# Patient Record
Sex: MALE | Race: WHITE | NOT HISPANIC OR LATINO | Employment: FULL TIME | URBAN - NONMETROPOLITAN AREA
[De-identification: names, ages, dates, MRNs, and addresses within clinical notes are randomized per-mention and may not be internally consistent; named-entity substitution may affect disease eponyms.]

---

## 2020-06-12 ENCOUNTER — HOSPITAL ENCOUNTER (OUTPATIENT)
Dept: LAB | Facility: MEDICAL CENTER | Age: 70
End: 2020-06-12
Attending: FAMILY MEDICINE
Payer: MEDICARE

## 2020-06-12 LAB
25(OH)D3 SERPL-MCNC: 22 NG/ML (ref 30–100)
ALBUMIN SERPL BCP-MCNC: 4.2 G/DL (ref 3.2–4.9)
ALBUMIN/GLOB SERPL: 1.4 G/DL
ALP SERPL-CCNC: 62 U/L (ref 30–99)
ALT SERPL-CCNC: 18 U/L (ref 2–50)
ANION GAP SERPL CALC-SCNC: 12 MMOL/L (ref 7–16)
AST SERPL-CCNC: 24 U/L (ref 12–45)
BASOPHILS # BLD AUTO: 0.9 % (ref 0–1.8)
BASOPHILS # BLD: 0.05 K/UL (ref 0–0.12)
BILIRUB SERPL-MCNC: 0.4 MG/DL (ref 0.1–1.5)
BUN SERPL-MCNC: 16 MG/DL (ref 8–22)
CALCIUM SERPL-MCNC: 8.8 MG/DL (ref 8.5–10.5)
CHLORIDE SERPL-SCNC: 106 MMOL/L (ref 96–112)
CHOLEST SERPL-MCNC: 229 MG/DL (ref 100–199)
CO2 SERPL-SCNC: 22 MMOL/L (ref 20–33)
CREAT SERPL-MCNC: 0.85 MG/DL (ref 0.5–1.4)
EOSINOPHIL # BLD AUTO: 0.24 K/UL (ref 0–0.51)
EOSINOPHIL NFR BLD: 4.3 % (ref 0–6.9)
ERYTHROCYTE [DISTWIDTH] IN BLOOD BY AUTOMATED COUNT: 46.1 FL (ref 35.9–50)
FASTING STATUS PATIENT QL REPORTED: NORMAL
GLOBULIN SER CALC-MCNC: 2.9 G/DL (ref 1.9–3.5)
GLUCOSE SERPL-MCNC: 89 MG/DL (ref 65–99)
HCT VFR BLD AUTO: 42.7 % (ref 42–52)
HDLC SERPL-MCNC: 49 MG/DL
HGB BLD-MCNC: 13.4 G/DL (ref 14–18)
IMM GRANULOCYTES # BLD AUTO: 0.01 K/UL (ref 0–0.11)
IMM GRANULOCYTES NFR BLD AUTO: 0.2 % (ref 0–0.9)
LDLC SERPL CALC-MCNC: 157 MG/DL
LYMPHOCYTES # BLD AUTO: 1.51 K/UL (ref 1–4.8)
LYMPHOCYTES NFR BLD: 26.8 % (ref 22–41)
MCH RBC QN AUTO: 28.5 PG (ref 27–33)
MCHC RBC AUTO-ENTMCNC: 31.4 G/DL (ref 33.7–35.3)
MCV RBC AUTO: 90.9 FL (ref 81.4–97.8)
MONOCYTES # BLD AUTO: 0.7 K/UL (ref 0–0.85)
MONOCYTES NFR BLD AUTO: 12.4 % (ref 0–13.4)
NEUTROPHILS # BLD AUTO: 3.12 K/UL (ref 1.82–7.42)
NEUTROPHILS NFR BLD: 55.4 % (ref 44–72)
NRBC # BLD AUTO: 0 K/UL
NRBC BLD-RTO: 0 /100 WBC
PLATELET # BLD AUTO: 232 K/UL (ref 164–446)
PMV BLD AUTO: 10.6 FL (ref 9–12.9)
POTASSIUM SERPL-SCNC: 4.1 MMOL/L (ref 3.6–5.5)
PROT SERPL-MCNC: 7.1 G/DL (ref 6–8.2)
RBC # BLD AUTO: 4.7 M/UL (ref 4.7–6.1)
SODIUM SERPL-SCNC: 140 MMOL/L (ref 135–145)
T3FREE SERPL-MCNC: 2.84 PG/ML (ref 2–4.4)
T4 FREE SERPL-MCNC: 1.47 NG/DL (ref 0.93–1.7)
TRIGL SERPL-MCNC: 114 MG/DL (ref 0–149)
TSH SERPL DL<=0.005 MIU/L-ACNC: 0.52 UIU/ML (ref 0.38–5.33)
WBC # BLD AUTO: 5.6 K/UL (ref 4.8–10.8)

## 2020-06-12 PROCEDURE — 84403 ASSAY OF TOTAL TESTOSTERONE: CPT

## 2020-06-12 PROCEDURE — 86376 MICROSOMAL ANTIBODY EACH: CPT

## 2020-06-12 PROCEDURE — 84443 ASSAY THYROID STIM HORMONE: CPT

## 2020-06-12 PROCEDURE — 82306 VITAMIN D 25 HYDROXY: CPT

## 2020-06-12 PROCEDURE — 80053 COMPREHEN METABOLIC PANEL: CPT

## 2020-06-12 PROCEDURE — 84270 ASSAY OF SEX HORMONE GLOBUL: CPT

## 2020-06-12 PROCEDURE — 84439 ASSAY OF FREE THYROXINE: CPT

## 2020-06-12 PROCEDURE — 85025 COMPLETE CBC W/AUTO DIFF WBC: CPT

## 2020-06-12 PROCEDURE — 84402 ASSAY OF FREE TESTOSTERONE: CPT

## 2020-06-12 PROCEDURE — 86800 THYROGLOBULIN ANTIBODY: CPT

## 2020-06-12 PROCEDURE — 84481 FREE ASSAY (FT-3): CPT

## 2020-06-12 PROCEDURE — 80061 LIPID PANEL: CPT

## 2020-06-12 PROCEDURE — 36415 COLL VENOUS BLD VENIPUNCTURE: CPT

## 2020-06-14 LAB
SHBG SERPL-SCNC: 51 NMOL/L (ref 11–80)
TESTOST FREE MFR SERPL: 1.4 % (ref 1.6–2.9)
TESTOST FREE SERPL-MCNC: 62 PG/ML (ref 47–244)
TESTOST SERPL-MCNC: 435 NG/DL (ref 300–720)
THYROGLOB AB SERPL-ACNC: <0.9 IU/ML (ref 0–4)
THYROPEROXIDASE AB SERPL-ACNC: 0.7 IU/ML (ref 0–9)

## 2020-09-25 ENCOUNTER — HOSPITAL ENCOUNTER (OUTPATIENT)
Dept: LAB | Facility: MEDICAL CENTER | Age: 70
End: 2020-09-25
Attending: FAMILY MEDICINE
Payer: MEDICARE

## 2020-09-25 LAB
25(OH)D3 SERPL-MCNC: 42 NG/ML (ref 30–100)
ALBUMIN SERPL BCP-MCNC: 4 G/DL (ref 3.2–4.9)
ALBUMIN/GLOB SERPL: 1.4 G/DL
ALP SERPL-CCNC: 79 U/L (ref 30–99)
ALT SERPL-CCNC: 26 U/L (ref 2–50)
ANION GAP SERPL CALC-SCNC: 13 MMOL/L (ref 7–16)
AST SERPL-CCNC: 27 U/L (ref 12–45)
BASOPHILS # BLD AUTO: 1.2 % (ref 0–1.8)
BASOPHILS # BLD: 0.07 K/UL (ref 0–0.12)
BILIRUB SERPL-MCNC: 0.3 MG/DL (ref 0.1–1.5)
BUN SERPL-MCNC: 22 MG/DL (ref 8–22)
CALCIUM SERPL-MCNC: 8.6 MG/DL (ref 8.5–10.5)
CHLORIDE SERPL-SCNC: 103 MMOL/L (ref 96–112)
CHOLEST SERPL-MCNC: 197 MG/DL (ref 100–199)
CO2 SERPL-SCNC: 21 MMOL/L (ref 20–33)
CREAT SERPL-MCNC: 0.89 MG/DL (ref 0.5–1.4)
EOSINOPHIL # BLD AUTO: 0.37 K/UL (ref 0–0.51)
EOSINOPHIL NFR BLD: 6.2 % (ref 0–6.9)
ERYTHROCYTE [DISTWIDTH] IN BLOOD BY AUTOMATED COUNT: 46.5 FL (ref 35.9–50)
FASTING STATUS PATIENT QL REPORTED: NORMAL
GLOBULIN SER CALC-MCNC: 2.9 G/DL (ref 1.9–3.5)
GLUCOSE SERPL-MCNC: 97 MG/DL (ref 65–99)
HCT VFR BLD AUTO: 40.8 % (ref 42–52)
HDLC SERPL-MCNC: 48 MG/DL
HGB BLD-MCNC: 13.1 G/DL (ref 14–18)
IMM GRANULOCYTES # BLD AUTO: 0.01 K/UL (ref 0–0.11)
IMM GRANULOCYTES NFR BLD AUTO: 0.2 % (ref 0–0.9)
LDLC SERPL CALC-MCNC: 128 MG/DL
LYMPHOCYTES # BLD AUTO: 1.65 K/UL (ref 1–4.8)
LYMPHOCYTES NFR BLD: 27.6 % (ref 22–41)
MCH RBC QN AUTO: 27.6 PG (ref 27–33)
MCHC RBC AUTO-ENTMCNC: 32.1 G/DL (ref 33.7–35.3)
MCV RBC AUTO: 85.9 FL (ref 81.4–97.8)
MONOCYTES # BLD AUTO: 0.81 K/UL (ref 0–0.85)
MONOCYTES NFR BLD AUTO: 13.5 % (ref 0–13.4)
NEUTROPHILS # BLD AUTO: 3.07 K/UL (ref 1.82–7.42)
NEUTROPHILS NFR BLD: 51.3 % (ref 44–72)
NRBC # BLD AUTO: 0 K/UL
NRBC BLD-RTO: 0 /100 WBC
PLATELET # BLD AUTO: 231 K/UL (ref 164–446)
PMV BLD AUTO: 12.5 FL (ref 9–12.9)
POTASSIUM SERPL-SCNC: 4.2 MMOL/L (ref 3.6–5.5)
PROT SERPL-MCNC: 6.9 G/DL (ref 6–8.2)
RBC # BLD AUTO: 4.75 M/UL (ref 4.7–6.1)
SODIUM SERPL-SCNC: 137 MMOL/L (ref 135–145)
TRIGL SERPL-MCNC: 107 MG/DL (ref 0–149)
WBC # BLD AUTO: 6 K/UL (ref 4.8–10.8)

## 2020-09-25 PROCEDURE — 80053 COMPREHEN METABOLIC PANEL: CPT

## 2020-09-25 PROCEDURE — 84270 ASSAY OF SEX HORMONE GLOBUL: CPT

## 2020-09-25 PROCEDURE — 36415 COLL VENOUS BLD VENIPUNCTURE: CPT

## 2020-09-25 PROCEDURE — 82306 VITAMIN D 25 HYDROXY: CPT

## 2020-09-25 PROCEDURE — 84402 ASSAY OF FREE TESTOSTERONE: CPT

## 2020-09-25 PROCEDURE — 80061 LIPID PANEL: CPT

## 2020-09-25 PROCEDURE — 84403 ASSAY OF TOTAL TESTOSTERONE: CPT

## 2020-09-25 PROCEDURE — 85025 COMPLETE CBC W/AUTO DIFF WBC: CPT

## 2020-09-27 LAB
SHBG SERPL-SCNC: 44 NMOL/L (ref 11–80)
TESTOST FREE MFR SERPL: 1.6 % (ref 1.6–2.9)
TESTOST FREE SERPL-MCNC: 59 PG/ML (ref 47–244)
TESTOST SERPL-MCNC: 383 NG/DL (ref 300–720)

## 2020-11-25 ENCOUNTER — HOSPITAL ENCOUNTER (OUTPATIENT)
Dept: LAB | Facility: MEDICAL CENTER | Age: 70
End: 2020-11-25
Attending: FAMILY MEDICINE
Payer: MEDICARE

## 2020-11-25 LAB
25(OH)D3 SERPL-MCNC: 59 NG/ML (ref 30–100)
ALBUMIN SERPL BCP-MCNC: 4.1 G/DL (ref 3.2–4.9)
ALBUMIN/GLOB SERPL: 1.4 G/DL
ALP SERPL-CCNC: 71 U/L (ref 30–99)
ALT SERPL-CCNC: 22 U/L (ref 2–50)
ANION GAP SERPL CALC-SCNC: 8 MMOL/L (ref 7–16)
AST SERPL-CCNC: 22 U/L (ref 12–45)
BASOPHILS # BLD AUTO: 0.7 % (ref 0–1.8)
BASOPHILS # BLD: 0.04 K/UL (ref 0–0.12)
BILIRUB SERPL-MCNC: 0.4 MG/DL (ref 0.1–1.5)
BUN SERPL-MCNC: 17 MG/DL (ref 8–22)
CALCIUM SERPL-MCNC: 9 MG/DL (ref 8.5–10.5)
CHLORIDE SERPL-SCNC: 104 MMOL/L (ref 96–112)
CHOLEST SERPL-MCNC: 166 MG/DL (ref 100–199)
CO2 SERPL-SCNC: 24 MMOL/L (ref 20–33)
CREAT SERPL-MCNC: 0.86 MG/DL (ref 0.5–1.4)
EOSINOPHIL # BLD AUTO: 0.16 K/UL (ref 0–0.51)
EOSINOPHIL NFR BLD: 2.7 % (ref 0–6.9)
ERYTHROCYTE [DISTWIDTH] IN BLOOD BY AUTOMATED COUNT: 51.3 FL (ref 35.9–50)
FASTING STATUS PATIENT QL REPORTED: NORMAL
GLOBULIN SER CALC-MCNC: 2.9 G/DL (ref 1.9–3.5)
GLUCOSE SERPL-MCNC: 91 MG/DL (ref 65–99)
HCT VFR BLD AUTO: 41.8 % (ref 42–52)
HDLC SERPL-MCNC: 39 MG/DL
HGB BLD-MCNC: 13.3 G/DL (ref 14–18)
IMM GRANULOCYTES # BLD AUTO: 0.01 K/UL (ref 0–0.11)
IMM GRANULOCYTES NFR BLD AUTO: 0.2 % (ref 0–0.9)
LDLC SERPL CALC-MCNC: 107 MG/DL
LYMPHOCYTES # BLD AUTO: 2.01 K/UL (ref 1–4.8)
LYMPHOCYTES NFR BLD: 34.1 % (ref 22–41)
MCH RBC QN AUTO: 28.1 PG (ref 27–33)
MCHC RBC AUTO-ENTMCNC: 31.8 G/DL (ref 33.7–35.3)
MCV RBC AUTO: 88.2 FL (ref 81.4–97.8)
MONOCYTES # BLD AUTO: 0.72 K/UL (ref 0–0.85)
MONOCYTES NFR BLD AUTO: 12.2 % (ref 0–13.4)
NEUTROPHILS # BLD AUTO: 2.95 K/UL (ref 1.82–7.42)
NEUTROPHILS NFR BLD: 50.1 % (ref 44–72)
NRBC # BLD AUTO: 0 K/UL
NRBC BLD-RTO: 0 /100 WBC
PLATELET # BLD AUTO: 255 K/UL (ref 164–446)
PMV BLD AUTO: 12 FL (ref 9–12.9)
POTASSIUM SERPL-SCNC: 3.9 MMOL/L (ref 3.6–5.5)
PROT SERPL-MCNC: 7 G/DL (ref 6–8.2)
RBC # BLD AUTO: 4.74 M/UL (ref 4.7–6.1)
SODIUM SERPL-SCNC: 136 MMOL/L (ref 135–145)
T3FREE SERPL-MCNC: 2.63 PG/ML (ref 2–4.4)
T4 FREE SERPL-MCNC: 1.78 NG/DL (ref 0.93–1.7)
TRIGL SERPL-MCNC: 100 MG/DL (ref 0–149)
TSH SERPL DL<=0.005 MIU/L-ACNC: 0.91 UIU/ML (ref 0.38–5.33)
WBC # BLD AUTO: 5.9 K/UL (ref 4.8–10.8)

## 2020-11-25 PROCEDURE — 84270 ASSAY OF SEX HORMONE GLOBUL: CPT

## 2020-11-25 PROCEDURE — 80053 COMPREHEN METABOLIC PANEL: CPT

## 2020-11-25 PROCEDURE — 85025 COMPLETE CBC W/AUTO DIFF WBC: CPT

## 2020-11-25 PROCEDURE — 82306 VITAMIN D 25 HYDROXY: CPT

## 2020-11-25 PROCEDURE — 84402 ASSAY OF FREE TESTOSTERONE: CPT

## 2020-11-25 PROCEDURE — 84403 ASSAY OF TOTAL TESTOSTERONE: CPT

## 2020-11-25 PROCEDURE — 84443 ASSAY THYROID STIM HORMONE: CPT

## 2020-11-25 PROCEDURE — 86800 THYROGLOBULIN ANTIBODY: CPT

## 2020-11-25 PROCEDURE — 84481 FREE ASSAY (FT-3): CPT

## 2020-11-25 PROCEDURE — 80061 LIPID PANEL: CPT

## 2020-11-25 PROCEDURE — 84439 ASSAY OF FREE THYROXINE: CPT

## 2020-11-25 PROCEDURE — 36415 COLL VENOUS BLD VENIPUNCTURE: CPT

## 2020-11-28 LAB
SHBG SERPL-SCNC: 38 NMOL/L (ref 11–80)
TESTOST FREE MFR SERPL: 1.7 % (ref 1.6–2.9)
TESTOST FREE SERPL-MCNC: 60 PG/ML (ref 47–244)
TESTOST SERPL-MCNC: 358 NG/DL (ref 300–720)
THYROGLOB AB SERPL-ACNC: <0.9 IU/ML (ref 0–4)

## 2021-08-13 ENCOUNTER — TELEPHONE (OUTPATIENT)
Dept: CARDIOLOGY | Facility: MEDICAL CENTER | Age: 71
End: 2021-08-13

## 2021-08-13 NOTE — TELEPHONE ENCOUNTER
Medical records have been requested from [facility] regarding all cardiac related records such as ECG tracings, ambulatory reports, OV notes.     Fax: [phone], Phone: [phone].    Fax confirmation has been received and sent to scan through [employer].

## 2021-08-16 ENCOUNTER — TELEPHONE (OUTPATIENT)
Dept: SCHEDULING | Facility: IMAGING CENTER | Age: 71
End: 2021-08-16

## 2021-09-01 ENCOUNTER — OFFICE VISIT (OUTPATIENT)
Dept: CARDIOLOGY | Facility: MEDICAL CENTER | Age: 71
End: 2021-09-01
Payer: MEDICARE

## 2021-09-01 VITALS
HEIGHT: 66 IN | BODY MASS INDEX: 40.98 KG/M2 | DIASTOLIC BLOOD PRESSURE: 74 MMHG | SYSTOLIC BLOOD PRESSURE: 132 MMHG | OXYGEN SATURATION: 96 % | HEART RATE: 56 BPM | WEIGHT: 255 LBS

## 2021-09-01 DIAGNOSIS — I48.0 PAROXYSMAL ATRIAL FIBRILLATION (HCC): ICD-10-CM

## 2021-09-01 LAB — EKG IMPRESSION: NORMAL

## 2021-09-01 PROCEDURE — 93000 ELECTROCARDIOGRAM COMPLETE: CPT | Performed by: INTERNAL MEDICINE

## 2021-09-01 PROCEDURE — 99204 OFFICE O/P NEW MOD 45 MIN: CPT | Performed by: INTERNAL MEDICINE

## 2021-09-01 RX ORDER — HYDROXYZINE 50 MG/1
50 TABLET, FILM COATED ORAL
COMMUNITY
End: 2021-12-19

## 2021-09-01 RX ORDER — LOSARTAN POTASSIUM 50 MG/1
50 TABLET ORAL DAILY
Status: ON HOLD | COMMUNITY
Start: 2021-03-08 | End: 2021-12-24

## 2021-09-01 RX ORDER — PRAVASTATIN SODIUM 20 MG
20 TABLET ORAL DAILY
Status: ON HOLD | COMMUNITY
Start: 2021-03-08 | End: 2022-01-06

## 2021-09-01 RX ORDER — AMIODARONE HYDROCHLORIDE 200 MG/1
100 TABLET ORAL DAILY
COMMUNITY
Start: 2021-03-08 | End: 2021-09-01

## 2021-09-01 ASSESSMENT — FIBROSIS 4 INDEX: FIB4 SCORE: 1.25

## 2021-09-01 NOTE — PROGRESS NOTES
Arrhythmia Clinic Note (New patient)     DOS: 9/1/2021    Referring physician: Dr Alfonso    Chief complaint/Reason for consult: Afib    HPI: 69 y/o M with dx of pAF/AFL. S/p DCCV in 2019 on amiodarone since. On Xarelto. Major complaints is intermittent muscle spasms. Otherwise no recurrence of palpitations. Wants to be off meds if possible.     ROS (+ highlighted in bold):  Constitutional: Fevers/chills/fatigue/weightloss  HEENT: Blurry vision/eye pain/sore throat/hearing loss  Respiratory: Shortness of breath/cough  Cardiovascular: Chest pain/palpitations/edema/orthopnea/syncope  GI: Nausea/vomitting/diarrhea  MSK: Arthralgias/myagias/muscle weakness  Skin: Rash/sores  Neurological: Numbness/tremors/vertigo  Endocrine: Excessive thirst/polyuria/cold intolerance/heat intolerance  Psych: Depression/anxiety    History reviewed. No pertinent past medical history.   PAF/AFL  HTN  HLD    History reviewed. No pertinent surgical history.    Social History     Socioeconomic History   • Marital status:      Spouse name: Not on file   • Number of children: Not on file   • Years of education: Not on file   • Highest education level: Not on file   Occupational History   • Not on file   Tobacco Use   • Smoking status: Never Smoker   • Smokeless tobacco: Never Used   Substance and Sexual Activity   • Alcohol use: Never   • Drug use: Never   • Sexual activity: Not on file   Other Topics Concern   • Not on file   Social History Narrative   • Not on file     Social Determinants of Health     Financial Resource Strain:    • Difficulty of Paying Living Expenses:    Food Insecurity:    • Worried About Running Out of Food in the Last Year:    • Ran Out of Food in the Last Year:    Transportation Needs:    • Lack of Transportation (Medical):    • Lack of Transportation (Non-Medical):    Physical Activity:    • Days of Exercise per Week:    • Minutes of Exercise per Session:    Stress:    • Feeling of Stress :    Social  "Connections:    • Frequency of Communication with Friends and Family:    • Frequency of Social Gatherings with Friends and Family:    • Attends [employer] Services:    • Active Member of Clubs or Organizations:    • Attends Club or Organization Meetings:    • Marital Status:    Intimate Partner Violence:    • Fear of Current or Ex-Partner:    • Emotionally Abused:    • Physically Abused:    • Sexually Abused:        History reviewed. No pertinent family history.   Father  of heart disease    No Known Allergies    Current Outpatient Medications   Medication Sig Dispense Refill   • losartan (COZAAR) 50 MG Tab Take 50 mg by mouth every day.     • pravastatin (PRAVACHOL) 20 MG Tab Take 20 mg by mouth every day.     • rivaroxaban (XARELTO) 20 MG Tab tablet Take 20 mg by mouth every day.     • hydrOXYzine HCl (ATARAX) 50 MG Tab Take 50 mg by mouth at bedtime.       No current facility-administered medications for this visit.       Physical Exam:  Vitals:    21 0746   BP: 132/74   BP Location: Left arm   Patient Position: Sitting   BP Cuff Size: Adult   Pulse: (!) 56   SpO2: 96%   Weight: 116 kg (255 lb)   Height: 1.676 m (5' 6\")     General appearance: NAD, conversant   Eyes: anicteric sclerae, moist conjunctivae; no lid-lag; PERRLA  HENT: Atraumatic; oropharynx clear with moist mucous membranes and no mucosal ulcerations; normal hard and soft palate  Neck: Trachea midline; FROM, supple, no thyromegaly or lymphadenopathy  Lungs: CTA, with normal respiratory effort and no intercostal retractions  CV: RRR, no MRGs, no JVD   Abdomen: Soft, non-tender; no masses or HSM  Extremities: No peripheral edema or extremity lymphadenopathy  Skin: Normal temperature, turgor and texture; no rash, ulcers or subcutaneous nodules  Psych: Appropriate affect, alert and oriented to person, place and time    Data:  Lipids:   Lab Results   Component Value Date/Time    CHOLSTRLTOT 166 2020 07:23 AM    TRIGLYCERIDE 100 2020 " 07:23 AM    HDL 39 (A) 11/25/2020 07:23 AM     (H) 11/25/2020 07:23 AM        BMP:  Lab Results   Component Value Date/Time    SODIUM 136 11/25/2020 0723    POTASSIUM 3.9 11/25/2020 0723    CHLORIDE 104 11/25/2020 0723    CO2 24 11/25/2020 0723    GLUCOSE 91 11/25/2020 0723    BUN 17 11/25/2020 0723    CREATININE 0.86 11/25/2020 0723    CALCIUM 9.0 11/25/2020 0723    ANION 8.0 11/25/2020 0723        TSH:   Lab Results   Component Value Date/Time    TSHULTRASEN 0.914 11/25/2020 0723        THYROXINE (T4):   No results found for: FREEDIR     CBC:   Lab Results   Component Value Date/Time    WBC 5.9 11/25/2020 07:23 AM    RBC 4.74 11/25/2020 07:23 AM    HEMOGLOBIN 13.3 (L) 11/25/2020 07:23 AM    HEMATOCRIT 41.8 (L) 11/25/2020 07:23 AM    MCV 88.2 11/25/2020 07:23 AM    MCH 28.1 11/25/2020 07:23 AM    MCHC 31.8 (L) 11/25/2020 07:23 AM    RDW 51.3 (H) 11/25/2020 07:23 AM    PLATELETCT 255 11/25/2020 07:23 AM    MPV 12.0 11/25/2020 07:23 AM    NEUTSPOLYS 50.10 11/25/2020 07:23 AM    LYMPHOCYTES 34.10 11/25/2020 07:23 AM    MONOCYTES 12.20 11/25/2020 07:23 AM    EOSINOPHILS 2.70 11/25/2020 07:23 AM    BASOPHILS 0.70 11/25/2020 07:23 AM    IMMGRAN 0.20 11/25/2020 07:23 AM    NRBC 0.00 11/25/2020 07:23 AM    NEUTS 2.95 11/25/2020 07:23 AM    LYMPHS 2.01 11/25/2020 07:23 AM    MONOS 0.72 11/25/2020 07:23 AM    EOS 0.16 11/25/2020 07:23 AM    BASO 0.04 11/25/2020 07:23 AM    IMMGRANAB 0.01 11/25/2020 07:23 AM    NRBCAB 0.00 11/25/2020 07:23 AM        CBC w/o DIFF  Lab Results   Component Value Date/Time    WBC 5.9 11/25/2020 07:23 AM    RBC 4.74 11/25/2020 07:23 AM    HEMOGLOBIN 13.3 (L) 11/25/2020 07:23 AM    MCV 88.2 11/25/2020 07:23 AM    MCH 28.1 11/25/2020 07:23 AM    MCHC 31.8 (L) 11/25/2020 07:23 AM    RDW 51.3 (H) 11/25/2020 07:23 AM    MPV 12.0 11/25/2020 07:23 AM       Prior echo/stress results reviewed: [facility] echo reviewed, normal EF    EKG interpreted by me: BJ    Impression/Plan:  1. Paroxysmal atrial  fibrillation (HCC)  EKG     1. pAF  2. AFL  3. HTN  4. HLD    - SBP controlled on Losartan  - HLD controlled on Pravachol  - Wishes to come off amiodarone and metoprolol. Instructed ROB tx and weight loss to prevent AF recurrence. If recurrence we discussed flecainide vs ablation, he would prefer ablation  - Continue Xarelto [id number] is 2    FV in 6 months, sooner if needed    Prince Brown MD  Cardiac Electrophysiology

## 2021-12-19 ENCOUNTER — APPOINTMENT (OUTPATIENT)
Dept: RADIOLOGY | Facility: MEDICAL CENTER | Age: 71
DRG: 065 | End: 2021-12-19
Attending: EMERGENCY MEDICINE
Payer: MEDICARE

## 2021-12-19 ENCOUNTER — APPOINTMENT (OUTPATIENT)
Dept: RADIOLOGY | Facility: MEDICAL CENTER | Age: 71
DRG: 065 | End: 2021-12-19
Attending: PSYCHIATRY & NEUROLOGY
Payer: MEDICARE

## 2021-12-19 ENCOUNTER — APPOINTMENT (OUTPATIENT)
Dept: CARDIOLOGY | Facility: MEDICAL CENTER | Age: 71
DRG: 065 | End: 2021-12-19
Attending: INTERNAL MEDICINE
Payer: MEDICARE

## 2021-12-19 ENCOUNTER — HOSPITAL ENCOUNTER (INPATIENT)
Facility: MEDICAL CENTER | Age: 71
LOS: 5 days | DRG: 065 | End: 2021-12-24
Attending: EMERGENCY MEDICINE | Admitting: HOSPITALIST
Payer: MEDICARE

## 2021-12-19 DIAGNOSIS — I61.9 CEREBRAL HEMORRHAGE (HCC): Primary | ICD-10-CM

## 2021-12-19 DIAGNOSIS — F51.01 PRIMARY INSOMNIA: ICD-10-CM

## 2021-12-19 DIAGNOSIS — D68.9 COAGULOPATHY (HCC): ICD-10-CM

## 2021-12-19 DIAGNOSIS — R53.1 LEFT-SIDED WEAKNESS: ICD-10-CM

## 2021-12-19 DIAGNOSIS — I10 PRIMARY HYPERTENSION: ICD-10-CM

## 2021-12-19 PROBLEM — E78.5 HYPERLIPEMIA: Status: ACTIVE | Noted: 2021-12-19

## 2021-12-19 PROBLEM — I48.91 AF (ATRIAL FIBRILLATION) (HCC): Status: ACTIVE | Noted: 2021-12-19

## 2021-12-19 PROBLEM — I62.9 INTRACRANIAL HEMORRHAGE (HCC): Status: ACTIVE | Noted: 2021-12-19

## 2021-12-19 LAB
ABO + RH BLD: NORMAL
ABO GROUP BLD: NORMAL
ALBUMIN SERPL BCP-MCNC: 4.2 G/DL (ref 3.2–4.9)
ALBUMIN/GLOB SERPL: 1.4 G/DL
ALP SERPL-CCNC: 88 U/L (ref 30–99)
ALT SERPL-CCNC: 27 U/L (ref 2–50)
AMPHET UR QL SCN: NEGATIVE
ANION GAP SERPL CALC-SCNC: 10 MMOL/L (ref 7–16)
ANION GAP SERPL CALC-SCNC: 9 MMOL/L (ref 7–16)
APTT PPP: 35.2 SEC (ref 24.7–36)
AST SERPL-CCNC: 26 U/L (ref 12–45)
BARBITURATES UR QL SCN: NEGATIVE
BASOPHILS # BLD AUTO: 0.8 % (ref 0–1.8)
BASOPHILS # BLD: 0.05 K/UL (ref 0–0.12)
BENZODIAZ UR QL SCN: NEGATIVE
BILIRUB SERPL-MCNC: 0.2 MG/DL (ref 0.1–1.5)
BLD GP AB SCN SERPL QL: NORMAL
BUN SERPL-MCNC: 17 MG/DL (ref 8–22)
BUN SERPL-MCNC: 21 MG/DL (ref 8–22)
BZE UR QL SCN: NEGATIVE
CALCIUM SERPL-MCNC: 8.8 MG/DL (ref 8.5–10.5)
CALCIUM SERPL-MCNC: 8.8 MG/DL (ref 8.5–10.5)
CANNABINOIDS UR QL SCN: NEGATIVE
CFT BLD TEG: 5.6 MIN (ref 4.6–9.1)
CFT P HPASE BLD TEG: 5.9 MIN (ref 4.3–8.3)
CHLORIDE SERPL-SCNC: 104 MMOL/L (ref 96–112)
CHLORIDE SERPL-SCNC: 104 MMOL/L (ref 96–112)
CHOLEST SERPL-MCNC: 191 MG/DL (ref 100–199)
CLOT ANGLE BLD TEG: 78 DEGREES (ref 63–78)
CLOT LYSIS 30M P MA LENFR BLD TEG: 0.1 % (ref 0–2.6)
CO2 SERPL-SCNC: 24 MMOL/L (ref 20–33)
CO2 SERPL-SCNC: 27 MMOL/L (ref 20–33)
CREAT SERPL-MCNC: 0.67 MG/DL (ref 0.5–1.4)
CREAT SERPL-MCNC: 0.7 MG/DL (ref 0.5–1.4)
CT.EXTRINSIC BLD ROTEM: 0.8 MIN (ref 0.8–2.1)
EKG IMPRESSION: NORMAL
EOSINOPHIL # BLD AUTO: 0.22 K/UL (ref 0–0.51)
EOSINOPHIL NFR BLD: 3.5 % (ref 0–6.9)
ERYTHROCYTE [DISTWIDTH] IN BLOOD BY AUTOMATED COUNT: 45.8 FL (ref 35.9–50)
EST. AVERAGE GLUCOSE BLD GHB EST-MCNC: 111 MG/DL
GLOBULIN SER CALC-MCNC: 2.9 G/DL (ref 1.9–3.5)
GLUCOSE BLD-MCNC: 100 MG/DL (ref 65–99)
GLUCOSE BLD-MCNC: 124 MG/DL (ref 65–99)
GLUCOSE BLD-MCNC: 125 MG/DL (ref 65–99)
GLUCOSE SERPL-MCNC: 117 MG/DL (ref 65–99)
GLUCOSE SERPL-MCNC: 124 MG/DL (ref 65–99)
HBA1C MFR BLD: 5.5 % (ref 4–5.6)
HCT VFR BLD AUTO: 40.2 % (ref 42–52)
HDLC SERPL-MCNC: 46 MG/DL
HGB BLD-MCNC: 13.3 G/DL (ref 14–18)
IMM GRANULOCYTES # BLD AUTO: 0.02 K/UL (ref 0–0.11)
IMM GRANULOCYTES NFR BLD AUTO: 0.3 % (ref 0–0.9)
INR PPP: 1.23 (ref 0.87–1.13)
LDLC SERPL CALC-MCNC: 124 MG/DL
LV EJECT FRACT  99904: 70
LV EJECT FRACT MOD 2C 99903: 71.38
LV EJECT FRACT MOD 4C 99902: 74.48
LV EJECT FRACT MOD BP 99901: 72.62
LYMPHOCYTES # BLD AUTO: 1.69 K/UL (ref 1–4.8)
LYMPHOCYTES NFR BLD: 26.6 % (ref 22–41)
MCF BLD TEG: 63.6 MM (ref 52–69)
MCF.PLATELET INHIB BLD ROTEM: 28.7 MM (ref 15–32)
MCH RBC QN AUTO: 29.3 PG (ref 27–33)
MCHC RBC AUTO-ENTMCNC: 33.1 G/DL (ref 33.7–35.3)
MCV RBC AUTO: 88.5 FL (ref 81.4–97.8)
METHADONE UR QL SCN: NEGATIVE
MONOCYTES # BLD AUTO: 0.82 K/UL (ref 0–0.85)
MONOCYTES NFR BLD AUTO: 12.9 % (ref 0–13.4)
NEUTROPHILS # BLD AUTO: 3.55 K/UL (ref 1.82–7.42)
NEUTROPHILS NFR BLD: 55.9 % (ref 44–72)
NRBC # BLD AUTO: 0.02 K/UL
NRBC BLD-RTO: 0.3 /100 WBC
OPIATES UR QL SCN: NEGATIVE
OXYCODONE UR QL SCN: NEGATIVE
PA AA BLD-ACNC: 15.3 % (ref 0–11)
PA ADP BLD-ACNC: 0.9 % (ref 0–17)
PCP UR QL SCN: NEGATIVE
PLATELET # BLD AUTO: 229 K/UL (ref 164–446)
PMV BLD AUTO: 9.2 FL (ref 9–12.9)
POTASSIUM SERPL-SCNC: 4.2 MMOL/L (ref 3.6–5.5)
POTASSIUM SERPL-SCNC: 4.6 MMOL/L (ref 3.6–5.5)
PROPOXYPH UR QL SCN: NEGATIVE
PROT SERPL-MCNC: 7.1 G/DL (ref 6–8.2)
PROTHROMBIN TIME: 15.2 SEC (ref 12–14.6)
RBC # BLD AUTO: 4.54 M/UL (ref 4.7–6.1)
RH BLD: NORMAL
SODIUM SERPL-SCNC: 138 MMOL/L (ref 135–145)
SODIUM SERPL-SCNC: 140 MMOL/L (ref 135–145)
SODIUM SERPL-SCNC: 141 MMOL/L (ref 135–145)
SODIUM SERPL-SCNC: 143 MMOL/L (ref 135–145)
TEG ALGORITHM TGALG: ABNORMAL
TRIGL SERPL-MCNC: 104 MG/DL (ref 0–149)
TROPONIN T SERPL-MCNC: 8 NG/L (ref 6–19)
WBC # BLD AUTO: 6.4 K/UL (ref 4.8–10.8)

## 2021-12-19 PROCEDURE — 700111 HCHG RX REV CODE 636 W/ 250 OVERRIDE (IP): Mod: JG | Performed by: EMERGENCY MEDICINE

## 2021-12-19 PROCEDURE — 85576 BLOOD PLATELET AGGREGATION: CPT | Mod: 91

## 2021-12-19 PROCEDURE — 85025 COMPLETE CBC W/AUTO DIFF WBC: CPT

## 2021-12-19 PROCEDURE — 99221 1ST HOSP IP/OBS SF/LOW 40: CPT | Performed by: PSYCHIATRY & NEUROLOGY

## 2021-12-19 PROCEDURE — 700117 HCHG RX CONTRAST REV CODE 255: Performed by: INTERNAL MEDICINE

## 2021-12-19 PROCEDURE — 700111 HCHG RX REV CODE 636 W/ 250 OVERRIDE (IP): Performed by: PSYCHIATRY & NEUROLOGY

## 2021-12-19 PROCEDURE — 85384 FIBRINOGEN ACTIVITY: CPT

## 2021-12-19 PROCEDURE — 96375 TX/PRO/DX INJ NEW DRUG ADDON: CPT

## 2021-12-19 PROCEDURE — 70450 CT HEAD/BRAIN W/O DYE: CPT | Mod: MG

## 2021-12-19 PROCEDURE — 92610 EVALUATE SWALLOWING FUNCTION: CPT

## 2021-12-19 PROCEDURE — 86900 BLOOD TYPING SEROLOGIC ABO: CPT

## 2021-12-19 PROCEDURE — 82962 GLUCOSE BLOOD TEST: CPT | Mod: 91

## 2021-12-19 PROCEDURE — 99291 CRITICAL CARE FIRST HOUR: CPT

## 2021-12-19 PROCEDURE — 700111 HCHG RX REV CODE 636 W/ 250 OVERRIDE (IP): Performed by: INTERNAL MEDICINE

## 2021-12-19 PROCEDURE — 93005 ELECTROCARDIOGRAM TRACING: CPT | Performed by: EMERGENCY MEDICINE

## 2021-12-19 PROCEDURE — 99291 CRITICAL CARE FIRST HOUR: CPT | Performed by: INTERNAL MEDICINE

## 2021-12-19 PROCEDURE — 80307 DRUG TEST PRSMV CHEM ANLYZR: CPT

## 2021-12-19 PROCEDURE — 0042T CT-CEREBRAL PERFUSION ANALYSIS: CPT

## 2021-12-19 PROCEDURE — 80061 LIPID PANEL: CPT

## 2021-12-19 PROCEDURE — 700105 HCHG RX REV CODE 258: Performed by: PSYCHIATRY & NEUROLOGY

## 2021-12-19 PROCEDURE — 84295 ASSAY OF SERUM SODIUM: CPT

## 2021-12-19 PROCEDURE — 36415 COLL VENOUS BLD VENIPUNCTURE: CPT

## 2021-12-19 PROCEDURE — 85610 PROTHROMBIN TIME: CPT

## 2021-12-19 PROCEDURE — 86901 BLOOD TYPING SEROLOGIC RH(D): CPT

## 2021-12-19 PROCEDURE — 86850 RBC ANTIBODY SCREEN: CPT

## 2021-12-19 PROCEDURE — 83036 HEMOGLOBIN GLYCOSYLATED A1C: CPT

## 2021-12-19 PROCEDURE — 85347 COAGULATION TIME ACTIVATED: CPT

## 2021-12-19 PROCEDURE — 71045 X-RAY EXAM CHEST 1 VIEW: CPT

## 2021-12-19 PROCEDURE — 80048 BASIC METABOLIC PNL TOTAL CA: CPT

## 2021-12-19 PROCEDURE — 84484 ASSAY OF TROPONIN QUANT: CPT

## 2021-12-19 PROCEDURE — 700105 HCHG RX REV CODE 258: Performed by: INTERNAL MEDICINE

## 2021-12-19 PROCEDURE — 770022 HCHG ROOM/CARE - ICU (200)

## 2021-12-19 PROCEDURE — 700117 HCHG RX CONTRAST REV CODE 255: Performed by: EMERGENCY MEDICINE

## 2021-12-19 PROCEDURE — 85730 THROMBOPLASTIN TIME PARTIAL: CPT

## 2021-12-19 PROCEDURE — 70498 CT ANGIOGRAPHY NECK: CPT | Mod: MG

## 2021-12-19 PROCEDURE — 70496 CT ANGIOGRAPHY HEAD: CPT | Mod: MG

## 2021-12-19 PROCEDURE — 92612 ENDOSCOPY SWALLOW (FEES) VID: CPT

## 2021-12-19 PROCEDURE — 93306 TTE W/DOPPLER COMPLETE: CPT

## 2021-12-19 PROCEDURE — 94760 N-INVAS EAR/PLS OXIMETRY 1: CPT

## 2021-12-19 PROCEDURE — 700101 HCHG RX REV CODE 250: Performed by: INTERNAL MEDICINE

## 2021-12-19 PROCEDURE — 93306 TTE W/DOPPLER COMPLETE: CPT | Mod: 26 | Performed by: INTERNAL MEDICINE

## 2021-12-19 PROCEDURE — 96374 THER/PROPH/DIAG INJ IV PUSH: CPT

## 2021-12-19 PROCEDURE — 80053 COMPREHEN METABOLIC PANEL: CPT

## 2021-12-19 PROCEDURE — 700101 HCHG RX REV CODE 250: Performed by: PSYCHIATRY & NEUROLOGY

## 2021-12-19 RX ORDER — ENALAPRILAT 1.25 MG/ML
1.25 INJECTION INTRAVENOUS EVERY 6 HOURS PRN
Status: DISCONTINUED | OUTPATIENT
Start: 2021-12-19 | End: 2021-12-24 | Stop reason: HOSPADM

## 2021-12-19 RX ORDER — BISACODYL 10 MG
10 SUPPOSITORY, RECTAL RECTAL
Status: DISCONTINUED | OUTPATIENT
Start: 2021-12-19 | End: 2021-12-24 | Stop reason: HOSPADM

## 2021-12-19 RX ORDER — SODIUM CHLORIDE 9 MG/ML
INJECTION, SOLUTION INTRAVENOUS CONTINUOUS
Status: DISCONTINUED | OUTPATIENT
Start: 2021-12-19 | End: 2021-12-19

## 2021-12-19 RX ORDER — DEXTROSE MONOHYDRATE 25 G/50ML
50 INJECTION, SOLUTION INTRAVENOUS
Status: DISCONTINUED | OUTPATIENT
Start: 2021-12-19 | End: 2021-12-22

## 2021-12-19 RX ORDER — 3% SODIUM CHLORIDE 3 G/100ML
500 INJECTION, SOLUTION INTRAVENOUS CONTINUOUS
Status: DISCONTINUED | OUTPATIENT
Start: 2021-12-19 | End: 2021-12-22 | Stop reason: ALTCHOICE

## 2021-12-19 RX ORDER — URSODIOL 300 MG/1
300 CAPSULE ORAL 2 TIMES DAILY
Status: ON HOLD | COMMUNITY
Start: 2021-11-10 | End: 2022-01-06

## 2021-12-19 RX ORDER — LABETALOL HYDROCHLORIDE 5 MG/ML
10 INJECTION, SOLUTION INTRAVENOUS EVERY 4 HOURS PRN
Status: DISCONTINUED | OUTPATIENT
Start: 2021-12-19 | End: 2021-12-23

## 2021-12-19 RX ORDER — HYDRALAZINE HYDROCHLORIDE 20 MG/ML
10 INJECTION INTRAMUSCULAR; INTRAVENOUS EVERY 4 HOURS PRN
Status: DISCONTINUED | OUTPATIENT
Start: 2021-12-19 | End: 2021-12-23

## 2021-12-19 RX ORDER — ACETAMINOPHEN 325 MG/1
650 TABLET ORAL EVERY 4 HOURS PRN
Status: DISCONTINUED | OUTPATIENT
Start: 2021-12-19 | End: 2021-12-24 | Stop reason: HOSPADM

## 2021-12-19 RX ORDER — LORAZEPAM 2 MG/ML
2 INJECTION INTRAMUSCULAR
Status: DISCONTINUED | OUTPATIENT
Start: 2021-12-19 | End: 2021-12-24 | Stop reason: HOSPADM

## 2021-12-19 RX ORDER — POLYETHYLENE GLYCOL 3350 17 G/17G
1 POWDER, FOR SOLUTION ORAL
Status: DISCONTINUED | OUTPATIENT
Start: 2021-12-19 | End: 2021-12-24 | Stop reason: HOSPADM

## 2021-12-19 RX ORDER — ACETAMINOPHEN 650 MG/1
650 SUPPOSITORY RECTAL EVERY 4 HOURS PRN
Status: DISCONTINUED | OUTPATIENT
Start: 2021-12-19 | End: 2021-12-24 | Stop reason: HOSPADM

## 2021-12-19 RX ORDER — ONDANSETRON 2 MG/ML
4 INJECTION INTRAMUSCULAR; INTRAVENOUS EVERY 4 HOURS PRN
Status: DISCONTINUED | OUTPATIENT
Start: 2021-12-19 | End: 2021-12-24 | Stop reason: HOSPADM

## 2021-12-19 RX ORDER — ONDANSETRON 4 MG/1
4 TABLET, ORALLY DISINTEGRATING ORAL EVERY 4 HOURS PRN
Status: DISCONTINUED | OUTPATIENT
Start: 2021-12-19 | End: 2021-12-24 | Stop reason: HOSPADM

## 2021-12-19 RX ORDER — AMOXICILLIN 250 MG
2 CAPSULE ORAL 2 TIMES DAILY
Status: DISCONTINUED | OUTPATIENT
Start: 2021-12-19 | End: 2021-12-24 | Stop reason: HOSPADM

## 2021-12-19 RX ADMIN — PROTHROMBIN, COAGULATION FACTOR VII HUMAN, COAGULATION FACTOR IX HUMAN, COAGULATION FACTOR X HUMAN, PROTEIN C, PROTEIN S HUMAN, AND WATER 5000 UNITS: KIT at 04:27

## 2021-12-19 RX ADMIN — SODIUM CHLORIDE 30 ML: 3 INJECTION, SOLUTION INTRAVENOUS at 10:52

## 2021-12-19 RX ADMIN — IOHEXOL 40 ML: 350 INJECTION, SOLUTION INTRAVENOUS at 03:56

## 2021-12-19 RX ADMIN — IOHEXOL 70 ML: 350 INJECTION, SOLUTION INTRAVENOUS at 03:58

## 2021-12-19 RX ADMIN — HUMAN ALBUMIN MICROSPHERES AND PERFLUTREN 3 ML: 10; .22 INJECTION, SOLUTION INTRAVENOUS at 10:56

## 2021-12-19 RX ADMIN — SODIUM CHLORIDE 10 MG/HR: 9 INJECTION, SOLUTION INTRAVENOUS at 14:34

## 2021-12-19 RX ADMIN — FENTANYL CITRATE 50 MCG: 50 INJECTION, SOLUTION INTRAMUSCULAR; INTRAVENOUS at 05:46

## 2021-12-19 RX ADMIN — SODIUM CHLORIDE: 9 INJECTION, SOLUTION INTRAVENOUS at 06:42

## 2021-12-19 RX ADMIN — SODIUM CHLORIDE 10 MG/HR: 9 INJECTION, SOLUTION INTRAVENOUS at 09:25

## 2021-12-19 RX ADMIN — SODIUM BICARBONATE 100 MEQ: 84 INJECTION, SOLUTION INTRAVENOUS at 10:58

## 2021-12-19 RX ADMIN — SODIUM CHLORIDE 10 MG/HR: 9 INJECTION, SOLUTION INTRAVENOUS at 20:16

## 2021-12-19 RX ADMIN — SODIUM CHLORIDE 10 MG/HR: 9 INJECTION, SOLUTION INTRAVENOUS at 11:52

## 2021-12-19 RX ADMIN — SODIUM CHLORIDE 2 MG/HR: 9 INJECTION, SOLUTION INTRAVENOUS at 06:02

## 2021-12-19 ASSESSMENT — PAIN DESCRIPTION - PAIN TYPE
TYPE: ACUTE PAIN

## 2021-12-19 ASSESSMENT — ENCOUNTER SYMPTOMS
NECK PAIN: 0
COUGH: 0
MEMORY LOSS: 0
TINGLING: 1
BLURRED VISION: 0
WEAKNESS: 0
SEIZURES: 0
NAUSEA: 1
DIZZINESS: 0
SENSORY CHANGE: 1
HEADACHES: 0
SHORTNESS OF BREATH: 0
DIARRHEA: 0
CHILLS: 0
HEADACHES: 1
SPEECH CHANGE: 1
BACK PAIN: 0
FOCAL WEAKNESS: 1
ABDOMINAL PAIN: 0
PHOTOPHOBIA: 0
SPEECH CHANGE: 0
VOMITING: 0
FEVER: 0
FALLS: 0
MYALGIAS: 0
WEAKNESS: 1
DOUBLE VISION: 0
LOSS OF CONSCIOUSNESS: 0
NAUSEA: 0
TREMORS: 0

## 2021-12-19 ASSESSMENT — PATIENT HEALTH QUESTIONNAIRE - PHQ9
SUM OF ALL RESPONSES TO PHQ9 QUESTIONS 1 AND 2: 0
1. LITTLE INTEREST OR PLEASURE IN DOING THINGS: NOT AT ALL
2. FEELING DOWN, DEPRESSED, IRRITABLE, OR HOPELESS: NOT AT ALL

## 2021-12-19 ASSESSMENT — FIBROSIS 4 INDEX
FIB4 SCORE: 1.51
FIB4 SCORE: 1.27
FIB4 SCORE: 1.27

## 2021-12-19 NOTE — ASSESSMENT & PLAN NOTE
On nicardipine gtt    Plan  -continue nicardipinge gtt per neurosurgery target <150 systolics  -continue prn hydralazine  -continue prn vasotec  -continue prn labetalol

## 2021-12-19 NOTE — ASSESSMENT & PLAN NOTE
#hx of hypertension  #hx of atrial fibrilliation on xarelto  Pending official read of CTA head and neck.Likely from small vessel disease and HTN       Plan  -continue blood pressure control: target per NSG <150 systolics, prn labetalol, hydralazine, enalprit  -continue nicaripine gtt  -continue seizure precaution--lorazepam 5mg prn--call MD if seizures   -started on hypertonic saline with target of 150-155 with sodium checks  -continue fall precaution  -continue aspiration precaution  -NPO and clearance from speech therapy  -continue good glucose control  -risk modification: Pravasatin of 80 mg qdaily from 20, with LDL target of <70  -PT/OT when able  -continue SCDs.   -appreciate neurosurgery recs

## 2021-12-19 NOTE — CONSULTS
Critical Care Consultation    Date of consult: 12/19/2021    Referring Physician  MEME Barnett II*    Reason for Consultation  ICH    History of Presenting Illness  71 y.o. male with a PMHx AF on xeralto, HLD, HTN who presented 12/19/2021 with L sided weakness, found to have a R sided BG hemorrhage 3 x 3 cm.    Symptoms were noticed in the middle the night with left facial droop and left-sided weakness.  He was in his prior state of health.  Endorses a moderate R sided headache which is made worse by light and associated with nausea but denies chest pain, shortness of breath, fevers, chills.    Code Status  Full Code    Review of Systems  Review of Systems   Constitutional: Positive for malaise/fatigue. Negative for chills and fever.   Eyes: Negative for blurred vision and double vision.   Respiratory: Negative for cough and shortness of breath.    Cardiovascular: Negative for chest pain.   Gastrointestinal: Positive for nausea. Negative for abdominal pain and vomiting.   Neurological: Positive for speech change, focal weakness, weakness and headaches.       Past Medical History   has no past medical history on file.    Surgical History   has no past surgical history on file.    Family History  family history is not on file.    Social History   reports that he has never smoked. He has never used smokeless tobacco. He reports that he does not drink alcohol and does not use drugs.    Medications  Home Medications     Reviewed by Chaz Sellers (Pharmacy Tech) on 12/19/21 at 0512  Med List Status: Complete   Medication Last Dose Status   losartan (COZAAR) 50 MG Tab 12/18/2021 Active   pravastatin (PRAVACHOL) 20 MG Tab 12/18/2021 Active   rivaroxaban (XARELTO) 20 MG Tab tablet 12/18/2021 Active   ursodiol (ACTIGALL) 300 MG Cap 12/18/2021 Active              Current Facility-Administered Medications   Medication Dose Route Frequency Provider Last Rate Last Admin   • senna-docusate (PERICOLACE or SENOKOT  S) 8.6-50 MG per tablet 2 Tablet  2 Tablet Oral BID Ever Whitmore M.D.        And   • polyethylene glycol/lytes (MIRALAX) PACKET 1 Packet  1 Packet Oral QDAY PRN Ever Whitmore M.D.        And   • magnesium hydroxide (MILK OF MAGNESIA) suspension 30 mL  30 mL Oral QDAY PRN Ever Whitmore M.D.        And   • bisacodyl (DULCOLAX) suppository 10 mg  10 mg Rectal QDAY PRN Ever Whitmore M.D.       • Respiratory Therapy Consult   Nebulization Continuous RT Ever Whitmore M.D.       • NS infusion   Intravenous Continuous Ever Whitmore M.D.       • insulin regular (HumuLIN R,NovoLIN R) injection  1-6 Units Subcutaneous Q6HRS Ever Whitmore M.D.        And   • dextrose 50% (D50W) injection 50 mL  50 mL Intravenous Q15 MIN PRN Ever Whitmore M.D.       • Respiratory Therapy Consult   Nebulization Continuous RT Ever Whitmore M.D.       • LORazepam (ATIVAN) injection 2 mg  2 mg Intravenous Q5 MIN PRN Ever Whitmore M.D.       • acetaminophen (Tylenol) tablet 650 mg  650 mg Oral Q4HRS PRN Ever Whitmore M.D.        Or   • acetaminophen (TYLENOL) suppository 650 mg  650 mg Rectal Q4HRS PRN Ever Whitmore M.D.       • ondansetron (ZOFRAN ODT) dispertab 4 mg  4 mg Oral Q4HRS PRN Ever Whitmore M.D.        Or   • ondansetron (ZOFRAN) syringe/vial injection 4 mg  4 mg Intravenous Q4HRS PRN Ever Whitmore M.D.       • MD Alert...ICU Electrolyte Replacement per Pharmacy   Other PHARMACY TO DOSE Ever Whitmore M.D.       • labetalol (NORMODYNE/TRANDATE) injection 10 mg  10 mg Intravenous Q4HRS PRN Ever Whitmore M.D.       • hydrALAZINE (APRESOLINE) injection 10 mg  10 mg Intravenous Q4HRS PRN Ever Whitmore M.D.       • enalaprilat (Vasotec) injection 1.25 mg 1 mL  1.25 mg Intravenous Q6HRS PRN Ever Whitmore M.D.         Current Outpatient Medications   Medication Sig Dispense Refill   • ursodiol (ACTIGALL) 300 MG Cap Take 300 mg by mouth 2 times a day.     • losartan (COZAAR)  50 MG Tab Take 50 mg by mouth every day.     • pravastatin (PRAVACHOL) 20 MG Tab Take 20 mg by mouth every day.     • rivaroxaban (XARELTO) 20 MG Tab tablet Take 20 mg by mouth every day.         Allergies  No Known Allergies    Vital Signs last 24 hours  Temp:  [36.5 °C (97.7 °F)] 36.5 °C (97.7 °F)  Pulse:  [69-80] 76  Resp:  [18-35] 21  BP: (144-192)/(75-94) 185/75  SpO2:  [90 %-96 %] 90 %    Physical Exam  Physical Exam  Vitals and nursing note reviewed.   Constitutional:       Appearance: He is ill-appearing.   HENT:      Head: Normocephalic and atraumatic.      Right Ear: External ear normal.      Left Ear: External ear normal.      Mouth/Throat:      Mouth: Mucous membranes are moist.   Eyes:      Pupils: Pupils are equal, round, and reactive to light.   Cardiovascular:      Rate and Rhythm: Normal rate and regular rhythm.      Pulses: Normal pulses.   Pulmonary:      Effort: Pulmonary effort is normal. No respiratory distress.      Breath sounds: No wheezing or rales.   Chest:      Chest wall: No tenderness.   Abdominal:      General: Abdomen is flat. There is no distension.      Palpations: Abdomen is soft.      Tenderness: There is no abdominal tenderness. There is no guarding or rebound.   Musculoskeletal:      Right lower leg: No edema.      Left lower leg: No edema.   Skin:     General: Skin is warm and dry.      Capillary Refill: Capillary refill takes less than 2 seconds.   Neurological:      Mental Status: He is alert.      Comments: Left-sided facial droop with buccal weakness, dysarthric speech, left-sided weakness with antigravity left upper and lower extremity.  Normal strength on the right side         Fluids  No intake or output data in the 24 hours ending 12/19/21 0521    Laboratory  Recent Results (from the past 48 hour(s))   EKG (NOW)    Collection Time: 12/19/21  4:04 AM   Result Value Ref Range    Report       [facility] Emergency Dept.    Test Date:  2021-12-19  Pt  Name:    NASIM TELLEZ                Department:   MRN:        [id number]                      Room:       [facility]  Gender:     Male                         Technician: [id number]  :        1950                   Requested By:CONSTANZA ARTIS II  Order #:    [id number]                    Reading MD: Constanza Artis II, MD    Measurements  Intervals                                Axis  Rate:       81                           P:          67  NM:         199                          QRS:        14  QRSD:       93                           T:          18  QT:         381  QTc:        443    Interpretive Statements  Sinus rhythm  Rate 81  Normal intervals. No ST elevation or depression. NOrmal twaves.  Impression: Normal sinus rhythm EKG.  Compared to ECG 2021 08:01:50  Sinus bradycardia no longer present  Myocardial infarct finding no longer present  Electronically Signed On 2021 4:27:04 PST by A manny Artis II, MD     CBC WITH DIFFERENTIAL    Collection Time: 21  4:16 AM   Result Value Ref Range    WBC 6.4 4.8 - 10.8 K/uL    RBC 4.54 (L) 4.70 - 6.10 M/uL    Hemoglobin 13.3 (L) 14.0 - 18.0 g/dL    Hematocrit 40.2 (L) 42.0 - 52.0 %    MCV 88.5 81.4 - 97.8 fL    MCH 29.3 27.0 - 33.0 pg    MCHC 33.1 (L) 33.7 - 35.3 g/dL    RDW 45.8 35.9 - 50.0 fL    Platelet Count 229 164 - 446 K/uL    MPV 9.2 9.0 - 12.9 fL    Neutrophils-Polys 55.90 44.00 - 72.00 %    Lymphocytes 26.60 22.00 - 41.00 %    Monocytes 12.90 0.00 - 13.40 %    Eosinophils 3.50 0.00 - 6.90 %    Basophils 0.80 0.00 - 1.80 %    Immature Granulocytes 0.30 0.00 - 0.90 %    Nucleated RBC 0.30 /100 WBC    Neutrophils (Absolute) 3.55 1.82 - 7.42 K/uL    Lymphs (Absolute) 1.69 1.00 - 4.80 K/uL    Monos (Absolute) 0.82 0.00 - 0.85 K/uL    Eos (Absolute) 0.22 0.00 - 0.51 K/uL    Baso (Absolute) 0.05 0.00 - 0.12 K/uL    Immature Granulocytes (abs) 0.02 0.00 - 0.11 K/uL    NRBC (Absolute) 0.02 K/uL   COMP METABOLIC PANEL    Collection Time:  12/19/21  4:16 AM   Result Value Ref Range    Sodium 138 135 - 145 mmol/L    Potassium 4.6 3.6 - 5.5 mmol/L    Chloride 104 96 - 112 mmol/L    Co2 24 20 - 33 mmol/L    Anion Gap 10.0 7.0 - 16.0    Glucose 117 (H) 65 - 99 mg/dL    Bun 21 8 - 22 mg/dL    Creatinine 0.67 0.50 - 1.40 mg/dL    Calcium 8.8 8.5 - 10.5 mg/dL    AST(SGOT) 26 12 - 45 U/L    ALT(SGPT) 27 2 - 50 U/L    Alkaline Phosphatase 88 30 - 99 U/L    Total Bilirubin 0.2 0.1 - 1.5 mg/dL    Albumin 4.2 3.2 - 4.9 g/dL    Total Protein 7.1 6.0 - 8.2 g/dL    Globulin 2.9 1.9 - 3.5 g/dL    A-G Ratio 1.4 g/dL   TROPONIN    Collection Time: 12/19/21  4:16 AM   Result Value Ref Range    Troponin T 8 6 - 19 ng/L   ESTIMATED GFR    Collection Time: 12/19/21  4:16 AM   Result Value Ref Range    GFR If African American >60 >60 mL/min/1.73 m 2    GFR If Non African American >60 >60 mL/min/1.73 m 2       Imaging  DX-CHEST-PORTABLE (1 VIEW)   Final Result         1. No acute cardiopulmonary abnormalities are identified.      CT-CTA HEAD WITH & W/O-POST PROCESS   Final Result         1. No hemodynamically significant narrowing of the major intracranial vessels.   2. Redemonstration of parenchymal hemorrhage in the right basal ganglia.      CT-CTA NECK WITH & W/O-POST PROCESSING   Final Result      1. No evidence of flow-limiting stenosis in the cervical carotid or cervical vertebral arteries.      CT-CEREBRAL PERFUSION ANALYSIS   Final Result      1.  Cerebral blood flow less than 30% = 7 mL.      2.  T Max more than 6 seconds = 16 mL.      3.  These are likely related to the parenchymal hemorrhage.      4.  Please note that the cerebral perfusion was performed on the limited brain tissue around the basal ganglia region. Infarct/ischemia outside the CT perfusion sections can be missed in this study.      CT-HEAD W/O   Final Result         1. A 3.4 x 3.6 cm parenchymal hemorrhage in the right basal ganglia. Mild mass effect in the right lateral ventricle. No significant  midline shift.                  CRITICAL RESULT READ BACK: Preliminary findings discussed with and critical read back performed by Dr. CONSTANZA ARTIS II in the Emergency Department via telephone on 12/19/2021 3:50 AM      EC-ECHOCARDIOGRAM COMPLETE W/O CONT    (Results Pending)       Assessment/Plan  * Intracranial hemorrhage (HCC)  Assessment & Plan  R side BG 3 x 3 cm    SBP < 140  TEG in process  PCC for xeralto reversal  Maintain normal glucose and temperature  Neurology following  Repeat CT if change in neurologic exam  SLP  Q1 hour neuro checks    Hypertension  Assessment & Plan  Reintroduce oral antihypertensives when clinically appropriate     Hyperlipemia  Assessment & Plan  Continue statin once cleared by SLP    AF (atrial fibrillation) (HCC)  Assessment & Plan  Previously on Xeralto    AC contraindicated  Optimize electrolytes  Rate control as needed  Formal ECHO      Discussed patient condition and risk of morbidity and/or mortality with RN, RT, Therapies, Pharmacy, Charge nurse / hot rounds and Patient.    The patient remains critically ill.  Critical care time = 67 minutes in directly providing and coordinating critical care and extensive data review.  No time overlap and excludes procedures.

## 2021-12-19 NOTE — ASSESSMENT & PLAN NOTE
Previously on Xeralto  ECHO completed 12/19/2021  SR noted on 12/19 EKG  Plan  Holding xarelto; AC contraindicated  Optimize electrolytes  Rate control as needed

## 2021-12-19 NOTE — PROGRESS NOTES
Case d/w Dr Shelton.  CT reviewed:  Moderate sized Rt BG ICH.  GCS 14.  Will see later today.  No immediate NS intervention needed.  Rec:  ICU, BP control, reverse coagulopathy, Neurology consultation.

## 2021-12-19 NOTE — THERAPY
Speech Language Pathology   Clinical Swallow Evaluation     Patient Name: Gokul Baca  AGE:  71 y.o., SEX:  male  Medical Record #: [id number]  Today's Date: 12/19/2021     Assessment    Patient is a 71 y.o. male with a PMHx AF on xeralto, HLD, HTN who presented 12/19/2021 with L sided weakness, found to have a R sided BG hemorrhage 3 x 3 cm. Symptoms were noticed in the middle the night with left facial droop and left-sided weakness.  He was in his prior state of health.  Endorses a moderate R sided headache which is made worse by light and associated with nausea but denies chest pain, shortness of breath, fevers, chills.      The patient was seen for clinical swallow evaluation this date. The patient was awake, alert and oriented to self, location and reason with periods of confusion noted. The patient kept eyes closed for majority of session but when eyes were open he appeared to have difficulty locating/tracking items. The pt was able to follow oral motor directives with left sided facial weakness and numbness reported. The patient also exhibited difficult lateralizing tongue to left which may have more to do with numbness than true muscle weakness given use of contralateral side for that task. The patient was given PO trials of ice chips, thins, MTL and purees. Pt presented with intermittent throat clearing on PO trials with immediate cough response following thin liquid trials. Given location of neurologic symptoms and s/s noted during bedside evaluation, recommend patient remains NPO pending FEES to further assess oropharyngeal swallow function and r/o aspiration.     Plan    Recommendations: 1) Continue NPO pending FEES to further assess oropharyngeal swallow function and r/o aspiration.  2) SLP following for cognitive evaluation as pt able to maintain improved alertness.       Recommend Speech Therapy 5 times per week until therapy goals are met for the following treatments:  Dysphagia  Training.    Discharge Recommendations: Recommend post-acute placement for additional speech therapy services prior to discharge home    Objective       12/19/21 0912   Oral Motor Eval    Is Patient Able to Complete Oral Motor Eval Yes but Impaired   Labial Function   Labial Structure At Rest Moderate  (left weakness with decreased sensation)   Labial Vowel Production / I /, / U / Moderate   Labial Sequence / I /, / U / Moderate   Lingual Function   Lingual Structure At Rest Within Functional Limits   Lingual Protrude Within Functional Limits   Lingual Retract Within Functional Limits   Elevate In Mouth Within Functional Limits   Elevate Outside Mouth Within Functional Limits   Lateralization Moderate Left  (?d/t confusion of task vs weakness given opposite side)   / Pa / 5X's Within Functional Limits   / Ta / 5X's Within Functional Limits   / Ka / 5X's Within Functional Limits   / [place] / 5X's Minimal   Jaw   Jaw Structure At Rest Within Functional Limits   Bite ([place]) Within Functional Limits   Chew ([place]) Minimal   Jaw Open / Resist Within Functional Limits   Jaw Close / Resist Within Functional Limits   Velar Function   Velar Structure At Rest Not Tested   / A / Prolonged Not Tested   Laryngeal Function   Voice Quality Within Functional Limits   Volutional Cough Within Functional Limits   Excursion Upon Swallow Complete   Oral Food Presentation   Ice Chips Within Functional Limits   Single Swallow Mildly Thick (2) - (Nectar Thick)  Minimal   Serial Swallow Mildly Thick (2) - (Nectar Thick) Not Tested   Single Swallow Thin (0) Moderate   Serial Swallow Thin (0) Not Tested   Liquidised (3) Minimal   Pureed (4) Minimal   Minced & Moist (5) - (Dysphagia II) Not Tested   Soft & Bite-Sized (6) - (Dysphagia III) Not Tested   Regular (7) Not Tested   Regular-Easy to Chew (7) Not Tested   Tracheostomy   Tracheostomy  No   Dysphagia Strategies / Recommendations   Strategies / Interventions Recommended (Yes / No)  "Yes   Compensatory Strategies To Be Assessed   Diet / Liquid Recommendation NPO;Pre-Feeding Trials with SLP Only   Medication Administration    (NPO pending FEES)   Therapy Interventions Dysphagia Therapy By Speech Language Pathologist;Pharyngeal / Laryngeal Exercises;Oral Motor Exercises;FEES Evaluation   Dysphagia Rating   Nutritional Liquid Intake Rating Scale Nothing by mouth   Nutritional Food Intake Rating Scale Nothing by mouth   Patient / Family Goals   Patient / Family Goal #1 \" I'm thirsty\"   Short Term Goals   Short Term Goal # 1 The patient will consume prefeeding trials with no overt s/s of aspiration given min use of swallow strategies.    Short Term Goal # 2 The patient and his family will verbalize understanding of current swallow function, role of SLP and how current diagnosis will impact swallow and cognition.          "

## 2021-12-19 NOTE — ED TRIAGE NOTES
Chief Complaint   Patient presents with   • Possible Stroke     LKW 0130, slight slurred speech and Left side weakness with facial droop       Patient BIBA from home for stroke like symptoms. Patient lives at home with wife who noted patient to have facial droop, left side weakness, and slightly slurred speech.    Patient takes Xarelto for Afib.    Per EMS:   /70  FSBG 120    20g Right Hand PIV established PTA    Wife, Adriana: [phone]

## 2021-12-19 NOTE — CONSULTS
Referring Physician: Dr. Vlad Shelton    Referral Reason: Stroke code    HPI:  Mr. Gokul Baca is a 71 y.o. right-handed male with history of hypertension, hyperlipidemia and paroxysmal atrial fibrillation, currently on Xarelto who presented to emergency room for acute onset of left-sided weakness involving face arm and leg.  Apparently earlier this morning his wife noted some people are in their backyard.  She woke her  up who thought there intruder in their backyard.  They subsequently found out that there was a party in the neighborhood which was raided by police and the kids in the party were trying to escape through neighbors backyard.  Shortly after the patient woke up, developed left-sided weakness for which EMS were called and patient was brought to emergency room.  Stroke code was activated and he underwent a brain CT which revealed right-sided basal ganglia hemorrhage measuring 3 x 3 cm.  Patient reports moderate right-sided headache associated with mild nausea.  Patient was evaluated by neurosurgery and no neurosurgical intervention deemed necessary at this time.      ROS:   Review of Systems   Constitutional: Negative for chills, fever and malaise/fatigue.   HENT: Negative for hearing loss and tinnitus.    Eyes: Negative for blurred vision, double vision and photophobia.   Respiratory: Negative for shortness of breath.    Cardiovascular: Negative for chest pain.   Gastrointestinal: Negative for nausea and vomiting.   Genitourinary: Negative for hematuria.   Musculoskeletal: Negative for back pain, falls, myalgias and neck pain.   Skin: Negative for rash.   Neurological: Positive for tingling, sensory change, focal weakness and headaches. Negative for dizziness, tremors, speech change, seizures, loss of consciousness and weakness.   Psychiatric/Behavioral: Negative for memory loss.       Past Medical History: History reviewed. No pertinent past medical history.    Past Surgical History:  History reviewed. No pertinent surgical history.    Social History:   Social History     Socioeconomic History   • Marital status:      Spouse name: Not on file   • Number of children: Not on file   • Years of education: Not on file   • Highest education level: Not on file   Occupational History   • Not on file   Tobacco Use   • Smoking status: Never Smoker   • Smokeless tobacco: Never Used   Substance and Sexual Activity   • Alcohol use: Never   • Drug use: Never   • Sexual activity: Not on file   Other Topics Concern   • Not on file   Social History Narrative   • Not on file     Social Determinants of Health     Financial Resource Strain:    • Difficulty of Paying Living Expenses: Not on file   Food Insecurity:    • Worried About Running Out of Food in the Last Year: Not on file   • Ran Out of Food in the Last Year: Not on file   Transportation Needs:    • Lack of Transportation (Medical): Not on file   • Lack of Transportation (Non-Medical): Not on file   Physical Activity:    • Days of Exercise per Week: Not on file   • Minutes of Exercise per Session: Not on file   Stress:    • Feeling of Stress : Not on file   Social Connections:    • Frequency of Communication with Friends and Family: Not on file   • Frequency of Social Gatherings with Friends and Family: Not on file   • Attends [employer] Services: Not on file   • Active Member of Clubs or Organizations: Not on file   • Attends Club or Organization Meetings: Not on file   • Marital Status: Not on file   Intimate Partner Violence:    • Fear of Current or Ex-Partner: Not on file   • Emotionally Abused: Not on file   • Physically Abused: Not on file   • Sexually Abused: Not on file   Housing Stability:    • Unable to Pay for Housing in the Last Year: Not on file   • Number of Places Lived in the Last Year: Not on file   • Unstable Housing in the Last Year: Not on file       Family Hx: No family history on file.    Current Medications:   Current  Facility-Administered Medications   Medication Dose Route Frequency Provider Last Rate Last Admin   • senna-docusate (PERICOLACE or SENOKOT S) 8.6-50 MG per tablet 2 Tablet  2 Tablet Oral BID Ever Whitmore M.D.        And   • polyethylene glycol/lytes (MIRALAX) PACKET 1 Packet  1 Packet Oral QDAY PRN Ever Whitmore M.D.        And   • magnesium hydroxide (MILK OF MAGNESIA) suspension 30 mL  30 mL Oral QDAY PRN Ever Whitmore M.D.        And   • bisacodyl (DULCOLAX) suppository 10 mg  10 mg Rectal QDAY PRN Ever Whitmore M.D.       • Respiratory Therapy Consult   Nebulization Continuous RT Ever Whitmore M.D.       • insulin regular (HumuLIN R,NovoLIN R) injection  1-6 Units Subcutaneous Q6HRS Ever Whitmore M.D.        And   • dextrose 50% (D50W) injection 50 mL  50 mL Intravenous Q15 MIN PRN Ever Whitmore M.D.       • LORazepam (ATIVAN) injection 2 mg  2 mg Intravenous Q5 MIN PRN Ever Whitmore M.D.       • acetaminophen (Tylenol) tablet 650 mg  650 mg Oral Q4HRS PRN Ever Whitmore M.D.        Or   • acetaminophen (TYLENOL) suppository 650 mg  650 mg Rectal Q4HRS PRN Ever Whitmore M.D.       • ondansetron (ZOFRAN ODT) dispertab 4 mg  4 mg Oral Q4HRS PRN Ever Whitmore M.D.        Or   • ondansetron (ZOFRAN) syringe/vial injection 4 mg  4 mg Intravenous Q4HRS PRN Ever Whitmore M.D.       • MD Alert...ICU Electrolyte Replacement per Pharmacy   Other PHARMACY TO DOSE Ever Whitmore M.D.       • labetalol (NORMODYNE/TRANDATE) injection 10 mg  10 mg Intravenous Q4HRS PRN Ever Whitmore M.D.       • hydrALAZINE (APRESOLINE) injection 10 mg  10 mg Intravenous Q4HRS PRN Ever Whitmore M.D.       • enalaprilat (Vasotec) injection 1.25 mg 1 mL  1.25 mg Intravenous Q6HRS PRN Ever Whitmore M.D.       • niCARdipine (CARDENE) 25 mg in  mL Infusion  0-15 mg/hr Intravenous Continuous Ever Whitmore M.D. 100 mL/hr at 12/19/21 0925 10 mg/hr at 12/19/21 0925   • 3% sodium  chloride (HYPERTONIC SALINE) 500mL infusion 500 mL  500 mL Intravenous Continuous Kenn Davenport M.D. 30 mL/hr at 12/19/21 1052 30 mL at 12/19/21 1052   • sodium chloride 200 mEq in empty bag 50 mL ivpb  200 mEq Intravenous Q6HRS PRN Kenn Davenport M.D.           Allergies: No Known Allergies    Physical Exam:   Vitals:    12/19/21 0700 12/19/21 0800 12/19/21 0900 12/19/21 1000   BP: 137/64 121/67 139/73 120/58   Pulse: 68 69 81 79   Resp: 15 16 (!) 27 (!) 31   Temp:       TempSrc:       SpO2: 97% 96% 97% 96%   Weight:       Height:           Physical Exam   GENERAL:  Lying in the hospital bed in no apparent distress.  Head: Normocephalic and atraumatic.   Eyes: Pupils are equal, round, and reactive to light. EOM are normal.   Cardiovascular: Normal rate and regular rhythm.    Pulmonary/Chest: Breath sounds normal.   Abdominal: Soft. Bowel sounds are normal. He exhibits no distension. There is no tenderness.   Skin: Skin is warm and dry. No rash noted. No erythema.  Neuro Exam  MENTAL STATUS:  Awake, alert, oriented times 3.  Speech is fluent, although he has pauses, comprehension is intact.  CRANIAL NERVES:  PERRL, EOMI with no nystagmus, facial motor revealed left facial weakness in an upper motor neuron pattern, facial sensation is intact, tongue is in the midline, palate is symmetric. Hearing is intact to finger rub bilaterally. Shoulder shrugs are normal.  He appears to have mild neglect to the left side.  MOTOR:  Motor examination showed normal strength in direct testing of right upper and lower extremities, proximal and distal.  He has profound weakness of left upper extremity, he was able to move his hand minimally at the level of the wrist but is not able to wiggle his fingers.  He is able to move his left lower extremity with gravity eliminated.  SENSATION: Decreased to light touch and temperature in left upper and lower extremity.  REFLEXES:  1+ and symmetric, toes are downgoing  bilaterally  COORDINATION:  Normal finger to nose and heel to shin bilaterally  GAIT:  Deferred       NIH Stroke Scale:    1a. Level of Consciousness (Alert, drowsy, etc): 0= Alert    1b. LOC Questions (Month, age): 0= Answers both correctly    1c. LOC Commands (Open/close eyes make fist/let go): 0= Obeys both correctly    2.   Best Gaze (Eyes open - patient follows examiner's finger on face): 0= Normal    3.   Visual Fields (introduce visual stimulus/threat to patient's field quadrants): 0= No visual loss    4.   Facial Paresis (Show teeth, raise eyebrows and squeeze eyes shut): 1= Minor     5a. Motor Arm - Left (Elevate arm to 90 degrees if patient is sitting, 45 degrees if  supine): 4= No movement    5b. Motor Arm - Right (Elevate arm to 90 degrees if patient is sitting, 45 degrees if supine): 0= No drift    6a. Motor Leg - Left (Elevate leg 30 degrees with patient supine): 3= No effort against gravity    6b. Motor Leg - Right  (Elevate leg 30 degrees with patient supine): 0= No drift    7.   Limb Ataxia (Finger-nose, heel down shin): 0= No ataxia    8.   Sensory (Pin prick to face, arm, trunk and leg - compare side to side): 1= Partial loss    9.  Best Language (Name item, describe a picture and read sentences): 0= No aphasia    10. Dysarthria (Evaluate speech clarity by patient repeating listed words): 1= Mild to moderate slurring    11. Extinction and Inattention (Use information from prior testing to identify neglect or  double simultaneous stimuli testing): 1= Partial neglect    Total NIH Score: 10     Prehospital modified David Scale (MRS): 0 = No symptoms     ICH score is 1 due to ICH volume.      Labs:  Recent Labs     12/19/21 0416   WBC 6.4   RBC 4.54*   HEMOGLOBIN 13.3*   HEMATOCRIT 40.2*   MCV 88.5   MCH 29.3   MCHC 33.1*   RDW 45.8   PLATELETCT 229   MPV 9.2     Recent Labs     12/19/21 0416   SODIUM 138   POTASSIUM 4.6   CHLORIDE 104   CO2 24   GLUCOSE 117*   BUN 21   CREATININE 0.67   CALCIUM  8.8     Recent Labs     12/19/21  0435   APTT 35.2   INR 1.23*             Recent Labs     12/19/21  0416   TRIGLYCERIDE 104   HDL 46   *     Recent Labs     12/19/21  0416   SODIUM 138   POTASSIUM 4.6   CHLORIDE 104   CO2 24   GLUCOSE 117*   BUN 21     Recent Labs     12/19/21  0416   SODIUM 138   POTASSIUM 4.6   CHLORIDE 104   CO2 24   BUN 21   CREATININE 0.67   CALCIUM 8.8     Recent Labs     12/19/21  0435   APTT 35.2   INR 1.23*     No results found for this or any previous visit.      Imaging reviewed:    EC-ECHOCARDIOGRAM COMPLETE W/ CONT         CT-HEAD W/O   Final Result      1.  Increase in size of right temporal intra-axial hematoma now measuring 6.7 x 4.3 cm and previously 3.4 x 2.9 cm      2.  Mild mass effect with 2-3 mm right to left shift      3.  Underlying brain white matter change and volume loss      DX-CHEST-PORTABLE (1 VIEW)   Final Result         1. No acute cardiopulmonary abnormalities are identified.      CT-CTA HEAD WITH & W/O-POST PROCESS   Final Result         1. No hemodynamically significant narrowing of the major intracranial vessels.   2. Redemonstration of parenchymal hemorrhage in the right basal ganglia.      CT-CTA NECK WITH & W/O-POST PROCESSING   Final Result      1. No evidence of flow-limiting stenosis in the cervical carotid or cervical vertebral arteries.      CT-CEREBRAL PERFUSION ANALYSIS   Final Result      1.  Cerebral blood flow less than 30% = 7 mL.      2.  T Max more than 6 seconds = 16 mL.      3.  These are likely related to the parenchymal hemorrhage.      4.  Please note that the cerebral perfusion was performed on the limited brain tissue around the basal ganglia region. Infarct/ischemia outside the CT perfusion sections can be missed in this study.      CT-HEAD W/O   Final Result         1. A 3.4 x 3.6 cm parenchymal hemorrhage in the right basal ganglia. Mild mass effect in the right lateral ventricle. No significant midline shift.                   CRITICAL RESULT READ BACK: Preliminary findings discussed with and critical read back performed by Dr. CONSTANZA ARTIS II in the Emergency Department via telephone on 12/19/2021 3:50 AM             Assessment/Plan:   71 y.o. male with history of atrial fibrillation who was on Xarelto, hypertension who was brought to emergency room for acute onset of left-sided weakness.  He was found to have a 3 x 3 cm intraparenchymal hemorrhage in the right basal ganglia.  His ICH score is 1.  Patient's anticoagulation was reversed by [facility].  I will be admitted to intensive care unit for close neuro monitoring.  Continue with every hour neuro check.  Repeat brain CT at 6 hours.  Keep head of bed elevated at 45 degrees.  Maintain systolic blood pressure less than 150.  If any seizure activity noted recommend Keppra 500 mg twice a day.  Bowel regimen to avoid Valsalva maneuver.  Neurosurgery is on board for possible intervention in case of hemorrhage expansion or clinical deterioration.  Avoid antiplatelet or anticoagulant.  DVT prevention with SCDs.

## 2021-12-19 NOTE — PROGRESS NOTES
Daily Progress Note:     Date of Service: 12/19/2021  Primary Team: [facility] ICU Gold Team   Attending: Dr Davenport  Senior Resident: Dr. Robbins  Contact:  [phone]    Chief Complaint:   ICH  Atrial fibrilliation on Xarelto    Interval history:  VS: 144//91, ave 137/64, HR 68,   Ins/out: None  Non vented     Pending repeat CT scan head and CTA carotids official read.   Pending echocardiogram read  ECG showing NSR     Consultants/Specialty:  Neurology     Review of Systems:    Review of Systems   Constitutional: Negative for fever.   Respiratory: Negative for cough.    Cardiovascular: Negative for chest pain.   Gastrointestinal: Negative for diarrhea.   Musculoskeletal: Negative for myalgias.   Neurological: Positive for sensory change and focal weakness. Negative for tremors and headaches.       Objective Data:   Physical Exam:   Vitals:   Temp:  [36.5 °C (97.7 °F)] 36.5 °C (97.7 °F)  Pulse:  [62-81] 79  Resp:  [15-35] 31  BP: (120-192)/(58-94) 120/58  SpO2:  [90 %-97 %] 96 %   Physical Exam  Constitutional:       General: He is not in acute distress.     Appearance: He is obese. He is not ill-appearing.   Eyes:      General: No scleral icterus.  Cardiovascular:      Rate and Rhythm: Normal rate and regular rhythm.      Pulses: Normal pulses.   Pulmonary:      Effort: Pulmonary effort is normal.      Breath sounds: Normal breath sounds.   Abdominal:      General: Abdomen is flat.   Neurological:      Mental Status: He is alert and oriented to person, place, and time.      Comments: Mentation: Alert and oriented 4x,   CN: left side facial droop, non dysarthric  Motor: left upper 2-5, Left lower 3-5, Right side 5/5,            Labs:   WBC 6.4  Hgb 13.3  Na 138  K 4.6  Crea 0.67  T.bili 0.2  Glyc 5.5       Imaging:   CTA-head and neck pending   Echocardiogram pending read  EKG on 12/19/2021 showing NSR    Problem Representation  Mr. Baca is a 72 y/o w/ obesity, dyslipidemia, hx of atrial fibrilliation on  rivaroxaban, HTN on losartan admitted here for right basal ganglion ICH likely from small vessel disease, CXR showing aortic calcification. Pending CT head and neck read.       * Intracranial hemorrhage (HCC)  Assessment & Plan  #hx of hypertension  #hx of atrial fibrilliation on xarelto  Pending official read of CTA head and neck.Likely from small vessel disease and HTN       Plan  -continue blood pressure control: target per NSG <150 systolics, prn labetalol, hydralazine, enalprit  -continue nicaripine gtt  -continue seizure precaution--lorazepam 5mg prn--call MD if seizures   -started on hypertonic saline with target of 150-155 with sodium checks  -continue fall precaution  -continue aspiration precaution  -NPO and clearance from speech therapy  -continue good glucose control  -risk modification: Pravasatin of 80 mg qdaily from 20, with LDL target of <70  -PT/OT when able  -continue SCDs.   -appreciate neurosurgery recs       Hypertension  Assessment & Plan  On nicardipine gtt    Plan  -continue nicardipinge gtt per neurosurgery target <150 systolics  -continue prn hydralazine  -continue prn vasotec  -continue prn labetalol     Hyperlipemia  Assessment & Plan  #CAD chest xray showing aortic calcification.  #ASCVD  LDL >70 on 12/19/2021    Plan  -increase pravastatin to 80 mg qdaily with target of LDL <70      AF (atrial fibrillation) (HCC)  Assessment & Plan  CXR not showing cardiomegaly  Echocardiogram read pending  EKG showing NSR on 12/19    Plan  -holding off xarelto   -follow up echocardiogram    T/L/D  2x PIV On urinary condom cath  Analgesia: acetaminophen  Bowel regimen: PEG, MOM  Non sedated, no abx  GI prophylaix: none  DVT prophylaxis: SCDs, holding rivaroxaban  Code: Full  Dispo: ICU

## 2021-12-19 NOTE — ED NOTES
Pt drowsy, arouses on calling. Pt appears to be favoring R side, turning head to R and resting gaze is to R side. Able to track to L side with some effort.

## 2021-12-19 NOTE — ASSESSMENT & PLAN NOTE
CXR not showing cardiomegaly  Echocardiogram read pending  EKG showing NSR on 12/19    Plan  -holding off xarelto   -follow up echocardiogram

## 2021-12-19 NOTE — ASSESSMENT & PLAN NOTE
R side BG 3 x 3 cm on admission, increased to 6.7 x 4.3 cm on 12/19 1054 imaging  Plan  losartan, amlodipine goal: SBP < 140, see below problem hypertension   sodium tabs, given clinical improvement will be less aggressive with pursuing therapeutic hypernatremia  Glucose control  pravastatin  Neurology following  Neurosurgery signed off  SLP, PT/OT  PMR  Q4 hour neuro checks

## 2021-12-19 NOTE — ASSESSMENT & PLAN NOTE
#CAD chest xray showing aortic calcification.  #ASCVD  LDL >70 on 12/19/2021    Plan  -increase pravastatin to 80 mg qdaily with target of LDL <70

## 2021-12-19 NOTE — ASSESSMENT & PLAN NOTE
BP 190s systolic on admission  Started on amlodipine 5mg on 12/22  Plan  increased losartan dose to 100mg on 12/21  Amlodipine dose increased to 10mg on 12/23/2021      PRNs available; hydralazine, Vasotec, labetalol

## 2021-12-19 NOTE — CARE PLAN
Problem: Optimal Care of the Stroke Patient  Goal: Optimal emergency care for the stroke patient  Outcome: Progressing  Flowsheets (Taken 12/19/2021 1324)  Emergency Care:   NIHSS completed within 12 hours of arrival to hospital   Educated patient/family on diagnosis, medications and testing   Administered antihypertensive medications per order     Problem: Knowledge Deficit - Stroke Education  Goal: Patient's knowledge of stroke and risk factors will improve  Outcome: Progressing  Note: Family and patient education in regards to Hemorraghic stroke. Stroke booklet given to family    The patient is Watcher - Medium risk of patient condition declining or worsening    Shift Goals  Clinical Goals: SBP < 140  Patient Goals: Pass FEES  Family Goals: Updates    Progress made toward(s) clinical / shift goals:  on going    Patient is not progressing towards the following goals:

## 2021-12-19 NOTE — THERAPY
Speech Language Pathology   Fiberoptic Endoscopic Evaluation of Swallow     Patient Name: Gokul Baca  AGE:  71 y.o., SEX:  male  Medical Record #: [id number]  Today's Date: 12/19/2021    Assessment  Discussed with the patient the risks, benefits, and alternatives of the FEES procedure. Patient acknowledges and agreeable to proceed with the procedure.     Cursory view revealed asymmetrical closure pattern of laryngeal vestibule with left arytenoid appearing sluggish. Appearance of complete laryngeal adduction was noted.     PO trials: ice chips, thin liquid, mildly thick liquid, liquidized, puree, minced/moist solids, soft/bite sized solids & regular solids    Results:    The patient was seen for FEES evaluation this date. FEES procedure completed. Upon insertion of scope, patient was noted to have asymmetry of the arytenoids with what appeared to be decreased/sluggish movement of the left vocal fold. During breath hold and phonation, the patient was observed to have appearance of adequate ADDuction of his vocal folds.  Presentation of PO included ice chips, MTL purees, thins and soft solids. The patient presented with mild-moderate orophryngeal dysphagia as evidenced by delayed onset of swallow, weak laryngeal elevation, pharyngeal squeeze and BoT retraction as noted by premature spillage of bolus over anterior portion of epiglottis and to the level of the pyriforms before onset of swallow. Cognition did appear to impact timing of swallow at times during this evaluation. The patient reported feeling residue and was able to effectively clear with multiple swallows. Penetration occurred on thin liquids and soft solids to the vocal folds. Pt with immediate throat clear response to penetration episodes. Thin liquids also resulted in aspiration before the swallow which patient coughed and cleared. At this time, the patient appears at a level for modified PO diet of Liquidized mildly thick liquids with strict follow  "through with swallow strategies and direct supervision. Patient remains HIGH risk for decline in swallow function and need for NPO status. Results and recommendations discussed with patient and his wife. The patient verbalized clear understanding of aspiration risk, s/s to monitor and SLP recommendations. Please hold PO with any difficulty or change in status.     Penetration Aspiration Scale:    Consistency PAS Score Timing   Thin Liquid 7 Pre swallow   Soft and Bite Sized    4 Pre swallow     Clinical Impressions:    Mild-Moderate oropharyngeal dysphagia, marked by. Dysphagia is likely acute related to (chronic & acute risk factors). Swallow safety is impaired; swallow efficiency is impaired. Pt appears to be at high risk for aspiration PNA, and moderate risk for malnutrition/dehydration. Temporary Diet modification is indicated. Patient appears to be a excellent candidate for behavioral swallow rehabilitation.    Recommendations:  Initiation of diet liquidized with mildly thick liquids.   Supervision: Direct supervision, patient may be left alone for less than 5 minutes at a time  Positioning: Seat fully upright and midline when eating  Medication: Crushed in puree  Swallow Techniques: small bites and sips, no straws, refrain from talking while chewing or swallowing and check mouth for \"pocketing\" of food periodically during meal  Oral Care: Perform good oral cares at least 3-4 times a day        Plan    Recommend Speech Therapy 5 times per week until therapy goals are met for the following treatments:  Dysphagia Training.    Discharge Recommendations: Recommend post-acute placement for additional speech therapy services prior to discharge home    Objective       12/19/21 1515   FEES  Anatomy Assessment   Anatomy For A Functional Swallow: Edema;Asymmetry;Small Vallecular Space   FEES Laryngeal Adduction Assessment   Breath Holding Adequate   Clearing Throat Adequate   Cough Adequate   Phonation Adequate   Onset " Of Swallow Adequate   FEES Velopharyngeal Assessment    Velopharyngeal Closure: Adequate   FEES Sensation Assessment    Presence of Scope Adequate   Presence of Residue Adequate   Presence of Penetration Immediate Response   Presence of Aspiration Immediate Response   FEES Swallow Function Assessment   Swallow Trigger Level of the Valleculae   Base of Tongue Retraction Functional   Pharyngeal Constrictor Movement Functional   White Out Phase Incomplete White Out   Epiglottic Movement Impaired   Laryngeal Elevation Functional   Cricopharyngeal Function Functional   Swallow Observations / Symptoms   Swallow Observations / Symptoms Yes   Vallecular Residue Pureed (4);Soft & Bite-Sized (6) - (Dysphagia III)   Pyriform Sinus Residue Soft & Bite-Sized (6) - (Dysphagia III)   Penetration Before the Swallow Soft & Bite-Sized (6) - (Dysphagia III);Thin (0)   Thin (0) Teaspoon   Penetration Suspected During the Swallow Thin (0);Soft & Bite-Sized (6) - (Dysphagia III)   Thin (0) Teaspoon   Aspiration Suspected During the Swallow Thin (0)   Thin (0) Teaspoon   Compensatory Strategies Attempted   Compensatory Strategies Attempted Yes   Other (See Comments) Check left cheek for pocketed material   Patient Ability To Protect Airway   Patient Ability To Protect Airway  Adequate   Penetration Aspiration Scale   Penetration Aspiration Scale 7 - Material passes glottis but is not ejected from airway, visible subglottic stasis despite patient's response   Nehal Pharyngeal Residue Severity   Vallecular Residue Trace, trace coating   Pyriform Sinus Residue  Trace, trace coating   Dysphagia Rating   Nutritional Liquid Intake Rating Scale Thickened beverages (mildly thick unless otherwise specified)   Nutritional Food Intake Rating Scale Total oral diet of a single consistency   Problem List   Problem List Dysphagia   Evaluation Recommendations   Diet / Liquid Recommendation Liquidised (3) - (Nectar Thick Full Liquid);Moderately Thick (3) -  "(Honey Thick)   Medication Administration  Crush all Medications in Puree   Recommended Supervision Direct   Evaluation Recommended Techniques   Swallow Techniques Yes   Techniques Oral Stage Position Food Posterior In Right Oral Cavity;Check For Pocketing   Techniques Pharyngeal Phase Thicken Liquids For Better Control Of Bolus To Consistency Of Nectar;Repeat Swallow 2-3 Times On Each Bolus To Clear Residue;Effortful Swallow   Patient / Family Goals   Patient / Family Goal #1 \" I'm thirsty\"   Goal #1 Outcome Progressing as expected   Short Term Goals   Short Term Goal # 1 The patient will consume prefeeding trials with no overt s/s of aspiration given min use of swallow strategies.    Goal Outcome # 1 Goal met, new goal added   Short Term Goal # 1 B  The patient will consume LQ3/MT2 meal items with no overt s/s of aspiration given min cues for swallow strategies.    Short Term Goal # 2 The patient and his family will verbalize understanding of current swallow function, role of SLP and how current diagnosis will impact swallow and cognition.          "

## 2021-12-19 NOTE — CONSULTS
DATE OF SERVICE:  12/19/2021     NEUROSURGICAL CONSULTATION     REFERRING PHYSICIAN:  Ever Whitmore MD     HISTORY:  This 71-year-old male who developed acute onset of left-sided   weakness, brought to the emergency room.  CT scan shows a right basal   ganglionic hemorrhage involving the putamen and globus pallidus on the right,   extending right up to the posterior limb of the internal capsule.  The patient   reports some dull headache.  No previous history of stroke.  The scan shows a   roughly 3 cm clot with some local mass effect.  No significant midline shift.    No intraventricular extension.     REVIEW OF SYSTEMS:  There is no history of bleeding disorder or DVT.  The   patient was on Xarelto for atrial fibrillation that has been reversed.     PHYSICAL EXAMINATION:  VITAL SIGNS:  Recorded.  GENERAL:  The patient readily awakens.  He is conversant.  Speech is fluent.  HEENT:  Pupils equally round and react to light.  Extraocular movements are   full.  The patient is right-handed.  NEUROLOGIC:  He has a left facial weakness, lower left face.  He has got   minimal motor function in the left arm.  He can move his left leg better grade   3-4/5 power.  Follows commands readily.     IMPRESSION:  Right basal ganglionic intracerebral hemorrhage likely secondary   to small vessel disease.  The patient does have a history of hypertension,   does not smoke.     RECOMMENDATIONS:  ICU observation with hourly neurological checks, head of bed   elevated.  Blood pressure control to keep systolic less than 150-160.  I   would also recommend a neurology consultation for risk factor   identification/modification as long as the patient is awake, conversant and   following commands.  There is no need for immediate surgical intervention and   the neurosurgery service will follow the patient with internal medicine team.        ______________________________  MD LEAH VALADEZ/JAISON    DD:  12/19/2021 09:16  DT:   12/19/2021 09:52    Job#:  [id number]

## 2021-12-19 NOTE — ED PROVIDER NOTES
ED Provider Note     12/19/2021  3:37 AM    Means of Arrival: EMS  History obtained by: patient and EMS  Limitations: None  PCP: Eber Alfonso  CODE STATUS: Full    CHIEF COMPLAINT  Chief Complaint   Patient presents with   • Possible Stroke     LKW 0130, slight slurred speech and Left side weakness with facial droop       HPI  Gokul Baca is a 71 y.o. male who presents with concerns of possible stroke.  Last seen normal at 1:15AM with symptom onset 1:30 AM.  He was out of bed at 1:15 AM because he thought there was something outside of his house.  He went and looked outside, when he came back inside the house he started to feel weak on his left side.  Wife noticed that his face was drooped.  He was unable to ambulate.  No chest pain.  No trouble breathing.  He does have a headache that is on his left side.  He has a history of atrial fibrillation and takes Xarelto.      REVIEW OF SYSTEMS  Review of Systems   All other systems reviewed and are negative.    See HPI for further details.    PAST MEDICAL HISTORY   Atrial fibrillation anticoagulated with Xarelto    FAMILY HISTORY  No family history on file.    SOCIAL HISTORY  Social History     Tobacco Use   • Smoking status: Never Smoker   • Smokeless tobacco: Never Used   Substance and Sexual Activity   • Alcohol use: Never   • Drug use: Never   • Sexual activity: Not on file       SURGICAL HISTORY  patient denies any surgical history    CURRENT MEDICATIONS  Home Medications     Reviewed by Chaz Sellers (Pharmacy Tech) on 12/19/21 at 0512  Med List Status: Complete   Medication Last Dose Status   losartan (COZAAR) 50 MG Tab 12/18/2021 Active   pravastatin (PRAVACHOL) 20 MG Tab 12/18/2021 Active   rivaroxaban (XARELTO) 20 MG Tab tablet 12/18/2021 Active   ursodiol (ACTIGALL) 300 MG Cap 12/18/2021 Active                ALLERGIES  No Known Allergies    PHYSICAL EXAM  VITAL SIGNS: /64   Pulse 68   Temp 36.5 °C (97.7 °F) (Temporal)   Resp 15   Ht  "1.664 m (5' 5.51\")   Wt 112 kg (245 lb 13 oz)   SpO2 97%   BMI 40.27 kg/m²       Constitutional: Alert in no apparent distress.  HENT: No signs of trauma, Bilateral external ears normal, Nose normal.   Eyes: Pupils are equal, Conjunctiva normal, Non-icteric.   Neck: Normal range of motion, No tenderness, Supple, No stridor.   Cardiovascular: Regular rate and rhythm, no murmurs. Symmetric distal pulses. No cyanosis of extremities. No peripheral edema of extremities.  Thorax & Lungs: Normal breath sounds, No respiratory distress  Abdomen: Soft, No tenderness, No pulsatile masses. No peritoneal signs.  Skin: Warm, Dry, No erythema, No rash.   Back: No midline bony tenderness,   Musculoskeletal: Good range of motion in all major joints. No tenderness to palpation or major deformities noted.   Neurologic: NIHSS 11.  Alert and oriented to person place time situation.  Left-sided facial droop.  No aphasia.  Left upper and lower extremity drift. Neglecting left side. Visual field defect in left visual field.   Psychiatric: Affect normal, Judgment normal, Mood normal.   Physical Exam      DIAGNOSTIC STUDIES / PROCEDURES    EKG  Results for orders placed or performed during the hospital encounter of 21   EKG (NOW)   Result Value Ref Range    Report       [facility] Emergency Dept.    Test Date:  2021  Pt Name:    NASIM TELLEZ                Department: ER  MRN:        [id number]                      Room:       [facility]  Gender:     Male                         Technician: [id number]  :        1950                   Requested By:CONSTANZA ARTIS II  Order #:    [id number]                    Reading MD: Constanza Artis II, MD    Measurements  Intervals                                Axis  Rate:       81                           P:          67  WV:         199                          QRS:        14  QRSD:       93                           T:          18  QT:         381  QTc:        " "443    Interpretive Statements  Sinus rhythm  Rate 81  Normal intervals. No ST elevation or depression. NOrmal twaves.  Impression: Normal sinus rhythm EKG.  Compared to ECG 09/01/2021 08:01:50  Sinus bradycardia no longer present  Myocardial infarct finding no longer present  Electronically Signed On 12- 4:27:04 PST by JOHN Shelton II, MD           LABS  Pertinent Labs & Imaging studies reviewed. (See chart for details)    RADIOLOGY  Pertinent Labs & Imaging studies reviewed. (See chart for details)    COURSE & MEDICAL DECISION MAKING  Pertinent Labs & Imaging studies reviewed. (See chart for details)    3:37 AM This is an emergent evaluation of a  71 y.o. male who presents with symptoms concerning for stroke.  Upon arrival stroke alert was sent out and patient was immediately evaluated at nursing charge desk. Physical findings include left-sided facial droop, left-sided upper and lower extremity drift. They were immediately taken to imaging following a brief history and exam.  The differential diagnosis includes but is not limited to hemorrhagic stroke, ischemic stroke.  Stroke order set placed.    3:55 AM Discussed with Dr. Cervantes, Neurology.  Plan for No TPA.  Decision made because cerebral hemorrhage.  Imagings revealed \"3.4x3.6 cm parenchymal hemorrhage in the right basal ganglia with mild mass effect in the right lateral ventricle.\"  Plan to reverse anticoagulation with Kcentra.  He will need to be hospitalized in the ICU.  I will also consult neurosurgery.  Awaiting repeat vital signs when return from CT.     NIHSS score is 11.     4:23 AM  I have spoken with Dr. Arriaga, Neurosurgery. Agrees with plan for Kcentra.  He will evaluate  later today in the ICU.  No immediate indications for surgical intervention.  If there is acute worsening then we will contact Dr. Arriaga for further recommendations.    I have ordered continuous pulse ox and cardiac monitoring. Pulse ox shows a normal " waveform with saturations of 96%. Heart rate of 80, sinus rhythm.     CBC, CMP, Trop within normal limits. CXR unremarkable. Dr. Whitmore, Intensivist, has arranged for admission.        CRITICAL CARE  The very real possibilty of a deterioration of this patient's condition required the highest level of my preparedness for sudden, emergent intervention.  I provided critical care services, which included medication orders, frequent reevaluations of the patient's condition and response to treatment, ordering and reviewing test results, and discussing the case with various consultants.  The critical care time associated with the care of the patient was 35 minutes. Review chart for interventions. This time is exclusive of any other billable procedures.          FINAL IMPRESSION    ICD-10-CM   1. Cerebral hemorrhage (HCC)  I61.9   2. Coagulopathy (HCC)  D68.9   3. Left-sided weakness  R53.1            This dictation was created using voice recognition software. The accuracy of the dictation is limited to the abilities of the software. I expect there may be some errors of grammar and possibly content. The nursing notes were reviewed and certain aspects of this information were incorporated into this note.    Electronically signed by: Vlad Shelton II, M.D., 12/19/2021 3:37 AM

## 2021-12-20 LAB
ALBUMIN SERPL BCP-MCNC: 3.9 G/DL (ref 3.2–4.9)
ALBUMIN/GLOB SERPL: 1.6 G/DL
ALP SERPL-CCNC: 81 U/L (ref 30–99)
ALT SERPL-CCNC: 24 U/L (ref 2–50)
ANION GAP SERPL CALC-SCNC: 9 MMOL/L (ref 7–16)
AST SERPL-CCNC: 30 U/L (ref 12–45)
BASOPHILS # BLD AUTO: 0.4 % (ref 0–1.8)
BASOPHILS # BLD: 0.04 K/UL (ref 0–0.12)
BILIRUB SERPL-MCNC: 0.4 MG/DL (ref 0.1–1.5)
BUN SERPL-MCNC: 11 MG/DL (ref 8–22)
CALCIUM SERPL-MCNC: 8.2 MG/DL (ref 8.5–10.5)
CHLORIDE SERPL-SCNC: 110 MMOL/L (ref 96–112)
CO2 SERPL-SCNC: 22 MMOL/L (ref 20–33)
CREAT SERPL-MCNC: 0.65 MG/DL (ref 0.5–1.4)
EOSINOPHIL # BLD AUTO: 0.05 K/UL (ref 0–0.51)
EOSINOPHIL NFR BLD: 0.5 % (ref 0–6.9)
ERYTHROCYTE [DISTWIDTH] IN BLOOD BY AUTOMATED COUNT: 48.4 FL (ref 35.9–50)
GLOBULIN SER CALC-MCNC: 2.4 G/DL (ref 1.9–3.5)
GLUCOSE BLD-MCNC: 103 MG/DL (ref 65–99)
GLUCOSE BLD-MCNC: 105 MG/DL (ref 65–99)
GLUCOSE BLD-MCNC: 119 MG/DL (ref 65–99)
GLUCOSE BLD-MCNC: 123 MG/DL (ref 65–99)
GLUCOSE BLD-MCNC: 127 MG/DL (ref 65–99)
GLUCOSE BLD-MCNC: 130 MG/DL (ref 65–99)
GLUCOSE SERPL-MCNC: 134 MG/DL (ref 65–99)
HCT VFR BLD AUTO: 39.1 % (ref 42–52)
HGB BLD-MCNC: 12.8 G/DL (ref 14–18)
IMM GRANULOCYTES # BLD AUTO: 0.03 K/UL (ref 0–0.11)
IMM GRANULOCYTES NFR BLD AUTO: 0.3 % (ref 0–0.9)
LYMPHOCYTES # BLD AUTO: 1.6 K/UL (ref 1–4.8)
LYMPHOCYTES NFR BLD: 16.1 % (ref 22–41)
MAGNESIUM SERPL-MCNC: 1.9 MG/DL (ref 1.5–2.5)
MCH RBC QN AUTO: 29.4 PG (ref 27–33)
MCHC RBC AUTO-ENTMCNC: 32.7 G/DL (ref 33.7–35.3)
MCV RBC AUTO: 89.7 FL (ref 81.4–97.8)
MONOCYTES # BLD AUTO: 1.09 K/UL (ref 0–0.85)
MONOCYTES NFR BLD AUTO: 11 % (ref 0–13.4)
NEUTROPHILS # BLD AUTO: 7.13 K/UL (ref 1.82–7.42)
NEUTROPHILS NFR BLD: 71.7 % (ref 44–72)
NRBC # BLD AUTO: 0 K/UL
NRBC BLD-RTO: 0 /100 WBC
PLATELET # BLD AUTO: 234 K/UL (ref 164–446)
PMV BLD AUTO: 9 FL (ref 9–12.9)
POTASSIUM SERPL-SCNC: 4.3 MMOL/L (ref 3.6–5.5)
PROT SERPL-MCNC: 6.3 G/DL (ref 6–8.2)
RBC # BLD AUTO: 4.36 M/UL (ref 4.7–6.1)
SODIUM SERPL-SCNC: 140 MMOL/L (ref 135–145)
SODIUM SERPL-SCNC: 141 MMOL/L (ref 135–145)
SODIUM SERPL-SCNC: 141 MMOL/L (ref 135–145)
SODIUM SERPL-SCNC: 142 MMOL/L (ref 135–145)
WBC # BLD AUTO: 9.9 K/UL (ref 4.8–10.8)

## 2021-12-20 PROCEDURE — A9270 NON-COVERED ITEM OR SERVICE: HCPCS | Performed by: PHYSICAL MEDICINE & REHABILITATION

## 2021-12-20 PROCEDURE — 97167 OT EVAL HIGH COMPLEX 60 MIN: CPT

## 2021-12-20 PROCEDURE — 83735 ASSAY OF MAGNESIUM: CPT

## 2021-12-20 PROCEDURE — 700111 HCHG RX REV CODE 636 W/ 250 OVERRIDE (IP): Performed by: INTERNAL MEDICINE

## 2021-12-20 PROCEDURE — 700105 HCHG RX REV CODE 258: Performed by: PSYCHIATRY & NEUROLOGY

## 2021-12-20 PROCEDURE — A9270 NON-COVERED ITEM OR SERVICE: HCPCS | Performed by: STUDENT IN AN ORGANIZED HEALTH CARE EDUCATION/TRAINING PROGRAM

## 2021-12-20 PROCEDURE — 84295 ASSAY OF SERUM SODIUM: CPT

## 2021-12-20 PROCEDURE — 700101 HCHG RX REV CODE 250: Performed by: PSYCHIATRY & NEUROLOGY

## 2021-12-20 PROCEDURE — 770022 HCHG ROOM/CARE - ICU (200)

## 2021-12-20 PROCEDURE — 700102 HCHG RX REV CODE 250 W/ 637 OVERRIDE(OP): Performed by: PHYSICAL MEDICINE & REHABILITATION

## 2021-12-20 PROCEDURE — 99291 CRITICAL CARE FIRST HOUR: CPT | Performed by: INTERNAL MEDICINE

## 2021-12-20 PROCEDURE — 99223 1ST HOSP IP/OBS HIGH 75: CPT | Performed by: PHYSICAL MEDICINE & REHABILITATION

## 2021-12-20 PROCEDURE — 92526 ORAL FUNCTION THERAPY: CPT

## 2021-12-20 PROCEDURE — 700101 HCHG RX REV CODE 250: Performed by: INTERNAL MEDICINE

## 2021-12-20 PROCEDURE — 700102 HCHG RX REV CODE 250 W/ 637 OVERRIDE(OP): Performed by: STUDENT IN AN ORGANIZED HEALTH CARE EDUCATION/TRAINING PROGRAM

## 2021-12-20 PROCEDURE — A9270 NON-COVERED ITEM OR SERVICE: HCPCS | Performed by: INTERNAL MEDICINE

## 2021-12-20 PROCEDURE — 82962 GLUCOSE BLOOD TEST: CPT | Mod: 91

## 2021-12-20 PROCEDURE — 700102 HCHG RX REV CODE 250 W/ 637 OVERRIDE(OP): Performed by: INTERNAL MEDICINE

## 2021-12-20 PROCEDURE — 99233 SBSQ HOSP IP/OBS HIGH 50: CPT | Performed by: PSYCHIATRY & NEUROLOGY

## 2021-12-20 PROCEDURE — 80053 COMPREHEN METABOLIC PANEL: CPT

## 2021-12-20 PROCEDURE — 97162 PT EVAL MOD COMPLEX 30 MIN: CPT

## 2021-12-20 PROCEDURE — 85025 COMPLETE CBC W/AUTO DIFF WBC: CPT

## 2021-12-20 PROCEDURE — 97535 SELF CARE MNGMENT TRAINING: CPT

## 2021-12-20 RX ORDER — SODIUM CHLORIDE 1 G/1
1 TABLET ORAL
Status: DISCONTINUED | OUTPATIENT
Start: 2021-12-20 | End: 2021-12-20

## 2021-12-20 RX ORDER — URSODIOL 300 MG/1
300 CAPSULE ORAL 2 TIMES DAILY
Status: DISCONTINUED | OUTPATIENT
Start: 2021-12-20 | End: 2021-12-24 | Stop reason: HOSPADM

## 2021-12-20 RX ORDER — PRAVASTATIN SODIUM 20 MG
20 TABLET ORAL DAILY
Status: DISCONTINUED | OUTPATIENT
Start: 2021-12-20 | End: 2021-12-24 | Stop reason: HOSPADM

## 2021-12-20 RX ORDER — ACETAMINOPHEN 500 MG
500 TABLET ORAL 3 TIMES DAILY
Status: DISCONTINUED | OUTPATIENT
Start: 2021-12-20 | End: 2021-12-24 | Stop reason: HOSPADM

## 2021-12-20 RX ORDER — SODIUM CHLORIDE 1 G/1
2 TABLET ORAL
Status: DISCONTINUED | OUTPATIENT
Start: 2021-12-20 | End: 2021-12-24 | Stop reason: HOSPADM

## 2021-12-20 RX ORDER — CHOLECALCIFEROL (VITAMIN D3) 125 MCG
2.5 CAPSULE ORAL NIGHTLY
Status: DISCONTINUED | OUTPATIENT
Start: 2021-12-20 | End: 2021-12-24 | Stop reason: HOSPADM

## 2021-12-20 RX ORDER — LOSARTAN POTASSIUM 50 MG/1
50 TABLET ORAL DAILY
Status: DISCONTINUED | OUTPATIENT
Start: 2021-12-20 | End: 2021-12-21

## 2021-12-20 RX ADMIN — SODIUM CHLORIDE 9 MG/HR: 9 INJECTION, SOLUTION INTRAVENOUS at 08:05

## 2021-12-20 RX ADMIN — SODIUM CHLORIDE 10 MG/HR: 9 INJECTION, SOLUTION INTRAVENOUS at 15:45

## 2021-12-20 RX ADMIN — ACETAMINOPHEN 500 MG: 500 TABLET ORAL at 20:50

## 2021-12-20 RX ADMIN — Medication 2.5 MG: at 20:50

## 2021-12-20 RX ADMIN — ACETAMINOPHEN 650 MG: 325 TABLET, FILM COATED ORAL at 09:58

## 2021-12-20 RX ADMIN — Medication 1 G: at 17:23

## 2021-12-20 RX ADMIN — SODIUM CHLORIDE 9 MG/HR: 9 INJECTION, SOLUTION INTRAVENOUS at 02:12

## 2021-12-20 RX ADMIN — SODIUM CHLORIDE 500 ML: 3 INJECTION, SOLUTION INTRAVENOUS at 03:23

## 2021-12-20 RX ADMIN — SODIUM CHLORIDE 500 ML: 3 INJECTION, SOLUTION INTRAVENOUS at 14:30

## 2021-12-20 RX ADMIN — HYDRALAZINE HYDROCHLORIDE 10 MG: 20 INJECTION INTRAMUSCULAR; INTRAVENOUS at 08:55

## 2021-12-20 RX ADMIN — LABETALOL HYDROCHLORIDE 10 MG: 5 INJECTION INTRAVENOUS at 13:31

## 2021-12-20 RX ADMIN — LOSARTAN POTASSIUM 50 MG: 50 TABLET, FILM COATED ORAL at 09:57

## 2021-12-20 RX ADMIN — SODIUM CHLORIDE 13 MG/HR: 9 INJECTION, SOLUTION INTRAVENOUS at 20:45

## 2021-12-20 RX ADMIN — LABETALOL HYDROCHLORIDE 10 MG: 5 INJECTION INTRAVENOUS at 18:43

## 2021-12-20 RX ADMIN — Medication 1 G: at 14:33

## 2021-12-20 RX ADMIN — Medication 2 G: at 19:44

## 2021-12-20 RX ADMIN — DOCUSATE SODIUM 50 MG AND SENNOSIDES 8.6 MG 2 TABLET: 8.6; 5 TABLET, FILM COATED ORAL at 05:56

## 2021-12-20 RX ADMIN — HYDRALAZINE HYDROCHLORIDE 10 MG: 20 INJECTION INTRAMUSCULAR; INTRAVENOUS at 17:49

## 2021-12-20 RX ADMIN — URSODIOL 300 MG: 300 CAPSULE ORAL at 17:23

## 2021-12-20 RX ADMIN — DOCUSATE SODIUM 50 MG AND SENNOSIDES 8.6 MG 2 TABLET: 8.6; 5 TABLET, FILM COATED ORAL at 17:23

## 2021-12-20 RX ADMIN — URSODIOL 300 MG: 300 CAPSULE ORAL at 13:19

## 2021-12-20 RX ADMIN — PRAVASTATIN SODIUM 20 MG: 20 TABLET ORAL at 09:57

## 2021-12-20 ASSESSMENT — COGNITIVE AND FUNCTIONAL STATUS - GENERAL
SUGGESTED CMS G CODE MODIFIER MOBILITY: CM
WALKING IN HOSPITAL ROOM: TOTAL
HELP NEEDED FOR BATHING: A LOT
STANDING UP FROM CHAIR USING ARMS: A LOT
DAILY ACTIVITIY SCORE: 12
SUGGESTED CMS G CODE MODIFIER DAILY ACTIVITY: CL
CLIMB 3 TO 5 STEPS WITH RAILING: TOTAL
MOVING FROM LYING ON BACK TO SITTING ON SIDE OF FLAT BED: UNABLE
DRESSING REGULAR LOWER BODY CLOTHING: A LOT
EATING MEALS: A LOT
DRESSING REGULAR UPPER BODY CLOTHING: A LOT
PERSONAL GROOMING: A LOT
MOBILITY SCORE: 7
TOILETING: A LOT
MOVING TO AND FROM BED TO CHAIR: UNABLE
TURNING FROM BACK TO SIDE WHILE IN FLAT BAD: UNABLE

## 2021-12-20 ASSESSMENT — ENCOUNTER SYMPTOMS
NERVOUS/ANXIOUS: 0
ABDOMINAL PAIN: 0
BRUISES/BLEEDS EASILY: 0
SORE THROAT: 0
COUGH: 0
VOMITING: 0
FEVER: 0
MYALGIAS: 0
NAUSEA: 1
FOCAL WEAKNESS: 1
SHORTNESS OF BREATH: 0
BLURRED VISION: 0
DOUBLE VISION: 0
WEAKNESS: 1
SINUS PAIN: 0
HEADACHES: 1
TREMORS: 0
NECK PAIN: 0
SENSORY CHANGE: 1
BACK PAIN: 0
HEADACHES: 0
DEPRESSION: 0
DIARRHEA: 0
CHILLS: 0
WHEEZING: 0

## 2021-12-20 ASSESSMENT — ACTIVITIES OF DAILY LIVING (ADL): TOILETING: INDEPENDENT

## 2021-12-20 ASSESSMENT — PATIENT HEALTH QUESTIONNAIRE - PHQ9
2. FEELING DOWN, DEPRESSED, IRRITABLE, OR HOPELESS: NOT AT ALL
SUM OF ALL RESPONSES TO PHQ9 QUESTIONS 1 AND 2: 0
1. LITTLE INTEREST OR PLEASURE IN DOING THINGS: NOT AT ALL

## 2021-12-20 ASSESSMENT — GAIT ASSESSMENTS: GAIT LEVEL OF ASSIST: UNABLE TO PARTICIPATE

## 2021-12-20 ASSESSMENT — PAIN DESCRIPTION - PAIN TYPE: TYPE: ACUTE PAIN

## 2021-12-20 NOTE — CARE PLAN
The patient is Watcher - Medium risk of patient condition declining or worsening    Problem: Neuro Status  Goal: Neuro status will remain stable or improve  Note: Neuro exam q2h per neurology      Problem: Mobility - Stroke  Goal: Patient's capacity to carry out activities will improve  Note: Patient goal to mobilize to EOB today.    Shift Goals  Clinical Goals: stable neuro exams   Patient Goals: mobilize   Family Goals: updates    Progress made toward(s) clinical / shift goals: Patient mobilized to EOB with therapy.     Patient is not progressing towards the following goals:

## 2021-12-20 NOTE — PROGRESS NOTES
NEUROLOGY PROGRESS NOTE      BACKGROUND:    71 y.o. male was admitted on 12/19/2021  3:31 AM for Intracranial hemorrhage (HCC) [I62.9].      SUBJECTIVE:   He is more alert today and the movement in left upper and lower extremity has improved.  He has left visual field cut on the left.  Passed swallow eval.    VITALS:  Vitals:    12/20/21 1115 12/20/21 1130 12/20/21 1145 12/20/21 1200   BP: 137/64 135/59 148/63    Pulse: 84 87 89    Resp: 18 (!) 22 (!) 30    Temp:    36.9 °C (98.4 °F)   TempSrc:    Temporal   SpO2: 97% 97% 99%    Weight:       Height:           NEUROLOGICAL EXAM:   MENTAL STATUS:  Awake, alert, oriented times 3.  Speech is fluent, comprehension is intact.  CRANIAL NERVES:  PERRL, EOMI with no nystagmus, facial motor revealed left facial weakness in an upper motor neuron pattern, facial sensation is intact, tongue is in the midline, palate is symmetric. Hearing is intact to finger rub bilaterally. Shoulder shrugs are normal.  He appears to have mild neglect to the left side.  MOTOR:  Motor examination showed normal strength in direct testing of right upper and lower extremities, proximal and distal.  He has 3/5 weakness of left upper and lower extremity.  SENSATION: Decreased to light touch and temperature in left upper and lower extremity.  REFLEXES:  1+ and symmetric, toes are downgoing bilaterally  COORDINATION:  Normal finger to nose and heel to shin bilaterally  GAIT:  Deferred      OBJECTIVE:    NEUROIMAGING:    EC-ECHOCARDIOGRAM COMPLETE W/ CONT   Final Result      CT-HEAD W/O   Final Result      1.  Increase in size of right temporal intra-axial hematoma now measuring 6.7 x 4.3 cm and previously 3.4 x 2.9 cm      2.  Mild mass effect with 2-3 mm right to left shift      3.  Underlying brain white matter change and volume loss      DX-CHEST-PORTABLE (1 VIEW)   Final Result         1. No acute cardiopulmonary abnormalities are identified.      CT-CTA HEAD WITH & W/O-POST PROCESS   Final Result          1. No hemodynamically significant narrowing of the major intracranial vessels.   2. Redemonstration of parenchymal hemorrhage in the right basal ganglia.      CT-CTA NECK WITH & W/O-POST PROCESSING   Final Result      1. No evidence of flow-limiting stenosis in the cervical carotid or cervical vertebral arteries.      CT-CEREBRAL PERFUSION ANALYSIS   Final Result      1.  Cerebral blood flow less than 30% = 7 mL.      2.  T Max more than 6 seconds = 16 mL.      3.  These are likely related to the parenchymal hemorrhage.      4.  Please note that the cerebral perfusion was performed on the limited brain tissue around the basal ganglia region. Infarct/ischemia outside the CT perfusion sections can be missed in this study.      CT-HEAD W/O   Final Result         1. A 3.4 x 3.6 cm parenchymal hemorrhage in the right basal ganglia. Mild mass effect in the right lateral ventricle. No significant midline shift.                  CRITICAL RESULT READ BACK: Preliminary findings discussed with and critical read back performed by Dr. CONSTANZA ARTIS II in the Emergency Department via telephone on 12/19/2021 3:50 AM          MEDICATIONS:  Current Facility-Administered Medications   Medication Dose Route Frequency Provider Last Rate Last Admin   • losartan (COZAAR) tablet 50 mg  50 mg Oral DAILY Lissette Jaffe M.D.   50 mg at 12/20/21 0957   • pravastatin (PRAVACHOL) tablet 20 mg  20 mg Oral DAILY Lissette Jaffe M.D.   20 mg at 12/20/21 0957   • ursodiol (ACTIGALL) capsule 300 mg  300 mg Oral BID Lissette Jaffe M.D.   300 mg at 12/20/21 1319   • sodium chloride (SALT) tablet 1 g  1 g Oral TID WITH MEALS Andrew Samson M.D.       • senna-docusate (PERICOLACE or SENOKOT S) 8.6-50 MG per tablet 2 Tablet  2 Tablet Oral BID Ever Whitmore M.D.   2 Tablet at 12/20/21 0556    And   • polyethylene glycol/lytes (MIRALAX) PACKET 1 Packet  1 Packet Oral QDAY PRN Ever Whitmore M.D.        And   • magnesium hydroxide  (MILK OF MAGNESIA) suspension 30 mL  30 mL Oral QDAY PRN Ever Whitmore M.D.        And   • bisacodyl (DULCOLAX) suppository 10 mg  10 mg Rectal QDAY PRN Ever Whitmore M.D.       • Respiratory Therapy Consult   Nebulization Continuous RT Ever Whitmore M.D.       • insulin regular (HumuLIN R,NovoLIN R) injection  1-6 Units Subcutaneous Q6HRS Ever Whitmore M.D.        And   • dextrose 50% (D50W) injection 50 mL  50 mL Intravenous Q15 MIN PRN Ever Whitmore M.D.       • LORazepam (ATIVAN) injection 2 mg  2 mg Intravenous Q5 MIN PRN Ever Whitmore M.D.       • acetaminophen (Tylenol) tablet 650 mg  650 mg Oral Q4HRS PRN Ever Whitmore M.D.   650 mg at 12/20/21 0958    Or   • acetaminophen (TYLENOL) suppository 650 mg  650 mg Rectal Q4HRS PRN Ever Whitmore M.D.       • ondansetron (ZOFRAN ODT) dispertab 4 mg  4 mg Oral Q4HRS PRN Ever Whitmore M.D.        Or   • ondansetron (ZOFRAN) syringe/vial injection 4 mg  4 mg Intravenous Q4HRS PRN Ever Whitmore M.D.       • MD Alert...ICU Electrolyte Replacement per Pharmacy   Other PHARMACY TO DOSE Ever Whitmore M.D.       • labetalol (NORMODYNE/TRANDATE) injection 10 mg  10 mg Intravenous Q4HRS PRN Ever Whitmore M.D.   10 mg at 12/20/21 1331   • hydrALAZINE (APRESOLINE) injection 10 mg  10 mg Intravenous Q4HRS PRN Ever Whitmore M.D.   10 mg at 12/20/21 0855   • enalaprilat (Vasotec) injection 1.25 mg 1 mL  1.25 mg Intravenous Q6HRS PRN Ever Whitmore M.D.       • 3% sodium chloride (HYPERTONIC SALINE) 500mL infusion 500 mL  500 mL Intravenous Continuous Kenn Davenport M.D. 50 mL/hr at 12/20/21 1300 Rate Verify at 12/20/21 1300   • sodium chloride 200 mEq in empty bag 50 mL ivpb  200 mEq Intravenous Q6HRS PRN Kenn Davneport M.D.       • niCARdipine (CARDENE) 50 mg in  mL Infusion  0-15 mg/hr Intravenous Continuous Kenn Davenport M.D. 60 mL/hr at 12/20/21 1030 6 mg/hr at 12/20/21 1030       LABS:      Recent Labs      12/19/21  0416 12/20/21  1145   WBC 6.4 9.9   RBC 4.54* 4.36*   HEMOGLOBIN 13.3* 12.8*   HEMATOCRIT 40.2* 39.1*   MCV 88.5 89.7   MCH 29.3 29.4   MCHC 33.1* 32.7*   RDW 45.8 48.4   PLATELETCT 229 234   MPV 9.2 9.0     Recent Labs     12/19/21  0416 12/19/21  0416 12/19/21  1115 12/19/21  1702 12/19/21  2314 12/20/21  0611 12/20/21  1145   SODIUM 138   < > 140   < > 141 141 141   POTASSIUM 4.6  --  4.2  --   --  4.3  --    CHLORIDE 104  --  104  --   --  110  --    CO2 24  --  27  --   --  22  --    GLUCOSE 117*  --  124*  --   --  134*  --    BUN 21  --  17  --   --  11  --     < > = values in this interval not displayed.     INR   Date Value Ref Range Status   12/19/2021 1.23 (H) 0.87 - 1.13 Final     Comment:     INR - Non-therapeutic Reference Range: 0.87-1.13  INR - Therapeutic Reference Range: 2.0-4.0       No results found for: POCINR  Lab Results   Component Value Date/Time    CREATININE 0.65 12/20/2021 0611     Lab Results   Component Value Date/Time    IFAFRICA >60 12/20/2021 0611    IFNOTAFR >60 12/20/2021 0611       ASSESSMENT AND PLAN:  71 y.o. male with history of atrial fibrillation who was on Xarelto,  brought to emergency room for acute onset of left-sided weakness.  He was found to have a 3 x 3 cm intraparenchymal hemorrhage in the right basal ganglia.  His ICH score is 1.  Patient's anticoagulation was reversed with Kcentra.   Repeat brain CT revealed Increase in size of right temporal intra-axial hematoma now measuring 6.7 x 4.3 cm and previously 3.4 x 2.9 cm  also with mild mass effect with 2-3 mm right to left shift, started on hypertonic saline with current sodium of 141.   Keep head of bed elevated at 45 degrees.  Maintain systolic blood pressure less than 150.  If any seizure activity noted recommend Keppra 500 mg twice a day.  Bowel regimen to avoid Valsalva maneuver.   DVT prevention with SCDs.  Continue with physical therapy, occupational therapy and speech therapy.

## 2021-12-20 NOTE — DISCHARGE PLANNING
Anticipated Discharge Disposition: possible IRF     Action: RN CM attended IDT rounds and reviewed patient chart.  Patient lives in [place] with wife and works full-time.  Patient's PCP is Dr. Alfonso and preferred pharmacy on file is the [facility] pharmacy in [place] and the [facility] pharmacy mail service.  IRF following patient for potential rehab candidate, PT/OT evals pending at this time due to strict bedrest.     Email to PFA to come see patient and wife for insurance updates.  PFA Anastasia to come see patient and wife around 1230    Barriers to Discharge: medical clearance; ICU level of care.  PT/OT evals, IRF consult     Plan: HCM to continue to follow and assist with discharge planning needs and barriers       Care Transition Team Assessment  Emergency Contact  Adriana Baca (spouse) [phone]    Information Source: chart review   Orientation Level: Oriented X4  Information Given By: Other (Comments)  Informant's Name: EHR  Who is responsible for making decisions for patient? : Patient    Readmission Evaluation  Is this a readmission?: No    Elopement Risk  Legal Hold: No  Ambulatory or Self Mobile in Wheelchair: No-Not an Elopement Risk  Elopement Risk: Not at Risk for Elopement    Interdisciplinary Discharge Planning  Lives with - Patient's Self Care Capacity: Spouse  Housing / Facility: Unable To Determine At This Time  Prior Services: Home-Independent    Discharge Preparedness  What is your plan after discharge?: Uncertain - pending medical team collaboration  What are your discharge supports?: Spouse  Prior Functional Level: Ambulatory,Independent with Activities of Daily Living  Difficulity with ADLs: None  Difficulity with IADLs: None    Functional Assesment  Prior Functional Level: Ambulatory,Independent with Activities of Daily Living    Finances  Financial Barriers to Discharge: No  Prescription Coverage: Yes    Advance Directive  Advance Directive?: None    Domestic Abuse  Have you ever been the victim  of abuse or violence?: No    Psychological Assessment  History of Substance Abuse: None  History of Psychiatric Problems: No  Non-compliant with Treatment: No  Newly Diagnosed Illness: Yes    Discharge Risks or Barriers  Discharge risks or barriers?: No    Anticipated Discharge Information  Discharge Disposition: Disch to  rehab facility or distinct part unit (62)

## 2021-12-20 NOTE — PROGRESS NOTES
Neurosurgery Progress Note    Subjective:  States some HA, denies new changes  States some photophobia    Exam:  AOx4,Tongue ML, mild drift on the left. Some weakness to left arm 4/5, BLE grossly intact. EOMs, some deficit to LLQ, states some vision deficit since hemorrhage, unchanged. Perrl 3mm bilat    BP  Min: 115/56  Max: 150/68  Pulse  Av.2  Min: 69  Max: 93  Resp  Av.2  Min: 13  Max: 38  Temp  Av.8 °C (98.2 °F)  Min: 36.3 °C (97.4 °F)  Max: 37.1 °C (98.8 °F)  SpO2  Av.8 %  Min: 90 %  Max: 98 %    No data recorded    Recent Labs     21   WBC 6.4   RBC 4.54*   HEMOGLOBIN 13.3*   HEMATOCRIT 40.2*   MCV 88.5   MCH 29.3   MCHC 33.1*   RDW 45.8   PLATELETCT 229   MPV 9.2     Recent Labs     21  0416 21  0416 21  1115 21  1115 21  1702 21  2314 21  0611   SODIUM 138   < > 140   < > 143 141 141   POTASSIUM 4.6  --  4.2  --   --   --  4.3   CHLORIDE 104  --  104  --   --   --  110   CO2 24  --  27  --   --   --  22   GLUCOSE 117*  --  124*  --   --   --  134*   BUN 21  --  17  --   --   --  11   CREATININE 0.67  --  0.70  --   --   --  0.65   CALCIUM 8.8  --  8.8  --   --   --  8.2*    < > = values in this interval not displayed.     Recent Labs     21  0435   APTT 35.2   INR 1.23*     Recent Labs     21  0612   REACTMIN 5.6   CLOTKINET 0.8   CLOTANGL 78.0   MAXCLOTS 63.6   [id number] 0.1   PRCINADP 0.9   PRCINAA 15.3*       Intake/Output                       21 0700 - 21 0659 21 0700 - 21 659 Total  Total                 Intake    P.O.  --  700 700  120  -- 120    P.O. -- 700 700 120 -- 120    I.V.  1104.4  1603.8 2708.3  260.8  -- 260.8    Cardene Volume 785.4 1127.5 1912.9 180 -- 180    Volume (mL) (NS infusion) 165 0 165 -- -- --    Volume (mL) (3% sodium chloride (HYPERTONIC SALINE) 500mL infusion 500 mL) 154 476.3 630.3 80.8 -- 80.8    IV Piggyback  --  0 0  --   -- --    Volume (mL) (sodium chloride 200 mEq in empty bag 50 mL ivpb) -- 0 0 -- -- --    Total Intake 1104.4 2303.8 3408.3 380.8 -- 380.8       Output    Urine  1650  900 2550  160  -- 160    Output (mL) (External Urinary Catheter (Condom)) 3145 872 9980 160 -- 160    Total Output 7806 530 9868 160 -- 160       Net I/O     -545.6 1403.8 858.3 220.8 -- 220.8            Intake/Output Summary (Last 24 hours) at 12/20/2021 1037  Last data filed at 12/20/2021 0800  Gross per 24 hour   Intake 3789.08 ml   Output 2710 ml   Net 1079.08 ml            • losartan  50 mg DAILY   • pravastatin  20 mg DAILY   • ursodiol  300 mg BID   • senna-docusate  2 Tablet BID    And   • polyethylene glycol/lytes  1 Packet QDAY PRN    And   • magnesium hydroxide  30 mL QDAY PRN    And   • bisacodyl  10 mg QDAY PRN   • Respiratory Therapy Consult   Continuous RT   • insulin regular  1-6 Units Q6HRS    And   • dextrose 50%  50 mL Q15 MIN PRN   • LORazepam  2 mg Q5 MIN PRN   • acetaminophen  650 mg Q4HRS PRN    Or   • acetaminophen  650 mg Q4HRS PRN   • ondansetron  4 mg Q4HRS PRN    Or   • ondansetron  4 mg Q4HRS PRN   • MD Alert...Adult ICU Electrolyte Replacement per Pharmacy   PHARMACY TO DOSE   • labetalol  10 mg Q4HRS PRN   • hydrALAZINE  10 mg Q4HRS PRN   • enalaprilat  1.25 mg Q6HRS PRN   • 3% sodium chloride  500 mL Continuous   • sodium chloride 23.4% ivpb  200 mEq Q6HRS PRN   • niCARdipine infusion  0-15 mg/hr Continuous       Assessment and Plan:  Hospital day #2 R BG ICH  Prophylactic anticoagulation: no         Start date/time: tbd    Hs Afib- reversed  CT yest- some worsened  Keep SBP<150-on nicardipine  On 3%  Continue neuro checks

## 2021-12-20 NOTE — RESPIRATORY CARE
COPD EDUCATION by COPD CLINICAL EDUCATOR  12/20/2021 at 5:41 AM by Jojo Cuenca, RRT     Patient reviewed by COPD education team. Patient does not have a history or diagnosis of COPD and is a non-smoker.  Therefore, patient does not qualify for the COPD program.

## 2021-12-20 NOTE — DIETARY
NUTRITION SERVICES: BMI - Pt with BMI >40 (=Body mass index is 40.27 kg/m².), Class III obesity. Weight loss counseling not appropriate in acute care setting. RECOMMEND - If appropriate at DC please refer to outpatient nutrition services for weight management.

## 2021-12-20 NOTE — THERAPY
PT Contact Note    PT Consult received/acknowledged. Pt currently with strict bedrest orders. Will evaluate OOB mobility once bedrest orders lifted, discussed with nsg.    Tavia Rosa, PT, DPT  Ext. [phone]

## 2021-12-20 NOTE — THERAPY
Occupational Therapy   Initial Evaluation     Patient Name: Gokul Baca  Age:  71 y.o., Sex:  male  Medical Record #: [id number]  Today's Date: 12/20/2021     Precautions  Precautions: (P) Fall Risk,Swallow Precautions ( See Comments)    Assessment  Patient is 71 y.o. male admitted for right basal ganglia intracranial hemorrhage, HTN. Pt normally independent with all mobility and ADLs at baseline living in a [facility] with spouse who works during the day. Pt required modA for mobility, maxA for lower body ADLs and toileting. Pt presented with left sided weakness, left visual field deficits, and left inattention. Pt would benefit from continued skilled therapy while admitted as well as recommend post-acute placement when medically cleared. Pt and spouse educated on use of LUE and attempting to use as often as possible as well as exercises for LUE to increase functional use for long term outlook, spouse and patient agreeable to education and spouse plans to work with patient outside of therapy time.    Plan    Recommend Occupational Therapy 4 times per week until therapy goals are met for the following treatments:  Adaptive Equipment, Neuro Re-Education / Balance, Self Care/Activities of Daily Living, Therapeutic Activities and Therapeutic Exercises.    DC Equipment Recommendations: (P) Unable to determine at this time  Discharge Recommendations: (P) Recommend post-acute placement for additional occupational therapy services prior to discharge home     Objective       12/20/21 1138   Prior Living Situation   Prior Services Home-Independent   Housing / Facility 1 Story House   Steps Into Home 2   Bathroom Set up Walk In Shower;Shower Chair   Equipment Owned Tub / Shower Seat;4-Wheel Walker   Lives with - Patient's Self Care Capacity Spouse   Prior Level of ADL Function   Self Feeding Independent   Grooming / Hygiene Independent   Bathing Independent   Dressing Independent   Toileting Independent   Prior Level of IADL  Function   Medication Management Independent   Laundry Independent   Kitchen Mobility Independent   Finances Independent   Home Management Independent   Shopping Independent   Prior Level Of Mobility Independent Without Device in Community   Driving / Transportation Driving Independent   Precautions   Precautions Fall Risk;Swallow Precautions ( See Comments)   Pain 0 - 10 Group   Therapist Pain Assessment Post Activity Pain Same as Prior to Activity;Nurse Notified   Cognition    Cognition / Consciousness X   Level of Consciousness Alert   Ability To Follow Commands   (50-75% of time)   Safety Awareness Impulsive;Impaired   New Learning Impaired   Attention Impaired   Sequencing Impaired   Passive ROM Upper Body   Passive ROM Upper Body WDL   Active ROM Upper Body   Active ROM Upper Body  X   Dominant Hand Right   Comments LUE limited by weakness (3-/5) RUE WFL   Strength Upper Body   Upper Body Strength  X   Comments LUE 3-/5, RUE WFL   Sensation Upper Body   Upper Extremity Sensation  X   Lt Upper Extremity Light Touch Impaired   Lt Upper Extremity Proprioception Impaired   Upper Body Muscle Tone   Upper Body Muscle Tone  X   Lt Upper Extremity Muscle Tone Hypotonic   Neurological Concerns   Neurological Concerns Yes   Lt Upper Extremity Gross Motor Control Absent;Ataxic   Lt Upper Extremity Functional Use Impaired   Coordination Upper Body   Coordination X   Gross Motor Coordination LUE impaired   Balance Assessment   Sitting Balance (Static) Fair   Sitting Balance (Dynamic) Fair -   Standing Balance (Static) Poor   Standing Balance (Dynamic) Poor -   Weight Shift Sitting Fair   Weight Shift Standing Poor   Comments w/ HHA x2   Bed Mobility    Supine to Sit Moderate Assist   Sit to Supine Moderate Assist   Scooting Minimal Assist   Rolling Moderate Assist to Rt.   ADL Assessment   Grooming Moderate Assist;Seated   Upper Body Dressing Moderate Assist   Lower Body Dressing Maximal Assist   Toileting Maximal Assist    How much help from another person does the patient currently need...   Putting on and taking off regular lower body clothing? 2   Bathing (including washing, rinsing, and drying)? 2   Toileting, which includes using a toilet, bedpan, or urinal? 2   Putting on and taking off regular upper body clothing? 2   Taking care of personal grooming such as brushing teeth? 2   Eating meals? 2   6 Clicks Daily Activity Score 12   Modified [place] (mRS)   Modified [place] Score 4   Functional Mobility   Sit to Stand Moderate Assist  (of 2)   Bed, Chair, Wheelchair Transfer Unable to Participate   Toilet Transfers Unable to Participate   Mobility bed mobility, STS at EOB, side steps, BTB   Comments w/ HHA x2   ICU Target Mobility Level   ICU Mobility - Targeted Level Level 3A   Visual Perception   Visual Perception  X   Visual Fields Impaired;Left Field Cut   Right / Left Discrimination Impaired   Neglect Moderate Left   Visual Scanning Impaired  (to midline only when scanning towards left)   Activity Tolerance   Sitting Edge of Bed 10 min   Standing 5 min   Patient / Family Goals   Patient / Family Goal #1 To get better   Short Term Goals   Short Term Goal # 1 Pt will complete ADL transfers with supervision   Short Term Goal # 2 Pt will complete LB dressing with supervision   Short Term Goal # 3 Pt will complete toileting with supervision   Education Group   Education Provided Role of Occupational Therapist;Stroke   Role of Occupational Therapist Patient Response Patient;Family;Acceptance;Explanation   Stroke Patient Response Patient;Family;Acceptance;Explanation;Action Demonstration   Problem List   Problem List Decreased Active Daily Living Skills;Decreased Homemaking Skills;Decreased Upper Extremity Strength Left;Decreased Upper Extremity AROM Left;Decreased Functional Mobility;Decreased Activity Tolerance;Safety Awareness Deficits / Cognition;Impaired Coordination Left Upper Extremity;Impaired Sensation Left Upper  Extremity;Impaired Upper Extremity Tone Left;Impaired Postural Control / Balance;Impaired Vision   Interdisciplinary Plan of Care Collaboration   IDT Collaboration with  Nursing;Physical Therapist;Family / Caregiver   Patient Position at End of Therapy In Bed;Bed Alarm On;Call Light within Reach;Tray Table within Reach;Phone within Reach;Family / Friend in Room   Collaboration Comments RN updated

## 2021-12-20 NOTE — PROGRESS NOTES
Critical Care Progress Note    Date of admission  12/19/2021    Chief Complaint  71 y.o. male admitted 12/19/2021 with acute right basal ganglia hemorrhage    Hospital Course  Mr. Baca is a 71 year old male with the past medical history of atrial fibrillation on Xeralto, dyslipidemia, and hypertension who presented to the ER on 12/19 with complaints of sudden onset of left facial droop and left-sided weakness.  The patient was found to have a right basal ganglia hemorrhage at 3 x 3 cm.  He was admitted to the ICU for neuro checks and BP management.  12/20 - nicardipine drip ongoing, Na 141-->3% increased to 50cc/hr    Interval Problem Update  Reviewed last 24 hour events:   - no events overnight   - pt with left facial droop and left-sided weakness, a/ox4   - SR 60-80s   - -150   - Tmax 98.2   - Nicardipine at 10   - UOP of 900cc with condom cath   - Na at 141-->increased 3% to 50cc/hr   - O2 at 2 lpm NC   - last BM pta   - regular diet   - statin    Review of Systems  Review of Systems   Constitutional: Positive for malaise/fatigue. Negative for chills and fever.   HENT: Negative for sinus pain and sore throat.    Eyes: Negative for blurred vision and double vision.   Respiratory: Negative for cough and shortness of breath.    Cardiovascular: Negative for chest pain and leg swelling.   Gastrointestinal: Positive for nausea. Negative for abdominal pain, diarrhea and vomiting.   Genitourinary: Negative for frequency and hematuria.   Musculoskeletal: Negative for back pain and neck pain.   Skin: Negative for rash.   Neurological: Positive for sensory change, focal weakness, weakness and headaches.   Endo/Heme/Allergies: Does not bruise/bleed easily.   Psychiatric/Behavioral: Negative for depression. The patient is not nervous/anxious.         Vital Signs for last 24 hours   Temp:  [36.3 °C (97.3 °F)-37.1 °C (98.8 °F)] 36.7 °C (98.1 °F)  Pulse:  [69-93] 93  Resp:  [13-38] 18  BP: (115-150)/(55-68)  124/61  SpO2:  [90 %-98 %] 96 %    Hemodynamic parameters for last 24 hours       Respiratory Information for the last 24 hours       Physical Exam   Physical Exam  Vitals and nursing note reviewed.   Constitutional:       Appearance: He is obese. He is ill-appearing.   HENT:      Head: Normocephalic and atraumatic.      Right Ear: External ear normal.      Left Ear: External ear normal.      Nose: Nose normal. No rhinorrhea.      Mouth/Throat:      Mouth: Mucous membranes are moist.      Pharynx: Oropharynx is clear.   Eyes:      General: No scleral icterus.     Extraocular Movements: Extraocular movements intact.      Conjunctiva/sclera: Conjunctivae normal.      Pupils: Pupils are equal, round, and reactive to light.   Cardiovascular:      Rate and Rhythm: Normal rate and regular rhythm.      Pulses: Normal pulses.      Heart sounds: Normal heart sounds. No murmur heard.      Pulmonary:      Effort: No respiratory distress.      Breath sounds: No wheezing.   Chest:      Chest wall: No tenderness.   Abdominal:      General: There is no distension.      Palpations: Abdomen is soft.      Tenderness: There is no abdominal tenderness. There is no guarding or rebound.   Musculoskeletal:      Cervical back: Normal range of motion and neck supple.      Right lower leg: No edema.      Left lower leg: No edema.   Lymphadenopathy:      Cervical: No cervical adenopathy.   Skin:     General: Skin is warm and dry.      Capillary Refill: Capillary refill takes less than 2 seconds.      Findings: No rash.   Neurological:      Mental Status: He is alert and oriented to person, place, and time.      Cranial Nerves: Cranial nerve deficit present.      Motor: Weakness present.      Comments: Left facial droop, LUE/LLE weakness 3-4/5, normal strength on right, clearer speech   Psychiatric:         Mood and Affect: Mood normal.         Thought Content: Thought content normal.         Medications  Current Facility-Administered  Medications   Medication Dose Route Frequency Provider Last Rate Last Admin   • senna-docusate (PERICOLACE or SENOKOT S) 8.6-50 MG per tablet 2 Tablet  2 Tablet Oral BID Ever Whitmore M.D.   2 Tablet at 12/20/21 0556    And   • polyethylene glycol/lytes (MIRALAX) PACKET 1 Packet  1 Packet Oral QDAY PRN Ever Whitmore M.D.        And   • magnesium hydroxide (MILK OF MAGNESIA) suspension 30 mL  30 mL Oral QDAY PRN Ever Whitmore M.D.        And   • bisacodyl (DULCOLAX) suppository 10 mg  10 mg Rectal QDAY PRN Ever Whitmore M.D.       • Respiratory Therapy Consult   Nebulization Continuous RT Ever Whitmore M.D.       • insulin regular (HumuLIN R,NovoLIN R) injection  1-6 Units Subcutaneous Q6HRS Ever Whitmore M.D.        And   • dextrose 50% (D50W) injection 50 mL  50 mL Intravenous Q15 MIN PRN Ever Whitmore M.D.       • LORazepam (ATIVAN) injection 2 mg  2 mg Intravenous Q5 MIN PRN Ever Whitmore M.D.       • acetaminophen (Tylenol) tablet 650 mg  650 mg Oral Q4HRS PRN Ever Whitmore M.D.        Or   • acetaminophen (TYLENOL) suppository 650 mg  650 mg Rectal Q4HRS PRN Ever Whitmore M.D.       • ondansetron (ZOFRAN ODT) dispertab 4 mg  4 mg Oral Q4HRS PRN Ever Whitmore M.D.        Or   • ondansetron (ZOFRAN) syringe/vial injection 4 mg  4 mg Intravenous Q4HRS PRN Ever Whitmore M.D.       • MD Alert...ICU Electrolyte Replacement per Pharmacy   Other PHARMACY TO DOSE Ever Whitmore M.D.       • labetalol (NORMODYNE/TRANDATE) injection 10 mg  10 mg Intravenous Q4HRS PRN Ever Whitmore M.D.       • hydrALAZINE (APRESOLINE) injection 10 mg  10 mg Intravenous Q4HRS PRN Ever Whitmore M.D.   10 mg at 12/20/21 0855   • enalaprilat (Vasotec) injection 1.25 mg 1 mL  1.25 mg Intravenous Q6HRS PRN Ever Whitmore M.D.       • 3% sodium chloride (HYPERTONIC SALINE) 500mL infusion 500 mL  500 mL Intravenous Continuous Kenn Davenport M.D. 50 mL/hr at 12/20/21 0755 Rate Change  at 12/20/21 0755   • sodium chloride 200 mEq in empty bag 50 mL ivpb  200 mEq Intravenous Q6HRS PRN Kenn Davenport M.D.       • niCARdipine (CARDENE) 50 mg in  mL Infusion  0-15 mg/hr Intravenous Continuous Kenn Davenport M.D. 110 mL/hr at 12/20/21 0830 11 mg/hr at 12/20/21 0830       Fluids    Intake/Output Summary (Last 24 hours) at 12/20/2021 0917  Last data filed at 12/20/2021 0800  Gross per 24 hour   Intake 3789.08 ml   Output 2710 ml   Net 1079.08 ml       Laboratory          Recent Labs     12/19/21  0416 12/19/21  0416 12/19/21  1115 12/19/21  1115 12/19/21  1702 12/19/21  2314 12/20/21  0611   SODIUM 138   < > 140   < > 143 141 141   POTASSIUM 4.6  --  4.2  --   --   --  4.3   CHLORIDE 104  --  104  --   --   --  110   CO2 24  --  27  --   --   --  22   BUN 21  --  17  --   --   --  11   CREATININE 0.67  --  0.70  --   --   --  0.65   MAGNESIUM  --   --   --   --   --   --  1.9   CALCIUM 8.8  --  8.8  --   --   --  8.2*    < > = values in this interval not displayed.     Recent Labs     12/19/21 0416 12/19/21  1115 12/20/21  0611   ALTSGPT 27  --  24   ASTSGOT 26  --  30   ALKPHOSPHAT 88  --  81   TBILIRUBIN 0.2  --  0.4   GLUCOSE 117* 124* 134*     Recent Labs     12/19/21 0416 12/20/21  0611   WBC 6.4  --    NEUTSPOLYS 55.90  --    LYMPHOCYTES 26.60  --    MONOCYTES 12.90  --    EOSINOPHILS 3.50  --    BASOPHILS 0.80  --    ASTSGOT 26 30   ALTSGPT 27 24   ALKPHOSPHAT 88 81   TBILIRUBIN 0.2 0.4     Recent Labs     12/19/21 0416 12/19/21  0435   RBC 4.54*  --    HEMOGLOBIN 13.3*  --    HEMATOCRIT 40.2*  --    PLATELETCT 229  --    PROTHROMBTM  --  15.2*   APTT  --  35.2   INR  --  1.23*       Imaging  ECHO:  CONCLUSIONS  Normal transthoracic echocardiogram.     CT head 10pm:  IMPRESSION:     1.  Increase in size of right temporal intra-axial hematoma now measuring 6.7 x 4.3 cm and previously 3.4 x 2.9 cm     2.  Mild mass effect with 2-3 mm right to left shift     3.  Underlying brain white  matter change and volume loss    Assessment/Plan  Acute right basal ganglia hemorrhage   - NSG/neuro following   - neuro check every 1 hour   - SBP goals < 140   - s/p xeralto reversal   - SLP/PT/OT   - Na goals 150-155   - active titration of 3% drip   - CT head last night with some worsening, but clinically improving    Essential HTN   - nicardipine drip for SBP goals   - restart home losartan   - labetalol/hydralazine prn    Dyslipidemia   - cont home statin    Atrial fibrillation   - appears in SR   - holding Xeralto   - K goal > 4 and Mg goal > 2   - ECHO ok      VTE:  Contraindicated  Ulcer: Not Indicated  Lines: Peripherals    I have performed a physical exam and reviewed and updated ROS and Plan today (12/20/2021). In review of yesterday's note (12/19/2021), there are no changes except as documented above.     Discussed patient condition and risk of morbidity and/or mortality with Family, RN, RT, Therapies, Pharmacy, Charge nurse / hot rounds and Patient  The patient remains critically ill requiring active titration of nicardipine for SBP goals and titration of 3% for Na goals.  The patient remains at high risk of clinical deterioration, worsening vital organ dysfunction, and death without the above critical care interventions.  Critical care time = 37 minutes in directly providing and coordinating critical care and extensive data review.  No time overlap and excludes procedures.

## 2021-12-20 NOTE — DISCHARGE PLANNING
[facility] Transitional Care Coordination    Referral from:  Dr. Whitmore    Insurance Provider on Facesheet: [facility]    Potential Rehab Diagnosis: NTBI    Chart review indicates patient may have on going medical management and may have therapy needs to possibly meet inpatient rehab facility criteria with the goal of returning to community.    D/C support: TBD     Physiatry consultation forwarded per protocol.     NTBI.  Would appreciate LUNA hernandez once appropriate.      Thank you for the referral.

## 2021-12-20 NOTE — PROGRESS NOTES
"Daily Progress Note:     Date of Service: 12/20/2021  Primary Team: [facility] ICU Gold Team   Attending: Lissette Jaffe M.D.   Senior Resident: Dr. Andrew Samson    Chief Complaint:  Intracranial hemorrhage  History of atrial fibrillation on chronic anticoagulaion    Subjective:  -no acute events overnight    Interval Updates:  -VS: BP ranges 115/56 -150 /68, goal pressure systolic <140  -In/Outs IN: PO 820ml , IV 2968.8ml , OUT: 2710ml, Net: +1079.1  -Continuing with blood pressure control    Telemetry:  Per nursing documentation  \"SR 68-92 No ectopy\"    Consultants/Specialty:  Neurology  Neurosurgery  Review of Systems:    Review of Systems   Constitutional: Negative for fever.   Respiratory: Negative for cough, shortness of breath and wheezing.    Cardiovascular: Negative for chest pain.   Gastrointestinal: Negative for diarrhea and vomiting.   Musculoskeletal: Negative for myalgias.   Neurological: Positive for sensory change and focal weakness. Negative for tremors and headaches.       Objective Data:   Physical Exam:   Vitals:   Temp:  [36.7 °C (98 °F)-37.1 °C (98.8 °F)] 36.9 °C (98.4 °F)  Pulse:  [] 89  Resp:  [13-38] 30  BP: (115-150)/(55-68) 148/63  SpO2:  [90 %-99 %] 99 %     Physical Exam  Vitals and nursing note reviewed.   Constitutional:       General: He is not in acute distress.  HENT:      Head: Normocephalic and atraumatic.      Right Ear: External ear normal.      Left Ear: External ear normal.      Mouth/Throat:      Mouth: Mucous membranes are moist.   Eyes:      General: No scleral icterus.     Pupils: Pupils are equal, round, and reactive to light.   Cardiovascular:      Rate and Rhythm: Normal rate and regular rhythm.      Pulses: Normal pulses.   Pulmonary:      Effort: Pulmonary effort is normal. No respiratory distress.      Breath sounds: No wheezing.   Abdominal:      General: Abdomen is flat. There is no distension.      Palpations: Abdomen is soft.      Tenderness: There is no " abdominal tenderness. There is no guarding.   Musculoskeletal:      Right lower leg: No edema.      Left lower leg: No edema.   Skin:     General: Skin is warm and dry.      Capillary Refill: Capillary refill takes less than 2 seconds.   Neurological:      Mental Status: He is alert and oriented to person, place, and time.      Cranial Nerves: Cranial nerve deficit (L sided facial droop) present.      Motor: Weakness (Left UE and LE 2-3/5 strenght. 5/5 on RUE an RLE) present.   Psychiatric:         Mood and Affect: Mood normal.         Behavior: Behavior normal.         Labs:  Results for NASIM TELLEZ (MRN [id number]) as of 12/20/2021 09:52   Ref. Range 12/20/2021 00:48 12/20/2021 05:52 12/20/2021 06:11 12/20/2021 08:00   Sodium Latest Ref Range: 135 - 145 mmol/L   141    Potassium Latest Ref Range: 3.6 - 5.5 mmol/L   4.3    Chloride Latest Ref Range: 96 - 112 mmol/L   110    Co2 Latest Ref Range: 20 - 33 mmol/L   22    Anion Gap Latest Ref Range: 7.0 - 16.0    9.0    Glucose Latest Ref Range: 65 - 99 mg/dL   134 (H)    Bun Latest Ref Range: 8 - 22 mg/dL   11    Creatinine Latest Ref Range: 0.50 - 1.40 mg/dL   0.65    GFR If  Latest Ref Range: >60 mL/min/1.73 m 2   >60    GFR If Non  Latest Ref Range: >60 mL/min/1.73 m 2   >60    Calcium Latest Ref Range: 8.5 - 10.5 mg/dL   8.2 (L)    AST(SGOT) Latest Ref Range: 12 - 45 U/L   30    ALT(SGPT) Latest Ref Range: 2 - 50 U/L   24    Alkaline Phosphatase Latest Ref Range: 30 - 99 U/L   81    Total Bilirubin Latest Ref Range: 0.1 - 1.5 mg/dL   0.4    Albumin Latest Ref Range: 3.2 - 4.9 g/dL   3.9    Total Protein Latest Ref Range: 6.0 - 8.2 g/dL   6.3    Globulin Latest Ref Range: 1.9 - 3.5 g/dL   2.4    A-G Ratio Latest Units: g/dL   1.6    Magnesium Latest Ref Range: 1.5 - 2.5 mg/dL   1.9    Glucose - Accu-Ck Latest Ref Range: 65 - 99 mg/dL 119 (H) 103 (H)  130 (H)       Imaging:   No new imaging    Problem Representation: 71 year old  man with PMH that includes atrial fibrillation with chronic use of xarelto as well as history of hypertension who presented on 12/19 early AM with complaints of L sided weakness, found to have right sided intracranial hemorrhage with repeat CT imaging 6 hours after found to show increasing mass size. Neuro and Neurosurgery onboard. Currently on medical management.       * Intracranial hemorrhage (HCC)  Assessment & Plan  R side BG 3 x 3 cm on admission, increased to 6.7 x 4.3 cm on 12/19 1054 imaging  PCC for xeralto reversal  Plan  Nicardipine drip, SBP < 140  Hypertonic saline, Sodium goal of 150-155  Glucose control  pravastatin  Neurology following  Neurosurgery following  SLP, PT/OT  Q1 hour neuro checksR side BG 3 x 3 cm on admission, increased to 6.7 x 4.3 cm on 12/19 1054 imaging  PCC for xeralto reversal  Plan  Nicardipine drip, SBP < 140  Hypertonic saline, Sodium goal of 150-155  Glucose control  High dose pravastatin  Neurology following  Neurosurgery following  SLP, PT/OT  Q1 hour neuro checks    Hypertension  Assessment & Plan    BP 190s systolic on admission  Plan  Nicardipine drip, goal >140 systolic  PRNs available; hydralazine, Vasotec, labetalol   -Resumed home losartan PO on 12/20/21    Hyperlipemia  Assessment & Plan  -XR chest noted aortic calcification  Pravastatin as above, target LDL <70    AF (atrial fibrillation) (HCC)  Assessment & Plan  Previously on Xeralto  ECHO completed 12/19/2021  SR noted on 12/19 EKG  Plan  Holding xarelto; AC contraindicated  Optimize electrolytes  Rate control as needed            Quality Measures:  Lines:  Peripheral IV x 3. Condom cath  Code: FULL  VTE: SCD  Diet: Liquid Diet  GI prophylaxis: None  Disposition: ICU for continued monitoring  Antibiotics: None  Bowel regimen: Miralax, Milk of mag, dulcolax  Analgesia: Tylenol

## 2021-12-20 NOTE — THERAPY
Speech Language Pathology  Daily Treatment     Patient Name: Gokul Baca  Age:  71 y.o., Sex:  male  Medical Record #: [id number]  Today's Date: 12/20/2021     Assessment    The patient was seen for dysphagia therapy this date following initiation of PO alimentation last SLP session. The patient was awake, alert and able to keep his eyes open this session however, query visual changes. The patient was seen during his liquidized mildly thick meal tray. The patient required [place] A to self feed items from meal tray. The patient consumed meal items with no overt s/s of aspiration given min A for swallow strategies. Education provided to patient regarding importance of swallow strategies and reiterated results of FEES evaluation. Patient did not recall previous education. At this time, recommend continuation of modified PO diet of liquidized mildly thick with direct supervision.     Plan    Recommendations: 1) continuation of modified PO diet of liquidized mildly thick with direct supervision. 2) SLP following for dysphagia therapy and cognitive-linguistic evaluation.    Continue current treatment plan.    Discharge Recommendations: Recommend post-acute placement for additional speech therapy services prior to discharge home    Objective       12/20/21 0855   Verbal Expression   Comments Tangential and confabulatory at times   Cognitive-Linguistic   Level of Consciousness Alert   Dysphagia    Diet / Liquid Recommendation Mildly Thick (2) - (Nectar Thick);Liquidised (3) - (Nectar Thick Full Liquid)   Nutritional Liquid Intake Rating Scale Thickened beverages (mildly thick unless otherwise specified)   Nutritional Food Intake Rating Scale Total oral diet of a single consistency   Nursing Communication Swallow Precaution Sign Posted at Head of Bed   Skilled Intervention Compensatory Strategies;Gestural Cueing;Verbal Cueing   Recommended Route of Medication Administration   Medication Administration  Crush all Medications  "in Puree   Patient / Family Goals   Patient / Family Goal #1 \" I'm thirsty\"   Goal #1 Outcome Progressing as expected   Short Term Goals   Short Term Goal # 1 B  The patient will consume LQ3/MT2 meal items with no overt s/s of aspiration given min cues for swallow strategies.    Goal Outcome  # 1 B Progressing as expected   Short Term Goal # 2 The patient and his family will verbalize understanding of current swallow function, role of SLP and how current diagnosis will impact swallow and cognition.    Goal Outcome # 2  Progressing as expected         "

## 2021-12-20 NOTE — HOSPITAL COURSE
Mr. Baca is a 71 year old male with the past medical history of atrial fibrillation on Xeralto, dyslipidemia, and hypertension who presented to the ER on 12/19 with complaints of sudden onset of left facial droop and left-sided weakness.  The patient was found to have a right basal ganglia hemorrhage at 3 x 3 cm.  He was admitted to the ICU for neuro checks and BP management.

## 2021-12-20 NOTE — CARE PLAN
The patient is Watcher - Medium risk of patient condition declining or worsening    Shift Goals  Clinical Goals: SBP <140  Patient Goals: rest  Family Goals: KAELA    Progress made toward(s) clinical / shift goals:      Problem: Knowledge Deficit - Stroke Education  Goal: Patient's knowledge of stroke and risk factors will improve  Outcome: Progressing, patient questions answered.     Problem: Neuro Status  Goal: Neuro status will remain stable or improve  Outcome: Progressing, stable neuro exams     Problem: Hemodynamic Monitoring  Goal: Patient's hemodynamics, fluid balance and neurologic status will be stable or improve  Outcome: Progressing, SBP <140 with Nicardipine.      Problem: Dysphagia  Goal: Dysphagia will improve  Outcome: Progressing, FEES exam today, pt on mildly thick liquid diet, 1:1 feed.      Problem: Risk for Aspiration  Goal: Patient's risk for aspiration will be absent or decrease  Outcome: Progressing

## 2021-12-20 NOTE — THERAPY
Physical Therapy   Initial Evaluation     Patient Name: Gokul Baca  Age:  71 y.o., Sex:  male  Medical Record #: [id number]  Today's Date: 12/20/2021     Precautions: Fall Risk;Swallow Precautions (per SLP)  Comments: L sided deficits/L inattention, SBP <150 per neurosx and neuro    Assessment  Patient is 71 y.o. male presenting acutely with L-sided weakness, found 2/2 R basal ganglionic hemorrhage. Wife present throughout eval and very supportive. Pt initially lethargic requires cues to sustain alertness for activity, improved level of arousal with mob. He demonstrated L sided weakness, LLE grossly 4/5 however reports numbness throughout LLE. Decreased attention on L side requiring cues to bring L hip forward EOB. He was able to stand with mod A, max A to attempt side stepping EOB and quick fatigue noted. SBP increased from 135 pre activity to 159 during activity, nsg notified and further activity deferred. Anticipate need for post-acute placement prior to DC home.    Plan    Recommend Physical Therapy 4 times per week until therapy goals are met for the following treatments:  Bed Mobility, Equipment, Gait Training, Neuro Re-Education / Balance, Therapeutic Activities and Therapeutic Exercises    DC Equipment Recommendations: Unable to determine at this time  Discharge Recommendations: Recommend post-acute placement for additional physical therapy services prior to discharge home     Objective     12/20/21 1143   Vitals   Vitals Comments SBP increased from 135 pre activity to 159 with standing, deferred further activity and notified nsg   Prior Living Situation   Prior Services Home-Independent   Housing / Facility 1 Story House   Steps Into Home 2   Rail None   Equipment Owned 4-Wheel Walker   Lives with - Patient's Self Care Capacity Spouse   Comments Spouse has flexible schedule and able to be able to assist as needed   Prior Level of Functional Mobility   Bed Mobility Independent   Transfer Status  Independent   Ambulation Independent   Distance Ambulation (Feet) (Community distances)   Assistive Devices Used None   Stairs Independent   Cognition    Level of Consciousness Alert   Ability To Follow Commands 1 Step (50-75%)   Safety Awareness Impaired   New Learning Impaired   Attention Impaired   Sequencing Impaired   Comments L inattention   Active ROM Lower Body    Active ROM Lower Body  WDL   Strength Lower Body   Comments RLE WFL 5/5; LLE 4/5   Sensation Lower Body   Comments reports LLE grossly numb throughout, unable to identify tactile input to LLE   Strength Upper Body   Comments LUE weakness   Vision   Vision Comments Visual field cut on the L, minimal tracking L past midline   Balance Assessment   Sitting Balance (Static) Fair   Sitting Balance (Dynamic) Fair -   Standing Balance (Static) Trace +   Standing Balance (Dynamic) Trace   Comments w/ BUE support for standing   Gait Analysis   Gait Level Of Assist Unable to Participate   Bed Mobility    Supine to Sit Moderate Assist   Sit to Supine Moderate Assist   Functional Mobility   Sit to Stand Moderate Assist   Bed, Chair, Wheelchair Transfer Unable to Participate   Short Term Goals    Short Term Goal # 1 Pt will perform supine<>sit with SPV within 6 visits to improve ind with bed mob.   Short Term Goal # 2 Pt will perform bed<>chair transfers with FWW and min A within 6 visits to increase OOB activity.   Short Term Goal # 3 Pt will amb >15ft with mod A within 6 visits to begin gait trng.

## 2021-12-21 ENCOUNTER — APPOINTMENT (OUTPATIENT)
Dept: RADIOLOGY | Facility: MEDICAL CENTER | Age: 71
DRG: 065 | End: 2021-12-21
Attending: PHYSICIAN ASSISTANT
Payer: MEDICARE

## 2021-12-21 LAB
ALBUMIN SERPL BCP-MCNC: 3.8 G/DL (ref 3.2–4.9)
ALBUMIN/GLOB SERPL: 1.6 G/DL
ALP SERPL-CCNC: 75 U/L (ref 30–99)
ALT SERPL-CCNC: 19 U/L (ref 2–50)
ANION GAP SERPL CALC-SCNC: 10 MMOL/L (ref 7–16)
AST SERPL-CCNC: 19 U/L (ref 12–45)
BASOPHILS # BLD AUTO: 0.6 % (ref 0–1.8)
BASOPHILS # BLD: 0.06 K/UL (ref 0–0.12)
BILIRUB SERPL-MCNC: 0.3 MG/DL (ref 0.1–1.5)
BUN SERPL-MCNC: 8 MG/DL (ref 8–22)
CALCIUM SERPL-MCNC: 8.1 MG/DL (ref 8.5–10.5)
CHLORIDE SERPL-SCNC: 111 MMOL/L (ref 96–112)
CO2 SERPL-SCNC: 22 MMOL/L (ref 20–33)
CREAT SERPL-MCNC: 0.6 MG/DL (ref 0.5–1.4)
EOSINOPHIL # BLD AUTO: 0.07 K/UL (ref 0–0.51)
EOSINOPHIL NFR BLD: 0.7 % (ref 0–6.9)
ERYTHROCYTE [DISTWIDTH] IN BLOOD BY AUTOMATED COUNT: 49.4 FL (ref 35.9–50)
GLOBULIN SER CALC-MCNC: 2.4 G/DL (ref 1.9–3.5)
GLUCOSE BLD-MCNC: 113 MG/DL (ref 65–99)
GLUCOSE BLD-MCNC: 127 MG/DL (ref 65–99)
GLUCOSE BLD-MCNC: 136 MG/DL (ref 65–99)
GLUCOSE BLD-MCNC: 91 MG/DL (ref 65–99)
GLUCOSE SERPL-MCNC: 126 MG/DL (ref 65–99)
HCT VFR BLD AUTO: 37.7 % (ref 42–52)
HGB BLD-MCNC: 12 G/DL (ref 14–18)
IMM GRANULOCYTES # BLD AUTO: 0.03 K/UL (ref 0–0.11)
IMM GRANULOCYTES NFR BLD AUTO: 0.3 % (ref 0–0.9)
LYMPHOCYTES # BLD AUTO: 1.97 K/UL (ref 1–4.8)
LYMPHOCYTES NFR BLD: 20.9 % (ref 22–41)
MAGNESIUM SERPL-MCNC: 1.8 MG/DL (ref 1.5–2.5)
MCH RBC QN AUTO: 29.1 PG (ref 27–33)
MCHC RBC AUTO-ENTMCNC: 31.8 G/DL (ref 33.7–35.3)
MCV RBC AUTO: 91.3 FL (ref 81.4–97.8)
MONOCYTES # BLD AUTO: 1.01 K/UL (ref 0–0.85)
MONOCYTES NFR BLD AUTO: 10.7 % (ref 0–13.4)
NEUTROPHILS # BLD AUTO: 6.3 K/UL (ref 1.82–7.42)
NEUTROPHILS NFR BLD: 66.8 % (ref 44–72)
NRBC # BLD AUTO: 0 K/UL
NRBC BLD-RTO: 0 /100 WBC
PLATELET # BLD AUTO: 221 K/UL (ref 164–446)
PMV BLD AUTO: 9.5 FL (ref 9–12.9)
POTASSIUM SERPL-SCNC: 4 MMOL/L (ref 3.6–5.5)
PROT SERPL-MCNC: 6.2 G/DL (ref 6–8.2)
RBC # BLD AUTO: 4.13 M/UL (ref 4.7–6.1)
SODIUM SERPL-SCNC: 140 MMOL/L (ref 135–145)
SODIUM SERPL-SCNC: 140 MMOL/L (ref 135–145)
SODIUM SERPL-SCNC: 143 MMOL/L (ref 135–145)
WBC # BLD AUTO: 9.4 K/UL (ref 4.8–10.8)

## 2021-12-21 PROCEDURE — 700102 HCHG RX REV CODE 250 W/ 637 OVERRIDE(OP): Performed by: INTERNAL MEDICINE

## 2021-12-21 PROCEDURE — 82962 GLUCOSE BLOOD TEST: CPT | Mod: 91

## 2021-12-21 PROCEDURE — 99233 SBSQ HOSP IP/OBS HIGH 50: CPT | Performed by: PSYCHIATRY & NEUROLOGY

## 2021-12-21 PROCEDURE — 700102 HCHG RX REV CODE 250 W/ 637 OVERRIDE(OP): Performed by: PHYSICAL MEDICINE & REHABILITATION

## 2021-12-21 PROCEDURE — 70450 CT HEAD/BRAIN W/O DYE: CPT

## 2021-12-21 PROCEDURE — 700111 HCHG RX REV CODE 636 W/ 250 OVERRIDE (IP): Performed by: INTERNAL MEDICINE

## 2021-12-21 PROCEDURE — A9270 NON-COVERED ITEM OR SERVICE: HCPCS | Performed by: INTERNAL MEDICINE

## 2021-12-21 PROCEDURE — 700101 HCHG RX REV CODE 250: Performed by: PSYCHIATRY & NEUROLOGY

## 2021-12-21 PROCEDURE — A9270 NON-COVERED ITEM OR SERVICE: HCPCS | Performed by: PHYSICAL MEDICINE & REHABILITATION

## 2021-12-21 PROCEDURE — 84295 ASSAY OF SERUM SODIUM: CPT

## 2021-12-21 PROCEDURE — 83735 ASSAY OF MAGNESIUM: CPT

## 2021-12-21 PROCEDURE — A9270 NON-COVERED ITEM OR SERVICE: HCPCS | Performed by: STUDENT IN AN ORGANIZED HEALTH CARE EDUCATION/TRAINING PROGRAM

## 2021-12-21 PROCEDURE — 700102 HCHG RX REV CODE 250 W/ 637 OVERRIDE(OP): Performed by: STUDENT IN AN ORGANIZED HEALTH CARE EDUCATION/TRAINING PROGRAM

## 2021-12-21 PROCEDURE — 700105 HCHG RX REV CODE 258: Performed by: PSYCHIATRY & NEUROLOGY

## 2021-12-21 PROCEDURE — 85025 COMPLETE CBC W/AUTO DIFF WBC: CPT

## 2021-12-21 PROCEDURE — 80053 COMPREHEN METABOLIC PANEL: CPT

## 2021-12-21 PROCEDURE — 99291 CRITICAL CARE FIRST HOUR: CPT | Performed by: INTERNAL MEDICINE

## 2021-12-21 PROCEDURE — 770022 HCHG ROOM/CARE - ICU (200)

## 2021-12-21 RX ORDER — MAGNESIUM SULFATE HEPTAHYDRATE 40 MG/ML
2 INJECTION, SOLUTION INTRAVENOUS ONCE
Status: COMPLETED | OUTPATIENT
Start: 2021-12-21 | End: 2021-12-21

## 2021-12-21 RX ORDER — LOSARTAN POTASSIUM 50 MG/1
100 TABLET ORAL DAILY
Status: DISCONTINUED | OUTPATIENT
Start: 2021-12-22 | End: 2021-12-24 | Stop reason: HOSPADM

## 2021-12-21 RX ORDER — FUROSEMIDE 40 MG/1
40 TABLET ORAL
Status: DISCONTINUED | OUTPATIENT
Start: 2021-12-21 | End: 2021-12-22

## 2021-12-21 RX ORDER — LOSARTAN POTASSIUM 50 MG/1
50 TABLET ORAL ONCE
Status: COMPLETED | OUTPATIENT
Start: 2021-12-21 | End: 2021-12-21

## 2021-12-21 RX ADMIN — LABETALOL HYDROCHLORIDE 10 MG: 5 INJECTION INTRAVENOUS at 21:41

## 2021-12-21 RX ADMIN — Medication 2 G: at 07:44

## 2021-12-21 RX ADMIN — Medication 2 G: at 11:36

## 2021-12-21 RX ADMIN — PRAVASTATIN SODIUM 20 MG: 20 TABLET ORAL at 06:00

## 2021-12-21 RX ADMIN — SODIUM CHLORIDE 13 MG/HR: 9 INJECTION, SOLUTION INTRAVENOUS at 00:56

## 2021-12-21 RX ADMIN — LOSARTAN POTASSIUM 50 MG: 50 TABLET, FILM COATED ORAL at 06:00

## 2021-12-21 RX ADMIN — FUROSEMIDE 40 MG: 40 TABLET ORAL at 09:31

## 2021-12-21 RX ADMIN — SODIUM CHLORIDE 500 ML: 3 INJECTION, SOLUTION INTRAVENOUS at 21:54

## 2021-12-21 RX ADMIN — SODIUM CHLORIDE 500 ML: 3 INJECTION, SOLUTION INTRAVENOUS at 00:58

## 2021-12-21 RX ADMIN — DOCUSATE SODIUM 50 MG AND SENNOSIDES 8.6 MG 2 TABLET: 8.6; 5 TABLET, FILM COATED ORAL at 05:59

## 2021-12-21 RX ADMIN — LABETALOL HYDROCHLORIDE 10 MG: 5 INJECTION INTRAVENOUS at 16:01

## 2021-12-21 RX ADMIN — URSODIOL 300 MG: 300 CAPSULE ORAL at 05:59

## 2021-12-21 RX ADMIN — URSODIOL 300 MG: 300 CAPSULE ORAL at 17:23

## 2021-12-21 RX ADMIN — ACETAMINOPHEN 500 MG: 500 TABLET ORAL at 07:44

## 2021-12-21 RX ADMIN — SODIUM CHLORIDE 12 MG/HR: 9 INJECTION, SOLUTION INTRAVENOUS at 13:35

## 2021-12-21 RX ADMIN — Medication 2.5 MG: at 20:55

## 2021-12-21 RX ADMIN — MAGNESIUM SULFATE 2 G: 2 INJECTION INTRAVENOUS at 11:36

## 2021-12-21 RX ADMIN — Medication 2 G: at 17:23

## 2021-12-21 RX ADMIN — HYDRALAZINE HYDROCHLORIDE 10 MG: 20 INJECTION INTRAMUSCULAR; INTRAVENOUS at 09:02

## 2021-12-21 RX ADMIN — LOSARTAN POTASSIUM 50 MG: 50 TABLET, FILM COATED ORAL at 09:31

## 2021-12-21 RX ADMIN — SODIUM CHLORIDE 13 MG/HR: 9 INJECTION, SOLUTION INTRAVENOUS at 06:07

## 2021-12-21 RX ADMIN — SODIUM CHLORIDE 12 MG/HR: 9 INJECTION, SOLUTION INTRAVENOUS at 22:45

## 2021-12-21 RX ADMIN — HYDRALAZINE HYDROCHLORIDE 10 MG: 20 INJECTION INTRAMUSCULAR; INTRAVENOUS at 19:21

## 2021-12-21 RX ADMIN — ACETAMINOPHEN 500 MG: 500 TABLET ORAL at 20:55

## 2021-12-21 RX ADMIN — SODIUM CHLORIDE 10 MG/HR: 9 INJECTION, SOLUTION INTRAVENOUS at 19:23

## 2021-12-21 RX ADMIN — ACETAMINOPHEN 500 MG: 500 TABLET ORAL at 16:01

## 2021-12-21 RX ADMIN — SODIUM CHLORIDE 500 ML: 3 INJECTION, SOLUTION INTRAVENOUS at 11:40

## 2021-12-21 ASSESSMENT — ENCOUNTER SYMPTOMS
NAUSEA: 1
MYALGIAS: 0
SHORTNESS OF BREATH: 0
FOCAL WEAKNESS: 1
BRUISES/BLEEDS EASILY: 0
DEPRESSION: 0
FEVER: 0
BACK PAIN: 0
BLURRED VISION: 0
NECK PAIN: 0
SORE THROAT: 0
HEADACHES: 0
VOMITING: 0
WEAKNESS: 1
WHEEZING: 0
SENSORY CHANGE: 1
TREMORS: 0
CHILLS: 0
DOUBLE VISION: 0
DIARRHEA: 0
NERVOUS/ANXIOUS: 0
SINUS PAIN: 0
ABDOMINAL PAIN: 0
HEADACHES: 1
COUGH: 0

## 2021-12-21 ASSESSMENT — PATIENT HEALTH QUESTIONNAIRE - PHQ9
1. LITTLE INTEREST OR PLEASURE IN DOING THINGS: NOT AT ALL
2. FEELING DOWN, DEPRESSED, IRRITABLE, OR HOPELESS: NOT AT ALL
SUM OF ALL RESPONSES TO PHQ9 QUESTIONS 1 AND 2: 0

## 2021-12-21 ASSESSMENT — PAIN DESCRIPTION - PAIN TYPE: TYPE: ACUTE PAIN

## 2021-12-21 ASSESSMENT — FIBROSIS 4 INDEX: FIB4 SCORE: 1.81

## 2021-12-21 NOTE — PROGRESS NOTES
Right forearm piv infiltrated. Multiple attempts to place new piv including ultrasound. One piv currently in place, patient on sodium 3% and cardene gtts-not compatible. [facility] resident, [place] notified.

## 2021-12-21 NOTE — PROGRESS NOTES
"Daily Progress Note:     Date of Service: 12/21/2021  Primary Team: [facility] Gold Team   Attending: Lissette Jaffe M.D.   Senior Resident: Dr. Andrew Samson    Chief Complaint:  Intracranial hemorrhage  History of atrial fibrillation on chronic anticoagulation    Subjective:  -no acute events overnight  -reports symptoms have improved somewhat this AM  -was increased to 2g salt tabs overnight from 1g tabs  -this AM lost 1 of his peripheral IV lines, found to be a hard stick    Interval Updates:  -PICC line ordered  -losartan increased to 100mg  -started on oral lasix 40mg for volume    Telemetry:  Per nursing documentation  \"SR-ST   occasional PVC's\"    Consultants/Specialty:  Neurology  Neurosurgery    Review of Systems:    Review of Systems   Constitutional: Negative for fever.   HENT: Negative for hearing loss.    Eyes: Negative for blurred vision and double vision.   Respiratory: Negative for cough, shortness of breath and wheezing.    Cardiovascular: Negative for chest pain.   Gastrointestinal: Negative for diarrhea and vomiting.   Musculoskeletal: Negative for myalgias.   Neurological: Positive for sensory change and focal weakness. Negative for tremors and headaches.       Objective Data:   Physical Exam:   Vitals:   Temp:  [36.6 °C (97.8 °F)-36.9 °C (98.4 °F)] 36.8 °C (98.2 °F)  Pulse:  [] 97  Resp:  [17-85] 20  BP: (104-158)/(55-88) 128/60  SpO2:  [90 %-99 %] 96 %     Physical Exam  Vitals and nursing note reviewed.   Constitutional:       General: He is not in acute distress.     Comments: Laying in bed watching TV   HENT:      Head: Normocephalic and atraumatic.      Right Ear: External ear normal.      Left Ear: External ear normal.      Mouth/Throat:      Mouth: Mucous membranes are moist.   Eyes:      General: No scleral icterus.     Pupils: Pupils are equal, round, and reactive to light.   Cardiovascular:      Rate and Rhythm: Normal rate and regular rhythm.      Pulses: Normal pulses. "   Pulmonary:      Effort: Pulmonary effort is normal. No respiratory distress.      Breath sounds: No wheezing.   Abdominal:      General: Abdomen is flat. There is no distension.      Palpations: Abdomen is soft.      Tenderness: There is no abdominal tenderness. There is no guarding.   Musculoskeletal:      Right lower leg: No edema.      Left lower leg: No edema.   Skin:     General: Skin is warm and dry.      Capillary Refill: Capillary refill takes less than 2 seconds.   Neurological:      Mental Status: He is alert and oriented to person, place, and time.      Cranial Nerves: Cranial nerve deficit (L sided facial droop) present.      Motor: Weakness (Left UE and LE 2-3/5 strenght. 5/5 on RUE an RLE) present.   Psychiatric:         Mood and Affect: Mood normal.         Behavior: Behavior normal.         Labs:  Results for NASIM TELLEZ (MRN [id number]) as of 12/21/2021 08:33   Ref. Range 12/20/2021 18:00 12/20/2021 23:18 12/20/2021 23:20 12/21/2021 04:42 12/21/2021 05:57   WBC Latest Ref Range: 4.8 - 10.8 K/uL    9.4    RBC Latest Ref Range: 4.70 - 6.10 M/uL    4.13 (L)    Hemoglobin Latest Ref Range: 14.0 - 18.0 g/dL    12.0 (L)    Hematocrit Latest Ref Range: 42.0 - 52.0 %    37.7 (L)    MCV Latest Ref Range: 81.4 - 97.8 fL    91.3    MCH Latest Ref Range: 27.0 - 33.0 pg    29.1    MCHC Latest Ref Range: 33.7 - 35.3 g/dL    31.8 (L)    RDW Latest Ref Range: 35.9 - 50.0 fL    49.4    Platelet Count Latest Ref Range: 164 - 446 K/uL    221    MPV Latest Ref Range: 9.0 - 12.9 fL    9.5    Neutrophils-Polys Latest Ref Range: 44.00 - 72.00 %    66.80    Neutrophils (Absolute) Latest Ref Range: 1.82 - 7.42 K/uL    6.30    Lymphocytes Latest Ref Range: 22.00 - 41.00 %    20.90 (L)    Lymphs (Absolute) Latest Ref Range: 1.00 - 4.80 K/uL    1.97    Monocytes Latest Ref Range: 0.00 - 13.40 %    10.70    Monos (Absolute) Latest Ref Range: 0.00 - 0.85 K/uL    1.01 (H)    Eosinophils Latest Ref Range: 0.00 - 6.90 %     0.70    Eos (Absolute) Latest Ref Range: 0.00 - 0.51 K/uL    0.07    Basophils Latest Ref Range: 0.00 - 1.80 %    0.60    Baso (Absolute) Latest Ref Range: 0.00 - 0.12 K/uL    0.06    Immature Granulocytes Latest Ref Range: 0.00 - 0.90 %    0.30    Immature Granulocytes (abs) Latest Ref Range: 0.00 - 0.11 K/uL    0.03    Nucleated RBC Latest Units: /100 WBC    0.00    NRBC (Absolute) Latest Units: K/uL    0.00    Sodium Latest Ref Range: 135 - 145 mmol/L 140  142 143    Potassium Latest Ref Range: 3.6 - 5.5 mmol/L    4.0    Chloride Latest Ref Range: 96 - 112 mmol/L    111    Co2 Latest Ref Range: 20 - 33 mmol/L    22    Anion Gap Latest Ref Range: 7.0 - 16.0     10.0    Glucose Latest Ref Range: 65 - 99 mg/dL    126 (H)    Bun Latest Ref Range: 8 - 22 mg/dL    8    Creatinine Latest Ref Range: 0.50 - 1.40 mg/dL    0.60    GFR If  Latest Ref Range: >60 mL/min/1.73 m 2    >60    GFR If Non  Latest Ref Range: >60 mL/min/1.73 m 2    >60    Calcium Latest Ref Range: 8.5 - 10.5 mg/dL    8.1 (L)    AST(SGOT) Latest Ref Range: 12 - 45 U/L    19    ALT(SGPT) Latest Ref Range: 2 - 50 U/L    19    Alkaline Phosphatase Latest Ref Range: 30 - 99 U/L    75    Total Bilirubin Latest Ref Range: 0.1 - 1.5 mg/dL    0.3    Albumin Latest Ref Range: 3.2 - 4.9 g/dL    3.8    Total Protein Latest Ref Range: 6.0 - 8.2 g/dL    6.2    Globulin Latest Ref Range: 1.9 - 3.5 g/dL    2.4    A-G Ratio Latest Units: g/dL    1.6    Magnesium Latest Ref Range: 1.5 - 2.5 mg/dL    1.8    Glucose - Accu-Ck Latest Ref Range: 65 - 99 mg/dL  123 (H)   113 (H)       Imaging:   No new imaging    Problem Representation: 71 year old man with PMH that includes atrial fibrillation with chronic use of xarelto as well as history of hypertension who presented on 12/19 early AM with complaints of L sided weakness, found to have right sided intracranial hemorrhage with repeat CT imaging 6 hours after found to show increasing mass  size. Neuro and Neurosurgery onboard. Currently on medical management.     * Intracranial hemorrhage (HCC)  Assessment & Plan  R side BG 3 x 3 cm on admission, increased to 6.7 x 4.3 cm on 12/19 1054 imaging  Plan  Nicardipine drip, losartan, goal: SBP < 140  Hypertonic saline, sodium tabs: Sodium goal of 150-155, given clinical improvement will be less aggressive with pursuing this goal sodium  Oral lasix started on 12/21 for volume  Glucose control  pravastatin  Neurology following  Neurosurgery following  SLP, PT/OT  Q1 hour neuro checks      Hypertension  Assessment & Plan    BP 190s systolic on admission  Plan  Nicardipine drip, goal >140 systolic  PRNs available; hydralazine, Vasotec, labetalol   -increased losartan dose to 100mg on 12/21    Hyperlipemia  Assessment & Plan  -XR chest noted aortic calcification  Pravastatin as above, target LDL <70    AF (atrial fibrillation) (HCC)  Assessment & Plan  Previously on Xeralto  ECHO completed 12/19/2021  SR noted on 12/19 EKG  Plan  Holding xarelto; AC contraindicated  Optimize electrolytes  Rate control as needed        Quality Measures:  Lines: Peripheral IV x1, PICC pending, Condom cath  Code: FULL  VTE: SCDs  Diet: Liquid diet  GI prophylaxis: None  Disposition: ICU for continued monitoring  Antibiotics: None  Bowel regimen: Doc-Senna  Analgesia: Tylenol

## 2021-12-21 NOTE — PROGRESS NOTES
Doctor Seeni updated regarding sodium result of 140. Current 3% gtt at 50mL/hr-only peripheral line in place at this time. Per doctor Seeni, salt tablet dose will be increased.

## 2021-12-21 NOTE — CARE PLAN
The patient is Watcher - Medium risk of patient condition declining or worsening    Shift Goals  Clinical Goals: SBP <140  Patient Goals: rest  Family Goals: KAELA    Progress made toward(s) clinical / shift goals:      Problem: Neuro Status  Goal: Neuro status will remain stable or improve  Outcome: Progressing       Patient is not progressing towards the following goals:      Problem: Hemodynamic Monitoring  Goal: Patient's hemodynamics, fluid balance and neurologic status will be stable or improve  Outcome: Not Progressing, increasing cardene drip, and use of PRN's.      Statement Selected

## 2021-12-21 NOTE — PROGRESS NOTES
Critical Care Progress Note    Date of admission  12/19/2021    Chief Complaint  71 y.o. male admitted 12/19/2021 with acute right basal ganglia hemorrhage    Hospital Course  Mr. Baca is a 71 year old male with the past medical history of atrial fibrillation on Xeralto, dyslipidemia, and hypertension who presented to the ER on 12/19 with complaints of sudden onset of left facial droop and left-sided weakness.  The patient was found to have a right basal ganglia hemorrhage at 3 x 3 cm.  He was admitted to the ICU for neuro checks and BP management.  12/20 - nicardipine drip ongoing, Na 141-->3% increased to 50cc/hr    Interval Problem Update  Reviewed last 24 hour events:   - no events overnight   - neuro at q 2 hour   - improving left side strength   - SR 60-90s   - SBP < 140 (120-140   - nicardipine at 6mg/hr   - tolerating diet   - BM yesterday   - UOP of 1.6 liters overnight   - mobilized to edge of bed with PT   - Na at 143   - SCDs in place   - 3% at 50cc/hr   - Mg at 1.8   - losartan to increase to 100mg PO   - lost IV access-->PICC line ordered    Yesterday's Events:   - no events overnight   - pt with left facial droop and left-sided weakness, a/ox4   - SR 60-80s   - -150   - Tmax 98.2   - Nicardipine at 10   - UOP of 900cc with condom cath   - Na at 141-->increased 3% to 50cc/hr   - O2 at 2 lpm NC   - last BM pta   - regular diet   - statin    Review of Systems  Review of Systems   Constitutional: Positive for malaise/fatigue. Negative for chills and fever.   HENT: Negative for sinus pain and sore throat.    Eyes: Negative for blurred vision and double vision.   Respiratory: Negative for cough and shortness of breath.    Cardiovascular: Negative for chest pain and leg swelling.   Gastrointestinal: Positive for nausea. Negative for abdominal pain, diarrhea and vomiting.   Genitourinary: Negative for frequency and hematuria.   Musculoskeletal: Negative for back pain and neck pain.   Skin: Negative  for rash.   Neurological: Positive for sensory change, focal weakness, weakness and headaches.   Endo/Heme/Allergies: Does not bruise/bleed easily.   Psychiatric/Behavioral: Negative for depression. The patient is not nervous/anxious.     no change to ROS    Vital Signs for last 24 hours   Temp:  [36.6 °C (97.8 °F)-36.9 °C (98.4 °F)] 36.7 °C (98 °F)  Pulse:  [] 89  Resp:  [17-85] 21  BP: (104-158)/(55-88) 109/57  SpO2:  [90 %-99 %] 93 %    Hemodynamic parameters for last 24 hours       Respiratory Information for the last 24 hours       Physical Exam   Physical Exam  Vitals and nursing note reviewed.   Constitutional:       Appearance: He is obese. He is ill-appearing.      Comments: A little sleepy this morning   HENT:      Head: Normocephalic and atraumatic.      Right Ear: External ear normal.      Left Ear: External ear normal.      Nose: Nose normal. No rhinorrhea.      Mouth/Throat:      Mouth: Mucous membranes are moist.      Pharynx: Oropharynx is clear.   Eyes:      General: No scleral icterus.     Extraocular Movements: Extraocular movements intact.      Conjunctiva/sclera: Conjunctivae normal.      Pupils: Pupils are equal, round, and reactive to light.   Cardiovascular:      Rate and Rhythm: Normal rate and regular rhythm.      Pulses: Normal pulses.      Heart sounds: Normal heart sounds. No murmur heard.      Pulmonary:      Effort: No respiratory distress.      Breath sounds: No wheezing.   Chest:      Chest wall: No tenderness.   Abdominal:      General: There is no distension.      Palpations: Abdomen is soft.      Tenderness: There is no abdominal tenderness. There is no guarding or rebound.   Musculoskeletal:         General: Normal range of motion.      Cervical back: Normal range of motion and neck supple.      Right lower leg: No edema.      Left lower leg: No edema.   Lymphadenopathy:      Cervical: No cervical adenopathy.   Skin:     General: Skin is warm and dry.      Capillary Refill:  Capillary refill takes less than 2 seconds.      Findings: No rash.   Neurological:      Mental Status: He is alert and oriented to person, place, and time.      Cranial Nerves: Cranial nerve deficit present.      Motor: Weakness present.      Comments: Left facial droop, LUE/LLE weakness 4/5, normal strength on right, clear speech   Psychiatric:         Mood and Affect: Mood normal.         Thought Content: Thought content normal.      Comments: sleepy         Medications  Current Facility-Administered Medications   Medication Dose Route Frequency Provider Last Rate Last Admin   • losartan (COZAAR) tablet 50 mg  50 mg Oral DAILY Lissette Jaffe M.D.   50 mg at 12/21/21 0600   • pravastatin (PRAVACHOL) tablet 20 mg  20 mg Oral DAILY Lissette Jaffe M.D.   20 mg at 12/21/21 0600   • ursodiol (ACTIGALL) capsule 300 mg  300 mg Oral BID Lissette Jaffe M.D.   300 mg at 12/21/21 0559   • acetaminophen (TYLENOL) tablet 500 mg  500 mg Oral TID Arden Pfeiffer D.O.   500 mg at 12/20/21 2050   • melatonin tablet 2.5 mg  2.5 mg Oral Nightly Arden Pfeiffer, D.O.   2.5 mg at 12/20/21 2050   • sodium chloride (SALT) tablet 2 g  2 g Oral TID WITH MEALS Bel Leach M.D.   2 g at 12/20/21 1944   • senna-docusate (PERICOLACE or SENOKOT S) 8.6-50 MG per tablet 2 Tablet  2 Tablet Oral BID Ever Whitmore M.D.   2 Tablet at 12/21/21 0559    And   • polyethylene glycol/lytes (MIRALAX) PACKET 1 Packet  1 Packet Oral QDAY PRN Ever Whitmore M.D.        And   • magnesium hydroxide (MILK OF MAGNESIA) suspension 30 mL  30 mL Oral QDAY PRN Ever Whitmore M.D.        And   • bisacodyl (DULCOLAX) suppository 10 mg  10 mg Rectal QDAY PRN Ever Whitmore M.D.       • Respiratory Therapy Consult   Nebulization Continuous RT Ever Whitmore M.D.       • insulin regular (HumuLIN R,NovoLIN R) injection  1-6 Units Subcutaneous Q6HRS Ever D Whitmore, M.D.        And   • dextrose 50% (D50W) injection 50 mL  50 mL  Intravenous Q15 MIN PRN Ever Whitmore M.D.       • LORazepam (ATIVAN) injection 2 mg  2 mg Intravenous Q5 MIN PRN Ever Whitmore M.D.       • acetaminophen (Tylenol) tablet 650 mg  650 mg Oral Q4HRS PRN Ever Whitmore M.D.   650 mg at 12/20/21 0958    Or   • acetaminophen (TYLENOL) suppository 650 mg  650 mg Rectal Q4HRS PRN Ever Whitmore M.D.       • ondansetron (ZOFRAN ODT) dispertab 4 mg  4 mg Oral Q4HRS PRN Ever Whitmore M.D.        Or   • ondansetron (ZOFRAN) syringe/vial injection 4 mg  4 mg Intravenous Q4HRS PRN Ever Whitmore M.D.       • MD Alert...ICU Electrolyte Replacement per Pharmacy   Other PHARMACY TO DOSE Ever Whitmore M.D.       • labetalol (NORMODYNE/TRANDATE) injection 10 mg  10 mg Intravenous Q4HRS PRN Ever Whitmore M.D.   10 mg at 12/20/21 1843   • hydrALAZINE (APRESOLINE) injection 10 mg  10 mg Intravenous Q4HRS PRN Ever Whitmore M.D.   10 mg at 12/20/21 1749   • enalaprilat (Vasotec) injection 1.25 mg 1 mL  1.25 mg Intravenous Q6HRS PRN Ever Whitmore M.D.       • 3% sodium chloride (HYPERTONIC SALINE) 500mL infusion 500 mL  500 mL Intravenous Continuous Kenn Davenport M.D. 50 mL/hr at 12/21/21 0058 500 mL at 12/21/21 0058   • sodium chloride 200 mEq in empty bag 50 mL ivpb  200 mEq Intravenous Q6HRS PRN Kenn Davenport M.D.       • niCARdipine (CARDENE) 50 mg in  mL Infusion  0-15 mg/hr Intravenous Continuous Kenn Davenport M.D. 130 mL/hr at 12/21/21 0607 13 mg/hr at 12/21/21 0607       Fluids    Intake/Output Summary (Last 24 hours) at 12/21/2021 0722  Last data filed at 12/21/2021 0600  Gross per 24 hour   Intake 6565.33 ml   Output 2450 ml   Net 4115.33 ml       Laboratory          Recent Labs     12/19/21  1115 12/19/21  1702 12/20/21  0611 12/20/21  1145 12/20/21  1800 12/20/21  2320 12/21/21  0442   SODIUM 140   < > 141   < > 140 142 143   POTASSIUM 4.2  --  4.3  --   --   --  4.0   CHLORIDE 104  --  110  --   --   --  111   CO2 27  --  22   --   --   --  22   BUN 17  --  11  --   --   --  8   CREATININE 0.70  --  0.65  --   --   --  0.60   MAGNESIUM  --   --  1.9  --   --   --  1.8   CALCIUM 8.8  --  8.2*  --   --   --  8.1*    < > = values in this interval not displayed.     Recent Labs     12/19/21  0416 12/19/21  0416 12/19/21  1115 12/20/21  0611 12/21/21  0442   ALTSGPT 27  --   --  24 19   ASTSGOT 26  --   --  30 19   ALKPHOSPHAT 88  --   --  81 75   TBILIRUBIN 0.2  --   --  0.4 0.3   GLUCOSE 117*   < > 124* 134* 126*    < > = values in this interval not displayed.     Recent Labs     12/19/21 0416 12/20/21  0611 12/20/21  1145 12/21/21  0442   WBC 6.4  --  9.9 9.4   NEUTSPOLYS 55.90  --  71.70 66.80   LYMPHOCYTES 26.60  --  16.10* 20.90*   MONOCYTES 12.90  --  11.00 10.70   EOSINOPHILS 3.50  --  0.50 0.70   BASOPHILS 0.80  --  0.40 0.60   ASTSGOT 26 30  --  19   ALTSGPT 27 24  --  19   ALKPHOSPHAT 88 81  --  75   TBILIRUBIN 0.2 0.4  --  0.3     Recent Labs     12/19/21 0416 12/19/21  0435 12/20/21  1145 12/21/21  0442   RBC 4.54*  --  4.36* 4.13*   HEMOGLOBIN 13.3*  --  12.8* 12.0*   HEMATOCRIT 40.2*  --  39.1* 37.7*   PLATELETCT 229  --  234 221   PROTHROMBTM  --  15.2*  --   --    APTT  --  35.2  --   --    INR  --  1.23*  --   --        Imaging  ECHO:  CONCLUSIONS  Normal transthoracic echocardiogram.     CT head 10pm:  IMPRESSION:     1.  Increase in size of right temporal intra-axial hematoma now measuring 6.7 x 4.3 cm and previously 3.4 x 2.9 cm     2.  Mild mass effect with 2-3 mm right to left shift     3.  Underlying brain white matter change and volume loss    Assessment/Plan  Acute right basal ganglia hemorrhage   - NSG/neuro following   - neuro check every 1 hour   - SBP goals < 140   - s/p xeralto reversal   - SLP/PT/OT   - Na goals 150-155   - active titration of 3% drip, obtain PICC   - CT head last night with some worsening, but clinically improving   - increase salt tabs to 2g TID    Essential HTN   - active titration of  nicardipine drip for SBP goals   - cont home losartan, increase to 100mg daily from 50mg   - labetalol/hydralazine prn    Dyslipidemia   - cont home statin    Atrial fibrillation   - appears in SR   - reversed Xeralto   - K goal > 4 and Mg goal > 2   - ECHO ok      VTE:  Contraindicated  Ulcer: Not Indicated  Lines: Peripherals    I have performed a physical exam and reviewed and updated ROS and Plan today (12/21/2021). In review of yesterday's note (12/20/2021), there are no changes except as documented above.     Discussed patient condition and risk of morbidity and/or mortality with Family, RN, RT, Therapies, Pharmacy, Charge nurse / hot rounds and Patient     Patient remains critically ill requiring active titration of nicardipine drip for SBP goals as well as titration of 3% for sodium goals.  The patient remains at high risk of clinical deterioration, worsening vital organ dysfunction, and death without the above critical care interventions.    Critical care time = 39 minutes in directly providing and coordinating critical care and extensive data review.  No time overlap and excludes procedures.

## 2021-12-21 NOTE — PROGRESS NOTES
Neurosurgery Progress Note    Subjective:  States some HA, denies new changes  States some photophobia  Pain controlled on pain meds    Exam:  AOx4,Tongue ML, mild drift on the left. Some weakness to left arm 4/5, BLE grossly intact. EOMs, some deficit to LLQ, states some vision deficit since hemorrhage, unchanged. Perrl 3mm bilat, tongue midline    BP  Min: 104/81  Max: 158/78  Pulse  Av.8  Min: 65  Max: 100  Resp  Av.7  Min: 17  Max: 85  Temp  Av.7 °C (98.1 °F)  Min: 36.6 °C (97.8 °F)  Max: 36.9 °C (98.4 °F)  SpO2  Av %  Min: 90 %  Max: 99 %    No data recorded    Recent Labs     21  0416 21  1145 21  0442   WBC 6.4 9.9 9.4   RBC 4.54* 4.36* 4.13*   HEMOGLOBIN 13.3* 12.8* 12.0*   HEMATOCRIT 40.2* 39.1* 37.7*   MCV 88.5 89.7 91.3   MCH 29.3 29.4 29.1   MCHC 33.1* 32.7* 31.8*   RDW 45.8 48.4 49.4   PLATELETCT 229 234 221   MPV 9.2 9.0 9.5     Recent Labs     21  1115 21  1702 21  0611 21  1145 21  1800 21  2320 21  0442   SODIUM 140   < > 141   < > 140 142 143   POTASSIUM 4.2  --  4.3  --   --   --  4.0   CHLORIDE 104  --  110  --   --   --  111   CO2 27  --  22  --   --   --  22   GLUCOSE 124*  --  134*  --   --   --  126*   BUN 17  --  11  --   --   --  8   CREATININE 0.70  --  0.65  --   --   --  0.60   CALCIUM 8.8  --  8.2*  --   --   --  8.1*    < > = values in this interval not displayed.     Recent Labs     21  0435   APTT 35.2   INR 1.23*     Recent Labs     21  0612   REACTMIN 5.6   CLOTKINET 0.8   CLOTANGL 78.0   MAXCLOTS 63.6   [id number] 0.1   PRCINADP 0.9   PRCINAA 15.3*       Intake/Output                       21 0700 - 21 0659 21 0700 - 21 0659     8784-2621 5460-5543 Total 5197-4667 6732-9758 Total                 Intake    P.O.  1080  1700 2780  120  -- 120    P.O. 1080 1700 2780 120 -- 120    I.V.  1653.3  2132 3785.3  335  -- 335    Cardene Volume 1072.5 1532 2604.5 235 -- 235     Volume (mL) (3% sodium chloride (HYPERTONIC SALINE) 500mL infusion 500 mL) 580.8 600 1180.8 100 -- 100    Total Intake 2733.3 3832 6565.3 455 -- 455       Output    Urine  1250  1200 2450  240  -- 240    Output (mL) (External Urinary Catheter (Condom)) 1250 1200 2450 240 -- 240    Stool  --  -- --  --  -- --    Number of Times Stooled 1 x -- 1 x -- -- --    Total Output 1250 1200 2450 240 -- 240       Net I/O     1483.3 2632 4115.3 215 -- 215            Intake/Output Summary (Last 24 hours) at 12/21/2021 0942  Last data filed at 12/21/2021 0800  Gross per 24 hour   Intake 6399.5 ml   Output 2530 ml   Net 3869.5 ml            • magnesium sulfate  2 g Once   • [START ON 12/22/2021] losartan  100 mg DAILY   • furosemide  40 mg Q DAY   • pravastatin  20 mg DAILY   • ursodiol  300 mg BID   • acetaminophen  500 mg TID   • melatonin  2.5 mg Nightly   • sodium chloride  2 g TID WITH MEALS   • senna-docusate  2 Tablet BID    And   • polyethylene glycol/lytes  1 Packet QDAY PRN    And   • magnesium hydroxide  30 mL QDAY PRN    And   • bisacodyl  10 mg QDAY PRN   • Respiratory Therapy Consult   Continuous RT   • insulin regular  1-6 Units Q6HRS    And   • dextrose 50%  50 mL Q15 MIN PRN   • LORazepam  2 mg Q5 MIN PRN   • acetaminophen  650 mg Q4HRS PRN    Or   • acetaminophen  650 mg Q4HRS PRN   • ondansetron  4 mg Q4HRS PRN    Or   • ondansetron  4 mg Q4HRS PRN   • MD Alert...Adult ICU Electrolyte Replacement per Pharmacy   PHARMACY TO DOSE   • labetalol  10 mg Q4HRS PRN   • hydrALAZINE  10 mg Q4HRS PRN   • enalaprilat  1.25 mg Q6HRS PRN   • 3% sodium chloride  500 mL Continuous   • sodium chloride 23.4% ivpb  200 mEq Q6HRS PRN   • niCARdipine infusion  0-15 mg/hr Continuous       Assessment and Plan:  Hospital day #3 R BG ICH  Prophylactic anticoagulation: no         Start date/time: tbd    Hs Afib- reversed  CT 12/19- some worsened.  Repeat head CT today  Keep SBP<150-on nicardipine  On 3%, Na 143  Continue neuro  checks  PT/OT recommending placement when medically cleared

## 2021-12-21 NOTE — CONSULTS
Physical Medicine and Rehabilitation Consultation              Date of initial consultation: 12/20/2021  Requested by: Ever Whitmore MD  Consulting physician: Arden Pfeiffer D.O.  Reason for consultation: assessment of rehabilitation needs  LOS: 1 Day(s)    Chief complaint: left sided weakness and headache    This history was prepared after interviewing the patient and reviewing the patient's chart at [facility].    HPI: The patient is a 71 y.o. male with a past medical history of obstructive sleep apnea, hypertension, hyperlipidemia and paroxysmal atrial fibrillation on Xarelto;  who presented on 12/19/2021  3:31 AM with left sided droop and left sided weakness  and found to have a basal ganglionic hemorrhage involving the putamen and globus pallidus on the right, extending right up to the posterior limb of the internal capsule. Per documentation, wife woke up early AM due to noise and woke up  to check for intruders. It was neighbor's party in backyard. Shortly after the patient woke up, developed left-sided weakness for which EMS were called and patient was brought to emergency room.     Physiatry was consulted to assess the patient's rehabilitation needs.    Today, patient reports: throbbing headache on right side that will not go away. Unable to sleep due to noises. Having dry mouth. He uses a CPAP at night for obstructive sleep apnea    Goals for rehabilitation include: improving independence and going home safely    Social and functional history:  Prior to this hospitalization, patient was independent with ADLS and mobility without an assistive device. Patient lives with  spouse in a one story home with 2 stairs to enter. Spouse is realtor with flexible hours and willing to be home 24hours a day if needed    Employment: , works for [employer]    Current function during therapy include:  Restrictions: SBP<150mmHg  PT: Functional mobility   Cognition    Level  of Consciousness Alert   Ability To Follow Commands 1 Step (50-75%)   Safety Awareness Impaired   New Learning Impaired   Attention Impaired   Sequencing Impaired   Comments L inattention   Active ROM Lower Body    Active ROM Lower Body  WDL   Strength Lower Body   Comments RLE WFL 5/5; LLE 4/5   Sensation Lower Body   Comments reports LLE grossly numb throughout, unable to identify tactile input to LLE   Strength Upper Body   Comments LUE weakness   Vision   Vision Comments Visual field cut on the L, minimal tracking L past midline   Balance Assessment   Sitting Balance (Static) Fair   Sitting Balance (Dynamic) Fair -   Standing Balance (Static) Trace +   Standing Balance (Dynamic) Trace   Comments w/ BUE support for standing   Gait Analysis   Gait Level Of Assist Unable to Participate   Bed Mobility    Supine to Sit Moderate Assist   Sit to Supine Moderate Assist   Functional Mobility   Sit to Stand Moderate Assist   Bed, Chair, Wheelchair Transfer Unable to Participate         OT: Activities of daily living  Cognition    Cognition / Consciousness X   Level of Consciousness Alert   Ability To Follow Commands    (50-75% of time)   Safety Awareness Impulsive;Impaired   New Learning Impaired   Attention Impaired   Sequencing Impaired   Passive ROM Upper Body   Passive ROM Upper Body WDL   Active ROM Upper Body   Active ROM Upper Body  X   Dominant Hand Right   Comments LUE limited by weakness (3-/5) RUE WFL   Strength Upper Body   Upper Body Strength  X   Comments LUE 3-/5, RUE WFL   Sensation Upper Body   Upper Extremity Sensation  X   Lt Upper Extremity Light Touch Impaired   Lt Upper Extremity Proprioception Impaired   Upper Body Muscle Tone   Upper Body Muscle Tone  X   Lt Upper Extremity Muscle Tone Hypotonic   Neurological Concerns   Neurological Concerns Yes   Lt Upper Extremity Gross Motor Control Absent;Ataxic   Lt Upper Extremity Functional Use Impaired   Coordination Upper Body   Coordination X   Gross  Motor Coordination LUE impaired   Balance Assessment   Sitting Balance (Static) Fair   Sitting Balance (Dynamic) Fair -   Standing Balance (Static) Poor   Standing Balance (Dynamic) Poor -   Weight Shift Sitting Fair   Weight Shift Standing Poor   Comments w/ HHA x2   Bed Mobility    Supine to Sit Moderate Assist   Sit to Supine Moderate Assist   Scooting Minimal Assist   Rolling Moderate Assist to Rt.   ADL Assessment   Grooming Moderate Assist;Seated   Upper Body Dressing Moderate Assist   Lower Body Dressing Maximal Assist   Toileting Maximal Assist   How much help from another person does the patient currently need...   Putting on and taking off regular lower body clothing? 2   Bathing (including washing, rinsing, and drying)? 2   Toileting, which includes using a toilet, bedpan, or urinal? 2   Putting on and taking off regular upper body clothing? 2   Taking care of personal grooming such as brushing teeth? 2   Eating meals? 2   6 Clicks Daily Activity Score 12   Modified [place] (mRS)   Modified David Score 4   Functional Mobility   Sit to Stand Moderate Assist  (of 2)   Bed, Chair, Wheelchair Transfer Unable to Participate   Toilet Transfers Unable to Participate   Mobility bed mobility, STS at EOB, side steps, BTB   Comments w/ HHA x2   ICU Target Mobility Level   ICU Mobility - Targeted Level Level 3A   Visual Perception   Visual Perception  X   Visual Fields Impaired;Left Field Cut   Right / Left Discrimination Impaired   Neglect Moderate Left   Visual Scanning Impaired  (to midline only when scanning towards left)   Activity Tolerance   Sitting Edge of Bed 10 min   Standing 5 min         SLP: Cognition/swallow/speech  Penetration Aspiration Scale:     Consistency PAS Score Timing   Thin Liquid 7 Pre swallow   Soft and Bite Sized     4 Pre swallow      Clinical Impressions:     Mild-Moderate oropharyngeal dysphagia, marked by. Dysphagia is likely acute related to (chronic & acute risk factors). Swallow  safety is impaired; swallow efficiency is impaired. Pt appears to be at high risk for aspiration PNA, and moderate risk for malnutrition/dehydration. Temporary Diet modification is indicated. Patient appears to be a excellent candidate for behavioral swallow rehabilitation.      PMH:  History reviewed. No pertinent past medical history.    PSH:  History reviewed. No pertinent surgical history.    FHX:  No family history on file.    Medications:  Current Facility-Administered Medications   Medication Dose   • losartan (COZAAR) tablet 50 mg  50 mg   • pravastatin (PRAVACHOL) tablet 20 mg  20 mg   • ursodiol (ACTIGALL) capsule 300 mg  300 mg   • sodium chloride (SALT) tablet 1 g  1 g   • senna-docusate (PERICOLACE or SENOKOT S) 8.6-50 MG per tablet 2 Tablet  2 Tablet    And   • polyethylene glycol/lytes (MIRALAX) PACKET 1 Packet  1 Packet    And   • magnesium hydroxide (MILK OF MAGNESIA) suspension 30 mL  30 mL    And   • bisacodyl (DULCOLAX) suppository 10 mg  10 mg   • Respiratory Therapy Consult     • insulin regular (HumuLIN R,NovoLIN R) injection  1-6 Units    And   • dextrose 50% (D50W) injection 50 mL  50 mL   • LORazepam (ATIVAN) injection 2 mg  2 mg   • acetaminophen (Tylenol) tablet 650 mg  650 mg    Or   • acetaminophen (TYLENOL) suppository 650 mg  650 mg   • ondansetron (ZOFRAN ODT) dispertab 4 mg  4 mg    Or   • ondansetron (ZOFRAN) syringe/vial injection 4 mg  4 mg   • MD Alert...ICU Electrolyte Replacement per Pharmacy     • labetalol (NORMODYNE/TRANDATE) injection 10 mg  10 mg   • hydrALAZINE (APRESOLINE) injection 10 mg  10 mg   • enalaprilat (Vasotec) injection 1.25 mg 1 mL  1.25 mg   • 3% sodium chloride (HYPERTONIC SALINE) 500mL infusion 500 mL  500 mL   • sodium chloride 200 mEq in empty bag 50 mL ivpb  200 mEq   • niCARdipine (CARDENE) 50 mg in  mL Infusion  0-15 mg/hr       Allergies:  Allergies   Allergen Reactions   • Lactose          Review of systems:  Constitutional: denies fevers and  "chills  Eyes: denies change in vision  Ears, nose, mouth, throat: denies sore throat  Cardiovascular: denies palpitations  Respiratory: denies respiratory discomfort  Gastrointestinal: last BM today, continent  Genitourinary: has been using condom catheter  Musculoskeletal: admits to pain in right frontal headache  Integumentary: denies pressure ulcer  Neurological: denies spasms, denies burning or shooting pain, admits to right frontal headache, admits to weakness in left arm / leg  Psychiatric: denies change in mood  Endocrine: denies diabetes mellitus  Hematologic/lymphatic: denies history of blood disorders  Allergic/immunologic: has allergy to lactose    Physical Exam:  Vitals: /63   Pulse 89   Temp 36.8 °C (98.3 °F) (Temporal)   Resp (!) 21   Ht 1.664 m (5' 5.51\")   Wt 112 kg (245 lb 13 oz)   SpO2 97%   General: well-groomed sitting up in no acute distress, no visitors present  Eyes: no scleral icterus or conjunctival injection  Ears, nose, mouth and throat: moist oral mucosa  Cardiovascular: regular rate, good peripheral perfusion  Respiratory: breathing comfortably without use of accessory muscles  Gastrointestinal: nondistended  Genitourinary: +condom catheter  Musculoskeletal: good symmetry in bilateral shoulders  Skin: no wounds seen on exposed skin    Neurologic:  Mental status:  A&Ox4 (person, place, date, situation) answers questions appropriately follows commands but showing some confusion and left inattention  Speech: fluent, no aphasia or dysarthria  Motor:    Upper Extremity  Myotome R L   Shoulder flexion C5 5/5 4/5   Elbow flexion C5 5/5 4/5   Wrist extension C6 5/5 3/5   Elbow extension C7 5/5 3/5   Finger flexion C8 5/5 3/5   Finger abduction T1 5/5 3/5     Lower Extremity Myotome R L   Hip flexion L2 5/5 1/5   Knee extension L3 5/5 1/5   Ankle dorsiflexion L4 5/5 4/5   Toe extension L5 5/5 4/5   Ankle plantarflexion S1 5/5 4/5     Sensory:   Impaired sensation to light touch on " left leg  Tone: no spasticity noted  Psychiatric: appropriate affect  Hematologic/lymphatic/immunologic: ++IV access, no bruises seen on exposed skin    Labs: Reviewed and significant for   Recent Labs     12/19/21 0416 12/19/21  0435 12/20/21  1145   RBC 4.54*  --  4.36*   HEMOGLOBIN 13.3*  --  12.8*   HEMATOCRIT 40.2*  --  39.1*   PLATELETCT 229  --  234   PROTHROMBTM  --  15.2*  --    APTT  --  35.2  --    INR  --  1.23*  --      Recent Labs     12/19/21  0416 12/19/21  0416 12/19/21  1115 12/19/21  1702 12/19/21  2314 12/20/21  0611 12/20/21  1145   SODIUM 138   < > 140   < > 141 141 141   POTASSIUM 4.6  --  4.2  --   --  4.3  --    CHLORIDE 104  --  104  --   --  110  --    CO2 24  --  27  --   --  22  --    GLUCOSE 117*  --  124*  --   --  134*  --    BUN 21  --  17  --   --  11  --    CREATININE 0.67  --  0.70  --   --  0.65  --    CALCIUM 8.8  --  8.8  --   --  8.2*  --     < > = values in this interval not displayed.     Recent Results (from the past 24 hour(s))   Sodium Serum (NA)    Collection Time: 12/19/21  5:02 PM   Result Value Ref Range    Sodium 143 135 - 145 mmol/L   POCT glucose device results    Collection Time: 12/19/21  5:05 PM   Result Value Ref Range    Glucose - Accu-Ck 100 (H) 65 - 99 mg/dL   Sodium Serum (NA)    Collection Time: 12/19/21 11:14 PM   Result Value Ref Range    Sodium 141 135 - 145 mmol/L   POCT glucose device results    Collection Time: 12/20/21 12:48 AM   Result Value Ref Range    Glucose - Accu-Ck 119 (H) 65 - 99 mg/dL   POCT glucose device results    Collection Time: 12/20/21  5:52 AM   Result Value Ref Range    Glucose - Accu-Ck 103 (H) 65 - 99 mg/dL   Comp Metabolic Panel    Collection Time: 12/20/21  6:11 AM   Result Value Ref Range    Sodium 141 135 - 145 mmol/L    Potassium 4.3 3.6 - 5.5 mmol/L    Chloride 110 96 - 112 mmol/L    Co2 22 20 - 33 mmol/L    Anion Gap 9.0 7.0 - 16.0    Glucose 134 (H) 65 - 99 mg/dL    Bun 11 8 - 22 mg/dL    Creatinine 0.65 0.50 - 1.40  mg/dL    Calcium 8.2 (L) 8.5 - 10.5 mg/dL    AST(SGOT) 30 12 - 45 U/L    ALT(SGPT) 24 2 - 50 U/L    Alkaline Phosphatase 81 30 - 99 U/L    Total Bilirubin 0.4 0.1 - 1.5 mg/dL    Albumin 3.9 3.2 - 4.9 g/dL    Total Protein 6.3 6.0 - 8.2 g/dL    Globulin 2.4 1.9 - 3.5 g/dL    A-G Ratio 1.6 g/dL   MAGNESIUM    Collection Time: 12/20/21  6:11 AM   Result Value Ref Range    Magnesium 1.9 1.5 - 2.5 mg/dL   ESTIMATED GFR    Collection Time: 12/20/21  6:11 AM   Result Value Ref Range    GFR If African American >60 >60 mL/min/1.73 m 2    GFR If Non African American >60 >60 mL/min/1.73 m 2   POCT glucose device results    Collection Time: 12/20/21  8:00 AM   Result Value Ref Range    Glucose - Accu-Ck 130 (H) 65 - 99 mg/dL   Sodium Serum (NA)    Collection Time: 12/20/21 11:45 AM   Result Value Ref Range    Sodium 141 135 - 145 mmol/L   CBC WITH DIFFERENTIAL    Collection Time: 12/20/21 11:45 AM   Result Value Ref Range    WBC 9.9 4.8 - 10.8 K/uL    RBC 4.36 (L) 4.70 - 6.10 M/uL    Hemoglobin 12.8 (L) 14.0 - 18.0 g/dL    Hematocrit 39.1 (L) 42.0 - 52.0 %    MCV 89.7 81.4 - 97.8 fL    MCH 29.4 27.0 - 33.0 pg    MCHC 32.7 (L) 33.7 - 35.3 g/dL    RDW 48.4 35.9 - 50.0 fL    Platelet Count 234 164 - 446 K/uL    MPV 9.0 9.0 - 12.9 fL    Neutrophils-Polys 71.70 44.00 - 72.00 %    Lymphocytes 16.10 (L) 22.00 - 41.00 %    Monocytes 11.00 0.00 - 13.40 %    Eosinophils 0.50 0.00 - 6.90 %    Basophils 0.40 0.00 - 1.80 %    Immature Granulocytes 0.30 0.00 - 0.90 %    Nucleated RBC 0.00 /100 WBC    Neutrophils (Absolute) 7.13 1.82 - 7.42 K/uL    Lymphs (Absolute) 1.60 1.00 - 4.80 K/uL    Monos (Absolute) 1.09 (H) 0.00 - 0.85 K/uL    Eos (Absolute) 0.05 0.00 - 0.51 K/uL    Baso (Absolute) 0.04 0.00 - 0.12 K/uL    Immature Granulocytes (abs) 0.03 0.00 - 0.11 K/uL    NRBC (Absolute) 0.00 K/uL   POCT glucose device results    Collection Time: 12/20/21 11:50 AM   Result Value Ref Range    Glucose - Accu-Ck 127 (H) 65 - 99 mg/dL          ASSESSMENT:  IMPRESSION: The patient is a 71 y.o. male with a past medical history of obstructive sleep apnea, hypertension, hyperlipidemia and paroxysmal atrial fibrillation on Xarelto;  who presented on 12/19/2021  3:31 AM with left sided droop and left sided weakness  and found to have a basal ganglionic hemorrhage involving the putamen and globus pallidus on the right, extending right up to the posterior limb of the internal capsule.     [facility] Code: 0001.1 - Stroke: Left Body Involvement (Right Brain)  -With acute secondary complications of: impaired ADLs and mobility, Mild-Moderate oropharyngeal dysphagia, impaired cognition, left inattention, left sided weakness  -with chronic conditions of: obstructive sleep apnea, hypertension, hyperlipidemia and paroxysmal atrial fibrillation on Xarelto  -and:     Medical Complexity:  Anemia  Hyperglycemia    RECOMMENDATIONS:    ##MSK  #Impaired ADLs and mobility: Agree with continuing OT/PT/*SLP while admitted here.    Patient is early in his course. However, due to his impaired cognition, left sided neglect, likely aphasia, I suspect he will require     Patient is a good candidate for acute inpatient rehabilitation provided that: patient can tolerate 3 hours of therapy a day, has aggressive therapy needs, patient is medically stable, bed becomes available, insurance authorization is obtained.    Estimated length of stay: 12-16 days  Anticipated discharge destination: home with family  Prognosis: good    ##NEURO  #Acute basal ganglionic hemorrhage involving the putamen and globus pallidus on the right, extending right up to the posterior limb of the internal capsule  Currently on nicardipine drip in ICU and is therefore high risk    #Likely aphasia  #left sided neglect  #Left sided weakness    #Acute headache  Ordered tylenol 500mg TID    ##PSYCH  #Impaired sleep  Ordered melatonin 2.5mg QHS  Recommend good sleep hygiene with lights on/window shade up during the day,  out of bed and in chair for meals, and in bed with lights off and minimal interruptions at night.    ##GI  #At risk for constipation  Goal of one continent BM daily    ##  #Condom catheter in place    ##SKIN  Recommend turning Q2hr and monitor for skin changes at least daily  DVT chemoprophlaxis: brain bleed. Recommend starting once medically cleared to do so  Code: full resuscitation    Thank you for allowing us to participate in the care of this patient. Physiatry will continue to follow and provide recommendations, as needed.    Patient was seen for 114 minutes on unit/floor of which > 50% of time was spent on counseling and coordination of care regarding the above, including prognosis, risk reduction, benefits of treatment, and options for next stage of care.    Arden Pfeiffer D.O.   Physical Medicine and Rehabilitation     I have performed a physical exam and reviewed and updated history and plan today (12/20/2021).     Please note that this dictation was created using voice recognition software. I have made every reasonable attempt to correct obvious errors, but there may be errors of grammar and possibly content that I did not discover before finalizing the note.

## 2021-12-21 NOTE — PROGRESS NOTES
NEUROLOGY PROGRESS NOTE      BACKGROUND:    71 y.o. male was admitted on 12/19/2021  3:31 AM for Intracranial hemorrhage (HCC) [I62.9].      SUBJECTIVE:   No significant changes, continue with left-sided weakness and left visual field cut.  Wife is at bedside.    VITALS:  Vitals:    12/21/21 0930 12/21/21 0945 12/21/21 1000 12/21/21 1015   BP: 123/59 123/60  131/58   Pulse: (!) 103 (!) 105 (!) 104 91   Resp: (!) 42 (!) 30 (!) 23 20   Temp:   36.7 °C (98 °F)    TempSrc:   Temporal    SpO2: 96% 97% 96% 97%   Weight:       Height:           NEUROLOGICAL EXAM:   MENTAL STATUS:  Awake, alert, oriented times 3.  Speech is fluent, comprehension is intact.  CRANIAL NERVES:  PERRL, EOMI with no nystagmus, facial motor revealed left facial weakness in an upper motor neuron pattern, facial sensation is intact, tongue is in the midline, palate is symmetric. Hearing is intact to finger rub bilaterally. Shoulder shrugs are normal.  He appears to have mild neglect to the left side.  He has left homonymous hemianopsia.  MOTOR:  Motor examination showed normal strength in direct testing of right upper and lower extremities, proximal and distal.  He has 3/5 weakness of left upper and lower extremity.  SENSATION: Decreased to light touch and temperature in left upper and lower extremity.  REFLEXES:  1+ and symmetric, toes are downgoing bilaterally  COORDINATION:  Normal finger to nose and heel to shin bilaterally  GAIT:  Deferred      OBJECTIVE:    NEUROIMAGING:    EC-ECHOCARDIOGRAM COMPLETE W/ CONT   Final Result      CT-HEAD W/O   Final Result      1.  Increase in size of right temporal intra-axial hematoma now measuring 6.7 x 4.3 cm and previously 3.4 x 2.9 cm      2.  Mild mass effect with 2-3 mm right to left shift      3.  Underlying brain white matter change and volume loss      DX-CHEST-PORTABLE (1 VIEW)   Final Result         1. No acute cardiopulmonary abnormalities are identified.      CT-CTA HEAD WITH & W/O-POST PROCESS    Final Result         1. No hemodynamically significant narrowing of the major intracranial vessels.   2. Redemonstration of parenchymal hemorrhage in the right basal ganglia.      CT-CTA NECK WITH & W/O-POST PROCESSING   Final Result      1. No evidence of flow-limiting stenosis in the cervical carotid or cervical vertebral arteries.      CT-CEREBRAL PERFUSION ANALYSIS   Final Result      1.  Cerebral blood flow less than 30% = 7 mL.      2.  T Max more than 6 seconds = 16 mL.      3.  These are likely related to the parenchymal hemorrhage.      4.  Please note that the cerebral perfusion was performed on the limited brain tissue around the basal ganglia region. Infarct/ischemia outside the CT perfusion sections can be missed in this study.      CT-HEAD W/O   Final Result         1. A 3.4 x 3.6 cm parenchymal hemorrhage in the right basal ganglia. Mild mass effect in the right lateral ventricle. No significant midline shift.                  CRITICAL RESULT READ BACK: Preliminary findings discussed with and critical read back performed by Dr. CONSTANZA ARTIS II in the Emergency Department via telephone on 12/19/2021 3:50 AM      CT-HEAD W/O    (Results Pending)       MEDICATIONS:  Current Facility-Administered Medications   Medication Dose Route Frequency Provider Last Rate Last Admin   • magnesium sulfate IVPB premix 2 g  2 g Intravenous Once Lissette Jaffe M.D. 25 mL/hr at 12/21/21 1136 2 g at 12/21/21 1136   • [START ON 12/22/2021] losartan (COZAAR) tablet 100 mg  100 mg Oral DAILY Lissette Jaffe M.D.       • furosemide (LASIX) tablet 40 mg  40 mg Oral Q DAY Lissette Jaffe M.D.   40 mg at 12/21/21 0931   • pravastatin (PRAVACHOL) tablet 20 mg  20 mg Oral DAILY Lissette Jaffe M.D.   20 mg at 12/21/21 0600   • ursodiol (ACTIGALL) capsule 300 mg  300 mg Oral BID Lissette Jaffe M.D.   300 mg at 12/21/21 0559   • acetaminophen (TYLENOL) tablet 500 mg  500 mg Oral TID LAURA Islas  mg at 12/21/21 0744   • melatonin tablet 2.5 mg  2.5 mg Oral Nightly Arden Pfeiffer, D.O.   2.5 mg at 12/20/21 2050   • sodium chloride (SALT) tablet 2 g  2 g Oral TID WITH MEALS Bel Leach M.D.   2 g at 12/21/21 1136   • senna-docusate (PERICOLACE or SENOKOT S) 8.6-50 MG per tablet 2 Tablet  2 Tablet Oral BID Ever Whitmore M.D.   2 Tablet at 12/21/21 0559    And   • polyethylene glycol/lytes (MIRALAX) PACKET 1 Packet  1 Packet Oral QDAY PRN Ever Whitmore M.D.        And   • magnesium hydroxide (MILK OF MAGNESIA) suspension 30 mL  30 mL Oral QDAY PRN Ever Whitmore M.D.        And   • bisacodyl (DULCOLAX) suppository 10 mg  10 mg Rectal QDAY PRN Ever Whitmore M.D.       • Respiratory Therapy Consult   Nebulization Continuous RT Ever Whitmore M.D.       • insulin regular (HumuLIN R,NovoLIN R) injection  1-6 Units Subcutaneous Q6HRS Ever Whitmore M.D.        And   • dextrose 50% (D50W) injection 50 mL  50 mL Intravenous Q15 MIN PRN Ever Whitmore M.D.       • LORazepam (ATIVAN) injection 2 mg  2 mg Intravenous Q5 MIN PRN Ever Whitmore M.D.       • acetaminophen (Tylenol) tablet 650 mg  650 mg Oral Q4HRS PRN Ever Whitmore M.D.   650 mg at 12/20/21 0958    Or   • acetaminophen (TYLENOL) suppository 650 mg  650 mg Rectal Q4HRS PRN Ever Whitmore M.D.       • ondansetron (ZOFRAN ODT) dispertab 4 mg  4 mg Oral Q4HRS PRN Ever Whitmore M.D.        Or   • ondansetron (ZOFRAN) syringe/vial injection 4 mg  4 mg Intravenous Q4HRS PRN Ever Whitmore M.D.       • MD Alert...ICU Electrolyte Replacement per Pharmacy   Other PHARMACY TO DOSE Ever Whitmore M.D.       • labetalol (NORMODYNE/TRANDATE) injection 10 mg  10 mg Intravenous Q4HRS PRN Ever Whitmore M.D.   10 mg at 12/20/21 1843   • hydrALAZINE (APRESOLINE) injection 10 mg  10 mg Intravenous Q4HRS PRN Ever Whitmore M.D.   10 mg at 12/21/21 0902   • enalaprilat (Vasotec) injection 1.25 mg 1 mL  1.25 mg  Intravenous Q6HRS PRN Ever Whitmore M.D.       • 3% sodium chloride (HYPERTONIC SALINE) 500mL infusion 500 mL  500 mL Intravenous Continuous Kenn Davenport M.D. 50 mL/hr at 12/21/21 1140 500 mL at 12/21/21 1140   • sodium chloride 200 mEq in empty bag 50 mL ivpb  200 mEq Intravenous Q6HRS PRN Kenn Davenport M.D.       • niCARdipine (CARDENE) 50 mg in  mL Infusion  0-15 mg/hr Intravenous Continuous Kenn Davenport M.D. 60 mL/hr at 12/21/21 0845 6 mg/hr at 12/21/21 0845       LABS:      Recent Labs     12/19/21  0416 12/20/21  1145 12/21/21  0442   WBC 6.4 9.9 9.4   RBC 4.54* 4.36* 4.13*   HEMOGLOBIN 13.3* 12.8* 12.0*   HEMATOCRIT 40.2* 39.1* 37.7*   MCV 88.5 89.7 91.3   MCH 29.3 29.4 29.1   MCHC 33.1* 32.7* 31.8*   RDW 45.8 48.4 49.4   PLATELETCT 229 234 221   MPV 9.2 9.0 9.5     Recent Labs     12/19/21  1115 12/19/21  1702 12/20/21  0611 12/20/21  1145 12/20/21  2320 12/21/21  0442 12/21/21  1110   SODIUM 140   < > 141   < > 142 143 140   POTASSIUM 4.2  --  4.3  --   --  4.0  --    CHLORIDE 104  --  110  --   --  111  --    CO2 27  --  22  --   --  22  --    GLUCOSE 124*  --  134*  --   --  126*  --    BUN 17  --  11  --   --  8  --     < > = values in this interval not displayed.     INR   Date Value Ref Range Status   12/19/2021 1.23 (H) 0.87 - 1.13 Final     Comment:     INR - Non-therapeutic Reference Range: 0.87-1.13  INR - Therapeutic Reference Range: 2.0-4.0       No results found for: POCINR  Lab Results   Component Value Date/Time    CREATININE 0.65 12/20/2021 0611     Lab Results   Component Value Date/Time    IFAFRICA >60 12/20/2021 0611    IFNOTAFR >60 12/20/2021 0611       ASSESSMENT AND PLAN:  71 y.o. male with history of atrial fibrillation who was on Xarelto,  brought to emergency room for acute onset of left-sided weakness.  He was found to have a 3 x 3 cm intraparenchymal hemorrhage in the right basal ganglia.  His ICH score is 1.  Patient's anticoagulation was reversed with Kcentra.    Repeat brain CT revealed Increase in size of right temporal intra-axial hematoma now measuring 6.7 x 4.3 cm and previously 3.4 x 2.9 cm  also with mild mass effect with 2-3 mm right to left shift, started on hypertonic saline with current sodium of 141.   Keep head of bed elevated at 45 degrees.  Maintain systolic blood pressure less than 150.  If any seizure activity noted recommend Keppra 500 mg twice a day.  Bowel regimen to avoid Valsalva maneuver.   DVT prevention with SCDs.  Continue with physical therapy, occupational therapy and speech therapy.  Neurology will follow as needed, please call us if there is any question.

## 2021-12-22 ENCOUNTER — APPOINTMENT (OUTPATIENT)
Dept: RADIOLOGY | Facility: MEDICAL CENTER | Age: 71
DRG: 065 | End: 2021-12-22
Attending: STUDENT IN AN ORGANIZED HEALTH CARE EDUCATION/TRAINING PROGRAM
Payer: MEDICARE

## 2021-12-22 PROBLEM — E87.70 VOLUME EXCESS: Status: ACTIVE | Noted: 2021-12-22

## 2021-12-22 LAB
ALBUMIN SERPL BCP-MCNC: 3.3 G/DL (ref 3.2–4.9)
ALBUMIN/GLOB SERPL: 1.4 G/DL
ALP SERPL-CCNC: 68 U/L (ref 30–99)
ALT SERPL-CCNC: 16 U/L (ref 2–50)
ANION GAP SERPL CALC-SCNC: 10 MMOL/L (ref 7–16)
ANION GAP SERPL CALC-SCNC: 9 MMOL/L (ref 7–16)
AST SERPL-CCNC: 14 U/L (ref 12–45)
BASOPHILS # BLD AUTO: 0.7 % (ref 0–1.8)
BASOPHILS # BLD: 0.06 K/UL (ref 0–0.12)
BILIRUB SERPL-MCNC: 0.3 MG/DL (ref 0.1–1.5)
BUN SERPL-MCNC: 9 MG/DL (ref 8–22)
BUN SERPL-MCNC: 9 MG/DL (ref 8–22)
CALCIUM SERPL-MCNC: 7.7 MG/DL (ref 8.5–10.5)
CALCIUM SERPL-MCNC: 7.8 MG/DL (ref 8.5–10.5)
CHLORIDE SERPL-SCNC: 108 MMOL/L (ref 96–112)
CHLORIDE SERPL-SCNC: 111 MMOL/L (ref 96–112)
CO2 SERPL-SCNC: 22 MMOL/L (ref 20–33)
CO2 SERPL-SCNC: 23 MMOL/L (ref 20–33)
CREAT SERPL-MCNC: 0.62 MG/DL (ref 0.5–1.4)
CREAT SERPL-MCNC: 0.63 MG/DL (ref 0.5–1.4)
EOSINOPHIL # BLD AUTO: 0.21 K/UL (ref 0–0.51)
EOSINOPHIL NFR BLD: 2.5 % (ref 0–6.9)
ERYTHROCYTE [DISTWIDTH] IN BLOOD BY AUTOMATED COUNT: 50.2 FL (ref 35.9–50)
GLOBULIN SER CALC-MCNC: 2.4 G/DL (ref 1.9–3.5)
GLUCOSE BLD-MCNC: 90 MG/DL (ref 65–99)
GLUCOSE SERPL-MCNC: 104 MG/DL (ref 65–99)
GLUCOSE SERPL-MCNC: 180 MG/DL (ref 65–99)
HCT VFR BLD AUTO: 35.4 % (ref 42–52)
HGB BLD-MCNC: 11.4 G/DL (ref 14–18)
IMM GRANULOCYTES # BLD AUTO: 0.03 K/UL (ref 0–0.11)
IMM GRANULOCYTES NFR BLD AUTO: 0.4 % (ref 0–0.9)
LYMPHOCYTES # BLD AUTO: 2.3 K/UL (ref 1–4.8)
LYMPHOCYTES NFR BLD: 27 % (ref 22–41)
MAGNESIUM SERPL-MCNC: 1.9 MG/DL (ref 1.5–2.5)
MCH RBC QN AUTO: 29.3 PG (ref 27–33)
MCHC RBC AUTO-ENTMCNC: 32.2 G/DL (ref 33.7–35.3)
MCV RBC AUTO: 91 FL (ref 81.4–97.8)
MONOCYTES # BLD AUTO: 1.04 K/UL (ref 0–0.85)
MONOCYTES NFR BLD AUTO: 12.2 % (ref 0–13.4)
NEUTROPHILS # BLD AUTO: 4.88 K/UL (ref 1.82–7.42)
NEUTROPHILS NFR BLD: 57.2 % (ref 44–72)
NRBC # BLD AUTO: 0 K/UL
NRBC BLD-RTO: 0 /100 WBC
PLATELET # BLD AUTO: 192 K/UL (ref 164–446)
PMV BLD AUTO: 9.2 FL (ref 9–12.9)
POTASSIUM SERPL-SCNC: 3.6 MMOL/L (ref 3.6–5.5)
POTASSIUM SERPL-SCNC: 3.7 MMOL/L (ref 3.6–5.5)
PROT SERPL-MCNC: 5.7 G/DL (ref 6–8.2)
RBC # BLD AUTO: 3.89 M/UL (ref 4.7–6.1)
SODIUM SERPL-SCNC: 141 MMOL/L (ref 135–145)
SODIUM SERPL-SCNC: 141 MMOL/L (ref 135–145)
SODIUM SERPL-SCNC: 142 MMOL/L (ref 135–145)
SODIUM SERPL-SCNC: 142 MMOL/L (ref 135–145)
WBC # BLD AUTO: 8.5 K/UL (ref 4.8–10.8)

## 2021-12-22 PROCEDURE — A9270 NON-COVERED ITEM OR SERVICE: HCPCS | Performed by: STUDENT IN AN ORGANIZED HEALTH CARE EDUCATION/TRAINING PROGRAM

## 2021-12-22 PROCEDURE — 770022 HCHG ROOM/CARE - ICU (200)

## 2021-12-22 PROCEDURE — 99291 CRITICAL CARE FIRST HOUR: CPT | Performed by: INTERNAL MEDICINE

## 2021-12-22 PROCEDURE — A9270 NON-COVERED ITEM OR SERVICE: HCPCS | Performed by: PHYSICAL MEDICINE & REHABILITATION

## 2021-12-22 PROCEDURE — 85025 COMPLETE CBC W/AUTO DIFF WBC: CPT

## 2021-12-22 PROCEDURE — 84295 ASSAY OF SERUM SODIUM: CPT

## 2021-12-22 PROCEDURE — 700111 HCHG RX REV CODE 636 W/ 250 OVERRIDE (IP): Performed by: INTERNAL MEDICINE

## 2021-12-22 PROCEDURE — 700102 HCHG RX REV CODE 250 W/ 637 OVERRIDE(OP): Performed by: INTERNAL MEDICINE

## 2021-12-22 PROCEDURE — 700102 HCHG RX REV CODE 250 W/ 637 OVERRIDE(OP): Performed by: STUDENT IN AN ORGANIZED HEALTH CARE EDUCATION/TRAINING PROGRAM

## 2021-12-22 PROCEDURE — 02HV33Z INSERTION OF INFUSION DEVICE INTO SUPERIOR VENA CAVA, PERCUTANEOUS APPROACH: ICD-10-PCS | Performed by: INTERNAL MEDICINE

## 2021-12-22 PROCEDURE — A9270 NON-COVERED ITEM OR SERVICE: HCPCS | Performed by: INTERNAL MEDICINE

## 2021-12-22 PROCEDURE — 83735 ASSAY OF MAGNESIUM: CPT

## 2021-12-22 PROCEDURE — 80053 COMPREHEN METABOLIC PANEL: CPT

## 2021-12-22 PROCEDURE — 97112 NEUROMUSCULAR REEDUCATION: CPT

## 2021-12-22 PROCEDURE — 700105 HCHG RX REV CODE 258: Performed by: STUDENT IN AN ORGANIZED HEALTH CARE EDUCATION/TRAINING PROGRAM

## 2021-12-22 PROCEDURE — 700101 HCHG RX REV CODE 250: Performed by: PSYCHIATRY & NEUROLOGY

## 2021-12-22 PROCEDURE — 700105 HCHG RX REV CODE 258: Performed by: PSYCHIATRY & NEUROLOGY

## 2021-12-22 PROCEDURE — 80048 BASIC METABOLIC PNL TOTAL CA: CPT

## 2021-12-22 PROCEDURE — 700102 HCHG RX REV CODE 250 W/ 637 OVERRIDE(OP): Performed by: PHYSICAL MEDICINE & REHABILITATION

## 2021-12-22 PROCEDURE — C1751 CATH, INF, PER/CENT/MIDLINE: HCPCS

## 2021-12-22 PROCEDURE — 82962 GLUCOSE BLOOD TEST: CPT

## 2021-12-22 PROCEDURE — 97530 THERAPEUTIC ACTIVITIES: CPT

## 2021-12-22 RX ORDER — 3% SODIUM CHLORIDE 3 G/100ML
INJECTION, SOLUTION INTRAVENOUS CONTINUOUS
Status: DISCONTINUED | OUTPATIENT
Start: 2021-12-22 | End: 2021-12-24

## 2021-12-22 RX ORDER — MAGNESIUM SULFATE HEPTAHYDRATE 40 MG/ML
2 INJECTION, SOLUTION INTRAVENOUS ONCE
Status: COMPLETED | OUTPATIENT
Start: 2021-12-22 | End: 2021-12-22

## 2021-12-22 RX ORDER — FUROSEMIDE 10 MG/ML
40 INJECTION INTRAMUSCULAR; INTRAVENOUS
Status: DISCONTINUED | OUTPATIENT
Start: 2021-12-22 | End: 2021-12-24 | Stop reason: HOSPADM

## 2021-12-22 RX ORDER — POTASSIUM CHLORIDE 20 MEQ/1
40 TABLET, EXTENDED RELEASE ORAL DAILY
Status: DISCONTINUED | OUTPATIENT
Start: 2021-12-22 | End: 2021-12-24 | Stop reason: HOSPADM

## 2021-12-22 RX ORDER — OMEPRAZOLE 20 MG/1
20 CAPSULE, DELAYED RELEASE ORAL DAILY
Status: DISCONTINUED | OUTPATIENT
Start: 2021-12-22 | End: 2021-12-24 | Stop reason: HOSPADM

## 2021-12-22 RX ORDER — AMLODIPINE BESYLATE 5 MG/1
5 TABLET ORAL
Status: DISCONTINUED | OUTPATIENT
Start: 2021-12-22 | End: 2021-12-23

## 2021-12-22 RX ADMIN — AMLODIPINE BESYLATE 5 MG: 5 TABLET ORAL at 08:41

## 2021-12-22 RX ADMIN — OMEPRAZOLE 20 MG: 20 CAPSULE, DELAYED RELEASE ORAL at 14:15

## 2021-12-22 RX ADMIN — SODIUM CHLORIDE 12 MG/HR: 9 INJECTION, SOLUTION INTRAVENOUS at 05:37

## 2021-12-22 RX ADMIN — LABETALOL HYDROCHLORIDE 10 MG: 5 INJECTION INTRAVENOUS at 14:07

## 2021-12-22 RX ADMIN — SODIUM CHLORIDE 500 ML: 3 INJECTION, SOLUTION INTRAVENOUS at 09:21

## 2021-12-22 RX ADMIN — FUROSEMIDE 40 MG: 10 INJECTION, SOLUTION INTRAMUSCULAR; INTRAVENOUS at 09:02

## 2021-12-22 RX ADMIN — SODIUM CHLORIDE: 3 INJECTION, SOLUTION INTRAVENOUS at 20:38

## 2021-12-22 RX ADMIN — LOSARTAN POTASSIUM 100 MG: 50 TABLET, FILM COATED ORAL at 05:16

## 2021-12-22 RX ADMIN — ENALAPRILAT 1.25 MG: 1.25 INJECTION INTRAVENOUS at 15:01

## 2021-12-22 RX ADMIN — Medication 2 G: at 18:01

## 2021-12-22 RX ADMIN — SODIUM CHLORIDE 8 MG/HR: 9 INJECTION, SOLUTION INTRAVENOUS at 20:19

## 2021-12-22 RX ADMIN — POTASSIUM CHLORIDE 40 MEQ: 1500 TABLET, EXTENDED RELEASE ORAL at 08:39

## 2021-12-22 RX ADMIN — FUROSEMIDE 40 MG: 40 TABLET ORAL at 05:16

## 2021-12-22 RX ADMIN — ACETAMINOPHEN 500 MG: 500 TABLET ORAL at 14:15

## 2021-12-22 RX ADMIN — PRAVASTATIN SODIUM 20 MG: 20 TABLET ORAL at 05:16

## 2021-12-22 RX ADMIN — URSODIOL 300 MG: 300 CAPSULE ORAL at 05:16

## 2021-12-22 RX ADMIN — ENALAPRILAT 1.25 MG: 1.25 INJECTION INTRAVENOUS at 09:02

## 2021-12-22 RX ADMIN — URSODIOL 300 MG: 300 CAPSULE ORAL at 18:01

## 2021-12-22 RX ADMIN — Medication 2 G: at 08:40

## 2021-12-22 RX ADMIN — ACETAMINOPHEN 500 MG: 500 TABLET ORAL at 20:22

## 2021-12-22 RX ADMIN — ACETAMINOPHEN 500 MG: 500 TABLET ORAL at 08:42

## 2021-12-22 RX ADMIN — Medication 2.5 MG: at 20:22

## 2021-12-22 RX ADMIN — MAGNESIUM SULFATE HEPTAHYDRATE 2 G: 2 INJECTION, SOLUTION INTRAVENOUS at 08:59

## 2021-12-22 RX ADMIN — HYDRALAZINE HYDROCHLORIDE 10 MG: 20 INJECTION INTRAMUSCULAR; INTRAVENOUS at 15:19

## 2021-12-22 RX ADMIN — Medication 2 G: at 14:15

## 2021-12-22 RX ADMIN — ONDANSETRON 4 MG: 2 INJECTION INTRAMUSCULAR; INTRAVENOUS at 14:43

## 2021-12-22 RX ADMIN — LABETALOL HYDROCHLORIDE 10 MG: 5 INJECTION INTRAVENOUS at 02:10

## 2021-12-22 ASSESSMENT — ENCOUNTER SYMPTOMS
SENSORY CHANGE: 1
WHEEZING: 0
BLURRED VISION: 0
SORE THROAT: 0
HEADACHES: 0
NAUSEA: 1
DIARRHEA: 0
DOUBLE VISION: 0
BACK PAIN: 0
NERVOUS/ANXIOUS: 0
SINUS PAIN: 0
WEAKNESS: 1
NECK PAIN: 0
BRUISES/BLEEDS EASILY: 0
DEPRESSION: 0
TREMORS: 0
MYALGIAS: 0
FEVER: 0
HEADACHES: 1
ABDOMINAL PAIN: 0
COUGH: 0
CHILLS: 0
FOCAL WEAKNESS: 1
VOMITING: 0
SHORTNESS OF BREATH: 0

## 2021-12-22 ASSESSMENT — COGNITIVE AND FUNCTIONAL STATUS - GENERAL
MOVING TO AND FROM BED TO CHAIR: UNABLE
STANDING UP FROM CHAIR USING ARMS: A LITTLE
SUGGESTED CMS G CODE MODIFIER MOBILITY: CL
WALKING IN HOSPITAL ROOM: A LOT
CLIMB 3 TO 5 STEPS WITH RAILING: TOTAL
MOVING FROM LYING ON BACK TO SITTING ON SIDE OF FLAT BED: UNABLE
MOBILITY SCORE: 10
TURNING FROM BACK TO SIDE WHILE IN FLAT BAD: A LOT

## 2021-12-22 ASSESSMENT — PATIENT HEALTH QUESTIONNAIRE - PHQ9
2. FEELING DOWN, DEPRESSED, IRRITABLE, OR HOPELESS: NOT AT ALL
1. LITTLE INTEREST OR PLEASURE IN DOING THINGS: NOT AT ALL
SUM OF ALL RESPONSES TO PHQ9 QUESTIONS 1 AND 2: 0

## 2021-12-22 ASSESSMENT — GAIT ASSESSMENTS: GAIT LEVEL OF ASSIST: UNABLE TO PARTICIPATE

## 2021-12-22 NOTE — THERAPY
Physical Therapy   Daily Treatment     Patient Name: Gokul Baca  Age:  71 y.o., Sex:  male  Medical Record #: [id number]  Today's Date: 12/22/2021     Precautions: Fall Risk;Swallow Precautions (per SLP)  Comments:  L sided deficits/inattention, SBP <150    Assessment    Pt with ongoing L inattention, L sided numbness, and weakness UE>LE. He required max cues to sequence bed mob today however was able to complete with min physical assist. He progressed with STS/transfers from mod A to min A with repetition. Unable to see transfer surface on L and requires cues to track to visualize. Cont to recommend post-acute placement prior to DC home.    Plan    Continue current treatment plan.    DC Equipment Recommendations: Unable to determine at this time  Discharge Recommendations: Recommend post-acute placement for additional physical therapy services prior to discharge home     Objective     12/22/21 1143   Vitals   Vitals Comments SBP remained <150 throughout tx   Cognition    Level of Consciousness Alert   Ability To Follow Commands 1 Step   Safety Awareness Impaired   Attention Impaired   Sequencing Impaired   Comments ongoing L inattention, required cues to sequence all mob   Strength Lower Body   Comments LLE 4/5   Sensation Lower Body   Comments ongoing LLE gross numbness   Vision   Vision Comments visual field cut on L, able to rotate neck to find objects on L today   Balance   Sitting Balance (Static) Fair   Sitting Balance (Dynamic) Fair -   Standing Balance (Static) Poor -   Standing Balance (Dynamic) Trace +   Gait Analysis   Gait Level Of Assist Unable to Participate   Bed Mobility    Supine to Sit Minimal Assist (max cueing to sequence, toward L side)   Sit to Supine (Up in chair post session)   Functional Mobility   Sit to Stand Minimal Assist   Bed, Chair, Wheelchair Transfer Minimal Assist   Transfer Method Stand Step   Short Term Goals    Short Term Goal # 1 Pt will perform supine<>sit with SPV within  6 visits to improve ind with bed mob.   Goal Outcome # 1 goal not met   Short Term Goal # 2 Pt will perform bed<>chair transfers with FWW and min A within 6 visits to increase OOB activity.   Goal Outcome # 2 Goal met, new goal added   Short Term Goal # 2 B  Pt will perform bed<>chair transfers with FWW and SPV within 6 visits to incrase ind with OOB activity.   Short Term Goal # 3 Pt will amb >15ft with mod A within 6 visits to begin gait trng.   Goal Outcome # 3 Goal not met

## 2021-12-22 NOTE — CARE PLAN
The patient is Watcher - Medium risk of patient condition declining or worsening    Shift Goals  Clinical Goals: SBP <140, self ADL's  Patient Goals: rest  Family Goals: KAELA    Progress made toward(s) clinical / shift goals:      Problem: Neuro Status  Goal: Neuro status will remain stable or improve  Outcome: Progressing, remains stable     Problem: Dysphagia  Goal: Dysphagia will improve  Outcome: Progressing       Patient is not progressing towards the following goals:      Problem: Hemodynamic Monitoring  Goal: Patient's hemodynamics, fluid balance and neurologic status will be stable or improve  Outcome: Not Progressing, continued use of nicardipine and PRN's to maintain SBP <140      Problem: Self Care  Goal: Patient will have the ability to perform ADLs independently or with assistance (bathe, groom, dress, toilet and feed)  Outcome: Not Progressing

## 2021-12-22 NOTE — PROGRESS NOTES
Neurosurgery Progress Note    Subjective:  States some HA, denies new changes, slightly improved since yesterday  States some photophobia  Pain controlled on pain meds    Exam:  AOx4,Tongue ML, mild drift on the left. Some weakness to left arm 4/5, BLE grossly intact. EOMs, some deficit to LLQ 4/5, slightly improved   states some vision deficit since hemorrhage, unchanged. Perrl 3mm bilat, tongue midline    BP  Min: 93/65  Max: 153/72  Pulse  Av.3  Min: 72  Max: 105  Resp  Av.1  Min: 12  Max: 48  Temp  Av.4 °C (97.6 °F)  Min: 36.1 °C (97 °F)  Max: 37 °C (98.6 °F)  SpO2  Av.3 %  Min: 90 %  Max: 97 %    No data recorded    Recent Labs     21  1145 21  0442 21  0511   WBC 9.9 9.4 8.5   RBC 4.36* 4.13* 3.89*   HEMOGLOBIN 12.8* 12.0* 11.4*   HEMATOCRIT 39.1* 37.7* 35.4*   MCV 89.7 91.3 91.0   MCH 29.4 29.1 29.3   MCHC 32.7* 31.8* 32.2*   RDW 48.4 49.4 50.2*   PLATELETCT 234 221 192   MPV 9.0 9.5 9.2     Recent Labs     21  0611 21  1145 21  0442 21  1110 21  1745 21  2330 21  0511   SODIUM 141   < > 143   < > 140 141 142   POTASSIUM 4.3  --  4.0  --   --   --  3.7   CHLORIDE 110  --  111  --   --   --  111   CO2 22  --  22  --   --   --  22   GLUCOSE 134*  --  126*  --   --   --  104*   BUN 11  --  8  --   --   --  9   CREATININE 0.65  --  0.60  --   --   --  0.62   CALCIUM 8.2*  --  8.1*  --   --   --  7.7*    < > = values in this interval not displayed.               Intake/Output                       21 0700 - 12/22/21 0659 21 0700 - 21 0659     [id number] 9821-5126 Total  Total                 Intake    P.O.  1080  1750 2830  --  -- --    P.O. 1080 1750 2830 -- -- --    I.V.  1705  2097.7 3802.7  209.8  -- 209.8    Magnesium Sulfate Volume 60 -- 60 -- -- --    Cardene Volume 1145 1397.7 2542.7 209.8 -- 209.8    Volume (mL) (3% sodium chloride (HYPERTONIC SALINE) 500mL infusion 500 mL)  --  -- --    Total Intake 2785 3847.7 6632.7 209.8 -- 209.8       Output    Urine  3080  1200 4280  --  -- --    Output (mL) (External Urinary Catheter (Condom)) 3080 1200 4280 -- -- --    Stool  --  -- --  --  -- --    Number of Times Stooled 2 x -- 2 x -- -- --    Total Output 3080 1200 4280 -- -- --       Net I/O     -295 2647.7 2352.7 209.8 -- 209.8            Intake/Output Summary (Last 24 hours) at 12/22/2021 0811  Last data filed at 12/22/2021 0800  Gross per 24 hour   Intake 6387.5 ml   Output 4040 ml   Net 2347.5 ml            • furosemide  40 mg Q DAY   • potassium chloride SA  40 mEq DAILY   • magnesium sulfate  2 g Once   • amLODIPine  5 mg Q DAY   • losartan  100 mg DAILY   • pravastatin  20 mg DAILY   • ursodiol  300 mg BID   • acetaminophen  500 mg TID   • melatonin  2.5 mg Nightly   • sodium chloride  2 g TID WITH MEALS   • senna-docusate  2 Tablet BID    And   • polyethylene glycol/lytes  1 Packet QDAY PRN    And   • magnesium hydroxide  30 mL QDAY PRN    And   • bisacodyl  10 mg QDAY PRN   • Respiratory Therapy Consult   Continuous RT   • insulin regular  1-6 Units Q6HRS    And   • dextrose 50%  50 mL Q15 MIN PRN   • LORazepam  2 mg Q5 MIN PRN   • acetaminophen  650 mg Q4HRS PRN    Or   • acetaminophen  650 mg Q4HRS PRN   • ondansetron  4 mg Q4HRS PRN    Or   • ondansetron  4 mg Q4HRS PRN   • MD Alert...Adult ICU Electrolyte Replacement per Pharmacy   PHARMACY TO DOSE   • labetalol  10 mg Q4HRS PRN   • hydrALAZINE  10 mg Q4HRS PRN   • enalaprilat  1.25 mg Q6HRS PRN   • 3% sodium chloride  500 mL Continuous   • sodium chloride 23.4% ivpb  200 mEq Q6HRS PRN   • niCARdipine infusion  0-15 mg/hr Continuous       Assessment and Plan:  Hospital day #4 R BG ICH  Prophylactic anticoagulation: no         Start date/time: tbd    Hs Afib- reversed  CT 12/19- some worsened.  Repeat head CT 12/21 stable  Keep SBP<150-on nicardipine  On 3%, Na 142  Continue neuro checks  PT/OT recommending placement when medically  cleared

## 2021-12-22 NOTE — PROGRESS NOTES
Critical Care Progress Note    Date of admission  12/19/2021    Chief Complaint  71 y.o. male admitted 12/19/2021 with acute right basal ganglia hemorrhage    Hospital Course  Mr. Baca is a 71 year old male with the past medical history of atrial fibrillation on Xeralto, dyslipidemia, and hypertension who presented to the ER on 12/19 with complaints of sudden onset of left facial droop and left-sided weakness.  The patient was found to have a right basal ganglia hemorrhage at 3 x 3 cm.  He was admitted to the ICU for neuro checks and BP management.  12/20 - nicardipine drip ongoing, Na 141-->3% increased to 50cc/hr  12/21 - nicardipine drip ongoing, Na 142-->3% at 50cc/hr, increased losartan dose    Interval Problem Update  Reviewed last 24 hour events:   - no events overnight   - a/ox4   - clear speech, improved strength to left arm/leg   - SR/ST 80-100s   - -130s   - nicardipine at 5   - started IV lasix   - tolerating modified diet   - BM this am   - UOP of 1 liter this am with condom cath   - no RT issues   - no abx   - Hb at 11.4   - 3% at 50cc/hr   - K 3.7-->repleted   - Mg 1.9-->repleted   - pt is 7.2 liters (+)      Yesterday's Events:   - no events overnight   - neuro at q 2 hour   - improving left side strength   - SR 60-90s   - SBP < 140 (120-140   - nicardipine at 6mg/hr   - tolerating diet   - BM yesterday   - UOP of 1.6 liters overnight   - mobilized to edge of bed with PT   - Na at 143   - SCDs in place   - 3% at 50cc/hr   - Mg at 1.8   - losartan to increase to 100mg PO   - lost IV access-->PICC line ordered    Review of Systems  Review of Systems   Constitutional: Positive for malaise/fatigue. Negative for chills and fever.   HENT: Negative for sinus pain and sore throat.    Eyes: Negative for blurred vision and double vision.   Respiratory: Negative for cough and shortness of breath.    Cardiovascular: Negative for chest pain and leg swelling.   Gastrointestinal: Positive for nausea.  Negative for abdominal pain, diarrhea and vomiting.   Genitourinary: Negative for frequency and hematuria.   Musculoskeletal: Negative for back pain and neck pain.   Skin: Negative for rash.   Neurological: Positive for sensory change, focal weakness, weakness and headaches.   Endo/Heme/Allergies: Does not bruise/bleed easily.   Psychiatric/Behavioral: Negative for depression. The patient is not nervous/anxious.    no change to ROS    Vital Signs for last 24 hours   Temp:  [36.1 °C (97 °F)-36.8 °C (98.2 °F)] 36.1 °C (97 °F)  Pulse:  [] 80  Resp:  [12-48] 34  BP: ()/(51-82) 129/67  SpO2:  [90 %-97 %] 95 %    Hemodynamic parameters for last 24 hours       Respiratory Information for the last 24 hours       Physical Exam   Physical Exam  Vitals and nursing note reviewed.   Constitutional:       Appearance: He is obese. He is ill-appearing.      Comments: Sleeping but wakes easily and following all commands   HENT:      Head: Normocephalic and atraumatic.      Right Ear: External ear normal.      Left Ear: External ear normal.      Nose: Nose normal. No rhinorrhea.      Mouth/Throat:      Mouth: Mucous membranes are moist.      Pharynx: Oropharynx is clear. No oropharyngeal exudate.   Eyes:      General: No scleral icterus.     Extraocular Movements: Extraocular movements intact.      Conjunctiva/sclera: Conjunctivae normal.      Pupils: Pupils are equal, round, and reactive to light.   Cardiovascular:      Rate and Rhythm: Normal rate and regular rhythm.      Pulses: Normal pulses.      Heart sounds: Normal heart sounds. No murmur heard.      Pulmonary:      Effort: No respiratory distress.      Breath sounds: No wheezing.   Chest:      Chest wall: No tenderness.   Abdominal:      General: There is no distension.      Palpations: Abdomen is soft.      Tenderness: There is no abdominal tenderness. There is no guarding or rebound.   Musculoskeletal:         General: Normal range of motion.      Cervical back:  Normal range of motion and neck supple.      Right lower leg: No edema.      Left lower leg: No edema.   Lymphadenopathy:      Cervical: No cervical adenopathy.   Skin:     General: Skin is warm and dry.      Capillary Refill: Capillary refill takes less than 2 seconds.      Findings: No rash.   Neurological:      Mental Status: He is alert and oriented to person, place, and time.      Cranial Nerves: Cranial nerve deficit present.      Motor: Weakness present.      Comments: Slight left facial droop, LUE/LLE weakness 4+/5, normal strength on right, clear speech   Psychiatric:         Mood and Affect: Mood normal.         Thought Content: Thought content normal.      Comments: sleepy         Medications  Current Facility-Administered Medications   Medication Dose Route Frequency Provider Last Rate Last Admin   • furosemide (LASIX) injection 40 mg  40 mg Intravenous Q DAY Lissette Jaffe M.D.       • losartan (COZAAR) tablet 100 mg  100 mg Oral DAILY Lissette Jaffe M.D.   100 mg at 12/22/21 0516   • pravastatin (PRAVACHOL) tablet 20 mg  20 mg Oral DAILY Lissette Jaffe M.D.   20 mg at 12/22/21 0516   • ursodiol (ACTIGALL) capsule 300 mg  300 mg Oral BID Lissette Jaffe M.D.   300 mg at 12/22/21 0516   • acetaminophen (TYLENOL) tablet 500 mg  500 mg Oral TID Arden Pfeiffer, D.O.   500 mg at 12/21/21 2055   • melatonin tablet 2.5 mg  2.5 mg Oral Nightly Arden Pfeiffer, D.O.   2.5 mg at 12/21/21 2055   • sodium chloride (SALT) tablet 2 g  2 g Oral TID WITH MEALS Bel Leach M.D.   2 g at 12/21/21 1723   • senna-docusate (PERICOLACE or SENOKOT S) 8.6-50 MG per tablet 2 Tablet  2 Tablet Oral BID Ever Whitmore M.D.   2 Tablet at 12/21/21 0559    And   • polyethylene glycol/lytes (MIRALAX) PACKET 1 Packet  1 Packet Oral QDAY PRN Ever Whitmore M.D.        And   • magnesium hydroxide (MILK OF MAGNESIA) suspension 30 mL  30 mL Oral QDAY PRN Ever Whitmore M.D.        And   • bisacodyl  (DULCOLAX) suppository 10 mg  10 mg Rectal QDAY PRN Ever Whitmore M.D.       • Respiratory Therapy Consult   Nebulization Continuous RT Ever Whitmore M.D.       • insulin regular (HumuLIN R,NovoLIN R) injection  1-6 Units Subcutaneous Q6HRS Ever Whitmore M.D.        And   • dextrose 50% (D50W) injection 50 mL  50 mL Intravenous Q15 MIN PRN Ever Whitmore M.D.       • LORazepam (ATIVAN) injection 2 mg  2 mg Intravenous Q5 MIN PRN Ever Whitmore M.D.       • acetaminophen (Tylenol) tablet 650 mg  650 mg Oral Q4HRS PRN Ever Whitmore M.D.   650 mg at 12/20/21 0958    Or   • acetaminophen (TYLENOL) suppository 650 mg  650 mg Rectal Q4HRS PRN Ever Whitmore M.D.       • ondansetron (ZOFRAN ODT) dispertab 4 mg  4 mg Oral Q4HRS PRN Ever Whitmore M.D.        Or   • ondansetron (ZOFRAN) syringe/vial injection 4 mg  4 mg Intravenous Q4HRS PRN Ever Whitmore M.D.       • MD Alert...ICU Electrolyte Replacement per Pharmacy   Other PHARMACY TO DOSE Ever Whitmore M.D.       • labetalol (NORMODYNE/TRANDATE) injection 10 mg  10 mg Intravenous Q4HRS PRN Ever Whitmore M.D.   10 mg at 12/22/21 0210   • hydrALAZINE (APRESOLINE) injection 10 mg  10 mg Intravenous Q4HRS PRN Ever Whitmore M.D.   10 mg at 12/21/21 1921   • enalaprilat (Vasotec) injection 1.25 mg 1 mL  1.25 mg Intravenous Q6HRS PRN Ever Whitmore M.D.       • 3% sodium chloride (HYPERTONIC SALINE) 500mL infusion 500 mL  500 mL Intravenous Continuous Kenn Davenport M.D. 50 mL/hr at 12/21/21 2154 500 mL at 12/21/21 2154   • sodium chloride 200 mEq in empty bag 50 mL ivpb  200 mEq Intravenous Q6HRS PRN Kenn Davenport M.D.       • niCARdipine (CARDENE) 50 mg in  mL Infusion  0-15 mg/hr Intravenous Continuous Kenn Davenport M.D. 100 mL/hr at 12/22/21 0659 10 mg/hr at 12/22/21 0659       Fluids    Intake/Output Summary (Last 24 hours) at 12/22/2021 0708  Last data filed at 12/22/2021 0600  Gross per 24 hour   Intake 6632.67  ml   Output 4280 ml   Net 2352.67 ml       Laboratory          Recent Labs     12/20/21  0611 12/20/21  1145 12/21/21  0442 12/21/21  1110 12/21/21  1745 12/21/21  2330 12/22/21  0511   SODIUM 141   < > 143   < > 140 141 142   POTASSIUM 4.3  --  4.0  --   --   --  3.7   CHLORIDE 110  --  111  --   --   --  111   CO2 22  --  22  --   --   --  22   BUN 11  --  8  --   --   --  9   CREATININE 0.65  --  0.60  --   --   --  0.62   MAGNESIUM 1.9  --  1.8  --   --   --  1.9   CALCIUM 8.2*  --  8.1*  --   --   --  7.7*    < > = values in this interval not displayed.     Recent Labs     12/20/21  0611 12/21/21  0442 12/22/21  0511   ALTSGPT 24 19 16   ASTSGOT 30 19 14   ALKPHOSPHAT 81 75 68   TBILIRUBIN 0.4 0.3 0.3   GLUCOSE 134* 126* 104*     Recent Labs     12/20/21  0611 12/20/21  1145 12/21/21  0442 12/22/21  0511   WBC  --  9.9 9.4 8.5   NEUTSPOLYS  --  71.70 66.80 57.20   LYMPHOCYTES  --  16.10* 20.90* 27.00   MONOCYTES  --  11.00 10.70 12.20   EOSINOPHILS  --  0.50 0.70 2.50   BASOPHILS  --  0.40 0.60 0.70   ASTSGOT 30  --  19 14   ALTSGPT 24  --  19 16   ALKPHOSPHAT 81  --  75 68   TBILIRUBIN 0.4  --  0.3 0.3     Recent Labs     12/20/21  1145 12/21/21  0442 12/22/21  0511   RBC 4.36* 4.13* 3.89*   HEMOGLOBIN 12.8* 12.0* 11.4*   HEMATOCRIT 39.1* 37.7* 35.4*   PLATELETCT 234 221 192       Imaging  ECHO:  CONCLUSIONS  Normal transthoracic echocardiogram.     CT head 10pm:  IMPRESSION:     1.  Increase in size of right temporal intra-axial hematoma now measuring 6.7 x 4.3 cm and previously 3.4 x 2.9 cm     2.  Mild mass effect with 2-3 mm right to left shift     3.  Underlying brain white matter change and volume loss    Assessment/Plan  Acute right basal ganglia hemorrhage   - NSG/neuro following   - neuro check every 2 hours   - SBP goals < 140   - s/p xeralto reversal   - SLP/PT/OT   - Na goals 150-155   - active titration of 3% drip, obtain PICC   - CT head last night with some worsening, but clinically  improving   - cont salt tabs at 2g TID    Essential HTN   - active titration of nicardipine drip for SBP goals   - cont home losartan at 100mg daily    - add oral amlodipine 5mg PO daily   - start IV lasix 40mg daily   - labetalol/hydralazine prn    Dyslipidemia   - cont home statin    Atrial fibrillation   - appears in SR   - reversed Xeralto   - K goal > 4 and Mg goal > 2   - ECHO ok      VTE:  Contraindicated  Ulcer: Not Indicated  Lines: Peripherals    I have performed a physical exam and reviewed and updated ROS and Plan today (12/22/2021). In review of yesterday's note (12/21/2021), there are no changes except as documented above.     Discussed patient condition and risk of morbidity and/or mortality with Family, RN, RT, Therapies, Pharmacy, Charge nurse / hot rounds and Patient     Patient jessica critically ill requiring active titration of nicardipine drip for SBP goals of less than 140 as well as titration of 3% for sodium goals.  The patient remains at high risk of clinical deterioration, worsening vital organ dysfunction, and death without the above critical care interventions.    Critical care time = 36 minutes in directly providing and coordinating critical care and extensive data review.  No time overlap and excludes procedures.

## 2021-12-22 NOTE — PROCEDURES
Vascular Access Team     Date of Insertion: 12/22/2021  Arm Circumference: 40.5  Internal length: 42  External Length: 0  Vein Occupancy %: 27   Reason for PICC: critical access  Labs: WBC 8.5, , BUN 9, Cr 0.62, GFR >60, INR 1.23     Consents confirmed, vessel patency confirmed with ultrasound. Risks and benefits of procedure explained to patient and family member and education regarding central line associated bloodstream infections provided. Questions answered.      PICC placed in RUE per licensed provider order with ultrasound guidance.  5 Fr, 2 lumen PICC placed in Brachial vein after 1 attempt(s). 2 mL of 1% lidocaine injected intradermally at the insertion site. A 21 gauge microintroducer needle and modified Seldinger technique was used to obtain access to the vein. 42 cm catheter inserted and brisk blood return was observed from each lumen upon aspiration. Line secured at the 0 cm marker. TCS stylet removed and observed to be fully intact. Each lumen flushed using pulsatile method without resistance with 10 mL 0.9% normal saline. PICC line secured with Biopatch and Tegaderm.     PICC tip placement location is confirmed by nurse to be in the Superior Vena Cava (SVC) utilizing 3CG technology. PICC line is appropriate for use at this time. Patient tolerated procedure well, without complications.  Patient condition relayed to primary RN or ordering physician via this post procedure note in the EMR.      Ultrasound images uploaded to PACS and viewable in the EMR - yes  Ultrasound imaged printed and placed in paper chart - no     BARD Power PICC ref # [id number], Lot # [id number], Expiration Date 08/31/2022

## 2021-12-22 NOTE — ASSESSMENT & PLAN NOTE
-Likely secondary to increased fluid intake from various IV inputs  -Started on PO lasix on 12/21  -Switched to IV lasix on 12/22  Plan  Continue with IV lasix

## 2021-12-22 NOTE — PROGRESS NOTES
"Daily Progress Note:     Date of Service: 12/22/2021  Primary Team: [facility] ICU Gold Team   Attending: Lissette Jaffe M.D.   Senior Resident: Dr. Andrew Samson    Chief Complaint:  Intracranial hemorrhage  History of atrial fibrillation on chronic anticoagulation    Subjective:  -no acute events overnight  -reports continued improvement of his symptoms, notes his left sided strength feels better    Interval Updates:  Started on Amlodipine 5mg  Lasix switched from PO 40mg to IV 40mg    Telemetry:  Per nursing documentation  \"SR-ST   Occasional PVC's\"    Consultants/Specialty:  Neurology  Neurosurgery    Review of Systems:    Review of Systems   Constitutional: Negative for fever.   HENT: Negative for hearing loss.    Eyes: Negative for blurred vision and double vision.   Respiratory: Negative for cough, shortness of breath and wheezing.    Cardiovascular: Negative for chest pain.   Gastrointestinal: Negative for diarrhea and vomiting.   Musculoskeletal: Negative for myalgias.   Neurological: Positive for sensory change and focal weakness (reports improving). Negative for tremors and headaches.   Psychiatric/Behavioral: Negative for depression.       Objective Data:   Physical Exam:   Vitals:   Temp:  [36.1 °C (97 °F)-37 °C (98.6 °F)] 37 °C (98.6 °F)  Pulse:  [] 79  Resp:  [12-48] 17  BP: ()/(51-84) 128/60  SpO2:  [90 %-97 %] 95 %     Physical Exam  Vitals and nursing note reviewed.   Constitutional:       General: He is not in acute distress.     Comments: Laying in bed watching TV   HENT:      Head: Normocephalic and atraumatic.      Right Ear: External ear normal.      Left Ear: External ear normal.      Mouth/Throat:      Mouth: Mucous membranes are moist.   Eyes:      General: No scleral icterus.     Pupils: Pupils are equal, round, and reactive to light.   Cardiovascular:      Rate and Rhythm: Normal rate and regular rhythm.      Pulses: Normal pulses.   Pulmonary:      Effort: Pulmonary effort is " normal. No respiratory distress.      Breath sounds: No wheezing.   Abdominal:      General: Abdomen is flat. There is no distension.      Palpations: Abdomen is soft.      Tenderness: There is no abdominal tenderness. There is no guarding.   Musculoskeletal:      Right lower leg: No edema.      Left lower leg: No edema.   Skin:     General: Skin is warm and dry.      Capillary Refill: Capillary refill takes less than 2 seconds.   Neurological:      Mental Status: He is alert and oriented to person, place, and time.      Cranial Nerves: Cranial nerve deficit (L sided facial droop, less pronounced from prior exam) present.      Motor: Weakness (Left UE and LE 3/5 strenght, improved from prior exam. 5/5 on RUE an RLE) present.   Psychiatric:         Mood and Affect: Mood normal.         Behavior: Behavior normal.         Labs:  Results for NASIM TELLEZ (MRN [id number]) as of 12/22/2021 08:08   Ref. Range 12/22/2021 05:10 12/22/2021 05:11   WBC Latest Ref Range: 4.8 - 10.8 K/uL  8.5   RBC Latest Ref Range: 4.70 - 6.10 M/uL  3.89 (L)   Hemoglobin Latest Ref Range: 14.0 - 18.0 g/dL  11.4 (L)   Hematocrit Latest Ref Range: 42.0 - 52.0 %  35.4 (L)   MCV Latest Ref Range: 81.4 - 97.8 fL  91.0   MCH Latest Ref Range: 27.0 - 33.0 pg  29.3   MCHC Latest Ref Range: 33.7 - 35.3 g/dL  32.2 (L)   RDW Latest Ref Range: 35.9 - 50.0 fL  50.2 (H)   Platelet Count Latest Ref Range: 164 - 446 K/uL  192   MPV Latest Ref Range: 9.0 - 12.9 fL  9.2   Neutrophils-Polys Latest Ref Range: 44.00 - 72.00 %  57.20   Neutrophils (Absolute) Latest Ref Range: 1.82 - 7.42 K/uL  4.88   Lymphocytes Latest Ref Range: 22.00 - 41.00 %  27.00   Lymphs (Absolute) Latest Ref Range: 1.00 - 4.80 K/uL  2.30   Monocytes Latest Ref Range: 0.00 - 13.40 %  12.20   Monos (Absolute) Latest Ref Range: 0.00 - 0.85 K/uL  1.04 (H)   Eosinophils Latest Ref Range: 0.00 - 6.90 %  2.50   Eos (Absolute) Latest Ref Range: 0.00 - 0.51 K/uL  0.21   Basophils Latest Ref Range:  0.00 - 1.80 %  0.70   Baso (Absolute) Latest Ref Range: 0.00 - 0.12 K/uL  0.06   Immature Granulocytes Latest Ref Range: 0.00 - 0.90 %  0.40   Immature Granulocytes (abs) Latest Ref Range: 0.00 - 0.11 K/uL  0.03   Nucleated RBC Latest Units: /100 WBC  0.00   NRBC (Absolute) Latest Units: K/uL  0.00   Sodium Latest Ref Range: 135 - 145 mmol/L  142   Potassium Latest Ref Range: 3.6 - 5.5 mmol/L  3.7   Chloride Latest Ref Range: 96 - 112 mmol/L  111   Co2 Latest Ref Range: 20 - 33 mmol/L  22   Anion Gap Latest Ref Range: 7.0 - 16.0   9.0   Glucose Latest Ref Range: 65 - 99 mg/dL  104 (H)   Bun Latest Ref Range: 8 - 22 mg/dL  9   Creatinine Latest Ref Range: 0.50 - 1.40 mg/dL  0.62   GFR If  Latest Ref Range: >60 mL/min/1.73 m 2  >60   GFR If Non  Latest Ref Range: >60 mL/min/1.73 m 2  >60   Calcium Latest Ref Range: 8.5 - 10.5 mg/dL  7.7 (L)   AST(SGOT) Latest Ref Range: 12 - 45 U/L  14   ALT(SGPT) Latest Ref Range: 2 - 50 U/L  16   Alkaline Phosphatase Latest Ref Range: 30 - 99 U/L  68   Total Bilirubin Latest Ref Range: 0.1 - 1.5 mg/dL  0.3   Albumin Latest Ref Range: 3.2 - 4.9 g/dL  3.3   Total Protein Latest Ref Range: 6.0 - 8.2 g/dL  5.7 (L)   Globulin Latest Ref Range: 1.9 - 3.5 g/dL  2.4   A-G Ratio Latest Units: g/dL  1.4   Magnesium Latest Ref Range: 1.5 - 2.5 mg/dL  1.9   Glucose - Accu-Ck Latest Ref Range: 65 - 99 mg/dL 90        Imaging:   No new imaging    Problem Representation:  71 year old man with PMH that includes atrial fibrillation with chronic use of xarelto as well as history of hypertension who presented on 12/19 early AM with complaints of L sided weakness, found to have right sided intracranial hemorrhage with repeat CT imaging 6 hours after found to show increasing mass size, repeat scan following shows mass is stable. Neuro and Neurosurgery onboard. Currently on medical management.    * Intracranial hemorrhage (HCC)  Assessment & Plan  R side BG 3 x 3 cm on  admission, increased to 6.7 x 4.3 cm on 12/19 1054 imaging  Plan  Nicardipine drip, losartan, amlodipine goal: SBP < 140, see below problem hypertension  Hypertonic saline, sodium tabs: Sodium goal of 150-155, given clinical improvement will be less aggressive with pursuing this goal sodium  Glucose control  pravastatin  Neurology following  Neurosurgery following  SLP, PT/OT  Q1 hour neuro checks      Volume excess  Assessment & Plan  -Likely secondary to increased fluid intake from various IV inputs  -Started on PO lasix on 12/21  Plan  -Switched to IV lasix on 12/22    Hypertension  Assessment & Plan    BP 190s systolic on admission  Plan  Nicardipine drip, goal >140 systolic  increased losartan dose to 100mg on 12/21  Started on amlodipine 5mg on 12/22      PRNs available; hydralazine, Vasotec, labetalol     Hyperlipemia  Assessment & Plan  -XR chest noted aortic calcification  Pravastatin as above, target LDL <70    AF (atrial fibrillation) (HCC)  Assessment & Plan  Previously on Xeralto  ECHO completed 12/19/2021  SR noted on 12/19 EKG  Plan  Holding xarelto; AC contraindicated  Optimize electrolytes  Rate control as needed        Quality Measures:  Lines: PIV x 1, PICC pending, Condom cath  Code: FULL  VTE: SCDs  Diet: Liquid diet  GI prophylaxis: none needed   Disposition: ICU for nicardipine drip/ tight BP control  Antibiotics: None  Bowel regimen: Doc-Senna  Analgesia: Tylenol

## 2021-12-23 PROBLEM — K21.9 GERD (GASTROESOPHAGEAL REFLUX DISEASE): Status: ACTIVE | Noted: 2021-12-23

## 2021-12-23 LAB
ALBUMIN SERPL BCP-MCNC: 3.3 G/DL (ref 3.2–4.9)
ALBUMIN/GLOB SERPL: 1.3 G/DL
ALP SERPL-CCNC: 69 U/L (ref 30–99)
ALT SERPL-CCNC: 13 U/L (ref 2–50)
ANION GAP SERPL CALC-SCNC: 9 MMOL/L (ref 7–16)
AST SERPL-CCNC: 12 U/L (ref 12–45)
BASOPHILS # BLD AUTO: 0.5 % (ref 0–1.8)
BASOPHILS # BLD: 0.04 K/UL (ref 0–0.12)
BILIRUB SERPL-MCNC: 0.4 MG/DL (ref 0.1–1.5)
BUN SERPL-MCNC: 8 MG/DL (ref 8–22)
CALCIUM SERPL-MCNC: 8 MG/DL (ref 8.5–10.5)
CHLORIDE SERPL-SCNC: 107 MMOL/L (ref 96–112)
CO2 SERPL-SCNC: 24 MMOL/L (ref 20–33)
CREAT SERPL-MCNC: 0.64 MG/DL (ref 0.5–1.4)
EOSINOPHIL # BLD AUTO: 0.35 K/UL (ref 0–0.51)
EOSINOPHIL NFR BLD: 4.4 % (ref 0–6.9)
ERYTHROCYTE [DISTWIDTH] IN BLOOD BY AUTOMATED COUNT: 47.8 FL (ref 35.9–50)
GLOBULIN SER CALC-MCNC: 2.5 G/DL (ref 1.9–3.5)
GLUCOSE SERPL-MCNC: 130 MG/DL (ref 65–99)
HCT VFR BLD AUTO: 35.8 % (ref 42–52)
HGB BLD-MCNC: 11.8 G/DL (ref 14–18)
IMM GRANULOCYTES # BLD AUTO: 0.02 K/UL (ref 0–0.11)
IMM GRANULOCYTES NFR BLD AUTO: 0.3 % (ref 0–0.9)
LYMPHOCYTES # BLD AUTO: 1.66 K/UL (ref 1–4.8)
LYMPHOCYTES NFR BLD: 20.8 % (ref 22–41)
MAGNESIUM SERPL-MCNC: 1.8 MG/DL (ref 1.5–2.5)
MCH RBC QN AUTO: 29.6 PG (ref 27–33)
MCHC RBC AUTO-ENTMCNC: 33 G/DL (ref 33.7–35.3)
MCV RBC AUTO: 89.9 FL (ref 81.4–97.8)
MONOCYTES # BLD AUTO: 0.99 K/UL (ref 0–0.85)
MONOCYTES NFR BLD AUTO: 12.4 % (ref 0–13.4)
NEUTROPHILS # BLD AUTO: 4.92 K/UL (ref 1.82–7.42)
NEUTROPHILS NFR BLD: 61.6 % (ref 44–72)
NRBC # BLD AUTO: 0 K/UL
NRBC BLD-RTO: 0 /100 WBC
PLATELET # BLD AUTO: 177 K/UL (ref 164–446)
PMV BLD AUTO: 9.1 FL (ref 9–12.9)
POTASSIUM SERPL-SCNC: 3.5 MMOL/L (ref 3.6–5.5)
PROT SERPL-MCNC: 5.8 G/DL (ref 6–8.2)
RBC # BLD AUTO: 3.98 M/UL (ref 4.7–6.1)
SODIUM SERPL-SCNC: 139 MMOL/L (ref 135–145)
SODIUM SERPL-SCNC: 140 MMOL/L (ref 135–145)
SODIUM SERPL-SCNC: 140 MMOL/L (ref 135–145)
SODIUM SERPL-SCNC: 142 MMOL/L (ref 135–145)
WBC # BLD AUTO: 8 K/UL (ref 4.8–10.8)

## 2021-12-23 PROCEDURE — 700105 HCHG RX REV CODE 258: Performed by: INTERNAL MEDICINE

## 2021-12-23 PROCEDURE — A9270 NON-COVERED ITEM OR SERVICE: HCPCS | Performed by: STUDENT IN AN ORGANIZED HEALTH CARE EDUCATION/TRAINING PROGRAM

## 2021-12-23 PROCEDURE — 84295 ASSAY OF SERUM SODIUM: CPT | Mod: 91

## 2021-12-23 PROCEDURE — 97116 GAIT TRAINING THERAPY: CPT

## 2021-12-23 PROCEDURE — 92526 ORAL FUNCTION THERAPY: CPT

## 2021-12-23 PROCEDURE — A9270 NON-COVERED ITEM OR SERVICE: HCPCS | Performed by: PHYSICAL MEDICINE & REHABILITATION

## 2021-12-23 PROCEDURE — 97530 THERAPEUTIC ACTIVITIES: CPT

## 2021-12-23 PROCEDURE — 700102 HCHG RX REV CODE 250 W/ 637 OVERRIDE(OP): Performed by: INTERNAL MEDICINE

## 2021-12-23 PROCEDURE — 99291 CRITICAL CARE FIRST HOUR: CPT | Performed by: INTERNAL MEDICINE

## 2021-12-23 PROCEDURE — 85025 COMPLETE CBC W/AUTO DIFF WBC: CPT

## 2021-12-23 PROCEDURE — 770022 HCHG ROOM/CARE - ICU (200)

## 2021-12-23 PROCEDURE — 700101 HCHG RX REV CODE 250: Performed by: INTERNAL MEDICINE

## 2021-12-23 PROCEDURE — 700111 HCHG RX REV CODE 636 W/ 250 OVERRIDE (IP): Performed by: INTERNAL MEDICINE

## 2021-12-23 PROCEDURE — 83735 ASSAY OF MAGNESIUM: CPT

## 2021-12-23 PROCEDURE — 700105 HCHG RX REV CODE 258: Performed by: STUDENT IN AN ORGANIZED HEALTH CARE EDUCATION/TRAINING PROGRAM

## 2021-12-23 PROCEDURE — A9270 NON-COVERED ITEM OR SERVICE: HCPCS | Performed by: INTERNAL MEDICINE

## 2021-12-23 PROCEDURE — 700102 HCHG RX REV CODE 250 W/ 637 OVERRIDE(OP): Performed by: STUDENT IN AN ORGANIZED HEALTH CARE EDUCATION/TRAINING PROGRAM

## 2021-12-23 PROCEDURE — 97535 SELF CARE MNGMENT TRAINING: CPT

## 2021-12-23 PROCEDURE — 700102 HCHG RX REV CODE 250 W/ 637 OVERRIDE(OP): Performed by: PHYSICAL MEDICINE & REHABILITATION

## 2021-12-23 PROCEDURE — 99233 SBSQ HOSP IP/OBS HIGH 50: CPT | Performed by: PHYSICAL MEDICINE & REHABILITATION

## 2021-12-23 PROCEDURE — 700101 HCHG RX REV CODE 250: Performed by: PSYCHIATRY & NEUROLOGY

## 2021-12-23 PROCEDURE — 700105 HCHG RX REV CODE 258: Performed by: PSYCHIATRY & NEUROLOGY

## 2021-12-23 PROCEDURE — 80053 COMPREHEN METABOLIC PANEL: CPT

## 2021-12-23 RX ORDER — HYDRALAZINE HYDROCHLORIDE 20 MG/ML
20 INJECTION INTRAMUSCULAR; INTRAVENOUS EVERY 4 HOURS PRN
Status: DISCONTINUED | OUTPATIENT
Start: 2021-12-23 | End: 2021-12-24 | Stop reason: HOSPADM

## 2021-12-23 RX ORDER — HYDRALAZINE HYDROCHLORIDE 25 MG/1
25 TABLET, FILM COATED ORAL EVERY 8 HOURS
Status: DISCONTINUED | OUTPATIENT
Start: 2021-12-23 | End: 2021-12-24 | Stop reason: HOSPADM

## 2021-12-23 RX ORDER — AMLODIPINE BESYLATE 10 MG/1
10 TABLET ORAL
Status: DISCONTINUED | OUTPATIENT
Start: 2021-12-24 | End: 2021-12-24 | Stop reason: HOSPADM

## 2021-12-23 RX ORDER — MAGNESIUM SULFATE HEPTAHYDRATE 40 MG/ML
2 INJECTION, SOLUTION INTRAVENOUS ONCE
Status: COMPLETED | OUTPATIENT
Start: 2021-12-23 | End: 2021-12-23

## 2021-12-23 RX ORDER — OMEPRAZOLE 20 MG/1
20 CAPSULE, DELAYED RELEASE ORAL DAILY
Status: ON HOLD | COMMUNITY
End: 2022-01-06

## 2021-12-23 RX ORDER — POTASSIUM CHLORIDE 29.8 MG/ML
40 INJECTION INTRAVENOUS ONCE
Status: COMPLETED | OUTPATIENT
Start: 2021-12-23 | End: 2021-12-23

## 2021-12-23 RX ORDER — LABETALOL HYDROCHLORIDE 5 MG/ML
10-20 INJECTION, SOLUTION INTRAVENOUS EVERY 4 HOURS PRN
Status: DISCONTINUED | OUTPATIENT
Start: 2021-12-23 | End: 2021-12-24 | Stop reason: HOSPADM

## 2021-12-23 RX ORDER — AMLODIPINE BESYLATE 5 MG/1
5 TABLET ORAL ONCE
Status: COMPLETED | OUTPATIENT
Start: 2021-12-23 | End: 2021-12-23

## 2021-12-23 RX ADMIN — HYDRALAZINE HYDROCHLORIDE 25 MG: 25 TABLET, FILM COATED ORAL at 14:15

## 2021-12-23 RX ADMIN — URSODIOL 300 MG: 300 CAPSULE ORAL at 17:51

## 2021-12-23 RX ADMIN — SODIUM CHLORIDE: 3 INJECTION, SOLUTION INTRAVENOUS at 07:58

## 2021-12-23 RX ADMIN — DOCUSATE SODIUM 50 MG AND SENNOSIDES 8.6 MG 2 TABLET: 8.6; 5 TABLET, FILM COATED ORAL at 05:08

## 2021-12-23 RX ADMIN — SODIUM CHLORIDE 7 MG/HR: 9 INJECTION, SOLUTION INTRAVENOUS at 09:49

## 2021-12-23 RX ADMIN — Medication 2 G: at 13:50

## 2021-12-23 RX ADMIN — Medication 2 G: at 17:51

## 2021-12-23 RX ADMIN — ACETAMINOPHEN 500 MG: 500 TABLET ORAL at 09:30

## 2021-12-23 RX ADMIN — ACETAMINOPHEN 500 MG: 500 TABLET ORAL at 14:15

## 2021-12-23 RX ADMIN — DOCUSATE SODIUM 50 MG AND SENNOSIDES 8.6 MG 2 TABLET: 8.6; 5 TABLET, FILM COATED ORAL at 17:51

## 2021-12-23 RX ADMIN — ACETAMINOPHEN 650 MG: 325 TABLET, FILM COATED ORAL at 03:30

## 2021-12-23 RX ADMIN — POTASSIUM CHLORIDE 40 MEQ: 29.8 INJECTION, SOLUTION INTRAVENOUS at 08:46

## 2021-12-23 RX ADMIN — FUROSEMIDE 40 MG: 10 INJECTION, SOLUTION INTRAMUSCULAR; INTRAVENOUS at 05:09

## 2021-12-23 RX ADMIN — HYDRALAZINE HYDROCHLORIDE 25 MG: 25 TABLET, FILM COATED ORAL at 08:40

## 2021-12-23 RX ADMIN — Medication 2.5 MG: at 21:06

## 2021-12-23 RX ADMIN — AMLODIPINE BESYLATE 5 MG: 5 TABLET ORAL at 05:08

## 2021-12-23 RX ADMIN — HYDRALAZINE HYDROCHLORIDE 25 MG: 25 TABLET, FILM COATED ORAL at 21:06

## 2021-12-23 RX ADMIN — POTASSIUM CHLORIDE 40 MEQ: 1500 TABLET, EXTENDED RELEASE ORAL at 05:10

## 2021-12-23 RX ADMIN — Medication 2 G: at 09:30

## 2021-12-23 RX ADMIN — MAGNESIUM SULFATE HEPTAHYDRATE 2 G: 40 INJECTION, SOLUTION INTRAVENOUS at 08:54

## 2021-12-23 RX ADMIN — AMLODIPINE BESYLATE 5 MG: 5 TABLET ORAL at 08:40

## 2021-12-23 RX ADMIN — OMEPRAZOLE 20 MG: 20 CAPSULE, DELAYED RELEASE ORAL at 05:11

## 2021-12-23 RX ADMIN — SODIUM CHLORIDE 8 MG/HR: 9 INJECTION, SOLUTION INTRAVENOUS at 03:30

## 2021-12-23 RX ADMIN — PRAVASTATIN SODIUM 20 MG: 20 TABLET ORAL at 05:10

## 2021-12-23 RX ADMIN — URSODIOL 300 MG: 300 CAPSULE ORAL at 05:12

## 2021-12-23 RX ADMIN — ACETAMINOPHEN 500 MG: 500 TABLET ORAL at 21:07

## 2021-12-23 RX ADMIN — SODIUM CHLORIDE: 3 INJECTION, SOLUTION INTRAVENOUS at 19:00

## 2021-12-23 RX ADMIN — LOSARTAN POTASSIUM 100 MG: 50 TABLET, FILM COATED ORAL at 05:10

## 2021-12-23 ASSESSMENT — COGNITIVE AND FUNCTIONAL STATUS - GENERAL
DRESSING REGULAR UPPER BODY CLOTHING: A LOT
STANDING UP FROM CHAIR USING ARMS: A LITTLE
EATING MEALS: A LOT
HELP NEEDED FOR BATHING: A LOT
DRESSING REGULAR LOWER BODY CLOTHING: A LOT
TURNING FROM BACK TO SIDE WHILE IN FLAT BAD: UNABLE
MOBILITY SCORE: 14
WALKING IN HOSPITAL ROOM: A LITTLE
PERSONAL GROOMING: A LOT
CLIMB 3 TO 5 STEPS WITH RAILING: A LOT
SUGGESTED CMS G CODE MODIFIER DAILY ACTIVITY: CL
SUGGESTED CMS G CODE MODIFIER MOBILITY: CL
TOILETING: A LOT
MOVING TO AND FROM BED TO CHAIR: UNABLE
DAILY ACTIVITIY SCORE: 12

## 2021-12-23 ASSESSMENT — ENCOUNTER SYMPTOMS
WHEEZING: 0
MYALGIAS: 0
CHILLS: 0
WEAKNESS: 1
HEADACHES: 1
TREMORS: 0
VOMITING: 0
COUGH: 0
DEPRESSION: 0
BACK PAIN: 0
DIARRHEA: 0
SENSORY CHANGE: 1
BLURRED VISION: 0
SORE THROAT: 0
NECK PAIN: 0
DOUBLE VISION: 0
SINUS PAIN: 0
FOCAL WEAKNESS: 1
BRUISES/BLEEDS EASILY: 0
ABDOMINAL PAIN: 0
NAUSEA: 1
SHORTNESS OF BREATH: 0
FEVER: 0
HEADACHES: 0
NERVOUS/ANXIOUS: 0

## 2021-12-23 ASSESSMENT — GAIT ASSESSMENTS
GAIT LEVEL OF ASSIST: MINIMAL ASSIST
DISTANCE (FEET): 4
ASSISTIVE DEVICE: FRONT WHEEL WALKER
DEVIATION: SHUFFLED GAIT;BRADYKINETIC

## 2021-12-23 ASSESSMENT — PAIN DESCRIPTION - PAIN TYPE
TYPE: ACUTE PAIN

## 2021-12-23 NOTE — THERAPY
Occupational Therapy  Daily Treatment     Patient Name: Gokul Baca  Age:  71 y.o., Sex:  male  Medical Record #: [id number]  Today's Date: 12/23/2021     Precautions  Precautions: (P) Fall Risk,Swallow Precautions ( See Comments)  Comments: (P) L sided deficits    Assessment    Pt seen for follow up OT tx session, pt making steady improvement in ADLs and functional mobility as well as cognition, pt more appropriate throughout and able to participate more meaningfully in tasks. Will continue to see for skilled therapy while admitted as well as recommend post-acute placement.    Plan    Continue current treatment plan.    DC Equipment Recommendations: (P) Unable to determine at this time  Discharge Recommendations: (P) Recommend post-acute placement for additional occupational therapy services prior to discharge home    Objective       12/23/21 1015   Precautions   Precautions Fall Risk;Swallow Precautions ( See Comments)   Comments L sided deficits   Vitals   Pulse 77   Blood Pressure  126/62   Respiration (!) 43   Pain 0 - 10 Group   Therapist Pain Assessment Post Activity Pain Same as Prior to Activity;Nurse Notified   Cognition    Cognition / Consciousness X   Level of Consciousness Alert   Ability To Follow Commands 1 Step   Safety Awareness Impaired   New Learning Impaired   Attention Impaired   Sequencing Impaired   Comments improving cognition   Balance   Sitting Balance (Static) Fair   Sitting Balance (Dynamic) Fair   Standing Balance (Static) Fair -   Standing Balance (Dynamic) Fair -   Weight Shift Sitting Poor   Weight Shift Standing Fair   Skilled Intervention Verbal Cuing;Facilitation   Comments w/ FWW   Bed Mobility    Supine to Sit Moderate Assist   Sit to Supine Minimal Assist   Scooting Maximal Assist   Rolling Moderate Assist to Rt.   Skilled Intervention Verbal Cuing;Tactile Cuing   Activities of Daily Living   Eating Minimal Assist   Grooming Moderate Assist   Upper Body Dressing Moderate  Assist   Lower Body Dressing Maximal Assist   How much help from another person does the patient currently need...   Putting on and taking off regular lower body clothing? 2   Bathing (including washing, rinsing, and drying)? 2   Toileting, which includes using a toilet, bedpan, or urinal? 2   Putting on and taking off regular upper body clothing? 2   Taking care of personal grooming such as brushing teeth? 2   Eating meals? 2   6 Clicks Daily Activity Score 12   Modified [place] (mRS)   Modified [place] Score 4   Functional Mobility   Sit to Stand Minimal Assist   Bed, Chair, Wheelchair Transfer Refused   Transfer Method Stand Step   Mobility bed mobility, steps at EOB, BTB   Activity Tolerance   Sitting Edge of Bed 5 min   Standing 5 min   Patient / Family Goals   Patient / Family Goal #1 To get better   Goal #1 Outcome Progressing slower than expected   Short Term Goals   Short Term Goal # 1 Pt will complete ADL transfers with supervision   Goal Outcome # 1 Progressing as expected   Short Term Goal # 2 Pt will complete LB dressing with supervision   Goal Outcome # 2 Progressing slower than expected   Short Term Goal # 3 Pt will complete toileting with supervision   Goal Outcome # 3 Progressing slower than expected   Education Group   Education Provided Role of Occupational Therapist   Role of Occupational Therapist Patient Response Patient;Acceptance;Explanation   Interdisciplinary Plan of Care Collaboration   IDT Collaboration with  Nursing   Patient Position at End of Therapy In Bed;Bed Alarm On;Call Light within Reach;Tray Table within Reach;Phone within Reach   Collaboration Comments RN updated

## 2021-12-23 NOTE — DISCHARGE PLANNING
Anticipated Discharge Disposition: possible IRF when medically cleared     Action: RN CANDIDO attended IDT rounds and reviewed patient chart.  Patient seen by therapies with recommendation for post-acute placement.  IRF following, barriers at this time include nicardipine and Na gtts.       Barriers to Discharge: medical clearance; ICU level of care     Plan: HCM to continue to follow for discharge planning needs and barriers

## 2021-12-23 NOTE — THERAPY
"Physical Therapy   Daily Treatment     Patient Name: Gokul Baca  Age:  71 y.o., Sex:  male  Medical Record #: [id number]  Today's Date: 12/23/2021     Precautions  Precautions: Fall Risk;Swallow Precautions ( See Comments)  Comments: L sided deficits/inattention, SBP <150    Assessment    Rec'd pt alert, in bed, pleasant.  Mod assist to sit eob, w/ log rolling to his left.  Able to maintain eob balance w/o assist.  Continues to present w/ LUE weakness w/ noted edema.  Educated pt to elevate LUE as well as to attempt movement as much as possible to mitigate edema.  He is able to stand w/ spv only today.  He does require verbal encouragement to attempt ambulation, and only wanted to take two steps, but after encouragement he was able to ambulate 4 ft forward and back.  He refused to sit in the bedside chair, requesting to return to bed, stating that he is \"lazy\".      Plan    Continue current treatment plan.    DC Equipment Recommendations: Unable to determine at this time  Discharge Recommendations: Recommend post-acute placement for additional physical therapy services prior to discharge home         12/23/21 0911   Balance   Sitting Balance (Static) Fair   Sitting Balance (Dynamic) Fair   Standing Balance (Static) Fair -   Standing Balance (Dynamic) Fair -   Weight Shift Sitting Poor   Weight Shift Standing Fair   Skilled Intervention Verbal Cuing;Tactile Cuing;Facilitation   Comments w/ fww   Gait Analysis   Gait Level Of Assist Minimal Assist   Assistive Device Front Wheel Walker   Distance (Feet) 4  (4 forward, 4 back)   Deviation Shuffled Gait;Bradykinetic  (verbal encouragment needed to participate)   Skilled Intervention Verbal Cuing;Tactile Cuing;Facilitation   Bed Mobility    Supine to Sit Moderate Assist   Sit to Supine Minimal Assist   Scooting Maximal Assist   Skilled Intervention Verbal Cuing;Tactile Cuing;Sequencing;Facilitation   Comments toward left side   Functional Mobility   Sit to Stand " Supervised   Bed, Chair, Wheelchair Transfer Refused   Skilled Intervention Verbal Cuing   Short Term Goals    Short Term Goal # 1 Pt will perform supine<>sit with SPV within 6 visits to improve ind with bed mob.   Goal Outcome # 1 goal not met   Short Term Goal # 2 B  Pt will perform bed<>chair transfers with FWW and SPV within 6 visits to incrase ind with OOB activity.   Goal Outcome # 2 B Goal not met   Short Term Goal # 3 Pt will amb >15ft with mod A within 6 visits to begin gait trng.   Goal Outcome # 3 Goal not met   Anticipated Discharge Equipment and Recommendations   DC Equipment Recommendations Unable to determine at this time   Discharge Recommendations Recommend post-acute placement for additional physical therapy services prior to discharge home     Objective

## 2021-12-23 NOTE — PROGRESS NOTES
Critical Care Progress Note    Date of admission  12/19/2021    Chief Complaint  71 y.o. male admitted 12/19/2021 with acute right basal ganglia hemorrhage    Hospital Course  Mr. Baca is a 71 year old male with the past medical history of atrial fibrillation on Xeralto, dyslipidemia, and hypertension who presented to the ER on 12/19 with complaints of sudden onset of left facial droop and left-sided weakness.  The patient was found to have a right basal ganglia hemorrhage at 3 x 3 cm.  He was admitted to the ICU for neuro checks and BP management.  12/20 - nicardipine drip ongoing, Na 141-->3% increased to 50cc/hr  12/21 - nicardipine drip ongoing, Na 142-->3% at 50cc/hr, increased losartan dose  12/22 - nicardipine drip ongoing, Na 142, added amlodipine    Interval Problem Update  Reviewed last 24 hour events:   - no events overnight   - a/ox4   - still left facial droop, LUE/LLE weakness  3/5 with drift   - SR 70-90s   - SBPO 110-140s   - 3% at 50cc   - nicadipine at 7   - BM on 12/22   - nectar thick diet   - UOP of 2200 cc with condom cath   - no RT issues   - PPI   - no DVT proph   - no abx   - Na at 142   - lasix 40mg IV       Yesterday's Events:   - no events overnight   - a/ox4   - clear speech, improved strength to left arm/leg   - SR/ST 80-100s   - -130s   - nicardipine at 5   - started IV lasix   - tolerating modified diet   - BM this am   - UOP of 1 liter this am with condom cath   - no RT issues   - no abx   - Hb at 11.4   - 3% at 50cc/hr   - K 3.7-->repleted   - Mg 1.9-->repleted   - pt is 7.2 liters (+)    Review of Systems  Review of Systems   Constitutional: Positive for malaise/fatigue. Negative for chills and fever.   HENT: Negative for sinus pain and sore throat.    Eyes: Negative for blurred vision and double vision.   Respiratory: Negative for cough and shortness of breath.    Cardiovascular: Negative for chest pain and leg swelling.   Gastrointestinal: Positive for nausea. Negative  for abdominal pain, diarrhea and vomiting.   Genitourinary: Negative for frequency and hematuria.   Musculoskeletal: Negative for back pain and neck pain.   Skin: Negative for rash.   Neurological: Positive for sensory change, focal weakness, weakness and headaches.   Endo/Heme/Allergies: Does not bruise/bleed easily.   Psychiatric/Behavioral: Negative for depression. The patient is not nervous/anxious.    Unchanged ROS    Vital Signs for last 24 hours   Temp:  [36.7 °C (98 °F)-37 °C (98.6 °F)] 36.7 °C (98 °F)  Pulse:  [72-96] 80  Resp:  [15-98] 44  BP: (112-164)/(53-84) 125/56  SpO2:  [90 %-97 %] 94 %    Hemodynamic parameters for last 24 hours       Respiratory Information for the last 24 hours       Physical Exam   Physical Exam  Vitals and nursing note reviewed.   Constitutional:       General: He is not in acute distress.     Appearance: He is obese. He is ill-appearing. He is not toxic-appearing.      Comments: Bright-eyed and bushy tailed this morning, sitting up in bed   HENT:      Head: Normocephalic and atraumatic.      Right Ear: External ear normal.      Left Ear: External ear normal.      Nose: Nose normal. No rhinorrhea.      Mouth/Throat:      Mouth: Mucous membranes are moist.      Pharynx: Oropharynx is clear. No oropharyngeal exudate.   Eyes:      General: No scleral icterus.     Extraocular Movements: Extraocular movements intact.      Conjunctiva/sclera: Conjunctivae normal.      Pupils: Pupils are equal, round, and reactive to light.   Cardiovascular:      Rate and Rhythm: Normal rate and regular rhythm.      Pulses: Normal pulses.      Heart sounds: Normal heart sounds. No murmur heard.      Pulmonary:      Effort: No respiratory distress.      Breath sounds: No wheezing.   Chest:      Chest wall: No tenderness.   Abdominal:      General: There is no distension.      Palpations: Abdomen is soft.      Tenderness: There is no abdominal tenderness. There is no guarding or rebound.    Musculoskeletal:         General: Normal range of motion.      Cervical back: Normal range of motion and neck supple.      Right lower leg: No edema.      Left lower leg: No edema.   Lymphadenopathy:      Cervical: No cervical adenopathy.   Skin:     General: Skin is warm and dry.      Capillary Refill: Capillary refill takes less than 2 seconds.      Findings: No rash.   Neurological:      Mental Status: He is alert and oriented to person, place, and time.      Cranial Nerves: Cranial nerve deficit present.      Motor: Weakness present.      Comments: Slight left facial droop, LUE/LLE weakness 3+/5, normal strength on right, clear speech   Psychiatric:         Mood and Affect: Mood normal.         Behavior: Behavior normal.         Thought Content: Thought content normal.         Medications  Current Facility-Administered Medications   Medication Dose Route Frequency Provider Last Rate Last Admin   • labetalol (NORMODYNE/TRANDATE) injection 10-20 mg  10-20 mg Intravenous Q4HRS PRN Lissette Jaffe M.D.       • hydrALAZINE (APRESOLINE) injection 20 mg  20 mg Intravenous Q4HRS PRN Lissette Jaffe M.D.       • hydrALAZINE (APRESOLINE) tablet 25 mg  25 mg Oral Q8HRS Lissette Jaffe M.D.       • [START ON 12/24/2021] amLODIPine (NORVASC) tablet 10 mg  10 mg Oral Q DAY Lissette Jaffe M.D.       • furosemide (LASIX) injection 40 mg  40 mg Intravenous Q DAY Lissette Jaffe M.D.   40 mg at 12/23/21 0509   • potassium chloride SA (Kdur) tablet 40 mEq  40 mEq Oral DAILY Lissette Jaffe M.D.   40 mEq at 12/23/21 0510   • omeprazole (PRILOSEC) capsule 20 mg  20 mg Oral DAILY Andrew Samson M.D.   20 mg at 12/23/21 0511   • 3% sodium chloride (HYPERTONIC SALINE) 500mL infusion   Intravenous Continuous Andrew Samson M.D. 50 mL/hr at 12/22/21 2038 New Bag at 12/22/21 2038   • losartan (COZAAR) tablet 100 mg  100 mg Oral DAILY Lissette Jaffe M.D.   100 mg at 12/23/21 0510   • pravastatin (PRAVACHOL) tablet 20 mg  20  mg Oral DAILY Lissette Jaffe M.D.   20 mg at 12/23/21 0510   • ursodiol (ACTIGALL) capsule 300 mg  300 mg Oral BID Lissette Jaffe M.D.   300 mg at 12/23/21 0512   • acetaminophen (TYLENOL) tablet 500 mg  500 mg Oral TID Arden Pfeiffer, D.O.   500 mg at 12/22/21 2022   • melatonin tablet 2.5 mg  2.5 mg Oral Nightly Arden Pfeiffer, D.O.   2.5 mg at 12/22/21 2022   • sodium chloride (SALT) tablet 2 g  2 g Oral TID WITH MEALS Bel Leach M.D.   2 g at 12/22/21 1801   • senna-docusate (PERICOLACE or SENOKOT S) 8.6-50 MG per tablet 2 Tablet  2 Tablet Oral BID Ever Whitmore M.D.   2 Tablet at 12/23/21 0508    And   • polyethylene glycol/lytes (MIRALAX) PACKET 1 Packet  1 Packet Oral QDAY PRN Ever Whitmore M.D.        And   • magnesium hydroxide (MILK OF MAGNESIA) suspension 30 mL  30 mL Oral QDAY PRN Ever Whitmore M.D.        And   • bisacodyl (DULCOLAX) suppository 10 mg  10 mg Rectal QDAY PRN Ever Whitmore M.D.       • Respiratory Therapy Consult   Nebulization Continuous RT Ever Whitmore M.D.       • LORazepam (ATIVAN) injection 2 mg  2 mg Intravenous Q5 MIN PRN Ever Whitmore M.D.       • acetaminophen (Tylenol) tablet 650 mg  650 mg Oral Q4HRS PRN Ever Whitmore M.D.   650 mg at 12/23/21 0330    Or   • acetaminophen (TYLENOL) suppository 650 mg  650 mg Rectal Q4HRS PRN Ever Whitmore M.D.       • ondansetron (ZOFRAN ODT) dispertab 4 mg  4 mg Oral Q4HRS PRN Ever Whitmore M.D.        Or   • ondansetron (ZOFRAN) syringe/vial injection 4 mg  4 mg Intravenous Q4HRS PRN Ever Whitmore M.D.   4 mg at 12/22/21 1443   • MD Alert...ICU Electrolyte Replacement per Pharmacy   Other PHARMACY TO DOSE Ever Whitmore M.D.       • enalaprilat (Vasotec) injection 1.25 mg 1 mL  1.25 mg Intravenous Q6HRS PRN Ever Whitmore M.D.   1.25 mg at 12/22/21 1501   • niCARdipine (CARDENE) 50 mg in  mL Infusion  0-15 mg/hr Intravenous Continuous Lissette Jaffe M.D. 80 mL/hr  at 12/23/21 0330 8 mg/hr at 12/23/21 0330       Fluids    Intake/Output Summary (Last 24 hours) at 12/23/2021 0654  Last data filed at 12/23/2021 0600  Gross per 24 hour   Intake 5498.32 ml   Output 6675 ml   Net -1176.68 ml       Laboratory          Recent Labs     12/21/21  0442 12/21/21  1110 12/22/21  0511 12/22/21  1145 12/22/21  1908 12/23/21  0030 12/23/21  0310   SODIUM 143   < > 142   < > 141 139 140   POTASSIUM 4.0   < > 3.7  --  3.6  --  3.5*   CHLORIDE 111   < > 111  --  108  --  107   CO2 22   < > 22  --  23  --  24   BUN 8   < > 9  --  9  --  8   CREATININE 0.60   < > 0.62  --  0.63  --  0.64   MAGNESIUM 1.8  --  1.9  --   --   --  1.8   CALCIUM 8.1*   < > 7.7*  --  7.8*  --  8.0*    < > = values in this interval not displayed.     Recent Labs     12/21/21 0442 12/21/21 0442 12/22/21  0511 12/22/21  1908 12/23/21  0310   ALTSGPT 19  --  16  --  13   ASTSGOT 19  --  14  --  12   ALKPHOSPHAT 75  --  68  --  69   TBILIRUBIN 0.3  --  0.3  --  0.4   GLUCOSE 126*   < > 104* 180* 130*    < > = values in this interval not displayed.     Recent Labs     12/21/21 0442 12/22/21  0511 12/23/21  0310   WBC 9.4 8.5 8.0   NEUTSPOLYS 66.80 57.20 61.60   LYMPHOCYTES 20.90* 27.00 20.80*   MONOCYTES 10.70 12.20 12.40   EOSINOPHILS 0.70 2.50 4.40   BASOPHILS 0.60 0.70 0.50   ASTSGOT 19 14 12   ALTSGPT 19 16 13   ALKPHOSPHAT 75 68 69   TBILIRUBIN 0.3 0.3 0.4     Recent Labs     12/21/21 0442 12/22/21  0511 12/23/21  0310   RBC 4.13* 3.89* 3.98*   HEMOGLOBIN 12.0* 11.4* 11.8*   HEMATOCRIT 37.7* 35.4* 35.8*   PLATELETCT 221 192 177       Imaging  ECHO:  CONCLUSIONS  Normal transthoracic echocardiogram.     CT head 10pm:  IMPRESSION:     1.  Increase in size of right temporal intra-axial hematoma now measuring 6.7 x 4.3 cm and previously 3.4 x 2.9 cm     2.  Mild mass effect with 2-3 mm right to left shift     3.  Underlying brain white matter change and volume loss    Assessment/Plan  Acute right basal ganglia  hemorrhage   - NSG/neuro following   - neuro check every 2 hours   - SBP goals < 140   - s/p xeralto reversal   - SLP/PT/OT   - cont 3% at 50cc/hr   - CT head on 12/21 with some worsening, but clinically improving   - cont salt tabs at 2g TID   - PMR consult placed    Essential HTN   - active titration of nicardipine drip for SBP goals   - cont home losartan 100mg daily    - cont oral amlodipine 10mg PO daily   - add oral hydralazine 25mg every 8 hours   - cont IV lasix 40mg daily   - labetalol/hydralazine prn    Dyslipidemia   - cont home statin    Atrial fibrillation   - appears in SR   - reversed Xeralto   - K goal > 4 and Mg goal > 2   - ECHO ok      VTE:  Contraindicated  Ulcer: Not Indicated  Lines: Peripherals    I have performed a physical exam and reviewed and updated ROS and Plan today (12/23/2021). In review of yesterday's note (12/22/2021), there are no changes except as documented above.     Discussed patient condition and risk of morbidity and/or mortality with Family, RN, RT, Therapies, Pharmacy, Charge nurse / hot rounds and Patient     Patient remains critically ill requiring active titration of nicardipine drip for SBP goals less than 140.  The patient is on 3% saline and is maintaining sodium levels.  Patient remains at high risk of clinical deterioration, worsening vital organ dysfunction, and death without the above critical care interventions.    Critical care time = 38 minutes in directly providing and coordinating critical care and extensive data review.  No time overlap and excludes procedures.

## 2021-12-23 NOTE — PROGRESS NOTES
Physical Medicine and Rehabilitation Consultation              Date of initial consultation: 12/20/2021  Requested by: Ever Whitmore MD  Consulting physician: Arden Pfeiffer D.O.  Reason for consultation: assessment of rehabilitation needs  LOS: 4 Day(s)    SUBJECTIVE: Family came in during the visit. Patient reporting minor headache that is improving. Still on drips with BP well controlled with them. Patient denies pain. Using condom catheter for convenience. BM continent. Patient would like to go to rehab once cleared.    Medications:  Current Facility-Administered Medications   Medication Dose   • labetalol (NORMODYNE/TRANDATE) injection 10-20 mg  10-20 mg   • hydrALAZINE (APRESOLINE) injection 20 mg  20 mg   • hydrALAZINE (APRESOLINE) tablet 25 mg  25 mg   • [START ON 12/24/2021] amLODIPine (NORVASC) tablet 10 mg  10 mg   • furosemide (LASIX) injection 40 mg  40 mg   • potassium chloride SA (Kdur) tablet 40 mEq  40 mEq   • omeprazole (PRILOSEC) capsule 20 mg  20 mg   • 3% sodium chloride (HYPERTONIC SALINE) 500mL infusion     • losartan (COZAAR) tablet 100 mg  100 mg   • pravastatin (PRAVACHOL) tablet 20 mg  20 mg   • ursodiol (ACTIGALL) capsule 300 mg  300 mg   • acetaminophen (TYLENOL) tablet 500 mg  500 mg   • melatonin tablet 2.5 mg  2.5 mg   • sodium chloride (SALT) tablet 2 g  2 g   • senna-docusate (PERICOLACE or SENOKOT S) 8.6-50 MG per tablet 2 Tablet  2 Tablet    And   • polyethylene glycol/lytes (MIRALAX) PACKET 1 Packet  1 Packet    And   • magnesium hydroxide (MILK OF MAGNESIA) suspension 30 mL  30 mL    And   • bisacodyl (DULCOLAX) suppository 10 mg  10 mg   • Respiratory Therapy Consult     • LORazepam (ATIVAN) injection 2 mg  2 mg   • acetaminophen (Tylenol) tablet 650 mg  650 mg    Or   • acetaminophen (TYLENOL) suppository 650 mg  650 mg   • ondansetron (ZOFRAN ODT) dispertab 4 mg  4 mg    Or   • ondansetron (ZOFRAN) syringe/vial injection 4 mg  4 mg   • MD Alert...ICU Electrolyte  "Replacement per Pharmacy     • enalaprilat (Vasotec) injection 1.25 mg 1 mL  1.25 mg   • niCARdipine (CARDENE) 50 mg in  mL Infusion  0-15 mg/hr       Allergies:  Allergies   Allergen Reactions   • Lactose      Physical Exam:  Vitals: /57   Pulse 71   Temp 36.7 °C (98 °F) (Temporal)   Resp 18   Ht 1.664 m (5' 5.51\")   Wt 117 kg (257 lb 4.4 oz)   SpO2 93%   General: well-groomed sitting up in no acute distress, multiple visitors present  Eyes: no scleral icterus or conjunctival injection  Ears, nose, mouth and throat: moist oral mucosa  Cardiovascular: regular rate, good peripheral perfusion  Respiratory: breathing comfortably without use of accessory muscles  Gastrointestinal: nondistended  Genitourinary: +condom catheter  Musculoskeletal: good symmetry in bilateral shoulders  Skin: no wounds seen on exposed skin    Neurologic:  Mental status:  A&Ox4 (person, place, date, situation) answers questions appropriately follows commands but showing some confusion and left inattention  Speech: fluent, no aphasia or dysarthria  Motor - not tested today:    Upper Extremity  Myotome R L   Shoulder flexion C5 5/5 4/5   Elbow flexion C5 5/5 4/5   Wrist extension C6 5/5 3/5   Elbow extension C7 5/5 3/5   Finger flexion C8 5/5 3/5   Finger abduction T1 5/5 3/5     Lower Extremity Myotome R L   Hip flexion L2 5/5 1/5   Knee extension L3 5/5 1/5   Ankle dorsiflexion L4 5/5 4/5   Toe extension L5 5/5 4/5   Ankle plantarflexion S1 5/5 4/5     Sensory - not tested today : Impaired sensation to light touch on left leg  Tone: no spasticity noted  Psychiatric: appropriate affect  Hematologic/lymphatic/immunologic: ++IV access    Labs: Reviewed and significant for   Recent Labs     12/21/21  0442 12/22/21  0511 12/23/21  0310   RBC 4.13* 3.89* 3.98*   HEMOGLOBIN 12.0* 11.4* 11.8*   HEMATOCRIT 37.7* 35.4* 35.8*   PLATELETCT 221 192 177     Recent Labs     12/22/21  0511 12/22/21  1145 12/22/21  1908 12/23/21  0030 " 12/23/21  0310   SODIUM 142   < > 141 139 140   POTASSIUM 3.7  --  3.6  --  3.5*   CHLORIDE 111  --  108  --  107   CO2 22  --  23  --  24   GLUCOSE 104*  --  180*  --  130*   BUN 9  --  9  --  8   CREATININE 0.62  --  0.63  --  0.64   CALCIUM 7.7*  --  7.8*  --  8.0*    < > = values in this interval not displayed.     Recent Results (from the past 24 hour(s))   Sodium Serum (NA)    Collection Time: 12/22/21 11:45 AM   Result Value Ref Range    Sodium 142 135 - 145 mmol/L   Basic Metabolic Panel    Collection Time: 12/22/21  7:08 PM   Result Value Ref Range    Sodium 141 135 - 145 mmol/L    Potassium 3.6 3.6 - 5.5 mmol/L    Chloride 108 96 - 112 mmol/L    Co2 23 20 - 33 mmol/L    Glucose 180 (H) 65 - 99 mg/dL    Bun 9 8 - 22 mg/dL    Creatinine 0.63 0.50 - 1.40 mg/dL    Calcium 7.8 (L) 8.5 - 10.5 mg/dL    Anion Gap 10.0 7.0 - 16.0   ESTIMATED GFR    Collection Time: 12/22/21  7:08 PM   Result Value Ref Range    GFR If African American >60 >60 mL/min/1.73 m 2    GFR If Non African American >60 >60 mL/min/1.73 m 2   Sodium Serum (NA)    Collection Time: 12/23/21 12:30 AM   Result Value Ref Range    Sodium 139 135 - 145 mmol/L   Magnesium    Collection Time: 12/23/21  3:10 AM   Result Value Ref Range    Magnesium 1.8 1.5 - 2.5 mg/dL   Complete Metabolic Panel    Collection Time: 12/23/21  3:10 AM   Result Value Ref Range    Sodium 140 135 - 145 mmol/L    Potassium 3.5 (L) 3.6 - 5.5 mmol/L    Chloride 107 96 - 112 mmol/L    Co2 24 20 - 33 mmol/L    Anion Gap 9.0 7.0 - 16.0    Glucose 130 (H) 65 - 99 mg/dL    Bun 8 8 - 22 mg/dL    Creatinine 0.64 0.50 - 1.40 mg/dL    Calcium 8.0 (L) 8.5 - 10.5 mg/dL    AST(SGOT) 12 12 - 45 U/L    ALT(SGPT) 13 2 - 50 U/L    Alkaline Phosphatase 69 30 - 99 U/L    Total Bilirubin 0.4 0.1 - 1.5 mg/dL    Albumin 3.3 3.2 - 4.9 g/dL    Total Protein 5.8 (L) 6.0 - 8.2 g/dL    Globulin 2.5 1.9 - 3.5 g/dL    A-G Ratio 1.3 g/dL   CBC with Differential    Collection Time: 12/23/21  3:10 AM   Result  Value Ref Range    WBC 8.0 4.8 - 10.8 K/uL    RBC 3.98 (L) 4.70 - 6.10 M/uL    Hemoglobin 11.8 (L) 14.0 - 18.0 g/dL    Hematocrit 35.8 (L) 42.0 - 52.0 %    MCV 89.9 81.4 - 97.8 fL    MCH 29.6 27.0 - 33.0 pg    MCHC 33.0 (L) 33.7 - 35.3 g/dL    RDW 47.8 35.9 - 50.0 fL    Platelet Count 177 164 - 446 K/uL    MPV 9.1 9.0 - 12.9 fL    Neutrophils-Polys 61.60 44.00 - 72.00 %    Lymphocytes 20.80 (L) 22.00 - 41.00 %    Monocytes 12.40 0.00 - 13.40 %    Eosinophils 4.40 0.00 - 6.90 %    Basophils 0.50 0.00 - 1.80 %    Immature Granulocytes 0.30 0.00 - 0.90 %    Nucleated RBC 0.00 /100 WBC    Neutrophils (Absolute) 4.92 1.82 - 7.42 K/uL    Lymphs (Absolute) 1.66 1.00 - 4.80 K/uL    Monos (Absolute) 0.99 (H) 0.00 - 0.85 K/uL    Eos (Absolute) 0.35 0.00 - 0.51 K/uL    Baso (Absolute) 0.04 0.00 - 0.12 K/uL    Immature Granulocytes (abs) 0.02 0.00 - 0.11 K/uL    NRBC (Absolute) 0.00 K/uL   ESTIMATED GFR    Collection Time: 12/23/21  3:10 AM   Result Value Ref Range    GFR If African American >60 >60 mL/min/1.73 m 2    GFR If Non African American >60 >60 mL/min/1.73 m 2     ASSESSMENT:  IMPRESSION: The patient is a 71 y.o. male with a past medical history of obstructive sleep apnea, hypertension, hyperlipidemia and paroxysmal atrial fibrillation on Xarelto;  who presented on 12/19/2021  3:31 AM with left sided droop and left sided weakness  and found to have a basal ganglionic hemorrhage involving the putamen and globus pallidus on the right, extending right up to the posterior limb of the internal capsule.     [facility] Code: 0001.1 - Stroke: Left Body Involvement (Right Brain)  -With acute secondary complications of: impaired ADLs and mobility, Mild-Moderate oropharyngeal dysphagia, impaired cognition, left inattention, left sided weakness  -with chronic conditions of: obstructive sleep apnea, hypertension, hyperlipidemia and paroxysmal atrial fibrillation on Xarelto  -and:     Medical  Complexity:  Anemia  Hyperglycemia    RECOMMENDATIONS:    ##MSK  #Impaired ADLs and mobility: Agree with continuing OT/PT/SLP while admitted here.    Patient is early in his course. However, due to his impaired cognition, left sided neglect, likely aphasia, I suspect he will require     Patient is a good candidate for acute inpatient rehabilitation provided that: patient can tolerate 3 hours of therapy a day, has aggressive therapy needs, patient is medically stable, bed becomes available, insurance authorization is obtained.    Estimated length of stay: 12-16 days  Anticipated discharge destination: home with family  Prognosis: good    ##NEURO  #Acute basal ganglionic hemorrhage involving the putamen and globus pallidus on the right, extending right up to the posterior limb of the internal capsule  Currently on nicardipine drip in ICU and is therefore high risk    #Likely aphasia  #left sided neglect  #Left sided weakness  Aphasia improving  SLP, PT, OT    #Acute headache, improving  Continue tylenol 500mg TID    ##PSYCH  #Impaired sleep  Continue melatonin 2.5mg QHS  Recommend good sleep hygiene with lights on/window shade up during the day, out of bed and in chair for meals, and in bed with lights off and minimal interruptions at night.    ##GI  #At risk for constipation  Goal of one continent BM daily    ##  #Condom catheter in place  Monitor    ##SKIN  Recommend turning Q2hr and monitor for skin changes at least daily  DVT chemoprophlaxis: brain bleed. Recommend starting once medically cleared to do so  Code: full resuscitation    Thank you for allowing us to participate in the care of this patient. Physiatry will continue to follow and provide recommendations, as needed.    Patient was seen for 26 minutes on unit/floor of which > 50% of time was spent on counseling and coordination of care regarding the above, including prognosis, risk reduction, benefits of treatment, and options for next stage of  care.    Arden Pfeiffer D.O.   Physical Medicine and Rehabilitation     I have performed a physical exam and reviewed and updated history and plan today (12/23/2021).     Please note that this dictation was created using voice recognition software. I have made every reasonable attempt to correct obvious errors, but there may be errors of grammar and possibly content that I did not discover before finalizing the note.

## 2021-12-23 NOTE — PROGRESS NOTES
Neurosurgery Progress Note    Subjective:  States some HA, denies new changes, slightly improved since yesterday  Pain controlled on pain meds    Exam:  AOx4,Tongue ML, mild drift on the left. Some weakness to left arm 4/5, BLE grossly intact. EOMs, some deficit to LLQ 4/5, slightly improved   states some vision deficit since hemorrhage, unchanged. Perrl 3mm bilat, tongue midline    BP  Min: 119/57  Max: 164/70  Pulse  Av  Min: 71  Max: 96  Resp  Av.9  Min: 15  Max: 98  Temp  Av.8 °C (98.2 °F)  Min: 36.7 °C (98 °F)  Max: 36.9 °C (98.4 °F)  SpO2  Av.7 %  Min: 90 %  Max: 97 %    No data recorded    Recent Labs     21  0442 21  0511 21  0310   WBC 9.4 8.5 8.0   RBC 4.13* 3.89* 3.98*   HEMOGLOBIN 12.0* 11.4* 11.8*   HEMATOCRIT 37.7* 35.4* 35.8*   MCV 91.3 91.0 89.9   MCH 29.1 29.3 29.6   MCHC 31.8* 32.2* 33.0*   RDW 49.4 50.2* 47.8   PLATELETCT 221 192 177   MPV 9.5 9.2 9.1     Recent Labs     21  0511 21  1145 21  1908 21  0030 21  0310   SODIUM 142   < > 141 139 140   POTASSIUM 3.7  --  3.6  --  3.5*   CHLORIDE 111  --  108  --  107   CO2   --    --  24   GLUCOSE 104*  --  180*  --  130*   BUN 9  --  9  --  8   CREATININE 0.62  --  0.63  --  0.64   CALCIUM 7.7*  --  7.8*  --  8.0*    < > = values in this interval not displayed.               Intake/Output                       21 0700 - 21 0659 21 0700 - 21 0659      Total 1686-4631 3248-1547 Total                 Intake    P.O.  1920  1000 2920  --  -- --    P.O. 1920 1000 2920 -- -- --    I.V.  1041  1537.3 2578.3  130  -- 130    Magnesium Sulfate Volume 50 -- 50 -- -- --    Cardene Volume 391 937.3 1328.3 80 -- 80    Volume (mL) (3% sodium chloride (HYPERTONIC SALINE) 500mL infusion 500 mL) 400 -- 400 -- -- --    Volume (mL) (3% sodium chloride (HYPERTONIC SALINE) 500mL infusion) 200 600 800 50 -- 50    Total Intake 2961 2537.3 5498.3 130 -- 130        Output    Urine  4475  2200 6675  --  -- --    Output (mL) (External Urinary Catheter) 1475 2200 3675 -- -- --    Output (mL) ([REMOVED] External Urinary Catheter (Condom)) 3000 -- 3000 -- -- --    Stool  --  -- --  --  -- --    Number of Times Stooled 1 x -- 1 x -- -- --    Total Output 4475 2200 6675 -- -- --       Net I/O     -1514 337.3 -1176.7 130 -- 130            Intake/Output Summary (Last 24 hours) at 12/23/2021 1054  Last data filed at 12/23/2021 0700  Gross per 24 hour   Intake 4305.16 ml   Output 5675 ml   Net -1369.84 ml            • labetalol  10-20 mg Q4HRS PRN   • hydrALAZINE  20 mg Q4HRS PRN   • hydrALAZINE  25 mg Q8HRS   • [START ON 12/24/2021] amLODIPine  10 mg Q DAY   • furosemide  40 mg Q DAY   • potassium chloride SA  40 mEq DAILY   • omeprazole  20 mg DAILY   • 3% sodium chloride   Continuous   • losartan  100 mg DAILY   • pravastatin  20 mg DAILY   • ursodiol  300 mg BID   • acetaminophen  500 mg TID   • melatonin  2.5 mg Nightly   • sodium chloride  2 g TID WITH MEALS   • senna-docusate  2 Tablet BID    And   • polyethylene glycol/lytes  1 Packet QDAY PRN    And   • magnesium hydroxide  30 mL QDAY PRN    And   • bisacodyl  10 mg QDAY PRN   • Respiratory Therapy Consult   Continuous RT   • LORazepam  2 mg Q5 MIN PRN   • acetaminophen  650 mg Q4HRS PRN    Or   • acetaminophen  650 mg Q4HRS PRN   • ondansetron  4 mg Q4HRS PRN    Or   • ondansetron  4 mg Q4HRS PRN   • MD Alert...Adult ICU Electrolyte Replacement per Pharmacy   PHARMACY TO DOSE   • enalaprilat  1.25 mg Q6HRS PRN   • niCARdipine infusion  0-15 mg/hr Continuous       Assessment and Plan:  Hospital day #5 R BG ICH  Prophylactic anticoagulation: no         Start date/time: tbd    Hs Afib- reversed  CT 12/19- some worsened.  Repeat head CT 12/21 stable  Keep SBP<150-on nicardipine  On 3%, Na 140    PT/OT recommending placement when medically cleared  No active neurosurgical needs, ok to transfer to floor or to rehab when  appropriate  nsgy team will sign off, please call with questions or concerns

## 2021-12-23 NOTE — CARE PLAN
The patient is Stable - Low risk of patient condition declining or worsening    Shift Goals  Clinical Goals: SBP <140, titrate nicard. drip down, comfot/rest  Patient Goals: comfort/rest  Family Goals: n/a    Progress made toward(s) clinical / shift goals:  SBP remained below goal of 140 with nicardipine drip, unable to titrate down or stop. Pt displayed good knowledge and acceptance about sitauation and importance of support. Pt able to sleep on/off throughout night, easily woken but otherwise comfortable.    Patient is not progressing towards the following goals:

## 2021-12-23 NOTE — PROGRESS NOTES
Daily Progress Note:     Date of Service: 12/23/2021  Primary Team: [facility] ICU Gold Team   Attending: Lissette Jaffe M.D.   Senior Resident: Dr. Andrew Samson    Chief Complaint:  Intracranial hemorrhage  History of atrial fibrillation on chronic anticoagulation    Subjective:  -no acute events overnight  -reports feeling good this AM  -tolerating IV lasix well    Interval Updates:  -Amlodipine dose increased to 10mg from 5mg    Consultants/Specialty:  Neurology  Neurosurgery    Review of Systems:    Review of Systems   Constitutional: Negative for fever.   HENT: Negative for hearing loss.    Eyes: Negative for blurred vision and double vision.        Worsened left eye vision (stable)   Respiratory: Negative for cough, shortness of breath and wheezing.    Cardiovascular: Negative for chest pain.   Gastrointestinal: Negative for diarrhea and vomiting.   Musculoskeletal: Negative for myalgias.   Neurological: Positive for sensory change and focal weakness (reports improving). Negative for tremors and headaches.   Psychiatric/Behavioral: Negative for depression.       Objective Data:   Physical Exam:   Vitals:   Temp:  [36.7 °C (98 °F)-36.9 °C (98.4 °F)] 36.7 °C (98 °F)  Pulse:  [71-96] 71  Resp:  [15-98] 18  BP: (115-164)/(56-70) 121/57  SpO2:  [90 %-97 %] 93 %     Physical Exam  Vitals and nursing note reviewed.   Constitutional:       General: He is not in acute distress.     Comments: Laying in bed watching TV   HENT:      Head: Normocephalic and atraumatic.      Right Ear: External ear normal.      Left Ear: External ear normal.      Mouth/Throat:      Mouth: Mucous membranes are moist.   Eyes:      General: No scleral icterus.     Pupils: Pupils are equal, round, and reactive to light.   Cardiovascular:      Rate and Rhythm: Normal rate and regular rhythm.      Pulses: Normal pulses.   Pulmonary:      Effort: Pulmonary effort is normal. No respiratory distress.      Breath sounds: No wheezing.   Abdominal:       General: Abdomen is flat. There is no distension.      Palpations: Abdomen is soft.      Tenderness: There is no abdominal tenderness. There is no guarding.   Musculoskeletal:      Right lower leg: No edema.      Left lower leg: No edema.   Skin:     General: Skin is warm and dry.      Capillary Refill: Capillary refill takes less than 2 seconds.   Neurological:      Mental Status: He is alert and oriented to person, place, and time.      Cranial Nerves: Cranial nerve deficit (mild L sided facial droop) present.      Motor: Weakness (Left UE and LE 3/5 strength, 5/5 on RUE an RLE) present.   Psychiatric:         Mood and Affect: Mood normal.         Behavior: Behavior normal.         Labs:  Results for NASIM TELLEZ (MRN [id number]) as of 12/23/2021 10:04   Ref. Range 12/22/2021 19:08 12/23/2021 00:30 12/23/2021 03:10   WBC Latest Ref Range: 4.8 - 10.8 K/uL   8.0   RBC Latest Ref Range: 4.70 - 6.10 M/uL   3.98 (L)   Hemoglobin Latest Ref Range: 14.0 - 18.0 g/dL   11.8 (L)   Hematocrit Latest Ref Range: 42.0 - 52.0 %   35.8 (L)   MCV Latest Ref Range: 81.4 - 97.8 fL   89.9   MCH Latest Ref Range: 27.0 - 33.0 pg   29.6   MCHC Latest Ref Range: 33.7 - 35.3 g/dL   33.0 (L)   RDW Latest Ref Range: 35.9 - 50.0 fL   47.8   Platelet Count Latest Ref Range: 164 - 446 K/uL   177   MPV Latest Ref Range: 9.0 - 12.9 fL   9.1   Neutrophils-Polys Latest Ref Range: 44.00 - 72.00 %   61.60   Neutrophils (Absolute) Latest Ref Range: 1.82 - 7.42 K/uL   4.92   Lymphocytes Latest Ref Range: 22.00 - 41.00 %   20.80 (L)   Lymphs (Absolute) Latest Ref Range: 1.00 - 4.80 K/uL   1.66   Monocytes Latest Ref Range: 0.00 - 13.40 %   12.40   Monos (Absolute) Latest Ref Range: 0.00 - 0.85 K/uL   0.99 (H)   Eosinophils Latest Ref Range: 0.00 - 6.90 %   4.40   Eos (Absolute) Latest Ref Range: 0.00 - 0.51 K/uL   0.35   Basophils Latest Ref Range: 0.00 - 1.80 %   0.50   Baso (Absolute) Latest Ref Range: 0.00 - 0.12 K/uL   0.04   Immature  Granulocytes Latest Ref Range: 0.00 - 0.90 %   0.30   Immature Granulocytes (abs) Latest Ref Range: 0.00 - 0.11 K/uL   0.02   Nucleated RBC Latest Units: /100 WBC   0.00   NRBC (Absolute) Latest Units: K/uL   0.00   Sodium Latest Ref Range: 135 - 145 mmol/L 141 139 140   Potassium Latest Ref Range: 3.6 - 5.5 mmol/L 3.6  3.5 (L)   Chloride Latest Ref Range: 96 - 112 mmol/L 108  107   Co2 Latest Ref Range: 20 - 33 mmol/L 23  24   Anion Gap Latest Ref Range: 7.0 - 16.0  10.0  9.0   Glucose Latest Ref Range: 65 - 99 mg/dL 180 (H)  130 (H)   Bun Latest Ref Range: 8 - 22 mg/dL 9  8   Creatinine Latest Ref Range: 0.50 - 1.40 mg/dL 0.63  0.64   GFR If  Latest Ref Range: >60 mL/min/1.73 m 2 >60  >60   GFR If Non  Latest Ref Range: >60 mL/min/1.73 m 2 >60  >60   Calcium Latest Ref Range: 8.5 - 10.5 mg/dL 7.8 (L)  8.0 (L)   AST(SGOT) Latest Ref Range: 12 - 45 U/L   12   ALT(SGPT) Latest Ref Range: 2 - 50 U/L   13   Alkaline Phosphatase Latest Ref Range: 30 - 99 U/L   69   Total Bilirubin Latest Ref Range: 0.1 - 1.5 mg/dL   0.4   Albumin Latest Ref Range: 3.2 - 4.9 g/dL   3.3   Total Protein Latest Ref Range: 6.0 - 8.2 g/dL   5.8 (L)   Globulin Latest Ref Range: 1.9 - 3.5 g/dL   2.5   A-G Ratio Latest Units: g/dL   1.3   Magnesium Latest Ref Range: 1.5 - 2.5 mg/dL   1.8       Imaging:   No new imaging    Problem Representation:  71 year old man with PMH that includes atrial fibrillation with chronic use of xarelto as well as history of hypertension who presented on 12/19 early AM with complaints of L sided weakness, found to have right sided intracranial hemorrhage with repeat CT imaging 6 hours after found to show increasing mass size, repeat scan following shows mass is stable. Neuro and Neurosurgery onboard. Currently on medical management.    * Intracranial hemorrhage (HCC)  Assessment & Plan  R side BG 3 x 3 cm on admission, increased to 6.7 x 4.3 cm on 12/19 1054 imaging  Plan  Nicardipine  drip, losartan, amlodipine goal: SBP < 140, see below problem hypertension  Hypertonic saline, sodium tabs: Sodium goal of 150-155, given clinical improvement will be less aggressive with pursuing this goal sodium  Glucose control  pravastatin  Neurology following  Neurosurgery following  SLP, PT/OT  Q4 hour neuro checks      Hypertension  Assessment & Plan    BP 190s systolic on admission  Started on amlodipine 5mg on 12/22  Plan  Nicardipine drip, goal >140 systolic  increased losartan dose to 100mg on 12/21  Amlodipine dose increased to 10mg on 12/23/2021      PRNs available; hydralazine, Vasotec, labetalol     Volume excess  Assessment & Plan  -Likely secondary to increased fluid intake from various IV inputs  -Started on PO lasix on 12/21  -Switched to IV lasix on 12/22  Plan  Continue with IV lasix    Hyperlipemia  Assessment & Plan  -XR chest noted aortic calcification  Pravastatin as above, target LDL <70    AF (atrial fibrillation) (HCC)  Assessment & Plan  Previously on Xeralto  ECHO completed 12/19/2021  SR noted on 12/19 EKG  Plan  Holding xarelto; AC contraindicated  Optimize electrolytes  Rate control as needed    GERD (gastroesophageal reflux disease)  Assessment & Plan  -On omeprazole OTC at home  Plan  Resumed home omeprazole        Quality Measures:  Lines: PIV x 2, PICC line in place, Condom cath  Code: FULL  VTE: SCDs  Diet: Liquid diet  GI prophylaxis: none needed, on home omeprazole for GERD  Disposition: ICU for nicardipine drip/ tight BP control  Antibiotics: None  Bowel regimen: Doc-Senna  Analgesia: Tylenol

## 2021-12-24 ENCOUNTER — HOSPITAL ENCOUNTER (INPATIENT)
Facility: REHABILITATION | Age: 71
LOS: 13 days | DRG: 056 | End: 2022-01-06
Attending: PHYSICAL MEDICINE & REHABILITATION | Admitting: PHYSICAL MEDICINE & REHABILITATION
Payer: MEDICARE

## 2021-12-24 ENCOUNTER — ANCILLARY PROCEDURE (OUTPATIENT)
Dept: CARDIOLOGY | Facility: REHABILITATION | Age: 71
DRG: 056 | End: 2021-12-24
Attending: PHYSICAL MEDICINE & REHABILITATION
Payer: MEDICARE

## 2021-12-24 VITALS
BODY MASS INDEX: 40.75 KG/M2 | RESPIRATION RATE: 17 BRPM | TEMPERATURE: 98.7 F | DIASTOLIC BLOOD PRESSURE: 55 MMHG | HEART RATE: 73 BPM | HEIGHT: 66 IN | OXYGEN SATURATION: 96 % | SYSTOLIC BLOOD PRESSURE: 112 MMHG | WEIGHT: 253.53 LBS

## 2021-12-24 DIAGNOSIS — M25.552 HIP PAIN, LEFT: ICD-10-CM

## 2021-12-24 DIAGNOSIS — I10 PRIMARY HYPERTENSION: ICD-10-CM

## 2021-12-24 DIAGNOSIS — T14.90XA TRAUMA: ICD-10-CM

## 2021-12-24 DIAGNOSIS — R25.2 SPASTICITY: ICD-10-CM

## 2021-12-24 DIAGNOSIS — K21.9 GASTROESOPHAGEAL REFLUX DISEASE WITHOUT ESOPHAGITIS: ICD-10-CM

## 2021-12-24 DIAGNOSIS — N30.90 CYSTITIS: ICD-10-CM

## 2021-12-24 DIAGNOSIS — D50.9 IRON DEFICIENCY ANEMIA, UNSPECIFIED IRON DEFICIENCY ANEMIA TYPE: ICD-10-CM

## 2021-12-24 DIAGNOSIS — I63.9 CEREBROVASCULAR ACCIDENT (CVA), UNSPECIFIED MECHANISM (HCC): ICD-10-CM

## 2021-12-24 DIAGNOSIS — G47.00 INSOMNIA, UNSPECIFIED TYPE: ICD-10-CM

## 2021-12-24 PROBLEM — E87.70 VOLUME EXCESS: Status: RESOLVED | Noted: 2021-12-22 | Resolved: 2021-12-24

## 2021-12-24 PROBLEM — Z74.09 IMPAIRED MOBILITY AND ADLS: Status: ACTIVE | Noted: 2021-12-24

## 2021-12-24 PROBLEM — I61.9 HEMORRHAGIC STROKE (HCC): Status: ACTIVE | Noted: 2021-12-24

## 2021-12-24 PROBLEM — Z78.9 IMPAIRED MOBILITY AND ADLS: Status: ACTIVE | Noted: 2021-12-24

## 2021-12-24 PROBLEM — E66.01 MORBID OBESITY WITH BMI OF 40.0-44.9, ADULT (HCC): Status: ACTIVE | Noted: 2021-12-24

## 2021-12-24 PROBLEM — G47.30 SLEEP APNEA: Status: ACTIVE | Noted: 2021-12-24

## 2021-12-24 PROBLEM — H92.01 RIGHT EAR PAIN: Status: ACTIVE | Noted: 2021-12-24

## 2021-12-24 LAB
ALBUMIN SERPL BCP-MCNC: 3.4 G/DL (ref 3.2–4.9)
ALBUMIN/GLOB SERPL: 1.3 G/DL
ALP SERPL-CCNC: 75 U/L (ref 30–99)
ALT SERPL-CCNC: 14 U/L (ref 2–50)
ANION GAP SERPL CALC-SCNC: 8 MMOL/L (ref 7–16)
AST SERPL-CCNC: 16 U/L (ref 12–45)
BASOPHILS # BLD AUTO: 0.6 % (ref 0–1.8)
BASOPHILS # BLD: 0.05 K/UL (ref 0–0.12)
BILIRUB SERPL-MCNC: 0.4 MG/DL (ref 0.1–1.5)
BUN SERPL-MCNC: 9 MG/DL (ref 8–22)
CALCIUM SERPL-MCNC: 8.1 MG/DL (ref 8.5–10.5)
CHLORIDE SERPL-SCNC: 107 MMOL/L (ref 96–112)
CO2 SERPL-SCNC: 25 MMOL/L (ref 20–33)
CREAT SERPL-MCNC: 0.61 MG/DL (ref 0.5–1.4)
EOSINOPHIL # BLD AUTO: 0.3 K/UL (ref 0–0.51)
EOSINOPHIL NFR BLD: 3.4 % (ref 0–6.9)
ERYTHROCYTE [DISTWIDTH] IN BLOOD BY AUTOMATED COUNT: 46 FL (ref 35.9–50)
GLOBULIN SER CALC-MCNC: 2.7 G/DL (ref 1.9–3.5)
GLUCOSE SERPL-MCNC: 93 MG/DL (ref 65–99)
HCT VFR BLD AUTO: 37 % (ref 42–52)
HGB BLD-MCNC: 12.4 G/DL (ref 14–18)
IMM GRANULOCYTES # BLD AUTO: 0.03 K/UL (ref 0–0.11)
IMM GRANULOCYTES NFR BLD AUTO: 0.3 % (ref 0–0.9)
LYMPHOCYTES # BLD AUTO: 1.5 K/UL (ref 1–4.8)
LYMPHOCYTES NFR BLD: 16.8 % (ref 22–41)
MAGNESIUM SERPL-MCNC: 1.9 MG/DL (ref 1.5–2.5)
MCH RBC QN AUTO: 30 PG (ref 27–33)
MCHC RBC AUTO-ENTMCNC: 33.5 G/DL (ref 33.7–35.3)
MCV RBC AUTO: 89.4 FL (ref 81.4–97.8)
MONOCYTES # BLD AUTO: 1.1 K/UL (ref 0–0.85)
MONOCYTES NFR BLD AUTO: 12.3 % (ref 0–13.4)
NEUTROPHILS # BLD AUTO: 5.97 K/UL (ref 1.82–7.42)
NEUTROPHILS NFR BLD: 66.6 % (ref 44–72)
NRBC # BLD AUTO: 0 K/UL
NRBC BLD-RTO: 0 /100 WBC
PLATELET # BLD AUTO: 199 K/UL (ref 164–446)
PMV BLD AUTO: 9 FL (ref 9–12.9)
POTASSIUM SERPL-SCNC: 4 MMOL/L (ref 3.6–5.5)
PROT SERPL-MCNC: 6.1 G/DL (ref 6–8.2)
RBC # BLD AUTO: 4.14 M/UL (ref 4.7–6.1)
SODIUM SERPL-SCNC: 139 MMOL/L (ref 135–145)
SODIUM SERPL-SCNC: 140 MMOL/L (ref 135–145)
SODIUM SERPL-SCNC: 142 MMOL/L (ref 135–145)
WBC # BLD AUTO: 9 K/UL (ref 4.8–10.8)

## 2021-12-24 PROCEDURE — 85025 COMPLETE CBC W/AUTO DIFF WBC: CPT

## 2021-12-24 PROCEDURE — U0005 INFEC AGEN DETEC AMPLI PROBE: HCPCS

## 2021-12-24 PROCEDURE — A9270 NON-COVERED ITEM OR SERVICE: HCPCS | Performed by: PHYSICAL MEDICINE & REHABILITATION

## 2021-12-24 PROCEDURE — 94760 N-INVAS EAR/PLS OXIMETRY 1: CPT

## 2021-12-24 PROCEDURE — A9270 NON-COVERED ITEM OR SERVICE: HCPCS | Performed by: INTERNAL MEDICINE

## 2021-12-24 PROCEDURE — A9270 NON-COVERED ITEM OR SERVICE: HCPCS

## 2021-12-24 PROCEDURE — 700102 HCHG RX REV CODE 250 W/ 637 OVERRIDE(OP): Performed by: INTERNAL MEDICINE

## 2021-12-24 PROCEDURE — U0003 INFECTIOUS AGENT DETECTION BY NUCLEIC ACID (DNA OR RNA); SEVERE ACUTE RESPIRATORY SYNDROME CORONAVIRUS 2 (SARS-COV-2) (CORONAVIRUS DISEASE [COVID-19]), AMPLIFIED PROBE TECHNIQUE, MAKING USE OF HIGH THROUGHPUT TECHNOLOGIES AS DESCRIBED BY CMS-2020-01-R: HCPCS

## 2021-12-24 PROCEDURE — 700111 HCHG RX REV CODE 636 W/ 250 OVERRIDE (IP): Performed by: INTERNAL MEDICINE

## 2021-12-24 PROCEDURE — A9270 NON-COVERED ITEM OR SERVICE: HCPCS | Performed by: STUDENT IN AN ORGANIZED HEALTH CARE EDUCATION/TRAINING PROGRAM

## 2021-12-24 PROCEDURE — 99233 SBSQ HOSP IP/OBS HIGH 50: CPT | Performed by: INTERNAL MEDICINE

## 2021-12-24 PROCEDURE — 80053 COMPREHEN METABOLIC PANEL: CPT

## 2021-12-24 PROCEDURE — 770010 HCHG ROOM/CARE - REHAB SEMI PRIVAT*

## 2021-12-24 PROCEDURE — 700105 HCHG RX REV CODE 258: Performed by: STUDENT IN AN ORGANIZED HEALTH CARE EDUCATION/TRAINING PROGRAM

## 2021-12-24 PROCEDURE — 93971 EXTREMITY STUDY: CPT | Mod: LT

## 2021-12-24 PROCEDURE — 700102 HCHG RX REV CODE 250 W/ 637 OVERRIDE(OP): Performed by: PHYSICAL MEDICINE & REHABILITATION

## 2021-12-24 PROCEDURE — 700102 HCHG RX REV CODE 250 W/ 637 OVERRIDE(OP): Performed by: STUDENT IN AN ORGANIZED HEALTH CARE EDUCATION/TRAINING PROGRAM

## 2021-12-24 PROCEDURE — 83735 ASSAY OF MAGNESIUM: CPT

## 2021-12-24 PROCEDURE — 93971 EXTREMITY STUDY: CPT | Mod: 26,LT | Performed by: INTERNAL MEDICINE

## 2021-12-24 PROCEDURE — 82088 ASSAY OF ALDOSTERONE: CPT

## 2021-12-24 PROCEDURE — 92526 ORAL FUNCTION THERAPY: CPT

## 2021-12-24 PROCEDURE — 84244 ASSAY OF RENIN: CPT

## 2021-12-24 PROCEDURE — 99223 1ST HOSP IP/OBS HIGH 75: CPT | Mod: AI | Performed by: PHYSICAL MEDICINE & REHABILITATION

## 2021-12-24 PROCEDURE — 99222 1ST HOSP IP/OBS MODERATE 55: CPT | Performed by: HOSPITALIST

## 2021-12-24 PROCEDURE — 700102 HCHG RX REV CODE 250 W/ 637 OVERRIDE(OP)

## 2021-12-24 PROCEDURE — 84295 ASSAY OF SERUM SODIUM: CPT | Mod: 91

## 2021-12-24 RX ORDER — ACETAMINOPHEN 500 MG
500 TABLET ORAL 3 TIMES DAILY
Status: DISCONTINUED | OUTPATIENT
Start: 2021-12-24 | End: 2021-12-24

## 2021-12-24 RX ORDER — ECHINACEA PURPUREA EXTRACT 125 MG
2 TABLET ORAL PRN
Status: DISCONTINUED | OUTPATIENT
Start: 2021-12-24 | End: 2022-01-06 | Stop reason: HOSPADM

## 2021-12-24 RX ORDER — HYDRALAZINE HYDROCHLORIDE 10 MG/1
10 TABLET, FILM COATED ORAL 3 TIMES DAILY PRN
Status: DISCONTINUED | OUTPATIENT
Start: 2021-12-24 | End: 2022-01-06 | Stop reason: HOSPADM

## 2021-12-24 RX ORDER — MIDAZOLAM HYDROCHLORIDE 5 MG/ML
5 INJECTION INTRAMUSCULAR; INTRAVENOUS PRN
Status: DISCONTINUED | OUTPATIENT
Start: 2021-12-24 | End: 2022-01-06 | Stop reason: HOSPADM

## 2021-12-24 RX ORDER — HYDRALAZINE HYDROCHLORIDE 25 MG/1
TABLET, FILM COATED ORAL
Status: COMPLETED
Start: 2021-12-24 | End: 2021-12-24

## 2021-12-24 RX ORDER — AMOXICILLIN 250 MG
2 CAPSULE ORAL 2 TIMES DAILY
Status: DISCONTINUED | OUTPATIENT
Start: 2021-12-24 | End: 2022-01-06 | Stop reason: HOSPADM

## 2021-12-24 RX ORDER — HYDRALAZINE HYDROCHLORIDE 25 MG/1
25 TABLET, FILM COATED ORAL EVERY 8 HOURS
Status: DISCONTINUED | OUTPATIENT
Start: 2021-12-24 | End: 2021-12-24

## 2021-12-24 RX ORDER — SODIUM CHLORIDE 1 G/1
2 TABLET ORAL
Status: DISCONTINUED | OUTPATIENT
Start: 2021-12-24 | End: 2021-12-27

## 2021-12-24 RX ORDER — LOSARTAN POTASSIUM 100 MG/1
100 TABLET ORAL DAILY
Qty: 30 TABLET | Status: ON HOLD
Start: 2021-12-25 | End: 2022-01-06

## 2021-12-24 RX ORDER — POTASSIUM CHLORIDE 20 MEQ/1
40 TABLET, EXTENDED RELEASE ORAL DAILY
Status: DISCONTINUED | OUTPATIENT
Start: 2021-12-25 | End: 2021-12-24

## 2021-12-24 RX ORDER — OMEPRAZOLE 20 MG/1
20 CAPSULE, DELAYED RELEASE ORAL
Status: DISCONTINUED | OUTPATIENT
Start: 2021-12-25 | End: 2021-12-27

## 2021-12-24 RX ORDER — ENEMA 19; 7 G/133ML; G/133ML
1 ENEMA RECTAL
Status: DISCONTINUED | OUTPATIENT
Start: 2021-12-24 | End: 2022-01-06 | Stop reason: HOSPADM

## 2021-12-24 RX ORDER — ONDANSETRON 4 MG/1
4 TABLET, ORALLY DISINTEGRATING ORAL 4 TIMES DAILY PRN
Status: DISCONTINUED | OUTPATIENT
Start: 2021-12-24 | End: 2022-01-06 | Stop reason: HOSPADM

## 2021-12-24 RX ORDER — LANOLIN ALCOHOL/MO/W.PET/CERES
3 CREAM (GRAM) TOPICAL
Status: DISCONTINUED | OUTPATIENT
Start: 2021-12-24 | End: 2021-12-26

## 2021-12-24 RX ORDER — HYDROXYZINE HYDROCHLORIDE 25 MG/1
50 TABLET, FILM COATED ORAL EVERY 6 HOURS PRN
Status: DISCONTINUED | OUTPATIENT
Start: 2021-12-24 | End: 2021-12-27

## 2021-12-24 RX ORDER — HYDRALAZINE HYDROCHLORIDE 50 MG/1
25 TABLET, FILM COATED ORAL EVERY 8 HOURS
Status: CANCELLED | OUTPATIENT
Start: 2021-12-24

## 2021-12-24 RX ORDER — POLYETHYLENE GLYCOL 3350 17 G/17G
1 POWDER, FOR SOLUTION ORAL
Status: DISCONTINUED | OUTPATIENT
Start: 2021-12-24 | End: 2022-01-06 | Stop reason: HOSPADM

## 2021-12-24 RX ORDER — AMOXICILLIN 250 MG
2 CAPSULE ORAL 2 TIMES DAILY
Status: CANCELLED | OUTPATIENT
Start: 2021-12-24

## 2021-12-24 RX ORDER — ACETAMINOPHEN 325 MG/1
650 TABLET ORAL EVERY 4 HOURS PRN
Status: DISCONTINUED | OUTPATIENT
Start: 2021-12-24 | End: 2022-01-06 | Stop reason: HOSPADM

## 2021-12-24 RX ORDER — POLYVINYL ALCOHOL 14 MG/ML
1 SOLUTION/ DROPS OPHTHALMIC PRN
Status: DISCONTINUED | OUTPATIENT
Start: 2021-12-24 | End: 2022-01-06 | Stop reason: HOSPADM

## 2021-12-24 RX ORDER — ONDANSETRON 2 MG/ML
4 INJECTION INTRAMUSCULAR; INTRAVENOUS 4 TIMES DAILY PRN
Status: DISCONTINUED | OUTPATIENT
Start: 2021-12-24 | End: 2022-01-06 | Stop reason: HOSPADM

## 2021-12-24 RX ORDER — AMLODIPINE BESYLATE 5 MG/1
10 TABLET ORAL
Status: DISCONTINUED | OUTPATIENT
Start: 2021-12-25 | End: 2022-01-06 | Stop reason: HOSPADM

## 2021-12-24 RX ORDER — HYDRALAZINE HYDROCHLORIDE 10 MG/1
TABLET, FILM COATED ORAL
Status: COMPLETED
Start: 2021-12-24 | End: 2021-12-24

## 2021-12-24 RX ORDER — ACETAMINOPHEN 500 MG
1000 TABLET ORAL 3 TIMES DAILY
Status: DISCONTINUED | OUTPATIENT
Start: 2021-12-24 | End: 2022-01-02

## 2021-12-24 RX ORDER — SODIUM CHLORIDE 1 G/1
2 TABLET ORAL
Status: CANCELLED | OUTPATIENT
Start: 2021-12-24

## 2021-12-24 RX ORDER — HYDRALAZINE HYDROCHLORIDE 10 MG/1
10 TABLET, FILM COATED ORAL ONCE
Status: COMPLETED | OUTPATIENT
Start: 2021-12-24 | End: 2021-12-24

## 2021-12-24 RX ORDER — AMLODIPINE BESYLATE 10 MG/1
10 TABLET ORAL
Status: CANCELLED | OUTPATIENT
Start: 2021-12-25

## 2021-12-24 RX ORDER — HYDRALAZINE HYDROCHLORIDE 25 MG/1
25 TABLET, FILM COATED ORAL EVERY 8 HOURS
Qty: 90 TABLET | Status: ON HOLD
Start: 2021-12-24 | End: 2022-01-06

## 2021-12-24 RX ORDER — AMLODIPINE BESYLATE 10 MG/1
10 TABLET ORAL DAILY
Qty: 30 TABLET | Status: ON HOLD
Start: 2021-12-25 | End: 2022-01-06

## 2021-12-24 RX ORDER — ATORVASTATIN CALCIUM 40 MG/1
40 TABLET, FILM COATED ORAL EVERY EVENING
Status: DISCONTINUED | OUTPATIENT
Start: 2021-12-24 | End: 2022-01-06 | Stop reason: HOSPADM

## 2021-12-24 RX ORDER — CLONIDINE HYDROCHLORIDE 0.1 MG/1
0.1 TABLET ORAL 3 TIMES DAILY PRN
Status: DISCONTINUED | OUTPATIENT
Start: 2021-12-24 | End: 2022-01-06 | Stop reason: HOSPADM

## 2021-12-24 RX ORDER — URSODIOL 300 MG/1
300 CAPSULE ORAL 2 TIMES DAILY
Status: DISCONTINUED | OUTPATIENT
Start: 2021-12-24 | End: 2021-12-26

## 2021-12-24 RX ORDER — BISACODYL 10 MG
10 SUPPOSITORY, RECTAL RECTAL
Status: CANCELLED | OUTPATIENT
Start: 2021-12-24

## 2021-12-24 RX ORDER — POTASSIUM CHLORIDE 20 MEQ/1
40 TABLET, EXTENDED RELEASE ORAL DAILY
Status: CANCELLED | OUTPATIENT
Start: 2021-12-25

## 2021-12-24 RX ORDER — LOSARTAN POTASSIUM 25 MG/1
100 TABLET ORAL DAILY
Status: DISCONTINUED | OUTPATIENT
Start: 2021-12-25 | End: 2022-01-02

## 2021-12-24 RX ORDER — ACETAMINOPHEN 500 MG
500 TABLET ORAL 3 TIMES DAILY
Status: CANCELLED | OUTPATIENT
Start: 2021-12-24

## 2021-12-24 RX ORDER — LOSARTAN POTASSIUM 100 MG/1
100 TABLET ORAL DAILY
Qty: 30 TABLET | Status: SHIPPED
Start: 2021-12-25 | End: 2021-12-24 | Stop reason: SDUPTHER

## 2021-12-24 RX ORDER — ALUMINA, MAGNESIA, AND SIMETHICONE 2400; 2400; 240 MG/30ML; MG/30ML; MG/30ML
20 SUSPENSION ORAL
Status: DISCONTINUED | OUTPATIENT
Start: 2021-12-24 | End: 2022-01-06 | Stop reason: HOSPADM

## 2021-12-24 RX ORDER — CHOLECALCIFEROL (VITAMIN D3) 125 MCG
2.5 CAPSULE ORAL
Qty: 30 TABLET | Status: ON HOLD
Start: 2021-12-24 | End: 2022-01-06

## 2021-12-24 RX ORDER — HYDRALAZINE HYDROCHLORIDE 25 MG/1
50 TABLET, FILM COATED ORAL EVERY 8 HOURS
Status: DISCONTINUED | OUTPATIENT
Start: 2021-12-24 | End: 2021-12-25

## 2021-12-24 RX ORDER — CEPHALEXIN 250 MG/1
500 CAPSULE ORAL EVERY 6 HOURS
Status: DISCONTINUED | OUTPATIENT
Start: 2021-12-24 | End: 2021-12-25

## 2021-12-24 RX ORDER — BISACODYL 10 MG
10 SUPPOSITORY, RECTAL RECTAL
Status: DISCONTINUED | OUTPATIENT
Start: 2021-12-24 | End: 2022-01-06 | Stop reason: HOSPADM

## 2021-12-24 RX ORDER — POLYETHYLENE GLYCOL 3350 17 G/17G
1 POWDER, FOR SOLUTION ORAL
Status: CANCELLED | OUTPATIENT
Start: 2021-12-24

## 2021-12-24 RX ORDER — BUTALBITAL, ACETAMINOPHEN AND CAFFEINE 50; 325; 40 MG/1; MG/1; MG/1
1 TABLET ORAL EVERY 6 HOURS PRN
Status: DISCONTINUED | OUTPATIENT
Start: 2021-12-24 | End: 2022-01-06 | Stop reason: HOSPADM

## 2021-12-24 RX ORDER — LOSARTAN POTASSIUM 50 MG/1
100 TABLET ORAL DAILY
Status: CANCELLED | OUTPATIENT
Start: 2021-12-25

## 2021-12-24 RX ORDER — TRAZODONE HYDROCHLORIDE 50 MG/1
50 TABLET ORAL
Status: DISCONTINUED | OUTPATIENT
Start: 2021-12-24 | End: 2022-01-06 | Stop reason: HOSPADM

## 2021-12-24 RX ORDER — URSODIOL 300 MG/1
300 CAPSULE ORAL 2 TIMES DAILY
Status: CANCELLED | OUTPATIENT
Start: 2021-12-24

## 2021-12-24 RX ORDER — SODIUM CHLORIDE 1 G/1
2 TABLET ORAL
Qty: 90 TABLET | Refills: 3 | Status: ON HOLD
Start: 2021-12-24 | End: 2022-01-06

## 2021-12-24 RX ADMIN — HYDRALAZINE HYDROCHLORIDE 50 MG: 25 TABLET, FILM COATED ORAL at 20:25

## 2021-12-24 RX ADMIN — URSODIOL 300 MG: 300 CAPSULE ORAL at 20:28

## 2021-12-24 RX ADMIN — CLONIDINE HYDROCHLORIDE 0.1 MG: 0.1 TABLET ORAL at 17:17

## 2021-12-24 RX ADMIN — SENNOSIDES AND DOCUSATE SODIUM 2 TABLET: 50; 8.6 TABLET ORAL at 13:42

## 2021-12-24 RX ADMIN — Medication 2 G: at 11:07

## 2021-12-24 RX ADMIN — HYDRALAZINE HYDROCHLORIDE 25 MG: 25 TABLET, FILM COATED ORAL at 13:50

## 2021-12-24 RX ADMIN — FUROSEMIDE 40 MG: 10 INJECTION, SOLUTION INTRAMUSCULAR; INTRAVENOUS at 06:15

## 2021-12-24 RX ADMIN — SENNOSIDES AND DOCUSATE SODIUM 2 TABLET: 50; 8.6 TABLET ORAL at 20:23

## 2021-12-24 RX ADMIN — HYDRALAZINE HYDROCHLORIDE 10 MG: 10 TABLET, FILM COATED ORAL at 15:02

## 2021-12-24 RX ADMIN — PRAVASTATIN SODIUM 20 MG: 20 TABLET ORAL at 06:14

## 2021-12-24 RX ADMIN — URSODIOL 300 MG: 300 CAPSULE ORAL at 06:14

## 2021-12-24 RX ADMIN — SODIUM CHLORIDE: 3 INJECTION, SOLUTION INTRAVENOUS at 05:25

## 2021-12-24 RX ADMIN — AMLODIPINE BESYLATE 10 MG: 10 TABLET ORAL at 06:14

## 2021-12-24 RX ADMIN — OMEPRAZOLE 20 MG: 20 CAPSULE, DELAYED RELEASE ORAL at 06:14

## 2021-12-24 RX ADMIN — SODIUM CHLORIDE 2 G: 1 TABLET ORAL at 17:06

## 2021-12-24 RX ADMIN — HYDRALAZINE HYDROCHLORIDE 25 MG: 25 TABLET, FILM COATED ORAL at 06:14

## 2021-12-24 RX ADMIN — ACETAMINOPHEN 500 MG: 500 TABLET, FILM COATED ORAL at 14:02

## 2021-12-24 RX ADMIN — Medication 2 G: at 08:13

## 2021-12-24 RX ADMIN — MELATONIN TAB 3 MG 3 MG: 3 TAB at 20:23

## 2021-12-24 RX ADMIN — ATORVASTATIN CALCIUM 40 MG: 40 TABLET, FILM COATED ORAL at 20:23

## 2021-12-24 RX ADMIN — LOSARTAN POTASSIUM 100 MG: 50 TABLET, FILM COATED ORAL at 06:14

## 2021-12-24 RX ADMIN — DOCUSATE SODIUM 50 MG AND SENNOSIDES 8.6 MG 2 TABLET: 8.6; 5 TABLET, FILM COATED ORAL at 06:14

## 2021-12-24 RX ADMIN — SODIUM CHLORIDE 2 G: 1 TABLET ORAL at 13:43

## 2021-12-24 RX ADMIN — POTASSIUM CHLORIDE 40 MEQ: 1500 TABLET, EXTENDED RELEASE ORAL at 06:14

## 2021-12-24 RX ADMIN — CEPHALEXIN 500 MG: 250 CAPSULE ORAL at 18:12

## 2021-12-24 RX ADMIN — ACETAMINOPHEN 1000 MG: 500 TABLET, FILM COATED ORAL at 20:21

## 2021-12-24 RX ADMIN — ACETAMINOPHEN 500 MG: 500 TABLET ORAL at 08:13

## 2021-12-24 ASSESSMENT — ENCOUNTER SYMPTOMS
STRIDOR: 0
WEAKNESS: 1
TREMORS: 0
NAUSEA: 1
BACK PAIN: 0
HEADACHES: 0
COUGH: 0
HEADACHES: 1
SINUS PAIN: 0
BRUISES/BLEEDS EASILY: 0
SENSORY CHANGE: 1
SHORTNESS OF BREATH: 0
CHILLS: 0
EYE DISCHARGE: 0
ABDOMINAL PAIN: 0
VOMITING: 0
SORE THROAT: 0
FEVER: 0
NERVOUS/ANXIOUS: 0
DIARRHEA: 0
BLURRED VISION: 0
DOUBLE VISION: 0
FOCAL WEAKNESS: 1
HALLUCINATIONS: 0
DEPRESSION: 0
NECK PAIN: 0
WHEEZING: 0
MYALGIAS: 0
EYE REDNESS: 0

## 2021-12-24 ASSESSMENT — LIFESTYLE VARIABLES
EVER FELT BAD OR GUILTY ABOUT YOUR DRINKING: NO
CONSUMPTION TOTAL: NEGATIVE
HAVE YOU EVER FELT YOU SHOULD CUT DOWN ON YOUR DRINKING: NO
TOTAL SCORE: 0
ON A TYPICAL DAY WHEN YOU DRINK ALCOHOL HOW MANY DRINKS DO YOU HAVE: 0
ALCOHOL_USE: NO
HAVE PEOPLE ANNOYED YOU BY CRITICIZING YOUR DRINKING: NO
EVER HAD A DRINK FIRST THING IN THE MORNING TO STEADY YOUR NERVES TO GET RID OF A HANGOVER: NO
EVER_SMOKED: NEVER
AVERAGE NUMBER OF DAYS PER WEEK YOU HAVE A DRINK CONTAINING ALCOHOL: 0
TOTAL SCORE: 0
HOW MANY TIMES IN THE PAST YEAR HAVE YOU HAD 5 OR MORE DRINKS IN A DAY: 0
TOTAL SCORE: 0

## 2021-12-24 ASSESSMENT — FIBROSIS 4 INDEX
FIB4 SCORE: 1.48
FIB4 SCORE: 1.3

## 2021-12-24 ASSESSMENT — PAIN DESCRIPTION - PAIN TYPE
TYPE: ACUTE PAIN
TYPE: ACUTE PAIN

## 2021-12-24 NOTE — PROGRESS NOTES
Received report from SLICK Jeffrey at 1100. Pt arrived at [facility] from [facility] via [facility] transport. Dr. Rmairez to follow for diagnosis of Hemorrhagic stroke. Initial assessment initiated. Pt oriented to room and facility routine and safety measures; pt education binder provided and discussed. Pt A/O x 4, incontinent of bowel and condom cath worn for urination. 2 person max assist for transfers. All wounds photographed and documented; photos uploaded to . Pt complains of right ear pain or discomfort at this time. Pt positioned for comfort in bed. Call light within reach, safety measures in place. Will continue to monitor.

## 2021-12-24 NOTE — THERAPY
Speech Language Pathology  Daily Treatment     Patient Name: Gokul Baca  Age:  71 y.o., Sex:  male  Medical Record #: [id number]  Today's Date: 12/23/2021     Assessment  The patient was seen for dysphagia therapy this date. The patient was awake, alert and once again able to keep his eyes open this session however, visual deficits noted. The patient was seen during his liquidized mildly thick meal tray. The patient was able to self feed from meal tray with min cues for attention to task and location of items (left inattention/neglect).  The patient consumed meal items with no overt s/s of aspiration given min A for swallow strategies. The patient was given PO trials of soft and bite size meal items which he consumed without difficulty with once again cues for attention to task and to only take 1 bite at a time. Education provided to patient regarding importance of swallow strategies and reiterated results of FEES evaluation.  At this time, recommend upgrade to soft and bite size meal items with continuation of mildly thick liquids.    Plan     Recommendations: 1)  upgrade to soft and bite size meal items with continuation of mildly thick liquids.  2) SLP following for dysphagia therapy and cognitive-linguistic evaluation.     Continue current treatment plan.     Discharge Recommendations: Recommend post-acute placement for additional speech therapy services prior to discharge home    Objective       12/23/21 3777   Dysphagia    Diet / Liquid Recommendation Soft & Bite-Sized (6) - (Dysphagia III);Mildly Thick (2) - (Nectar Thick)   Nutritional Liquid Intake Rating Scale Thickened beverages (mildly thick unless otherwise specified)   Nutritional Food Intake Rating Scale Total oral diet with multiple consistencies without special preparation but with specific food limitations   Nursing Communication Swallow Precaution Sign Posted at Head of Bed   Skilled Intervention Compensatory Strategies;Verbal Cueing;External  "Aids   Recommended Route of Medication Administration   Medication Administration  Crush all Medications in Puree   Patient / Family Goals   Patient / Family Goal #1 \" I'm thirsty\"   Goal #1 Outcome Progressing as expected   Short Term Goals   Short Term Goal # 1 The patient will utilize swallow strategies during SB6/MT2 meal items with no overt s/s of aspiration given min-mod A.    Short Term Goal # 1 B  The patient will consume LQ3/MT2 meal items with no overt s/s of aspiration given min cues for swallow strategies.    Goal Outcome  # 1 B Goal met   Short Term Goal # 2 The patient and his family will verbalize understanding of current swallow function, role of SLP and how current diagnosis will impact swallow and cognition.    Goal Outcome # 2  Progressing as expected         "

## 2021-12-24 NOTE — CARE PLAN
The patient is Stable - Low risk of patient condition declining or worsening    Shift Goals  Clinical Goals: Acclimate to new room/surroundings, monitor ear pain  Patient Goals: Pain control    Progress made toward(s) clinical / shift goals:  Patient understands the call light system and TV remote, ear pain to be addressed, Venous US ordered for left arm swelling    Patient is not progressing towards the following goals: Pain continues to right ear. Dr. Ramirez is aware.      Problem: Pain - Standard  Goal: Alleviation of pain or a reduction in pain to the patient’s comfort goal  Outcome: Not Progressing

## 2021-12-24 NOTE — ASSESSMENT & PLAN NOTE
Neuro & NSG consulted   SBP goals < 140  Was on Xeralto reversed   SLP/PT/OT > good candidate for acute rehab. Accepted to [facility].  Will transfer 12/24/2021   S/p 3%, now on salt tabs    Will need to follow with neurology stroke clinic, if not able to restart on anticoagulation may be a good candidate for a Watchman device for embolic stoke prophylaxis.

## 2021-12-24 NOTE — DISCHARGE INSTRUCTIONS
Discharge Instructions        Follow up with stroke bridge clinical once discharged from [facility] rehab, Referral has already been placed.      Discharged to other by medical transportation with relative. Discharged via wheelchair, hospital escort: Yes.  Special equipment needed: Not Applicable    Be sure to schedule a follow-up appointment with your primary care doctor or any specialists as instructed.     Discharge Plan:   Diet Plan: Discussed  Activity Level: Discussed  Confirmed Follow up Appointment:  (Pt discharging to Rehab)  Confirmed Symptoms Management: Discussed  Medication Reconciliation Updated: Yes  Influenza Vaccine Indication: Not indicated: Previously immunized this influenza season and > 8 years of age    I understand that a diet low in cholesterol, fat, and sodium is recommended for good health. Unless I have been given specific instructions below for another diet, I accept this instruction as my diet prescription.   Other diet: per SLP recommendations for texture and liquid thickness    Special Instructions: None    · Is patient discharged on Warfarin / Coumadin?   No     Depression / Suicide Risk    As you are discharged from this [facility] facility, it is important to learn how to keep safe from harming yourself.    Recognize the warning signs:  · Abrupt changes in personality, positive or negative- including increase in energy   · Giving away possessions  · Change in eating patterns- significant weight changes-  positive or negative  · Change in sleeping patterns- unable to sleep or sleeping all the time   · Unwillingness or inability to communicate  · Depression  · Unusual sadness, discouragement and loneliness  · Talk of wanting to die  · Neglect of personal appearance   · Rebelliousness- reckless behavior  · Withdrawal from people/activities they love  · Confusion- inability to concentrate     If you or a loved one observes any of these behaviors or has concerns about self-harm, here's  what you can do:  · Talk about it- your feelings and reasons for harming yourself  · Remove any means that you might use to hurt yourself (examples: pills, rope, extension cords, firearm)  · Get professional help from the community (Mental Health, Substance Abuse, psychological counseling)  · Do not be alone:Call your Safe Contact- someone whom you trust who will be there for you.  · Call your local CRISIS HOTLINE 153-1716 or 678-826-9096  · Call your local Children's Mobile Crisis Response Team [place] (809) 326-1326 or www.[employer]  · Call the toll free National Suicide Prevention Hotlines   · National Suicide Prevention Lifeline 744-785-XIYC (1478)  · National Hope Line Network 800-SUICIDE (110-7160)

## 2021-12-24 NOTE — THERAPY
Speech Language Pathology  Daily Treatment     Patient Name: Gokul Baca  Age:  71 y.o., Sex:  male  Medical Record #: [id number]  Today's Date: 12/24/2021     Precautions: Fall Risk,Swallow Precautions ( See Comments)  Comments: L sided deficits    Assessment    Patient was seen on this date for dysphagia treatment. Patient awake and AAOx4. Voice was clear. Spouse at bedside.     Patient seen with items from SB6/MT2 diet. Patient required min cues to reduce bite and take single versus consecutive sips from the cup. Pt independently ate at a slow rate. NO overt s/sx of aspiration appreciated across entire meal. Pt consumed <50% before declining further PO. Education provided to pt regarding current status and SLP recs.     Recommendations  1. Continue soft and bite size and mildly thick liquids    -sit up 90*   -small, single sips and bites   -close monitoring  2. SLP following for dysphagia therapy and cognitive-linguistic evaluation.    Plan    Continue current treatment plan.    Discharge Recommendations: Recommend post-acute placement for additional speech therapy services prior to discharge home     Objective       12/24/21 0940   Vitals   O2 (LPM) 2   O2 Delivery Device Silicone Nasal Cannula   Dysphagia    Positioning / Behavior Modification Modulate Rate or Bite Size   Other Treatments SB6/MT2 meal tray   Diet / Liquid Recommendation Soft & Bite-Sized (6) - (Dysphagia III);Mildly Thick (2) - (Nectar Thick)   Nursing Communication Swallow Precaution Sign Posted at Head of Bed   Skilled Intervention Compensatory Strategies;Verbal Cueing   Recommended Route of Medication Administration   Medication Administration  Crush all Medications in Puree   Short Term Goals   Short Term Goal # 1 The patient will utilize swallow strategies during SB6/MT2 meal items with no overt s/s of aspiration given min-mod A.    Goal Outcome # 1 Progressing as expected

## 2021-12-24 NOTE — DISCHARGE SUMMARY
Discharge Summary    CHIEF COMPLAINT ON ADMISSION  Chief Complaint   Patient presents with   • Possible Stroke     LKW 0130, slight slurred speech and Left side weakness with facial droop       Reason for Admission  Stroke Alert     CODE STATUS  Full Code    HPI & HOSPITAL COURSE  This is a 71 y.o. male with past medical history that includes atrial fibrillation on chronic xarelto, hypertension, hyperlipidemia who presented on 12/19/2021 for intracranial hemorrhage; right sided bleed with neurological changes.     Intracranial hemorrhage; CT imaging on 12/19 found a 3.4x 3.6cm parenchymal hemorrhage in the right basal ganglia with mild mass effect in the right lateral ventricle. Neurology and neurosurgery were consulted. Neurosurgery recommended no surgical intervention. Patient received prothrombin complex concentrates for Xarelto reversal and admitted to the ICU for close monitoring. His blood pressure on admission was 190 systolic. He was started on nicardipine drip for pressure goal of less than 140 systolic. Was started on hypertonic saline drip and oral sodium tabs initially for a goal of sodium of 150, however as patient began to clinically improve without meeting this goal it was decided to not aggressively pursue this sodium goal. Anticoagulation was held.  Repeat CT head 6 hours from admission did find increase in size of hematoma to 6.7x 4.3 cm. Follow-up CT on 12/21/21 found the hematoma to be stable.    Neurological deficits; on admission was noted to have left sided facial droop, slurred speech, and left sided weakness in both the upper and lower extremity with partial sensory loss over the same area. Also endorsed left eye vision loss. Over the course of his stay these deficits gradually improved. On day of discharge was noted to have 3/5 strength in LUE, 4/5 strength in LLE with reduced sensation on both upper and lower extremity. Mild facial droop improved from admission. Speech  improved.    Hypertension; as above, blood pressures were managed with a goal of systolic pressure less than 140. Ultimately his pressures were appropriately controlled on an oral regimen of amlodipine 10mg and losartan 100mg. He was also receiving lasix 40mg IV as below for volume excess. Will need follow up for continued adjustment of his regimen for good pressure control.     Volume excess; noted that the patient was having mild symptoms of volume excess with swelling of his abdomen and upper extremities. This was thought to be secondary to high intake of IV fluids. ECHO performed on 12/19/2021 showed EF of 70% with generally normal cardiac structures. He was started on Lasix on 12/21 for purposes of diuresis of this excess intake.    Intravenous access: it was noted that it was difficult to maintain consistent peripheral IV access in the patient, and as such a PICC line was placed on 12/22/2021.     Atrial fibrillation; on admission his anticoagulation with xarelto was reversed as above. Xarelto was discontinued in the context of his bleed. Patient remained in sinus rhythm over the course of his stay. Of note, patient does share that a-fib was originally diagnosed in 2019 after a bout a pneumonia. He is not on rate control medication at home and did not require rate control medication while here.     Therefore, he is discharged in fair and stable condition to [facility].    The patient met 2-midnight criteria for an inpatient stay at the time of discharge.      FOLLOW UP ITEMS POST DISCHARGE  Blood pressure regimen  Volume status/ continued need for lasix  Holding of anticoagulation in the context of intracranial hemorrhage     DISCHARGE DIAGNOSES  Principal Problem:    Intracranial hemorrhage (HCC) POA: Yes  Active Problems:    Hypertension POA: Yes    AF (atrial fibrillation) (HCC) POA: Yes    Hyperlipemia POA: Yes    GERD (gastroesophageal reflux disease) POA: Yes  Resolved Problems:    Volume excess POA:  Yes      FOLLOW UP  No future appointments.  No follow-up provider specified.    MEDICATIONS ON DISCHARGE     Medication List      START taking these medications      Instructions   amLODIPine 10 MG Tabs  Start taking on: December 25, 2021  Commonly known as: NORVASC   Take 1 Tablet by mouth every day.  Dose: 10 mg     melatonin 5 mg Tabs   Take 0.5 Tablets by mouth 1 time a day as needed.  Dose: 2.5 mg     sodium chloride 1 GM Tabs  Commonly known as: SALT   Take 2 Tablets by mouth 3 times a day with meals.  Dose: 2 g     Hydralazine 25mg tabs                                               Take 1 tablet every 8 hours.     CHANGE how you take these medications      Instructions   losartan 100 MG Tabs  Start taking on: December 25, 2021  What changed:   · medication strength  · how much to take  Commonly known as: COZAAR   Take 1 Tablet by mouth every day.  Dose: 100 mg        CONTINUE taking these medications      Instructions   omeprazole 20 MG delayed-release capsule  Commonly known as: PRILOSEC   Take 20 mg by mouth every day.  Dose: 20 mg     pravastatin 20 MG Tabs  Commonly known as: PRAVACHOL   Take 20 mg by mouth every day.  Dose: 20 mg     ursodiol 300 MG Caps  Commonly known as: ACTIGALL   Take 300 mg by mouth 2 times a day.  Dose: 300 mg        STOP taking these medications    rivaroxaban 20 MG Tabs tablet  Commonly known as: XARELTO            Allergies  Allergies   Allergen Reactions   • Lactose        DIET  Orders Placed This Encounter   Procedures   • Diet Order Diet: Level 6 - Soft and Bite Sized; Liquid level: Level 2 - Mildly Thick; Tray Modifications (optional): SLP - 1:1 Supervision by Nursing, SLP - Deliver to Nursing Station     Standing Status:   Standing     Number of Occurrences:   1     Order Specific Question:   Diet:     Answer:   Level 6 - Soft and Bite Sized [23]     Order Specific Question:   Liquid level     Answer:   Level 2 - Mildly Thick     Order Specific Question:   Tray  Modifications (optional)     Answer:   SLP - 1:1 Supervision by Nursing     Order Specific Question:   Tray Modifications (optional)     Answer:   SLP - Deliver to Nursing Station   • Discontinue Diet Tray     Standing Status:   Standing     Number of Occurrences:   1       ACTIVITY  As tolerated and directed by rehab.  Weight bearing as tolerated    LINES, DRAINS, AND WOUNDS  This is an automated list. Peripheral IVs will be removed prior to discharge.  Peripheral IV 12/20/21 20 G Anterior;Left;Proximal Forearm (Active)   Site Assessment Clean;Dry;Intact 12/23/21 2000   Dressing Type Transparent;Occlusive 12/23/21 2000   Line Status Scrubbed the hub prior to access;Flushed;Saline locked 12/23/21 2000   Dressing Status Clean;Dry;Intact 12/23/21 2000   Dressing Intervention N/A 12/23/21 2000   Dressing Change Due 12/25/21 12/23/21 2000   Date Primary Tubing Changed 12/21/21 12/22/21 0800   NEXT Primary Tubing Change  12/25/21 12/23/21 0800   Infiltration Grading ([facility], [facility]) 0 12/23/21 2000   Phlebitis Scale ([facility] Only) 0 12/23/21 2000       PICC Double Lumen 12/22/21 Lumen 1: Red Lumen 2: Purple Right Brachial (Active)   Site Assessment Clean;Dry;Intact 12/23/21 2000   Lumen 1 Status Scrubbed the hub prior to access;Flushed;Infusing 12/23/21 2000   Lumen 2 Status Scrubbed the hub prior to access;Normal saline locked;Flushed 12/23/21 2000   Line Secured at (cm) 42 cm 12/22/21 2000   Extremity Circumference (cm) 40.5 cm 12/22/21 2000   Dressing Type Biopatch;Securing device;Skin barrier;Transparent;Occlusive 12/23/21 2000   Dressing Status Clean;Dry;Intact 12/23/21 2000   Dressing Intervention N/A 12/23/21 2000   Dressing Change Due 12/25/21 12/23/21 2000   Date Primary Tubing Changed 12/23/21 12/23/21 2000   Date Secondary Tubing Changed 12/23/21 12/23/21 2000   Date IV Connector(s) Changed 12/22/21 12/23/21 2000   NEXT Primary Tubing Change  12/25/21 12/23/21 2000   NEXT Secondary Tubing Change  12/25/21  12/23/21 2000   NEXT IV Connector(s) Change 12/25/21 12/23/21 2000   Line Necessity Assessed Medication with pH <4.1 and >9.0 12/23/21 2000   $ Double Lumen PICC Charge Single kit used 12/22/21 1033     External Urinary Catheter (Active)   Collection Container Standard drainage bag 12/24/21 0600   Output (mL) 1100 mL 12/24/21 0600        PICC Double Lumen 12/22/21 Lumen 1: Red Lumen 2: Purple Right Brachial (Active)   Site Assessment Clean;Dry;Intact 12/23/21 2000   Lumen 1 Status Scrubbed the hub prior to access;Flushed;Infusing 12/23/21 2000   Lumen 2 Status Scrubbed the hub prior to access;Normal saline locked;Flushed 12/23/21 2000   Line Secured at (cm) 42 cm 12/22/21 2000   Extremity Circumference (cm) 40.5 cm 12/22/21 2000   Dressing Type Biopatch;Securing device;Skin barrier;Transparent;Occlusive 12/23/21 2000   Dressing Status Clean;Dry;Intact 12/23/21 2000   Dressing Intervention N/A 12/23/21 2000   Dressing Change Due 12/25/21 12/23/21 2000   Date Primary Tubing Changed 12/23/21 12/23/21 2000   Date Secondary Tubing Changed 12/23/21 12/23/21 2000   Date IV Connector(s) Changed 12/22/21 12/23/21 2000   NEXT Primary Tubing Change  12/25/21 12/23/21 2000   NEXT Secondary Tubing Change  12/25/21 12/23/21 2000   NEXT IV Connector(s) Change 12/25/21 12/23/21 2000   Line Necessity Assessed Medication with pH <4.1 and >9.0 12/23/21 2000   $ Double Lumen PICC Charge Single kit used 12/22/21 1033     Peripheral IV 12/20/21 20 G Anterior;Left;Proximal Forearm (Active)   Site Assessment Clean;Dry;Intact 12/23/21 2000   Dressing Type Transparent;Occlusive 12/23/21 2000   Line Status Scrubbed the hub prior to access;Flushed;Saline locked 12/23/21 2000   Dressing Status Clean;Dry;Intact 12/23/21 2000   Dressing Intervention N/A 12/23/21 2000   Dressing Change Due 12/25/21 12/23/21 2000   Date Primary Tubing Changed 12/21/21 12/22/21 0800   NEXT Primary Tubing Change  12/25/21 12/23/21 0800   Infiltration Grading ([facility],  [facility]) 0 12/23/21 2000   Phlebitis Scale ([facility] Only) 0 12/23/21 2000               MENTAL STATUS ON TRANSFER      AOx 4       CONSULTATIONS  Neurosurgery: Eyal Arriaga  Neurology: Teetee Cervantes    PROCEDURES  12/19/2021: prothrombin complex concentrates (PCC) administered for anticoagulation reversal    LABORATORY  Lab Results   Component Value Date    SODIUM 139 12/24/2021    POTASSIUM 4.0 12/24/2021    CHLORIDE 107 12/24/2021    CO2 25 12/24/2021    GLUCOSE 93 12/24/2021    BUN 9 12/24/2021    CREATININE 0.61 12/24/2021        Lab Results   Component Value Date    WBC 9.0 12/24/2021    HEMOGLOBIN 12.4 (L) 12/24/2021    HEMATOCRIT 37.0 (L) 12/24/2021    PLATELETCT 199 12/24/2021        Total time of the discharge process exceeds 47 minutes.

## 2021-12-24 NOTE — PROGRESS NOTES
Critical Care Progress Note    Date of admission  12/19/2021    Chief Complaint  71 y.o. male admitted 12/19/2021 with acute right basal ganglia hemorrhage    Hospital Course  Mr. Baca is a 71 year old male with the past medical history of atrial fibrillation on Xeralto, dyslipidemia, and hypertension who presented to the ER on 12/19 with complaints of sudden onset of left facial droop and left-sided weakness.  The patient was found to have a right basal ganglia hemorrhage at 3 x 3 cm.  He was admitted to the ICU for neuro checks and BP management.  12/20 - nicardipine drip ongoing, Na 141-->3% increased to 50cc/hr  12/21 - nicardipine drip ongoing, Na 142-->3% at 50cc/hr, increased losartan dose  12/22 - nicardipine drip ongoing, Na 142, added amlodipine  12/23 - nicardipine drip ongoing, Na 142, increased amlodipine and added hydralazine oral    Interval Problem Update  Reviewed last 24 hour events:   - no events overnight   - nicardipine drip off now   - still with slight facial droop and LUE/LLE weakness at 3/5   - SR 70-80s   - -140s   - afebrile   - 3% stopped this morning   - nectar thick diet   - UOP of 2.5 liters overnight   - BM on 12/22   - no RT issues   - PPI   - no DVT proph, SCDs   - Na at 140    Yesterday's Events:   - no events overnight   - a/ox4   - still left facial droop, LUE/LLE weakness  3/5 with drift   - SR 70-90s   - SBPO 110-140s   - 3% at 50cc   - nicadipine at 7   - BM on 12/22   - nectar thick diet   - UOP of 2200 cc with condom cath   - no RT issues   - PPI   - no DVT proph   - no abx   - Na at 142   - lasix 40mg IV    Review of Systems  Review of Systems   Constitutional: Positive for malaise/fatigue. Negative for chills and fever.   HENT: Negative for sinus pain and sore throat.    Eyes: Negative for blurred vision and double vision.   Respiratory: Negative for cough and shortness of breath.    Cardiovascular: Negative for chest pain and leg swelling.   Gastrointestinal:  Positive for nausea. Negative for abdominal pain, diarrhea and vomiting.   Genitourinary: Negative for frequency and hematuria.   Musculoskeletal: Negative for back pain and neck pain.   Skin: Negative for rash.   Neurological: Positive for sensory change, focal weakness, weakness and headaches.   Endo/Heme/Allergies: Does not bruise/bleed easily.   Psychiatric/Behavioral: Negative for depression. The patient is not nervous/anxious.    no change to ROS    Vital Signs for last 24 hours   Temp:  [36.7 °C (98 °F)-37.2 °C (98.9 °F)] 37.1 °C (98.8 °F)  Pulse:  [69-91] 70  Resp:  [15-52] 21  BP: (115-148)/(56-74) 133/62  SpO2:  [93 %-98 %] 97 %    Hemodynamic parameters for last 24 hours       Respiratory Information for the last 24 hours       Physical Exam   Physical Exam  Vitals and nursing note reviewed.   Constitutional:       General: He is not in acute distress.     Appearance: He is obese. He is ill-appearing. He is not toxic-appearing.      Comments: Bright-eyed and bushy tailed this morning, sitting up in bed   HENT:      Head: Normocephalic and atraumatic.      Right Ear: External ear normal.      Left Ear: External ear normal.      Nose: Nose normal. No rhinorrhea.      Mouth/Throat:      Mouth: Mucous membranes are moist.      Pharynx: Oropharynx is clear. No oropharyngeal exudate.   Eyes:      General: No scleral icterus.     Extraocular Movements: Extraocular movements intact.      Conjunctiva/sclera: Conjunctivae normal.      Pupils: Pupils are equal, round, and reactive to light.   Cardiovascular:      Rate and Rhythm: Normal rate and regular rhythm.      Pulses: Normal pulses.      Heart sounds: Normal heart sounds. No murmur heard.      Pulmonary:      Effort: No respiratory distress.      Breath sounds: No wheezing.   Chest:      Chest wall: No tenderness.   Abdominal:      General: There is no distension.      Palpations: Abdomen is soft.      Tenderness: There is no abdominal tenderness. There is no  guarding or rebound.   Musculoskeletal:         General: Normal range of motion.      Cervical back: Normal range of motion and neck supple.      Right lower leg: No edema.      Left lower leg: No edema.   Lymphadenopathy:      Cervical: No cervical adenopathy.   Skin:     General: Skin is warm and dry.      Capillary Refill: Capillary refill takes less than 2 seconds.      Findings: No rash.   Neurological:      Mental Status: He is alert and oriented to person, place, and time.      Cranial Nerves: Cranial nerve deficit present.      Motor: Weakness present.      Comments: Slight left facial droop, LUE/LLE weakness 3+/5, normal strength on right, clear speech   Psychiatric:         Mood and Affect: Mood normal.         Behavior: Behavior normal.         Thought Content: Thought content normal.     no change to exam     Medications  Current Facility-Administered Medications   Medication Dose Route Frequency Provider Last Rate Last Admin   • labetalol (NORMODYNE/TRANDATE) injection 10-20 mg  10-20 mg Intravenous Q4HRS PRN Lissette Jaffe M.D.       • hydrALAZINE (APRESOLINE) injection 20 mg  20 mg Intravenous Q4HRS PRN Lissette Jaffe M.D.       • hydrALAZINE (APRESOLINE) tablet 25 mg  25 mg Oral Q8HRS Lissette Jaffe M.D.   25 mg at 12/24/21 0614   • amLODIPine (NORVASC) tablet 10 mg  10 mg Oral Q DAY Lissette Jaffe M.D.   10 mg at 12/24/21 0614   • furosemide (LASIX) injection 40 mg  40 mg Intravenous Q DAY Lissette Jaffe M.D.   40 mg at 12/24/21 0615   • potassium chloride SA (Kdur) tablet 40 mEq  40 mEq Oral DAILY Lissette Jaffe M.D.   40 mEq at 12/24/21 0614   • omeprazole (PRILOSEC) capsule 20 mg  20 mg Oral DAILY Andrew Samson M.D.   20 mg at 12/24/21 0614   • losartan (COZAAR) tablet 100 mg  100 mg Oral DAILY Lissette Jaffe M.D.   100 mg at 12/24/21 0614   • pravastatin (PRAVACHOL) tablet 20 mg  20 mg Oral DAILY Lissette Jaffe M.D.   20 mg at 12/24/21 0614   • ursodiol (ACTIGALL) capsule  300 mg  300 mg Oral BID Lissette Jaffe M.D.   300 mg at 12/24/21 0614   • acetaminophen (TYLENOL) tablet 500 mg  500 mg Oral TID Arden Pfeiffer D.O.   500 mg at 12/23/21 2107   • melatonin tablet 2.5 mg  2.5 mg Oral Nightly Arden Pfeiffer D.O.   2.5 mg at 12/23/21 2106   • sodium chloride (SALT) tablet 2 g  2 g Oral TID WITH MEALS Bel Leach M.D.   2 g at 12/23/21 1751   • senna-docusate (PERICOLACE or SENOKOT S) 8.6-50 MG per tablet 2 Tablet  2 Tablet Oral BID Ever Whitmore M.D.   2 Tablet at 12/24/21 0614    And   • polyethylene glycol/lytes (MIRALAX) PACKET 1 Packet  1 Packet Oral QDAY PRN Ever Whitmore M.D.        And   • magnesium hydroxide (MILK OF MAGNESIA) suspension 30 mL  30 mL Oral QDAY PRN Ever Whitmore M.D.        And   • bisacodyl (DULCOLAX) suppository 10 mg  10 mg Rectal QDAY PRN Ever Whitmore M.D.       • Respiratory Therapy Consult   Nebulization Continuous RT Ever Whitmore M.D.       • LORazepam (ATIVAN) injection 2 mg  2 mg Intravenous Q5 MIN PRN Ever Whitmore M.D.       • acetaminophen (Tylenol) tablet 650 mg  650 mg Oral Q4HRS PRN Ever Whitmore M.D.   650 mg at 12/23/21 0330    Or   • acetaminophen (TYLENOL) suppository 650 mg  650 mg Rectal Q4HRS PRN Ever Whitmore M.D.       • ondansetron (ZOFRAN ODT) dispertab 4 mg  4 mg Oral Q4HRS PRN Ever Whitmore M.D.        Or   • ondansetron (ZOFRAN) syringe/vial injection 4 mg  4 mg Intravenous Q4HRS PRN Ever Whitmore M.D.   4 mg at 12/22/21 1443   • MD Alert...ICU Electrolyte Replacement per Pharmacy   Other PHARMACY TO DOSE Ever Whitmore M.D.       • enalaprilat (Vasotec) injection 1.25 mg 1 mL  1.25 mg Intravenous Q6HRS PRN Ever Whitmore M.D.   1.25 mg at 12/22/21 1501       Fluids    Intake/Output Summary (Last 24 hours) at 12/24/2021 0655  Last data filed at 12/24/2021 0600  Gross per 24 hour   Intake 5397.08 ml   Output 7650 ml   Net -2252.92 ml       Laboratory           Recent Labs     12/22/21  0511 12/22/21  1145 12/22/21  1908 12/23/21  0030 12/23/21  0310 12/23/21  0310 12/23/21  1220 12/23/21  1835 12/24/21  0000   SODIUM 142   < > 141   < > 140   < > 140 142 142   POTASSIUM 3.7  --  3.6  --  3.5*  --   --   --   --    CHLORIDE 111  --  108  --  107  --   --   --   --    CO2 22  --  23  --  24  --   --   --   --    BUN 9  --  9  --  8  --   --   --   --    CREATININE 0.62  --  0.63  --  0.64  --   --   --   --    MAGNESIUM 1.9  --   --   --  1.8  --   --   --   --    CALCIUM 7.7*  --  7.8*  --  8.0*  --   --   --   --     < > = values in this interval not displayed.     Recent Labs     12/22/21  0511 12/22/21  1908 12/23/21 0310   ALTSGPT 16  --  13   ASTSGOT 14  --  12   ALKPHOSPHAT 68  --  69   TBILIRUBIN 0.3  --  0.4   GLUCOSE 104* 180* 130*     Recent Labs     12/22/21  0511 12/23/21 0310   WBC 8.5 8.0   NEUTSPOLYS 57.20 61.60   LYMPHOCYTES 27.00 20.80*   MONOCYTES 12.20 12.40   EOSINOPHILS 2.50 4.40   BASOPHILS 0.70 0.50   ASTSGOT 14 12   ALTSGPT 16 13   ALKPHOSPHAT 68 69   TBILIRUBIN 0.3 0.4     Recent Labs     12/22/21  0511 12/23/21 0310   RBC 3.89* 3.98*   HEMOGLOBIN 11.4* 11.8*   HEMATOCRIT 35.4* 35.8*   PLATELETCT 192 177       Imaging  ECHO:  CONCLUSIONS  Normal transthoracic echocardiogram.     CT head 10pm:  IMPRESSION:     1.  Increase in size of right temporal intra-axial hematoma now measuring 6.7 x 4.3 cm and previously 3.4 x 2.9 cm     2.  Mild mass effect with 2-3 mm right to left shift     3.  Underlying brain white matter change and volume loss    Assessment/Plan  Acute right basal ganglia hemorrhage   - NSG/neuro following   - neuro check every 4 hours   - SBP goals < 140   - s/p xeralto reversal   - SLP/PT/OT   - stop 3%    - CT head on 12/21 with some worsening, but clinically improving   - cont salt tabs at 2g TID   - medically cleared and accepted to [facility].  Will transfer today    Acute Cerebral Edema   - due to basal ganglia  hemorrhage   - s/p 3%   - exam improving slowly    Essential HTN   - s/p nicardipine   - cont home losartan 100mg daily    - cont oral amlodipine 10mg PO daily   - cont oral hydralazine 25mg every 8 hours   - cont IV lasix 40mg daily   - labetalol/hydralazine prn    Dyslipidemia   - cont home statin    Atrial fibrillation   - appears in SR   - reversed Xeralto   - K goal > 4 and Mg goal > 2   - ECHO ok   - discuss with NSG when to resume Xarelto    Hyponatremia   - Na stable at 140-142   - cont salt tabs at 2g TID   - stopped 3% today   - would repeat BMP tomorrow to monitor Na levels      VTE:  Contraindicated  Ulcer: Not Indicated  Lines: Peripherals    I have performed a physical exam and reviewed and updated ROS and Plan today (12/24/2021). In review of yesterday's note (12/23/2021), there are no changes except as documented above.     Discussed patient condition and risk of morbidity and/or mortality with Family, RN, RT, Therapies, Pharmacy, Charge nurse / hot rounds and Patient     Patient is stable from an ICU perspective and is medically cleared for rehab.  He will be transferred today.  He has required several additional medications to control SBP with an increased dose of losartan from 50mg to 100mg as well as amlodipine and oral hydralazine.  We have been diuresing with Lasix 40mg IV daily.  His total fluid is about 3.8 liters so I would continue diuresing.  3% was just stopped today.  I would repeat a BMP in the morning.  His neuro exam has remained stable and improving somewhat.

## 2021-12-24 NOTE — CONSULTS
Hospital Medicine Consultation    Date of Service  12/24/2021    Referring Physician  Lissette Jaffe M.D.    Consulting Physician  Abram Hyatt M.D.    Reason for Consultation  Help manage medical problem     History of Presenting Illness  71 y.o. male with past medical history of atrial fibrillation dyslipidemia and hypertension who presented 12/19/2021 with left-sided weakness secondary to right basal ganglia hemorrhage.  S/p nicardipine drips & hypertonic saline infusion. transitioned to salt tablets. His blood pressure medications adjusted. Will need rehab, accepted at [facility]. Will need to follow with neurology stroke clinic, if not able to restart on anticoagulation may be a good candidate for a Watchman device for embolic stoke prophylaxis.     Review of Systems  Review of Systems   Constitutional: Positive for malaise/fatigue. Negative for fever.   HENT: Negative for sore throat.    Eyes: Negative for discharge and redness.   Respiratory: Negative for cough, shortness of breath and stridor.    Cardiovascular: Negative for chest pain and leg swelling.   Gastrointestinal: Negative for abdominal pain.   Genitourinary: Negative for frequency and hematuria.   Musculoskeletal: Negative for back pain and neck pain.   Skin: Negative for rash.   Neurological: Positive for sensory change and focal weakness (left side, improving slowly ).   Endo/Heme/Allergies: Does not bruise/bleed easily.   Psychiatric/Behavioral: Negative for depression. The patient is not nervous/anxious.      Past Medical History  HTN, AFIB DLD     Surgical History  Dental extraction     Family History  HTN in father     Consults  [facility] [place]     Social History   reports that he has never smoked. He has never used smokeless tobacco. He reports that he does not drink alcohol and does not use drugs.    Medications  Prior to Admission Medications   Prescriptions Last Dose Informant Patient Reported? Taking?   losartan  (COZAAR) 50 MG Tab 12/18/2021 at am  Yes No   Sig: Take 50 mg by mouth every day.   omeprazole (PRILOSEC) 20 MG delayed-release capsule   Yes Yes   Sig: Take 20 mg by mouth every day.   pravastatin (PRAVACHOL) 20 MG Tab 12/18/2021 at am  Yes No   Sig: Take 20 mg by mouth every day.   rivaroxaban (XARELTO) 20 MG Tab tablet 12/18/2021 at am  Yes No   Sig: Take 20 mg by mouth every day.   ursodiol (ACTIGALL) 300 MG Cap 12/18/2021 at am  Yes No   Sig: Take 300 mg by mouth 2 times a day.      Facility-Administered Medications: None     Allergies  Allergies   Allergen Reactions   • Lactose      Physical Exam  Temp:  [36.7 °C (98 °F)-37.2 °C (98.9 °F)] 37.1 °C (98.7 °F)  Pulse:  [69-91] 73  Resp:  [15-52] 17  BP: (112-148)/(55-74) 112/55  SpO2:  [93 %-98 %] 96 %    Physical Exam  Vitals and nursing note reviewed.   Constitutional:       General: He is not in acute distress.     Comments: Laying in bed watching TV   HENT:      Head: Normocephalic and atraumatic.      Right Ear: External ear normal.      Left Ear: External ear normal.      Mouth/Throat:      Mouth: Mucous membranes are moist.   Eyes:      General: No scleral icterus.     Pupils: Pupils are equal, round, and reactive to light.   Cardiovascular:      Rate and Rhythm: Normal rate and regular rhythm.      Pulses: Normal pulses.   Pulmonary:      Effort: Pulmonary effort is normal. No respiratory distress.      Breath sounds: No wheezing.   Abdominal:      General: Abdomen is flat. There is no distension.      Palpations: Abdomen is soft.      Tenderness: There is no abdominal tenderness. There is no guarding.   Musculoskeletal:      Right lower leg: No edema.      Left lower leg: No edema.   Skin:     General: Skin is warm and dry.      Capillary Refill: Capillary refill takes less than 2 seconds.   Neurological:      Mental Status: He is alert and oriented to person, place, and time.      Motor: Weakness (Power 3/5 Left UE and LE 3/5 strength) present.    Psychiatric:         Mood and Affect: Mood normal.         Behavior: Behavior normal.       Laboratory  Recent Labs     12/22/21  0511 12/23/21  0310   WBC 8.5 8.0   RBC 3.89* 3.98*   HEMOGLOBIN 11.4* 11.8*   HEMATOCRIT 35.4* 35.8*   MCV 91.0 89.9   MCH 29.3 29.6   MCHC 32.2* 33.0*   RDW 50.2* 47.8   PLATELETCT 192 177   MPV 9.2 9.1     Recent Labs     12/22/21  1908 12/23/21  0030 12/23/21  0310 12/23/21  1220 12/23/21  1835 12/24/21  0000 12/24/21  0625   SODIUM 141   < > 140   < > 142 142 140   POTASSIUM 3.6  --  3.5*  --   --   --  4.0   CHLORIDE 108  --  107  --   --   --  107   CO2 23  --  24  --   --   --  25   GLUCOSE 180*  --  130*  --   --   --  93   BUN 9  --  8  --   --   --  9   CREATININE 0.63  --  0.64  --   --   --  0.61   CALCIUM 7.8*  --  8.0*  --   --   --  8.1*    < > = values in this interval not displayed.                     Imaging  IR-PICC LINE PLACEMENT W/ GUIDANCE > AGE 5   Final Result                  Ultrasound-guided PICC placement performed by qualified nursing staff as    above.          CT-HEAD W/O   Final Result      1. Stable acute right temporal lobe and basal ganglia intraparenchymal hematoma measuring approximately 6.3 x 4.1 cm.   2. Chronic microvascular ischemic type changes.         EC-ECHOCARDIOGRAM COMPLETE W/ CONT   Final Result      CT-HEAD W/O   Final Result      1.  Increase in size of right temporal intra-axial hematoma now measuring 6.7 x 4.3 cm and previously 3.4 x 2.9 cm      2.  Mild mass effect with 2-3 mm right to left shift      3.  Underlying brain white matter change and volume loss      DX-CHEST-PORTABLE (1 VIEW)   Final Result         1. No acute cardiopulmonary abnormalities are identified.      CT-CTA HEAD WITH & W/O-POST PROCESS   Final Result         1. No hemodynamically significant narrowing of the major intracranial vessels.   2. Redemonstration of parenchymal hemorrhage in the right basal ganglia.      CT-CTA NECK WITH & W/O-POST PROCESSING    Final Result      1. No evidence of flow-limiting stenosis in the cervical carotid or cervical vertebral arteries.      CT-CEREBRAL PERFUSION ANALYSIS   Final Result      1.  Cerebral blood flow less than 30% = 7 mL.      2.  T Max more than 6 seconds = 16 mL.      3.  These are likely related to the parenchymal hemorrhage.      4.  Please note that the cerebral perfusion was performed on the limited brain tissue around the basal ganglia region. Infarct/ischemia outside the CT perfusion sections can be missed in this study.      CT-HEAD W/O   Final Result         1. A 3.4 x 3.6 cm parenchymal hemorrhage in the right basal ganglia. Mild mass effect in the right lateral ventricle. No significant midline shift.                  CRITICAL RESULT READ BACK: Preliminary findings discussed with and critical read back performed by Dr. CONSTANZA ARTIS II in the Emergency Department via telephone on 12/19/2021 3:50 AM        Assessment/Plan  * Intracranial hemorrhage (HCC)- (present on admission)  Assessment & Plan  Neuro & NSG consulted   SBP goals < 140  Was on Xeralto reversed   SLP/PT/OT > good candidate for acute rehab. Accepted to [facility].  Will transfer 12/24/2021   S/p 3%, now on salt tabs    Will need to follow with neurology stroke clinic, if not able to restart on anticoagulation may be a good candidate for a Watchman device for embolic stoke prophylaxis.      Hypertension- (present on admission)  Assessment & Plan  Amlodipine, losartan   Titrate according to tred      AF (atrial fibrillation) (HCC)- (present on admission)  Assessment & Plan  Xarallto stopped with his ICH   Will need to follow with neurology stroke clinic, if not able to restart on anticoagulation may be a good candidate for a Watchman device for embolic stoke prophylaxis.   Has been without RVR     Hyperlipemia- (present on admission)  Assessment & Plan  Pravastatin     GERD (gastroesophageal reflux disease)- (present on  admission)  Assessment & Plan  Omeprazole

## 2021-12-24 NOTE — DISCHARGE PLANNING
Anticipated Discharge Disposition: [facility]     Action: RN CM attended IDT rounds and reviewed patient chart.  Voalte [facility] from [facility] at 1012 stating patient was accepted and transport arranged for  with [facility].  SLICK REY spoke with Dr. Jaffe to confirm medical clearance as patient had orders to transfer to neuro floor.  Dr. Jaffe states patient is medically cleared and she and resident will work on d/c orders and summary.    SLICK REY met with patient and wife at bedside.  They are aware of transport and anxious to get patient to rehab.  Cobra and IMM signed.  Cobra given to charge RN.  IMM scanned to DPA.      Barriers to Discharge: none identified     Plan: HCM to remain available for any further discharge planning needs.

## 2021-12-24 NOTE — H&P
"REHABILITATION HISTORY AND PHYSICAL/POST ADMISSION EVALUATION    12/24/2021  2:23 PM  Gokul Baca  [id number]  Admission  12/24/2021 12:44 PM  [facility] Code/Reason for admission: 0001.1 - Stroke: Left Body Involvement (Right Brain)   Etiologic diagnosis/problem: Hemorrhagic stroke (HCC)  Chief Complaint: Left-sided weakness    HPI:  Per Dr. Pfeiffer's consult \"The patient is a 71 y.o. male with a past medical history of obstructive sleep apnea, hypertension, hyperlipidemia and paroxysmal atrial fibrillation on Xarelto;  who presented on 12/19/2021  3:31 AM with left sided droop and left sided weakness and found to have a basal ganglionic hemorrhage involving the putamen and globus pallidus on the right, extending right up to the posterior limb of the internal capsule. Per documentation, wife woke up early AM due to noise and woke up  to check for intruders. It was neighbor's party in backyard. Shortly after the patient woke up, developed left-sided weakness for which EMS were called and patient was brought to emergency room.\"    Patient was monitored in the ICU and on IV antihypertensives.  He received prothrombin complex conference for Xarelto reversal.  He was started on hyper tonic saline and oral sodium tabs for initial goal of a sodium of 150, now with a goal of 140.  Repeat head CT 6 hours for admission found increase in the size of his hemorrhage though repeat imaging the following day was stable.  He did not require any surgery. HgbA1c 5.5, .    Patient required IV diuresis with noted anasarca, thought secondary to IV fluids.  He had an echocardiogram that showed ejection fraction of 70%.  Patient had a PICC line placed on 12/22 due to difficulties maintaining peripheral IV access.  His Xarelto was discontinued due to his hemorrhage.  Per neurology okay to restart Xarelto in 2 weeks.    Patient current reports left-sided weakness, a sense that his left arm does not belong to him, swelling in " his extremities, and a mild headache.  He denies any issues with urination other than polyuria due to the diuretics and is currently has a condom catheter in place.  He moved his bowels this morning.  He also reports a left visual field cut new since his stroke.  In addition he is complaining of right ear pain.  Patient reports he is not sure he wants to restart Xarelto.    Patient was evaluated by Rehab Medicine physician and Physical Therapy, Occupational Therapy and Speech Therapy and determined to be appropriate for acute inpatient rehab and was transferred to [facility] on 12/24/2021 12:44 PM.    With this acute therapeutic intervention, this patient hopes to improve his functional status, and return to independent living with the supportive care of spouse.    REVIEW OF SYSTEMS:     A complete review of systems was performed and was negative in detail with the exception of items mentioned elsewhere in this document.    PMH:  Past Medical History:   Diagnosis Date   • Atrial fibrillation (HCC)    • GERD (gastroesophageal reflux disease)    • Hyperlipidemia    • Hypertension        PSH:  Past Surgical History:   Procedure Laterality Date   • KNEE ARTHROPLASTY TOTAL Left        Family History   Problem Relation Age of Onset   • Heart Disease Mother    • Heart Disease Brother    • Alcohol abuse Son         MEDICATIONS:  Current Facility-Administered Medications   Medication Dose   • hydrOXYzine HCl (ATARAX) tablet 50 mg  50 mg   • melatonin tablet 3 mg  3 mg   • Respiratory Therapy Consult     • Pharmacy Consult Request ...Pain Management Review 1 Each  1 Each   • acetaminophen (Tylenol) tablet 650 mg  650 mg   • docusate sodium (ENEMEEZ) enema 283 mg  283 mg   • sodium phosphate (Fleet) enema 133 mL  1 Each   • [START ON 12/25/2021] omeprazole (PRILOSEC) capsule 20 mg  20 mg   • artificial tears ophthalmic solution 1 Drop  1 Drop   • benzocaine-menthol (CEPACOL) lozenge 1 Lozenge  1 Lozenge   •  mag hydrox-al hydrox-simeth (MAALOX PLUS ES or MYLANTA DS) suspension 20 mL  20 mL   • ondansetron (ZOFRAN ODT) dispertab 4 mg  4 mg    Or   • ondansetron (ZOFRAN) syringe/vial injection 4 mg  4 mg   • traZODone (DESYREL) tablet 50 mg  50 mg   • sodium chloride (OCEAN) 0.65 % nasal spray 2 Spray  2 Spray   • midazolam (VERSED) 5 mg/mL (1 mL vial)  5 mg   • senna-docusate (PERICOLACE or SENOKOT S) 8.6-50 MG per tablet 2 Tablet  2 Tablet    And   • polyethylene glycol/lytes (MIRALAX) PACKET 1 Packet  1 Packet    And   • magnesium hydroxide (MILK OF MAGNESIA) suspension 30 mL  30 mL    And   • bisacodyl (DULCOLAX) suppository 10 mg  10 mg   • acetaminophen (TYLENOL) tablet 500 mg  500 mg   • [START ON 12/25/2021] amLODIPine (NORVASC) tablet 10 mg  10 mg   • hydrALAZINE (APRESOLINE) tablet 25 mg  25 mg   • [START ON 12/25/2021] losartan (COZAAR) tablet 100 mg  100 mg   • [START ON 12/25/2021] potassium chloride SA (Kdur) tablet 40 mEq  40 mEq   • sodium chloride (SALT) tablet 2 g  2 g   • ursodiol (ACTIGALL) capsule 300 mg  300 mg       ALLERGIES:  Lactose    PSYCHOSOCIAL HISTORY:  Pre-mobidly, the patient lived in a single level home with 2 steps to enter, in [place] with spouse.  They have been  for 40 years.  They have 2 adopted children.  Patient still works as a salesman for life insurance.  Denies tobacco alcohol or drugs.  His wife is a realtor and can assist him at discharge.    LEVEL OF FUNCTION PRIOR TO DISABILTY:  Independent    LEVEL OF FUNCTION PRIOR TO ADMISSION to [facility]:  PT:    Gait Level Of Assist: Minimal Assist  Assistive Device: Front Wheel Walker  Distance (Feet): 4 (4 forward, 4 back)  Deviation: Shuffled Gait,Bradykinetic (verbal encouragment needed to participate)  Skilled Intervention: Verbal Cuing,Tactile Cuing,Facilitation  Supine to Sit: Moderate Assist  Sit to Supine: Minimal Assist  Scooting: Maximal Assist  Rolling: Moderate Assist to Rt.  Skilled  "Intervention: Verbal Cuing,Tactile Cuing  Comments: toward left side  Sit to Stand: Minimal Assist  Bed, Chair, Wheelchair Transfer: Refused  Toilet Transfers: Unable to Participate  Transfer Method: Stand Step  Skilled Intervention: Verbal Cuing  Sitting in Chair: post session  Sitting Edge of Bed: 5 min  Standin min    OT:    Eating: Minimal Assist  Upper Body Dressing: Moderate Assist  Lower Body Dressing: Maximal Assist  Toileting: Maximal Assist    SLP:    Problem List: Dysphagia  Diet / Liquid Recommendation: Soft & Bite-Sized (6) - (Dysphagia III),Mildly Thick (2) - (Nectar Thick)    CURRENT LEVEL OF FUNCTION:   Same as level of function prior to admission to [facility]    PHYSICAL EXAM:     VITAL SIGNS:   height is 1.664 m (5' 5.51\") and weight is 116 kg (255 lb 11.7 oz). His blood pressure is 165/68 (abnormal) and his pulse is 85. His respiration is 17 and oxygen saturation is 96%.     GENERAL: No apparent distress  HEENT: Normocephalic/atraumatic, EOMI, PERRL and No nystagmus, external auditory canal without evidence of erythema  CARDIAC: Regular rate and rhythm, normal S1, S2, no murmurs, left upper extremity and left lower extremity peripheral edema   LUNGS: Clear to auscultation, normal respiratory effort, on room air   ABDOMINAL: bowel sounds present, soft, nontender, nondistended and protuberant    EXTREMITIES: 2+ bilateral DP/PT pulses  MSK: Scar left knee    NEURO:    Mental status: Alert, impaired attention  Speech: fluent, no aphasia or dysarthria    CRANIAL NERVES:  2,3: Left visual field cut, PERRL  3,4,6: EOMI bilaterally, no nystagmus or diplopia  5: intact in all branches  7: Left facial droop  8: hearing grossly intact  9,10: symmetric palate elevation  11: SCM/Trapezius strength 5/5 on the right, 4/5 on the left  12: tongue protrudes midline    Motor:  Shoulder flexors:  Right -  5/5, Left -  2-3/5  Elbow flexors:  Right -  5/5, Left -  4/5  Elbow extensors:  Right - "  5/5, Left -  4/5  Weaker  on the left  Hip flexors:  Right -  5/5, Left -  2-3/5  Dorsiflexors:  Right -  5/5, Left -  4/5  Plantar flexors:  Right -  5/5, Left -  4/5     Sensory:   Decreased to light touch in patchy areas on the left leg and arm    DTRs:   2+ in right upper and right lower extremity  3+ in left upper and left lower extremity  Several beats of clonus at the left ankle  Negative babinski b/l  Negative Larson b/l     Tone: Increased at the left ankle and the left wrist      RADIOLOGY:                                                           Results for orders placed during the hospital encounter of 12/19/21    CT-CTA NECK WITH & W/O-POST PROCESSING    Impression  1. No evidence of flow-limiting stenosis in the cervical carotid or cervical vertebral arteries.                     12/19/2021 3:36 AM     HISTORY/REASON FOR EXAM:  Emergency Medical Condition ? Stroke  Left-sided weakness.     TECHNIQUE/EXAM DESCRIPTION AND NUMBER OF VIEWS:  CT of the head without contrast.     The study was performed on a helical multidetector CT scanner. Contiguous 2.5 mm axial sections were obtained from the skull base through the vertex.     Up to date radiation dose reduction adjustments have been utilized to meet ALARA standards for radiation dose reduction.     COMPARISON:  None available     FINDINGS:  A 3.4 x 3.6 cm parenchymal hemorrhage in the right basal ganglia     Gray-white matter differentiation is grossly preserved.     Ventricle size and brain parenchymal volume are appropriate for this patient's stated age.     The basal cisterns are patent.     There are no abnormal extra-axial fluid collections.     No depressed or widely  calvarial fracture is seen.     The visualized paranasal sinuses and temporal bone structures are aerated.     ___________________________________     IMPRESSION:        1. A 3.4 x 3.6 cm parenchymal hemorrhage in the right basal ganglia. Mild mass effect in the right  lateral ventricle. No significant midline shift.         12/19/2021 9:44 AM     HISTORY/REASON FOR EXAM:  Stroke, follow up.  Follow-up intracranial hemorrhage     TECHNIQUE/EXAM DESCRIPTION AND NUMBER OF VIEWS:  CT of the head without contrast.     The study was performed on a helical multidetector CT scanner. Contiguous 5 mm axial sections were obtained from the skull base through the vertex.     Up to date radiation dose reduction adjustments have been utilized to meet ALARA standards for radiation dose reduction.     COMPARISON:  CT head 12/19/2021 0338 hours     FINDINGS:  CALVARIA:  The calvaria are normal in appearance.     BRAIN:  There is a right temporal intra-axial hematoma. It now measures 6.7 x 4.3 cm transversely. There is surrounding white matter edema. There is mild mass effect with 2-3 mm right to left shift. Previously the hematoma measured 2.9 x 3.4 cm.     There are underlying brain white matter changes and cerebral volume loss..                LABS:  Recent Labs     12/22/21  1908 12/23/21  0030 12/23/21  0310 12/23/21  1220 12/24/21  0000 12/24/21  0625 12/24/21  1040   SODIUM 141   < > 140   < > 142 140 139   POTASSIUM 3.6  --  3.5*  --   --  4.0  --    CHLORIDE 108  --  107  --   --  107  --    CO2 23  --  24  --   --  25  --    GLUCOSE 180*  --  130*  --   --  93  --    BUN 9  --  8  --   --  9  --    CREATININE 0.63  --  0.64  --   --  0.61  --    CALCIUM 7.8*  --  8.0*  --   --  8.1*  --     < > = values in this interval not displayed.     Recent Labs     12/22/21  0511 12/23/21  0310 12/24/21  1040   WBC 8.5 8.0 9.0   RBC 3.89* 3.98* 4.14*   HEMOGLOBIN 11.4* 11.8* 12.4*   HEMATOCRIT 35.4* 35.8* 37.0*   MCV 91.0 89.9 89.4   MCH 29.3 29.6 30.0   MCHC 32.2* 33.0* 33.5*   RDW 50.2* 47.8 46.0   PLATELETCT 192 177 199   MPV 9.2 9.1 9.0         PRIMARY REHAB DIAGNOSIS:    This patient is a 71 y.o. male admitted for acute inpatient rehabilitation with Hemorrhagic stroke (HCC).    IMPAIRMENTS:    Cognitive  ADLs/IADLs  Mobility  Speech  Swallow    SECONDARY DIAGNOSIS/MEDICAL CO-MORBIDITIES AFFECTING FUNCTION:    Headache  Hypertension  Paroxysmal atrial fibrillation  Edema  Anemia  GERD  Insomnia  Morbid obesity  Right ear pain      RELEVANT CHANGES SINCE PREADMISSION EVALUATION:    Status unchanged    The patient's rehabilitation potential is Good  The patient's medical prognosis is good    PLAN:   Discussion and Recommendations, discussed with the patient and/or family:   1. The patient requires an acute inpatient rehabilitation program with a coordinated program of care at an intensity and frequency not available at a lower level of care. This recommendation is substantiated by the patient's medical physicians who recommend that the patient's intervention and assessment of medical issues needs to be done at an acute level of care for patient's safety and maximum outcome.     2. A coordinated program of care will be supplied by an interdisciplinary team of physical therapy, occupational therapy, rehab physician, rehab nursing, and, if needed, speech therapy and rehab psychology. Rehab team presents a patient-specific rehabilitation and education program concentrating on prevention of future problems related to accessibility, mobility, skin, bowel, bladder, sexuality, and psychosocial and medical/surgical problems.     3. Need for Rehabilitation Physician: The rehab physician will be evaluating the patient on a multi-weekly basis to help coordinate the program of care. The rehab physician communicates between medical physicians, therapists, and nurses to maximize the patient's potential outcome. Specific areas in which the rehab physician will be providing daily assessment include the following:   A. Assessing the patient's heart rate and blood pressure response (vitals monitoring) to activity and making adjustments in medications or conservative measures as needed.   B. The rehab physician will be  assessing the frequency at which the program can be increased to allow the patient to reach optimal functional outcome.   C. The rehab physician will also provide assessments in daily skin care, especially in light of patient's impairments in mobility.   D. The rehab physician will provide special expertise in understanding how to work with functional impairment and recommend appropriate interventions, compensatory techniques, and education that will facilitate the patient's outcome.     4. Rehab R.N.   The rehab RN will be working with patient to carry over in room mobility and activities of daily living when the patient is not in 3 hours of skilled therapy. Rehab nursing will be working in conjunction with rehab physician to address all the medical issues above and continue to assess laboratory work and discuss abnormalities with the treating physicians, assess vitals, and response to activity, and discuss and report abnormalities with the rehab physician. Rehab RN will also continue daily skin care, supervise bladder/bowel program, instruct in medication administration, and ensure patient safety.     5. Therapies to treat at intensity and frequency of (may change after completion of evaluation by all therapeutic disciplines):       PT:  Physical therapy to address mobility, transfer, gait training and evaluation for adaptive equipment needs 1hour/day at least 5 days/week for the duration of the ELOS (see below)       OT:  Occupational therapy to address ADLs, self-care, home management training, functional mobility/transfers and assistive device evaluation, and community re-integration 1hour/day at least 5 days/week for the duration of the ELOS (see below).        ST/Dysphagia:  Speech therapy to address speech, language, and cognitive deficits as well as swallowing difficulties with retraining/dysphagia management and community re-integration with comprehension, expression, cognitive training 1hour/day at least  5 days/week for the duration of the ELOS (see below).     6. Medical management / Rehabilitation Issues/Adverse Potential affecting function as part of rehabilitation plan.    Headache  Likely from brain swelling  Continue scheduled Tylenol, dose increased  As needed Fioricet  We will slowly titrate off salt tabs with goal to keep sodium 140 or above    Hypertension  Amlodipine 10 mg  Losartan 100 mg  Increased dose of hydralazine 25 mg every 8 to 50 mg every 8  Consult hospitalist    Paroxysmal atrial fibrillation  Xarelto currently on hold for 2 weeks per neurology  May benefit from beta-blocker, per review of outpatient notes has been on metoprolol and amiodarone in the past  Consult hospitalist    Edema  Previously on IV Lasix  Check Doppler ultrasound of the left upper extremity and left lower extremity    Anemia  Check morning labs    GERD  Omeprazole    Insomnia  Melatonin    Morbid obesity  Due to excess calories  BMI 41.89  Outpatient nutritional counseling    Right ear pain  Evaluate with otoscope    I performed a complete drug regimen review and did not identify any potential clinically significant medication issues.    The patient's CODE STATUS was confirmed as FULL CODE on admission, with the patient and/or family at bedside.    REHABILITATION ISSUES/ADVERSE POTENTIAL:  1.  CVA (Cerebrovascular Accident): Continue lipid and blood pressure management. Patient demonstrates functional deficits in strength, balance, coordination, and ADL's. Patient is admitted to [facility] for comprehensive rehabilitation therapy as described below.   Rehabilitation nursing monitors bowel and bladder control, educates on medication administration, co-morbidities and monitors patient safety.    2.  DVT prophylaxis:  Patient is on nothing for anticoagulation upon transfer due to his intraparenchymal hemorrhage. Encourage OOB. Monitor daily for signs and symptoms of DVT including but not limited to  swelling and pain to prevent the development of DVT that may interfere with therapies.  Check Doppler ultrasound of the left lower extremity.    3.  Pain: No issues with pain currently / Controlled with as needed oral analgesics.  See headache as noted above.    4.  Nutrition/Dysphagia: Dietician monitors nutrient intake, recommend supplements prn and provide nutrition education to pt/family to promote optimal nutrition for wound healing/recovery.     5.  Bladder/bowel:  Start bowel and bladder program, to prevent constipation, urinary retention (which may lead to UTI), and urinary incontinence (which will impact upon pt's functional independence).   - TV Q3h while awake with post void bladder scans, I&O cath for PVRs >400  - up to commode after meal   -Okay to use condom catheter due to diuresis    6.  Skin/dermal ulcer prophylaxis: Monitor for new skin conditions with q.2 h. turns as required to prevent the development of skin breakdown.     7.  GI prophylaxis: Omeprazole to prevent gastritis or GI bleed that would interfere with therapies.    8. Respiratory therapy: RT performs O2 management prn, breathing retraining, pulmonary hygiene and bronchospasm management prn to optimize participation in therapies.    Pt was seen today for 74 min, and entire time spent in face-to-face contact was >50% in counseling and coordination of care as detailed in A/P above.        GOALS/EXPECTED LEVEL OF FUNCTION BASED ON CURRENT MEDICAL AND FUNCTIONAL STATUS (may change based on patient's medical status and rate of impairment recovery):  Transfers:   Supervision  Mobility/Gait:   Supervision  ADL's:   Minimal Assistance  Cognition: Least verbal cues  Swallowing: Least restrictive diet    DISPOSITION: Discharge to pre-morbid independent living setting with the supportive care of patient's spouse.      ELOS: 21 days    Valencia Ramirez M.D.  Physical Medicine and Rehabilitation

## 2021-12-24 NOTE — PROGRESS NOTES
Daily Progress Note:     Date of Service: 12/24/2021  Primary Team: [facility] Gold Team   Attending: Lissette Jaffe M.D.   Senior Resident: Dr. Andrew Samson    Chief Complaint:  Intracranial hemorrhage  History of atrial fibrillation on chronic anticoagulation    Subjective:  -Reports doing well this AM  -Around 6AM had a couple second run of irregular heart rate, possible afib, however spontaneously terminated within a few seconds. No recurrence of abnormality thus far.  -mentioned this AM that his wife had to go to the ED after having high blood pressure    Interval Updates:  Transferred to Neurosciences  -Nicardipine drip discontinued  -discontinued continuous hypertonic saline    Consultants/Specialty:  Neurosurgery: Signed off  PMR  Neuro    Review of Systems:    Review of Systems   Constitutional: Negative for fever.   HENT: Negative for hearing loss.    Eyes: Negative for blurred vision and double vision.        Worsened left eye vision (stable)   Respiratory: Negative for cough, shortness of breath and wheezing.    Cardiovascular: Negative for chest pain.   Gastrointestinal: Negative for diarrhea and vomiting.   Musculoskeletal: Negative for myalgias.   Neurological: Positive for sensory change and focal weakness (reports improving). Negative for tremors and headaches.   Psychiatric/Behavioral: Negative for depression and hallucinations.       Objective Data:   Physical Exam:   Vitals:   Temp:  [36.7 °C (98 °F)-37.2 °C (98.9 °F)] 37.1 °C (98.8 °F)  Pulse:  [69-91] 70  Resp:  [15-52] 21  BP: (115-148)/(56-74) 133/62  SpO2:  [93 %-98 %] 97 %     Physical Exam  Vitals and nursing note reviewed.   Constitutional:       General: He is not in acute distress.     Comments: Laying in bed watching TV   HENT:      Head: Normocephalic and atraumatic.      Right Ear: External ear normal.      Left Ear: External ear normal.      Mouth/Throat:      Mouth: Mucous membranes are moist.   Eyes:      General: No scleral  icterus.     Pupils: Pupils are equal, round, and reactive to light.   Cardiovascular:      Rate and Rhythm: Normal rate and regular rhythm.      Pulses: Normal pulses.   Pulmonary:      Effort: Pulmonary effort is normal. No respiratory distress.      Breath sounds: No wheezing.   Abdominal:      General: Abdomen is flat. There is no distension.      Palpations: Abdomen is soft.      Tenderness: There is no abdominal tenderness. There is no guarding.   Musculoskeletal:      Right lower leg: No edema.      Left lower leg: No edema.   Skin:     General: Skin is warm and dry.      Capillary Refill: Capillary refill takes less than 2 seconds.   Neurological:      Mental Status: He is alert and oriented to person, place, and time.      Cranial Nerves: Cranial nerve deficit (mild L sided facial droop) present.      Motor: Weakness (Left UE and LE 3/5 strength, 5/5 on RUE an RLE) present.   Psychiatric:         Mood and Affect: Mood normal.         Behavior: Behavior normal.         Labs:  Results for NASIM TELLEZ (MRN [id number]) as of 12/24/2021 09:43   Ref. Range 12/24/2021 06:25   Sodium Latest Ref Range: 135 - 145 mmol/L 140   Potassium Latest Ref Range: 3.6 - 5.5 mmol/L 4.0   Chloride Latest Ref Range: 96 - 112 mmol/L 107   Co2 Latest Ref Range: 20 - 33 mmol/L 25   Anion Gap Latest Ref Range: 7.0 - 16.0  8.0   Glucose Latest Ref Range: 65 - 99 mg/dL 93   Bun Latest Ref Range: 8 - 22 mg/dL 9   Creatinine Latest Ref Range: 0.50 - 1.40 mg/dL 0.61   GFR If  Latest Ref Range: >60 mL/min/1.73 m 2 >60   GFR If Non  Latest Ref Range: >60 mL/min/1.73 m 2 >60   Calcium Latest Ref Range: 8.5 - 10.5 mg/dL 8.1 (L)   AST(SGOT) Latest Ref Range: 12 - 45 U/L 16   ALT(SGPT) Latest Ref Range: 2 - 50 U/L 14   Alkaline Phosphatase Latest Ref Range: 30 - 99 U/L 75   Total Bilirubin Latest Ref Range: 0.1 - 1.5 mg/dL 0.4   Albumin Latest Ref Range: 3.2 - 4.9 g/dL 3.4   Total Protein Latest Ref Range: 6.0  - 8.2 g/dL 6.1   Globulin Latest Ref Range: 1.9 - 3.5 g/dL 2.7   A-G Ratio Latest Units: g/dL 1.3   Magnesium Latest Ref Range: 1.5 - 2.5 mg/dL 1.9       Problem Representation:  71 year old man with PMH that includes atrial fibrillation with chronic use of xarelto as well as history of hypertension who presented on 12/19 early AM with complaints of L sided weakness, found to have right sided intracranial hemorrhage with repeat CT imaging 6 hours after found to show increasing mass size, repeat scan following shows mass is stable. Now off of nicardipine drip, stable for transfer out of ICU to Neuroscience floor.     * Intracranial hemorrhage (HCC)  Assessment & Plan  R side BG 3 x 3 cm on admission, increased to 6.7 x 4.3 cm on 12/19 1054 imaging  Plan  losartan, amlodipine goal: SBP < 140, see below problem hypertension   sodium tabs, given clinical improvement will be less aggressive with pursuing therapeutic hypernatremia  Glucose control  pravastatin  Neurology following  Neurosurgery signed off  SLP, PT/OT  PMR  Q4 hour neuro checks      Hypertension  Assessment & Plan    BP 190s systolic on admission  Started on amlodipine 5mg on 12/22  Plan  increased losartan dose to 100mg on 12/21  Amlodipine dose increased to 10mg on 12/23/2021      PRNs available; hydralazine, Vasotec, labetalol     Volume excess  Assessment & Plan  -Likely secondary to increased fluid intake from various IV inputs  -Started on PO lasix on 12/21  -Switched to IV lasix on 12/22  Plan  Continue with IV lasix    Hyperlipemia  Assessment & Plan  -XR chest noted aortic calcification  Pravastatin as above, target LDL <70    AF (atrial fibrillation) (HCC)  Assessment & Plan  Previously on Xeralto  ECHO completed 12/19/2021  SR noted on 12/19 EKG  Plan  Holding xarelto; AC contraindicated  Optimize electrolytes  Rate control as needed    GERD (gastroesophageal reflux disease)  Assessment & Plan  -On omeprazole OTC at home  Plan  Resumed home  omeprazole        Quality Measures:  Lines: PIV x 1, PICC line in place, Condom cath  Code: FULL  VTE: SCDs  Diet: Liquid diet  GI prophylaxis: none needed, on home omeprazole for GERD  Disposition: transfer to neurosciences  Antibiotics: None  Bowel regimen: Doc-Senna  Analgesia: Tylenol

## 2021-12-24 NOTE — CARE PLAN
The patient is Watcher - Medium risk of patient condition declining or worsening    Shift Goals  Clinical Goals: Stable neuro checks  Patient Goals: Sleep  Family Goals: Comfort    Progress made toward(s) clinical / shift goals:  Patient extremely thirsty, required minimal but frequent reorientation for safety measures and swallow precautions.      Problem: Optimal Care of the Stroke Patient  Goal: Optimal acute care for the stroke patient  Outcome: Progressing     Problem: Knowledge Deficit - Stroke Education  Goal: Patient's knowledge of stroke and risk factors will improve  Outcome: Progressing     Problem: Neuro Status  Goal: Neuro status will remain stable or improve  Outcome: Progressing     Problem: Hemodynamic Monitoring  Goal: Patient's hemodynamics, fluid balance and neurologic status will be stable or improve  Outcome: Progressing     Problem: Risk for Aspiration  Goal: Patient's risk for aspiration will be absent or decrease  Outcome: Progressing     Problem: Self Care  Goal: Patient will have the ability to perform ADLs independently or with assistance (bathe, groom, dress, toilet and feed)  Outcome: Progressing     Problem: Skin Integrity  Goal: Skin integrity is maintained or improved  Outcome: Progressing     Problem: Fall Risk  Goal: Patient will remain free from falls  Outcome: Progressing

## 2021-12-24 NOTE — PREADMISSION SCREENING NOTE
Pre-Admission Screening Form    Patient Information:   Name: Gokul Baca     MRN: [id number]       : 1950      Age: 71 y.o.   Gender: male      Race: White [7]       Marital Status:  [2]  Family Contact: Adriana Baca        Relationship: Spouse [17]  Home Phone:            Cell Phone: [phone]  Advanced Directives: None  Code Status:  FULL  Current Attending Provider: Abram Hyatt M.D.  Referring Physician: Dr. Whitmore  Physiatrist Consult: Dr. Pfeiffer   Referral Date: 21  Primary Payor Source:  MEDICARE  Secondary Payor Source:  [facility], [facility]    Medical Information:   Date of Admission to Acute Care [place]2021  Room Number: R104/00  Rehabilitation Diagnosis: 0001.1 - Stroke: Left Body Involvement (Right Brain)  Immunization History   Administered Date(s) Administered   • Influenza Vaccine Adult HD 2020, 10/14/2020   • Moderna SARS-CoV-2 Vaccine 2021, 2021, 2021   • Pneumococcal Conjugate Vaccine (Prevnar/PCV-13) 2020     Allergies   Allergen Reactions   • Lactose      History reviewed. No pertinent past medical history.  History reviewed. No pertinent surgical history.    History Leading to Admission, Conditions that Caused the Need for Rehab (CMS):     Dr. Whitmore H&P:  Reason for Consultation  ICH     History of Presenting Illness  71 y.o. male with a PMHx AF on xeralto, HLD, HTN who presented 2021 with L sided weakness, found to have a R sided BG hemorrhage 3 x 3 cm.     Symptoms were noticed in the middle the night with left facial droop and left-sided weakness.  He was in his prior state of health.  Endorses a moderate R sided headache which is made worse by light and associated with nausea but denies chest pain, shortness of breath, fevers, chills.   Assessment/Plan  * Intracranial hemorrhage (HCC)  Assessment & Plan  R side BG 3 x 3 cm     SBP < 140  TEG in process  PCC for xeralto reversal  Maintain normal glucose and  temperature  Neurology following  Repeat CT if change in neurologic exam  SLP  Q1 hour neuro checks     Hypertension  Assessment & Plan  Reintroduce oral antihypertensives when clinically appropriate      Hyperlipemia  Assessment & Plan  Continue statin once cleared by SLP     AF (atrial fibrillation) ([facility])  Assessment & Plan  Previously on Xeralto     AC contraindicated  Optimize electrolytes  Rate control as needed  Formal ECHO    Dr. Cervantes (Neurology) recommendations:  Assessment/Plan:   71 y.o. male with history of atrial fibrillation who was on Xarelto, hypertension who was brought to emergency room for acute onset of left-sided weakness.  He was found to have a 3 x 3 cm intraparenchymal hemorrhage in the right basal ganglia.  His ICH score is 1.  Patient's anticoagulation was reversed by [employer].  I will be admitted to intensive care unit for close neuro monitoring.  Continue with every hour neuro check.  Repeat brain CT at 6 hours.  Keep head of bed elevated at 45 degrees.  Maintain systolic blood pressure less than 150.  If any seizure activity noted recommend Keppra 500 mg twice a day.  Bowel regimen to avoid Valsalva maneuver.  Neurosurgery is on board for possible intervention in case of hemorrhage expansion or clinical deterioration.  Avoid antiplatelet or anticoagulant.  DVT prevention with SCDs.    Dr. Arriaga (Surgery Neurosurgery) recommendations:  IMPRESSION:  Right basal ganglionic intracerebral hemorrhage likely secondary   to small vessel disease.  The patient does have a history of hypertension,   does not smoke.     RECOMMENDATIONS:  ICU observation with hourly neurological checks, head of bed   elevated.  Blood pressure control to keep systolic less than 150-160.  I   would also recommend a neurology consultation for risk factor   identification/modification as long as the patient is awake, conversant and   following commands.  There is no need for immediate surgical intervention and   the  neurosurgery service will follow the patient with internal medicine team.     Dr. Pfeiffer (Physiatry) recommendations:  ASSESSMENT:  IMPRESSION: The patient is a 71 y.o. male with a past medical history of obstructive sleep apnea, hypertension, hyperlipidemia and paroxysmal atrial fibrillation on Xarelto;  who presented on 12/19/2021  3:31 AM with left sided droop and left sided weakness  and found to have a basal ganglionic hemorrhage involving the putamen and globus pallidus on the right, extending right up to the posterior limb of the internal capsule.      [facility] Code: 0001.1 - Stroke: Left Body Involvement (Right Brain)  -With acute secondary complications of: impaired ADLs and mobility, Mild-Moderate oropharyngeal dysphagia, impaired cognition, left inattention, left sided weakness  -with chronic conditions of: obstructive sleep apnea, hypertension, hyperlipidemia and paroxysmal atrial fibrillation on Xarelto  -and:      Medical Complexity:  Anemia  Hyperglycemia     RECOMMENDATIONS:     ##MSK  #Impaired ADLs and mobility: Agree with continuing OT/PT/SLP while admitted here.     Patient is early in his course. However, due to his impaired cognition, left sided neglect, likely aphasia, I suspect he will require      Patient is a good candidate for acute inpatient rehabilitation provided that: patient can tolerate 3 hours of therapy a day, has aggressive therapy needs, patient is medically stable, bed becomes available, insurance authorization is obtained.     Estimated length of stay: 12-16 days  Anticipated discharge destination: home with family  Prognosis: good     ##NEURO  #Acute basal ganglionic hemorrhage involving the putamen and globus pallidus on the right, extending right up to the posterior limb of the internal capsule  Currently on nicardipine drip in ICU and is therefore high risk     #Likely aphasia  #left sided neglect  #Left sided weakness  Aphasia improving  SLP, PT, OT     #Acute headache,  improving  Continue tylenol 500mg TID     ##PSYCH  #Impaired sleep  Continue melatonin 2.5mg QHS  Recommend good sleep hygiene with lights on/window shade up during the day, out of bed and in chair for meals, and in bed with lights off and minimal interruptions at night.     ##GI  #At risk for constipation  Goal of one continent BM daily     ##  #Condom catheter in place  Monitor     ##SKIN  Recommend turning Q2hr and monitor for skin changes at least daily  DVT chemoprophlaxis: brain bleed. Recommend starting once medically cleared to do so  Code: full resuscitation    Syeda Ramires R.N.   Registered Nurse      Procedures      Signed   Date of Service:  12/22/2021 10:34 AM                       []Hide copied text    []Adele for details    Vascular Access Team      Date of Insertion: 12/22/2021  Arm Circumference: 40.5  Internal length: 42  External Length: 0  Vein Occupancy %: 27              Reason for PICC: critical access  Labs: WBC 8.5, PLT 192, BUN 9, Cr 0.62, GFR >60, INR 1.23     Consents confirmed, vessel patency confirmed with ultrasound. Risks and benefits of procedure explained to patient and family member and education regarding central line associated bloodstream infections provided. Questions answered.      PICC placed in RUE per licensed provider order with ultrasound guidance.  5 Fr, 2 lumen PICC placed in Brachial vein after 1 attempt(s). 2 mL of 1% lidocaine injected intradermally at the insertion site. A 21 gauge microintroducer needle and modified Seldinger technique was used to obtain access to the vein. 42 cm catheter inserted and brisk blood return was observed from each lumen upon aspiration. Line secured at the 0 cm marker. TCS stylet removed and observed to be fully intact. Each lumen flushed using pulsatile method without resistance with 10 mL 0.9% normal saline. PICC line secured with Biopatch and Tegaderm.     PICC tip placement location is confirmed by nurse to be in the  "Superior Vena Cava (SVC) utilizing 3CG technology. PICC line is appropriate for use at this time. Patient tolerated procedure well, without complications.  Patient condition relayed to primary RN or ordering physician via this post procedure note in the EMR.      Ultrasound images uploaded to PACS and viewable in the EMR - yes  Ultrasound imaged printed and placed in paper chart - no     [facility] PICC ref # [id number]        Head CT 12-19-21:  1. A 3.4 x 3.6 cm parenchymal hemorrhage in the right basal ganglia. Mild mass effect in the right lateral ventricle. No significant midline shift.    Co-morbidities: See PMH  Potential Risk - Complications: Aphasia, Cognitive Impairment, Contractures, Deep Vein Thrombosis, Dysphagia, Incontinence, Malnutrition, Pain, Perceptual Impairment, Pneumonia, Pressure Ulcer, Seizures and Urinary Tract Infection  Level of Risk: High    Ongoing Medical Management Needed (Medical/Nursing Needs):   Patient Active Problem List    Diagnosis Date Noted   • GERD (gastroesophageal reflux disease) 12/23/2021   • Volume excess 12/22/2021   • Intracranial hemorrhage (HCC) 12/19/2021   • AF (atrial fibrillation) (HCC) 12/19/2021   • Hyperlipemia 12/19/2021   • Hypertension 12/19/2021     Alert with impaired cognition.    Current Vital Signs:   Temperature: 37.1 °C (98.8 °F) Pulse: 70 Respiration: (!) 21 Blood Pressure : 133/62  Weight: 115 kg (253 lb 8.5 oz) Height: 166.4 cm (5' 5.51\")  Pulse Oximetry: 97 % O2 (LPM): 2      Completed Laboratory Reports:  Recent Labs     12/21/21  1110 12/21/21  1143 12/21/21  1727 12/21/21  1745 12/21/21  2330 12/21/21  2333 12/22/21  0510 12/22/21  0511 12/22/21  1145 12/22/21  1908 12/23/21  0030 12/23/21  0310 12/23/21  1220 12/23/21  1835 12/24/21  0000 12/24/21  0625   WBC  --   --   --   --   --   --   --  8.5  --   --   --  8.0  --   --   --   --    HEMOGLOBIN  --   --   --   --   --   --   --  11.4*  --   --   --  11.8*  --   --   --   --    HEMATOCRIT  " --   --   --   --   --   --   --  35.4*  --   --   --  35.8*  --   --   --   --    PLATELETCT  --   --   --   --   --   --   --  192  --   --   --  177  --   --   --   --    SODIUM 140  --   --  140 141  --   --  142 142 141 139 140 140 142 142 140   POTASSIUM  --   --   --   --   --   --   --  3.7  --  3.6  --  3.5*  --   --   --  4.0   BUN  --   --   --   --   --   --   --  9  --  9  --  8  --   --   --  9   CREATININE  --   --   --   --   --   --   --  0.62  --  0.63  --  0.64  --   --   --  0.61   ALBUMIN  --   --   --   --   --   --   --  3.3  --   --   --  3.3  --   --   --  3.4   GLUCOSE  --   --   --   --   --   --   --  104*  --  180*  --  130*  --   --   --  93   POCGLUCOSE  --  127* 136*  --   --  91 90  --   --   --   --   --   --   --   --   --      Additional Labs: Not Applicable    Prior Living Situation:   Housing / Facility: 1 Story House  Steps Into Home: 2  Lives with - Patient's Self Care Capacity: Spouse  Equipment Owned: 4-Wheel Walker    Prior Level of Function / Living Situation:   Physical Therapy: Prior Services: Home-Independent  Housing / Facility: 1 Story House  Steps Into Home: 2  Rail: None  Bathroom Set up: Walk In Shower,Shower Chair  Equipment Owned: 4-Wheel Walker  Lives with - Patient's Self Care Capacity: Spouse  Bed Mobility: Independent  Transfer Status: Independent  Ambulation: Independent  Distance Ambulation (Feet):  (Community distances)  Assistive Devices Used: None  Stairs: Independent  Current Level of Function:   Gait Level Of Assist: Minimal Assist  Assistive Device: Front Wheel Walker  Distance (Feet): 4 (4 forward, 4 back)  Deviation: Shuffled Gait,Bradykinetic (verbal encouragment needed to participate)  Skilled Intervention: Verbal Cuing,Tactile Cuing,Facilitation  Supine to Sit: Moderate Assist  Sit to Supine: Minimal Assist  Scooting: Maximal Assist  Rolling: Moderate Assist to Rt.  Skilled Intervention: Verbal Cuing,Tactile Cuing  Comments: toward left side  Sit  to Stand: Minimal Assist  Bed, Chair, Wheelchair Transfer: Refused  Toilet Transfers: Unable to Participate  Transfer Method: Stand Step  Skilled Intervention: Verbal Cuing  Sitting in Chair: post session  Sitting Edge of Bed: 5 min  Standin min  Occupational Therapy:   Self Feeding: Independent  Grooming / Hygiene: Independent  Bathing: Independent  Dressing: Independent  Toileting: Independent  Medication Management: Independent  Laundry: Independent  Kitchen Mobility: Independent  Finances: Independent  Home Management: Independent  Shopping: Independent  Prior Level Of Mobility: Independent Without Device in Community  Driving / Transportation: Driving Independent  Prior Services: Home-Independent  Housing / Facility: [address]  Occupation (Pre-Hospital Vocational): Unable To Determine At This Time  Current Level of Function:   Eating: Minimal Assist  Upper Body Dressing: Moderate Assist  Lower Body Dressing: Maximal Assist  Toileting: Maximal Assist  Speech Language Pathology:   Problem List: Dysphagia  Diet / Liquid Recommendation: Soft & Bite-Sized (6) - (Dysphagia III),Mildly Thick (2) - (Nectar Thick)  Rehabilitation Prognosis/Potential: Good  Estimated Length of Stay: 14-21 days    Nursing:      Incontinent-external cath in place.    Scope/Intensity of Services Recommended:  Physical Therapy: 1 hr / day  5 days / week. Therapeutic Interventions Required: Maximize Endurance, Mobility, Strength and Safety  Occupational Therapy: 1 hr / day 5 days / week. Therapeutic Interventions Required: Maximize Self Care, ADLs, IADLs and Energy Conservation  Speech & Language Pathology: 1 hr / day 5 days / week. Therapeutic Interventions Required: Maximize Cognition, Swallowing and Safety  Rehabilitation Nursin/7. Therapeutic Interventions Required: Monitor Pain, Skin, Vital Signs, Intake and Output, Labs, Safety, Aspiration Risk and Family Training  Rehabilitation Physician: 3 - 5 days / week. Therapeutic  Interventions Required: Medical Management  Respiratory Care: Pulmonary Toileting. Therapeutic Interventions Required: Pulmonary Toileting, O2 Weaning and Aspiration Risk  Dietician: Nutritional Assessment/Education. Therapeutic Interventions Required: .    He requires 24-hour rehabilitation nursing to manage bowel and bladder function, skin care, nutrition and fluid intake, pulmonary hygiene, pain control, safety, medication management and patient/family goals. In addition, rehabilitation nursing will reiterate and reinforce therapy skills and equipment use, including ADLs, as well as provide education to the patient and family. Gokul Baca is willing to participate in and is able to tolerate the proposed plan of care.    Rehabilitation Goals and Plan (Expected frequency & duration of treatment in the IRF):   Return to the Community, Minimal Assist Level of Care and Family Able to Provide 24/7 Assistance  Anticipated Date of Rehabilitation Admission: 12-24-21  Patient/Family oriented IRF level of care/facility/plan: Yes  Patient/Family willing to participate in IRF care/facility/plan: Yes  Patient able to tolerate IRF level of care proposed: Yes  Patient has potential to benefit IRF level of care proposed: Yes  Comments: Not Applicable    Special Needs or Precautions - Medical Necessity:  Safety Concerns/Precautions:  Fall Risk / High Risk for Falls, Balance, Cognition and Bed / Chair Alarm  Pain Management  IV Site: PICC  Requires Oxygen  Current Medications:    Current Facility-Administered Medications Ordered in Epic   Medication Dose Route Frequency Provider Last Rate Last Admin   • labetalol (NORMODYNE/TRANDATE) injection 10-20 mg  10-20 mg Intravenous Q4HRS PRN Lissette Jaffe M.D.       • hydrALAZINE (APRESOLINE) injection 20 mg  20 mg Intravenous Q4HRS PRN Lissette Jaffe M.D.       • hydrALAZINE (APRESOLINE) tablet 25 mg  25 mg Oral Q8HRS Lissette Jaffe M.D.   25 mg at 12/24/21 0614   •  amLODIPine (NORVASC) tablet 10 mg  10 mg Oral Q DAY Lissette Jaffe M.D.   10 mg at 12/24/21 0614   • furosemide (LASIX) injection 40 mg  40 mg Intravenous Q DAY Lissette Jaffe M.D.   40 mg at 12/24/21 0615   • potassium chloride SA (Kdur) tablet 40 mEq  40 mEq Oral DAILY Lissette Jaffe M.D.   40 mEq at 12/24/21 0614   • omeprazole (PRILOSEC) capsule 20 mg  20 mg Oral DAILY Andrew Samson M.D.   20 mg at 12/24/21 0614   • losartan (COZAAR) tablet 100 mg  100 mg Oral DAILY Lissette Jaffe M.D.   100 mg at 12/24/21 0614   • pravastatin (PRAVACHOL) tablet 20 mg  20 mg Oral DAILY Lissette Jaffe M.D.   20 mg at 12/24/21 0614   • ursodiol (ACTIGALL) capsule 300 mg  300 mg Oral BID Lissette Jaffe M.D.   300 mg at 12/24/21 0614   • acetaminophen (TYLENOL) tablet 500 mg  500 mg Oral TID Arden Pfeiffer D.O.   500 mg at 12/24/21 0813   • melatonin tablet 2.5 mg  2.5 mg Oral Nightly Arden Pfeiffer, D.O.   2.5 mg at 12/23/21 2106   • sodium chloride (SALT) tablet 2 g  2 g Oral TID WITH MEALS Bel Leach M.D.   2 g at 12/24/21 0813   • senna-docusate (PERICOLACE or SENOKOT S) 8.6-50 MG per tablet 2 Tablet  2 Tablet Oral BID Ever Whitmore M.D.   2 Tablet at 12/24/21 0614    And   • polyethylene glycol/lytes (MIRALAX) PACKET 1 Packet  1 Packet Oral QDAY PRN Ever Whitmore M.D.        And   • magnesium hydroxide (MILK OF MAGNESIA) suspension 30 mL  30 mL Oral QDAY PRN Ever Whitmore M.D.        And   • bisacodyl (DULCOLAX) suppository 10 mg  10 mg Rectal QDAY PRN Ever Whitmore M.D.       • Respiratory Therapy Consult   Nebulization Continuous RT Ever Whitmore M.D.       • LORazepam (ATIVAN) injection 2 mg  2 mg Intravenous Q5 MIN PRN Ever Whitmore M.D.       • acetaminophen (Tylenol) tablet 650 mg  650 mg Oral Q4HRS PRN Ever Whitmore M.D.   650 mg at 12/23/21 0330    Or   • acetaminophen (TYLENOL) suppository 650 mg  650 mg Rectal Q4HRS PRN Ever Whitmore M.D.       •  ondansetron (ZOFRAN ODT) dispertab 4 mg  4 mg Oral Q4HRS PRN Ever Whitmore M.D.        Or   • ondansetron (ZOFRAN) syringe/vial injection 4 mg  4 mg Intravenous Q4HRS PRN Ever Whitmore M.D.   4 mg at 12/22/21 1443   • MD Alert...ICU Electrolyte Replacement per Pharmacy   Other PHARMACY TO DOSE Ever Whitmore M.D.       • enalaprilat (Vasotec) injection 1.25 mg 1 mL  1.25 mg Intravenous Q6HRS PRN Ever Whitmore M.D.   1.25 mg at 12/22/21 1501     No current Epic-ordered outpatient medications on file.     Diet:   DIET ORDERS (From admission to next 24h)     Start     Ordered    12/23/21 1100  Diet Order Diet: Level 6 - Soft and Bite Sized; Liquid level: Level 2 - Mildly Thick; Tray Modifications (optional): SLP - 1:1 Supervision by Nursing, SLP - Deliver to Nursing Station  START AT LUNCH        Question Answer Comment   Diet: Level 6 - Soft and Bite Sized    Liquid level Level 2 - Mildly Thick    Tray Modifications (optional) SLP - 1:1 Supervision by Nursing    Tray Modifications (optional) SLP - Deliver to Nursing Station        12/23/21 0929                Anticipated Discharge Destination / Patient/Family Goal:  Destination: Home with Assistance Support System: Spouse  Anticipated home health services: OT, PT, SLP, Nursing, Social Work and Aide  Previously used HH service/ provider: Not Applicable  Anticipated DME Needs: Oxygen, Walker, Wheelchair, Ramp, Commode, Hospital Bed and Life Line  Outpatient Services: OT, PT and SLP  Alternative resources to address additional identified needs:     Pre-Screen Completed: 12/24/2021 9:07 AM Vasu Ding L.P.N.

## 2021-12-24 NOTE — FLOWSHEET NOTE
12/24/21 1436   Patient History   Pulmonary Diagnosis mild ROB   Procedures Relevant to Respiratory Status none   Home O2 No   Nocturnal CPAP Yes   Nocturnal CPAP Pressures   (uses intermittantly)   Nocturnal CPAP Oxygen liter flow no   Home Treatments/Frequency No   Sleep Apnea Screening   Have you had a sleep study? Yes   Have you been diagnosed with sleep apnea? Yes   Do you use a CPAP/BIPAP/AUTOPAP? Yes

## 2021-12-24 NOTE — ASSESSMENT & PLAN NOTE
Xarallto stopped with his ICH   Will need to follow with neurology stroke clinic, if not able to restart on anticoagulation may be a good candidate for a Watchman device for embolic stoke prophylaxis.   Has been without RVR

## 2021-12-24 NOTE — CARE PLAN
The patient is Stable - Low risk of patient condition declining or worsening    Shift Goals  Clinical Goals: SBP<150, wean cardene gtt, stable neuro checks and vitals  Patient Goals: Rest  Family Goals: N/A    Progress made toward(s) clinical / shift goals:  PO anti-hypertensives added by physician and cardene drip stopped since SBP within goals. Neuro checks and vitals stable throughout shift.      Problem: Knowledge Deficit - Standard  Goal: Patient and family/care givers will demonstrate understanding of plan of care, disease process/condition, diagnostic tests and medications  Outcome: Progressing     Problem: Knowledge Deficit - Stroke Education  Goal: Patient's knowledge of stroke and risk factors will improve  Outcome: Progressing     Problem: Psychosocial - Patient Condition  Goal: Patient's ability to verbalize feelings about condition will improve  Outcome: Progressing     Problem: Neuro Status  Goal: Neuro status will remain stable or improve  Outcome: Progressing    Patient is not progressing towards the following goals:    Problem: Self Care  Goal: Patient will have the ability to perform ADLs independently or with assistance (bathe, groom, dress, toilet and feed)  Outcome: Not Progressing  Note: Patient needs encouragement to perform ADLs independently, but willing to participate.

## 2021-12-24 NOTE — DISCHARGE PLANNING
Dr. Jaffe has medically cleared.  Msg left for Adriana, spouse to look into D/C resources/support.  Case is under review by Dr. Ramirez.     6294-Dr. Ramirez has accepted.  Spoke with Adriana, spouse and she is agreeable.  They live in a 1LV home with 2ST to enter.  She works from home and able to provide 24/7 supervision/min assistance.  CEDRIC Maldonado is looking into transport.    1001-Transport has been arranged via GMT between [id number].  Dr. Jaffe, Dr. Hyatt, Zeny REY & Adriana spouse are aware.  Msg placed to Wojciech JOHNSTON.

## 2021-12-24 NOTE — DOCUMENTATION QUERY
[facility]                                                                       Query Response Note      PATIENT:               NASIM ETLLEZ  ACCT #:                  [id number]  MRN:                     [id number]  :                      1950  ADMIT DATE:       2021 3:31 AM  DISCH DATE:          RESPONDING  PROVIDER #:        [id number]           QUERY TEXT:    Please clarify the clinical relevance for the clinical/diagnostic findings stated below:  -- Acute cerebral edema  -- Insignificant findings  -- Clinically unable to be further specified  -- Other (please specify in the medical record)  -- Unknown    The patient's clinical indicators include:  70 yo male presented w/non-traumatic ICH  ROS: c/o headache, photophobia  Exam: oropharyngeal dysphagia, LUE/LLE weakness, facial droop  CT Head on : right temporal intra-axial hematoma, surrounding white matter edema, mild mass effect w/2-3mm right to left                                 shift.  CT Head on : intraparenchymal hematoma w/mild surrounding vasogenic edema, localized mass effect w/some sulcal                                 and right lateral ventrical effacement.  TX: 3% hypertonic saline    Thank you,  Enid Yee RN, CCDS  Clinical   Connect via [employer]        Query created by: Enid Yee on 2021 6:17 AM    RESPONSE TEXT:    Ask the primary care team.          Electronically signed by:  NATHALIE WOMACK MD 2021 8:20 PM

## 2021-12-24 NOTE — PROGRESS NOTES
4 Eyes Skin Assessment Completed by SLICK Colon and SLICK Avalos.    Head WDL  Ears Redness, right ear pain  Nose WDL  Mouth WDL  Neck WDL  Breast/Chest WDL  Shoulder Blades WDL  Spine WDL  (R) Arm/Elbow/Hand Scab, DBL lumen PICC to RUE   (L) Arm/Elbow/Hand Abrasion and Edema and Redness  Abdomen WDL  Groin WDL  Scrotum/Coccyx/Buttocks WDL, condom cath worn  (R) Leg WDL  (L) Leg WDL, weakness >to left leg  (R) Heel/Foot/Toe WDL - dry  (L) Heel/Foot/Toe WDL - dry          Devices In Places: Central Line DBL lumen PICC to RUE      Interventions In Place Chair Waffle, Waffle Overlay, Pillows and Q2 Turns    Possible Skin Injury No    Pictures Uploaded Into Epic Yes  Wound Consult Placed N/A  RN Wound Prevention Protocol Ordered Yes

## 2021-12-24 NOTE — FLOWSHEET NOTE
12/24/21 1440   Events/Summary/Plan   Events/Summary/Plan RT assessment.  Pt does not want intervention for his mild sleep apnea   Vital Signs   Pulse 85   Respiration 18   Pulse Oximetry 96 %   $ Pulse Oximetry (Spot Check) Yes   Respiratory Assessment   Level of Consciousness Alert   Chest Exam   Work Of Breathing / Effort Within Normal Limits   Oxygen   O2 Delivery Device None - Room Air   Smoking History   Have you ever smoked Never

## 2021-12-25 ENCOUNTER — APPOINTMENT (OUTPATIENT)
Dept: RADIOLOGY | Facility: REHABILITATION | Age: 71
DRG: 056 | End: 2021-12-25
Attending: PHYSICAL MEDICINE & REHABILITATION
Payer: MEDICARE

## 2021-12-25 ENCOUNTER — ANCILLARY PROCEDURE (OUTPATIENT)
Dept: CARDIOLOGY | Facility: REHABILITATION | Age: 71
DRG: 056 | End: 2021-12-25
Attending: HOSPITALIST
Payer: MEDICARE

## 2021-12-25 PROBLEM — R50.9 FEVER: Status: ACTIVE | Noted: 2021-12-25

## 2021-12-25 PROBLEM — R60.9 EDEMA: Status: ACTIVE | Noted: 2021-12-25

## 2021-12-25 PROBLEM — R41.4 LEFT-SIDED NEGLECT: Status: ACTIVE | Noted: 2021-12-25

## 2021-12-25 LAB
25(OH)D3 SERPL-MCNC: 46 NG/ML (ref 30–100)
ALBUMIN SERPL BCP-MCNC: 3.8 G/DL (ref 3.2–4.9)
ALBUMIN/GLOB SERPL: 1.5 G/DL
ALP SERPL-CCNC: 84 U/L (ref 30–99)
ALT SERPL-CCNC: 15 U/L (ref 2–50)
ANION GAP SERPL CALC-SCNC: 11 MMOL/L (ref 7–16)
APPEARANCE UR: CLEAR
AST SERPL-CCNC: 18 U/L (ref 12–45)
BACTERIA #/AREA URNS HPF: NEGATIVE /HPF
BASOPHILS # BLD AUTO: 0.2 % (ref 0–1.8)
BASOPHILS # BLD: 0.03 K/UL (ref 0–0.12)
BILIRUB SERPL-MCNC: 0.5 MG/DL (ref 0.1–1.5)
BILIRUB UR QL STRIP.AUTO: NEGATIVE
BUN SERPL-MCNC: 11 MG/DL (ref 8–22)
CALCIUM SERPL-MCNC: 8.6 MG/DL (ref 8.5–10.5)
CHLORIDE SERPL-SCNC: 101 MMOL/L (ref 96–112)
CO2 SERPL-SCNC: 24 MMOL/L (ref 20–33)
COLOR UR: YELLOW
CREAT SERPL-MCNC: 0.65 MG/DL (ref 0.5–1.4)
EOSINOPHIL # BLD AUTO: 0.09 K/UL (ref 0–0.51)
EOSINOPHIL NFR BLD: 0.7 % (ref 0–6.9)
EPI CELLS #/AREA URNS HPF: NEGATIVE /HPF
ERYTHROCYTE [DISTWIDTH] IN BLOOD BY AUTOMATED COUNT: 45.4 FL (ref 35.9–50)
GLOBULIN SER CALC-MCNC: 2.6 G/DL (ref 1.9–3.5)
GLUCOSE SERPL-MCNC: 94 MG/DL (ref 65–99)
GLUCOSE UR STRIP.AUTO-MCNC: NEGATIVE MG/DL
HCT VFR BLD AUTO: 37 % (ref 42–52)
HGB BLD-MCNC: 12.3 G/DL (ref 14–18)
IMM GRANULOCYTES # BLD AUTO: 0.07 K/UL (ref 0–0.11)
IMM GRANULOCYTES NFR BLD AUTO: 0.5 % (ref 0–0.9)
KETONES UR STRIP.AUTO-MCNC: NEGATIVE MG/DL
LEUKOCYTE ESTERASE UR QL STRIP.AUTO: ABNORMAL
LYMPHOCYTES # BLD AUTO: 1.5 K/UL (ref 1–4.8)
LYMPHOCYTES NFR BLD: 11.3 % (ref 22–41)
MAGNESIUM SERPL-MCNC: 1.8 MG/DL (ref 1.5–2.5)
MCH RBC QN AUTO: 29.8 PG (ref 27–33)
MCHC RBC AUTO-ENTMCNC: 33.2 G/DL (ref 33.7–35.3)
MCV RBC AUTO: 89.6 FL (ref 81.4–97.8)
MICRO URNS: ABNORMAL
MONOCYTES # BLD AUTO: 1.48 K/UL (ref 0–0.85)
MONOCYTES NFR BLD AUTO: 11.1 % (ref 0–13.4)
NEUTROPHILS # BLD AUTO: 10.15 K/UL (ref 1.82–7.42)
NEUTROPHILS NFR BLD: 76.2 % (ref 44–72)
NITRITE UR QL STRIP.AUTO: NEGATIVE
NRBC # BLD AUTO: 0 K/UL
NRBC BLD-RTO: 0 /100 WBC
PH UR STRIP.AUTO: 8 [PH] (ref 5–8)
PLATELET # BLD AUTO: 218 K/UL (ref 164–446)
PMV BLD AUTO: 10 FL (ref 9–12.9)
POTASSIUM SERPL-SCNC: 3.8 MMOL/L (ref 3.6–5.5)
PROT SERPL-MCNC: 6.4 G/DL (ref 6–8.2)
PROT UR QL STRIP: NEGATIVE MG/DL
RBC # BLD AUTO: 4.13 M/UL (ref 4.7–6.1)
RBC # URNS HPF: ABNORMAL /HPF
RBC UR QL AUTO: ABNORMAL
SARS-COV-2 RNA RESP QL NAA+PROBE: NOTDETECTED
SODIUM SERPL-SCNC: 136 MMOL/L (ref 135–145)
SP GR UR STRIP.AUTO: 1.01
SPECIMEN SOURCE: NORMAL
UROBILINOGEN UR STRIP.AUTO-MCNC: 0.2 MG/DL
WBC # BLD AUTO: 13.3 K/UL (ref 4.8–10.8)
WBC #/AREA URNS HPF: ABNORMAL /HPF

## 2021-12-25 PROCEDURE — 82306 VITAMIN D 25 HYDROXY: CPT

## 2021-12-25 PROCEDURE — 92610 EVALUATE SWALLOWING FUNCTION: CPT

## 2021-12-25 PROCEDURE — 93971 EXTREMITY STUDY: CPT | Mod: 26,RT | Performed by: INTERNAL MEDICINE

## 2021-12-25 PROCEDURE — 87086 URINE CULTURE/COLONY COUNT: CPT

## 2021-12-25 PROCEDURE — A9270 NON-COVERED ITEM OR SERVICE: HCPCS | Performed by: PHYSICAL MEDICINE & REHABILITATION

## 2021-12-25 PROCEDURE — 81001 URINALYSIS AUTO W/SCOPE: CPT

## 2021-12-25 PROCEDURE — 93971 EXTREMITY STUDY: CPT | Mod: RT

## 2021-12-25 PROCEDURE — 97167 OT EVAL HIGH COMPLEX 60 MIN: CPT

## 2021-12-25 PROCEDURE — 83735 ASSAY OF MAGNESIUM: CPT

## 2021-12-25 PROCEDURE — 97163 PT EVAL HIGH COMPLEX 45 MIN: CPT

## 2021-12-25 PROCEDURE — 770010 HCHG ROOM/CARE - REHAB SEMI PRIVAT*

## 2021-12-25 PROCEDURE — 92523 SPEECH SOUND LANG COMPREHEN: CPT

## 2021-12-25 PROCEDURE — 87186 SC STD MICRODIL/AGAR DIL: CPT

## 2021-12-25 PROCEDURE — 80053 COMPREHEN METABOLIC PANEL: CPT

## 2021-12-25 PROCEDURE — 36415 COLL VENOUS BLD VENIPUNCTURE: CPT

## 2021-12-25 PROCEDURE — 87077 CULTURE AEROBIC IDENTIFY: CPT

## 2021-12-25 PROCEDURE — 97535 SELF CARE MNGMENT TRAINING: CPT

## 2021-12-25 PROCEDURE — 700111 HCHG RX REV CODE 636 W/ 250 OVERRIDE (IP): Performed by: HOSPITALIST

## 2021-12-25 PROCEDURE — 97112 NEUROMUSCULAR REEDUCATION: CPT

## 2021-12-25 PROCEDURE — 700102 HCHG RX REV CODE 250 W/ 637 OVERRIDE(OP): Performed by: PHYSICAL MEDICINE & REHABILITATION

## 2021-12-25 PROCEDURE — 87040 BLOOD CULTURE FOR BACTERIA: CPT | Mod: 91

## 2021-12-25 PROCEDURE — 99233 SBSQ HOSP IP/OBS HIGH 50: CPT | Performed by: PHYSICAL MEDICINE & REHABILITATION

## 2021-12-25 PROCEDURE — A9270 NON-COVERED ITEM OR SERVICE: HCPCS | Performed by: HOSPITALIST

## 2021-12-25 PROCEDURE — 700101 HCHG RX REV CODE 250: Performed by: PHYSICAL MEDICINE & REHABILITATION

## 2021-12-25 PROCEDURE — 71045 X-RAY EXAM CHEST 1 VIEW: CPT

## 2021-12-25 PROCEDURE — 700105 HCHG RX REV CODE 258: Performed by: HOSPITALIST

## 2021-12-25 PROCEDURE — 700102 HCHG RX REV CODE 250 W/ 637 OVERRIDE(OP): Performed by: HOSPITALIST

## 2021-12-25 PROCEDURE — 99223 1ST HOSP IP/OBS HIGH 75: CPT | Performed by: HOSPITALIST

## 2021-12-25 PROCEDURE — 85025 COMPLETE CBC W/AUTO DIFF WBC: CPT

## 2021-12-25 RX ORDER — HYDRALAZINE HYDROCHLORIDE 25 MG/1
75 TABLET, FILM COATED ORAL EVERY 8 HOURS
Status: DISCONTINUED | OUTPATIENT
Start: 2021-12-25 | End: 2021-12-27

## 2021-12-25 RX ORDER — SODIUM CHLORIDE 9 MG/ML
20 INJECTION, SOLUTION INTRAMUSCULAR; INTRAVENOUS; SUBCUTANEOUS EVERY 12 HOURS
Status: DISCONTINUED | OUTPATIENT
Start: 2021-12-25 | End: 2022-01-01 | Stop reason: ALTCHOICE

## 2021-12-25 RX ADMIN — CEPHALEXIN 500 MG: 250 CAPSULE ORAL at 05:28

## 2021-12-25 RX ADMIN — MELATONIN TAB 3 MG 3 MG: 3 TAB at 20:52

## 2021-12-25 RX ADMIN — PIPERACILLIN AND TAZOBACTAM 3.38 G: 3; .375 INJECTION, POWDER, LYOPHILIZED, FOR SOLUTION INTRAVENOUS; PARENTERAL at 17:19

## 2021-12-25 RX ADMIN — SODIUM CHLORIDE 2 G: 1 TABLET ORAL at 08:08

## 2021-12-25 RX ADMIN — URSODIOL 300 MG: 300 CAPSULE ORAL at 08:14

## 2021-12-25 RX ADMIN — CEPHALEXIN 500 MG: 250 CAPSULE ORAL at 00:51

## 2021-12-25 RX ADMIN — HYDRALAZINE HYDROCHLORIDE 10 MG: 10 TABLET, FILM COATED ORAL at 00:52

## 2021-12-25 RX ADMIN — OMEPRAZOLE 20 MG: 20 CAPSULE, DELAYED RELEASE ORAL at 08:08

## 2021-12-25 RX ADMIN — Medication 20 ML: at 08:16

## 2021-12-25 RX ADMIN — ACETAMINOPHEN 1000 MG: 500 TABLET, FILM COATED ORAL at 20:51

## 2021-12-25 RX ADMIN — HYDRALAZINE HYDROCHLORIDE 75 MG: 25 TABLET, FILM COATED ORAL at 14:10

## 2021-12-25 RX ADMIN — URSODIOL 300 MG: 300 CAPSULE ORAL at 20:51

## 2021-12-25 RX ADMIN — AMLODIPINE BESYLATE 10 MG: 5 TABLET ORAL at 05:28

## 2021-12-25 RX ADMIN — PIPERACILLIN AND TAZOBACTAM 3.38 G: 3; .375 INJECTION, POWDER, LYOPHILIZED, FOR SOLUTION INTRAVENOUS; PARENTERAL at 09:30

## 2021-12-25 RX ADMIN — ACETAMINOPHEN 1000 MG: 500 TABLET, FILM COATED ORAL at 14:10

## 2021-12-25 RX ADMIN — Medication 20 ML: at 21:00

## 2021-12-25 RX ADMIN — HYDRALAZINE HYDROCHLORIDE 75 MG: 25 TABLET, FILM COATED ORAL at 20:50

## 2021-12-25 RX ADMIN — PIPERACILLIN AND TAZOBACTAM 3.38 G: 3; .375 INJECTION, POWDER, LYOPHILIZED, FOR SOLUTION INTRAVENOUS; PARENTERAL at 12:02

## 2021-12-25 RX ADMIN — LOSARTAN POTASSIUM 100 MG: 25 TABLET, FILM COATED ORAL at 08:08

## 2021-12-25 RX ADMIN — ACETAMINOPHEN 1000 MG: 500 TABLET, FILM COATED ORAL at 08:08

## 2021-12-25 RX ADMIN — PIPERACILLIN AND TAZOBACTAM 3.38 G: 3; .375 INJECTION, POWDER, LYOPHILIZED, FOR SOLUTION INTRAVENOUS; PARENTERAL at 23:50

## 2021-12-25 RX ADMIN — HYDRALAZINE HYDROCHLORIDE 50 MG: 25 TABLET, FILM COATED ORAL at 05:28

## 2021-12-25 RX ADMIN — SODIUM CHLORIDE 2 G: 1 TABLET ORAL at 17:15

## 2021-12-25 RX ADMIN — SODIUM CHLORIDE 2 G: 1 TABLET ORAL at 12:02

## 2021-12-25 RX ADMIN — ATORVASTATIN CALCIUM 40 MG: 40 TABLET, FILM COATED ORAL at 20:52

## 2021-12-25 RX ADMIN — SENNOSIDES AND DOCUSATE SODIUM 2 TABLET: 50; 8.6 TABLET ORAL at 08:07

## 2021-12-25 ASSESSMENT — ENCOUNTER SYMPTOMS
SHORTNESS OF BREATH: 0
PALPITATIONS: 0
FOCAL WEAKNESS: 1
VOMITING: 0
CHILLS: 1
MUSCULOSKELETAL NEGATIVE: 1
COUGH: 0
EYES NEGATIVE: 1
NAUSEA: 0
FEVER: 1
POLYDIPSIA: 0
BRUISES/BLEEDS EASILY: 0
ABDOMINAL PAIN: 0

## 2021-12-25 ASSESSMENT — BRIEF INTERVIEW FOR MENTAL STATUS (BIMS)
ASKED TO RECALL BLUE: YES, NO CUE REQUIRED
WHAT MONTH IS IT: ACCURATE WITHIN 5 DAYS
INITIAL REPETITION OF BED BLUE SOCK - FIRST ATTEMPT: 3
WHAT DAY OF THE WEEK IS IT: CORRECT
GENERAL MEMORY AND RECALL ABILITY: CURRENT SEASON;RECOGNIZES APPROPRIATE HEALTHCARE SETTING
ASKED TO RECALL SOCK: YES, NO CUE REQUIRED
WHAT YEAR IS IT: CORRECT
ASKED TO RECALL BED: YES, NO CUE REQUIRED
BIMS SUMMARY SCORE: 15

## 2021-12-25 ASSESSMENT — GAIT ASSESSMENTS
DEVIATION: DECREASED BASE OF SUPPORT;STEP TO;DECREASED HEEL STRIKE;DECREASED TOE OFF
DISTANCE (FEET): 10
GAIT LEVEL OF ASSIST: TOTAL ASSIST X 2

## 2021-12-25 ASSESSMENT — ACTIVITIES OF DAILY LIVING (ADL): TOILETING: INDEPENDENT

## 2021-12-25 ASSESSMENT — PAIN DESCRIPTION - PAIN TYPE
TYPE: ACUTE PAIN
TYPE: ACUTE PAIN

## 2021-12-25 ASSESSMENT — PAIN SCALES - WONG BAKER: WONGBAKER_NUMERICALRESPONSE: HURTS EVEN MORE

## 2021-12-25 NOTE — THERAPY
"Occupational Therapy   Initial Evaluation     Patient Name: Gokul Baca  Age:  71 y.o., Sex:  male  Medical Record #: [id number]  Today's Date: 12/25/2021     Subjective    \"Yesterday was a nightmare. It was too hot, then too cold, then I couldn't find my blanket or the paddle to call for help. I'm exhausted.\"  (ref to overnight)      Objective       12/25/21 0949   Prior Living Situation   Prior Services None   Housing / Facility 1 Story House   Steps Into Home 1   Bathroom Set up Walk In Shower;Shower Chair   Equipment Owned Wheelchair;Front-Wheel Walker   Lives with - Patient's Self Care Capacity Spouse   Comments DME from previous L TKA    Prior Level of ADL Function   Self Feeding Independent   Grooming / Hygiene Independent   Bathing Independent   Dressing Independent   Toileting Independent   Prior Level of IADL Function   Medication Management Independent   Laundry Independent   Kitchen Mobility Independent   Finances Independent   Home Management Independent   Shopping Independent   Prior Level Of Mobility Independent Without Device in Community   Driving / Transportation Driving Independent   Occupation (Pre-Hospital Vocational) Employed Full Time   Leisure Interests Hobbies  (Golf)   Prior Functioning: Everyday Activities   Self Care Independent   Indoor Mobility (Ambulation) Independent   Stairs Independent   Functional Cognition Independent   Prior Device Use Walker  (From L TKA ~ 1 yr status post )   Pain   Pain Scales Zazueta Baker Scale   Pain 0 - 10 Group   Location Leg   Location Orientation Left   Therapist Pain Assessment During Activity  (In standing )   Pain- Zazueta Baker Scale Group   Zazueta-Desai Scale  6    Cognition    Cognition / Consciousness X   Orientation Level Oriented x 4   Level of Consciousness Alert   Ability To Follow Commands 1 Step   Safety Awareness Impaired;Impulsive   Attention Impaired   Comments L neglect; inconsistent adherence to 1-step commands during latter half of session " secondary to increased fatigue   ABS (Agitated Behavior Scale)   Agitated Behavior Scale Performed Yes   Short Attention Span, Easy Distractibility, Inability to Concentrate 3   Impulsive, Impatient, Low Tolerance for Pain or Frustration 2   Uncooperative, Resistant to Care, Demanding 1   Violent and/or Threatening Violence Toward People or Property 1   Explosive and/or Unpredictable Anger 1   Rocking, Rubbing, Moaning, Other Self-Stimulating Behavior 1   Pulling at Tubes, Restraints, etc. 2   Wandering from Treatment Area 1   Restlessness, Pacing, Excessive Movement 1   Repetitive Behaviors, Motor and/or Verbal 1   Rapid, Loud or Excessive Talking 1   Sudden Changes of Mood 1   Easily Initiated - Excessive Crying and/or Laughter 1   Self-Abusiveness, Physical and/or Verbal 1   Agitated Behavior Scale Total Score 18   Level of Severity No Agitation   Cognitive Pattern Assessment   Cognitive Pattern Assessment Used Staff Assessment   Staff Assessment for Mental Status   Memory/Recall Ability Current season;Recognizes appropriate healthcare setting  (BIM[facility] not utilized due to decreased attention end of tx)   Vision Screen   Vision Tested   Visual Acuity Able to read clock/calander on wall without difficulty;Able to read employee name badge without difficulty   Tracking Unable to test secondary to decreased visual attention   Saccades Unable to test secondary to decreased visual attention   Convergence Unable to test secondary to decreased visual attention   Visual Fields Unable to test secondary due to decreased visual attention   Passive ROM Upper Body   Passive ROM Upper Body WDL   Active ROM Upper Body   Active ROM Upper Body  X   Dominant Hand Right   Lt Shoulder Flexion Degrees 65   Lt. Shoulder Abduction Degrees 45   Lt Elbow Flexion Degrees 35   ROM Lt Hand Severe Impaired   Comments Visual estimate; L neglect noted    Strength Upper Body   Upper Body Strength  X   Lt Shoulder Flexion Strength 2- (P-)   Lt  Shoulder Abduction Strength 2- (P-)   Lt Elbow Flexion Strength 2+ (P+)   Left  Impaired   Balance Assessment   Sitting Balance (Static) Poor +   Sitting Balance (Dynamic) Poor   Standing Balance (Static) Poor -   Standing Balance (Dynamic) Trace   Weight Shift Sitting Poor   Weight Shift Standing Poor   Comments Dense L maida with poor  awareness L side weakness and increased impulsivity in standing.   Bed Mobility    Supine to Sit Maximal Assist   Sit to Supine Maximal Assist  (x2)   Sit to Stand Maximal Assist   Scooting Total Assist   Coordination Upper Body   Coordination X   Fine Motor Coordination RUE FMC impacted by poor visual attention to task and difficulty maintain upright in supported sitting position.    Gross Motor Coordination Moderate LUE ataxia with poor proprioceptive awareness, limited AROM, and ongoing cues required to maintain LUE in a supported anterior position at rest rather than dangling to L side.    Eating   Assistance Needed Physical assistance   Physical Assistance Level 51%-75%   CARE Score - Eating 2   Eating Discharge Goal   Discharge Goal 6   Oral Hygiene   Assistance Needed Physical assistance   Physical Assistance Level 51%-75%   CARE Score - Oral Hygiene 2   Oral Hygiene Discharge Goal   Discharge Goal 6   Shower/Bathe Self   Reason if not Attempted Safety concerns   CARE Score - Shower/Bathe Self 88   Shower/Bathe Self Discharge Goal   Discharge Goal 4   Upper Body Dressing   Assistance Needed Physical assistance   Physical Assistance Level 76% or more   CARE Score - Upper Body Dressing 2   Upper Body Dressing Discharge Goal   Discharge Goal 6   Lower Body Dressing   Assistance Needed Physical assistance   Physical Assistance Level Total assistance   CARE Score - Lower Body Dressing 1   Lower Body Dressing Discharge Goal   Discharge Goal 4   Putting On/Taking Off Footwear   Assistance Needed Physical assistance   Physical Assistance Level Total assistance   CARE Score -  Putting On/Taking Off Footwear 1   Putting On/Taking Off Footwear Discharge Goal   Discharge Goal 4   Toileting Hygiene   Assistance Needed Physical assistance   Physical Assistance Level Total assistance   CARE Score - Toileting Hygiene 1   Toileting Hygiene Discharge Goal   Discharge Goal 6   Toilet Transfer   Assistance Needed Physical assistance   Physical Assistance Level 76% or more   CARE Score - Toilet Transfer 2   Toilet Transfer Discharge Goal   Discharge Goal 6   Hearing, Speech, and Vision   Expression of Ideas and Wants Without difficulty   Understanding Verbal and Non-Verbal Content Usually understands   Functional Level of Assist   Grooming Moderate Assist   Grooming Description Increased time;Initial preparation for task;Seated in wheelchair at sink;Supervision for safety;Verbal cueing   Upper Body Dressing Maximal Assist   Upper Body Dressing Description Assit with threading arms through sleeves;Assist with pulling shirt over head;Increased time;Initial preparation for task;Supervision for safety;Verbal cueing   Lower Body Dressing Total Assist x 2   Lower Body Dressing Description Assist with threading into pant leg;Increased time;Initial preparation for task;Supervision for safety;Verbal cueing   Toileting Total Assist x 2   Toileting Description Assist for hygiene;Assist to pull pants up;Assist to pull pants down;Assist for standing balance;Grab bar;Increased time;Initial preparation for task;Supervision for safety;Verbal cueing  (Incontinent of bowel and bladder )   Bed, Chair, Wheelchair Transfer Maximal Assist  (2 person assist for safety )   Bed Chair Wheelchair Transfer Description Assist with one limb;Increased time;Initial preparation for task;Requires lift;Supervision for safety;Verbal cueing   Toilet Transfers Total Assist  (Left lateral LOB with total therapist assist to prevent fall)   Toilet Transfer Description Grab bar;Assist with one limb;Assist with two limbs;Increased  time;Initial preparation for task;Requires lift;Supervision for safety;Verbal cueing   Problem List   Problem List Decreased Active Daily Living Skills;Decreased Homemaking Skills;Decreased Upper Extremity Strength Left;Decreased Upper Extremity AROM Left;Decreased Functional Mobility;Decreased Activity Tolerance;Safety Awareness Deficits / Cognition;Impaired Coordination Left Upper Extremity;Impaired Cognitive Function;Impaired Postural Control / Balance   Precautions   Precautions Fall Risk   Comments Dense L maida; 2 person assist rec.    Current Discharge Plan   Current Discharge Plan Return to Prior Living Situation   Benefit    Therapy Benefit Patient Would Benefit from Inpatient Rehab Occupational Therapy to Maximize [place] with ADLs, IADLs and Functional Mobility.   Interdisciplinary Plan of Care Collaboration   IDT Collaboration with  Certified Nursing Assistant;Family / Caregiver;Nursing;Physical Therapist;Physician;Therapy Tech   Patient Position at End of Therapy In Bed;Call Light within Reach;Tray Table within Reach;Phone within Reach   Collaboration Comments CLOF    Equipment Needs   Assistive Device / DME None   Adaptive Equipment Not Assessed   Strengths & Barriers   Strengths Independent prior level of function;Pleasant and cooperative;Supportive family;Willingly participates in therapeutic activities   Barriers Bladder incontinence;Bowel incontinence;Decreased endurance;Difficulty following instructions;Fatigue;Hemiparesis;Impaired activity tolerance;Impaired balance;Impaired insight/denial of deficits;Impaired functional cognition;Impulsive;Limited mobility   OT Total Time Spent   OT Individual Total Time Spent (Mins) 75   OT Charge Group   Charges Yes   OT Self Care / ADL 1   OT Evaluation OT Evaluation High       Assessment  Patient is 71 y.o. male with a diagnosis of hemorrhagic stroke (R).  Additional factors influencing patient status / progress (ie: cognitive factors, co-morbidities,  social support, etc): L inattention, L GMC/FMC/AROM deficits, decreased insight into functional deficits, poor proprioceptive awareness in seated and standing, accessible home environment with AD/DME in place from L TKA (status post 1 yr), good spousal support, independent prior level of function.      Plan  Recommend Occupational Therapy  minutes per day 5-7 days per week for 14-21 days for the following treatments:  OT Self Care/ADL, OT Cognitive Skill Dev, OT Neuro Re-Ed/Balance, OT Therapeutic Activity, OT Evaluation and OT Therapeutic Exercise.    Passport items to be completed:  Perform bathroom transfers, complete dressing, complete feeding, get ready for the day, prepare a simple meal, participate in household tasks, adapt home for safety needs, demonstrate home exercise program, complete caregiver training     Goals:  Long term and short term goals have been discussed with patient and they are in agreement.    Occupational Therapy Goals (Active)     Problem: Dressing     Dates: Start: 12/25/21       Goal: STG-Within one week, patient will dress UB     Dates: Start: 12/25/21       Description: 1) Individualized Goal:  at a Min A level with fading cues for L maida technique.           Problem: Functional Transfers     Dates: Start: 12/25/21       Goal: STG-Within one week, patient will transfer to toilet     Dates: Start: 12/25/21       Description: 1) Individualized Goal:  at a Mod A level with AD/DME PRN.           Problem: Grooming     Dates: Start: 12/25/21       Goal: STG-Within one week, patient will complete grooming     Dates: Start: 12/25/21       Description: 1) Individualized Goal:  seated at sink-side with Min A and fading cues for LUE involvement.             Problem: OT Long Term Goals     Dates: Start: 12/25/21       Goal: LTG-By discharge, patient will complete basic self care tasks     Dates: Start: 12/25/21       Description: 1) Individualized Goal:  at a Min A to Mod I level with  AD/AE/DME PRN.        Goal: LTG-By discharge, patient will perform bathroom transfers     Dates: Start: 12/25/21       Description: 1) Individualized Goal:  at a Min A to Mod I level with use of AE/AD/DME PRN.

## 2021-12-25 NOTE — CARE PLAN
"The patient is Watcher - Medium risk of patient condition declining or worsening    Shift Goals  Clinical Goals: safety/manage b/p  Patient Goals: sleep well    Progress made toward(s) clinical / shift goals:  Resting in bed after hs meds. Sleeping off and on. Using call light for assist as needed.     Problem: Pain - Standard  Goal: Alleviation of pain or a reduction in pain to the patient’s comfort goal  Outcome: Progressing  Pain managed w/scheduled tylenol at this time.    [place] Fall risk Assessment Score: 12    Moderate fall risk Interventions  - Bed and strip alarm   - Yellow sign by the door   - Yellow wrist band \"Fall risk\"  - Do not leave patient unattended in the bathroom  - Fall risk education provided  - Call light within reach   - Yellow  socks   - Belongings within reach   - Bed in the lowest position               "

## 2021-12-25 NOTE — CARE PLAN
The patient is Watcher - Medium risk of patient condition declining or worsening    Shift Goals  Clinical Goals: Rest between therapy, monitor labs, promote safety  Patient Goals: Rest    Progress made toward(s) clinical / shift goals:  Patient understands the need for 2 person max assist with transfers in and out of bed.    Patient is not progressing towards the following goals:Patients right ear continues to be a source of pain, IV ABT started 12/25/21, Urine specimen sent out.      Problem: Knowledge Deficit - Standard  Goal: Patient and family/care givers will demonstrate understanding of plan of care, disease process/condition, diagnostic tests and medications  Outcome: Not Progressing     Problem: Pain - Standard  Goal: Alleviation of pain or a reduction in pain to the patient’s comfort goal  Outcome: Not Progressing

## 2021-12-25 NOTE — PROGRESS NOTES
"Rehab Progress Note     Date of Service: 12/25/2021  Chief Complaint: Follow-up stroke    Interval Events (Subjective)    Patient seen and examined in his room today.  He has completed occupational and speech therapy this morning.  He is a 2 person assist.  Patient currently is denying any pain.  He did get a fever overnight for which blood cultures were ordered as well as labs.  He was started on IV antibiotics this morning.  Hospitalist has been consulted.    ROS: No changes to bowel, bladder, pain, mood, or sleep.       Objective:  VITAL SIGNS: /60   Pulse 84   Temp 37.4 °C (99.3 °F) (Temporal)   Resp 18   Ht 1.664 m (5' 5.51\")   Wt 116 kg (255 lb 11.7 oz)   SpO2 95%   BMI 41.89 kg/m²   Gen: alert, no apparent distress  CV: Edema in left arm and left lower extremity  Neuro: notable for left neglect, left hemiparesis, left visual field cut    Recent Results (from the past 72 hour(s))   Basic Metabolic Panel    Collection Time: 12/22/21  7:08 PM   Result Value Ref Range    Sodium 141 135 - 145 mmol/L    Potassium 3.6 3.6 - 5.5 mmol/L    Chloride 108 96 - 112 mmol/L    Co2 23 20 - 33 mmol/L    Glucose 180 (H) 65 - 99 mg/dL    Bun 9 8 - 22 mg/dL    Creatinine 0.63 0.50 - 1.40 mg/dL    Calcium 7.8 (L) 8.5 - 10.5 mg/dL    Anion Gap 10.0 7.0 - 16.0   ESTIMATED GFR    Collection Time: 12/22/21  7:08 PM   Result Value Ref Range    GFR If African American >60 >60 mL/min/1.73 m 2    GFR If Non African American >60 >60 mL/min/1.73 m 2   Sodium Serum (NA)    Collection Time: 12/23/21 12:30 AM   Result Value Ref Range    Sodium 139 135 - 145 mmol/L   Magnesium    Collection Time: 12/23/21  3:10 AM   Result Value Ref Range    Magnesium 1.8 1.5 - 2.5 mg/dL   Complete Metabolic Panel    Collection Time: 12/23/21  3:10 AM   Result Value Ref Range    Sodium 140 135 - 145 mmol/L    Potassium 3.5 (L) 3.6 - 5.5 mmol/L    Chloride 107 96 - 112 mmol/L    Co2 24 20 - 33 mmol/L    Anion Gap 9.0 7.0 - 16.0    Glucose 130 (H) " 65 - 99 mg/dL    Bun 8 8 - 22 mg/dL    Creatinine 0.64 0.50 - 1.40 mg/dL    Calcium 8.0 (L) 8.5 - 10.5 mg/dL    AST(SGOT) 12 12 - 45 U/L    ALT(SGPT) 13 2 - 50 U/L    Alkaline Phosphatase 69 30 - 99 U/L    Total Bilirubin 0.4 0.1 - 1.5 mg/dL    Albumin 3.3 3.2 - 4.9 g/dL    Total Protein 5.8 (L) 6.0 - 8.2 g/dL    Globulin 2.5 1.9 - 3.5 g/dL    A-G Ratio 1.3 g/dL   CBC with Differential    Collection Time: 12/23/21  3:10 AM   Result Value Ref Range    WBC 8.0 4.8 - 10.8 K/uL    RBC 3.98 (L) 4.70 - 6.10 M/uL    Hemoglobin 11.8 (L) 14.0 - 18.0 g/dL    Hematocrit 35.8 (L) 42.0 - 52.0 %    MCV 89.9 81.4 - 97.8 fL    MCH 29.6 27.0 - 33.0 pg    MCHC 33.0 (L) 33.7 - 35.3 g/dL    RDW 47.8 35.9 - 50.0 fL    Platelet Count 177 164 - 446 K/uL    MPV 9.1 9.0 - 12.9 fL    Neutrophils-Polys 61.60 44.00 - 72.00 %    Lymphocytes 20.80 (L) 22.00 - 41.00 %    Monocytes 12.40 0.00 - 13.40 %    Eosinophils 4.40 0.00 - 6.90 %    Basophils 0.50 0.00 - 1.80 %    Immature Granulocytes 0.30 0.00 - 0.90 %    Nucleated RBC 0.00 /100 WBC    Neutrophils (Absolute) 4.92 1.82 - 7.42 K/uL    Lymphs (Absolute) 1.66 1.00 - 4.80 K/uL    Monos (Absolute) 0.99 (H) 0.00 - 0.85 K/uL    Eos (Absolute) 0.35 0.00 - 0.51 K/uL    Baso (Absolute) 0.04 0.00 - 0.12 K/uL    Immature Granulocytes (abs) 0.02 0.00 - 0.11 K/uL    NRBC (Absolute) 0.00 K/uL   ESTIMATED GFR    Collection Time: 12/23/21  3:10 AM   Result Value Ref Range    GFR If African American >60 >60 mL/min/1.73 m 2    GFR If Non African American >60 >60 mL/min/1.73 m 2   Sodium Serum (NA)    Collection Time: 12/23/21 12:20 PM   Result Value Ref Range    Sodium 140 135 - 145 mmol/L   Sodium Serum (NA)    Collection Time: 12/23/21  6:35 PM   Result Value Ref Range    Sodium 142 135 - 145 mmol/L   Sodium Serum (NA)    Collection Time: 12/24/21 12:00 AM   Result Value Ref Range    Sodium 142 135 - 145 mmol/L   Magnesium    Collection Time: 12/24/21  6:25 AM   Result Value Ref Range    Magnesium 1.9 1.5 -  2.5 mg/dL   Complete Metabolic Panel    Collection Time: 12/24/21  6:25 AM   Result Value Ref Range    Sodium 140 135 - 145 mmol/L    Potassium 4.0 3.6 - 5.5 mmol/L    Chloride 107 96 - 112 mmol/L    Co2 25 20 - 33 mmol/L    Anion Gap 8.0 7.0 - 16.0    Glucose 93 65 - 99 mg/dL    Bun 9 8 - 22 mg/dL    Creatinine 0.61 0.50 - 1.40 mg/dL    Calcium 8.1 (L) 8.5 - 10.5 mg/dL    AST(SGOT) 16 12 - 45 U/L    ALT(SGPT) 14 2 - 50 U/L    Alkaline Phosphatase 75 30 - 99 U/L    Total Bilirubin 0.4 0.1 - 1.5 mg/dL    Albumin 3.4 3.2 - 4.9 g/dL    Total Protein 6.1 6.0 - 8.2 g/dL    Globulin 2.7 1.9 - 3.5 g/dL    A-G Ratio 1.3 g/dL   ESTIMATED GFR    Collection Time: 12/24/21  6:25 AM   Result Value Ref Range    GFR If African American >60 >60 mL/min/1.73 m 2    GFR If Non African American >60 >60 mL/min/1.73 m 2   Sodium Serum (NA)    Collection Time: 12/24/21 10:40 AM   Result Value Ref Range    Sodium 139 135 - 145 mmol/L   CBC WITH DIFFERENTIAL    Collection Time: 12/24/21 10:40 AM   Result Value Ref Range    WBC 9.0 4.8 - 10.8 K/uL    RBC 4.14 (L) 4.70 - 6.10 M/uL    Hemoglobin 12.4 (L) 14.0 - 18.0 g/dL    Hematocrit 37.0 (L) 42.0 - 52.0 %    MCV 89.4 81.4 - 97.8 fL    MCH 30.0 27.0 - 33.0 pg    MCHC 33.5 (L) 33.7 - 35.3 g/dL    RDW 46.0 35.9 - 50.0 fL    Platelet Count 199 164 - 446 K/uL    MPV 9.0 9.0 - 12.9 fL    Neutrophils-Polys 66.60 44.00 - 72.00 %    Lymphocytes 16.80 (L) 22.00 - 41.00 %    Monocytes 12.30 0.00 - 13.40 %    Eosinophils 3.40 0.00 - 6.90 %    Basophils 0.60 0.00 - 1.80 %    Immature Granulocytes 0.30 0.00 - 0.90 %    Nucleated RBC 0.00 /100 WBC    Neutrophils (Absolute) 5.97 1.82 - 7.42 K/uL    Lymphs (Absolute) 1.50 1.00 - 4.80 K/uL    Monos (Absolute) 1.10 (H) 0.00 - 0.85 K/uL    Eos (Absolute) 0.30 0.00 - 0.51 K/uL    Baso (Absolute) 0.05 0.00 - 0.12 K/uL    Immature Granulocytes (abs) 0.03 0.00 - 0.11 K/uL    NRBC (Absolute) 0.00 K/uL   SARS-CoV-2 PCR (24 hour In-House): Collect NP swab in [facility]     Collection Time: 12/24/21  1:40 PM    Specimen: Nasopharyngeal; Respirate   Result Value Ref Range    SARS-CoV-2 Source NP Swab     SARS-CoV-2 by PCR NotDetected    URINALYSIS    Collection Time: 12/25/21  4:20 AM    Specimen: Urine, Clean Catch   Result Value Ref Range    Color Yellow     Character Clear     Specific Gravity 1.012 <1.035    Ph 8.0 5.0 - 8.0    Glucose Negative Negative mg/dL    Ketones Negative Negative mg/dL    Protein Negative Negative mg/dL    Bilirubin Negative Negative    Urobilinogen, Urine 0.2 Negative    Nitrite Negative Negative    Leukocyte Esterase Small (A) Negative    Occult Blood Trace (A) Negative    Micro Urine Req Microscopic    URINE MICROSCOPIC (W/UA)    Collection Time: 12/25/21  4:20 AM   Result Value Ref Range    WBC 10-20 (A) /hpf    RBC 2-5 (A) /hpf    Bacteria Negative None /hpf    Epithelial Cells Negative /hpf   CBC with Differential    Collection Time: 12/25/21  5:18 AM   Result Value Ref Range    WBC 13.3 (H) 4.8 - 10.8 K/uL    RBC 4.13 (L) 4.70 - 6.10 M/uL    Hemoglobin 12.3 (L) 14.0 - 18.0 g/dL    Hematocrit 37.0 (L) 42.0 - 52.0 %    MCV 89.6 81.4 - 97.8 fL    MCH 29.8 27.0 - 33.0 pg    MCHC 33.2 (L) 33.7 - 35.3 g/dL    RDW 45.4 35.9 - 50.0 fL    Platelet Count 218 164 - 446 K/uL    MPV 10.0 9.0 - 12.9 fL    Neutrophils-Polys 76.20 (H) 44.00 - 72.00 %    Lymphocytes 11.30 (L) 22.00 - 41.00 %    Monocytes 11.10 0.00 - 13.40 %    Eosinophils 0.70 0.00 - 6.90 %    Basophils 0.20 0.00 - 1.80 %    Immature Granulocytes 0.50 0.00 - 0.90 %    Nucleated RBC 0.00 /100 WBC    Neutrophils (Absolute) 10.15 (H) 1.82 - 7.42 K/uL    Lymphs (Absolute) 1.50 1.00 - 4.80 K/uL    Monos (Absolute) 1.48 (H) 0.00 - 0.85 K/uL    Eos (Absolute) 0.09 0.00 - 0.51 K/uL    Baso (Absolute) 0.03 0.00 - 0.12 K/uL    Immature Granulocytes (abs) 0.07 0.00 - 0.11 K/uL    NRBC (Absolute) 0.00 K/uL   Comp Metabolic Panel (CMP)    Collection Time: 12/25/21  5:18 AM   Result Value Ref Range    Sodium 136  135 - 145 mmol/L    Potassium 3.8 3.6 - 5.5 mmol/L    Chloride 101 96 - 112 mmol/L    Co2 24 20 - 33 mmol/L    Anion Gap 11.0 7.0 - 16.0    Glucose 94 65 - 99 mg/dL    Bun 11 8 - 22 mg/dL    Creatinine 0.65 0.50 - 1.40 mg/dL    Calcium 8.6 8.5 - 10.5 mg/dL    AST(SGOT) 18 12 - 45 U/L    ALT(SGPT) 15 2 - 50 U/L    Alkaline Phosphatase 84 30 - 99 U/L    Total Bilirubin 0.5 0.1 - 1.5 mg/dL    Albumin 3.8 3.2 - 4.9 g/dL    Total Protein 6.4 6.0 - 8.2 g/dL    Globulin 2.6 1.9 - 3.5 g/dL    A-G Ratio 1.5 g/dL   Magnesium    Collection Time: 12/25/21  5:18 AM   Result Value Ref Range    Magnesium 1.8 1.5 - 2.5 mg/dL   Vitamin D, 25-hydroxy (blood)    Collection Time: 12/25/21  5:18 AM   Result Value Ref Range    25-Hydroxy   Vitamin D 25 46 30 - 100 ng/mL   ESTIMATED GFR    Collection Time: 12/25/21  5:18 AM   Result Value Ref Range    GFR If African American >60 >60 mL/min/1.73 m 2    GFR If Non African American >60 >60 mL/min/1.73 m 2       Current Facility-Administered Medications   Medication Frequency   • normal saline (PF) 20 mL Q12HRS   • piperacillin-tazobactam (ZOSYN) 3.375 g in  mL IVPB Q6HRS   • hydrALAZINE (APRESOLINE) tablet 75 mg Q8HRS   • hydrOXYzine HCl (ATARAX) tablet 50 mg Q6HRS PRN   • melatonin tablet 3 mg QHS   • Respiratory Therapy Consult Continuous RT   • Pharmacy Consult Request ...Pain Management Review 1 Each PHARMACY TO DOSE   • acetaminophen (Tylenol) tablet 650 mg Q4HRS PRN   • docusate sodium (ENEMEEZ) enema 283 mg QDAY PRN   • sodium phosphate (Fleet) enema 133 mL QDAY PRN   • omeprazole (PRILOSEC) capsule 20 mg QAM AC   • artificial tears ophthalmic solution 1 Drop PRN   • benzocaine-menthol (CEPACOL) lozenge 1 Lozenge Q2HRS PRN   • mag hydrox-al hydrox-simeth (MAALOX PLUS ES or MYLANTA DS) suspension 20 mL Q2HRS PRN   • ondansetron (ZOFRAN ODT) dispertab 4 mg 4X/DAY PRN    Or   • ondansetron (ZOFRAN) syringe/vial injection 4 mg 4X/DAY PRN   • traZODone (DESYREL) tablet 50 mg QHS PRN    • sodium chloride (OCEAN) 0.65 % nasal spray 2 Spray PRN   • midazolam (VERSED) 5 mg/mL (1 mL vial) PRN   • senna-docusate (PERICOLACE or SENOKOT S) 8.6-50 MG per tablet 2 Tablet BID    And   • polyethylene glycol/lytes (MIRALAX) PACKET 1 Packet QDAY PRN    And   • magnesium hydroxide (MILK OF MAGNESIA) suspension 30 mL QDAY PRN    And   • bisacodyl (DULCOLAX) suppository 10 mg QDAY PRN   • amLODIPine (NORVASC) tablet 10 mg Q DAY   • losartan (COZAAR) tablet 100 mg DAILY   • sodium chloride (SALT) tablet 2 g TID WITH MEALS   • ursodiol (ACTIGALL) capsule 300 mg BID   • hydrALAZINE (APRESOLINE) tablet 10 mg TID PRN   • cloNIDine (CATAPRES) tablet 0.1 mg TID PRN   • acetaminophen (TYLENOL) tablet 1,000 mg TID   • butalbital/apap/caffeine -40 mg (Fioricet) per tablet 1 Tablet Q6HRS PRN   • atorvastatin (LIPITOR) tablet 40 mg Q EVENING       Orders Placed This Encounter   Procedures   • Diet Order Diet: Level 6 - Soft and Bite Sized (No Straw; t-dine); Liquid level: Level 0 - Thin; Tray Modifications (optional): No Straws     Standing Status:   Standing     Number of Occurrences:   1     Order Specific Question:   Diet:     Answer:   Level 6 - Soft and Bite Sized [23]     Comments:   No Straw; t-dine     Order Specific Question:   Liquid level     Answer:   Level 0 - Thin     Order Specific Question:   Tray Modifications (optional)     Answer:   No Straws       Assessment:  Active Hospital Problems    Diagnosis    • *Hemorrhagic stroke (HCC)    • Fever    • Left-sided neglect    • Impaired mobility and ADLs    • Right ear pain    • Morbid obesity with BMI of 40.0-44.9, adult ([facility])    • GERD (gastroesophageal reflux disease)    • AF (atrial fibrillation) ([facility])    • Hyperlipemia    • Hypertension      This patient is a 71 y.o. male admitted for acute inpatient rehabilitation with Hemorrhagic stroke (HCC).    Medical Decision Making and Plan:    Large right basal ganglia hemorrhagic stroke  Left hemiparesis  Left  visual field cut  Left neglect  Continue full rehab program  PT/OT/SLP, 1 hr each discipline, 5 days per week    Statin    Per neurology okay to restart Xarelto in 2 weeks, so we will check a CT scan first    Outpatient follow-up with neurology, Dr. Figueroa    Headache  Likely from brain swelling  Continue scheduled Tylenol, dose increased  As needed Fioricet, last use never  We will slowly titrate off salt tabs with goal to keep sodium 140 or above     Hypertension  Amlodipine 10 mg  Losartan 100 mg  Increased dose of hydralazine 25 mg every 8 to 50 mg every 8, increased again to 75 mg every 8 hours  As needed clonidine for systolic blood pressures over 160  Appreciate hospitalist assistance     Paroxysmal atrial fibrillation  Xarelto currently on hold for 2 weeks per neurology  May benefit from beta-blocker, per review of outpatient notes has been on metoprolol and amiodarone in the past  Consult hospitalist     Edema  Previously on IV Lasix  Checked Doppler ultrasound of the left upper extremity and left lower extremity, lower extremities negative for DVT    Left arm thrombophlebitis  Initially started on Keflex, now switched to IV antibiotics.    Fever  Check UA and chest x-ray  Check blood cultures  Was started on Keflex for thrombophlebitis, now switched to IV antibiotics  Appreciate hospitalist assistance     Anemia  Mild  Monitor with weekly labs     GERD  Omeprazole     Insomnia  Melatonin     Morbid obesity  Due to excess calories  BMI 41.89  Outpatient nutritional counseling     Right ear pain  Evaluate with otoscope  Appreciate hospitalist assistance    Bowel program  Continue bowel medications  Last BM 12/25    Bladder program  Check PVRs - 33  Bladder scan for no voids  ICP for over 400 cc  Scheduled toileting    DVT prophylaxis  Contraindicated given hemorrhage.   Compression stockings on in am, off in pm.  Increase mobility. Monitor for swelling.    Total time:  35 minutes.  I spent greater than 50% of  the time for patient care, counseling, and coordination on this date, including patient face-to face time, unit/floor time with review of records/pertinent lab data and studies, as well as discussing diagnostic evaluation/work up, planned therapeutic interventions, and future disposition of care, as per the interval events/subjective and the assessment and plan as noted above.    Valencia Ramirez M.D.  Physical Medicine and Rehabilitation

## 2021-12-25 NOTE — PROGRESS NOTES
Spoke to on call physician r/t elevated temp and b/p at beginning of shift. Administered scheduled tylenol and b/p meds, recheck showed improved temp and b/p. MD notified and new orders from Dr Ramirez for UA, Chest xray in morning and blood cultures to be drawn w/morning labs. Pt denies headache or discomfort.

## 2021-12-25 NOTE — PROGRESS NOTES
Received patient during shift change, report rec'd from day shift RN. Resting in bed, VS stable on room air. Per report incontinent of B&B, condom cath in place. Max assist for transfers. A&O x 3-4, able to make needs known. Bed in low position, call light within reach.

## 2021-12-25 NOTE — THERAPY
"Speech Language Pathology   Initial Assessment     Patient Name: Gokul Baca  AGE:  71 y.o., SEX:  male  Medical Record #: [id number]  Today's Date: 12/25/2021     Subjective    Per H&P:  \"Per Dr. Pfeiffer's consult \"The patient is a 71 y.o. male with a past medical history of obstructive sleep apnea, hypertension, hyperlipidemia and paroxysmal atrial fibrillation on Xarelto;  who presented on 12/19/2021  3:31 AM with left sided droop and left sided weakness and found to have a basal ganglionic hemorrhage involving the putamen and globus pallidus on the right, extending right up to the posterior limb of the internal capsule. Per documentation, wife woke up early AM due to noise and woke up  to check for intruders. It was neighbor's party in backyard. Shortly after the patient woke up, developed left-sided weakness for which EMS were called and patient was brought to emergency room.\"     Patient was monitored in the ICU and on IV antihypertensives.  He received prothrombin complex conference for Xarelto reversal.  He was started on hyper tonic saline and oral sodium tabs for initial goal of a sodium of 150, now with a goal of 140.  Repeat head CT 6 hours for admission found increase in the size of his hemorrhage though repeat imaging the following day was stable.  He did not require any surgery. HgbA1c 5.5, .     Patient required IV diuresis with noted anasarca, thought secondary to IV fluids.  He had an echocardiogram that showed ejection fraction of 70%.  Patient had a PICC line placed on 12/22 due to difficulties maintaining peripheral IV access.  His Xarelto was discontinued due to his hemorrhage.  Per neurology okay to restart Xarelto in 2 weeks.     Patient current reports left-sided weakness, a sense that his left arm does not belong to him, swelling in his extremities, and a mild headache.  He denies any issues with urination other than polyuria due to the diuretics and is currently has " "a condom catheter in place.  He moved his bowels this morning.  He also reports a left visual field cut new since his stroke.  In addition he is complaining of right ear pain.  Patient reports he is not sure he wants to restart Xarelto.     Patient was evaluated by Rehab Medicine physician and Physical Therapy, Occupational Therapy and Speech Therapy and determined to be appropriate for acute inpatient rehab and was transferred to [facility] on 12/24/2021 12:44 PM.     With this acute therapeutic intervention, this patient hopes to improve his functional status, and return to independent living with the supportive care of spouse.\"    Pt was admitted to this facility on soft/bite-sized diet textures and mildly thick liquids. Current evaluation ordered to determine swallow and cog-ling CLOF and implement individualized POC if indicated.      Objective       12/25/21 0802   Precautions   Precautions Fall Risk   Comments L maida; L inattention   Prior Living Situation   Prior Services None   Housing / Facility [facility]   Lives with - Patient's Self Care Capacity Spouse   Prior Level Of Function   Patient's Primary Language English   Occupation (Pre-Hospital Vocational) Employed Full Time  ()   Receptive Language / Auditory Comprehension   Receptive Language / Auditory Comprehension WDL   Expressive Language   Expressive Language (WDL) WDL   Reading Comprehension    Reading Comprehension (WDL) X   Reading Phrases Moderate (3)  (d/t left visual inattention)   Functional Reading Materials Moderate (3)  (d/t left visual inattention)   Cognition   Cognitive-Linguistic (WDL) X   Simple Attention Within Functional Limits (6-7)   Moderate Attention Minimal (4)   Orientation  Within Functional Limits (6-7)   Verbal Short Term Memory   (recalled 1/3 meds on SCCAN with delay; recalled SLP's name)   Safety Awareness Supervision (5)   Insight into Deficits Supervision (5)   Verbal " "Sequencing (Complex) Within Functional Limits (6-7)   Cognitive Pattern Assessment   Cognitive Pattern Assessment Used BIMS   Brief Interview for Mental Status (BIMS)   Repetition of Three Words (First Attempt) 3   Temporal Orientation: Year Correct   Temporal Orientation: Month Accurate within 5 days   Temporal Orientation: Day Correct   Recall: \"Sock\" Yes, no cue required   Recall: \"Blue\" Yes, no cue required   Recall: \"Bed\" Yes, no cue required   BIMS Summary Score 15   Social / Pragmatic Communication   Social / Pragmatic Communication WDL   Speech Mechanisms / Voice Production   Speech Mechanisms / Voice Production (WDL) WDL   Labial Function   Labial Function (WDL) X   Labial Structure At Rest Minimal   Frown, Pucker Moderate   Lingual Function   Lingual Function (WDL) X   Lingual Protrude Minimal   Lateralization Minimal Left   Jaw Function   Jaw Function (WDL) WDL   Swallowing   Swallowing (WDL) X   Ice Chips Minimal   Pureed (4) Within Functional Limits   Thin Liquid Minimal   Single Swallow Thin / Nectar (Cup) Minimal   Masticated Foods Minimal   Dysphagia Strategies / Recommendations   Strategies / Interventions Recommended (Yes / No) Yes   Compensatory Strategies No Straws;No Talking During Eating / Drinking;Alternate Solids / Liquids;Single Sips / Bites;Finger / Tongue Sweep To Clear Pocketing Left;Assistance Needed for Meal Tray Set-up;Direct Supervision During Meals   Diet / Liquid Recommendation Soft & Bite-Sized (6) - (Dysphagia III);Thin (0)   Medication Administration  Crush all Medications in Puree   Therapy Interventions Dysphagia Therapy By Speech Language Pathologist;Therapeutic Dining For Meals;Pharyngeal / Laryngeal Exercises;Oral Motor Exercises;MBSS Evaluation   Barriers To Oral Feeding   Barriers To Oral Feeding Generalized Weakness;Lethargic / Inadequate Alertness   Swallowing/Nutritional Status   Swallowing/Nutritional Status Modified food consistency   Outcome Measures   Outcome " Measures Utilized SCCAN   SCCAN (Scales of Cognitive and Communicative Ability for Neurorehabilitation)   Oral Expression - Raw Score 19   Oral Expression - Scale Performance Score 100   Orientation - Raw Score 12   Orientation - Scale Performance Score 100   Speech Comprehension - Raw Score 11   Speech Comprehension - Scale Performance Score 85   Problem List   Problem List Cognitive-Linguistic Deficits;Dysphagia;Dysarthia   Current Discharge Plan   Current Discharge Plan Return to Prior Living Situation   Benefit   Therapy Benefit Patient would benefit from Inpatient Rehab Speech-Language Pathology to address above identified deficits.   Interdisciplinary Plan of Care Collaboration   IDT Collaboration with  Certified Nursing Assistant;Nursing;Therapy Tech   Patient Position at End of Therapy In Bed;Family / Friend in Room;Tray Table within Reach;Call Light within Reach;Phone within Reach   Collaboration Comments CLOF and recommendations   Strengths & Barriers   Strengths Willingly participates in therapeutic activities;Supportive family;Pleasant and cooperative;Motivated for self care and independence;Independent prior level of function;Effective communication skills   Barriers Impaired functional cognition;Aspiration risk;Fatigue   Speech Language Pathologist Assigned   Assigned SLP / Extension 60 NO SPLIT   SLP Total Time Spent   SLP Individual Total Time Spent (Mins) 90   Evaluation Charges   Charges Yes   SLP Speech Language Evaluation Speech Sound Language Comprehension   SLP Oral Pharyngeal Evaluation Oral Pharyngeal Evaluation       Assessment    Patient is 71 y.o. male with a diagnosis of hemorrhagic stroke.  Additional factors influencing patient status/progress (ie: cognitive factors, co-morbidities, social support, etc): dense left sided maida, strong family support, high PLOF.      Pt participated in OME and CSE. He presents with mild-moderate left sided oral weakness. SLP also notes green film on  lingual surface (made RN aware) concerning for potential thrush. Pt demonstrated overall mild incoordination of oral and pharyngeal phase of swallow. Despite incoordination, no overt coughing/throat clearing/wet vocal quality noted throughout trials of soft textures, mildly thick liquids, and thin liquids via individual cup sips. He did demonstrate left buccal pocketing with limited awareness with soft solids and mild left sided anterior loss with liquids. Recommend pt continue soft/bite-sized diet textures and be upgraded to thin liquids (no straws). He may benefit from participating in instrumental swallow evaluation when appropriate. He would also benefit from being in t-dine for close supervision as he is high aspiration risk.     Pt presents with mild dysarthria characterized by imprecise articulatory contacts. His speech however is 100% intelligible in connected speech.     SLP initiated administration of the Scales of Cognitive and Communicative Ability for Neuro rehabilitation (SCCAN). Testing had to be discontinued d/t pt reporting light headedness with RN intervening. Of subtests completed, his most notable deficit was with visuospatial skills which negatively impacted performance on select tasks (e.g. following directions with pictured shapes). Recommend continuing SCCAN to better determine cog-ling needs.     Plan  Recommend Speech Therapy  minutes per day 5-7 days per week for 7 weeks for the following treatments:  SLP Speech Language Treatment, SLP Swallowing Dysfunction Treatment, SLP Oral Pharyngeal Evaluation, SLP Video Swallow Evaluation, SLP Cognitive Skill Development and SLP Group Treatment.    Passport items to be completed:  understand food/liquid recommendations, consistently follow swallow precautions, manage finances, manage medications, arrive to therapy appointments on time, complete daily memory log entries, solve problems related to safety situations, review education related to  hospitalization, complete caregiver training     Goals:  Long term and short term goals have been discussed with patient and spouse and they are in agreement.    Speech Therapy Problems (Active)     Problem: Memory STGs     Dates: Start: 12/25/21       Goal: STG-Within one week, patient will     Dates: Start: 12/25/21       Description: Participate in administration of complete standardized cog-ling evaluation to better determine functional cog-ling needs and better guide POC.            Problem: Problem Solving STGs     Dates: Start: 12/25/21       Goal: STG-Within one week, patient will     Dates: Start: 12/25/21       Description: Complete visual scanning tasks (e.g. cancellation tasks, functional reading) with 80% acc. And moderate assistance to attend to left side.           Problem: Speech/Swallowing LTGs     Dates: Start: 12/25/21       Goal: LTG-By discharge, patient will safely swallow     Dates: Start: 12/25/21       Description: Safest/least restrictive diet to maintain adequate nutrition and hydration.        Goal: LTG-By discharge, patient will     Dates: Start: 12/25/21       Description: Demonstrate independent level of cog-ling functions to ensure safe return to PLOF.          Problem: Swallowing STGs     Dates: Start: 12/25/21       Goal: STG-Within one week, patient will     Dates: Start: 12/25/21       Description: Consume thin liquids with no overt s/sx of aspiration for 100% of intake.       Goal: STG-Within one week, patient will     Dates: Start: 12/25/21       Description: Consume soft bite sized diet textures in 100% of opportunities with min cues to follow trained safe swallow strategies.

## 2021-12-25 NOTE — CONSULTS
HOSPITAL MEDICINE CONSULTATION    Requesting Physician:  Dr. Ramirez    Reason for Consult:  Fever, Leukocytosis, Atrial Fibrillation, Hypertension    History of Present Illness:  The patient is a 71-year-old  male with past medical history significant for paroxysmal atrial fibrillation premorbidly on Xarelto, hypertension, and dyslipidemia.  He was admitted to [facility] on 12/19/21 for slurred speech, left facial droop, and left sided weakness.  The patient's presenting systolic blood pressure was 190 mmHg.  CT head revealed a 3.4 x 3.6 cm parenchymal hemorrhage in the right basal ganglia with mild mass effect on the right lateral ventricle.  Echocardiogram was normal with ejection fraction 70%.  He was conservatively managed with anticoagulation reversal and placed on a Nicardipine drip.  A peripherally inserted central catheter was placed on 12/22/21 due to difficult intravenous access.  Due to his ongoing functional debility, the patient was transferred to [facility] on 12/24/21.  Hospital Medicine consultation is requested to assist in the management of this patient's PAF and HTN.  He also developed a fever and leukocytosis overnight.  Infection work up is underway.  Note that the patient spiked a temperature after Keflex was started for possible left arm thrombophlebitis.    Review of Systems:  Review of Systems   Constitutional: Positive for chills, fever and malaise/fatigue.   HENT: Positive for ear pain.    Eyes: Negative.    Respiratory: Negative for cough and shortness of breath.    Cardiovascular: Positive for leg swelling. Negative for chest pain and palpitations.   Gastrointestinal: Negative for abdominal pain, nausea and vomiting.   Genitourinary: Negative.    Musculoskeletal: Negative.    Skin: Negative for itching and rash.   Neurological: Positive for focal weakness.   Endo/Heme/Allergies: Negative for polydipsia. Does not bruise/bleed easily.    All other systems reviewed and are negative.      Allergies:  Allergies   Allergen Reactions   • Lactose        Medications:    Current Facility-Administered Medications:   •  normal saline (PF) 20 mL, 20 mL, Intravenous, Q12HRS, Valencia Ramirez M.D., 20 mL at 12/25/21 0816  •  piperacillin-tazobactam (ZOSYN) 3.375 g in  mL IVPB, 3.375 g, Intravenous, Q6HRS, Gerri Tee M.D., Stopped at 12/25/21 1749  •  hydrALAZINE (APRESOLINE) tablet 75 mg, 75 mg, Oral, Q8HRS, Gerri Tee M.D., 75 mg at 12/25/21 1410  •  hydrOXYzine HCl (ATARAX) tablet 50 mg, 50 mg, Oral, Q6HRS PRN, Valencia Ramirez M.D.  •  melatonin tablet 3 mg, 3 mg, Oral, QHS, Valencia Ramirez M.D., 3 mg at 12/24/21 2023  •  Respiratory Therapy Consult, , Nebulization, Continuous RT, Valencia Ramirez M.D.  •  Pharmacy Consult Request ...Pain Management Review 1 Each, 1 Each, Other, PHARMACY TO DOSE, Valencia Ramirez M.D.  •  acetaminophen (Tylenol) tablet 650 mg, 650 mg, Oral, Q4HRS PRN, Valencia Ramirez M.D.  •  docusate sodium (ENEMEEZ) enema 283 mg, 283 mg, Rectal, QDAY PRN, Valencia Ramirez M.D.  •  sodium phosphate (Fleet) enema 133 mL, 1 Each, Rectal, QDAY PRN, Valencia Ramirez M.D.  •  omeprazole (PRILOSEC) capsule 20 mg, 20 mg, Oral, QAM AC, Valencia Ramirez M.D., 20 mg at 12/25/21 0808  •  artificial tears ophthalmic solution 1 Drop, 1 Drop, Both Eyes, PRN, Valencia Ramirez M.D.  •  benzocaine-menthol (CEPACOL) lozenge 1 Lozenge, 1 Lozenge, Mouth/Throat, Q2HRS PRN, Valencia Ramirez M.D.  •  mag hydrox-al hydrox-simeth (MAALOX PLUS ES or MYLANTA DS) suspension 20 mL, 20 mL, Oral, Q2HRS PRN, Valencia Ramirez M.D.  •  ondansetron (ZOFRAN ODT) dispertab 4 mg, 4 mg, Oral, 4X/DAY PRN **OR** ondansetron (ZOFRAN) syringe/vial injection 4 mg, 4 mg, Intramuscular, 4X/DAY PRN, Valencia Ramirez M.D.  •  traZODone (DESYREL) tablet 50 mg, 50 mg, Oral, QHS PRN, Valencia Ramirez M.D.  •  sodium chloride (OCEAN) 0.65 % nasal spray 2  Spray, 2 Spray, Nasal, PRN, Valencia Ramirez M.D.  •  midazolam (VERSED) 5 mg/mL (1 mL vial), 5 mg, Nasal, PRN, Valencia Ramirez M.D.  •  senna-docusate (PERICOLACE or SENOKOT S) 8.6-50 MG per tablet 2 Tablet, 2 Tablet, Oral, BID, 2 Tablet at 12/25/21 0807 **AND** polyethylene glycol/lytes (MIRALAX) PACKET 1 Packet, 1 Packet, Oral, QDAY PRN **AND** magnesium hydroxide (MILK OF MAGNESIA) suspension 30 mL, 30 mL, Oral, QDAY PRN **AND** bisacodyl (DULCOLAX) suppository 10 mg, 10 mg, Rectal, QDAY PRN, Valencia Ramirez M.D.  •  amLODIPine (NORVASC) tablet 10 mg, 10 mg, Oral, Q DAY, Valencia Ramirez M.D., 10 mg at 12/25/21 0528  •  losartan (COZAAR) tablet 100 mg, 100 mg, Oral, DAILY, Valencia Ramirez M.D., 100 mg at 12/25/21 0808  •  sodium chloride (SALT) tablet 2 g, 2 g, Oral, TID WITH MEALS, Valencia Ramirez M.D., 2 g at 12/25/21 1715  •  ursodiol (ACTIGALL) capsule 300 mg, 300 mg, Oral, BID, Valencia Ramirez M.D., 300 mg at 12/25/21 0814  •  hydrALAZINE (APRESOLINE) tablet 10 mg, 10 mg, Oral, TID PRN, Valencia Ramirez M.D., 10 mg at 12/25/21 0052  •  cloNIDine (CATAPRES) tablet 0.1 mg, 0.1 mg, Oral, TID PRN, Valencia Ramirez M.D., 0.1 mg at 12/24/21 1717  •  acetaminophen (TYLENOL) tablet 1,000 mg, 1,000 mg, Oral, TID, aVlencia Ramirez M.D., 1,000 mg at 12/25/21 1410  •  butalbital/apap/caffeine -40 mg (Fioricet) per tablet 1 Tablet, 1 Tablet, Oral, Q6HRS PRN, Valencia Ramirez M.D.  •  atorvastatin (LIPITOR) tablet 40 mg, 40 mg, Oral, Q EVENING, Valencia Ramirez M.D., 40 mg at 12/24/21 2023    Past Medical/Surgical History:  Past Medical History:   Diagnosis Date   • Atrial fibrillation (HCC)    • GERD (gastroesophageal reflux disease)    • Hyperlipidemia    • Hypertension      Past Surgical History:   Procedure Laterality Date   • KNEE ARTHROPLASTY TOTAL Left        Social History:  Social History     Socioeconomic History   • Marital status:      Spouse name: Not on file   •  Number of children: Not on file   • Years of education: Not on file   • Highest education level: Not on file   Occupational History   • Not on file   Tobacco Use   • Smoking status: Never Smoker   • Smokeless tobacco: Never Used   Substance and Sexual Activity   • Alcohol use: Never   • Drug use: Never   • Sexual activity: Not on file   Other Topics Concern   • Not on file   Social History Narrative   • Not on file     Social Determinants of Health     Financial Resource Strain:    • Difficulty of Paying Living Expenses: Not on file   Food Insecurity:    • Worried About Running Out of Food in the Last Year: Not on file   • Ran Out of Food in the Last Year: Not on file   Transportation Needs:    • Lack of Transportation (Medical): Not on file   • Lack of Transportation (Non-Medical): Not on file   Physical Activity:    • Days of Exercise per Week: Not on file   • Minutes of Exercise per Session: Not on file   Stress:    • Feeling of Stress : Not on file   Social Connections:    • Frequency of Communication with Friends and Family: Not on file   • Frequency of Social Gatherings with Friends and Family: Not on file   • Attends Islam Services: Not on file   • Active Member of Clubs or Organizations: Not on file   • Attends Club or Organization Meetings: Not on file   • Marital Status: Not on file   Intimate Partner Violence:    • Fear of Current or Ex-Partner: Not on file   • Emotionally Abused: Not on file   • Physically Abused: Not on file   • Sexually Abused: Not on file   Housing Stability:    • Unable to Pay for Housing in the Last Year: Not on file   • Number of Places Lived in the Last Year: Not on file   • Unstable Housing in the Last Year: Not on file       Family History:  Family History   Problem Relation Age of Onset   • Heart Disease Mother    • Heart Disease Brother    • Alcohol abuse Son        Physical Examination:   Vitals:    12/25/21 0715 12/25/21 0925 12/25/21 1410 12/25/21 1415   BP: (!) 179/93  104/60 141/80 141/86   Pulse: 79 84  78   Resp: 18   18   Temp: 37.4 °C (99.3 °F)   37 °C (98.6 °F)   TempSrc: Temporal   Temporal   SpO2:       Weight:       Height:           Physical Exam  Vitals reviewed.   Constitutional:       General: He is not in acute distress.     Appearance: Normal appearance. He is not ill-appearing.   HENT:      Head: Normocephalic and atraumatic.      Right Ear: External ear normal.      Left Ear: External ear normal.      Ears:      Comments: TM difficult to visualize due to copious cerumen bilat  R auditory canal erythematous     Nose: Nose normal.      Mouth/Throat:      Pharynx: Oropharynx is clear.   Eyes:      General:         Right eye: No discharge.         Left eye: No discharge.      Extraocular Movements: Extraocular movements intact.      Conjunctiva/sclera: Conjunctivae normal.   Cardiovascular:      Rate and Rhythm: Normal rate and regular rhythm.   Pulmonary:      Effort: No respiratory distress.      Breath sounds: No wheezing.      Comments: Decreased BS   Abdominal:      General: Bowel sounds are normal. There is no distension.      Palpations: Abdomen is soft.      Tenderness: There is no abdominal tenderness.   Musculoskeletal:      Cervical back: Normal range of motion and neck supple.      Left lower leg: Edema present.      Comments: LUE 2+ edema, RUE 1+ edema surrounding PICC   Skin:     General: Skin is warm and dry.   Neurological:      Mental Status: He is alert and oriented to person, place, and time.         Laboratory Data:  Recent Labs     12/23/21  0310 12/24/21  1040 12/25/21  0518   WBC 8.0 9.0 13.3*   RBC 3.98* 4.14* 4.13*   HEMOGLOBIN 11.8* 12.4* 12.3*   HEMATOCRIT 35.8* 37.0* 37.0*   MCV 89.9 89.4 89.6   MCH 29.6 30.0 29.8   MCHC 33.0* 33.5* 33.2*   RDW 47.8 46.0 45.4   PLATELETCT 177 199 218   MPV 9.1 9.0 10.0     Recent Labs     12/23/21  0310 12/23/21  1220 12/24/21  0625 12/24/21  1040 12/25/21  0518   SODIUM 140   < > 140 139 136   POTASSIUM  3.5*  --  4.0  --  3.8   CHLORIDE 107  --  107  --  101   CO2 24  --  25  --  24   GLUCOSE 130*  --  93  --  94   BUN 8  --  9  --  11   CREATININE 0.64  --  0.61  --  0.65   CALCIUM 8.0*  --  8.1*  --  8.6    < > = values in this interval not displayed.           Impressions/Recommendations:  Hypertension  On Norvasc, Losartan, and Hydralazine  Already on maximum doses of Norvasc and Losartan  Will increase Hydralazine for elevated blood pressures    Hyperlipemia  On Lipitor    Hemorrhagic stroke (HCC)  Had dysarthria, facial droop, and left hemiparesis  Presenting   CT right basal ganglia hemorrhage with mild mass effect  Non-surgical management  Chronic anticoagulation reversed  S/P Nicardipine gtt  S/P hypertonic saline, now on NaCl    Right ear pain  TM difficult to visualize due to cerumen  R auditory canal erythematous  Concerning for otitis externa  Ideally, would like to start Cipro Otic gtts but not available at this facility  But systemic abx (started for fever from likely alternative infectious source) should help    AF (atrial fibrillation) (HCC)  On chronic Xarelto  Anticoagulation presently on hold due to ICH    Fever  Tmax 101.9 w/ new leukocytosis  UA 10-20 WBC w/ negative bacteria, UCx pending  BCx x 2 NGTD  CXR pending  SARS-CoV-2 PCR negative  May also be due to LUE thrombophlebitis  Change Keflex to Zosyn due to concern for impending sepsis    Edema  U/S LUE +SVT, negative DVT  U/S LLE negative DVT  Request U/S RUE given PICC    Full Code    Discussed with patient, bedside RN (Grace), and Charge RN (Anu).  Thank you for the opportunity to assist in this patient's care.  We will continue to follow along with you.

## 2021-12-25 NOTE — THERAPY
"Physical Therapy   Initial Evaluation     Patient Name: Gokul Baca  Age:  71 y.o., Sex:  male  Medical Record #: [id number]  Today's Date: 12/25/2021     Subjective    \"It's so cold here everywhere\"     Objective       12/25/21 1301   Prior Living Situation   Prior Services None   Housing / Facility 1 Story House   Steps Into Home 2  (confirmed with wife, no rails but having one installed)   Steps In Home 0   Rail None  (wife planning to have one put in garage entrance)   Elevator No   Equipment Owned Front-Wheel Walker;Wheelchair   Lives with - Patient's Self Care Capacity Spouse   Comments Wheelchair and walker from previous TKA. 2 consecutive ERICA either through the front door or garage. Wife has purchased a rail to be installed in the garage entrance.   Prior Level of Functional Mobility   Bed Mobility Independent   Transfer Status Independent   Ambulation Independent   Distance Ambulation (Feet)   (community)   Assistive Devices Used None   Stairs Independent   Comments Pt was independent prior to admission with no adaptive equipment. Pt worked full time and drives.   Pain   Intervention Declines   Pain 0 - 10 Group   Pain Rating Scale (NPRS) 0   Cognition    Level of Consciousness Alert   Ability To Follow Commands 1 Step   Safety Awareness Impaired;Impulsive   Attention Impaired   Comments L neglect; easily distracted. When told not to plop on first stand>sit pt then repeated to himself for each subsequent time.   Passive ROM Lower Body   Passive ROM Lower Body X   Lt Ankle Dorsiflexion Degrees 0   Rt Ankle Dorsiflexion Degrees 0   Comments Hip flexion limited by body habitus, but functional. DF to neutral bilaterally.   Active ROM Lower Body    Active ROM Lower Body  X   Lt Hip Flexion Degrees 100   Lt Knee Extension Degrees (!) -20   Lt Ankle Dorsiflexion Degrees (!) -10   Rt Hip Flexion Degrees 100   Comments Grossly observed ROM. Limited secondary to weakness   Strength Lower Body   Lower Body Strength "  X   Rt Hip Flexion Strength 3- (F-)   Rt Knee Extension Strength 4 (G)   Lt Hip Flexion Strength 3- (F-)   Lt Hip Abduction Strength 3 (F)   Lt Hip Adduction Strength 3 (F)   Lt Knee Flexion Strength 3+ (F+)   Lt Knee Extension Strength 3- (F-)   Lt Ankle Dorsiflexion Strength 2+ (P+)   Comments L hemiplegia UE>LE   Sensation Lower Body   Lower Extremity Sensation   X   Lt Lower Extremity Light Touch Impaired   Comments Impaired light touch to the ant/post calf, but intact at the foot   Lower Body Muscle Tone   Lt Lower Extremity Muscle Tone Hypotonic   Balance Assessment   Sitting Balance (Static) Fair -   Sitting Balance (Dynamic) Poor   Standing Balance (Static) Poor -   Standing Balance (Dynamic) Trace   Weight Shift Sitting Poor   Weight Shift Standing Poor   Comments L hemiplegia with poor incorporation of L arm and difficulty placing L leg with standing.   Bed Mobility    Supine to Sit Maximal Assist   Sit to Supine Maximal Assist   Sit to Stand Maximal Assist   Scooting Total Assist   Rolling Maximal Assist to Rt.;Moderate Assist to Lt.   Comments 2nd person present throughout for safety, but not needed except for transfer.   Neurological Concerns   Neurological Concerns Yes   Clonus Intermittent   Comments L ankle clonus 5-6 beats.   Coordination Lower Body    Comments LLE coordination impaired overall.   Roll Left and Right   Assistance Needed Physical assistance   Physical Assistance Level 76% or more   CARE Score - Roll Left and Right 2   Roll Left and Right Discharge Goal   Discharge Goal 4   Sit to Lying   Assistance Needed Physical assistance   Physical Assistance Level 76% or more   CARE Score - Sit to Lying 2   Sit to Lying Discharge Goal   Discharge Goal 4   Lying to Sitting on Side of Bed   Assistance Needed Physical assistance   Physical Assistance Level 76% or more   CARE Score - Lying to Sitting on Side of Bed 2   Lying to Sitting on Side of Bed Discharge Goal   Discharge Goal 4   Sit to Stand    Assistance Needed Physical assistance   Physical Assistance Level 76% or more   CARE Score - Sit to Stand 2   Sit to Stand Discharge Goal   Discharge Goal 4   Chair/Bed-to-Chair Transfer   Assistance Needed Physical assistance   Physical Assistance Level Total assistance   Comment 2P assist   CARE Score - Chair/Bed-to-Chair Transfer 1   Chair/Bed-to-Chair Transfer Discharge Goal   Discharge Goal 4   Car Transfer   Reason if not Attempted Safety concerns   CARE Score - Car Transfer 88   Car Transfer Discharge Goal   Discharge Goal 3   Walk 10 Feet   Assistance Needed Physical assistance   Physical Assistance Level Total assistance   Comment 2P assist with maida rail and facilitation, wheelchair follow   CARE Score - Walk 10 Feet 1   Walk 10 Feet Discharge Goal   Discharge Goal 3   Walk 50 Feet with Two Turns   Reason if not Attempted Safety concerns   CARE Score - Walk 50 Feet with Two Turns 88   Walk 50 Feet with Two Turns Discharge Goal   Discharge Goal 3   Walk 150 Feet   Reason if not Attempted Safety concerns   CARE Score - Walk 150 Feet 88   Walk 150 Feet Discharge Goal   Discharge Goal 88   Walking 10 Feet on Uneven Surfaces   Reason if not Attempted Safety concerns   CARE Score - Walking 10 Feet on Uneven Surfaces 88   Walking 10 Feet on Uneven Surfaces Discharge Goal   Discharge Goal 3   1 Step (Curb)   Reason if not Attempted Safety concerns   CARE Score - 1 Step (Curb) 88   1 Step (Curb) Discharge Goal   Discharge Goal 3   4 Steps   Reason if not Attempted Safety concerns   CARE Score - 4 Steps 88   4 Steps Discharge Goal   Discharge Goal 3   12 Steps   Reason if not Attempted Safety concerns   CARE Score - 12 Steps 88   12 Steps Discharge Goal   Discharge Goal 88   Picking Up Object   Reason if not Attempted Safety concerns   CARE Score - Picking Up Object 88   Picking Up Object Discharge Goal   Discharge Goal 4   Wheel 50 Feet with Two Turns   Reason if not Attempted Activity not applicable   CARE Score  - Wheel 50 Feet with Two Turns 9   Type of Wheelchair/Scooter Manual   Wheel 50 Feet with Two Turns Discharge Goal   Discharge Goal 9   Wheel 150 Feet   Reason if not Attempted Activity not applicable   CARE Score - Wheel 150 Feet 9   Type of Wheelchair/Scooter Manual   Wheel 150 Feet Discharge Goal   Discharge Goal 9   Gait Functional Level of Assist    Gait Level Of Assist Total Assist X 2   Assistive Device   (hallway rail on R side)   Distance (Feet) 10   # of Times Distance was Traveled 2   Deviation Decreased Base Of Support;Step To;Decreased Heel Strike;Decreased Toe Off  (hands on facilitation for LLE advance and knee block)   Stairs Functional Level of Assist   Level of Assist with Stairs Unable to Participate   Transfer Functional Level of Assist   Bed, Chair, Wheelchair Transfer Maximal Assist  (2P for safety)   Bed Chair Wheelchair Transfer Description Assist with one limb;Increased time;Initial preparation for task;Set-up of equipment;Verbal cueing   Problem List    Problems Impaired Bed Mobility;Impaired Transfers;Impaired Ambulation;Functional ROM Deficit;Functional Strength Deficit;Impaired Balance;Impaired Coordination;Impaired Vision;Decreased Activity Tolerance;Safety Awareness Deficits / Cognition;Motor Planning / Sequencing   Precautions   Precautions Fall Risk   Comments Dense L maida, 2P assist, L neglect   Current Discharge Plan   Current Discharge Plan Return to Prior Living Situation   Interdisciplinary Plan of Care Collaboration   IDT Collaboration with  Certified Nursing Assistant;Family / Caregiver;Nursing;Physical Therapist;Physician;Therapy Tech   Patient Position at End of Therapy In Bed;Bed Alarm On;Call Light within Reach;Tray Table within Reach;Phone within Reach;Family / Friend in Room   Collaboration Comments CLOF   Benefit   Therapy Benefit Patient Would Benefit from Inpatient Rehabilitation Physical Therapy to Maximize Functional [place] with ADLs, IADLs and Mobility.    Strengths & Barriers   Strengths Able to follow instructions;Alert and oriented;Independent prior level of function;Motivated for self care and independence;Pleasant and cooperative;Supportive family;Willingly participates in therapeutic activities   Barriers Decreased endurance;Difficulty following instructions;Hemiplegia;Fatigue;Home accessibility;Impaired activity tolerance;Impaired balance;Impaired carryover of learning;Impaired functional cognition;Impulsive;Limited mobility   PT Total Time Spent   PT Individual Total Time Spent (Mins) 60   PT Charge Group   PT Neuromuscular Re-Education / Balance 1   PT Evaluation PT Evaluation High     Neuro re-ed with hands on facilitation for advancing LLE and blocking the knee for gait training with maida rail and wheelchair follow.    Assessment  Patient is 71 y.o. 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  Additional factors influencing patient status / progress (ie: cognitive factors, co-morbidities, social support, etc): PMH includes HTN, a-fib, morbid obesity, HLD and obstructive sleep apnea.    Pt presents with L sided weakness, impaired balance, impaired safety awareness, and L neglect resulting in a significant decline in functional mobility from baseline level. Pt required max A for bed mobility and 2P assist for transfers and gait attempt. Stairs were not safe to trial during this evaluation given the severity of pt's deficits. Pt will benefit from ongoing skilled inpatient PT to maximize functional mobility prior to return home with assist from his spouse.      Plan  Recommend Physical Therapy  minutes per day 5-7 days per week for 3 weeks for the following treatments:  PT Group Therapy, PT E Stim Attended, PT Orthotics Training, PT Gait Training, PT Self Care/Home Eval, PT Therapeutic Exercises, PT TENS Application, PT Neuro Re-Ed/Balance, PT Aquatic Therapy, PT  Therapeutic Activity, PT Manual Therapy and PT Evaluation.    Passport items to be completed:  Get in/out of bed safely, in/out of a vehicle, safely use mobility device, walk or wheel around home/community, navigate up and down stairs, show how to get up/down from the ground, ensure home is accessible, demonstrate HEP, complete caregiver training    Goals:  Long term and short term goals have been discussed with patient and spouse and they are in agreement.    Physical Therapy Problems (Active)     Problem: Balance     Dates: Start: 12/25/21       Goal: STG-Within one week, patient will maintain dynamic sitting x2 minutes without loss of balance.     Dates: Start: 12/25/21             Problem: Mobility     Dates: Start: 12/25/21       Goal: STG-Within one week, patient will ambulate 20ft using maida rail with min A and wheelchair follow.     Dates: Start: 12/25/21             Problem: Mobility Transfers     Dates: Start: 12/25/21       Goal: STG-Within one week, patient will perform bed mobility with mod A using maida techniques.     Dates: Start: 12/25/21          Goal: STG-Within one week, patient will transfer bed to chair with 1 person assist.     Dates: Start: 12/25/21             Problem: PT-Long Term Goals     Dates: Start: 12/25/21       Goal: LTG-By discharge, patient will ambulate 30ft with min A and most appropriate AD.     Dates: Start: 12/25/21          Goal: LTG-By discharge, patient will transfer one surface to another with min A using AD as needed.     Dates: Start: 12/25/21          Goal: LTG-By discharge, patient will ambulate up/down 2 stairs with L ascending rail and min A.     Dates: Start: 12/25/21          Goal: LTG-By discharge, patient will transfer in/out of a car with mod A using AD as needed.     Dates: Start: 12/25/21          Goal: LTG-By discharge, patient will perform bed mobility with supervision using maida techniques.     Dates: Start: 12/25/21

## 2021-12-25 NOTE — PROGRESS NOTES
Left upper extremity Venous Ultrasound findings:  PROCEDURES:   Left upper extremity venous duplex imaging.    The following venous structures were evaluated: internal jugular,    subclavian, axillary, brachial, cephalic, basilic, radial, and ulnar veins. Serial compression and spectral Doppler flow evaluations were performed.        FINDINGS:   All deep veins demonstrate complete color filling and compressibility with normal venous flow dynamics including spontaneous flow and respiratory phasicity.    No deep venous thrombosis.    At the elbow, the median antecubital vein is incompressible & in the    forearm the cephalic vein is incompressible with acute to subacute    appearing softly echogenic material in the lumen consistent with    superficial thrombus.  Dr. Ramirez notified.

## 2021-12-26 PROBLEM — D69.6 THROMBOCYTOPENIA (HCC): Status: ACTIVE | Noted: 2021-12-26

## 2021-12-26 PROBLEM — E87.6 HYPOKALEMIA: Status: ACTIVE | Noted: 2021-12-26

## 2021-12-26 LAB
ANION GAP SERPL CALC-SCNC: 13 MMOL/L (ref 7–16)
BUN SERPL-MCNC: 13 MG/DL (ref 8–22)
CALCIUM SERPL-MCNC: 8.2 MG/DL (ref 8.5–10.5)
CHLORIDE SERPL-SCNC: 102 MMOL/L (ref 96–112)
CO2 SERPL-SCNC: 23 MMOL/L (ref 20–33)
CREAT SERPL-MCNC: 0.71 MG/DL (ref 0.5–1.4)
ERYTHROCYTE [DISTWIDTH] IN BLOOD BY AUTOMATED COUNT: 45 FL (ref 35.9–50)
GLUCOSE SERPL-MCNC: 80 MG/DL (ref 65–99)
HCT VFR BLD AUTO: 35.5 % (ref 42–52)
HGB BLD-MCNC: 11.8 G/DL (ref 14–18)
MCH RBC QN AUTO: 29.6 PG (ref 27–33)
MCHC RBC AUTO-ENTMCNC: 33.2 G/DL (ref 33.7–35.3)
MCV RBC AUTO: 89 FL (ref 81.4–97.8)
NT-PROBNP SERPL IA-MCNC: 208 PG/ML (ref 0–125)
PHOSPHATE SERPL-MCNC: 2.9 MG/DL (ref 2.5–4.5)
PLATELET # BLD AUTO: 113 K/UL (ref 164–446)
PMV BLD AUTO: 10.6 FL (ref 9–12.9)
POTASSIUM SERPL-SCNC: 3.3 MMOL/L (ref 3.6–5.5)
RBC # BLD AUTO: 3.99 M/UL (ref 4.7–6.1)
SODIUM SERPL-SCNC: 138 MMOL/L (ref 135–145)
WBC # BLD AUTO: 13.5 K/UL (ref 4.8–10.8)

## 2021-12-26 PROCEDURE — A9270 NON-COVERED ITEM OR SERVICE: HCPCS | Performed by: PHYSICAL MEDICINE & REHABILITATION

## 2021-12-26 PROCEDURE — 700105 HCHG RX REV CODE 258: Performed by: HOSPITALIST

## 2021-12-26 PROCEDURE — 84100 ASSAY OF PHOSPHORUS: CPT

## 2021-12-26 PROCEDURE — 700111 HCHG RX REV CODE 636 W/ 250 OVERRIDE (IP): Performed by: HOSPITALIST

## 2021-12-26 PROCEDURE — 700102 HCHG RX REV CODE 250 W/ 637 OVERRIDE(OP): Performed by: PHYSICAL MEDICINE & REHABILITATION

## 2021-12-26 PROCEDURE — 770010 HCHG ROOM/CARE - REHAB SEMI PRIVAT*

## 2021-12-26 PROCEDURE — 85027 COMPLETE CBC AUTOMATED: CPT

## 2021-12-26 PROCEDURE — 700101 HCHG RX REV CODE 250: Performed by: PHYSICAL MEDICINE & REHABILITATION

## 2021-12-26 PROCEDURE — 97530 THERAPEUTIC ACTIVITIES: CPT | Mod: CO

## 2021-12-26 PROCEDURE — 700102 HCHG RX REV CODE 250 W/ 637 OVERRIDE(OP): Performed by: HOSPITALIST

## 2021-12-26 PROCEDURE — 97110 THERAPEUTIC EXERCISES: CPT | Mod: CO

## 2021-12-26 PROCEDURE — 83880 ASSAY OF NATRIURETIC PEPTIDE: CPT

## 2021-12-26 PROCEDURE — 99232 SBSQ HOSP IP/OBS MODERATE 35: CPT | Performed by: PHYSICAL MEDICINE & REHABILITATION

## 2021-12-26 PROCEDURE — 99233 SBSQ HOSP IP/OBS HIGH 50: CPT | Performed by: HOSPITALIST

## 2021-12-26 PROCEDURE — A9270 NON-COVERED ITEM OR SERVICE: HCPCS | Performed by: HOSPITALIST

## 2021-12-26 PROCEDURE — 80048 BASIC METABOLIC PNL TOTAL CA: CPT

## 2021-12-26 RX ORDER — LANOLIN ALCOHOL/MO/W.PET/CERES
3 CREAM (GRAM) TOPICAL
Status: DISCONTINUED | OUTPATIENT
Start: 2021-12-26 | End: 2022-01-01

## 2021-12-26 RX ORDER — POTASSIUM CHLORIDE 20 MEQ/1
40 TABLET, EXTENDED RELEASE ORAL ONCE
Status: COMPLETED | OUTPATIENT
Start: 2021-12-26 | End: 2021-12-26

## 2021-12-26 RX ADMIN — Medication 20 ML: at 20:08

## 2021-12-26 RX ADMIN — PIPERACILLIN AND TAZOBACTAM 3.38 G: 3; .375 INJECTION, POWDER, LYOPHILIZED, FOR SOLUTION INTRAVENOUS; PARENTERAL at 12:30

## 2021-12-26 RX ADMIN — ATORVASTATIN CALCIUM 40 MG: 40 TABLET, FILM COATED ORAL at 20:02

## 2021-12-26 RX ADMIN — POTASSIUM CHLORIDE 40 MEQ: 1500 TABLET, EXTENDED RELEASE ORAL at 11:26

## 2021-12-26 RX ADMIN — HYDRALAZINE HYDROCHLORIDE 75 MG: 25 TABLET, FILM COATED ORAL at 05:33

## 2021-12-26 RX ADMIN — ACETAMINOPHEN 1000 MG: 500 TABLET, FILM COATED ORAL at 07:55

## 2021-12-26 RX ADMIN — SENNOSIDES AND DOCUSATE SODIUM 2 TABLET: 50; 8.6 TABLET ORAL at 07:55

## 2021-12-26 RX ADMIN — SODIUM CHLORIDE 2 G: 1 TABLET ORAL at 16:51

## 2021-12-26 RX ADMIN — HYDROXYZINE HYDROCHLORIDE 50 MG: 25 TABLET, FILM COATED ORAL at 22:15

## 2021-12-26 RX ADMIN — LOSARTAN POTASSIUM 100 MG: 25 TABLET, FILM COATED ORAL at 07:55

## 2021-12-26 RX ADMIN — Medication 20 ML: at 07:56

## 2021-12-26 RX ADMIN — ACETAMINOPHEN 1000 MG: 500 TABLET, FILM COATED ORAL at 14:57

## 2021-12-26 RX ADMIN — PIPERACILLIN AND TAZOBACTAM 3.38 G: 3; .375 INJECTION, POWDER, LYOPHILIZED, FOR SOLUTION INTRAVENOUS; PARENTERAL at 23:54

## 2021-12-26 RX ADMIN — PIPERACILLIN AND TAZOBACTAM 3.38 G: 3; .375 INJECTION, POWDER, LYOPHILIZED, FOR SOLUTION INTRAVENOUS; PARENTERAL at 17:31

## 2021-12-26 RX ADMIN — URSODIOL 300 MG: 300 CAPSULE ORAL at 07:55

## 2021-12-26 RX ADMIN — HYDRALAZINE HYDROCHLORIDE 75 MG: 25 TABLET, FILM COATED ORAL at 14:57

## 2021-12-26 RX ADMIN — SODIUM CHLORIDE 2 G: 1 TABLET ORAL at 07:55

## 2021-12-26 RX ADMIN — TRAZODONE HYDROCHLORIDE 50 MG: 50 TABLET ORAL at 20:02

## 2021-12-26 RX ADMIN — ACETAMINOPHEN 1000 MG: 500 TABLET, FILM COATED ORAL at 20:02

## 2021-12-26 RX ADMIN — OMEPRAZOLE 20 MG: 20 CAPSULE, DELAYED RELEASE ORAL at 07:55

## 2021-12-26 RX ADMIN — PIPERACILLIN AND TAZOBACTAM 3.38 G: 3; .375 INJECTION, POWDER, LYOPHILIZED, FOR SOLUTION INTRAVENOUS; PARENTERAL at 05:25

## 2021-12-26 RX ADMIN — AMLODIPINE BESYLATE 10 MG: 5 TABLET ORAL at 05:33

## 2021-12-26 RX ADMIN — HYDRALAZINE HYDROCHLORIDE 75 MG: 25 TABLET, FILM COATED ORAL at 20:02

## 2021-12-26 RX ADMIN — SODIUM CHLORIDE 2 G: 1 TABLET ORAL at 11:26

## 2021-12-26 ASSESSMENT — ENCOUNTER SYMPTOMS
BRUISES/BLEEDS EASILY: 0
POLYDIPSIA: 0
FOCAL WEAKNESS: 1
PALPITATIONS: 0
EYES NEGATIVE: 1
VOMITING: 0
CHILLS: 0
SHORTNESS OF BREATH: 0
NAUSEA: 0
FEVER: 0
COUGH: 0
ABDOMINAL PAIN: 0

## 2021-12-26 ASSESSMENT — PAIN DESCRIPTION - PAIN TYPE: TYPE: ACUTE PAIN

## 2021-12-26 NOTE — PROGRESS NOTES
Received patient during shift change, report rec'd from day shift RN. Resting in bed, VS stable on room air. Incontinent of B&B, Max x 2 assist for transfers. A&O x 4, able to make needs known. Bed in low position, call light within reach.

## 2021-12-26 NOTE — ASSESSMENT & PLAN NOTE
Not on any rate controlling agents  Monitor for tachydysrhythmias  On chronic Xarelto  Anticoagulation presently on hold due to ICH

## 2021-12-26 NOTE — CARE PLAN
"The patient is Watcher - Medium risk of patient condition declining or worsening    Shift Goals  Clinical Goals: safety, monitor b/p  Patient Goals: sleep well    Progress made toward(s) clinical / shift goals:  Resting in bed after hs meds, resp even, unlabored. Using call light for assist as needed.     Problem: Pain - Standard  Goal: Alleviation of pain or a reduction in pain to the patient’s comfort goal  Outcome: Progressing     [place] Fall risk Assessment Score: 15    High fall risk Interventions   - Bed and strip alarm   - Yellow sign by the door   - Yellow wrist band \"Fall risk\"  - Do not leave patient unattended in the bathroom  - Fall risk education provided  - Call light within reach   - Yellow  socks   - Belongings within reach   - Bed in the lowest position             "

## 2021-12-26 NOTE — THERAPY
"Occupational Therapy  Daily Treatment     Patient Name: Gokul Baca  Age:  71 y.o., Sex:  male  Medical Record #: [id number]  Today's Date: 12/26/2021     Precautions  Precautions: (P) Fall Risk  Comments: (P) Left Hemiparesis          Subjective    \" I can here you . My eyes are shut. \"  Exclaimed to wife who was encouraging him to \"wake up \" for therapy  At start of session .        Objective       12/26/21 0931   Precautions   Precautions Fall Risk   Comments Left Hemiparesis    Functional Level of Assist   Bed, Chair, Wheelchair Transfer Maximal Assist  (1 person  stand pivot  extra time  cues for technique)   Supine Upper Body Exercises   Other Exercise  chest presses  x  5 reps x 3   using  pvc pipe frame    left hand grasp maintained with Ace wrap.       chest press followed by  shoulder flexion   and extension   x 3 reps x 4  again using PVC pipe frame.    Left UE only   chest presses  x 3 reps  x 3    chest press followed by shoulder flexion and extension   x 3 reps x 3.    note some difficulty with controlled extension noted    difficulty increased when fatigued.    left hand pumps  x 10 reps x  2    Sitting Upper Body Exercises   Other Exercise shoulder shrugs and  squeezes   x  3 reps x  3  seated edge of mat    Bed Mobility    Supine to Sit Maximal Assist  (in / out of bed  and on/off mat)   Sit to Supine Maximal Assist  (in / out of bed  and on/off mat)   Scooting Moderate Assist   Interdisciplinary Plan of Care Collaboration   IDT Collaboration with  Therapy Tech   Patient Position at End of Therapy In Bed;Call Light within Reach;Tray Table within Reach   Collaboration Comments  available to assist as needed.   not needed for transfers    OT Total Time Spent   OT Individual Total Time Spent (Mins) 60   OT Charge Group   OT Therapy Activity 2   OT Therapeutic Exercise  2     Edema glove to left hand     When back in bed  Forearm placed on pillow  For elevation to assist with edema mgmt.     Light " manual lymph drainage massage  to left hand and forearm  X 5 minutes during tx session       Assessment     alertness level increased as activity progressed    Reported feeling tired from lack of sleep night prior.     Motivated to make functional gains and return home with spouse   Strengths: Independent prior level of function,Pleasant and cooperative,Supportive family,Willingly participates in therapeutic activities  Barriers: Bladder incontinence,Bowel incontinence,Decreased endurance,Difficulty following instructions,Fatigue,Hemiparesis,Impaired activity tolerance,Impaired balance,Impaired insight/denial of deficits,Impaired functional cognition,Impulsive,Limited mobility    Plan     ADL  Related  Transfer/ mobility     UE ther ex and activity  Along with neuro re ed  To improve  Left UE functional use.    Edema mgmt  For left UE.     Passport items to be completed:  Perform bathroom transfers, complete dressing, complete feeding, get ready for the day, prepare a simple meal, participate in household tasks, adapt home for safety needs, demonstrate home exercise program, complete caregiver training     Occupational Therapy Goals (Active)     Problem: Dressing     Dates: Start: 12/25/21       Goal: STG-Within one week, patient will dress UB     Dates: Start: 12/25/21       Description: 1) Individualized Goal:  at a Min A level with fading cues for L maida technique.           Problem: Functional Transfers     Dates: Start: 12/25/21       Goal: STG-Within one week, patient will transfer to toilet     Dates: Start: 12/25/21       Description: 1) Individualized Goal:  at a Mod A level with AD/DME PRN.           Problem: Grooming     Dates: Start: 12/25/21       Goal: STG-Within one week, patient will complete grooming     Dates: Start: 12/25/21       Description: 1) Individualized Goal:  seated at sink-side with Min A and fading cues for LUE involvement.             Problem: OT Long Term Goals     Dates: Start:  12/25/21       Goal: LTG-By discharge, patient will complete basic self care tasks     Dates: Start: 12/25/21       Description: 1) Individualized Goal:  at a Min A to Mod I level with AD/AE/DME PRN.        Goal: LTG-By discharge, patient will perform bathroom transfers     Dates: Start: 12/25/21       Description: 1) Individualized Goal:  at a Min A to Mod I level with use of AE/AD/DME PRN.

## 2021-12-26 NOTE — PROGRESS NOTES
"Rehab Progress Note     Date of Service: 12/26/2021  Chief Complaint: Follow-up stroke    Interval Events (Subjective)    Patient seen and examined today in his room.   His wife is present. I discussed with her the significant impairments the patient has due to his large hemorrhagic stroke as well as the potential recovery timeline. 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 I recommended he take time off to all for stroke recovery. He has no yet completed the entire SCCAN with SLP.    Patient with a new superficial thrombus in the right UE. Discussed with Dr. Tee.     Both patient and wife do not want to restart his blood thinner. They will discuss further. In addition, wife thinks that patient would want to be DNR/DNI. I advised her he told me full code when I met him. She will also discuss this with him further.    Patient reports to me he is feeling great today. He was a one person assist this morning. I let the wife know that patient likely will need a long course of rehab.    ROS: No changes to bowel, bladder, pain, mood, or sleep.         Objective:  VITAL SIGNS: /67   Pulse 80   Temp 37.4 °C (99.4 °F) (Oral)   Resp (!) 22   Ht 1.664 m (5' 5.51\")   Wt 116 kg (255 lb 11.7 oz)   SpO2 94%   BMI 41.89 kg/m²   Gen: alert, no apparent distress  Neuro: notable for left visual field cut, left hemiparesis, left neglect    Recent Results (from the past 72 hour(s))   Sodium Serum (NA)    Collection Time: 12/23/21 12:20 PM   Result Value Ref Range    Sodium 140 135 - 145 mmol/L   Sodium Serum (NA)    Collection Time: 12/23/21  6:35 PM   Result Value Ref Range    Sodium 142 135 - 145 mmol/L   Sodium Serum (NA)    Collection Time: 12/24/21 12:00 AM   Result Value Ref Range    Sodium 142 135 - 145 mmol/L   Magnesium    Collection Time: 12/24/21  6:25 AM   Result Value Ref Range    Magnesium 1.9 1.5 - 2.5 mg/dL   Complete Metabolic Panel    Collection Time: 12/24/21  6:25 " AM   Result Value Ref Range    Sodium 140 135 - 145 mmol/L    Potassium 4.0 3.6 - 5.5 mmol/L    Chloride 107 96 - 112 mmol/L    Co2 25 20 - 33 mmol/L    Anion Gap 8.0 7.0 - 16.0    Glucose 93 65 - 99 mg/dL    Bun 9 8 - 22 mg/dL    Creatinine 0.61 0.50 - 1.40 mg/dL    Calcium 8.1 (L) 8.5 - 10.5 mg/dL    AST(SGOT) 16 12 - 45 U/L    ALT(SGPT) 14 2 - 50 U/L    Alkaline Phosphatase 75 30 - 99 U/L    Total Bilirubin 0.4 0.1 - 1.5 mg/dL    Albumin 3.4 3.2 - 4.9 g/dL    Total Protein 6.1 6.0 - 8.2 g/dL    Globulin 2.7 1.9 - 3.5 g/dL    A-G Ratio 1.3 g/dL   ESTIMATED GFR    Collection Time: 12/24/21  6:25 AM   Result Value Ref Range    GFR If African American >60 >60 mL/min/1.73 m 2    GFR If Non African American >60 >60 mL/min/1.73 m 2   Sodium Serum (NA)    Collection Time: 12/24/21 10:40 AM   Result Value Ref Range    Sodium 139 135 - 145 mmol/L   CBC WITH DIFFERENTIAL    Collection Time: 12/24/21 10:40 AM   Result Value Ref Range    WBC 9.0 4.8 - 10.8 K/uL    RBC 4.14 (L) 4.70 - 6.10 M/uL    Hemoglobin 12.4 (L) 14.0 - 18.0 g/dL    Hematocrit 37.0 (L) 42.0 - 52.0 %    MCV 89.4 81.4 - 97.8 fL    MCH 30.0 27.0 - 33.0 pg    MCHC 33.5 (L) 33.7 - 35.3 g/dL    RDW 46.0 35.9 - 50.0 fL    Platelet Count 199 164 - 446 K/uL    MPV 9.0 9.0 - 12.9 fL    Neutrophils-Polys 66.60 44.00 - 72.00 %    Lymphocytes 16.80 (L) 22.00 - 41.00 %    Monocytes 12.30 0.00 - 13.40 %    Eosinophils 3.40 0.00 - 6.90 %    Basophils 0.60 0.00 - 1.80 %    Immature Granulocytes 0.30 0.00 - 0.90 %    Nucleated RBC 0.00 /100 WBC    Neutrophils (Absolute) 5.97 1.82 - 7.42 K/uL    Lymphs (Absolute) 1.50 1.00 - 4.80 K/uL    Monos (Absolute) 1.10 (H) 0.00 - 0.85 K/uL    Eos (Absolute) 0.30 0.00 - 0.51 K/uL    Baso (Absolute) 0.05 0.00 - 0.12 K/uL    Immature Granulocytes (abs) 0.03 0.00 - 0.11 K/uL    NRBC (Absolute) 0.00 K/uL   SARS-CoV-2 PCR (24 hour In-House): Collect NP swab in [facility]    Collection Time: 12/24/21  1:40 PM    Specimen: Nasopharyngeal; Respirate    Result Value Ref Range    SARS-CoV-2 Source NP Swab     SARS-CoV-2 by PCR NotDetected    URINALYSIS    Collection Time: 12/25/21  4:20 AM    Specimen: Urine, Clean Catch   Result Value Ref Range    Color Yellow     Character Clear     Specific Gravity 1.012 <1.035    Ph 8.0 5.0 - 8.0    Glucose Negative Negative mg/dL    Ketones Negative Negative mg/dL    Protein Negative Negative mg/dL    Bilirubin Negative Negative    Urobilinogen, Urine 0.2 Negative    Nitrite Negative Negative    Leukocyte Esterase Small (A) Negative    Occult Blood Trace (A) Negative    Micro Urine Req Microscopic    URINE MICROSCOPIC (W/UA)    Collection Time: 12/25/21  4:20 AM   Result Value Ref Range    WBC 10-20 (A) /hpf    RBC 2-5 (A) /hpf    Bacteria Negative None /hpf    Epithelial Cells Negative /hpf   CBC with Differential    Collection Time: 12/25/21  5:18 AM   Result Value Ref Range    WBC 13.3 (H) 4.8 - 10.8 K/uL    RBC 4.13 (L) 4.70 - 6.10 M/uL    Hemoglobin 12.3 (L) 14.0 - 18.0 g/dL    Hematocrit 37.0 (L) 42.0 - 52.0 %    MCV 89.6 81.4 - 97.8 fL    MCH 29.8 27.0 - 33.0 pg    MCHC 33.2 (L) 33.7 - 35.3 g/dL    RDW 45.4 35.9 - 50.0 fL    Platelet Count 218 164 - 446 K/uL    MPV 10.0 9.0 - 12.9 fL    Neutrophils-Polys 76.20 (H) 44.00 - 72.00 %    Lymphocytes 11.30 (L) 22.00 - 41.00 %    Monocytes 11.10 0.00 - 13.40 %    Eosinophils 0.70 0.00 - 6.90 %    Basophils 0.20 0.00 - 1.80 %    Immature Granulocytes 0.50 0.00 - 0.90 %    Nucleated RBC 0.00 /100 WBC    Neutrophils (Absolute) 10.15 (H) 1.82 - 7.42 K/uL    Lymphs (Absolute) 1.50 1.00 - 4.80 K/uL    Monos (Absolute) 1.48 (H) 0.00 - 0.85 K/uL    Eos (Absolute) 0.09 0.00 - 0.51 K/uL    Baso (Absolute) 0.03 0.00 - 0.12 K/uL    Immature Granulocytes (abs) 0.07 0.00 - 0.11 K/uL    NRBC (Absolute) 0.00 K/uL   Comp Metabolic Panel (CMP)    Collection Time: 12/25/21  5:18 AM   Result Value Ref Range    Sodium 136 135 - 145 mmol/L    Potassium 3.8 3.6 - 5.5 mmol/L    Chloride 101 96 - 112  mmol/L    Co2 24 20 - 33 mmol/L    Anion Gap 11.0 7.0 - 16.0    Glucose 94 65 - 99 mg/dL    Bun 11 8 - 22 mg/dL    Creatinine 0.65 0.50 - 1.40 mg/dL    Calcium 8.6 8.5 - 10.5 mg/dL    AST(SGOT) 18 12 - 45 U/L    ALT(SGPT) 15 2 - 50 U/L    Alkaline Phosphatase 84 30 - 99 U/L    Total Bilirubin 0.5 0.1 - 1.5 mg/dL    Albumin 3.8 3.2 - 4.9 g/dL    Total Protein 6.4 6.0 - 8.2 g/dL    Globulin 2.6 1.9 - 3.5 g/dL    A-G Ratio 1.5 g/dL   Magnesium    Collection Time: 12/25/21  5:18 AM   Result Value Ref Range    Magnesium 1.8 1.5 - 2.5 mg/dL   Vitamin D, 25-hydroxy (blood)    Collection Time: 12/25/21  5:18 AM   Result Value Ref Range    25-Hydroxy   Vitamin D 25 46 30 - 100 ng/mL   ESTIMATED GFR    Collection Time: 12/25/21  5:18 AM   Result Value Ref Range    GFR If African American >60 >60 mL/min/1.73 m 2    GFR If Non African American >60 >60 mL/min/1.73 m 2   BLOOD CULTURE    Collection Time: 12/25/21  6:32 AM    Specimen: Peripheral; Blood   Result Value Ref Range    Significant Indicator NEG     Source BLD     Site PERIPHERAL     Culture Result       No Growth  Note: Blood cultures are incubated for 5 days and  are monitored continuously.Positive blood cultures  are called to the RN and reported as soon as  they are identified.     BLOOD CULTURE    Collection Time: 12/25/21  6:32 AM    Specimen: Peripheral; Blood   Result Value Ref Range    Significant Indicator NEG     Source BLD     Site PERIPHERAL     Culture Result       No Growth  Note: Blood cultures are incubated for 5 days and  are monitored continuously.Positive blood cultures  are called to the RN and reported as soon as  they are identified.     CBC WITHOUT DIFFERENTIAL    Collection Time: 12/26/21  4:50 AM   Result Value Ref Range    WBC 13.5 (H) 4.8 - 10.8 K/uL    RBC 3.99 (L) 4.70 - 6.10 M/uL    Hemoglobin 11.8 (L) 14.0 - 18.0 g/dL    Hematocrit 35.5 (L) 42.0 - 52.0 %    MCV 89.0 81.4 - 97.8 fL    MCH 29.6 27.0 - 33.0 pg    MCHC 33.2 (L) 33.7 - 35.3  g/dL    RDW 45.0 35.9 - 50.0 fL    Platelet Count 113 (L) 164 - 446 K/uL    MPV 10.6 9.0 - 12.9 fL   Basic Metabolic Panel    Collection Time: 12/26/21  4:50 AM   Result Value Ref Range    Sodium 138 135 - 145 mmol/L    Potassium 3.3 (L) 3.6 - 5.5 mmol/L    Chloride 102 96 - 112 mmol/L    Co2 23 20 - 33 mmol/L    Glucose 80 65 - 99 mg/dL    Bun 13 8 - 22 mg/dL    Creatinine 0.71 0.50 - 1.40 mg/dL    Calcium 8.2 (L) 8.5 - 10.5 mg/dL    Anion Gap 13.0 7.0 - 16.0   PHOSPHORUS    Collection Time: 12/26/21  4:50 AM   Result Value Ref Range    Phosphorus 2.9 2.5 - 4.5 mg/dL   proBrain Natriuretic Peptide, NT    Collection Time: 12/26/21  4:50 AM   Result Value Ref Range    NT-proBNP 208 (H) 0 - 125 pg/mL   ESTIMATED GFR    Collection Time: 12/26/21  4:50 AM   Result Value Ref Range    GFR If African American >60 >60 mL/min/1.73 m 2    GFR If Non African American >60 >60 mL/min/1.73 m 2       Current Facility-Administered Medications   Medication Frequency   • melatonin tablet 3 mg QHS PRN   • normal saline (PF) 20 mL Q12HRS   • piperacillin-tazobactam (ZOSYN) 3.375 g in  mL IVPB Q6HRS   • hydrALAZINE (APRESOLINE) tablet 75 mg Q8HRS   • hydrOXYzine HCl (ATARAX) tablet 50 mg Q6HRS PRN   • Respiratory Therapy Consult Continuous RT   • Pharmacy Consult Request ...Pain Management Review 1 Each PHARMACY TO DOSE   • acetaminophen (Tylenol) tablet 650 mg Q4HRS PRN   • docusate sodium (ENEMEEZ) enema 283 mg QDAY PRN   • sodium phosphate (Fleet) enema 133 mL QDAY PRN   • omeprazole (PRILOSEC) capsule 20 mg QAM AC   • artificial tears ophthalmic solution 1 Drop PRN   • benzocaine-menthol (CEPACOL) lozenge 1 Lozenge Q2HRS PRN   • mag hydrox-al hydrox-simeth (MAALOX PLUS ES or MYLANTA DS) suspension 20 mL Q2HRS PRN   • ondansetron (ZOFRAN ODT) dispertab 4 mg 4X/DAY PRN    Or   • ondansetron (ZOFRAN) syringe/vial injection 4 mg 4X/DAY PRN   • traZODone (DESYREL) tablet 50 mg QHS PRN   • sodium chloride (OCEAN) 0.65 % nasal spray  2 Spray PRN   • midazolam (VERSED) 5 mg/mL (1 mL vial) PRN   • senna-docusate (PERICOLACE or SENOKOT S) 8.6-50 MG per tablet 2 Tablet BID    And   • polyethylene glycol/lytes (MIRALAX) PACKET 1 Packet QDAY PRN    And   • magnesium hydroxide (MILK OF MAGNESIA) suspension 30 mL QDAY PRN    And   • bisacodyl (DULCOLAX) suppository 10 mg QDAY PRN   • amLODIPine (NORVASC) tablet 10 mg Q DAY   • losartan (COZAAR) tablet 100 mg DAILY   • sodium chloride (SALT) tablet 2 g TID WITH MEALS   • hydrALAZINE (APRESOLINE) tablet 10 mg TID PRN   • cloNIDine (CATAPRES) tablet 0.1 mg TID PRN   • acetaminophen (TYLENOL) tablet 1,000 mg TID   • butalbital/apap/caffeine -40 mg (Fioricet) per tablet 1 Tablet Q6HRS PRN   • atorvastatin (LIPITOR) tablet 40 mg Q EVENING       Orders Placed This Encounter   Procedures   • Diet Order Diet: Level 6 - Soft and Bite Sized (No Straw; t-dine); Liquid level: Level 0 - Thin; Tray Modifications (optional): No Straws     Standing Status:   Standing     Number of Occurrences:   1     Order Specific Question:   Diet:     Answer:   Level 6 - Soft and Bite Sized [23]     Comments:   No Straw; t-dine     Order Specific Question:   Liquid level     Answer:   Level 0 - Thin     Order Specific Question:   Tray Modifications (optional)     Answer:   No Straws       Assessment:  Active Hospital Problems    Diagnosis    • *Hemorrhagic stroke ([facility])    • Fever    • Left-sided neglect    • Impaired mobility and ADLs    • Right ear pain    • Morbid obesity with BMI of 40.0-44.9, adult ([facility])    • GERD (gastroesophageal reflux disease)    • AF (atrial fibrillation) ([facility])    • Hyperlipemia    • Hypertension      This patient is a 71 y.o. male admitted for acute inpatient rehabilitation with Hemorrhagic stroke (HCC).    Medical Decision Making and Plan:    Large right basal ganglia hemorrhagic stroke  Left hemiparesis  Left visual field cut  Left neglect  Continue full rehab program  PT/OT/SLP, 1 hr each  discipline, 5 days per week    Statin    Per neurology okay to restart Xarelto in 2 weeks, so we will check a CT scan first, patient and wife not interested in restarting    Outpatient follow-up with neurology, Dr. Figueroa    Headache, improved  Likely from brain swelling  Continue scheduled Tylenol, dose increased  As needed Fioricet, last use never  We will slowly titrate off salt tabs with goal to keep sodium 140 or above     Hypertension  Amlodipine 10 mg  Losartan 100 mg  Increased dose of hydralazine 25 mg every 8 to 50 mg every 8, increased again to 75 mg every 8 hours  As needed clonidine for systolic blood pressures over 160  Appreciate hospitalist assistance     Paroxysmal atrial fibrillation  Xarelto currently on hold for 2 weeks per neurology  May benefit from beta-blocker, per review of outpatient notes has been on metoprolol and amiodarone in the past  Appreciate hospitalist assistance     Edema  Previously on IV Lasix  Checked Doppler ultrasound of the left upper extremity and left lower extremity, lower extremities negative for DVT    Left arm thrombophlebitis  Right arm superficial thrombus  Initially started on Keflex, now switched to IV antibiotics  No need for full anti-coagulation, which also would be contra-indicated    Fever, resolved  Checked UA - positiive and chest x-ray - megative  Urine culture pending  Checked blood cultures - NTG  Was started on Keflex for thrombophlebitis, now switched to IV antibiotics  Appreciate hospitalist assistance     Anemia  Mild  Monitor with weekly labs     GERD  Omeprazole     Insomnia  Melatonin     Morbid obesity  Due to excess calories  BMI 41.89  Outpatient nutritional counseling     Right ear pain  Evaluate with otoscope  Appreciate hospitalist assistance    Bowel program  Continue bowel medications  Last BM 12/25    Bladder program  Check PVRs - 33  Bladder scan for no voids  ICP for over 400 cc  Scheduled toileting    DVT prophylaxis  Contraindicated  given hemorrhage.   Compression stockings on in am, off in pm.  Increase mobility. Monitor for swelling.    Total time:  37 minutes.  I spent greater than 50% of the time for patient care, counseling, and coordination on this date, including patient face-to face time, unit/floor time with review of records/pertinent lab data and studies, as well as discussing diagnostic evaluation/work up, planned therapeutic interventions, and future disposition of care, as per the interval events/subjective and the assessment and plan as noted above.    Extensive time today spent discussing patient's medical status, large stroke burden, prognosis for recovery.    I have performed a physical exam, reviewed and updated ROS, as well as the assessment and plan today 12/26/2021. In review of note from 12/24/2021 there are no new changes except as documented above.          Valencia Ramirez M.D.  Physical Medicine and Rehabilitation

## 2021-12-26 NOTE — PROGRESS NOTES
Hospital Medicine Daily Progress Note      Chief Complaint  Fever  Leukocytosis  Atrial Fibrillation  Hypertension    Interval Problem Update  Pt very sleepy this morning and unable to stay awake for the duration of our encounter--query 2/2 Melatonin.  No recurrent fever overnight.  Lab, microbiology, and imaging results reviewed.    Review of Systems  Review of Systems   Constitutional: Negative for chills and fever.   HENT: Negative.    Eyes: Negative.    Respiratory: Negative for cough and shortness of breath.    Cardiovascular: Negative for chest pain and palpitations.   Gastrointestinal: Negative for abdominal pain, nausea and vomiting.   Skin: Negative for itching and rash.   Neurological: Positive for focal weakness.   Endo/Heme/Allergies: Negative for polydipsia. Does not bruise/bleed easily.        Physical Exam  Temp:  [37 °C (98.6 °F)-37.9 °C (100.3 °F)] 37.4 °C (99.4 °F)  Pulse:  [78-85] 80  Resp:  [17-22] 22  BP: (114-156)/(67-86) 114/67  SpO2:  [94 %] 94 %    Physical Exam  Vitals reviewed.   Constitutional:       General: He is not in acute distress.     Appearance: He is not ill-appearing, toxic-appearing or diaphoretic.   HENT:      Head: Normocephalic and atraumatic.      Right Ear: External ear normal.      Left Ear: External ear normal.      Nose: Nose normal.      Mouth/Throat:      Pharynx: Oropharynx is clear.   Eyes:      General:         Right eye: No discharge.         Left eye: No discharge.      Extraocular Movements: Extraocular movements intact.      Conjunctiva/sclera: Conjunctivae normal.   Cardiovascular:      Rate and Rhythm: Normal rate and regular rhythm.   Pulmonary:      Effort: Pulmonary effort is normal. No respiratory distress.      Breath sounds: Normal breath sounds. No wheezing.   Abdominal:      General: Bowel sounds are normal. There is no distension.      Palpations: Abdomen is soft.      Tenderness: There is no abdominal tenderness. There is no guarding or rebound.    Musculoskeletal:      Cervical back: Normal range of motion and neck supple.      Left lower leg: Edema present.      Comments: BUE 1+ edema   Skin:     General: Skin is warm and dry.   Neurological:      Comments: Somnolent and difficult to arouse         Fluids    Intake/Output Summary (Last 24 hours) at 12/26/2021 1117  Last data filed at 12/26/2021 0900  Gross per 24 hour   Intake 1070 ml   Output 600 ml   Net 470 ml       Laboratory  Recent Labs     12/24/21  1040 12/25/21  0518 12/26/21  0450   WBC 9.0 13.3* 13.5*   RBC 4.14* 4.13* 3.99*   HEMOGLOBIN 12.4* 12.3* 11.8*   HEMATOCRIT 37.0* 37.0* 35.5*   MCV 89.4 89.6 89.0   MCH 30.0 29.8 29.6   MCHC 33.5* 33.2* 33.2*   RDW 46.0 45.4 45.0   PLATELETCT 199 218 113*   MPV 9.0 10.0 10.6     Recent Labs     12/24/21  0625 12/24/21  0625 12/24/21  1040 12/25/21  0518 12/26/21  0450   SODIUM 140   < > 139 136 138   POTASSIUM 4.0  --   --  3.8 3.3*   CHLORIDE 107  --   --  101 102   CO2 25  --   --  24 23   GLUCOSE 93  --   --  94 80   BUN 9  --   --  11 13   CREATININE 0.61  --   --  0.65 0.71   CALCIUM 8.1*  --   --  8.6 8.2*    < > = values in this interval not displayed.                 Assessment/Plan  * Hemorrhagic stroke (HCC)- (present on admission)  Assessment & Plan  Had dysarthria, facial droop, and left hemiparesis  Presenting   CT right basal ganglia hemorrhage with mild mass effect  Non-surgical management  Chronic anticoagulation reversed  S/P Nicardipine gtt, BP management  S/P hypertonic saline, now on NaCl    Thrombocytopenia (HCC)  Assessment & Plan  Follow low Platelet counts  Check F/U labs in AM     Hypokalemia  Assessment & Plan  Replete K+  Check F/U labs in AM     Edema  Assessment & Plan  U/S LUE +SVT, negative DVT  U/S LLE negative DVT  U/S RUE +SVT, negative DVT  Monitor for propagation    Fever- (present on admission)  Assessment & Plan  Tmax 101.9 w/ new leukocytosis on 12/25/21  UA 10-20 WBC w/ negative bacteria, UCx still  pending  BCx x 2 NGTD  CXR negative acute  SARS-CoV-2 PCR negative  Continue empiric Zosyn pending final C+S    Right ear pain- (present on admission)  Assessment & Plan  TM difficult to visualize due to cerumen  R auditory canal erythematous  Concerning for otitis externa  Ideally, would like to start Cipro Otic gtts but not available at this facility  But systemic abx (started for fever from likely alternative infectious source) should help  Continue to monitor clinically    Hypertension- (present on admission)  Assessment & Plan  On Norvasc, Losartan, and Hydralazine  On maximum doses of Norvasc and Losartan  Elevated blood pressures improving w/ increased Hydralazine    Hyperlipemia- (present on admission)  Assessment & Plan  On Lipitor    AF (atrial fibrillation) (HCC)- (present on admission)  Assessment & Plan  On chronic Xarelto  Anticoagulation presently on hold due to ICH     Full Code    Discussed w/ Dr. Ramirez

## 2021-12-26 NOTE — DISCHARGE PLANNING
CASE MANAGEMENT INITIAL ASSESSMENT    Admit Date:  12/24/2021   .   Patient is a  71 y.o. male transferred from [facility] where he was admitted from 12/19-12/24. Patient was transferred to [facility] on 12/24.    Admitting physician: Dr. Ramirez  PCP: Dr. Eber Alfonso     Case Management has reviewed medical chart and will meet with patient and family to discuss role of case management / discharge planning / team conference    Diagnosis: Hemorrhagic stroke (HCC) [I61.9]    Co-morbidities:   Patient Active Problem List    Diagnosis Date Noted   • Hypokalemia 12/26/2021   • Thrombocytopenia (HCC) 12/26/2021   • Fever 12/25/2021   • 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   • Edema 12/25/2021   • Hemorrhagic stroke (HCC) 12/24/2021   • Impaired mobility and ADLs 12/24/2021   • Right ear pain 12/24/2021   • Morbid obesity with BMI of 40.0-44.9, adult ([facility]) 12/24/2021   • GERD (gastroesophageal reflux disease) 12/23/2021   • Intracranial hemorrhage (HCC) 12/19/2021   • AF (atrial fibrillation) ([facility]) 12/19/2021   • Hyperlipemia 12/19/2021   • Hypertension 12/19/2021     Prior Living Situation:  Housing / Facility: [address]  Lives with - Patient's Self Care Capacity: Spouse    Prior Level of Function:  Medication Management: Independent  Finances: Independent  Home Management: Independent  Shopping: Independent  Prior Level Of Mobility: Independent Without Device in Community  Driving / Transportation: Driving Independent    Support Systems:  Primary : Adriana Baca (spouse) [phone]    Previous Services Utilized:   Equipment Owned: Front-Wheel Walker,Wheelchair  Prior Services: None    Other Information:  Occupation (Pre-Hospital Vocational): Employed Full Time  Primary Payor Source: Medicare A,Medicare B  Secondary Payor Source: Other (Comments) (anthem)  Primary Care Practitioner : Dr. Jovita Alfonso     Patient / Family Goal:  Patient / Family Goal:  will discuss goals with patient.    Patient lives in a  single level home with 2 mansi.Patient lives with his wife. PLOF: independent. Patient has a fww and wheelchair. Per notes patient wife has purchased rails for the stairs.     Plan:  1. Continue to follow patient through hospitalization and provide discharge planning in collaboration with patient, family, physicians and ancillary services.     2. Utilize community resources to ensure a safe discharge.

## 2021-12-26 NOTE — ASSESSMENT & PLAN NOTE
Otoscopic Exam by me 12/25/21:  TM difficult to visualize due to cerumen, R auditory canal erythematous  Concerning for otitis externa  Ideally, would have treated w/ Cipro Otic gtts but not available at this facility  Was on Zosyn for UTI and Thrombophlebitis, now completed  Unable to do F/U Otoscopic Exam 12/27/21 due to non-functioning equipment  Otoscopic Exam per Dr. Santiago 12/29/21:  R auditory canal w/ resolved erythema

## 2021-12-26 NOTE — ASSESSMENT & PLAN NOTE
Tmax 101.9 w/ new leukocytosis on 12/25/21  UA 10-20 WBC w/ negative bacteria  UCx 10-50,000 E Coli, pansensitive except to Levaquin  BCx x 2 NGTD  Completed Zosyn 1/1/22

## 2021-12-26 NOTE — ASSESSMENT & PLAN NOTE
U/S LUE +SVT, negative DVT, completed Zosyn for thrombophlebitis  U/S RUE +SVT, negative DVT, PICC now removed  U/S LLE negative DVT, edema resolved  F/U BUE U/S 1/4/22 show progression of RUE SVT, unable to compare LUE SVT to prior study as tech did not scan forearm this time  Recommend continued serial imaging

## 2021-12-27 ENCOUNTER — HOSPITAL ENCOUNTER (EMERGENCY)
Facility: MEDICAL CENTER | Age: 71
End: 2021-12-27
Attending: EMERGENCY MEDICINE
Payer: MEDICARE

## 2021-12-27 VITALS
BODY MASS INDEX: 38.57 KG/M2 | HEART RATE: 68 BPM | SYSTOLIC BLOOD PRESSURE: 149 MMHG | DIASTOLIC BLOOD PRESSURE: 95 MMHG | OXYGEN SATURATION: 97 % | RESPIRATION RATE: 18 BRPM | HEIGHT: 66 IN | TEMPERATURE: 98 F | WEIGHT: 240 LBS

## 2021-12-27 DIAGNOSIS — I69.349: ICD-10-CM
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
## 2021-12-27 DIAGNOSIS — I61.9 HEMORRHAGIC STROKE (HCC): ICD-10-CM

## 2021-12-27 PROBLEM — D64.9 ANEMIA: Status: ACTIVE | Noted: 2021-12-27

## 2021-12-27 LAB
ANION GAP SERPL CALC-SCNC: 11 MMOL/L (ref 7–16)
BACTERIA UR CULT: ABNORMAL
BACTERIA UR CULT: ABNORMAL
BUN SERPL-MCNC: 13 MG/DL (ref 8–22)
CALCIUM SERPL-MCNC: 8.1 MG/DL (ref 8.5–10.5)
CHLORIDE SERPL-SCNC: 105 MMOL/L (ref 96–112)
CO2 SERPL-SCNC: 23 MMOL/L (ref 20–33)
CREAT SERPL-MCNC: 0.75 MG/DL (ref 0.5–1.4)
ERYTHROCYTE [DISTWIDTH] IN BLOOD BY AUTOMATED COUNT: 45.7 FL (ref 35.9–50)
GLUCOSE SERPL-MCNC: 80 MG/DL (ref 65–99)
HCT VFR BLD AUTO: 27 % (ref 42–52)
HGB BLD-MCNC: 9.2 G/DL (ref 14–18)
MAGNESIUM SERPL-MCNC: 1.8 MG/DL (ref 1.5–2.5)
MCH RBC QN AUTO: 30.5 PG (ref 27–33)
MCHC RBC AUTO-ENTMCNC: 34.1 G/DL (ref 33.7–35.3)
MCV RBC AUTO: 89.4 FL (ref 81.4–97.8)
PHOSPHATE SERPL-MCNC: 3 MG/DL (ref 2.5–4.5)
PLATELET # BLD AUTO: 237 K/UL (ref 164–446)
PMV BLD AUTO: 9.9 FL (ref 9–12.9)
POTASSIUM SERPL-SCNC: 3.8 MMOL/L (ref 3.6–5.5)
RBC # BLD AUTO: 3.02 M/UL (ref 4.7–6.1)
SIGNIFICANT IND 70042: ABNORMAL
SITE SITE: ABNORMAL
SODIUM SERPL-SCNC: 139 MMOL/L (ref 135–145)
SOURCE SOURCE: ABNORMAL
WBC # BLD AUTO: 11.3 K/UL (ref 4.8–10.8)

## 2021-12-27 PROCEDURE — 82270 OCCULT BLOOD FECES: CPT

## 2021-12-27 PROCEDURE — A9270 NON-COVERED ITEM OR SERVICE: HCPCS | Performed by: PHYSICAL MEDICINE & REHABILITATION

## 2021-12-27 PROCEDURE — 99233 SBSQ HOSP IP/OBS HIGH 50: CPT | Performed by: PHYSICAL MEDICINE & REHABILITATION

## 2021-12-27 PROCEDURE — 80048 BASIC METABOLIC PNL TOTAL CA: CPT

## 2021-12-27 PROCEDURE — 36415 COLL VENOUS BLD VENIPUNCTURE: CPT

## 2021-12-27 PROCEDURE — 85027 COMPLETE CBC AUTOMATED: CPT

## 2021-12-27 PROCEDURE — 97110 THERAPEUTIC EXERCISES: CPT

## 2021-12-27 PROCEDURE — 700102 HCHG RX REV CODE 250 W/ 637 OVERRIDE(OP): Performed by: PHYSICAL MEDICINE & REHABILITATION

## 2021-12-27 PROCEDURE — 99284 EMERGENCY DEPT VISIT MOD MDM: CPT

## 2021-12-27 PROCEDURE — 99233 SBSQ HOSP IP/OBS HIGH 50: CPT | Performed by: HOSPITALIST

## 2021-12-27 PROCEDURE — 97535 SELF CARE MNGMENT TRAINING: CPT

## 2021-12-27 PROCEDURE — 97112 NEUROMUSCULAR REEDUCATION: CPT

## 2021-12-27 PROCEDURE — 700105 HCHG RX REV CODE 258: Performed by: HOSPITALIST

## 2021-12-27 PROCEDURE — 84100 ASSAY OF PHOSPHORUS: CPT

## 2021-12-27 PROCEDURE — 770010 HCHG ROOM/CARE - REHAB SEMI PRIVAT*

## 2021-12-27 PROCEDURE — A9270 NON-COVERED ITEM OR SERVICE: HCPCS | Performed by: HOSPITALIST

## 2021-12-27 PROCEDURE — 700102 HCHG RX REV CODE 250 W/ 637 OVERRIDE(OP): Performed by: HOSPITALIST

## 2021-12-27 PROCEDURE — 700111 HCHG RX REV CODE 636 W/ 250 OVERRIDE (IP): Performed by: HOSPITALIST

## 2021-12-27 PROCEDURE — 700101 HCHG RX REV CODE 250: Performed by: PHYSICAL MEDICINE & REHABILITATION

## 2021-12-27 PROCEDURE — 83735 ASSAY OF MAGNESIUM: CPT

## 2021-12-27 RX ORDER — OMEPRAZOLE 20 MG/1
20 CAPSULE, DELAYED RELEASE ORAL 2 TIMES DAILY
Status: DISCONTINUED | OUTPATIENT
Start: 2021-12-27 | End: 2022-01-06 | Stop reason: HOSPADM

## 2021-12-27 RX ORDER — HYDRALAZINE HYDROCHLORIDE 25 MG/1
50 TABLET, FILM COATED ORAL EVERY 8 HOURS
Status: DISCONTINUED | OUTPATIENT
Start: 2021-12-27 | End: 2022-01-01

## 2021-12-27 RX ORDER — SODIUM CHLORIDE 1 G/1
3 TABLET ORAL
Status: DISCONTINUED | OUTPATIENT
Start: 2021-12-27 | End: 2021-12-28

## 2021-12-27 RX ORDER — MODAFINIL 100 MG/1
100 TABLET ORAL EVERY MORNING
Status: DISCONTINUED | OUTPATIENT
Start: 2021-12-27 | End: 2022-01-06 | Stop reason: HOSPADM

## 2021-12-27 RX ADMIN — ACETAMINOPHEN 1000 MG: 500 TABLET, FILM COATED ORAL at 08:50

## 2021-12-27 RX ADMIN — HYDRALAZINE HYDROCHLORIDE 75 MG: 25 TABLET, FILM COATED ORAL at 05:11

## 2021-12-27 RX ADMIN — ATORVASTATIN CALCIUM 40 MG: 40 TABLET, FILM COATED ORAL at 21:05

## 2021-12-27 RX ADMIN — SODIUM CHLORIDE 2 G: 1 TABLET ORAL at 08:50

## 2021-12-27 RX ADMIN — Medication 20 ML: at 08:52

## 2021-12-27 RX ADMIN — Medication 20 ML: at 21:00

## 2021-12-27 RX ADMIN — ACETAMINOPHEN 1000 MG: 500 TABLET, FILM COATED ORAL at 21:04

## 2021-12-27 RX ADMIN — PIPERACILLIN AND TAZOBACTAM 3.38 G: 3; .375 INJECTION, POWDER, LYOPHILIZED, FOR SOLUTION INTRAVENOUS; PARENTERAL at 11:51

## 2021-12-27 RX ADMIN — HYDRALAZINE HYDROCHLORIDE 50 MG: 25 TABLET, FILM COATED ORAL at 21:05

## 2021-12-27 RX ADMIN — TRAZODONE HYDROCHLORIDE 50 MG: 50 TABLET ORAL at 21:05

## 2021-12-27 RX ADMIN — OMEPRAZOLE 20 MG: 20 CAPSULE, DELAYED RELEASE ORAL at 21:05

## 2021-12-27 RX ADMIN — OMEPRAZOLE 20 MG: 20 CAPSULE, DELAYED RELEASE ORAL at 08:50

## 2021-12-27 RX ADMIN — MELATONIN TAB 3 MG 3 MG: 3 TAB at 21:05

## 2021-12-27 RX ADMIN — AMLODIPINE BESYLATE 10 MG: 5 TABLET ORAL at 05:12

## 2021-12-27 RX ADMIN — SENNOSIDES AND DOCUSATE SODIUM 2 TABLET: 50; 8.6 TABLET ORAL at 21:05

## 2021-12-27 RX ADMIN — PIPERACILLIN AND TAZOBACTAM 3.38 G: 3; .375 INJECTION, POWDER, LYOPHILIZED, FOR SOLUTION INTRAVENOUS; PARENTERAL at 05:19

## 2021-12-27 ASSESSMENT — ENCOUNTER SYMPTOMS
EYES NEGATIVE: 1
NAUSEA: 0
POLYDIPSIA: 0
COUGH: 0
FOCAL WEAKNESS: 1
FEVER: 0
PALPITATIONS: 0
SHORTNESS OF BREATH: 0
ABDOMINAL PAIN: 0
BRUISES/BLEEDS EASILY: 0
VOMITING: 0
CHILLS: 0

## 2021-12-27 ASSESSMENT — ACTIVITIES OF DAILY LIVING (ADL)
BED_CHAIR_WHEELCHAIR_TRANSFER_DESCRIPTION: INCREASED TIME;INITIAL PREPARATION FOR TASK;REQUIRES LIFT;SET-UP OF EQUIPMENT;SUPERVISION FOR SAFETY;VERBAL CUEING

## 2021-12-27 ASSESSMENT — FIBROSIS 4 INDEX: FIB4 SCORE: 1.35

## 2021-12-27 NOTE — PROGRESS NOTES
Pt is very difficult to awake. Minimally responsive to stimuli. Transporting to ED for stat CT due to somnolence and change in mentation.

## 2021-12-27 NOTE — DISCHARGE PLANNING
Per cm patient is medically cleared to return to [facility]. Dr Ramirez is agreeable. Transport set for 3:45 via [facility], notified CANDIDO Hernandez [id number].

## 2021-12-27 NOTE — ASSESSMENT & PLAN NOTE
Has downtrending Hb w/ normocytic indices  Suspect hemodilution from IV abx volume load  Fe 34, now on Fe and Vit C supplements  Follow H/H

## 2021-12-27 NOTE — ED TRIAGE NOTES
"Chief Complaint   Patient presents with   • GLF     Pt to ED [facility]. EMS reports staff called stating pt was altered-- upon EMS arrival pt AAOx4 states LSW is baseline from previous stroke but is at baseline. Facility staff report pt slid down to ground today during transfer from chair to toilet. - head strike. Pt denies all complaints upon arrival AAOx4    /65   Pulse 71   Temp 36.8 °C (98.3 °F) (Temporal)   Resp 18   Ht 1.676 m (5' 6\")   Wt 109 kg (240 lb)   SpO2 95%   BMI 38.74 kg/m²     "

## 2021-12-27 NOTE — PROGRESS NOTES
Pt. Had an assisted fall with therapy tech. No injury. Physiatrist and patient's wife Adriana were notified.

## 2021-12-27 NOTE — CARE PLAN
Problem: Skin Integrity  Goal: Patient's skin integrity will be maintained or improve  Outcome: Progressing  Note: Patient's skin remains intact and free from new or accidental injury this shift.  Will continue to monitor.    The patient is Watcher - Medium risk of patient condition declining or worsening    Shift Goals  Clinical Goals: Monitor pain, labs, BP. Promote activity and independence  Patient Goals: Rest and improve strength    Progress made toward(s) clinical / shift goals:  wnl    Patient is not progressing towards the following goals:

## 2021-12-27 NOTE — DIETARY
NUTRITION SERVICES: BMI - Pt with BMI >40 (=Body mass index is 41.89 kg/m².), Class III obesity. Weight loss counseling not appropriate in acute care setting. RECOMMEND - If appropriate at DC please refer to outpatient nutrition services for weight management.

## 2021-12-27 NOTE — THERAPY
Missed Therapy     Patient Name: Gokul Baca  Age:  71 y.o., Sex:  male  Medical Record #: [id number]  Today's Date: 12/27/2021    Discussed missed therapy with Dr. Ramirez.  Pt unable to keep his eyes open for more than a few seconds, unable to answer any questions due to fatigue.  Not appropriate to attempt PO intake at this time.         12/27/21 1131   Therapy Missed   Missed Therapy (Minutes) 30   Reason For Missed Therapy Medical - Patient on Hold from Therapy  (unable to rouse )

## 2021-12-27 NOTE — THERAPY
"Occupational Therapy  Daily Treatment     Patient Name: Gokul Baca  Age:  71 y.o., Sex:  male  Medical Record #: [id number]  Today's Date: 12/27/2021     Precautions  Precautions: (P) Fall Risk,Other (See Comments)  Comments: (P) L maida, L neglect, 2 person assist for transfers         Subjective    \"I haven't gotten any sleep since I've been here. I will shower tomorrow, but I need a male to help me because they're stronger.\"    Per nursing, pt had an assisted fall in the bathroom this morning when transferring to toilet.      Objective       12/27/21 0931   Precautions   Precautions Fall Risk;Other (See Comments)   Comments L maida, L neglect, 2 person assist for transfers   Vitals   Vitals Comments CNA documented vitals, see flowsheet.   Cognition    Level of Consciousness Alert  (lethargic throughout session)   Functional Level of Assist   Lower Body Dressing Total Assist x 2   Lower Body Dressing Description Increased time;Set-up of equipment;Supervision for safety;Verbal cueing  (2 person assist to stand/pull up pants)   Bed, Chair, Wheelchair Transfer Total Assist X 2   Bed Chair Wheelchair Transfer Description Increased time;Initial preparation for task;Requires lift;Set-up of equipment;Supervision for safety;Verbal cueing  (2 person assist (mod-max) for squat pivot txfr)   Neuro-Muscular Treatments   Neuro-Muscular Treatments Other (See Comments)   Comments see note for tx details.   Bed Mobility    Sit to Supine Total Assist X 2  (mod-max A x2)   Scooting Moderate Assist   Rolling Maximal Assist to Rt.   Interdisciplinary Plan of Care Collaboration   IDT Collaboration with  Nursing;Certified Nursing Assistant;Physician   Patient Position at End of Therapy In Bed;Bed Alarm On;Call Light within Reach;Tray Table within Reach;Phone within Reach   Collaboration Comments CLOF, POC, med hold   Therapy Missed   Missed Therapy (Minutes) 30   Reason For Missed Therapy Medical - Patient on Hold from " Therapy;Medical - Patient not Able To Participate  (lethargic, low BP)   OT Total Time Spent   OT Individual Total Time Spent (Mins) 60   OT Charge Group   OT Self Care / ADL 1   OT Neuromuscular Re-education / Balance 2   OT Therapeutic Exercise  1     Pt education re: importance of incorporating use of LUE into daily activities to improve functional use, neuroplasticity principles, L neglect.     Shoulder stabilization exercises, 3x10 shoulder shrugs and 3x10 protraction/retraction.     Saebo mobile arm support (MAS) for unweighting LUE to increase repetitions, improve motor recovery, and assist in relearning normal movement patterns. Pt completed 3x20 each: shoulder flex/ext, abduction/adduction, horizontal abduction/adduction, elbow flex/ext, wrist flex/ext and finger flex/ext. Saebo MAS tension set to level 5 for all exercises; adjusted with pt fatigue. Pt completed exercises seated in front of mirror for visual feedback.    Donned compression glove L hand and placed LUE on pillow when back in bed for LUE positioning and edema mgt. Provided pt with pink foam resistance block to complete grasp/release exercises.     Assessment    Pt limited by fatigue and low BP this session. Per nursing, pt had an assisted fall in bathroom this morning when transferring to toilet. He requires 2 person mod-max A and blocking of LLE for safety. Pt declined to shower and requested to have male therapist assist him with shower tomorrow.     Strengths: Independent prior level of function,Pleasant and cooperative,Supportive family,Willingly participates in therapeutic activities  Barriers: Bladder incontinence,Bowel incontinence,Decreased endurance,Difficulty following instructions,Fatigue,Hemiparesis,Impaired activity tolerance,Impaired balance,Impaired insight/denial of deficits,Impaired functional cognition,Impulsive,Limited mobility    Plan    Shower tomorrow (pt requested male therapist to assist), ADLs, functional transfers,  LUE neuro re-ed, trial NMES/set up on RT-300, activities to encourage L side attention.    Passport items to be completed:  Perform bathroom transfers, complete dressing, complete feeding, get ready for the day, prepare a simple meal, participate in household tasks, adapt home for safety needs, demonstrate home exercise program, complete caregiver training     Occupational Therapy Goals (Active)     Problem: Dressing     Dates: Start: 12/25/21       Goal: STG-Within one week, patient will dress UB     Dates: Start: 12/25/21       Description: 1) Individualized Goal:  at a Min A level with fading cues for L maida technique.           Problem: Functional Transfers     Dates: Start: 12/25/21       Goal: STG-Within one week, patient will transfer to toilet     Dates: Start: 12/25/21       Description: 1) Individualized Goal:  at a Mod A level with AD/DME PRN.           Problem: Grooming     Dates: Start: 12/25/21       Goal: STG-Within one week, patient will complete grooming     Dates: Start: 12/25/21       Description: 1) Individualized Goal:  seated at sink-side with Min A and fading cues for LUE involvement.             Problem: OT Long Term Goals     Dates: Start: 12/25/21       Goal: LTG-By discharge, patient will complete basic self care tasks     Dates: Start: 12/25/21       Description: 1) Individualized Goal:  at a Min A to Mod I level with AD/AE/DME PRN.        Goal: LTG-By discharge, patient will perform bathroom transfers     Dates: Start: 12/25/21       Description: 1) Individualized Goal:  at a Min A to Mod I level with use of AE/AD/DME PRN.

## 2021-12-27 NOTE — PROGRESS NOTES
"Hospital Medicine Daily Progress Note      Chief Complaint  Fever  Leukocytosis  Atrial Fibrillation  Hypertension    Interval Problem Update  Pt c/o not being able to sleep at night due to the \"lunacy in this hospital\" and fluctuating temperature in room.  Labs and microbiology reviewed.    Review of Systems  Review of Systems   Constitutional: Negative for chills and fever.   HENT: Negative.    Eyes: Negative.    Respiratory: Negative for cough and shortness of breath.    Cardiovascular: Negative for chest pain and palpitations.   Gastrointestinal: Negative for abdominal pain, nausea and vomiting.   Skin: Negative for itching and rash.   Neurological: Positive for focal weakness.   Endo/Heme/Allergies: Negative for polydipsia. Does not bruise/bleed easily.        Physical Exam  Temp:  [36.9 °C (98.4 °F)-37.6 °C (99.7 °F)] 36.9 °C (98.5 °F)  Pulse:  [62-80] 62  Resp:  [16-20] 18  BP: ()/(42-75) 95/54  SpO2:  [90 %-97 %] 97 %    Physical Exam  Vitals reviewed.   Constitutional:       General: He is not in acute distress.     Appearance: He is not ill-appearing, toxic-appearing or diaphoretic.   HENT:      Head: Normocephalic and atraumatic.      Right Ear: External ear normal.      Left Ear: External ear normal.      Nose: Nose normal.      Mouth/Throat:      Pharynx: Oropharynx is clear.   Eyes:      General:         Right eye: No discharge.         Left eye: No discharge.      Extraocular Movements: Extraocular movements intact.      Conjunctiva/sclera: Conjunctivae normal.   Cardiovascular:      Rate and Rhythm: Normal rate and regular rhythm.   Pulmonary:      Effort: Pulmonary effort is normal. No respiratory distress.      Breath sounds: Normal breath sounds. No wheezing.   Abdominal:      General: Bowel sounds are normal. There is no distension.      Palpations: Abdomen is soft.      Tenderness: There is no abdominal tenderness. There is no guarding or rebound.   Musculoskeletal:      Cervical back: " Normal range of motion and neck supple.      Left lower leg: Edema present.      Comments: BUE 1+ edema   Skin:     General: Skin is warm and dry.   Neurological:      Comments: Awake and alert         Fluids    Intake/Output Summary (Last 24 hours) at 12/27/2021 1133  Last data filed at 12/27/2021 0900  Gross per 24 hour   Intake 900 ml   Output 750 ml   Net 150 ml       Laboratory  Recent Labs     12/25/21  0518 12/26/21  0450 12/27/21  0602   WBC 13.3* 13.5* 11.3*   RBC 4.13* 3.99* 3.02*   HEMOGLOBIN 12.3* 11.8* 9.2*   HEMATOCRIT 37.0* 35.5* 27.0*   MCV 89.6 89.0 89.4   MCH 29.8 29.6 30.5   MCHC 33.2* 33.2* 34.1   RDW 45.4 45.0 45.7   PLATELETCT 218 113* 237   MPV 10.0 10.6 9.9     Recent Labs     12/25/21  0518 12/26/21  0450 12/27/21  0602   SODIUM 136 138 139   POTASSIUM 3.8 3.3* 3.8   CHLORIDE 101 102 105   CO2 24 23 23   GLUCOSE 94 80 80   BUN 11 13 13   CREATININE 0.65 0.71 0.75   CALCIUM 8.6 8.2* 8.1*                 Assessment/Plan  * Hemorrhagic stroke (HCC)- (present on admission)  Assessment & Plan  Had dysarthria, facial droop, and left hemiparesis  Presenting   CT right basal ganglia hemorrhage with mild mass effect  Non-surgical management  Chronic anticoagulation reversed  S/P Nicardipine gtt, BP management  S/P hypertonic saline, now on NaCl  Now on Provigil per Physiatry    Anemia  Assessment & Plan  Has downtrending Hb w/ normocytic indices  Suspect hemodilution from IV abx volume load  But will check FOB x 3 and Fe Panel  Increase PPI to bid dosing    Thrombocytopenia (HCC)  Assessment & Plan  Low Platelet counts resolved    Edema  Assessment & Plan  U/S LUE +SVT, negative DVT  U/S LLE negative DVT  U/S RUE +SVT, negative DVT  Monitor for propagation  Continue Zosyn for LUE thrombophlebitis    Fever- (present on admission)  Assessment & Plan  Tmax 101.9 w/ new leukocytosis on 12/25/21  UA 10-20 WBC w/ negative bacteria  UCx 10-50,000 E Coli, pansensitive except to Levaquin  BCx x 2  NGTD  CXR negative acute  SARS-CoV-2 PCR negative  WBC improving on abx  Continue Zosyn    Right ear pain- (present on admission)  Assessment & Plan  Otoscopic Exam 12/25/21:  TM difficult to visualize due to cerumen, R auditory canal erythematous  Concerning for otitis externa  Ideally, would like to start Cipro Otic gtts but not available at this facility  Continue Zosyn (which is also treating UTI and Thrombophlebitis)  Unable to do F/U Otoscopic Exam 12/27/21 due to non-functioning equipment    Hypertension- (present on admission)  Assessment & Plan  On Norvasc, Losartan, and Hydralazine  Blood pressures now trending down  Will decrease Hydralazine    Hyperlipemia- (present on admission)  Assessment & Plan  On Lipitor    AF (atrial fibrillation) (HCC)- (present on admission)  Assessment & Plan  On chronic Xarelto  Anticoagulation presently on hold due to ICH     Full Code    Discussed w/ pt, Charge RN (Kellie), and Medical Director (Dr. Richardson)

## 2021-12-27 NOTE — THERAPY
Missed Therapy     Patient Name: Gokul Baca  Age:  71 y.o., Sex:  male  Medical Record #: [id number]  Today's Date: 12/27/2021    Discussed missed therapy with Dr Russ and Nursing, notified . Pt sent to the ER for CT scan       12/27/21 1301   Interdisciplinary Plan of Care Collaboration   IDT Collaboration with  Physician;Nursing   Collaboration Comments CLOF   Therapy Missed   Missed Therapy (Minutes) 60  (hold)   Reason For Missed Therapy Medical - Patient on Hold from Therapy;Medical - Patient  in Procedure  (sent to ER for CT)   PT Charge Group   Supervising Physical Therapist Alphonso Hemphill       Pt was arousalble and able to engage in conversation, very sleepy and cues to keep eyes open. [facility] presented to transfer the patient to the ER, unable to complete PT session

## 2021-12-27 NOTE — DISCHARGE PLANNING
RN CM spoke with NAYANA Leone with [facility]. Per Vasu, he will speak with Dr Russ and update this SLICK REY.

## 2021-12-27 NOTE — PROGRESS NOTES
Rehab Fall Note    Date of fall: 12/27/2021     Time of incident/discovery? 0840     Witnessed or Unwitnessed: witnessed     Assisted or unassisted?: assisted.     Staff member present? Therapist      Head strike?: no    What is the patient's orientation status? oriented x 4    Location of fall: bathroom in patients room     Was this a fall with therapy? Yes     Patient had a fall from: toilet     Injury from fall: none     Persons contacted regarding the fall: Dr. Ramirez, patients family     Post fall huddle called: No     Persons present at post fall huddle: n/a     Interventions to prevent another fall: wheelchair seat belt, chair alarm, max x2 assist     Was the call light used? No     Did the bed or chair alarm sound? No      If no alarm sounded, do the alarms work? Yes     If alarm malfunctioned, was a maintenance request submitted? N/A     Was the pt. wearing a seatbelt prior to the fall? N/A     If wearing, did the pt. take off the seatbelt? N/A     What is the patient's transfer status? Max x2     Other fall details: The patient got up to the bathroom with therapy and had an assisted fall when transferring to the wheelchair from the toilet. It took four staff members to get the patient up from the floor back into the wheelchair. Patient denied a headstrike as did the therapist who assisted the patient to the floor. Denies pain post fall. Vitals slightly decreased from prefall vitals, but remained stable. Pt left in wheelchair waiting for next therapy session with seatbelt on and locked.

## 2021-12-27 NOTE — CARE PLAN
Problem: Knowledge Deficit - Standard  Goal: Patient and family/care givers will demonstrate understanding of plan of care, disease process/condition, diagnostic tests and medications  Outcome: Progressing     Problem: Pain - Standard  Goal: Alleviation of pain or a reduction in pain to the patient’s comfort goal  12/26/2021 1600 by Darlene Sheehan R.N.  Outcome: Progressing  12/26/2021 1559 by JOMAR SoodN.  Outcome: Progressing     Problem: Fall Risk - Rehab  Goal: Patient will remain free from falls  12/26/2021 1600 by Darlene Sheehan R.N.  Outcome: Progressing  12/26/2021 1559 by JOMAR SoodN.  Outcome: Progressing     The patient is Watcher - Medium risk of patient condition declining or worsening    Shift Goals  Clinical Goals: Monitor pain, labs, BP. Promote activity and independence  Patient Goals: Rest and improve strength    Progress made toward(s) clinical / shift goals:  Patient's strength has improved, patient transferred in and out of bed and wheelchair for meals and therapy. Patient is using the urinal while in bed throughout day shift.     Patient is not progressing towards the following goals:

## 2021-12-27 NOTE — CARE PLAN
"  Problem: Pain - Standard  Goal: Alleviation of pain or a reduction in pain to the patient’s comfort goal  Note: Pain is manageable with scheduled medication.Repositioned with pillows for comfort.Will continue to monitor and assess pain level and medicate as needed.     Problem: Fall Risk - Rehab  Goal: Patient will remain free from falls  Note: [place] [place] Fall risk Assessment Score: 12    Moderate fall risk Interventions  - Bed and strip alarm   - Yellow sign by the door   - Yellow wrist band \"Fall risk\"  - Room near to the nurse station  - Do not leave patient unattended in the bathroom  - Fall risk education provided      Problem: Infection  Goal: Patient will remain free from infection  Note: Pt on scheduled Zosyn IV every 6 hours.No adverse reaction noted.Will continue to monitor.         "

## 2021-12-27 NOTE — ED PROVIDER NOTES
"ED Provider Note    Scribed for Jaime Martinez M.D. by Imelda Flores. 12/27/2021  2:07 PM    Primary care provider: Eber Alfonso M.D.  Means of arrival: EMS  History obtained from: Patient  History limited by: None    CHIEF COMPLAINT  Chief Complaint   Patient presents with   • GLF       HPI  Gokul Baca is a 71 y.o. male who presents to the Emergency Department from [facility] for evaluation of possible altered status. Per nursing staff, the patient had a ground level fall today during transfer from chair to toilet. Patient denies any head injuries, and states he \"has had worse falls.\" Patient states that the nursing staff was concerned when they had a hard time waking him up this morning. Patient states he was just very tired, and has been having a really hard time sleeping at this facility. Patient reports he was admitted to the facility for a spontaneous brain bleed. He reports left side deficits.     REVIEW OF SYSTEMS  Pertinent negatives include no head injuries. As above, all other systems reviewed and are negative.   See HPI for further details.     PAST MEDICAL HISTORY   has a past medical history of Atrial fibrillation (HCC), GERD (gastroesophageal reflux disease), Hyperlipidemia, and Hypertension.    SURGICAL HISTORY   has a past surgical history that includes knee arthroplasty total (Left).    SOCIAL HISTORY  Social History     Tobacco Use   • Smoking status: Never Smoker   • Smokeless tobacco: Never Used   Substance Use Topics   • Alcohol use: Never   • Drug use: Never      Social History     Substance and Sexual Activity   Drug Use Never       FAMILY HISTORY  Family History   Problem Relation Age of Onset   • Heart Disease Mother    • Heart Disease Brother    • Alcohol abuse Son        CURRENT MEDICATIONS  Home Medications     Reviewed by Ines Hong R.N. (Registered Nurse) on 12/27/21 at 1355  Med List Status: Not Addressed   Medication Last Dose Status   amLODIPine (NORVASC) 10 MG Tab  Active " "  hydrALAZINE (APRESOLINE) 25 MG Tab  Active   losartan (COZAAR) 100 MG Tab  Active   melatonin 5 mg Tab  Active   omeprazole (PRILOSEC) 20 MG delayed-release capsule  Active   pravastatin (PRAVACHOL) 20 MG Tab  Active   sodium chloride (SALT) 1 GM Tab  Active   ursodiol (ACTIGALL) 300 MG Cap  Active                ALLERGIES  Allergies   Allergen Reactions   • Lactose        PHYSICAL EXAM  VITAL SIGNS: /65   Pulse 71   Temp 36.8 °C (98.3 °F) (Temporal)   Resp 18   Ht 1.676 m (5' 6\")   Wt 109 kg (240 lb)   SpO2 95%   BMI 38.74 kg/m²   Constitutional: Well developed, Well nourished, No acute distress, Non-toxic appearance.   HENT: Normocephalic, Atraumatic, Bilateral external ears normal, Oropharynx is clear mucous membranes are moist. No oral exudates or nasal discharge.   Eyes: Pupils are equal round and reactive, EOMI, Conjunctiva normal, No discharge.   Neck: Normal range of motion, No tenderness, Supple, No stridor. No meningismus.  Lymphatic: No lymphadenopathy noted.   Cardiovascular: Regular rate and rhythm without murmur rub or gallop. Swelling in left arm and left leg  Thorax & Lungs: Clear breath sounds bilaterally without wheezes, rhonchi or rales. There is no chest wall tenderness.   Abdomen: Soft non-tender non-distended. There is no rebound or guarding. No organomegaly is appreciated. Bowel sounds are normal.  Skin: Normal without rash.   Back: No CVA or spinal tenderness.   Extremities: Intact distal pulses, No edema, No tenderness, No cyanosis, No clubbing. Capillary refill is less than 2 seconds.  Musculoskeletal: Good range of motion in all major joints. No tenderness to palpation or major deformities noted.   Neurologic: Alert & oriented x 3, abnormal left-sided motor function, Diminished sensation to the left lower extremities versus right about the same on the left, Reflexes are normal. 4/5 strength left upper extremities but 5 out of 5 strength in the left lower " extremity.  Psychiatric: Affect normal, Judgment normal, Mood normal. There is no suicidal ideation or patient reported hallucinations.     COURSE & MEDICAL DECISION MAKING  Nursing notes, VS, PMSFHx reviewed in chart.    2:07 PM Patient seen and examined at bedside. I do not believe the patient requires any imaging or labs at this time.  There is no signs of trauma about the head.  He does not have any new neurologic deficits.    Patient has had high blood pressure while in the emergency department, felt likely secondary to medical condition. Counseled patient to monitor blood pressure at home and follow up with primary care physician.     The patient is frustrated with his care at [facility].  We discussed this at length.  He wants to just go home but I tried to redirect him to improving as much as he can and then going home so he can take care of himself    The patient will return for new or worsening symptoms and is stable at the time of discharge.    DISPOSITION:  Patient will be discharged to rehab facility in stable condition.    FINAL IMPRESSION  1. Fall, initial encounter    2. Monoplegia, lower limb, nondominant side S/P CVA (cerebrovascular acc) ([facility])          Imelda OMREL (Scribe), am scribing for, and in the presence of, Jaime Martinez M.D..    Electronically signed by: Imelda Flores (Dannyibbenji), 12/27/2021    Jaime MOREL M.D. personally performed the services described in this documentation, as scribed by Imelda Flores in my presence, and it is both accurate and complete. C.    The note accurately reflects work and decisions made by me.  Jaime Martinez M.D.  12/27/2021  3:36 PM

## 2021-12-27 NOTE — DISCHARGE INSTRUCTIONS
Medically cleared to return to [facility]  No indication for further imaging.  Please speak with the patient about his desires to go home from his rehabilitation hospitalization

## 2021-12-27 NOTE — PROGRESS NOTES
"Rehab Progress Note     Date of Service: 12/27/2021  Chief Complaint: Follow-up stroke    Interval Events (Subjective)    Patient seen and examined today in his room.   He reports poor sleep last night due to temperature regulation issues in his room.  He reports a mild headache.  He missed some therapy this morning due to falling asleep.  He also had an assisted fall to the ground this morning with the PTA, landed on his knees and his buttocks, did not hit his head.  No other interval events.    ROS: No changes to bowel, bladder, pain, mood, or sleep.       Objective:  VITAL SIGNS: BP (!) 95/54 Comment: post fall v/s  Pulse 62   Temp 36.9 °C (98.5 °F) (Oral)   Resp 18   Ht 1.664 m (5' 5.51\")   Wt 116 kg (255 lb 11.7 oz)   SpO2 97%   BMI 41.89 kg/m²   Gen: somnolent, but arousable  Neuro: notable for left neglect, left hemiparesis, left visual field cut      Recent Results (from the past 72 hour(s))   SARS-CoV-2 PCR (24 hour In-House): Collect NP swab in [facility]    Collection Time: 12/24/21  1:40 PM    Specimen: Nasopharyngeal; Respirate   Result Value Ref Range    SARS-CoV-2 Source NP Swab     SARS-CoV-2 by PCR NotDetected    URINALYSIS    Collection Time: 12/25/21  4:20 AM    Specimen: Urine, Clean Catch   Result Value Ref Range    Color Yellow     Character Clear     Specific Gravity 1.012 <1.035    Ph 8.0 5.0 - 8.0    Glucose Negative Negative mg/dL    Ketones Negative Negative mg/dL    Protein Negative Negative mg/dL    Bilirubin Negative Negative    Urobilinogen, Urine 0.2 Negative    Nitrite Negative Negative    Leukocyte Esterase Small (A) Negative    Occult Blood Trace (A) Negative    Micro Urine Req Microscopic    URINE MICROSCOPIC (W/UA)    Collection Time: 12/25/21  4:20 AM   Result Value Ref Range    WBC 10-20 (A) /hpf    RBC 2-5 (A) /hpf    Bacteria Negative None /hpf    Epithelial Cells Negative /hpf   URINE CULTURE-EXISTING-LESS THAN 48 HOURS    Collection Time: 12/25/21  4:20 AM    Specimen: " Urine, Clean Catch   Result Value Ref Range    Significant Indicator POS (POS)     Source UR     Site URINE, CLEAN CATCH     Culture Result - (A)     Culture Result Escherichia coli  10-50,000 cfu/mL   (A)        Susceptibility    Escherichia coli - MIGUELINA     Ampicillin <=8 Sensitive mcg/mL     Ceftriaxone <=1 Sensitive mcg/mL     Cefazolin <=2 Sensitive mcg/mL     Ciprofloxacin <=0.25 Sensitive mcg/mL     Cefepime <=2 Sensitive mcg/mL     Cefuroxime <=4 Sensitive mcg/mL     Ampicillin/sulbactam <=4/2 Sensitive mcg/mL     Tobramycin <=2 Sensitive mcg/mL     Nitrofurantoin <=32 Sensitive mcg/mL     Gentamicin <=2 Sensitive mcg/mL     Levofloxacin 1 Intermediate mcg/mL     Minocycline <=4 Sensitive mcg/mL     Pip/Tazobactam <=8 Sensitive mcg/mL     Trimeth/Sulfa <=0.5/9.5 Sensitive mcg/mL     Tigecycline <=2 Sensitive mcg/mL   CBC with Differential    Collection Time: 12/25/21  5:18 AM   Result Value Ref Range    WBC 13.3 (H) 4.8 - 10.8 K/uL    RBC 4.13 (L) 4.70 - 6.10 M/uL    Hemoglobin 12.3 (L) 14.0 - 18.0 g/dL    Hematocrit 37.0 (L) 42.0 - 52.0 %    MCV 89.6 81.4 - 97.8 fL    MCH 29.8 27.0 - 33.0 pg    MCHC 33.2 (L) 33.7 - 35.3 g/dL    RDW 45.4 35.9 - 50.0 fL    Platelet Count 218 164 - 446 K/uL    MPV 10.0 9.0 - 12.9 fL    Neutrophils-Polys 76.20 (H) 44.00 - 72.00 %    Lymphocytes 11.30 (L) 22.00 - 41.00 %    Monocytes 11.10 0.00 - 13.40 %    Eosinophils 0.70 0.00 - 6.90 %    Basophils 0.20 0.00 - 1.80 %    Immature Granulocytes 0.50 0.00 - 0.90 %    Nucleated RBC 0.00 /100 WBC    Neutrophils (Absolute) 10.15 (H) 1.82 - 7.42 K/uL    Lymphs (Absolute) 1.50 1.00 - 4.80 K/uL    Monos (Absolute) 1.48 (H) 0.00 - 0.85 K/uL    Eos (Absolute) 0.09 0.00 - 0.51 K/uL    Baso (Absolute) 0.03 0.00 - 0.12 K/uL    Immature Granulocytes (abs) 0.07 0.00 - 0.11 K/uL    NRBC (Absolute) 0.00 K/uL   Comp Metabolic Panel (CMP)    Collection Time: 12/25/21  5:18 AM   Result Value Ref Range    Sodium 136 135 - 145 mmol/L    Potassium 3.8  3.6 - 5.5 mmol/L    Chloride 101 96 - 112 mmol/L    Co2 24 20 - 33 mmol/L    Anion Gap 11.0 7.0 - 16.0    Glucose 94 65 - 99 mg/dL    Bun 11 8 - 22 mg/dL    Creatinine 0.65 0.50 - 1.40 mg/dL    Calcium 8.6 8.5 - 10.5 mg/dL    AST(SGOT) 18 12 - 45 U/L    ALT(SGPT) 15 2 - 50 U/L    Alkaline Phosphatase 84 30 - 99 U/L    Total Bilirubin 0.5 0.1 - 1.5 mg/dL    Albumin 3.8 3.2 - 4.9 g/dL    Total Protein 6.4 6.0 - 8.2 g/dL    Globulin 2.6 1.9 - 3.5 g/dL    A-G Ratio 1.5 g/dL   Magnesium    Collection Time: 12/25/21  5:18 AM   Result Value Ref Range    Magnesium 1.8 1.5 - 2.5 mg/dL   Vitamin D, 25-hydroxy (blood)    Collection Time: 12/25/21  5:18 AM   Result Value Ref Range    25-Hydroxy   Vitamin D 25 46 30 - 100 ng/mL   ESTIMATED GFR    Collection Time: 12/25/21  5:18 AM   Result Value Ref Range    GFR If African American >60 >60 mL/min/1.73 m 2    GFR If Non African American >60 >60 mL/min/1.73 m 2   BLOOD CULTURE    Collection Time: 12/25/21  6:32 AM    Specimen: Peripheral; Blood   Result Value Ref Range    Significant Indicator NEG     Source BLD     Site PERIPHERAL     Culture Result       No Growth  Note: Blood cultures are incubated for 5 days and  are monitored continuously.Positive blood cultures  are called to the RN and reported as soon as  they are identified.     BLOOD CULTURE    Collection Time: 12/25/21  6:32 AM    Specimen: Peripheral; Blood   Result Value Ref Range    Significant Indicator NEG     Source BLD     Site PERIPHERAL     Culture Result       No Growth  Note: Blood cultures are incubated for 5 days and  are monitored continuously.Positive blood cultures  are called to the RN and reported as soon as  they are identified.     CBC WITHOUT DIFFERENTIAL    Collection Time: 12/26/21  4:50 AM   Result Value Ref Range    WBC 13.5 (H) 4.8 - 10.8 K/uL    RBC 3.99 (L) 4.70 - 6.10 M/uL    Hemoglobin 11.8 (L) 14.0 - 18.0 g/dL    Hematocrit 35.5 (L) 42.0 - 52.0 %    MCV 89.0 81.4 - 97.8 fL    MCH 29.6 27.0  - 33.0 pg    MCHC 33.2 (L) 33.7 - 35.3 g/dL    RDW 45.0 35.9 - 50.0 fL    Platelet Count 113 (L) 164 - 446 K/uL    MPV 10.6 9.0 - 12.9 fL   Basic Metabolic Panel    Collection Time: 12/26/21  4:50 AM   Result Value Ref Range    Sodium 138 135 - 145 mmol/L    Potassium 3.3 (L) 3.6 - 5.5 mmol/L    Chloride 102 96 - 112 mmol/L    Co2 23 20 - 33 mmol/L    Glucose 80 65 - 99 mg/dL    Bun 13 8 - 22 mg/dL    Creatinine 0.71 0.50 - 1.40 mg/dL    Calcium 8.2 (L) 8.5 - 10.5 mg/dL    Anion Gap 13.0 7.0 - 16.0   PHOSPHORUS    Collection Time: 12/26/21  4:50 AM   Result Value Ref Range    Phosphorus 2.9 2.5 - 4.5 mg/dL   proBrain Natriuretic Peptide, NT    Collection Time: 12/26/21  4:50 AM   Result Value Ref Range    NT-proBNP 208 (H) 0 - 125 pg/mL   ESTIMATED GFR    Collection Time: 12/26/21  4:50 AM   Result Value Ref Range    GFR If African American >60 >60 mL/min/1.73 m 2    GFR If Non African American >60 >60 mL/min/1.73 m 2   CBC WITHOUT DIFFERENTIAL    Collection Time: 12/27/21  6:02 AM   Result Value Ref Range    WBC 11.3 (H) 4.8 - 10.8 K/uL    RBC 3.02 (L) 4.70 - 6.10 M/uL    Hemoglobin 9.2 (L) 14.0 - 18.0 g/dL    Hematocrit 27.0 (L) 42.0 - 52.0 %    MCV 89.4 81.4 - 97.8 fL    MCH 30.5 27.0 - 33.0 pg    MCHC 34.1 33.7 - 35.3 g/dL    RDW 45.7 35.9 - 50.0 fL    Platelet Count 237 164 - 446 K/uL    MPV 9.9 9.0 - 12.9 fL   Basic Metabolic Panel    Collection Time: 12/27/21  6:02 AM   Result Value Ref Range    Sodium 139 135 - 145 mmol/L    Potassium 3.8 3.6 - 5.5 mmol/L    Chloride 105 96 - 112 mmol/L    Co2 23 20 - 33 mmol/L    Glucose 80 65 - 99 mg/dL    Bun 13 8 - 22 mg/dL    Creatinine 0.75 0.50 - 1.40 mg/dL    Calcium 8.1 (L) 8.5 - 10.5 mg/dL    Anion Gap 11.0 7.0 - 16.0   MAGNESIUM    Collection Time: 12/27/21  6:02 AM   Result Value Ref Range    Magnesium 1.8 1.5 - 2.5 mg/dL   PHOSPHORUS    Collection Time: 12/27/21  6:02 AM   Result Value Ref Range    Phosphorus 3.0 2.5 - 4.5 mg/dL   ESTIMATED GFR    Collection  Time: 12/27/21  6:02 AM   Result Value Ref Range    GFR If African American >60 >60 mL/min/1.73 m 2    GFR If Non African American >60 >60 mL/min/1.73 m 2       Current Facility-Administered Medications   Medication Frequency   • modafinil (PROVIGIL) tablet 100 mg QAM   • hydrALAZINE (APRESOLINE) tablet 50 mg Q8HRS   • omeprazole (PRILOSEC) capsule 20 mg BID   • melatonin tablet 3 mg QHS PRN   • normal saline (PF) 20 mL Q12HRS   • piperacillin-tazobactam (ZOSYN) 3.375 g in  mL IVPB Q6HRS   • hydrOXYzine HCl (ATARAX) tablet 50 mg Q6HRS PRN   • Respiratory Therapy Consult Continuous RT   • Pharmacy Consult Request ...Pain Management Review 1 Each PHARMACY TO DOSE   • acetaminophen (Tylenol) tablet 650 mg Q4HRS PRN   • docusate sodium (ENEMEEZ) enema 283 mg QDAY PRN   • sodium phosphate (Fleet) enema 133 mL QDAY PRN   • artificial tears ophthalmic solution 1 Drop PRN   • benzocaine-menthol (CEPACOL) lozenge 1 Lozenge Q2HRS PRN   • mag hydrox-al hydrox-simeth (MAALOX PLUS ES or MYLANTA DS) suspension 20 mL Q2HRS PRN   • ondansetron (ZOFRAN ODT) dispertab 4 mg 4X/DAY PRN    Or   • ondansetron (ZOFRAN) syringe/vial injection 4 mg 4X/DAY PRN   • traZODone (DESYREL) tablet 50 mg QHS PRN   • sodium chloride (OCEAN) 0.65 % nasal spray 2 Spray PRN   • midazolam (VERSED) 5 mg/mL (1 mL vial) PRN   • senna-docusate (PERICOLACE or SENOKOT S) 8.6-50 MG per tablet 2 Tablet BID    And   • polyethylene glycol/lytes (MIRALAX) PACKET 1 Packet QDAY PRN    And   • magnesium hydroxide (MILK OF MAGNESIA) suspension 30 mL QDAY PRN    And   • bisacodyl (DULCOLAX) suppository 10 mg QDAY PRN   • amLODIPine (NORVASC) tablet 10 mg Q DAY   • losartan (COZAAR) tablet 100 mg DAILY   • sodium chloride (SALT) tablet 2 g TID WITH MEALS   • hydrALAZINE (APRESOLINE) tablet 10 mg TID PRN   • cloNIDine (CATAPRES) tablet 0.1 mg TID PRN   • acetaminophen (TYLENOL) tablet 1,000 mg TID   • butalbital/apap/caffeine -40 mg (Fioricet) per tablet 1  Tablet Q6HRS PRN   • atorvastatin (LIPITOR) tablet 40 mg Q EVENING       Orders Placed This Encounter   Procedures   • Diet Order Diet: Level 6 - Soft and Bite Sized (No Straw; t-dine); Liquid level: Level 0 - Thin; Tray Modifications (optional): No Straws     Standing Status:   Standing     Number of Occurrences:   1     Order Specific Question:   Diet:     Answer:   Level 6 - Soft and Bite Sized [23]     Comments:   No Straw; t-dine     Order Specific Question:   Liquid level     Answer:   Level 0 - Thin     Order Specific Question:   Tray Modifications (optional)     Answer:   No Straws       Assessment:  Active Hospital Problems    Diagnosis    • *Hemorrhagic stroke (HCC)    • Fever    • Left-sided neglect    • Impaired mobility and ADLs    • Right ear pain    • Morbid obesity with BMI of 40.0-44.9, adult (HCC)    • GERD (gastroesophageal reflux disease)    • AF (atrial fibrillation) (HCC)    • Hyperlipemia    • Hypertension      This patient is a 71 y.o. male admitted for acute inpatient rehabilitation with Hemorrhagic stroke (HCC).    Therapy notes reviewed. Patient is a max to total assist currently.     We will have his first weekly conference on Weds to pick a discharge date.    Patient had assisted fall today with PTA.    Medical Decision Making and Plan:    Large right basal ganglia hemorrhagic stroke  Left hemiparesis  Left visual field cut  Left neglect  Cognitive impairment  Continue full rehab program  PT/OT/SLP, 1 hr each discipline, 5 days per week    Statin    Per neurology okay to restart Xarelto in 2 weeks, so we will check a CT scan first, patient and wife not interested in restarting    Outpatient follow-up with stroke bridge, Dr. Figueroa, referrals made    Sedation  Discontinued melatonin  Discontinued gabapentin  Discontinued PRN hydralazine  Start Provigil    Check STAT CT today to monitor edema, unable to get outpatient CT scan, patient sent to ED    Headache, improved  Likely from brain  swelling  Continue scheduled Tylenol, dose increased  As needed Fioricet, last use never  Increase salt tabs to 3 mg TID to keep sodium above 140     Hypertension  Amlodipine 10 mg  Losartan 100 mg  Increased dose of hydralazine 25 mg every 8 to 50 mg every 8, increased again to 75 mg every 8 hours  As needed clonidine for systolic blood pressures over 160  Appreciate hospitalist assistance     Paroxysmal atrial fibrillation  Xarelto currently on hold for 2 weeks per neurology  May benefit from beta-blocker, per review of outpatient notes has been on metoprolol and amiodarone in the past  Appreciate hospitalist assistance     Edema  Previously on IV Lasix  Checked Doppler ultrasound of the left upper extremity and left lower extremity, lower extremities negative for DVT    Left arm thrombophlebitis  Right arm superficial thrombus  Initially started on Keflex, now switched to IV antibiotics  No need for full anti-coagulation, which also would be contra-indicated    Fever, resolved  Checked UA - positiive and chest x-ray - megative  Urine culture pending  Checked blood cultures - NTG  Was started on Keflex for thrombophlebitis, now switched to IV antibiotics  Appreciate hospitalist assistance     Anemia  Mild  Monitor with weekly labs     GERD  Omeprazole     Insomnia, continues  Melatonin and hydralazine discontinued due to sedation     Morbid obesity  Due to excess calories  BMI 41.89  Outpatient nutritional counseling     Right ear pain  Otitis externa  Evaluate with otoscope  On IV antibiotics  Appreciate hospitalist assistance    Bowel program  Continue bowel medications  Last BM 12/26    Bladder program  Check PVRs - 33  Bladder scan for no voids  ICP for over 400 cc  Scheduled toileting    DVT prophylaxis  Contraindicated given hemorrhage.   Compression stockings on in am, off in pm.  Increase mobility. Monitor for swelling.    Total time:  45 minutes.  I spent greater than 50% of the time for patient care,  counseling, and coordination on this date, including patient face-to face time, unit/floor time with review of records/pertinent lab data and studies, as well as discussing diagnostic evaluation/work up, planned therapeutic interventions, and future disposition of care, as per the interval events/subjective and the assessment and plan as noted above.    I have performed a physical exam, reviewed and updated ROS, as well as the assessment and plan today 12/27/2021. In review of note from 12/26/2021 there are no new changes except as documented above.              Valencia Ramirez M.D.  Physical Medicine and Rehabilitation

## 2021-12-28 LAB
ALDOST SERPL-MCNC: 3.5 NG/DL
ERYTHROCYTE [DISTWIDTH] IN BLOOD BY AUTOMATED COUNT: 46 FL (ref 35.9–50)
HCT VFR BLD AUTO: 37 % (ref 42–52)
HGB BLD-MCNC: 12.5 G/DL (ref 14–18)
IRON SATN MFR SERPL: 13 % (ref 15–55)
IRON SERPL-MCNC: 34 UG/DL (ref 50–180)
MCH RBC QN AUTO: 30 PG (ref 27–33)
MCHC RBC AUTO-ENTMCNC: 33.8 G/DL (ref 33.7–35.3)
MCV RBC AUTO: 88.7 FL (ref 81.4–97.8)
PLATELET # BLD AUTO: 264 K/UL (ref 164–446)
PMV BLD AUTO: 9.6 FL (ref 9–12.9)
RBC # BLD AUTO: 4.17 M/UL (ref 4.7–6.1)
TIBC SERPL-MCNC: 258 UG/DL (ref 250–450)
UIBC SERPL-MCNC: 224 UG/DL (ref 110–370)
WBC # BLD AUTO: 9.4 K/UL (ref 4.8–10.8)

## 2021-12-28 PROCEDURE — 97110 THERAPEUTIC EXERCISES: CPT | Mod: CQ

## 2021-12-28 PROCEDURE — 83540 ASSAY OF IRON: CPT

## 2021-12-28 PROCEDURE — 700101 HCHG RX REV CODE 250: Performed by: PHYSICAL MEDICINE & REHABILITATION

## 2021-12-28 PROCEDURE — 97535 SELF CARE MNGMENT TRAINING: CPT | Mod: CO

## 2021-12-28 PROCEDURE — 97530 THERAPEUTIC ACTIVITIES: CPT | Mod: CQ

## 2021-12-28 PROCEDURE — A9270 NON-COVERED ITEM OR SERVICE: HCPCS | Performed by: PHYSICAL MEDICINE & REHABILITATION

## 2021-12-28 PROCEDURE — 97530 THERAPEUTIC ACTIVITIES: CPT

## 2021-12-28 PROCEDURE — 92526 ORAL FUNCTION THERAPY: CPT

## 2021-12-28 PROCEDURE — 85027 COMPLETE CBC AUTOMATED: CPT

## 2021-12-28 PROCEDURE — 700102 HCHG RX REV CODE 250 W/ 637 OVERRIDE(OP): Performed by: PHYSICAL MEDICINE & REHABILITATION

## 2021-12-28 PROCEDURE — 97116 GAIT TRAINING THERAPY: CPT | Mod: CQ

## 2021-12-28 PROCEDURE — 97112 NEUROMUSCULAR REEDUCATION: CPT | Mod: CQ

## 2021-12-28 PROCEDURE — 770010 HCHG ROOM/CARE - REHAB SEMI PRIVAT*

## 2021-12-28 PROCEDURE — 97110 THERAPEUTIC EXERCISES: CPT

## 2021-12-28 PROCEDURE — 99232 SBSQ HOSP IP/OBS MODERATE 35: CPT | Performed by: PHYSICAL MEDICINE & REHABILITATION

## 2021-12-28 PROCEDURE — 700105 HCHG RX REV CODE 258: Performed by: HOSPITALIST

## 2021-12-28 PROCEDURE — 83550 IRON BINDING TEST: CPT

## 2021-12-28 PROCEDURE — 700102 HCHG RX REV CODE 250 W/ 637 OVERRIDE(OP): Performed by: HOSPITALIST

## 2021-12-28 PROCEDURE — A9270 NON-COVERED ITEM OR SERVICE: HCPCS | Performed by: HOSPITALIST

## 2021-12-28 PROCEDURE — 700111 HCHG RX REV CODE 636 W/ 250 OVERRIDE (IP): Performed by: HOSPITALIST

## 2021-12-28 PROCEDURE — 99232 SBSQ HOSP IP/OBS MODERATE 35: CPT | Performed by: HOSPITALIST

## 2021-12-28 RX ORDER — FERROUS SULFATE 325(65) MG
325 TABLET ORAL
Status: DISCONTINUED | OUTPATIENT
Start: 2021-12-29 | End: 2022-01-06 | Stop reason: HOSPADM

## 2021-12-28 RX ADMIN — PIPERACILLIN AND TAZOBACTAM 3.38 G: 3; .375 INJECTION, POWDER, LYOPHILIZED, FOR SOLUTION INTRAVENOUS; PARENTERAL at 17:58

## 2021-12-28 RX ADMIN — ACETAMINOPHEN 1000 MG: 500 TABLET, FILM COATED ORAL at 07:48

## 2021-12-28 RX ADMIN — OMEPRAZOLE 20 MG: 20 CAPSULE, DELAYED RELEASE ORAL at 21:27

## 2021-12-28 RX ADMIN — HYDRALAZINE HYDROCHLORIDE 50 MG: 25 TABLET, FILM COATED ORAL at 14:35

## 2021-12-28 RX ADMIN — PIPERACILLIN AND TAZOBACTAM 3.38 G: 3; .375 INJECTION, POWDER, LYOPHILIZED, FOR SOLUTION INTRAVENOUS; PARENTERAL at 00:41

## 2021-12-28 RX ADMIN — Medication 20 ML: at 07:49

## 2021-12-28 RX ADMIN — LOSARTAN POTASSIUM 100 MG: 25 TABLET, FILM COATED ORAL at 07:47

## 2021-12-28 RX ADMIN — SENNOSIDES AND DOCUSATE SODIUM 2 TABLET: 50; 8.6 TABLET ORAL at 21:27

## 2021-12-28 RX ADMIN — Medication 20 ML: at 21:00

## 2021-12-28 RX ADMIN — HYDRALAZINE HYDROCHLORIDE 50 MG: 25 TABLET, FILM COATED ORAL at 05:23

## 2021-12-28 RX ADMIN — ACETAMINOPHEN 1000 MG: 500 TABLET, FILM COATED ORAL at 21:27

## 2021-12-28 RX ADMIN — OMEPRAZOLE 20 MG: 20 CAPSULE, DELAYED RELEASE ORAL at 07:47

## 2021-12-28 RX ADMIN — HYDRALAZINE HYDROCHLORIDE 50 MG: 25 TABLET, FILM COATED ORAL at 21:26

## 2021-12-28 RX ADMIN — PIPERACILLIN AND TAZOBACTAM 3.38 G: 3; .375 INJECTION, POWDER, LYOPHILIZED, FOR SOLUTION INTRAVENOUS; PARENTERAL at 12:23

## 2021-12-28 RX ADMIN — PIPERACILLIN AND TAZOBACTAM 3.38 G: 3; .375 INJECTION, POWDER, LYOPHILIZED, FOR SOLUTION INTRAVENOUS; PARENTERAL at 05:22

## 2021-12-28 RX ADMIN — ATORVASTATIN CALCIUM 40 MG: 40 TABLET, FILM COATED ORAL at 21:27

## 2021-12-28 RX ADMIN — AMLODIPINE BESYLATE 10 MG: 5 TABLET ORAL at 05:24

## 2021-12-28 RX ADMIN — MODAFINIL 100 MG: 100 TABLET ORAL at 07:48

## 2021-12-28 RX ADMIN — ACETAMINOPHEN 1000 MG: 500 TABLET, FILM COATED ORAL at 14:34

## 2021-12-28 ASSESSMENT — GAIT ASSESSMENTS
DISTANCE (FEET): 10
GAIT LEVEL OF ASSIST: CONTACT GUARD ASSIST
ASSISTIVE DEVICE: PARALLEL BARS

## 2021-12-28 ASSESSMENT — ENCOUNTER SYMPTOMS
ABDOMINAL PAIN: 0
NAUSEA: 0
NERVOUS/ANXIOUS: 0
CHILLS: 0
FEVER: 0
SHORTNESS OF BREATH: 0
DIARRHEA: 0
VOMITING: 0

## 2021-12-28 ASSESSMENT — PAIN DESCRIPTION - PAIN TYPE
TYPE: ACUTE PAIN
TYPE: ACUTE PAIN

## 2021-12-28 ASSESSMENT — ACTIVITIES OF DAILY LIVING (ADL): BED_CHAIR_WHEELCHAIR_TRANSFER_DESCRIPTION: INCREASED TIME;VERBAL CUEING;SUPERVISION FOR SAFETY;SET-UP OF EQUIPMENT

## 2021-12-28 NOTE — THERAPY
"Occupational Therapy  Daily Treatment     Patient Name: Gokul Baca  Age:  71 y.o., Sex:  male  Medical Record #: [id number]  Today's Date: 12/28/2021     Precautions  Precautions: (P) Fall Risk,Other (See Comments)  Comments: (P) L maida, L neglect, 2 person assist for transfers         Subjective    \"I slept well last night, I feel like a new man today\"     Objective       12/28/21 0831   Precautions   Precautions Fall Risk;Other (See Comments)   Comments L maida, L neglect, 2 person assist for transfers   Cognition    Level of Consciousness Alert   Interdisciplinary Plan of Care Collaboration   Patient Position at End of Therapy Seated;Chair Alarm On;Self Releasing Lap Belt Applied;Call Light within Reach;Tray Table within Reach;Phone within Reach   OT Total Time Spent   OT Individual Total Time Spent (Mins) 30   OT Charge Group   OT Therapy Activity 1   OT Therapeutic Exercise  1     Shoulder stabilization and AAROM exercises completed, 3x10 each:   -Shoulder shrugs with 3 sec hold, scapular mobilization (elevation/depression, protraction/retraction, upward rotation), shoulder flex/ext, abduction/adduction, int/ext rotation, elbow flex/ext, pronation/supination, wrist flex/ext, finger flex/ext.  Vibration applied during exercises for increased sensory input.     Ongoing pt education re: visual scanning strategies to improve L side awareness and attn.     Assessment    Pt more alert and participatory today. He is making progress with LUE neuromuscular return, and demonstrates volitional movement in all muscle groups. Pt con't to be limited by L neglect and impaired funct cog.   Strengths: Independent prior level of function,Pleasant and cooperative,Supportive family,Willingly participates in therapeutic activities  Barriers: Bladder incontinence,Bowel incontinence,Decreased endurance,Difficulty following instructions,Fatigue,Hemiparesis,Impaired activity tolerance,Impaired balance,Impaired insight/denial of " deficits,Impaired functional cognition,Impulsive,Limited mobility    Plan    Shower at 9:30 (pt requested male therapist to assist), ADLs, functional transfers, LUE neuro re-ed, trial NMES/set up on RT-300, activities to encourage L side attention.     Passport items to be completed:  Perform bathroom transfers, complete dressing, complete feeding, get ready for the day, prepare a simple meal, participate in household tasks, adapt home for safety needs, demonstrate home exercise program, complete caregiver training     Occupational Therapy Goals (Active)     Problem: Dressing     Dates: Start: 12/25/21       Goal: STG-Within one week, patient will dress UB     Dates: Start: 12/25/21       Description: 1) Individualized Goal:  at a Min A level with fading cues for L maida technique.           Problem: Functional Transfers     Dates: Start: 12/25/21       Goal: STG-Within one week, patient will transfer to toilet     Dates: Start: 12/25/21       Description: 1) Individualized Goal:  at a Mod A level with AD/DME PRN.           Problem: Grooming     Dates: Start: 12/25/21       Goal: STG-Within one week, patient will complete grooming     Dates: Start: 12/25/21       Description: 1) Individualized Goal:  seated at sink-side with Min A and fading cues for LUE involvement.             Problem: OT Long Term Goals     Dates: Start: 12/25/21       Goal: LTG-By discharge, patient will complete basic self care tasks     Dates: Start: 12/25/21       Description: 1) Individualized Goal:  at a Min A to Mod I level with AD/AE/DME PRN.        Goal: LTG-By discharge, patient will perform bathroom transfers     Dates: Start: 12/25/21       Description: 1) Individualized Goal:  at a Min A to Mod I level with use of AE/AD/DME PRN.

## 2021-12-28 NOTE — THERAPY
"Occupational Therapy  Daily Treatment     Patient Name: Gokul Baca  Age:  71 y.o., Sex:  male  Medical Record #: [id number]  Today's Date: 12/28/2021     Precautions  Precautions: (P) Fall Risk,Other (See Comments)  Comments: (P) L maida, L neglect, 2 person assist for transfers         Subjective    \"  I really appreciate what you guys are doing for me .\"       Objective       12/28/21 0931   Precautions   Precautions Fall Risk;Other (See Comments)   Comments L maida, L neglect, 2 person assist for transfers   Functional Level of Assist   Grooming Supervision  (set up )   Bathing Moderate Assist  (delcined to wash buttocks )   Upper Body Dressing Moderate Assist   Lower Body Dressing Total Assist  (2 person assist  to  1 person max )   Bed, Chair, Wheelchair Transfer Total Assist  (max of 1  CGA of another for safety )   Tub / Shower Transfers Total Assist  ( Max to mod of 1 SBA of another for safety. )   Interdisciplinary Plan of Care Collaboration   IDT Collaboration with  Occupational Therapist;Therapy Tech   Patient Position at End of Therapy In Bed;Call Light within Reach;Tray Table within Reach   Collaboration Comments  primary OT and tech assisting with transfer and  ADL as needed.      OT Total Time Spent   OT Individual Total Time Spent (Mins) 60   OT Charge Group   OT Self Care / ADL 4       note  Error.  With grooming  Juan Antonio Actually required   Min assist with shaving  ( assist for quality)   And setup  For oral care     LB dressing    Doff pants  Mod of one to stand while another pulled down.  Therapist  Doffed  LB clothing over feet.     Donning   Diaper     1 person to stand while another pulled up . Socks  Total assist to don.  Pants   Max assist of one to don and pulll up using  Grab bar in bathroom.     Assessment     cues for left attention and left scanning during functional tasks.    improved ability to actively participate in all tx activity presented this session     Strengths: Independent " prior level of function,Pleasant and cooperative,Supportive family,Willingly participates in therapeutic activities  Barriers: Bladder incontinence,Bowel incontinence,Decreased endurance,Difficulty following instructions,Fatigue,Hemiparesis,Impaired activity tolerance,Impaired balance,Impaired insight/denial of deficits,Impaired functional cognition,Impulsive,Limited mobility    Plan     ADL  Related transfer / mobility  Neuro re ed    UE ther ex and activity to improve functional use.  Left attention / visual scanning activity     Occupational Therapy Goals (Active)     Problem: Dressing     Dates: Start: 12/25/21       Goal: STG-Within one week, patient will dress UB     Dates: Start: 12/25/21       Description: 1) Individualized Goal:  at a Min A level with fading cues for L maida technique.           Problem: Functional Transfers     Dates: Start: 12/25/21       Goal: STG-Within one week, patient will transfer to toilet     Dates: Start: 12/25/21       Description: 1) Individualized Goal:  at a Mod A level with AD/DME PRN.           Problem: Grooming     Dates: Start: 12/25/21       Goal: STG-Within one week, patient will complete grooming     Dates: Start: 12/25/21       Description: 1) Individualized Goal:  seated at sink-side with Min A and fading cues for LUE involvement.             Problem: OT Long Term Goals     Dates: Start: 12/25/21       Goal: LTG-By discharge, patient will complete basic self care tasks     Dates: Start: 12/25/21       Description: 1) Individualized Goal:  at a Min A to Mod I level with AD/AE/DME PRN.        Goal: LTG-By discharge, patient will perform bathroom transfers     Dates: Start: 12/25/21       Description: 1) Individualized Goal:  at a Min A to Mod I level with use of AE/AD/DME PRN.

## 2021-12-28 NOTE — CARE PLAN
Problem: Knowledge Deficit - Standard  Goal: Patient and family/care givers will demonstrate understanding of plan of care, disease process/condition, diagnostic tests and medications  Outcome: Progressing     Problem: Skin Integrity  Goal: Skin integrity is maintained or improved  Outcome: Progressing   The patient is Stable - Low risk of patient condition declining or worsening    Shift Goals  Clinical Goals: Monitor pain, labs, BP. Promote activity and independence  Patient Goals: sleep    Progress made toward(s) clinical / shift goals:  Patient is sleeping    Patient is not progressing towards the following goals:

## 2021-12-28 NOTE — CARE PLAN
Problem: Dressing  Goal: STG-Within one week, patient will dress UB  Description: 1) Individualized Goal:  at a Min A level with fading cues for L maida technique.   Outcome: Not Met  Note: Requires mod A.      Problem: Functional Transfers  Goal: STG-Within one week, patient will transfer to toilet  Description: 1) Individualized Goal:  at a Mod A level with AD/DME PRN.   Outcome: Not Met  Note: Con't to require mod-max x2 for safety.     Problem: Grooming  Goal: STG-Within one week, patient will complete grooming  Description: 1) Individualized Goal:  seated at sink-side with Min A and fading cues for LUE involvement.     Outcome: Met

## 2021-12-28 NOTE — PROGRESS NOTES
Hospital Medicine Daily Progress Note      Chief Complaint:  Hypertension  Afib  Fever  Leukocytosis    Interval History:  Had some questions on discharge date and placement.  Will have CM / Physiatrist talk with pt.    Review of Systems  Review of Systems   Constitutional: Negative for chills and fever.   Respiratory: Negative for shortness of breath.    Cardiovascular: Negative for chest pain.   Gastrointestinal: Negative for abdominal pain, diarrhea, nausea and vomiting.   Psychiatric/Behavioral: The patient is not nervous/anxious.         Physical Exam  Temp:  [36.5 °C (97.7 °F)-36.9 °C (98.5 °F)] 36.5 °C (97.7 °F)  Pulse:  [57-82] 61  Resp:  [18] 18  BP: ()/(54-77) 118/70  SpO2:  [99 %] 99 %    Physical Exam  Vitals and nursing note reviewed.   Constitutional:       Appearance: Normal appearance.   HENT:      Head: Atraumatic.   Eyes:      Conjunctiva/sclera: Conjunctivae normal.      Pupils: Pupils are equal, round, and reactive to light.   Cardiovascular:      Rate and Rhythm: Normal rate and regular rhythm.      Heart sounds: No murmur heard.      Pulmonary:      Effort: Pulmonary effort is normal.      Breath sounds: No stridor. No wheezing or rales.   Abdominal:      General: There is no distension.      Palpations: Abdomen is soft.      Tenderness: There is no abdominal tenderness.   Musculoskeletal:      Cervical back: Normal range of motion and neck supple.      Right lower leg: Edema present.      Left lower leg: Edema present.      Comments: Has B/L pedal swelling.  Has BUE swelling.   Skin:     General: Skin is warm and dry.      Findings: No rash.   Neurological:      Mental Status: He is alert and oriented to person, place, and time.   Psychiatric:         Mood and Affect: Mood normal.         Behavior: Behavior normal.         Fluids    Intake/Output Summary (Last 24 hours) at 12/28/2021 0947  Last data filed at 12/28/2021 0900  Gross per 24 hour   Intake 480 ml   Output 300 ml   Net 180 ml        Laboratory  Recent Labs     12/26/21  0450 12/27/21  0602   WBC 13.5* 11.3*   RBC 3.99* 3.02*   HEMOGLOBIN 11.8* 9.2*   HEMATOCRIT 35.5* 27.0*   MCV 89.0 89.4   MCH 29.6 30.5   MCHC 33.2* 34.1   RDW 45.0 45.7   PLATELETCT 113* 237   MPV 10.6 9.9     Recent Labs     12/26/21  0450 12/27/21  0602   SODIUM 138 139   POTASSIUM 3.3* 3.8   CHLORIDE 102 105   CO2 23 23   GLUCOSE 80 80   BUN 13 13   CREATININE 0.71 0.75   CALCIUM 8.2* 8.1*                 Assessment/Plan  * Hemorrhagic stroke (HCC)- (present on admission)  Assessment & Plan  Had dysarthria, facial droop, and left hemiparesis  CT: right basal ganglia hemorrhage with mild mass effect  Non-surgical management  Off chronic anticoagulation   On Provigil per Physiatry    Anemia  Assessment & Plan  Hb: 11.8 --> 9.2 (12/27 ; likely error) --> 12.5 (12/28)  Fe: 34, sats 17%  F/U FOB  On Prilosec  Will start Fe supplements  Monitor    Edema  Assessment & Plan  US LLE: negative DVT  US LUE: the median antecubital vein is incompressible & in the forearm the cephalic vein   US RUE: evidence of acute superficial venous thrombosis in the median cubital vein  On abx for thrombophlebitis  Monitor    Fever- (present on admission)  Assessment & Plan  S/P fever  WBC's: 13.5 --> 11.3 (12/27)  Urine cultures: E. Coli  BC x 2: neg  CXR negative acute  Covod negative  Has thrombophlebitis  Has UTI  Has otitis externa  On Zosyn (thru 1/1)    Right ear pain- (present on admission)  Assessment & Plan  Otoscopic Exam 12/25/21:  TM difficult to visualize due to cerumen, R auditory canal erythematous  Concerning for otitis externa  On Zosyn (treating UTI and thrombophlebitis)  Note: Cipro Otic gtts not available at this facility    Hypertension- (present on admission)  Assessment & Plan  BP dipped a little lower on 12/27  BP recently ok  On Norvasc  On Cozaar  On Hydralazine --> dose decreased on 12/27  Cont to monitor    Hyperlipemia- (present on admission)  Assessment & Plan  On  Lipitor    AF (atrial fibrillation) (HCC)- (present on admission)  Assessment & Plan  HR ok  Not on any rate controlling meds  Anticoagulation presently on hold due to ICH  Note: was on chronic Xarelto  Monitor

## 2021-12-28 NOTE — THERAPY
Speech Language Pathology  Daily Treatment     Patient Name: Gokul Baca  Age:  71 y.o., Sex:  male  Medical Record #: [id number]  Today's Date: 12/28/2021     Precautions  Precautions: Fall Risk,Other (See Comments)  Comments: L maida, L neglect, 2 person assist for transfers    Subjective    Patient awake, alert, pleasant and cooperative.     Objective       12/28/21 1131   Sleep/Wake Cycle   Sleep & Rest Awake   Dysphagia    Diet / Liquid Recommendation Thin (0);Regular (7)  (solids cut up.)   Nutritional Liquid Intake Rating Scale Non thickened beverages   Nutritional Food Intake Rating Scale Total oral diet with multiple consistencies but requiring special preparation or compensations   SLP Total Time Spent   SLP Individual Total Time Spent (Mins) 60   Treatment Charges   SLP Swallowing Dysfunction Treatment Swallowing Dysfunction Treatment       Assessment    8:01-8:31  Patient seen at breakfast meal with current diet texture (6) soft and bite sized and (0) thin liquids.  Patient tolerated these textures with no s/sx of aspiration.  He does demonstrate mild to moderate oral residue but is able to independently clear this eventually.  He also demonstrates left visual inattention to tray and benefits from verbal cues to scan left.    11:31-12:01  Patient seen at lunch with therapeutic trial of (7) regular textures.  He did require assistance cutting up a pork chop into bite size morsels.  Patient tolerated regular textures with no s/sx of aspiration.  He was able to clear oral residue mod I.      Strengths: Willingly participates in therapeutic activities,Supportive family,Pleasant and cooperative,Motivated for self care and independence,Independent prior level of function,Effective communication skills  Barriers: Impaired functional cognition,Aspiration risk,Fatigue    Plan    Recommended advance to (7) regular textures - cut up into bite size morsels, (0) thin liquids, assess for readiness to discontinue  t-dine.  Target visual scanning.  Complete assessment with [employer].    Passport items to be completed:  Express basic needs, understand food/liquid recommendations, consistently follow swallow precautions, manage finances, manage medications, arrive to therapy appointments on time, complete daily memory log entries, solve problems related to safety situations, review education related to hospitalization, complete caregiver training     Speech Therapy Problems (Active)     Problem: Memory STGs     Dates: Start: 12/25/21       Goal: STG-Within one week, patient will     Dates: Start: 12/25/21       Description: Participate in administration of complete standardized cog-ling evaluation to better determine functional cog-ling needs and better guide POC.            Problem: Problem Solving STGs     Dates: Start: 12/25/21       Goal: STG-Within one week, patient will     Dates: Start: 12/25/21       Description: Complete visual scanning tasks (e.g. cancellation tasks, functional reading) with 80% acc. And moderate assistance to attend to left side.           Problem: Speech/Swallowing LTGs     Dates: Start: 12/25/21       Goal: LTG-By discharge, patient will safely swallow     Dates: Start: 12/25/21       Description: Safest/least restrictive diet to maintain adequate nutrition and hydration.        Goal: LTG-By discharge, patient will     Dates: Start: 12/25/21       Description: Demonstrate independent level of cog-ling functions to ensure safe return to PLOF.          Problem: Swallowing STGs     Dates: Start: 12/25/21       Goal: STG-Within one week, patient will     Dates: Start: 12/25/21       Description: Consume thin liquids with no overt s/sx of aspiration for 100% of intake.       Goal: STG-Within one week, patient will     Dates: Start: 12/25/21       Description: Consume soft bite sized diet textures in 100% of opportunities with min cues to follow trained safe swallow strategies.

## 2021-12-28 NOTE — THERAPY
Physical Therapy   Daily Treatment     Patient Name: Gokul Baca  Age:  71 y.o., Sex:  male  Medical Record #: [id number]  Today's Date: 12/28/2021     Precautions  Precautions: Fall Risk,Other (See Comments)  Comments: L maida, L neglect, 2 person assist for transfers    Subjective    Pt resting in bed, ready for therapy     Objective       12/28/21 1401   Pain   Intervention Declines   Cognition    Ability To Follow Commands 2 Step   Safety Awareness Impaired;Impulsive   Attention Impaired   Comments cues for L side attention/incorporation   Gait Functional Level of Assist    Gait Level Of Assist Contact Guard Assist   Assistive Device Parallel Bars   Distance (Feet) 10   # of Times Distance was Traveled 4   Deviation Bradykinetic;Increased Base Of Support;Decreased Heel Strike;Other (Comment)  (postural facilitation for thoracic extension)   Wheelchair Functional Level of Assist   Wheelchair Assist Moderate Assist   Distance Wheelchair (Feet or Distance) 30   Wheelchair Description Assistance with steering;Extra time;Impaired coordination;Safety concerns;Supervision for safety;Verbal cueing  (via maida technique)   Stairs Functional Level of Assist   Level of Assist with Stairs Unable to Participate   # of Stairs Climbed 0   Stairs Description Safety concerns   Transfer Functional Level of Assist   Bed, Chair, Wheelchair Transfer Total Assist   Bed Chair Wheelchair Transfer Description Increased time;Verbal cueing;Supervision for safety;Set-up of equipment  (Stand step transfer to L Tee of 1 CGA of 1)   Sitting Lower Body Exercises   Sitting Lower Body Exercises Yes   Other Exercises LE motomed x 10 mintues L1 resistance distance 1.06 mi34 rpm, 61/39 symmetry   Standing Lower Body Exercises   Standing Lower Body Exercises Yes   Mini Squat Partial;1 set of 10  (2 sets of 5)   Bed Mobility    Supine to Sit Contact Guard Assist  (with HOB elevated)   Sit to Stand Contact Guard Assist  (pulling up in //)    Neuro-Muscular Treatments   Neuro-Muscular Treatments Postural Facilitation;Sequencing;Verbal Cuing   Comments gait training in //, facilitation for ws cues for upright posture and advancement of L UE on //   Interdisciplinary Plan of Care Collaboration   IDT Collaboration with  Occupational Therapist;Family / Caregiver  (as trech assist)   Patient Position at End of Therapy Seated;Self Releasing Lap Belt Applied;Chair Alarm On;Call Light within Reach;Tray Table within Reach   Collaboration Comments OT assisted as tech, Spouse present at beginning of tx, CLOF with 1° OT   PT Total Time Spent   PT Individual Total Time Spent (Mins) 60   PT Charge Group   PT Gait Training 1   PT Therapeutic Exercise 1   PT Neuromuscular Re-Education / Balance 1   PT Therapeutic Activities 1   Supervising Physical Therapist Alphonso Hemphill       Assessment    Pt with significantly improved functional mobility, required CGA to transition from supine> sit with the HOB elevated. Stand step transfer with 2 person assist however 1 person provided min and 2nd provided CGA for safety. Blocking of L knee was provided however not necessary as patient was given vc for attention to the L LE. L neglect evident more so in UE than LE  Strengths: Able to follow instructions,Alert and oriented,Independent prior level of function,Motivated for self care and independence,Pleasant and cooperative,Supportive family,Willingly participates in therapeutic activities  Barriers: Decreased endurance,Difficulty following instructions,Hemiplegia,Fatigue,Home accessibility,Impaired activity tolerance,Impaired balance,Impaired carryover of learning,Impaired functional cognition,Impulsive,Limited mobility    Plan    Bed mobility , transfer training, cont amb in //, neuro re ed, issue seated HEP, L LE motor control, standing balance, progress to FWW as appropriate, initiate step in //    Physical Therapy Problems (Active)     Problem: Balance     Dates: Start:  12/25/21       Goal: STG-Within one week, patient will maintain dynamic sitting x2 minutes without loss of balance.     Dates: Start: 12/25/21             Problem: Mobility     Dates: Start: 12/25/21       Goal: STG-Within one week, patient will ambulate 20ft using maida rail with min A and wheelchair follow.     Dates: Start: 12/25/21             Problem: Mobility Transfers     Dates: Start: 12/25/21       Goal: STG-Within one week, patient will perform bed mobility with mod A using maida techniques.     Dates: Start: 12/25/21          Goal: STG-Within one week, patient will transfer bed to chair with 1 person assist.     Dates: Start: 12/25/21             Problem: PT-Long Term Goals     Dates: Start: 12/25/21       Goal: LTG-By discharge, patient will ambulate 30ft with min A and most appropriate AD.     Dates: Start: 12/25/21          Goal: LTG-By discharge, patient will transfer one surface to another with min A using AD as needed.     Dates: Start: 12/25/21          Goal: LTG-By discharge, patient will ambulate up/down 2 stairs with L ascending rail and min A.     Dates: Start: 12/25/21          Goal: LTG-By discharge, patient will transfer in/out of a car with mod A using AD as needed.     Dates: Start: 12/25/21          Goal: LTG-By discharge, patient will perform bed mobility with supervision using maida techniques.     Dates: Start: 12/25/21

## 2021-12-28 NOTE — DISCHARGE PLANNING
Pt verbalized concerns to this RN CM regarding the accomodations and care he is receiving at [facility]. This RN CM contacted Vasu, [facility], and updated him on pt's concerns. Vasu states he will update the CM at [facility].

## 2021-12-28 NOTE — DISCHARGE PLANNING
Anticipated Discharge Disposition: Return to [facility]    Action:   · Called [facility] to verify  time at 1830  · Updated CARMEN Chacon RN and Dr Morfin via text Voalte   · Packet with signed Cobra provided to CARMEN Chacon RN    Barriers to Discharge:   none    Plan:   · Continue to collaborate with the pt, pt's family, and health care team to provide social and discharge support as needed.

## 2021-12-28 NOTE — CARE PLAN
The patient is Watcher - Medium risk of patient condition declining or worsening    Shift Goals  Clinical Goals: Promote safe transfer, encourage daily activity and iself care  Patient Goals: Rest    Progress made toward(s) clinical / shift goals:  Patient states understanding of need to use the call light, have staff present for assistance with care, transfers, and repositioning.     Patient is not progressing towards the following goals:Witnessed Fall on 12/27/21, patient denies injury.      Problem: Knowledge Deficit - Standard  Goal: Patient and family/care givers will demonstrate understanding of plan of care, disease process/condition, diagnostic tests and medications  Outcome: Progressing     Problem: Skin Integrity  Goal: Skin integrity is maintained or improved  Outcome: Progressing     Problem: Fall Risk - Rehab  Goal: Patient will remain free from falls  Outcome: Progressing

## 2021-12-28 NOTE — DISCHARGE PLANNING
Discussed pt's discharge plan with Dr Martinez. Per Dr. Martinez, pt is cleared for discharge to [facility]. [facility] transport request faxed.

## 2021-12-29 ENCOUNTER — HOSPITAL ENCOUNTER (EMERGENCY)
Facility: MEDICAL CENTER | Age: 71
End: 2021-12-29
Attending: EMERGENCY MEDICINE
Payer: MEDICARE

## 2021-12-29 ENCOUNTER — APPOINTMENT (OUTPATIENT)
Dept: RADIOLOGY | Facility: MEDICAL CENTER | Age: 71
End: 2021-12-29
Attending: EMERGENCY MEDICINE
Payer: MEDICARE

## 2021-12-29 VITALS
RESPIRATION RATE: 18 BRPM | BODY MASS INDEX: 38.57 KG/M2 | TEMPERATURE: 98.9 F | SYSTOLIC BLOOD PRESSURE: 184 MMHG | DIASTOLIC BLOOD PRESSURE: 79 MMHG | HEART RATE: 77 BPM | OXYGEN SATURATION: 95 % | HEIGHT: 66 IN | WEIGHT: 240 LBS
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
## 2021-12-29 LAB — HEMOCCULT SP1 STL QL: NEGATIVE

## 2021-12-29 PROCEDURE — 99233 SBSQ HOSP IP/OBS HIGH 50: CPT | Performed by: PHYSICAL MEDICINE & REHABILITATION

## 2021-12-29 PROCEDURE — 770010 HCHG ROOM/CARE - REHAB SEMI PRIVAT*

## 2021-12-29 PROCEDURE — 700111 HCHG RX REV CODE 636 W/ 250 OVERRIDE (IP): Performed by: HOSPITALIST

## 2021-12-29 PROCEDURE — A9270 NON-COVERED ITEM OR SERVICE: HCPCS | Performed by: HOSPITALIST

## 2021-12-29 PROCEDURE — 700105 HCHG RX REV CODE 258: Performed by: HOSPITALIST

## 2021-12-29 PROCEDURE — 99284 EMERGENCY DEPT VISIT MOD MDM: CPT

## 2021-12-29 PROCEDURE — 97130 THER IVNTJ EA ADDL 15 MIN: CPT

## 2021-12-29 PROCEDURE — 97112 NEUROMUSCULAR REEDUCATION: CPT

## 2021-12-29 PROCEDURE — A9270 NON-COVERED ITEM OR SERVICE: HCPCS | Performed by: PHYSICAL MEDICINE & REHABILITATION

## 2021-12-29 PROCEDURE — 97530 THERAPEUTIC ACTIVITIES: CPT

## 2021-12-29 PROCEDURE — 700102 HCHG RX REV CODE 250 W/ 637 OVERRIDE(OP): Performed by: PHYSICAL MEDICINE & REHABILITATION

## 2021-12-29 PROCEDURE — 700102 HCHG RX REV CODE 250 W/ 637 OVERRIDE(OP): Performed by: HOSPITALIST

## 2021-12-29 PROCEDURE — 99232 SBSQ HOSP IP/OBS MODERATE 35: CPT | Performed by: HOSPITALIST

## 2021-12-29 PROCEDURE — 97535 SELF CARE MNGMENT TRAINING: CPT

## 2021-12-29 PROCEDURE — 97129 THER IVNTJ 1ST 15 MIN: CPT

## 2021-12-29 PROCEDURE — 70450 CT HEAD/BRAIN W/O DYE: CPT | Mod: ME

## 2021-12-29 PROCEDURE — 92526 ORAL FUNCTION THERAPY: CPT

## 2021-12-29 PROCEDURE — 700101 HCHG RX REV CODE 250: Performed by: PHYSICAL MEDICINE & REHABILITATION

## 2021-12-29 RX ADMIN — MODAFINIL 100 MG: 100 TABLET ORAL at 09:05

## 2021-12-29 RX ADMIN — SENNOSIDES AND DOCUSATE SODIUM 2 TABLET: 50; 8.6 TABLET ORAL at 23:38

## 2021-12-29 RX ADMIN — ACETAMINOPHEN 1000 MG: 500 TABLET, FILM COATED ORAL at 09:05

## 2021-12-29 RX ADMIN — PIPERACILLIN AND TAZOBACTAM 3.38 G: 3; .375 INJECTION, POWDER, LYOPHILIZED, FOR SOLUTION INTRAVENOUS; PARENTERAL at 23:27

## 2021-12-29 RX ADMIN — PIPERACILLIN AND TAZOBACTAM 3.38 G: 3; .375 INJECTION, POWDER, LYOPHILIZED, FOR SOLUTION INTRAVENOUS; PARENTERAL at 05:57

## 2021-12-29 RX ADMIN — ACETAMINOPHEN 1000 MG: 500 TABLET, FILM COATED ORAL at 14:34

## 2021-12-29 RX ADMIN — AMLODIPINE BESYLATE 10 MG: 5 TABLET ORAL at 05:56

## 2021-12-29 RX ADMIN — OMEPRAZOLE 20 MG: 20 CAPSULE, DELAYED RELEASE ORAL at 23:36

## 2021-12-29 RX ADMIN — Medication 20 ML: at 09:00

## 2021-12-29 RX ADMIN — ACETAMINOPHEN 1000 MG: 500 TABLET, FILM COATED ORAL at 23:35

## 2021-12-29 RX ADMIN — Medication 20 ML: at 23:56

## 2021-12-29 RX ADMIN — PIPERACILLIN AND TAZOBACTAM 3.38 G: 3; .375 INJECTION, POWDER, LYOPHILIZED, FOR SOLUTION INTRAVENOUS; PARENTERAL at 00:03

## 2021-12-29 RX ADMIN — HYDRALAZINE HYDROCHLORIDE 50 MG: 25 TABLET, FILM COATED ORAL at 14:34

## 2021-12-29 RX ADMIN — HYDRALAZINE HYDROCHLORIDE 50 MG: 25 TABLET, FILM COATED ORAL at 05:57

## 2021-12-29 RX ADMIN — PIPERACILLIN AND TAZOBACTAM 3.38 G: 3; .375 INJECTION, POWDER, LYOPHILIZED, FOR SOLUTION INTRAVENOUS; PARENTERAL at 11:34

## 2021-12-29 RX ADMIN — SENNOSIDES AND DOCUSATE SODIUM 2 TABLET: 50; 8.6 TABLET ORAL at 09:05

## 2021-12-29 RX ADMIN — LOSARTAN POTASSIUM 100 MG: 25 TABLET, FILM COATED ORAL at 09:05

## 2021-12-29 RX ADMIN — HYDRALAZINE HYDROCHLORIDE 50 MG: 25 TABLET, FILM COATED ORAL at 23:37

## 2021-12-29 RX ADMIN — ATORVASTATIN CALCIUM 40 MG: 40 TABLET, FILM COATED ORAL at 23:38

## 2021-12-29 RX ADMIN — OMEPRAZOLE 20 MG: 20 CAPSULE, DELAYED RELEASE ORAL at 09:05

## 2021-12-29 RX ADMIN — FERROUS SULFATE TAB 325 MG (65 MG ELEMENTAL FE) 325 MG: 325 (65 FE) TAB at 09:05

## 2021-12-29 ASSESSMENT — ENCOUNTER SYMPTOMS
HALLUCINATIONS: 0
HEADACHES: 0
BLURRED VISION: 0
DIZZINESS: 0
NAUSEA: 0
PALPITATIONS: 0
VOMITING: 0
FEVER: 0
SHORTNESS OF BREATH: 0

## 2021-12-29 ASSESSMENT — GAIT ASSESSMENTS
ASSISTIVE DEVICE: FRONT WHEEL WALKER
DISTANCE (FEET): 30
GAIT LEVEL OF ASSIST: MODERATE ASSIST

## 2021-12-29 ASSESSMENT — ACTIVITIES OF DAILY LIVING (ADL)
TOILETING_LEVEL_OF_ASSIST_DESCRIPTION: ASSIST FOR HYGIENE;ASSIST TO PULL PANTS UP;ASSIST TO PULL PANTS DOWN;ASSIST FOR STANDING BALANCE;GRAB BAR;INCREASED TIME;INITIAL PREPARATION FOR TASK;SET-UP OF EQUIPMENT;SUPERVISION FOR SAFETY;VERBAL CUEING
TOILET_TRANSFER_DESCRIPTION: GRAB BAR;SET-UP OF EQUIPMENT;SUPERVISION FOR SAFETY;VERBAL CUEING

## 2021-12-29 ASSESSMENT — FIBROSIS 4 INDEX: FIB4 SCORE: 1.21

## 2021-12-29 NOTE — PROGRESS NOTES
Patient care assumed. Report received from [facility] NEGRA Farrell. Patient is alert and calm, resting in bed. Call light and bedside table within reach. Will continue to monitor.

## 2021-12-29 NOTE — CARE PLAN
The patient is Stable - Low risk of patient condition declining or worsening    Shift Goals  Clinical Goals: safety  Patient Goals: Rest    Problem: Skin Integrity  Goal: Skin integrity is maintained or improved  Outcome: Progressing Patient's skin remains intact and free from new or accidental injury this shift.  Will continue to monitor.      Problem: Pain - Standard  Goal: Alleviation of pain or a reduction in pain to the patient’s comfort goal  Outcome: Progressing Patient able to verbalize pain level and verbalize an acceptable level of pain.

## 2021-12-29 NOTE — DISCHARGE PLANNING
CM spoke with patient and his wife to update on IDT and DC date of 1/7/22.  Answered questions.  Patient has a shower chair with arms and back support.  CM will continue to monitor for DC needs.

## 2021-12-29 NOTE — PROGRESS NOTES
"Rehab Progress Note     Date of Service: 12/29/2021  Chief Complaint: Follow-up stroke    Interval Events (Subjective)  Patient seen and examined in T dine with SLP and again at bedside. Patient reports that he is annoyed with being woken up at night. Patient reports he doesn't want it to be so loud in the mathur. Patient reports that he just wants to sleep better. He has many complaints about sound, temperature and sleep. Denies headache, light headedness, cp, or abdominal pain.     ROS: No changes to bowel, bladder, pain, mood, or sleep.       Objective:  VITAL SIGNS: /74   Pulse 75   Temp 36 °C (96.8 °F) (Oral)   Resp 18   Ht 1.664 m (5' 5.51\")   Wt 116 kg (255 lb 11.7 oz)   SpO2 94%   BMI 41.89 kg/m²   Gen: awake and alert with SLP in T dine, nursing at side   Neuro: notable for left neglect, left hemiparesis, left visual field cut   Pulm: no witnessed coughing during meal       Recent Results (from the past 72 hour(s))   CBC WITHOUT DIFFERENTIAL    Collection Time: 12/27/21  6:02 AM   Result Value Ref Range    WBC 11.3 (H) 4.8 - 10.8 K/uL    RBC 3.02 (L) 4.70 - 6.10 M/uL    Hemoglobin 9.2 (L) 14.0 - 18.0 g/dL    Hematocrit 27.0 (L) 42.0 - 52.0 %    MCV 89.4 81.4 - 97.8 fL    MCH 30.5 27.0 - 33.0 pg    MCHC 34.1 33.7 - 35.3 g/dL    RDW 45.7 35.9 - 50.0 fL    Platelet Count 237 164 - 446 K/uL    MPV 9.9 9.0 - 12.9 fL   Basic Metabolic Panel    Collection Time: 12/27/21  6:02 AM   Result Value Ref Range    Sodium 139 135 - 145 mmol/L    Potassium 3.8 3.6 - 5.5 mmol/L    Chloride 105 96 - 112 mmol/L    Co2 23 20 - 33 mmol/L    Glucose 80 65 - 99 mg/dL    Bun 13 8 - 22 mg/dL    Creatinine 0.75 0.50 - 1.40 mg/dL    Calcium 8.1 (L) 8.5 - 10.5 mg/dL    Anion Gap 11.0 7.0 - 16.0   MAGNESIUM    Collection Time: 12/27/21  6:02 AM   Result Value Ref Range    Magnesium 1.8 1.5 - 2.5 mg/dL   PHOSPHORUS    Collection Time: 12/27/21  6:02 AM   Result Value Ref Range    Phosphorus 3.0 2.5 - 4.5 mg/dL   ESTIMATED GFR "    Collection Time: 12/27/21  6:02 AM   Result Value Ref Range    GFR If African American >60 >60 mL/min/1.73 m 2    GFR If Non African American >60 >60 mL/min/1.73 m 2   CBC WITHOUT DIFFERENTIAL    Collection Time: 12/28/21 10:35 AM   Result Value Ref Range    WBC 9.4 4.8 - 10.8 K/uL    RBC 4.17 (L) 4.70 - 6.10 M/uL    Hemoglobin 12.5 (L) 14.0 - 18.0 g/dL    Hematocrit 37.0 (L) 42.0 - 52.0 %    MCV 88.7 81.4 - 97.8 fL    MCH 30.0 27.0 - 33.0 pg    MCHC 33.8 33.7 - 35.3 g/dL    RDW 46.0 35.9 - 50.0 fL    Platelet Count 264 164 - 446 K/uL    MPV 9.6 9.0 - 12.9 fL   IRON/TOTAL IRON BIND    Collection Time: 12/28/21 10:35 AM   Result Value Ref Range    Iron 34 (L) 50 - 180 ug/dL    Total Iron Binding 258 250 - 450 ug/dL    Unsat Iron Binding 224 110 - 370 ug/dL    % Saturation 13 (L) 15 - 55 %       Current Facility-Administered Medications   Medication Frequency   • ferrous sulfate tablet 325 mg QDAY with Breakfast   • modafinil (PROVIGIL) tablet 100 mg QAM   • hydrALAZINE (APRESOLINE) tablet 50 mg Q8HRS   • omeprazole (PRILOSEC) capsule 20 mg BID   • melatonin tablet 3 mg QHS PRN   • normal saline (PF) 20 mL Q12HRS   • piperacillin-tazobactam (ZOSYN) 3.375 g in  mL IVPB Q6HRS   • Respiratory Therapy Consult Continuous RT   • Pharmacy Consult Request ...Pain Management Review 1 Each PHARMACY TO DOSE   • acetaminophen (Tylenol) tablet 650 mg Q4HRS PRN   • docusate sodium (ENEMEEZ) enema 283 mg QDAY PRN   • sodium phosphate (Fleet) enema 133 mL QDAY PRN   • artificial tears ophthalmic solution 1 Drop PRN   • benzocaine-menthol (CEPACOL) lozenge 1 Lozenge Q2HRS PRN   • mag hydrox-al hydrox-simeth (MAALOX PLUS ES or MYLANTA DS) suspension 20 mL Q2HRS PRN   • ondansetron (ZOFRAN ODT) dispertab 4 mg 4X/DAY PRN    Or   • ondansetron (ZOFRAN) syringe/vial injection 4 mg 4X/DAY PRN   • traZODone (DESYREL) tablet 50 mg QHS PRN   • sodium chloride (OCEAN) 0.65 % nasal spray 2 Spray PRN   • midazolam (VERSED) 5 mg/mL (1 mL  vial) PRN   • senna-docusate (PERICOLACE or SENOKOT S) 8.6-50 MG per tablet 2 Tablet BID    And   • polyethylene glycol/lytes (MIRALAX) PACKET 1 Packet QDAY PRN    And   • magnesium hydroxide (MILK OF MAGNESIA) suspension 30 mL QDAY PRN    And   • bisacodyl (DULCOLAX) suppository 10 mg QDAY PRN   • amLODIPine (NORVASC) tablet 10 mg Q DAY   • losartan (COZAAR) tablet 100 mg DAILY   • hydrALAZINE (APRESOLINE) tablet 10 mg TID PRN   • cloNIDine (CATAPRES) tablet 0.1 mg TID PRN   • acetaminophen (TYLENOL) tablet 1,000 mg TID   • butalbital/apap/caffeine -40 mg (Fioricet) per tablet 1 Tablet Q6HRS PRN   • atorvastatin (LIPITOR) tablet 40 mg Q EVENING       Orders Placed This Encounter   Procedures   • Diet Order Diet: Regular (solids cut up into bite size pieces, No Straw; t-dine); Tray Modifications (optional): No Straws     Standing Status:   Standing     Number of Occurrences:   1     Order Specific Question:   Diet:     Answer:   Regular [1]     Comments:   solids cut up into bite size pieces, No Straw; t-dine     Order Specific Question:   Tray Modifications (optional)     Answer:   No Straws       Assessment:  Active Hospital Problems    Diagnosis    • *Hemorrhagic stroke (HCC)    • Fever    • Left-sided neglect    • Impaired mobility and ADLs    • Right ear pain    • Morbid obesity with BMI of 40.0-44.9, adult (HCC)    • GERD (gastroesophageal reflux disease)    • AF (atrial fibrillation) ([facility])    • Hyperlipemia    • Hypertension        I led and attended the weekly conference today, and agree with the IDT conference documentation and plan of care as noted below.    Date of conference: 12/29/21    Goals and barriers: See IDT note.    Biggest barriers: left sided neglect, poor carry over, inattention,  family training, completion of abx,     Goals in next week: family training, transfers to toilet, family training for ADLs     CM/social support: wife (works ), may need 24/7 supervision,     Anticipated DC  date: 1/7/21    Home health: PT/OT/SLP/RN    Equip: maida walker, WC     Follow up: PCP,Dr. Figueroa, [facility]          Medical Decision Making and Plan:    Large right basal ganglia hemorrhagic stroke  Left hemiparesis  Left visual field cut  Left neglect  Cognitive impairment  Continue full rehab program  PT/OT/SLP, 1 hr each discipline, 5 days per week    Statin    Per neurology okay to restart Xarelto in 2 weeks, so we will check a CT scan first, patient and wife not interested in restarting    Outpatient follow-up with stroke bridge, Dr. Figueroa, referrals made    Sedation  Discontinued melatonin  Discontinued gabapentin  Discontinued PRN hydralazine  Start Provigil  12/27: Sent to ED to check Stat CT, resolution of AMS in ED, CT head not completed     Headache, resolved   Likely from brain swelling  Continue scheduled Tylenol, dose increased  As needed Fioricet, last use never  Increase salt tabs to 3 mg TID to keep sodium above 140     Hypertension  Amlodipine 10 mg  Losartan 100 mg  Increased dose of hydralazine 25 mg every 8 to 50 mg every 8, increased again to 75 mg every 8 hours  As needed clonidine for systolic blood pressures over 160  Appreciate hospitalist assistance     Paroxysmal atrial fibrillation  Xarelto currently on hold for 2 weeks per neurology  May benefit from beta-blocker, per review of outpatient notes has been on metoprolol and amiodarone in the past  Appreciate hospitalist assistance     Edema  Previously on IV Lasix  Checked Doppler ultrasound of the left upper extremity and left lower extremity, lower extremities negative for DVT    Left arm thrombophlebitis  Right arm superficial thrombus  Initially started on Keflex, now switched to IV antibiotics, stop date is 1/1   No need for full anti-coagulation, which also would be contra-indicated    Fever, resolved  Checked UA - positiive and chest x-ray - megative  Urine culture pending  Checked blood cultures - NTG  Was started on Keflex  for thrombophlebitis, now switched to IV antibiotics  Appreciate hospitalist assistance     Anemia  Mild  Monitor with weekly labs     GERD  Omeprazole     Insomnia, continues  Melatonin and hydralazine discontinued due to sedation     Morbid obesity  Due to excess calories  BMI 41.89  Outpatient nutritional counseling     Right ear pain  Otitis externa  Evaluate with otoscope  On IV antibiotics, continues to need IV access   Appreciate hospitalist assistance    Bowel program  Continue bowel medications  Last BM 12/26    Bladder program  Check PVRs - 33  Bladder scan for no voids  ICP for over 400 cc  Scheduled toileting    DVT prophylaxis  Contraindicated given hemorrhage.   Compression stockings on in am, off in pm.  Increase mobility. Monitor for swelling.    Total time:  42 minutes.  I spent greater than 50% of the time for patient care, counseling, and coordination on this date, including patient face-to face time, unit/floor time with review of records/pertinent lab data and studies, as well as discussing diagnostic evaluation/work up, planned therapeutic interventions, and future disposition of care, as per the interval events/subjective and the assessment and plan as noted above.    I have performed a physical exam, reviewed and updated ROS, as well as the assessment and plan today 12/29/2021. In review of note from 12/28/2021 there are no new changes except as documented above.      Marii Cummings D.O.  Physical Medicine and Rehabilitation

## 2021-12-29 NOTE — THERAPY
"Physical Therapy   Daily Treatment     Patient Name: Gokul Baca  Age:  71 y.o., Sex:  male  Medical Record #: [id number]  Today's Date: 12/29/2021     Precautions  Precautions: Fall Risk,Other (See Comments)  Comments: L maida, L neglect, 2 person assist for transfers    Subjective    \"I'm grumpy today. I got woken up a bunch this morning for no reason\". Pt agreeable to PT tx and responds well to humor.     Objective       12/29/21 1301   Pain   Intervention Declines   Cognition    Level of Consciousness Alert   Sleep/Wake Cycle   Sleep & Rest Awake;Out of bed   Gait Functional Level of Assist    Gait Level Of Assist Moderate Assist   Assistive Device Front Wheel Walker   Distance (Feet) 30  (x1, then 10ft x2)   # of Times Distance was Traveled 1   Deviation Bradykinetic;Increased Base Of Support;Decreased Heel Strike;Other (Comment)  (facilitation for trunk extension)   Wheelchair Functional Level of Assist   Wheelchair Assist Stand by Assist   Distance Wheelchair (Feet or Distance) 40   Wheelchair Description Extra time;Leg rest management;Impaired coordination;Limited by fatigue;Supervision for safety;Verbal cueing  (maida technique)   Bed Mobility    Sit to Stand Contact Guard Assist  (with FWW x5, cues for hand placement)   Neuro-Muscular Treatments   Neuro-Muscular Treatments Postural Facilitation;Sequencing;Tactile Cuing;Verbal Cuing   Comments Facilitation for posture and repeated cues for sequencing during ambulation first at maida rail in hallway. Progressed to trial of maida walker and then bariatric FWW with both postural facilitation and hand over hand on the L to maintain  on the FWW. Pt tends to lean forwards in standing when using BUE, requiring facilitation at the trunk for thoracic extension. With the hemiwalker, he was able to maintain upright posture without that facilitation. Pt required cues for sequencing with gait on which foot to advance.   Interdisciplinary Plan of Care Collaboration " "  IDT Collaboration with  Family / Caregiver;Therapy Tech   Patient Position at End of Therapy Seated;Self Releasing Lap Belt Applied;Family / Friend in Room;Phone within Reach   Collaboration Comments Pt left seated in front lobby with spouse. Therapy tech assisted the tx session.   PT Total Time Spent   PT Individual Total Time Spent (Mins) 60   PT Charge Group   PT Neuromuscular Re-Education / Balance 3   PT Therapeutic Activities 1     Gait tx:  20ft with hallway rail, min A for trunk stability, wheelchair follow  10ft with maida walker trial and mod A, repeated cues for sequencing, minimal forward trunk lean  30ft then 10ftx2 with michael FWW and mod A for trunk/balance, wheelchair follow for rest breaks, not really needed for safety  No L knee buckling noted during gait training and pt able to advance LLE without hands on facilitation. Pt does have flat foot contact on the L with apparent foot drop due to inattention vs weakness, so would benefit from trial with ace wrap vs L300 vs AFO.    Pt c/o his bottom hurting at times in the wheelchair, so switched his cushion and got him a chair with less depth as he was using a pillow behind him in the 20\"D chair.    Assessment    Pt tolerated therapy session well with focus on progression of gait training using assistive devices. Pt did best with a bariatric FWW and progressed to 30ft before needing a rest break. Pt had no LLE buckling during this session, but does tend to lean forward in standing.  Strengths: Able to follow instructions,Alert and oriented,Independent prior level of function,Motivated for self care and independence,Pleasant and cooperative,Supportive family,Willingly participates in therapeutic activities  Barriers: Decreased endurance,Difficulty following instructions,Hemiplegia,Fatigue,Home accessibility,Impaired activity tolerance,Impaired balance,Impaired carryover of learning,Impaired functional cognition,Impulsive,Limited mobility    Plan    Bed " mobility , transfer training, neuro re ed, issue seated HEP, L LE motor control, standing balance, gait with FWW, initiate step in //    Physical Therapy Problems (Active)     Problem: Balance     Dates: Start: 12/25/21       Goal: STG-Within one week, patient will maintain dynamic sitting x2 minutes without loss of balance.     Dates: Start: 12/25/21             Problem: Mobility     Dates: Start: 12/25/21       Goal: STG-Within one week, patient will ambulate 20ft using maida rail with min A and wheelchair follow.     Dates: Start: 12/25/21             Problem: Mobility Transfers     Dates: Start: 12/25/21       Goal: STG-Within one week, patient will perform bed mobility with mod A using maida techniques.     Dates: Start: 12/25/21          Goal: STG-Within one week, patient will transfer bed to chair with 1 person assist.     Dates: Start: 12/25/21             Problem: PT-Long Term Goals     Dates: Start: 12/25/21       Goal: LTG-By discharge, patient will ambulate 30ft with min A and most appropriate AD.     Dates: Start: 12/25/21          Goal: LTG-By discharge, patient will transfer one surface to another with min A using AD as needed.     Dates: Start: 12/25/21          Goal: LTG-By discharge, patient will ambulate up/down 2 stairs with L ascending rail and min A.     Dates: Start: 12/25/21          Goal: LTG-By discharge, patient will transfer in/out of a car with mod A using AD as needed.     Dates: Start: 12/25/21          Goal: LTG-By discharge, patient will perform bed mobility with supervision using maida techniques.     Dates: Start: 12/25/21

## 2021-12-29 NOTE — THERAPY
Occupational Therapy  Daily Treatment     Patient Name: Gokul Baca  Age:  71 y.o., Sex:  male  Medical Record #: [id number]  Today's Date: 12/29/2021     Precautions  Precautions: Fall Risk,Other (See Comments)  Comments: L maida, L neglect, 2 person assist for transfers         Subjective       Objective       12/29/21 0831   Precautions   Precautions Fall Risk;Other (See Comments)   Comments L maida, L neglect, 2 person assist for transfers   Vitals   O2 Delivery Device None - Room Air   Pain   Intervention Declines   Pain 0 - 10 Group   Therapist Pain Assessment 0   Non Verbal Descriptors   Non Verbal Scale  Calm   Cognition    Level of Consciousness Alert   Sleep/Wake Cycle   Sleep & Rest Awake   Functional Level of Assist   Grooming Supervision   Grooming Description Set-up of equipment;Increased time;Seated in wheelchair at sink   Upper Body Dressing Moderate Assist   Upper Body Dressing Description Assit with threading arms through sleeves;Assist with pulling shirt over head;Increased time;Initial preparation for task;Set-up of equipment;Supervision for safety;Verbal cueing   Lower Body Dressing Total Assist x 2   Lower Body Dressing Description Grab bar;Increased time;Initial preparation for task;Set-up of equipment;Supervision for safety;Verbal cueing;Assist with threading into pant leg   Toileting Total Assist x 2  (Toileting 2x's during 60 min tx session)   Toileting Description Assist for hygiene;Assist to pull pants up;Assist to pull pants down;Assist for standing balance;Grab bar;Increased time;Initial preparation for task;Set-up of equipment;Supervision for safety;Verbal cueing   Toilet Transfers Total Assist  (Toileting 2x's during 60 min tx session)   Toilet Transfer Description Grab bar;Set-up of equipment;Supervision for safety;Verbal cueing   Sitting Upper Body Exercises   Upper Extremity Bike Level 1 Resistance  (LUE only on fluidoBike, 10 mins, LOG x 5, )   Interdisciplinary Plan of Care  Collaboration   IDT Collaboration with  Certified Nursing Assistant;Nursing;Therapy Tech   Patient Position at End of Therapy Seated;Chair Alarm On;Self Releasing Lap Belt Applied;Call Light within Reach;Tray Table within Reach;Phone within Reach   Collaboration Comments CLOF, assist for ADL's/transfers   OT Total Time Spent   OT Individual Total Time Spent (Mins) 60   OT Charge Group   OT Self Care / ADL 3   OT Neuromuscular Re-education / Balance 1       Assessment    Pt was alert and cooperative w/ tx. Toileting/toilet transfer 2x's during 60 min tx session.  OT tx emphasis on ADL's, transfers, integration of LUE as a gross/functioanl assist, neuro re-education LUE - see notes above for details.  Strengths: Independent prior level of function,Pleasant and cooperative,Supportive family,Willingly participates in therapeutic activities  Barriers: Bladder incontinence,Bowel incontinence,Decreased endurance,Difficulty following instructions,Fatigue,Hemiparesis,Impaired activity tolerance,Impaired balance,Impaired insight/denial of deficits,Impaired functional cognition,Impulsive,Limited mobility    Plan     cues for left attention and left scanning during functional tasks.    improved ability to actively participate in all tx activity presented this session     Passport items to be completed:  Passport items to be completed:  Perform bathroom transfers, complete dressing, complete feeding, get ready for the day, prepare a simple meal, participate in household tasks, adapt home for safety needs, demonstrate home exercise program, complete caregiver training     Occupational Therapy Goals (Active)     Problem: Bathing     Dates: Start: 12/28/21       Goal: STG-Within one week, patient will bathe with min A using AE as needed.     Dates: Start: 12/28/21             Problem: Dressing     Dates: Start: 12/25/21       Goal: STG-Within one week, patient will dress UB     Dates: Start: 12/25/21       Description: 1)  Individualized Goal:  at a Min A level with fading cues for L maida technique.     Goal Note filed on 12/28/21 1443 by Tavia Moreira, OT     Requires mod A.                Problem: Functional Transfers     Dates: Start: 12/25/21       Goal: STG-Within one week, patient will transfer to toilet     Dates: Start: 12/25/21       Description: 1) Individualized Goal:  at a Mod A level with AD/DME PRN.     Goal Note filed on 12/28/21 1443 by Tavia Moreira, OT     Con't to require mod-max x2 for safety.               Problem: OT Long Term Goals     Dates: Start: 12/25/21       Goal: LTG-By discharge, patient will complete basic self care tasks     Dates: Start: 12/25/21       Description: 1) Individualized Goal:  at a Min A to Mod I level with AD/AE/DME PRN.        Goal: LTG-By discharge, patient will perform bathroom transfers     Dates: Start: 12/25/21       Description: 1) Individualized Goal:  at a Min A to Mod I level with use of AE/AD/DME PRN.

## 2021-12-29 NOTE — PROGRESS NOTES
Hospital Medicine Daily Progress Note      Chief Complaint:  Hypertension  Afib  Fever  Leukocytosis    Interval History:  Pt states that his right ear pain is much improved.  Exam benign.    Review of Systems  Review of Systems   Constitutional: Negative for fever.   Eyes: Negative for blurred vision.   Respiratory: Negative for shortness of breath.    Cardiovascular: Negative for palpitations.   Gastrointestinal: Negative for nausea and vomiting.   Neurological: Negative for dizziness and headaches.   Psychiatric/Behavioral: Negative for hallucinations.        Physical Exam  Temp:  [36 °C (96.8 °F)-36.7 °C (98 °F)] 36 °C (96.8 °F)  Pulse:  [63-80] 75  Resp:  [18] 18  BP: (110-150)/(52-81) 148/74  SpO2:  [94 %-98 %] 94 %    Physical Exam  Vitals and nursing note reviewed.   Constitutional:       General: He is not in acute distress.  HENT:      Mouth/Throat:      Mouth: Mucous membranes are moist.      Pharynx: Oropharynx is clear.   Eyes:      General: No scleral icterus.  Cardiovascular:      Rate and Rhythm: Normal rate and regular rhythm.      Heart sounds: No murmur heard.      Pulmonary:      Effort: Pulmonary effort is normal.      Breath sounds: Normal breath sounds. No stridor.   Abdominal:      General: There is no distension.      Palpations: Abdomen is soft.      Tenderness: There is no abdominal tenderness.   Musculoskeletal:      Cervical back: No rigidity.      Right lower leg: Edema present.      Left lower leg: Edema present.      Comments: Has B/L pedal swelling.  Has BUE swelling.   Skin:     General: Skin is warm and dry.      Findings: No rash.   Neurological:      Mental Status: He is alert and oriented to person, place, and time.   Psychiatric:         Mood and Affect: Mood normal.         Behavior: Behavior normal.         Fluids    Intake/Output Summary (Last 24 hours) at 12/29/2021 1030  Last data filed at 12/29/2021 0800  Gross per 24 hour   Intake 970 ml   Output 1000 ml   Net -30 ml        Laboratory  Recent Labs     12/27/21  0602 12/28/21  1035   WBC 11.3* 9.4   RBC 3.02* 4.17*   HEMOGLOBIN 9.2* 12.5*   HEMATOCRIT 27.0* 37.0*   MCV 89.4 88.7   MCH 30.5 30.0   MCHC 34.1 33.8   RDW 45.7 46.0   PLATELETCT 237 264   MPV 9.9 9.6     Recent Labs     12/27/21  0602   SODIUM 139   POTASSIUM 3.8   CHLORIDE 105   CO2 23   GLUCOSE 80   BUN 13   CREATININE 0.75   CALCIUM 8.1*                 Assessment/Plan  * Hemorrhagic stroke (HCC)- (present on admission)  Assessment & Plan  Had dysarthria, facial droop, and left hemiparesis  CT: right basal ganglia hemorrhage with mild mass effect  Non-surgical management  Off chronic anticoagulation   On Provigil per Physiatry    Anemia  Assessment & Plan  Hb: 11.8 --> 9.2 (12/27 ; likely error) --> 12.5 (12/28)  Fe: 34, sats 17%  F/U FOB  On Prilosec  On Fe supplements  Monitor    Edema  Assessment & Plan  US LLE: negative DVT  US LUE: the median antecubital vein is incompressible & in the forearm the cephalic vein   US RUE: evidence of acute superficial venous thrombosis in the median cubital vein  On abx for thrombophlebitis  Monitor    Fever- (present on admission)  Assessment & Plan  S/P fever  WBC's: 13.5 --> 11.3 (12/27) --> 9.4 (12/28)  Urine cultures: E. Coli  BC x 2: neg  CXR negative acute  Covod negative  Has thrombophlebitis  Has UTI  Has otitis externa  On Zosyn (thru 1/1)    Right ear pain- (present on admission)  Assessment & Plan  Otoscopic Exam 12/25/21:  TM difficult to visualize due to cerumen, R auditory canal erythematous  Concerning for otitis externa  On Zosyn (treating UTI and thrombophlebitis)  Note: Cipro Otic gtts not available at this facility    Hypertension- (present on admission)  Assessment & Plan  BP dipped a little lower on 12/27  BP recently a little labile  On Norvasc  On Cozaar  On Hydralazine --> dose decreased on 12/27  Cont to monitor    Hyperlipemia- (present on admission)  Assessment & Plan  On Lipitor    AF (atrial  fibrillation) (HCC)- (present on admission)  Assessment & Plan  HR ok  Not on any rate controlling meds  Anticoagulation presently on hold due to ICH  Note: was on chronic Xarelto  Monitor

## 2021-12-29 NOTE — CARE PLAN
Problem: Problem Solving STGs  Goal: STG-Within one week, patient will  Description: Complete visual scanning tasks (e.g. cancellation tasks, functional reading) with 80% acc. And moderate assistance to attend to left side.   Outcome: Not Met  Note: Will continue to target.      Problem: Memory STGs  Goal: STG-Within one week, patient will  Description: Participate in administration of complete standardized cog-ling evaluation to better determine functional cog-ling needs and better guide POC.    Outcome: Progressing  Note: Should be completed today with subsequent goals pending results.      Problem: Swallowing STGs  Goal: STG-Within one week, patient will  Description: Consume thin liquids with no overt s/sx of aspiration for 100% of intake.  Outcome: Met  Goal: STG-Within one week, patient will  Description: Consume soft bite sized diet textures in 100% of opportunities with min cues to follow trained safe swallow strategies.   Outcome: Met

## 2021-12-29 NOTE — CARE PLAN
"The patient is Stable - Low risk of patient condition declining or worsening    Shift Goals  Clinical Goals: Promote safe transfer, encourage daily activity and iself care  Patient Goals: Rest    Problem: Skin Integrity  Goal: Skin integrity is maintained or improved  Outcome: Progressing  Note:   Brayden Score: 17    Patient's skin remains intact and free from new or accidental injury this shift; no s/s of infection. RN wound protocol checked.  Encouraged hydration and educated about the importance of nutrition to keep skin integrity. Will continue to monitor.       Problem: Fall Risk - Rehab  Goal: Patient will remain free from falls  Outcome: Progressing  Note: Kelin [place] Fall risk Assessment Score: 16    High fall risk Interventions   - Bed and strip alarm   - Yellow sign by the door   - Yellow wrist band \"Fall risk\"  - Room near to the nurse station  - Do not leave patient unattended in the bathroom  - Fall risk education provided     "

## 2021-12-29 NOTE — CARE PLAN
Problem: Mobility  Goal: STG-Within one week, patient will ambulate 20ft using maida rail with min A and wheelchair follow.  Outcome: Not Met     Problem: Mobility Transfers  Goal: STG-Within one week, patient will transfer bed to chair with 1 person assist.  Outcome: Not Met     Problem: Balance  Goal: STG-Within one week, patient will maintain dynamic sitting x2 minutes without loss of balance.  Outcome: Progressing     Problem: Mobility Transfers  Goal: STG-Within one week, patient will perform bed mobility with mod A using maida techniques.  Outcome: Progressing

## 2021-12-30 LAB
BACTERIA BLD CULT: NORMAL
BACTERIA BLD CULT: NORMAL
RENIN PLAS-CCNC: 0.2 NG/ML/HR
SIGNIFICANT IND 70042: NORMAL
SIGNIFICANT IND 70042: NORMAL
SITE SITE: NORMAL
SITE SITE: NORMAL
SOURCE SOURCE: NORMAL
SOURCE SOURCE: NORMAL

## 2021-12-30 PROCEDURE — 700111 HCHG RX REV CODE 636 W/ 250 OVERRIDE (IP): Performed by: HOSPITALIST

## 2021-12-30 PROCEDURE — 97129 THER IVNTJ 1ST 15 MIN: CPT

## 2021-12-30 PROCEDURE — 770010 HCHG ROOM/CARE - REHAB SEMI PRIVAT*

## 2021-12-30 PROCEDURE — 700102 HCHG RX REV CODE 250 W/ 637 OVERRIDE(OP): Performed by: HOSPITALIST

## 2021-12-30 PROCEDURE — 700105 HCHG RX REV CODE 258: Performed by: HOSPITALIST

## 2021-12-30 PROCEDURE — 97530 THERAPEUTIC ACTIVITIES: CPT | Mod: CO

## 2021-12-30 PROCEDURE — 97130 THER IVNTJ EA ADDL 15 MIN: CPT

## 2021-12-30 PROCEDURE — 700101 HCHG RX REV CODE 250: Performed by: PHYSICAL MEDICINE & REHABILITATION

## 2021-12-30 PROCEDURE — A9270 NON-COVERED ITEM OR SERVICE: HCPCS | Performed by: HOSPITALIST

## 2021-12-30 PROCEDURE — 97110 THERAPEUTIC EXERCISES: CPT

## 2021-12-30 PROCEDURE — 97116 GAIT TRAINING THERAPY: CPT

## 2021-12-30 PROCEDURE — 97535 SELF CARE MNGMENT TRAINING: CPT | Mod: CO

## 2021-12-30 PROCEDURE — 99232 SBSQ HOSP IP/OBS MODERATE 35: CPT | Performed by: HOSPITALIST

## 2021-12-30 PROCEDURE — 99233 SBSQ HOSP IP/OBS HIGH 50: CPT | Performed by: PHYSICAL MEDICINE & REHABILITATION

## 2021-12-30 PROCEDURE — 700102 HCHG RX REV CODE 250 W/ 637 OVERRIDE(OP): Performed by: PHYSICAL MEDICINE & REHABILITATION

## 2021-12-30 PROCEDURE — A9270 NON-COVERED ITEM OR SERVICE: HCPCS | Performed by: PHYSICAL MEDICINE & REHABILITATION

## 2021-12-30 RX ADMIN — FERROUS SULFATE TAB 325 MG (65 MG ELEMENTAL FE) 325 MG: 325 (65 FE) TAB at 08:51

## 2021-12-30 RX ADMIN — PIPERACILLIN AND TAZOBACTAM 3.38 G: 3; .375 INJECTION, POWDER, LYOPHILIZED, FOR SOLUTION INTRAVENOUS; PARENTERAL at 23:59

## 2021-12-30 RX ADMIN — OMEPRAZOLE 20 MG: 20 CAPSULE, DELAYED RELEASE ORAL at 20:44

## 2021-12-30 RX ADMIN — HYDRALAZINE HYDROCHLORIDE 50 MG: 25 TABLET, FILM COATED ORAL at 20:45

## 2021-12-30 RX ADMIN — MODAFINIL 100 MG: 100 TABLET ORAL at 08:52

## 2021-12-30 RX ADMIN — PIPERACILLIN AND TAZOBACTAM 3.38 G: 3; .375 INJECTION, POWDER, LYOPHILIZED, FOR SOLUTION INTRAVENOUS; PARENTERAL at 05:54

## 2021-12-30 RX ADMIN — HYDRALAZINE HYDROCHLORIDE 50 MG: 25 TABLET, FILM COATED ORAL at 15:04

## 2021-12-30 RX ADMIN — ATORVASTATIN CALCIUM 40 MG: 40 TABLET, FILM COATED ORAL at 20:44

## 2021-12-30 RX ADMIN — ACETAMINOPHEN 1000 MG: 500 TABLET, FILM COATED ORAL at 20:41

## 2021-12-30 RX ADMIN — SENNOSIDES AND DOCUSATE SODIUM 2 TABLET: 50; 8.6 TABLET ORAL at 08:52

## 2021-12-30 RX ADMIN — LOSARTAN POTASSIUM 100 MG: 25 TABLET, FILM COATED ORAL at 08:52

## 2021-12-30 RX ADMIN — SENNOSIDES AND DOCUSATE SODIUM 2 TABLET: 50; 8.6 TABLET ORAL at 20:43

## 2021-12-30 RX ADMIN — AMLODIPINE BESYLATE 10 MG: 5 TABLET ORAL at 05:55

## 2021-12-30 RX ADMIN — PIPERACILLIN AND TAZOBACTAM 3.38 G: 3; .375 INJECTION, POWDER, LYOPHILIZED, FOR SOLUTION INTRAVENOUS; PARENTERAL at 11:56

## 2021-12-30 RX ADMIN — OMEPRAZOLE 20 MG: 20 CAPSULE, DELAYED RELEASE ORAL at 08:51

## 2021-12-30 RX ADMIN — ACETAMINOPHEN 1000 MG: 500 TABLET, FILM COATED ORAL at 08:52

## 2021-12-30 RX ADMIN — Medication 20 ML: at 21:00

## 2021-12-30 RX ADMIN — ACETAMINOPHEN 1000 MG: 500 TABLET, FILM COATED ORAL at 15:04

## 2021-12-30 RX ADMIN — Medication 20 ML: at 09:00

## 2021-12-30 RX ADMIN — PIPERACILLIN AND TAZOBACTAM 3.38 G: 3; .375 INJECTION, POWDER, LYOPHILIZED, FOR SOLUTION INTRAVENOUS; PARENTERAL at 17:58

## 2021-12-30 RX ADMIN — HYDRALAZINE HYDROCHLORIDE 50 MG: 25 TABLET, FILM COATED ORAL at 05:55

## 2021-12-30 ASSESSMENT — ENCOUNTER SYMPTOMS
BLURRED VISION: 0
DIZZINESS: 0
FEVER: 0
NERVOUS/ANXIOUS: 0
DIARRHEA: 0
COUGH: 0

## 2021-12-30 ASSESSMENT — GAIT ASSESSMENTS
GAIT LEVEL OF ASSIST: MODERATE ASSIST
DISTANCE (FEET): 30
DEVIATION: INCREASED BASE OF SUPPORT;BRADYKINETIC;DECREASED HEEL STRIKE;DECREASED TOE OFF
ASSISTIVE DEVICE: FRONT WHEEL WALKER

## 2021-12-30 NOTE — ED NOTES
"Pt discharged to [facility] via [facility], pt A&Ox4, RA, x1 assist-chairfast. pt in possession of belongings. Pt provided discharge education and information pertaining to medications and follow up appointments. Pt received copy of discharge instructions and verbalized understanding. BP (!) 178/79   Pulse 77   Temp 37.2 °C (98.9 °F) (Temporal)   Resp 18   Ht 1.676 m (5' 6\")   Wt 109 kg (240 lb)   SpO2 94%   BMI 38.74 kg/m²   "

## 2021-12-30 NOTE — DISCHARGE PLANNING
Received call that pt is discharged and needs to go back to [facility].  Faxed PCS & Facesheet to Richard.  [facility] scheduled to  pt at approx. 10:00pm.  Bedside RN to call report to Maylin at [phone].

## 2021-12-30 NOTE — THERAPY
Speech Language Pathology  Daily Treatment     Patient Name: Gokul Baca  Age:  71 y.o., Sex:  male  Medical Record #: [id number]  Today's Date: 12/29/2021     Precautions  Precautions: Fall Risk,Other (See Comments)  Comments: L maida, L neglect, 2 person assist for transfers    Subjective    Patient pleasant and cooperative.     Objective       12/29/21 1015   SCCAN (Scales of Cognitive and Communicative Ability for Neurorehabilitation)   Reading Comprehension - Raw Score 5   Reading Comprehension - Scale Performance Score 42   Writing - Raw Score 7   Writing - Scale Performance Score 100   Attention - Raw Score 9   Attention - Scale Performance Score 56   Problem Solving - Raw Score 18   Problem Solving - Scale Performance Score 78   SLP Total Time Spent   SLP Individual Total Time Spent (Mins) 45   Treatment Charges   SLP Cognitive Skill Development First 15 Minutes 1   SLP Cognitive Skill Development Additional 15 Minutes 2       Assessment    Continued administration of SCCAN.  Patient achieved the following percentage scores for given subtests: Reading comprehension 42, Writing 100, Attention 56, and Problem solving 78.   A portion of memory subtest still needs to be completed ( recall of 3 medications and therapists name after a delay).   Patient currently demonstrates mild overall memory deficits.  He demonstrated moderate impairment of visual attention, and overall attention.  Mild deficits in problem solving.  Patient does demonstrate impaired insight into deficits and their impact on safety.      Strengths: Good insight into deficits/needs,Independent prior level of function,Making steady progress towards goals,Pleasant and cooperative,Supportive family,Motivated for self care and independence,Willingly participates in therapeutic activities,Effective communication skills,Able to follow instructions  Barriers: Impaired functional cognition,Hemiparesis (mild dysarthria)    Plan    Target visual  attention, safety planning and problem solving, recall of safety sequencing.     Passport items to be completed:  Express basic needs, understand food/liquid recommendations, consistently follow swallow precautions, manage finances, manage medications, arrive to therapy appointments on time, complete daily memory log entries, solve problems related to safety situations, review education related to hospitalization, complete caregiver training     Speech Therapy Problems (Active)     Problem: Memory STGs     Dates: Start: 12/25/21       Goal: STG-Within one week, patient will     Dates: Start: 12/25/21       Description: Participate in administration of complete standardized cog-ling evaluation to better determine functional cog-ling needs and better guide POC.      Goal Note filed on 12/29/21 1309 by Annmarie Nguyễn MS,CCC-SLP     Should be completed today with subsequent goals pending results.                Problem: Problem Solving STGs     Dates: Start: 12/25/21       Goal: STG-Within one week, patient will     Dates: Start: 12/25/21       Description: Complete visual scanning tasks (e.g. cancellation tasks, functional reading) with 80% acc. And moderate assistance to attend to left side.     Goal Note filed on 12/29/21 1303 by Annmarie Nguyễn MS,CCC-SLP     Will continue to target.                Problem: Speech/Swallowing LTGs     Dates: Start: 12/25/21       Goal: LTG-By discharge, patient will safely swallow     Dates: Start: 12/25/21       Description: Safest/least restrictive diet to maintain adequate nutrition and hydration.        Goal: LTG-By discharge, patient will     Dates: Start: 12/25/21       Description: Demonstrate independent level of cog-ling functions to ensure safe return to PLOF.          Problem: Swallowing STGs     Dates: Start: 12/25/21

## 2021-12-30 NOTE — CARE PLAN
Problem: Pain - Standard  Goal: Alleviation of pain or a reduction in pain to the patient’s comfort goal  Flowsheets (Taken 12/29/2021 2000)  Non Verbal Scale: Calm  Pain Rating Scale (NPRS): 0  Note: Patient able to verbalize pain level and verbalize an acceptable level of pain.      Problem: Fall Risk - Rehab  Goal: Patient will remain free from falls  Note: Patient remains free from falls this shift. Patient was educated on using the call light to prevent falls. Patients bed is in the lowest position. The patients belongings are placed in near proximity to the patient.     The patient is Stable - Low risk of patient condition declining or worsening

## 2021-12-30 NOTE — PROGRESS NOTES
1720 patient had a fall, Jan, wife was notified, per Dr Cummings patient being sent for CT of head.

## 2021-12-30 NOTE — ED PROVIDER NOTES
ED Provider Note    Scribed for Kel Francois M.D. by Bharti Whitmore. 12/29/2021, 6:36 PM.    Primary care provider: Eber Alfonso M.D.  Means of arrival: EMS  History obtained from: Patient  History limited by: None    CHIEF COMPLAINT  Chief Complaint   Patient presents with    T-5000 GLF     hit head, no LOC     PPE Note: I personally donned full PPE for all patient encounters during this visit, including wearing an N95 respirator mask, gloves, and eye protection. Scribe remained outside the patient's room and did not have any contact with the patient for the duration of patient encounter.      HPI  Gokul Baca is a 71 y.o. male who presents to the Emergency Department after sustaining a ground level fall onset prior to arrival. He states he fell out of his wheelchair at [facility] after trying to pick something up off of the floor. He reports he hit his head but denies loss of consciousness. He has a history of frequent falls. He denies headache, nausea, vomiting, neck pain, back pain, chest pain, and abdominal pain. He has a history of hemorrhagic stroke which left him with left-sided deficits.     REVIEW OF SYSTEMS  Pertinent positives include ground level fall. Pertinent negatives include headache, nausea, vomiting, neck pain, back pain, chest pain, and abdominal pain. All other systems negative.    PAST MEDICAL HISTORY   has a past medical history of Atrial fibrillation (HCC), GERD (gastroesophageal reflux disease), Hyperlipidemia, and Hypertension.    SURGICAL HISTORY   has a past surgical history that includes knee arthroplasty total (Left).    SOCIAL HISTORY  Social History     Tobacco Use    Smoking status: Never Smoker    Smokeless tobacco: Never Used   Substance Use Topics    Alcohol use: Never    Drug use: Never      Social History     Substance and Sexual Activity   Drug Use Never       FAMILY HISTORY  Family History   Problem Relation Age of Onset    Heart Disease Mother   "   Heart Disease Brother     Alcohol abuse Son        CURRENT MEDICATIONS  Current Outpatient Medications   Medication Instructions    amLODIPine (NORVASC) 10 mg, Oral, DAILY    hydrALAZINE (APRESOLINE) 25 mg, Oral, EVERY 8 HOURS    losartan (COZAAR) 100 mg, Oral, DAILY    melatonin 2.5 mg, Oral, 1 TIME DAILY PRN    omeprazole (PRILOSEC) 20 mg, Oral, DAILY    pravastatin (PRAVACHOL) 20 mg, Oral, DAILY    sodium chloride (SALT) 2 g, Oral, 3 TIMES DAILY WITH MEALS    ursodiol (ACTIGALL) 300 mg, Oral, 2 TIMES DAILY       ALLERGIES  Allergies   Allergen Reactions    Lactose        PHYSICAL EXAM  VITAL SIGNS: BP (!) 172/86   Pulse 66   Resp 18   Ht 1.676 m (5' 6\")   Wt 109 kg (240 lb)   SpO2 96%   BMI 38.74 kg/m²     Constitutional: Well developed, Well nourished, in mild distress.   HENT: Normocephalic, Abrasion just above the left lateral eyebrow, mask in place.  Eyes: Conjunctiva normal, No discharge.   Neck: Supple, No stridor, no vertebral point tenderness  Cardiovascular: Normal heart rate, Normal rhythm, No murmurs, equal pulses.   Pulmonary: Normal breath sounds, No respiratory distress, No wheezing, No rales, No rhonchi.  Chest: No chest wall tenderness or deformity. PIQ line in place on the left side.   Abdomen: Soft, No tenderness, No masses, no rebound, no guarding.   Back: No CVA tenderness.   Musculoskeletal: No major deformities noted, No tenderness.   Skin: Warm, Dry, No erythema, No rash.   Neurologic: Alert & oriented x 3, Flaccid on the left side at baseline, Normal motor function,  No focal deficits noted.   Psychiatric: Affect normal, Judgment normal, Mood normal.     RADIOLOGY  CT-HEAD W/O   Final Result      1.  Known right temporal and basal ganglia parenchymal hemorrhage is mildly decreased in size and density compared to the prior examination.   2.  There is, however, increased surrounding edema with increased mass effect and approximately 7 mm midline shift to the left.   3.  No new " hemorrhage is identified.        The radiologist's interpretation of all radiological studies have been reviewed by me.    COURSE & MEDICAL DECISION MAKING  Pertinent Labs & Imaging studies reviewed. (See chart for details)    6:36 PM - Patient seen and examined at bedside. Ordered CT-Head to evaluate his symptoms. The differential diagnoses include but are not limited to: intracranial hemorrhage, abrasion, fall    2003 discussed the case with Dr. Mendez through a nurse in the OR.  Reviewed the patient's CT findings he states that the edema is nothing to worry about, the patient does not need to be placed on steroids and can follow-up as scheduled.    Medical Decision Making: Patient presents emergency department with known history of intracranial hemorrhage with fall today striking his head.  Because of the recent hemorrhage CT was done to see if this had increased in size.  Likely the hemorrhage has actually decreased in size.  There is some new edema but after discussion with neurosurgery this is not needing any intervention at this point time.     The patient will return for new or worsening symptoms and is stable at the time of discharge.    The patient is referred to a primary physician for blood pressure management, diabetic screening, and for all other preventative health concerns.        DISPOSITION:  Patient will be discharged home in stable condition.    FOLLOW UP:  Eber Alfonso M.D.  [address] [facility] [id number] [phone]    Schedule an appointment as soon as possible for a visit       Eyal Arriaga M.D.  [address] [facility] [id number] [phone]      as scheduled      OUTPATIENT MEDICATIONS:  New Prescriptions    No medications on file          FINAL IMPRESSION  1. Fall, initial encounter          Bharti MOREL (Jorge), am scribing for, and in the presence of, Kel Francois M.D.    Electronically signed by: Bharti Whitmore (Jorge), 12/29/2021    Kel MOREL  M.D. personally performed the services described in this documentation, as scribed by Bharti Whitmore in my presence, and it is both accurate and complete.    The note accurately reflects work and decisions made by me.  Kel Francois M.D.  12/29/2021  8:18 PM

## 2021-12-30 NOTE — THERAPY
Speech Language Pathology  Daily Treatment     Patient Name: Gokul Baca  Age:  71 y.o., Sex:  male  Medical Record #: [id number]  Today's Date: 12/29/2021     Precautions  Precautions: Fall Risk,Other (See Comments)  Comments: L maida, L neglect, 2 person assist for transfers    Subjective    Patient pleasant and cooperative.  His goal is to be graduated from t-dine.     Objective       12/29/21 1201   Dysphagia    Diet / Liquid Recommendation Thin (0);Regular (7)  (cut up)   Nutritional Liquid Intake Rating Scale Non thickened beverages   Nutritional Food Intake Rating Scale Total oral diet with multiple consistencies but requiring special preparation or compensations   SLP Total Time Spent   SLP Individual Total Time Spent (Mins) 15   Treatment Charges   SLP Swallowing Dysfunction Treatment Swallowing Dysfunction Treatment       Assessment    Patient demonstrated no cough or wet voice with regular textures and thin liquids.  He does demonstrate mild left buccal residue which he eventually independently clears.     Strengths: Good insight into deficits/needs,Independent prior level of function,Making steady progress towards goals,Pleasant and cooperative,Supportive family,Motivated for self care and independence,Willingly participates in therapeutic activities,Effective communication skills,Able to follow instructions  Barriers: Impaired functional cognition,Hemiparesis (mild dysarthria)    Plan    Recommend continue (7) regular textures cut up (0) thin liquids.  D/C t-dine.    Passport items to be completed:  Express basic needs, understand food/liquid recommendations, consistently follow swallow precautions, manage finances, manage medications, arrive to therapy appointments on time, complete daily memory log entries, solve problems related to safety situations, review education related to hospitalization, complete caregiver training     Speech Therapy Problems (Active)     Problem: Memory STGs     Dates:  Start: 12/25/21       Goal: STG-Within one week, patient will     Dates: Start: 12/25/21       Description: Participate in administration of complete standardized cog-ling evaluation to better determine functional cog-ling needs and better guide POC.      Goal Note filed on 12/29/21 1309 by Annmarie Nguyễn MS,CCC-SLP     Should be completed today with subsequent goals pending results.                Problem: Problem Solving STGs     Dates: Start: 12/25/21       Goal: STG-Within one week, patient will     Dates: Start: 12/25/21       Description: Complete visual scanning tasks (e.g. cancellation tasks, functional reading) with 80% acc. And moderate assistance to attend to left side.     Goal Note filed on 12/29/21 1307 by Annmarie Nguyễn MS,CCC-SLP     Will continue to target.             Goal: STG-Within one week, patient will     Dates: Start: 12/29/21       Description: Patient will demonstrate safety planning, problem solving and sequencing for self care and balance maintenace with 80% acc with min cues to improve.          Problem: Speech/Swallowing LTGs     Dates: Start: 12/25/21       Goal: LTG-By discharge, patient will safely swallow     Dates: Start: 12/25/21       Description: Safest/least restrictive diet to maintain adequate nutrition and hydration.        Goal: LTG-By discharge, patient will     Dates: Start: 12/25/21       Description: Demonstrate independent level of cog-ling functions to ensure safe return to PLOF.          Problem: Swallowing STGs     Dates: Start: 12/25/21

## 2021-12-30 NOTE — PROGRESS NOTES
Patient care assumed. Report received from Be KRUGER. Tiffanie. Patient is alert and calm, resting in bed. Call light and bedside table within reach. Will continue to monitor.

## 2021-12-30 NOTE — CARE PLAN
The patient is Watcher - Medium risk of patient condition declining or worsening    Shift Goals  Clinical Goals: safety  Patient Goals: Rest      Problem: Skin Integrity  Goal: Skin integrity is maintained or improved  Outcome: Progressing Patient's skin remains intact and free from new or accidental injury this shift.  Will continue to monitor.      Problem: Fall Risk - Rehab  Goal: Patient will remain free from falls  Outcome: Not Met patient can be impulsive and was moved to a monitored room due to fall yesterday. Alarming seatbelt was installed this am. Will continue to educate and monitor regarding safety.

## 2021-12-30 NOTE — DISCHARGE INSTRUCTIONS
Return the emergency department if you new or different headache, confusion, vomiting or new weakness.

## 2021-12-30 NOTE — PROGRESS NOTES
"Rehab Progress Note     Encounter Date: 12/30/2021    CC: Left sided weakness    Interval Events (Subjective)    Patient had a fall overnight and struck his head on the left side.  On-call physician was contacted and patient was sent to CT.    He denies any change in strength or sensation.  He actually reports feeling stronger and working better with therapy.    Last BM: 12/30/21    ROS:  Gen: No fatigue, confusion, significant weight loss  Eyes: no blurry vision, double vision or pain in eyes  ENT: no changes in hearing, runny nose, nose bleeds, sinus pain  CV: No CP, palpitations  Lungs: no shortness of breath, changes in secretions, changes in cough, pain with coughing  Abd: No abd pain, nausea, vomiting, pain with eating  : no blood in urine, pain during storage phase, bladder spasms, suprapubic pain  Ext: No swelling in legs, asymmetric atrophy  Neuro: no changes in strength or sensation  Skin: no new ulcers/skin breakdown appreciated by patient  Mood: No changes in mood today, increase in depression or anxiety  Heme: no bruising, or bleeding  So 14 point review of systems is negative    Objective:  Vitals: /67   Pulse 70   Temp 36.7 °C (98.1 °F)   Resp 18   Ht 1.664 m (5' 5.51\")   Wt 116 kg (255 lb 11.7 oz)   SpO2 98%   Gen: NAD, Body mass index is 41.89 kg/m².  HEENT:  NC/AT, PERRLA, moist mucous membranes  Cardio: RRR, no mumurs  Pulm: CTAB, with normal respiratory effort  Abd: Soft NTND, active bowel sounds,   Ext: No peripheral edema. No calf tenderness. No clubbing/cyanosis. +dorsal pedalis pulses bilaterally.      Recent Results (from the past 72 hour(s))   CBC WITHOUT DIFFERENTIAL    Collection Time: 12/28/21 10:35 AM   Result Value Ref Range    WBC 9.4 4.8 - 10.8 K/uL    RBC 4.17 (L) 4.70 - 6.10 M/uL    Hemoglobin 12.5 (L) 14.0 - 18.0 g/dL    Hematocrit 37.0 (L) 42.0 - 52.0 %    MCV 88.7 81.4 - 97.8 fL    MCH 30.0 27.0 - 33.0 pg    MCHC 33.8 33.7 - 35.3 g/dL    RDW 46.0 35.9 - 50.0 fL    " Platelet Count 264 164 - 446 K/uL    MPV 9.6 9.0 - 12.9 fL   IRON/TOTAL IRON BIND    Collection Time: 12/28/21 10:35 AM   Result Value Ref Range    Iron 34 (L) 50 - 180 ug/dL    Total Iron Binding 258 250 - 450 ug/dL    Unsat Iron Binding 224 110 - 370 ug/dL    % Saturation 13 (L) 15 - 55 %   OCCULT BLOOD X3 (STOOL)    Collection Time: 12/28/21  6:15 PM   Result Value Ref Range    Occult Blood 1 Negative Negative    Occult Blood 2 Negative Negative       Current Facility-Administered Medications   Medication Frequency   • ferrous sulfate tablet 325 mg QDAY with Breakfast   • modafinil (PROVIGIL) tablet 100 mg QAM   • hydrALAZINE (APRESOLINE) tablet 50 mg Q8HRS   • omeprazole (PRILOSEC) capsule 20 mg BID   • melatonin tablet 3 mg QHS PRN   • normal saline (PF) 20 mL Q12HRS   • piperacillin-tazobactam (ZOSYN) 3.375 g in  mL IVPB Q6HRS   • Respiratory Therapy Consult Continuous RT   • Pharmacy Consult Request ...Pain Management Review 1 Each PHARMACY TO DOSE   • acetaminophen (Tylenol) tablet 650 mg Q4HRS PRN   • docusate sodium (ENEMEEZ) enema 283 mg QDAY PRN   • sodium phosphate (Fleet) enema 133 mL QDAY PRN   • artificial tears ophthalmic solution 1 Drop PRN   • benzocaine-menthol (CEPACOL) lozenge 1 Lozenge Q2HRS PRN   • mag hydrox-al hydrox-simeth (MAALOX PLUS ES or MYLANTA DS) suspension 20 mL Q2HRS PRN   • ondansetron (ZOFRAN ODT) dispertab 4 mg 4X/DAY PRN    Or   • ondansetron (ZOFRAN) syringe/vial injection 4 mg 4X/DAY PRN   • traZODone (DESYREL) tablet 50 mg QHS PRN   • sodium chloride (OCEAN) 0.65 % nasal spray 2 Spray PRN   • midazolam (VERSED) 5 mg/mL (1 mL vial) PRN   • senna-docusate (PERICOLACE or SENOKOT S) 8.6-50 MG per tablet 2 Tablet BID    And   • polyethylene glycol/lytes (MIRALAX) PACKET 1 Packet QDAY PRN    And   • magnesium hydroxide (MILK OF MAGNESIA) suspension 30 mL QDAY PRN    And   • bisacodyl (DULCOLAX) suppository 10 mg QDAY PRN   • amLODIPine (NORVASC) tablet 10 mg Q DAY   •  losartan (COZAAR) tablet 100 mg DAILY   • hydrALAZINE (APRESOLINE) tablet 10 mg TID PRN   • cloNIDine (CATAPRES) tablet 0.1 mg TID PRN   • acetaminophen (TYLENOL) tablet 1,000 mg TID   • butalbital/apap/caffeine -40 mg (Fioricet) per tablet 1 Tablet Q6HRS PRN   • atorvastatin (LIPITOR) tablet 40 mg Q EVENING       Orders Placed This Encounter   Procedures   • Diet Order Diet: Regular (solids cut up into bite size pieces, No Straw; t-dine); Tray Modifications (optional): No Straws     Standing Status:   Standing     Number of Occurrences:   1     Order Specific Question:   Diet:     Answer:   Regular [1]     Comments:   solids cut up into bite size pieces, No Straw; t-dine     Order Specific Question:   Tray Modifications (optional)     Answer:   No Straws       Assessment:  Active Hospital Problems    Diagnosis    • *Hemorrhagic stroke (HCC)    • Anemia    • Hypokalemia    • Thrombocytopenia (HCC)    • Fever    • Left-sided neglect    • Edema    • Impaired mobility and ADLs    • Right ear pain    • Morbid obesity with BMI of 40.0-44.9, adult (HCC)    • GERD (gastroesophageal reflux disease)    • AF (atrial fibrillation) (HCC)    • Hyperlipemia    • Hypertension        Medical Decision Making and Plan:  Large right basal ganglia hemorrhagic stroke  Left hemiparesis  Left visual field cut  Left neglect  Cognitive impairment  -Continue comprehensive acute inpatient rehabilitation  PT/OT/SLP, 1 hr each discipline, 5 days per week  -Atorvastatin 40 mg nightly for secondary prevention  -Per neurology okay to restart Xarelto in 2 weeks, so we will check a CT scan first, patient and wife not interested in restarting  -Patient had fall on 12/30 resulting in head strike, CT was ordered showed no significant change if not improvement in hemorrhage with surrounding edema and mild increase in midline shift. Neurosurgery was contacted and recommended no change in management.  Outpatient follow-up with stroke Dr. pat  Henry, referrals made     Sedation  Discontinued melatonin  Discontinued gabapentin  Discontinued PRN hydralazine  Start Provigil  12/27: Sent to ED to check Stat CT, resolution of AMS in ED, CT head not completed      Headache, resolved   Likely from brain swelling  Continue scheduled Tylenol, dose increased  As needed Fioricet, last use never  Increase salt tabs to 3 mg TID to keep sodium above 140     Hypertension  Amlodipine 10 mg  Losartan 100 mg  Increased dose of hydralazine 25 mg every 8 to 50 mg every 8, increased again to 75 mg every 8 hours  As needed clonidine for systolic blood pressures over 160  Appreciate hospitalist assistance     Paroxysmal atrial fibrillation  Xarelto currently on hold for 2 weeks per neurology  May benefit from beta-blocker, per review of outpatient notes has been on metoprolol and amiodarone in the past  Appreciate hospitalist assistance     Edema  Previously on IV Lasix  Checked Doppler ultrasound of the left upper extremity and left lower extremity, lower extremities negative for DVT     Left arm thrombophlebitis  Right arm superficial thrombus  Initially started on Keflex, now switched to IV antibiotics, stop date is 1/1   No need for full anti-coagulation, which also would be contra-indicated     Fever, resolved  Checked UA - positiive and chest x-ray - megative  Urine culture pending  Checked blood cultures - NTG  Was started on Keflex for thrombophlebitis, now switched to IV antibiotics  Appreciate hospitalist assistance     Anemia  Mild  Monitor with weekly labs     GERD  Omeprazole     Insomnia, continues  Melatonin and hydralazine discontinued due to sedation     Morbid obesity  Due to excess calories  BMI 41.89  Outpatient nutritional counseling     Right ear pain  Otitis externa  Evaluate with otoscope  On IV antibiotics, continues to need IV access   Appreciate hospitalist assistance     Bowel program  Continue bowel medications  Last BM 12/26     Bladder  program  Check PVRs - 33  Bladder scan for no voids  ICP for over 400 cc  Scheduled toileting     DVT prophylaxis  Contraindicated given hemorrhage.   Compression stockings on in am, off in pm.  Increase mobility. Monitor for swelling.    Total time:  35 minutes.  I spent greater than 50% of the time for patient care and coordination on this date, including unit/floor time, and face-to-face time with the patient as per assessment and plan above including right intracranial hemorrhage, discussed with neurosurgery and neurology, no change in management at this time, evaluation of CT, increase edema consistent with resolution of hemorrhage.  Fall, discussed importance of limiting out of bed on his own.    Dejuan Richardson D.O.

## 2021-12-30 NOTE — PROGRESS NOTES
Hospital Medicine Daily Progress Note      Chief Complaint:  Hypertension  Afib  Fever  Leukocytosis    Interval History:  Ear pain nearly resolved.  Had a fall last evening and went to ER for CT head - no acute changes but had a little increased edema.    Review of Systems  Review of Systems   Constitutional: Negative for fever.   Eyes: Negative for blurred vision.   Respiratory: Negative for cough.    Cardiovascular: Negative for chest pain.   Gastrointestinal: Negative for diarrhea.   Musculoskeletal: Negative for joint pain.   Neurological: Negative for dizziness.   Psychiatric/Behavioral: The patient is not nervous/anxious.         Physical Exam  Temp:  [36.7 °C (98 °F)-37.2 °C (98.9 °F)] 36.7 °C (98.1 °F)  Pulse:  [60-82] 70  Resp:  [18] 18  BP: (118-158)/(67-84) 118/67  SpO2:  [95 %-99 %] 98 %    Physical Exam  Vitals and nursing note reviewed.   Constitutional:       Appearance: He is not diaphoretic.   HENT:      Mouth/Throat:      Pharynx: No oropharyngeal exudate or posterior oropharyngeal erythema.   Eyes:      Extraocular Movements: Extraocular movements intact.   Neck:      Vascular: No carotid bruit.   Cardiovascular:      Rate and Rhythm: Normal rate and regular rhythm.      Heart sounds: No murmur heard.      Pulmonary:      Effort: Pulmonary effort is normal.      Breath sounds: Normal breath sounds. No stridor.   Abdominal:      General: Bowel sounds are normal.      Palpations: Abdomen is soft.   Musculoskeletal:      Right lower leg: Edema present.      Left lower leg: Edema present.      Comments: Has B/L pedal swelling.  Has BUE swelling.   Skin:     General: Skin is warm and dry.      Findings: No rash.   Neurological:      Mental Status: He is alert and oriented to person, place, and time.   Psychiatric:         Mood and Affect: Mood normal.         Behavior: Behavior normal.         Fluids    Intake/Output Summary (Last 24 hours) at 12/30/2021 0940  Last data filed at 12/29/2021 1200  Gross  per 24 hour   Intake 240 ml   Output --   Net 240 ml       Laboratory  Recent Labs     12/28/21  1035   WBC 9.4   RBC 4.17*   HEMOGLOBIN 12.5*   HEMATOCRIT 37.0*   MCV 88.7   MCH 30.0   MCHC 33.8   RDW 46.0   PLATELETCT 264   MPV 9.6                     Assessment/Plan  * Hemorrhagic stroke (HCC)- (present on admission)  Assessment & Plan  Had dysarthria, facial droop, and left hemiparesis  CT: right basal ganglia hemorrhage with mild mass effect  Non-surgical management  Off chronic anticoagulation   On Provigil per Physiatry    Anemia  Assessment & Plan  Hb: 11.8 --> 9.2 (12/27 ; likely error) --> 12.5 (12/28)  Fe: 34, sats 17%  FOB (-)  On Prilosec  On Fe supplements  Monitor    Edema  Assessment & Plan  US LLE: negative DVT  US LUE: the median antecubital vein is incompressible & in the forearm the cephalic vein   US RUE: evidence of acute superficial venous thrombosis in the median cubital vein  On abx for thrombophlebitis  Monitor    Fever- (present on admission)  Assessment & Plan  S/P fever  WBC's: 13.5 --> 11.3 (12/27) --> 9.4 (12/28)  Urine cultures: E. Coli  BC x 2: neg  CXR negative acute  Covod negative  Has thrombophlebitis  Has UTI  Has otitis externa  On Zosyn (thru 1/1)    Right ear pain- (present on admission)  Assessment & Plan  Pain much improved recently  Otoscopic Exam 12/25/21:  TM difficult to visualize due to cerumen, R auditory canal erythematous  Otoscopic exam (12/29): Has some cerumen but no erythema visualized  Concerning for otitis externa  On Zosyn (treating UTI and thrombophlebitis)  Note: Cipro Otic gtts not available at this facility    Hypertension- (present on admission)  Assessment & Plan  BP a little labile but ok  Note: BP dipped a little lower on 12/27  On Norvasc  On Cozaar  On Hydralazine --> dose decreased on 12/27  Cont to monitor    Hyperlipemia- (present on admission)  Assessment & Plan  On Lipitor    AF (atrial fibrillation) (HCC)- (present on admission)  Assessment &  Plan  HR ok  Not on any rate controlling meds  Anticoagulation presently on hold due to ICH  Note: was on chronic Xarelto  Monitor

## 2021-12-30 NOTE — PROGRESS NOTES
Rehab Fall Note    Date of fall: 12/29/2021     Time of incident/discovery? 1720     Witnessed or Unwitnessed: unwitnessed     Assisted or unassisted?: unassisted     Staff member present? Not present  Head strike?: yes    What is the patient's orientation status? oriented x 4    Location of fall: patient's room     Was this a fall with therapy? No      Patient had a fall from: chair/wheelchair     Injury from fall: unknown at this time     Persons contacted regarding the fall: family/caregiver, physician and charge nurse     Post fall huddle called: yes     Persons present at post fall huddle: patient, nurse and nursing supervisor     Interventions to prevent another fall: alarms on and signs on patient's door     Was the call light used? No     Did the bed or chair alarm sound? Yes     If no alarm sounded, do the alarms work? N/A     If alarm malfunctioned, was a maintenance request submitted? N/A     Was the pt. wearing a seatbelt prior to the fall? yes     If wearing, did the pt. take off the seatbelt? Yes      What is the patient's transfer status? Max x 2     Other fall details: patient hit his forehead, denies LOC, neck or back pain. Patient denies numbness or tingling in extremities. Assisted to w/c. Patient states he has clear vision but his head is throbbing. Dr Cummings notified. Patient transported by [facility] for CT of head.

## 2021-12-30 NOTE — THERAPY
"Occupational Therapy  Daily Treatment     Patient Name: Gokul Baca  Age:  71 y.o., Sex:  male  Medical Record #: [id number]  Today's Date: 12/30/2021     Precautions  Precautions: (P) Fall Risk,Other (See Comments)  Comments: (P) L maida, L neglect, 2 person assist for transfers         Subjective    In conversation regarding  fall from w/c day prior. \"   I wanted my shade up  I felt cut off from the outside world.   I waited 12 hours for someone to pull it up and no one came. \"      This was stated 30 minutes prior to tx session     At start of tx session   I attempted to raise his shade  He  Demanded it be  Down.  \" I don't like that up .\"    . I reminded patient he had complained that is was down the day prior and that he relayed that information   Just 30 minutes ago.      Hi response \" I guess I meant  Down not up \"     Objective       12/30/21 0831   Precautions   Precautions Fall Risk;Other (See Comments)   Comments L maida, L neglect, 2 person assist for transfers   Functional Level of Assist   Grooming Supervision  (shave  wash/ dry face and hands   oral care  brush hair)   Grooming Description Set-up of equipment   Upper Body Dressing Moderate Assist  (to don pull over shirt )   Bed, Chair, Wheelchair Transfer Moderate Assist  (stand pivot  bed to w/c )   Sitting Upper Body Exercises   Upper Extremity Bike Level 2 Resistance  (x 3 and 2 minutes   jessica med  symmetry 52% left  48%right)   Interdisciplinary Plan of Care Collaboration   Patient Position at End of Therapy In Bed;Call Light within Reach;Tray Table within Reach   OT Total Time Spent   OT Individual Total Time Spent (Mins) 60   OT Charge Group   OT Self Care / ADL 2   OT Therapy Activity 2      seated at table worked functional left UE reach grasp/ release  And left attention  Picking up bean bags from table top and dropping into container placed on floor on left hand side.   This was followed by overhand tossing of bean bags in to container. "   Assessment     mod cues for task attention   Self distracts with conversation and distracted by environment.  Requires mod cues to attend to the left during  Functional tasks.    Strengths: Independent prior level of function,Pleasant and cooperative,Supportive family,Willingly participates in therapeutic activities  Barriers: Bladder incontinence,Bowel incontinence,Decreased endurance,Difficulty following instructions,Fatigue,Hemiparesis,Impaired activity tolerance,Impaired balance,Impaired insight/denial of deficits,Impaired functional cognition,Impulsive,Limited mobility    Plan    ADL  Related transfer / mobility  Neuro re ed    UE ther ex and activity to improve functional use.  Left attention / visual scanning activity        Occupational Therapy Goals (Active)     Problem: Bathing     Dates: Start: 12/28/21       Goal: STG-Within one week, patient will bathe with min A using AE as needed.     Dates: Start: 12/28/21             Problem: Dressing     Dates: Start: 12/25/21       Goal: STG-Within one week, patient will dress UB     Dates: Start: 12/25/21       Description: 1) Individualized Goal:  at a Min A level with fading cues for L maida technique.     Goal Note filed on 12/28/21 1443 by Tavia Moreira, OT     Requires mod A.                Problem: Functional Transfers     Dates: Start: 12/25/21       Goal: STG-Within one week, patient will transfer to toilet     Dates: Start: 12/25/21       Description: 1) Individualized Goal:  at a Mod A level with AD/DME PRN.     Goal Note filed on 12/28/21 1443 by Tavia Moreira, OT     Con't to require mod-max x2 for safety.               Problem: OT Long Term Goals     Dates: Start: 12/25/21       Goal: LTG-By discharge, patient will complete basic self care tasks     Dates: Start: 12/25/21       Description: 1) Individualized Goal:  at a Min A to Mod I level with AD/AE/DME PRN.        Goal: LTG-By discharge, patient will perform bathroom transfers     Dates:  Start: 12/25/21       Description: 1) Individualized Goal:  at a Min A to Mod I level with use of AE/AD/DME PRN.

## 2021-12-30 NOTE — PROGRESS NOTES
On call. Received call about patient's fall. Per nursing, patient talking and alert. No change in mental status. Plan was to wait for planned CT head on 12/30.    Nursing went to discuss with patient. Patient reporting headache and concerned and would like CT head.    Considering patient was to get a follow up CT head the next day, CT head stat ordered and canceled tomorrow's routine CT head.    Results returned showing no acute changes but noticing an increased mass effect resulting in a 7mm midline shift to the left.    Will sign out to primary physician.

## 2021-12-30 NOTE — PROGRESS NOTES
Reported patient had a unwitnessed fall with presumed head strike as he has a new laceration to the forehead. Assisted patient back to wheelchair. Notified on call MD, orders to monitor for now as he does not have any change to baseline mentation.    1750 Patient now complaining of headache and feeling 'different'. Is already scheduled to have head CT tomorrow afternoon for f/u after hemorrhagic stroke for xarelto restart. Insisting to have imaging done now. On call MD notified. Order to send for stat head CT and cancel scheduled exam as it will be done tonight instead.    1807 Patient left facility via [facility] transport. Wife made aware by primary RN.

## 2021-12-30 NOTE — ED TRIAGE NOTES
Chief Complaint   Patient presents with   • T-5000 GLF     hit head, no LOC       Pt recently on 12/19 had a hemorrhagic stroke leaving him with left sided deficits. He was at [facility] this evening and fell out of his wheelchair and hit his head, there was no LOC. Pt can apparently stand with max 2 assist. He also has a RUE PICC for ABX for an apparent right ear infection and a UTI. Pt is alert and oriented x 4.

## 2021-12-31 ENCOUNTER — TELEPHONE (OUTPATIENT)
Dept: SCHEDULING | Facility: IMAGING CENTER | Age: 71
End: 2021-12-31

## 2021-12-31 PROCEDURE — 97530 THERAPEUTIC ACTIVITIES: CPT

## 2021-12-31 PROCEDURE — 97110 THERAPEUTIC EXERCISES: CPT | Mod: CQ

## 2021-12-31 PROCEDURE — A9270 NON-COVERED ITEM OR SERVICE: HCPCS | Performed by: HOSPITALIST

## 2021-12-31 PROCEDURE — A9270 NON-COVERED ITEM OR SERVICE: HCPCS | Performed by: PHYSICAL MEDICINE & REHABILITATION

## 2021-12-31 PROCEDURE — 700101 HCHG RX REV CODE 250: Performed by: PHYSICAL MEDICINE & REHABILITATION

## 2021-12-31 PROCEDURE — 700102 HCHG RX REV CODE 250 W/ 637 OVERRIDE(OP): Performed by: HOSPITALIST

## 2021-12-31 PROCEDURE — 97130 THER IVNTJ EA ADDL 15 MIN: CPT

## 2021-12-31 PROCEDURE — 97129 THER IVNTJ 1ST 15 MIN: CPT

## 2021-12-31 PROCEDURE — 700102 HCHG RX REV CODE 250 W/ 637 OVERRIDE(OP): Performed by: PHYSICAL MEDICINE & REHABILITATION

## 2021-12-31 PROCEDURE — 700111 HCHG RX REV CODE 636 W/ 250 OVERRIDE (IP): Performed by: HOSPITALIST

## 2021-12-31 PROCEDURE — 99232 SBSQ HOSP IP/OBS MODERATE 35: CPT | Performed by: HOSPITALIST

## 2021-12-31 PROCEDURE — 97116 GAIT TRAINING THERAPY: CPT | Mod: CQ

## 2021-12-31 PROCEDURE — 97530 THERAPEUTIC ACTIVITIES: CPT | Mod: CQ

## 2021-12-31 PROCEDURE — 700105 HCHG RX REV CODE 258: Performed by: HOSPITALIST

## 2021-12-31 PROCEDURE — 97535 SELF CARE MNGMENT TRAINING: CPT

## 2021-12-31 PROCEDURE — 770010 HCHG ROOM/CARE - REHAB SEMI PRIVAT*

## 2021-12-31 PROCEDURE — 97112 NEUROMUSCULAR REEDUCATION: CPT | Mod: CQ

## 2021-12-31 RX ADMIN — OMEPRAZOLE 20 MG: 20 CAPSULE, DELAYED RELEASE ORAL at 08:27

## 2021-12-31 RX ADMIN — TRAZODONE HYDROCHLORIDE 50 MG: 50 TABLET ORAL at 21:48

## 2021-12-31 RX ADMIN — Medication 20 ML: at 08:29

## 2021-12-31 RX ADMIN — PIPERACILLIN AND TAZOBACTAM 3.38 G: 3; .375 INJECTION, POWDER, LYOPHILIZED, FOR SOLUTION INTRAVENOUS; PARENTERAL at 23:39

## 2021-12-31 RX ADMIN — LOSARTAN POTASSIUM 100 MG: 25 TABLET, FILM COATED ORAL at 08:27

## 2021-12-31 RX ADMIN — PIPERACILLIN AND TAZOBACTAM 3.38 G: 3; .375 INJECTION, POWDER, LYOPHILIZED, FOR SOLUTION INTRAVENOUS; PARENTERAL at 17:33

## 2021-12-31 RX ADMIN — ACETAMINOPHEN 1000 MG: 500 TABLET, FILM COATED ORAL at 15:05

## 2021-12-31 RX ADMIN — HYDRALAZINE HYDROCHLORIDE 50 MG: 25 TABLET, FILM COATED ORAL at 21:45

## 2021-12-31 RX ADMIN — AMLODIPINE BESYLATE 10 MG: 5 TABLET ORAL at 05:48

## 2021-12-31 RX ADMIN — PIPERACILLIN AND TAZOBACTAM 3.38 G: 3; .375 INJECTION, POWDER, LYOPHILIZED, FOR SOLUTION INTRAVENOUS; PARENTERAL at 11:31

## 2021-12-31 RX ADMIN — PIPERACILLIN AND TAZOBACTAM 3.38 G: 3; .375 INJECTION, POWDER, LYOPHILIZED, FOR SOLUTION INTRAVENOUS; PARENTERAL at 05:44

## 2021-12-31 RX ADMIN — HYDRALAZINE HYDROCHLORIDE 50 MG: 25 TABLET, FILM COATED ORAL at 05:48

## 2021-12-31 RX ADMIN — ATORVASTATIN CALCIUM 40 MG: 40 TABLET, FILM COATED ORAL at 21:45

## 2021-12-31 RX ADMIN — ACETAMINOPHEN 1000 MG: 500 TABLET, FILM COATED ORAL at 21:45

## 2021-12-31 RX ADMIN — SENNOSIDES AND DOCUSATE SODIUM 2 TABLET: 50; 8.6 TABLET ORAL at 08:27

## 2021-12-31 RX ADMIN — MODAFINIL 100 MG: 100 TABLET ORAL at 08:27

## 2021-12-31 RX ADMIN — HYDRALAZINE HYDROCHLORIDE 50 MG: 25 TABLET, FILM COATED ORAL at 15:05

## 2021-12-31 RX ADMIN — Medication 20 ML: at 21:57

## 2021-12-31 RX ADMIN — ACETAMINOPHEN 1000 MG: 500 TABLET, FILM COATED ORAL at 08:27

## 2021-12-31 RX ADMIN — FERROUS SULFATE TAB 325 MG (65 MG ELEMENTAL FE) 325 MG: 325 (65 FE) TAB at 08:27

## 2021-12-31 RX ADMIN — OMEPRAZOLE 20 MG: 20 CAPSULE, DELAYED RELEASE ORAL at 21:45

## 2021-12-31 RX ADMIN — SENNOSIDES AND DOCUSATE SODIUM 2 TABLET: 50; 8.6 TABLET ORAL at 21:45

## 2021-12-31 ASSESSMENT — ACTIVITIES OF DAILY LIVING (ADL)
BED_CHAIR_WHEELCHAIR_TRANSFER_DESCRIPTION: INCREASED TIME;INITIAL PREPARATION FOR TASK;SET-UP OF EQUIPMENT;SUPERVISION FOR SAFETY;VERBAL CUEING
TOILET_TRANSFER_DESCRIPTION: GRAB BAR;INCREASED TIME;SET-UP OF EQUIPMENT;SUPERVISION FOR SAFETY;VERBAL CUEING
TOILET_TRANSFER_DESCRIPTION: GRAB BAR;INCREASED TIME;VERBAL CUEING;SUPERVISION FOR SAFETY
BED_CHAIR_WHEELCHAIR_TRANSFER_DESCRIPTION: ADAPTIVE EQUIPMENT;INCREASED TIME;SUPERVISION FOR SAFETY;VERBAL CUEING
TOILETING_LEVEL_OF_ASSIST_DESCRIPTION: ASSIST FOR HYGIENE;ASSIST TO PULL PANTS UP;ASSIST TO PULL PANTS DOWN;ASSIST FOR STANDING BALANCE;GRAB BAR;INCREASED TIME;SUPERVISION FOR SAFETY;VERBAL CUEING

## 2021-12-31 ASSESSMENT — ENCOUNTER SYMPTOMS
VOMITING: 0
NAUSEA: 0
NERVOUS/ANXIOUS: 0
DIARRHEA: 0
CHILLS: 0
FEVER: 0
SHORTNESS OF BREATH: 0
ABDOMINAL PAIN: 0

## 2021-12-31 ASSESSMENT — GAIT ASSESSMENTS
DEVIATION: INCREASED BASE OF SUPPORT;BRADYKINETIC;DECREASED HEEL STRIKE;DECREASED TOE OFF
DISTANCE (FEET): 20
ASSISTIVE DEVICE: OTHER (COMMENTS)
GAIT LEVEL OF ASSIST: MINIMAL ASSIST

## 2021-12-31 NOTE — THERAPY
"Speech Language Pathology  Daily Treatment     Patient Name: Gokul Baca  Age:  71 y.o., Sex:  male  Medical Record #: [id number]  Today's Date: 12/30/2021     Precautions  Precautions: Fall Risk,Other (See Comments)  Comments: L maida, L neglect, 2 person assist for transfers    Subjective    Patient had a fall yesterday.  He reported that he was reaching for phone.  He was pleasant and cooperative today.     Objective       12/30/21 1031   Cognition   Moderate Attention Minimal (4)   Visual Scanning / Cancellation Skills Minimal (4)   Functional Memory Activities Minimal (4)   Safety Awareness Supervision (5)   Insight into Deficits Supervision (5)   SLP Total Time Spent   SLP Individual Total Time Spent (Mins) 60   Treatment Charges   SLP Cognitive Skill Development First 15 Minutes 1   SLP Cognitive Skill Development Additional 15 Minutes 3       Assessment    Patient participated in BI and CVA education on his current deficits.  Patient verbalized that he understood but benefits from additional reinforcement.  He verbalizes that he understands he must call for assistance for items out of reach any transfers or activities that require him on his feet.  Patient was able to perform single letter visual scanning with 80% acc increasing to 100% with set up for use of light house or \"typwritter carriage\" scanning strategy.  Patient tended to start his scans just left of center then search from center.  Once we discussed scanning strategy he placed his own visual anchor.  We discussed planning and set  Up of body and equipment for toilet transfer, and patient was able to plan his sequence with min A.( 80% acc)    Strengths: Good insight into deficits/needs,Independent prior level of function,Making steady progress towards goals,Pleasant and cooperative,Supportive family,Motivated for self care and independence,Willingly participates in therapeutic activities,Effective communication skills,Able to follow " instructions  Barriers: Impaired functional cognition,Hemiparesis (mild dysarthria)    Plan    Target visual scanning, safety planning and problem solving, recall of safety sequencing.    Passport items to be completed:  Express basic needs, understand food/liquid recommendations, consistently follow swallow precautions, manage finances, manage medications, arrive to therapy appointments on time, complete daily memory log entries, solve problems related to safety situations, review education related to hospitalization, complete caregiver training     Speech Therapy Problems (Active)     Problem: Memory STGs     Dates: Start: 12/25/21       Goal: STG-Within one week, patient will     Dates: Start: 12/25/21       Description: Participate in administration of complete standardized cog-ling evaluation to better determine functional cog-ling needs and better guide POC.      Goal Note filed on 12/29/21 1304 by Annmarie Nguyễn MS,CCC-SLP     Should be completed today with subsequent goals pending results.                Problem: Problem Solving STGs     Dates: Start: 12/25/21       Goal: STG-Within one week, patient will     Dates: Start: 12/25/21       Description: Complete visual scanning tasks (e.g. cancellation tasks, functional reading) with 80% acc. And moderate assistance to attend to left side.     Goal Note filed on 12/29/21 1305 by Annmarie Nguyễn MS,CCC-SLP     Will continue to target.             Goal: STG-Within one week, patient will     Dates: Start: 12/29/21       Description: Patient will demonstrate safety planning, problem solving and sequencing for self care and balance maintenace with 80% acc with min cues to improve.          Problem: Speech/Swallowing LTGs     Dates: Start: 12/25/21       Goal: LTG-By discharge, patient will safely swallow     Dates: Start: 12/25/21       Description: Safest/least restrictive diet to maintain adequate nutrition and hydration.        Goal: LTG-By discharge, patient will      Dates: Start: 12/25/21       Description: Demonstrate independent level of cog-ling functions to ensure safe return to PLOF.          Problem: Swallowing STGs     Dates: Start: 12/25/21

## 2021-12-31 NOTE — PROGRESS NOTES
Hospital Medicine Daily Progress Note      Chief Complaint:  Hypertension  Afib  Fever  Leukocytosis    Interval History:  Pt is excited about using his left hand/arm better and was able to eat a hamburger with it.    Review of Systems  Review of Systems   Constitutional: Negative for chills and fever.   Respiratory: Negative for shortness of breath.    Cardiovascular: Negative for chest pain.   Gastrointestinal: Negative for abdominal pain, diarrhea, nausea and vomiting.   Psychiatric/Behavioral: The patient is not nervous/anxious.         Physical Exam  Temp:  [36.6 °C (97.8 °F)-36.7 °C (98.1 °F)] 36.6 °C (97.8 °F)  Pulse:  [64-70] 69  Resp:  [17-18] 17  BP: (119-136)/(66-73) 131/70  SpO2:  [96 %-97 %] 96 %    Physical Exam  Vitals and nursing note reviewed.   Constitutional:       Appearance: Normal appearance.   HENT:      Head: Atraumatic.   Eyes:      Conjunctiva/sclera: Conjunctivae normal.      Pupils: Pupils are equal, round, and reactive to light.   Cardiovascular:      Rate and Rhythm: Normal rate and regular rhythm.   Pulmonary:      Effort: Pulmonary effort is normal.      Breath sounds: Normal breath sounds.   Abdominal:      General: Bowel sounds are normal.      Palpations: Abdomen is soft.   Musculoskeletal:      Cervical back: Normal range of motion and neck supple.      Right lower leg: Edema present.      Left lower leg: Edema present.      Comments: Has B/L pedal swelling.  Has BUE swelling.   Skin:     General: Skin is warm and dry.   Neurological:      Mental Status: He is alert and oriented to person, place, and time.   Psychiatric:         Mood and Affect: Mood normal.         Behavior: Behavior normal.         Fluids    Intake/Output Summary (Last 24 hours) at 12/31/2021 1003  Last data filed at 12/31/2021 0900  Gross per 24 hour   Intake 380 ml   Output 850 ml   Net -470 ml       Laboratory  Recent Labs     12/28/21  1035   WBC 9.4   RBC 4.17*   HEMOGLOBIN 12.5*   HEMATOCRIT 37.0*   MCV  88.7   MCH 30.0   MCHC 33.8   RDW 46.0   PLATELETCT 264   MPV 9.6                     Assessment/Plan  * Hemorrhagic stroke (HCC)- (present on admission)  Assessment & Plan  Had dysarthria, facial droop, and left hemiparesis  CT: right basal ganglia hemorrhage with mild mass effect  Non-surgical management  Off chronic anticoagulation   On Provigil per Physiatry    Anemia  Assessment & Plan  Hb: 11.8 --> 9.2 (12/27 ; likely error) --> 12.5 (12/28)  Fe: 34, sats 17%  FOB (-)  On Prilosec  On Fe supplements  Monitor    Edema  Assessment & Plan  US LLE: negative DVT  US LUE: the median antecubital vein is incompressible & in the forearm the cephalic vein   US RUE: evidence of acute superficial venous thrombosis in the median cubital vein  On abx for thrombophlebitis  Monitor    Fever- (present on admission)  Assessment & Plan  S/P fever  WBC's: 13.5 --> 11.3 (12/27) --> 9.4 (12/28)  Urine cultures: E. Coli  BC x 2: neg  CXR negative acute  Covod negative  Has thrombophlebitis  Has UTI  Has otitis externa  On Zosyn (thru 1/1)    Right ear pain- (present on admission)  Assessment & Plan  Pain much improved recently  Otoscopic Exam 12/25/21:  TM difficult to visualize due to cerumen, R auditory canal erythematous  Otoscopic exam (12/29): Has some cerumen but no erythema visualized  Concerning for otitis externa  On Zosyn (was also treating UTI and thrombophlebitis)  Note: Cipro Otic gtts not available at this facility    Hypertension- (present on admission)  Assessment & Plan  BP better and less labile recently  On Norvasc  On Cozaar  On Hydralazine --> dose decreased on 12/27  Cont to monitor    Hyperlipemia- (present on admission)  Assessment & Plan  On Lipitor    AF (atrial fibrillation) (HCC)- (present on admission)  Assessment & Plan  HR ok  Not on any rate controlling meds  Anticoagulation presently on hold due to ICH  Note: was on chronic Xarelto  Monitor

## 2021-12-31 NOTE — THERAPY
"Physical Therapy   Daily Treatment     Patient Name: Gokul Baca  Age:  71 y.o., Sex:  male  Medical Record #: [id number]  Today's Date: 12/31/2021     Precautions  Precautions: Fall Risk,Other (See Comments)  Comments: L maida, L neglect    Subjective    Pt looking for his phone upon arrival. \"can you help me find my phone?\"     Objective       12/31/21 1401   Pain   Intervention Declines   Cognition    Level of Consciousness Alert   Ability To Follow Commands 2 Step   Safety Awareness Impaired;Impulsive   Attention Impaired   Gait Functional Level of Assist    Gait Level Of Assist Minimal Assist   Assistive Device Other (Comments)  (R mathur way HR)   Distance (Feet) 20   # of Times Distance was Traveled 1   Deviation Increased Base Of Support;Bradykinetic;Decreased Heel Strike;Decreased Toe Off  (manual cues for posture)   Wheelchair Functional Level of Assist   Wheelchair Assist Stand by Assist   Distance Wheelchair (Feet or Distance) 30   Wheelchair Description Extra time;Limited by fatigue;Impaired coordination;Safety concerns;Supervision for safety  (B LE and R UE only)   Stairs Functional Level of Assist   Level of Assist with Stairs Unable to Participate   # of Stairs Climbed 0   Stairs Description Safety concerns   Transfer Functional Level of Assist   Bed, Chair, Wheelchair Transfer Minimal Assist   Bed Chair Wheelchair Transfer Description Adaptive equipment;Increased time;Supervision for safety;Verbal cueing   Toilet Transfers Minimal Assist   Toilet Transfer Description Grab bar;Increased time;Verbal cueing;Supervision for safety   Supine Lower Body Exercise   Supine Lower Body Exercises Yes   Bridges Two Legged;3 sets of 10   Hip Abduction 3 sets of 10;Hook Lying;Bilateral;Light Resistance Theraband   Hip Adduction  3 sets of 10  (iso ball squeeze)   Straight Leg Raises Front;3 sets of 10;Bilateral   Bed Mobility    Supine to Sit Minimal Assist   Sit to Supine Minimal Assist   Sit to Stand Contact " Guard Assist   Neuro-Muscular Treatments   Neuro-Muscular Treatments Weight Shift Left;Weight Shift Right   Comments STS in front of mirror with focus on upright posture/sequencing/hand placement, manual cues for L knee control   Interdisciplinary Plan of Care Collaboration   IDT Collaboration with  Speech Therapist;Nursing;Certified Nursing Assistant;Family / Caregiver   Patient Position at End of Therapy Seated;Other (Comments)  (transfer care to RN/CNA for toilet transfer)   Collaboration Comments ST, recieved pt after ST, Wife present for end pf session, transfer of care to RN for assistance with toileting    PT Total Time Spent   PT Individual Total Time Spent (Mins) 60   PT Charge Group   PT Gait Training 1   PT Therapeutic Exercise 1   PT Neuromuscular Re-Education / Balance 1   PT Therapeutic Activities 1   Supervising Physical Therapist Alphonso Hemphill       Assessment    The patient tolerated the session well with focus on progression of functional mobility and initiation of supine exercises. Pt requires cues for functional use of L UE/attention. Pt progressed to amb with R hallway HR with Tee for balance and safety-step to pattern   Strengths: Able to follow instructions,Alert and oriented,Independent prior level of function,Motivated for self care and independence,Pleasant and cooperative,Supportive family,Willingly participates in therapeutic activities  Barriers: Decreased endurance,Difficulty following instructions,Hemiplegia,Fatigue,Home accessibility,Impaired activity tolerance,Impaired balance,Impaired carryover of learning,Impaired functional cognition,Impulsive,Limited mobility    Plan    Bed mobility , stand pivot transfer training, neuro re ed, L LE motor control, standing balance, gait with FWW for endurance/ hand rail to focus on gait quality, initiate step in //, initial set up on  L LE     Passport items to be completed:  Get in/out of bed safely, in/out of a vehicle, safely use  mobility device, walk or wheel around home/community, navigate up and down stairs, show how to get up/down from the ground, ensure home is accessible, demonstrate HEP, complete caregiver training    Physical Therapy Problems (Active)     Problem: Balance     Dates: Start: 12/25/21       Goal: STG-Within one week, patient will maintain dynamic sitting x2 minutes without loss of balance.     Dates: Start: 12/25/21             Problem: Mobility     Dates: Start: 12/25/21       Goal: STG-Within one week, patient will ambulate 20ft using maida rail with min A and wheelchair follow.     Dates: Start: 12/25/21             Problem: Mobility Transfers     Dates: Start: 12/25/21       Goal: STG-Within one week, patient will perform bed mobility with mod A using maida techniques.     Dates: Start: 12/25/21          Goal: STG-Within one week, patient will transfer bed to chair with 1 person assist.     Dates: Start: 12/25/21             Problem: PT-Long Term Goals     Dates: Start: 12/25/21       Goal: LTG-By discharge, patient will ambulate 30ft with min A and most appropriate AD.     Dates: Start: 12/25/21          Goal: LTG-By discharge, patient will transfer one surface to another with min A using AD as needed.     Dates: Start: 12/25/21          Goal: LTG-By discharge, patient will ambulate up/down 2 stairs with L ascending rail and min A.     Dates: Start: 12/25/21          Goal: LTG-By discharge, patient will transfer in/out of a car with mod A using AD as needed.     Dates: Start: 12/25/21          Goal: LTG-By discharge, patient will perform bed mobility with supervision using maida techniques.     Dates: Start: 12/25/21

## 2021-12-31 NOTE — THERAPY
Speech Language Pathology  Daily Treatment     Patient Name: Gokul Baca  Age:  71 y.o., Sex:  male  Medical Record #: [id number]  Today's Date: 12/31/2021     Precautions  Precautions: Fall Risk,Other (See Comments)  Comments: L maida, L neglect, 2 person assist for transfers    Subjective    Pt was motivated to participate in this ST session      Objective       12/31/21 0931   SLP Total Time Spent   SLP Individual Total Time Spent (Mins) 60   Treatment Charges   SLP Cognitive Skill Development First 15 Minutes 1   SLP Cognitive Skill Development Additional 15 Minutes 3       Assessment    Pt completed visual scanning tasks requiring pt to locate 1 and 2 targets.  Pt was able to identify the left border of each page independently and then draw a line close to the left margin of the page as a reminder to start scanning on the left side without cues required.  Pt located single targets independently to achieve 95% accuracy and given min cues was able to increase this to 100% accuracy.  Pt located 2 targets independently to achieve approximately 80% accuracy, with min cues for attention was able to increase his accuracy to 100%. Pt was able to follow 1 step written directions independently to achieve 100% accuracy.      Strengths: Good insight into deficits/needs,Independent prior level of function,Making steady progress towards goals,Pleasant and cooperative,Supportive family,Motivated for self care and independence,Willingly participates in therapeutic activities,Effective communication skills,Able to follow instructions  Barriers: Impaired functional cognition,Hemiparesis (mild dysarthria)    Plan    Continue targeting visual scanning and functional reading       Speech Therapy Problems (Active)     Problem: Memory STGs     Dates: Start: 12/25/21       Goal: STG-Within one week, patient will     Dates: Start: 12/25/21       Description: Participate in administration of complete standardized cog-ling evaluation  to better determine functional cog-ling needs and better guide POC.      Goal Note filed on 12/29/21 1309 by Annmarie Nguyễn MS,CCC-SLP     Should be completed today with subsequent goals pending results.                Problem: Problem Solving STGs     Dates: Start: 12/25/21       Goal: STG-Within one week, patient will     Dates: Start: 12/25/21       Description: Complete visual scanning tasks (e.g. cancellation tasks, functional reading) with 80% acc. And moderate assistance to attend to left side.     Goal Note filed on 12/29/21 1303 by Annmarie Nguyễn MS,CCC-SLP     Will continue to target.             Goal: STG-Within one week, patient will     Dates: Start: 12/29/21       Description: Patient will demonstrate safety planning, problem solving and sequencing for self care and balance maintenace with 80% acc with min cues to improve.          Problem: Speech/Swallowing LTGs     Dates: Start: 12/25/21       Goal: LTG-By discharge, patient will safely swallow     Dates: Start: 12/25/21       Description: Safest/least restrictive diet to maintain adequate nutrition and hydration.        Goal: LTG-By discharge, patient will     Dates: Start: 12/25/21       Description: Demonstrate independent level of cog-ling functions to ensure safe return to PLOF.          Problem: Swallowing STGs     Dates: Start: 12/25/21

## 2021-12-31 NOTE — THERAPY
Speech Language Pathology  Daily Treatment     Patient Name: Gokul Baca  Age:  71 y.o., Sex:  male  Medical Record #: [id number]  Today's Date: 12/31/2021     Precautions  Precautions: Fall Risk,Other (See Comments)  Comments: L maida, L neglect    Subjective    Pt waiting for ST with spouse in room. Pleasant and cooperative.      Objective     12/31/21 1334   Cognition   Visual Scanning / Cancellation Skills Minimal (4)   Interdisciplinary Plan of Care Collaboration   Patient Position at End of Therapy Seated;Call Light within Reach;Tray Table within Reach   SLP Total Time Spent   SLP Individual Total Time Spent (Mins) 30   Treatment Charges   SLP Cognitive Skill Development First 15 Minutes 1   SLP Cognitive Skill Development Additional 15 Minutes 1       Assessment    Visual scanning and following 2 step-4 component directives. Pt able to achieve 100% with MIN cues for task attention and extra time to scan left when unable to locate targets on left margin initially. Pt demonstrating good recall of strategies trained previously.     Strengths: Good insight into deficits/needs,Independent prior level of function,Making steady progress towards goals,Pleasant and cooperative,Supportive family,Motivated for self care and independence,Willingly participates in therapeutic activities,Effective communication skills,Able to follow instructions  Barriers: Impaired functional cognition,Hemiparesis (mild dysarthria)    Plan    Continue to target attention, visual scanning.         Speech Therapy Problems (Active)     Problem: Memory STGs     Dates: Start: 12/25/21       Goal: STG-Within one week, patient will     Dates: Start: 12/25/21       Description: Participate in administration of complete standardized cog-ling evaluation to better determine functional cog-ling needs and better guide POC.      Goal Note filed on 12/29/21 2449 by Annmarie Nguyễn MS,CCC-SLP     Should be completed today with subsequent goals pending  results.                Problem: Problem Solving STGs     Dates: Start: 12/25/21       Goal: STG-Within one week, patient will     Dates: Start: 12/25/21       Description: Complete visual scanning tasks (e.g. cancellation tasks, functional reading) with 80% acc. And moderate assistance to attend to left side.     Goal Note filed on 12/29/21 1306 by Annmarie Nguyễn MS,CCC-SLP     Will continue to target.             Goal: STG-Within one week, patient will     Dates: Start: 12/29/21       Description: Patient will demonstrate safety planning, problem solving and sequencing for self care and balance maintenace with 80% acc with min cues to improve.          Problem: Speech/Swallowing LTGs     Dates: Start: 12/25/21       Goal: LTG-By discharge, patient will safely swallow     Dates: Start: 12/25/21       Description: Safest/least restrictive diet to maintain adequate nutrition and hydration.        Goal: LTG-By discharge, patient will     Dates: Start: 12/25/21       Description: Demonstrate independent level of cog-ling functions to ensure safe return to PLOF.          Problem: Swallowing STGs     Dates: Start: 12/25/21

## 2021-12-31 NOTE — PROGRESS NOTES
Received patient during shift change, report rec'd from day shift RN. Resting in bed, VS stable on room air. Per report continent of B&B, max x 2 assist for transfers. A&O x 4, able to make needs known. Bed in low position, call light within reach.

## 2021-12-31 NOTE — THERAPY
Occupational Therapy  Daily Treatment     Patient Name: Gokul Baca  Age:  71 y.o., Sex:  male  Medical Record #: [id number]  Today's Date: 12/31/2021     Precautions  Precautions: (P) Fall Risk,Other (See Comments)  Comments: (P) L maida, L neglect    Safety   ADL Safety : (P) Requires Supervision for Safety,Requires Physical Assist for Safety,Impaired Insight into Safety,Requires Cueing for Safety  Bathroom Safety: (P) Requires Supervision for Safety,Impaired Insight into Safety,Requires Physical Assist for Safety,Requires Cuing for Safety  Comments: (P) see functional levels below for ADL performance details.    Subjective    Pt pleasant and motivated for therapy.     Objective       12/31/21 0831   Precautions   Precautions Fall Risk;Other (See Comments)   Comments L maida, L neglect   Safety    ADL Safety  Requires Supervision for Safety;Requires Physical Assist for Safety;Impaired Insight into Safety;Requires Cueing for Safety   Bathroom Safety Requires Supervision for Safety;Impaired Insight into Safety;Requires Physical Assist for Safety;Requires Cuing for Safety   Comments see functional levels below for ADL performance details.   Cognition    Level of Consciousness Alert   Ability To Follow Commands 2 Step   Safety Awareness Impaired;Impulsive   Attention Impaired   Functional Level of Assist   Grooming Supervision;Seated   Grooming Description Increased time;Set-up of equipment;Supervision for safety;Verbal cueing  (shaving, oral care, washing face and brushing hair)   Upper Body Dressing Moderate Assist   Upper Body Dressing Description Increased time;Set-up of equipment;Supervision for safety;Verbal cueing  (assist to pull shirt overhead and down in back)   Lower Body Dressing Maximal Assist   Lower Body Dressing Description Assist with threading into pant leg;Increased time;Supervision for safety;Set-up of equipment;Verbal cueing  (don pants in supine via rolling)   Toileting Total Assist   Toileting  Description Assist for hygiene;Assist to pull pants up;Assist to pull pants down;Assist for standing balance;Grab bar;Increased time;Supervision for safety;Verbal cueing   Bed, Chair, Wheelchair Transfer Moderate Assist   Bed Chair Wheelchair Transfer Description Increased time;Initial preparation for task;Set-up of equipment;Supervision for safety;Verbal cueing  (SPT, cues for sequencing)   Toilet Transfers Moderate Assist   Toilet Transfer Description Grab bar;Increased time;Set-up of equipment;Supervision for safety;Verbal cueing  (w/c<>toilet SPT via GB)   Bed Mobility    Supine to Sit Minimal Assist   Scooting Minimal Assist   Rolling Moderate Assist to Rt.;Moderate Assist to Lt.   Skilled Intervention Sequencing;Verbal Cuing   Interdisciplinary Plan of Care Collaboration   IDT Collaboration with  Speech Therapist   Patient Position at End of Therapy Seated;Chair Alarm On;Self Releasing Lap Belt Applied;Other (Comments)   Collaboration Comments transfer of care to speech therapy   OT Total Time Spent   OT Individual Total Time Spent (Mins) 60   OT Charge Group   OT Self Care / ADL 3   OT Therapy Activity 1     Pt participated in activities seated at table for RUE functional reach, including:  -reach/grasp/release of rings onto cone 2x10 reps  -stacking/unstacking 6 cones x3 reps    Assessment    Pt demonstrated increased safety and independence with functional transfers and ADLs. He con't to require total A for hygiene and clothing mgt, but able to maintain standing balance and utilize BUEs on GB with 1 person assistance. Pt making gains with LUE neuromuscular return. He con't to require cues for L side attention and visual scanning.   Strengths: Independent prior level of function,Pleasant and cooperative,Supportive family,Willingly participates in therapeutic activities  Barriers: Bladder incontinence,Bowel incontinence,Decreased endurance,Difficulty following instructions,Fatigue,Hemiparesis,Impaired  activity tolerance,Impaired balance,Impaired insight/denial of deficits,Impaired functional cognition,Impulsive,Limited mobility    Plan    ADLs, functional transfers, LUE neuro re-ed, set up on RT-300 (ID: [id number], PIN: [id number]), activities to encourage L side attention.    Occupational Therapy Goals (Active)     Problem: Bathing     Dates: Start: 12/28/21       Goal: STG-Within one week, patient will bathe with min A using AE as needed.     Dates: Start: 12/28/21             Problem: Dressing     Dates: Start: 12/25/21       Goal: STG-Within one week, patient will dress UB     Dates: Start: 12/25/21       Description: 1) Individualized Goal:  at a Min A level with fading cues for L maida technique.     Goal Note filed on 12/28/21 1443 by Tavia Moreira, OT     Requires mod A.                Problem: Functional Transfers     Dates: Start: 12/25/21       Goal: STG-Within one week, patient will transfer to toilet     Dates: Start: 12/25/21       Description: 1) Individualized Goal:  at a Mod A level with AD/DME PRN.     Goal Note filed on 12/28/21 1443 by Tavia Moreira, OT     Con't to require mod-max x2 for safety.               Problem: OT Long Term Goals     Dates: Start: 12/25/21       Goal: LTG-By discharge, patient will complete basic self care tasks     Dates: Start: 12/25/21       Description: 1) Individualized Goal:  at a Min A to Mod I level with AD/AE/DME PRN.        Goal: LTG-By discharge, patient will perform bathroom transfers     Dates: Start: 12/25/21       Description: 1) Individualized Goal:  at a Min A to Mod I level with use of AE/AD/DME PRN.

## 2021-12-31 NOTE — THERAPY
Physical Therapy   Daily Treatment     Patient Name: Gokul Baca  Age:  71 y.o., Sex:  male  Medical Record #: [id number]  Today's Date: 12/30/2021     Precautions  Precautions: Fall Risk,Other (See Comments)  Comments: L maida, L neglect    Subjective    Pt received in bed and agreeable to PT session.     Objective       12/30/21 1301   Pain   Intervention Declines   Cognition    Level of Consciousness Alert   Safety Awareness Impaired;Impulsive   Attention Impaired   Sleep/Wake Cycle   Sleep & Rest Awake   Gait Functional Level of Assist    Gait Level Of Assist Moderate Assist  (min to mod A)   Assistive Device Front Wheel Walker   Distance (Feet) 30  (also 50ft x1 and 10ft x1 in the room)   # of Times Distance was Traveled 2   Deviation Increased Base Of Support;Bradykinetic;Decreased Heel Strike;Decreased Toe Off  (Occasional need for cues for trunk ext)   Transfer Functional Level of Assist   Bed, Chair, Wheelchair Transfer Moderate Assist  (SPT, step by step cues)   Bed Chair Wheelchair Transfer Description Verbal cueing;Increased time;Initial preparation for task;Set-up of equipment   Sitting Lower Body Exercises   Ankle Pumps 1 set of 10   Hip Flexion 1 set of 10   Hip Abduction 1 set of 10   Hip Adduction 1 set of 10   Long Arc Quad 1 set of 10   Hamstring Curl 1 set of 10  (blue TB)   Comments Cues for staying on task.   Bed Mobility    Supine to Sit Minimal Assist   Sit to Supine Minimal Assist   Sit to Stand Contact Guard Assist  (to SBA with FWW)   Interdisciplinary Plan of Care Collaboration   Patient Position at End of Therapy In Bed;Call Light within Reach;Tray Table within Reach;Phone within Reach;Bed Alarm On   PT Total Time Spent   PT Individual Total Time Spent (Mins) 60   PT Charge Group   PT Gait Training 2   PT Therapeutic Exercise 2       Assessment    Pt tolerated therapy session well with introduction to seated HEP using handout and then continued progression of gait with a FWW. Pt is  very pleasant and motivated. He reports he fell out of the wheelchair while reaching for his phone yesterday, so now has a seatbelt alarm.  Strengths: Able to follow instructions,Alert and oriented,Independent prior level of function,Motivated for self care and independence,Pleasant and cooperative,Supportive family,Willingly participates in therapeutic activities  Barriers: Decreased endurance,Difficulty following instructions,Hemiplegia,Fatigue,Home accessibility,Impaired activity tolerance,Impaired balance,Impaired carryover of learning,Impaired functional cognition,Impulsive,Limited mobility    Plan    Bed mobility , transfer training, neuro re ed, L LE motor control, standing balance, gait with FWW, initiate step in //    Passport items to be completed:  Get in/out of bed safely, in/out of a vehicle, safely use mobility device, walk or wheel around home/community, navigate up and down stairs, show how to get up/down from the ground, ensure home is accessible, demonstrate HEP, complete caregiver training    Physical Therapy Problems (Active)     Problem: Balance     Dates: Start: 12/25/21       Goal: STG-Within one week, patient will maintain dynamic sitting x2 minutes without loss of balance.     Dates: Start: 12/25/21             Problem: Mobility     Dates: Start: 12/25/21       Goal: STG-Within one week, patient will ambulate 20ft using maida rail with min A and wheelchair follow.     Dates: Start: 12/25/21             Problem: Mobility Transfers     Dates: Start: 12/25/21       Goal: STG-Within one week, patient will perform bed mobility with mod A using maida techniques.     Dates: Start: 12/25/21          Goal: STG-Within one week, patient will transfer bed to chair with 1 person assist.     Dates: Start: 12/25/21             Problem: PT-Long Term Goals     Dates: Start: 12/25/21       Goal: LTG-By discharge, patient will ambulate 30ft with min A and most appropriate AD.     Dates: Start: 12/25/21           Goal: LTG-By discharge, patient will transfer one surface to another with min A using AD as needed.     Dates: Start: 12/25/21          Goal: LTG-By discharge, patient will ambulate up/down 2 stairs with L ascending rail and min A.     Dates: Start: 12/25/21          Goal: LTG-By discharge, patient will transfer in/out of a car with mod A using AD as needed.     Dates: Start: 12/25/21          Goal: LTG-By discharge, patient will perform bed mobility with supervision using maida techniques.     Dates: Start: 12/25/21

## 2021-12-31 NOTE — CARE PLAN
"The patient is Watcher - Medium risk of patient condition declining or worsening    Shift Goals  Clinical Goals: safety  Patient Goals: sleep well    Progress made toward(s) clinical / shift goals:  Resting in bed after hs meds. Sleeping off and on. Using call light for assist as needed.     [place] Fall risk Assessment Score: 24    High fall risk Interventions   - Alarming seatbelt  - Bed and strip alarm   - Yellow sign by the door   - Yellow wrist band \"Fall risk\"  - Do not leave patient unattended in the bathroom  - Fall risk education provided  - Call light within reach   - Yellow  socks   - Belongings within reach   - Bed in the lowest position       Patient is not progressing towards the following goals:    Problem: Bladder / Voiding  Goal: Patient will establish and maintain regular urinary output  Outcome: Progressing  Condom cath NOC. Incontinent episode of bowel, wiley-care provided.         "

## 2021-12-31 NOTE — CARE PLAN
"The patient is Watcher - Medium risk of patient condition declining or worsening    Shift Goals  Clinical Goals: Safety      Problem: Infection  Goal: Patient will remain free from infection  Note: PICC line has been redressed per protocol and sterile technique was used.     Problem: Fall Risk - Rehab  Goal: Patient will remain free from falls  Note: [place] Fall risk Assessment Score: 17    High fall risk Interventions   - Bed and strip alarm   - Yellow sign by the door   - Yellow wrist band \"Fall risk\"  - Do not leave patient unattended in the bathroom  - Fall risk education provided     "

## 2022-01-01 PROCEDURE — A9270 NON-COVERED ITEM OR SERVICE: HCPCS | Performed by: HOSPITALIST

## 2022-01-01 PROCEDURE — 99232 SBSQ HOSP IP/OBS MODERATE 35: CPT | Performed by: HOSPITALIST

## 2022-01-01 PROCEDURE — A9270 NON-COVERED ITEM OR SERVICE: HCPCS | Performed by: PHYSICAL MEDICINE & REHABILITATION

## 2022-01-01 PROCEDURE — 99232 SBSQ HOSP IP/OBS MODERATE 35: CPT | Performed by: PHYSICAL MEDICINE & REHABILITATION

## 2022-01-01 PROCEDURE — 97129 THER IVNTJ 1ST 15 MIN: CPT

## 2022-01-01 PROCEDURE — 700102 HCHG RX REV CODE 250 W/ 637 OVERRIDE(OP): Performed by: HOSPITALIST

## 2022-01-01 PROCEDURE — 700102 HCHG RX REV CODE 250 W/ 637 OVERRIDE(OP): Performed by: PHYSICAL MEDICINE & REHABILITATION

## 2022-01-01 PROCEDURE — 770010 HCHG ROOM/CARE - REHAB SEMI PRIVAT*

## 2022-01-01 PROCEDURE — 700101 HCHG RX REV CODE 250: Performed by: PHYSICAL MEDICINE & REHABILITATION

## 2022-01-01 PROCEDURE — 97130 THER IVNTJ EA ADDL 15 MIN: CPT

## 2022-01-01 RX ORDER — LANOLIN ALCOHOL/MO/W.PET/CERES
3 CREAM (GRAM) TOPICAL
Status: DISCONTINUED | OUTPATIENT
Start: 2022-01-01 | End: 2022-01-02

## 2022-01-01 RX ORDER — HYDRALAZINE HYDROCHLORIDE 25 MG/1
75 TABLET, FILM COATED ORAL EVERY 8 HOURS
Status: DISCONTINUED | OUTPATIENT
Start: 2022-01-01 | End: 2022-01-02

## 2022-01-01 RX ADMIN — FERROUS SULFATE TAB 325 MG (65 MG ELEMENTAL FE) 325 MG: 325 (65 FE) TAB at 09:00

## 2022-01-01 RX ADMIN — HYDRALAZINE HYDROCHLORIDE 75 MG: 25 TABLET, FILM COATED ORAL at 15:13

## 2022-01-01 RX ADMIN — LOSARTAN POTASSIUM 100 MG: 25 TABLET, FILM COATED ORAL at 08:58

## 2022-01-01 RX ADMIN — MELATONIN TAB 3 MG 3 MG: 3 TAB at 20:47

## 2022-01-01 RX ADMIN — ACETAMINOPHEN 1000 MG: 500 TABLET, FILM COATED ORAL at 20:48

## 2022-01-01 RX ADMIN — ATORVASTATIN CALCIUM 40 MG: 40 TABLET, FILM COATED ORAL at 20:48

## 2022-01-01 RX ADMIN — MODAFINIL 100 MG: 100 TABLET ORAL at 08:59

## 2022-01-01 RX ADMIN — HYDRALAZINE HYDROCHLORIDE 75 MG: 25 TABLET, FILM COATED ORAL at 20:47

## 2022-01-01 RX ADMIN — HYDRALAZINE HYDROCHLORIDE 50 MG: 25 TABLET, FILM COATED ORAL at 05:20

## 2022-01-01 RX ADMIN — ACETAMINOPHEN 1000 MG: 500 TABLET, FILM COATED ORAL at 15:12

## 2022-01-01 RX ADMIN — SENNOSIDES AND DOCUSATE SODIUM 2 TABLET: 50; 8.6 TABLET ORAL at 20:47

## 2022-01-01 RX ADMIN — ACETAMINOPHEN 1000 MG: 500 TABLET, FILM COATED ORAL at 08:57

## 2022-01-01 RX ADMIN — AMLODIPINE BESYLATE 10 MG: 5 TABLET ORAL at 05:19

## 2022-01-01 RX ADMIN — Medication 20 ML: at 09:17

## 2022-01-01 RX ADMIN — SENNOSIDES AND DOCUSATE SODIUM 1 TABLET: 50; 8.6 TABLET ORAL at 08:59

## 2022-01-01 RX ADMIN — OMEPRAZOLE 20 MG: 20 CAPSULE, DELAYED RELEASE ORAL at 08:59

## 2022-01-01 RX ADMIN — OMEPRAZOLE 20 MG: 20 CAPSULE, DELAYED RELEASE ORAL at 20:48

## 2022-01-01 ASSESSMENT — PAIN DESCRIPTION - PAIN TYPE: TYPE: ACUTE PAIN

## 2022-01-01 ASSESSMENT — ENCOUNTER SYMPTOMS
FEVER: 0
NAUSEA: 0
SHORTNESS OF BREATH: 0
HALLUCINATIONS: 0
HEADACHES: 0
PALPITATIONS: 0
VOMITING: 0
DIZZINESS: 0
BLURRED VISION: 0

## 2022-01-01 NOTE — PROGRESS NOTES
PICC removed per order, length 43 cm. Pressure dressing of sterile gauze covered by tegaderm placed. Pt tolerated removal well.

## 2022-01-01 NOTE — THERAPY
"Speech Language Pathology  Daily Treatment     Patient Name: Gokul Baca  Age:  71 y.o., Sex:  male  Medical Record #: [id number]  Today's Date: 1/1/2022     Precautions  Precautions: Fall Risk,Other (See Comments)  Comments: L maida, L neglect    Subjective    Pt seen at bedside; reported he \"did not get any sleep last night\"; pleasant and cooperative during ST     Objective       01/01/22 0901   Interdisciplinary Plan of Care Collaboration   Patient Position at End of Therapy In Bed;Bed Alarm On;Call Light within Reach;Tray Table within Reach;Phone within Reach   SLP Total Time Spent   SLP Individual Total Time Spent (Mins) 60   Treatment Charges   SLP Cognitive Skill Development First 15 Minutes 1   SLP Cognitive Skill Development Additional 15 Minutes 3       Assessment    Provided pt with handouts and education re: left neglect. Pt asked appropriate and relevant questions and demonstrated good understanding of material as evidenced by demonstrating strategies in his current environment (scanning the room to id objects/therapist, maintaining eye contact on the left side, using left hand as a guide, marking and locating marginal line, etc.). Pt recalled activities completed in previous ST sessions indep. Pt located and interpreted daily tx schedule requiring min A and completed simple addition and subtraction problems while implementing visual scanning strategies (hand guide, marginal line, scanning whole page) to target attention and visual scanning. Pt required min cues for attention; however, implemented visual scanning strategies indep. Pt reported he is noticing an improvement with attending to the left side.     Strengths: Good insight into deficits/needs,Independent prior level of function,Making steady progress towards goals,Pleasant and cooperative,Supportive family,Motivated for self care and independence,Willingly participates in therapeutic activities,Effective communication skills,Able to follow " instructions  Barriers: Impaired functional cognition,Hemiparesis (mild dysarthria)    Plan    Continue to target visual scanning, sequencing, and problem solving tasks.    Passport items to be completed:  Express basic needs, understand food/liquid recommendations, consistently follow swallow precautions, manage finances, manage medications, arrive to therapy appointments on time, complete daily memory log entries, solve problems related to safety situations, review education related to hospitalization, complete caregiver training     Speech Therapy Problems (Active)     Problem: Memory STGs     Dates: Start: 12/25/21       Goal: STG-Within one week, patient will     Dates: Start: 12/25/21       Description: Participate in administration of complete standardized cog-ling evaluation to better determine functional cog-ling needs and better guide POC.      Goal Note filed on 12/29/21 1309 by Annmarie Nguyễn MS,CCC-SLP     Should be completed today with subsequent goals pending results.                Problem: Problem Solving STGs     Dates: Start: 12/25/21       Goal: STG-Within one week, patient will     Dates: Start: 12/25/21       Description: Complete visual scanning tasks (e.g. cancellation tasks, functional reading) with 80% acc. And moderate assistance to attend to left side.     Goal Note filed on 12/29/21 1302 by Annmarie Nguyễn MS,CCC-SLP     Will continue to target.             Goal: STG-Within one week, patient will     Dates: Start: 12/29/21       Description: Patient will demonstrate safety planning, problem solving and sequencing for self care and balance maintenace with 80% acc with min cues to improve.          Problem: Speech/Swallowing LTGs     Dates: Start: 12/25/21       Goal: LTG-By discharge, patient will safely swallow     Dates: Start: 12/25/21       Description: Safest/least restrictive diet to maintain adequate nutrition and hydration.        Goal: LTG-By discharge, patient will     Dates:  Start: 12/25/21       Description: Demonstrate independent level of cog-ling functions to ensure safe return to PLOF.          Problem: Swallowing STGs     Dates: Start: 12/25/21

## 2022-01-01 NOTE — PROGRESS NOTES
"Rehab Progress Note     CC: Left sided weakness    Interval Events (Subjective): Seen in room sitting up in bed  Psych: reports bad sleep last night due to the blue light in room and noise  Neuro: patient reporting he is aware of his left neglect and working on it. He reports therapy and spouse sitting on left side and he is noticing them. Reports headache from excess heat in room overnight but has since resolved with better temperature control of the room  GI: continent, daily BM  : doing well with no pain with urination    Not taking any IV meds.    Objective:  Vitals: /82   Pulse 79   Temp 36.8 °C (98.2 °F) (Temporal)   Resp 16   Ht 1.664 m (5' 5.51\")   Wt 116 kg (255 lb 11.7 oz)   SpO2 95%   Gen: NAD, Body mass index is 41.89 kg/m².  HEENT:  NC/AT, PERRLA, moist mucous membranes  Cardio: RRR, no mumurs  Pulm: CTAB, with normal respiratory effort  Abd: Soft NTND, active bowel sounds,   Ext: No peripheral edema. No calf tenderness. No clubbing/cyanosis. +dorsal pedalis pulses bilaterally.      No results found for this or any previous visit (from the past 72 hour(s)).    Current Facility-Administered Medications   Medication Frequency   • hydrALAZINE (APRESOLINE) tablet 75 mg Q8HRS   • ferrous sulfate tablet 325 mg QDAY with Breakfast   • modafinil (PROVIGIL) tablet 100 mg QAM   • omeprazole (PRILOSEC) capsule 20 mg BID   • melatonin tablet 3 mg QHS PRN   • normal saline (PF) 20 mL Q12HRS   • Respiratory Therapy Consult Continuous RT   • Pharmacy Consult Request ...Pain Management Review 1 Each PHARMACY TO DOSE   • acetaminophen (Tylenol) tablet 650 mg Q4HRS PRN   • docusate sodium (ENEMEEZ) enema 283 mg QDAY PRN   • sodium phosphate (Fleet) enema 133 mL QDAY PRN   • artificial tears ophthalmic solution 1 Drop PRN   • benzocaine-menthol (CEPACOL) lozenge 1 Lozenge Q2HRS PRN   • mag hydrox-al hydrox-simeth (MAALOX PLUS ES or MYLANTA DS) suspension 20 mL Q2HRS PRN   • ondansetron (ZOFRAN ODT) dispertab " 4 mg 4X/DAY PRN    Or   • ondansetron (ZOFRAN) syringe/vial injection 4 mg 4X/DAY PRN   • traZODone (DESYREL) tablet 50 mg QHS PRN   • sodium chloride (OCEAN) 0.65 % nasal spray 2 Spray PRN   • midazolam (VERSED) 5 mg/mL (1 mL vial) PRN   • senna-docusate (PERICOLACE or SENOKOT S) 8.6-50 MG per tablet 2 Tablet BID    And   • polyethylene glycol/lytes (MIRALAX) PACKET 1 Packet QDAY PRN    And   • magnesium hydroxide (MILK OF MAGNESIA) suspension 30 mL QDAY PRN    And   • bisacodyl (DULCOLAX) suppository 10 mg QDAY PRN   • amLODIPine (NORVASC) tablet 10 mg Q DAY   • losartan (COZAAR) tablet 100 mg DAILY   • hydrALAZINE (APRESOLINE) tablet 10 mg TID PRN   • cloNIDine (CATAPRES) tablet 0.1 mg TID PRN   • acetaminophen (TYLENOL) tablet 1,000 mg TID   • butalbital/apap/caffeine -40 mg (Fioricet) per tablet 1 Tablet Q6HRS PRN   • atorvastatin (LIPITOR) tablet 40 mg Q EVENING       Orders Placed This Encounter   Procedures   • Diet Order Diet: Regular (solids cut up into bite size pieces, No Straw; t-dine); Tray Modifications (optional): No Straws     Standing Status:   Standing     Number of Occurrences:   1     Order Specific Question:   Diet:     Answer:   Regular [1]     Comments:   solids cut up into bite size pieces, No Straw; t-dine     Order Specific Question:   Tray Modifications (optional)     Answer:   No Straws       Assessment:  Active Hospital Problems    Diagnosis    • *Hemorrhagic stroke (HCC)    • Anemia    • Hypokalemia    • Thrombocytopenia (HCC)    • Fever    • Left-sided neglect    • Edema    • Impaired mobility and ADLs    • Right ear pain    • Morbid obesity with BMI of 40.0-44.9, adult (HCC)    • GERD (gastroesophageal reflux disease)    • AF (atrial fibrillation) (HCC)    • Hyperlipemia    • Hypertension        Medical Decision Making and Plan:  1/1:  #Impaired sleep:   good sleep hygiene with lights on/window shade up during the day, out of bed and in chair for meals, and in bed with lights  off and minimal interruptions at night.  Ordered melatonin 3mg QHS and discontinued PRN  Continue trazodone 50mg QHS PRN insomnia    #IV Access  Not requiring IV medications so discontinued PICC line    Continue:    Large right basal ganglia hemorrhagic stroke  Left hemiparesis  Left visual field cut  Left neglect  Cognitive impairment  -Continue comprehensive acute inpatient rehabilitation  PT/OT/SLP, 1 hr each discipline, 5 days per week  -Atorvastatin 40 mg nightly for secondary prevention  -Per neurology okay to restart Xarelto in 2 weeks, so we will check a CT scan first, patient and wife not interested in restarting  -Patient had fall on 12/30 resulting in head strike, CT was ordered showed no significant change if not improvement in hemorrhage with surrounding edema and mild increase in midline shift. Neurosurgery was contacted and recommended no change in management.  Outpatient follow-up with stroke bridge, Dr. Figueroa, referrals made     Sedation  Discontinued melatonin  Discontinued gabapentin  Discontinued PRN hydralazine  Start Provigil  12/27: Sent to ED to check Stat CT, resolution of AMS in ED, CT head not completed      Headache, resolved   Likely from brain swelling  Continue scheduled Tylenol, dose increased  As needed Fioricet, last use never  Continue salt tabs 3 mg TID to keep sodium above 140     Hypertension  Amlodipine 10 mg  Losartan 100 mg  Increased dose of hydralazine 25 mg every 8 to 50 mg every 8, increased again to 75 mg every 8 hours  As needed clonidine for systolic blood pressures over 160  Appreciate hospitalist assistance     Paroxysmal atrial fibrillation  Xarelto currently on hold for 2 weeks per neurology  May benefit from beta-blocker, per review of outpatient notes has been on metoprolol and amiodarone in the past  Appreciate hospitalist assistance     Edema  Previously on IV Lasix  Checked Doppler ultrasound of the left upper extremity and left lower extremity, lower  extremities negative for DVT     Left arm thrombophlebitis  Right arm superficial thrombus  Initially started on Keflex, now switched to IV antibiotics, stop date is 1/1   No need for full anti-coagulation, which also would be contra-indicated     Fever, resolved  Checked UA - positiive and chest x-ray - megative  Urine culture pending  Checked blood cultures - NTG  Was started on Keflex for thrombophlebitis, now switched to IV antibiotics  Appreciate hospitalist assistance     Anemia  Mild  Monitor with weekly labs     GERD  Omeprazole     Insomnia, continues  Melatonin and hydralazine discontinued due to sedation     Morbid obesity  Due to excess calories  BMI 41.89  Outpatient nutritional counseling     Right ear pain  Otitis externa  Evaluate with otoscope  On IV antibiotics, continues to need IV access   Appreciate hospitalist assistance     Bowel program  Continue bowel medications  Last BM 12/26     Bladder program  Check PVRs - 33  Bladder scan for no voids  ICP for over 400 cc  Scheduled toileting     DVT prophylaxis  Contraindicated given hemorrhage.   Compression stockings on in am, off in pm.  Increase mobility. Monitor for swelling.    Total time:  27 minutes.  I spent greater than 50% of the time for patient care and coordination on this date, including unit/floor time, and face-to-face time with the patient as per assessment and plan above including right intracranial hemorrhage    Arden Pfeiffer D.O.

## 2022-01-01 NOTE — CARE PLAN
"  Problem: Pain - Standard  Goal: Alleviation of pain or a reduction in pain to the patient’s comfort goal  Note: Pain is manageable with scheduled Tylenol at hs.Repositioned with pillows for comfort.Will continue to monitor and assess pain level and medicate as needed.     Problem: Fall Risk - Rehab  Goal: Patient will remain free from falls  Note: [place] Fall risk Assessment Score: 17  High fall risk Interventions     - Alarming seatbelt  - Bed and strip alarm   - Yellow sign by the door   - Yellow wrist band \"Fall risk\"  - Room near to the nurse station  - Do not leave patient unattended in the bathroom  - Fall risk education provided        Problem: Bowel Elimination  Goal: Patient will participate in bowel management program  Note: Scheduled senna given at hs.Incontinent per report.LBM 12/31.Will continue to monitor.         "

## 2022-01-01 NOTE — PROGRESS NOTES
Hospital Medicine Daily Progress Note      Chief Complaint:  Hypertension  Afib  Fever  Leukocytosis    Interval History:  Didn't sleep well last night and is tired today.    Review of Systems  Review of Systems   Constitutional: Negative for fever.   Eyes: Negative for blurred vision.   Respiratory: Negative for shortness of breath.    Cardiovascular: Negative for palpitations.   Gastrointestinal: Negative for nausea and vomiting.   Neurological: Negative for dizziness and headaches.   Psychiatric/Behavioral: Negative for hallucinations.        Physical Exam  Temp:  [36.3 °C (97.3 °F)-36.8 °C (98.2 °F)] 36.8 °C (98.2 °F)  Pulse:  [69-79] 79  Resp:  [16-18] 16  BP: (138-150)/(70-85) 150/82  SpO2:  [95 %] 95 %    Physical Exam  Vitals and nursing note reviewed.   Constitutional:       General: He is not in acute distress.  HENT:      Mouth/Throat:      Pharynx: No oropharyngeal exudate or posterior oropharyngeal erythema.   Eyes:      General: No scleral icterus.  Cardiovascular:      Rate and Rhythm: Normal rate and regular rhythm.   Pulmonary:      Effort: Pulmonary effort is normal.      Breath sounds: No wheezing or rales.   Abdominal:      General: Bowel sounds are normal.      Palpations: Abdomen is soft.   Musculoskeletal:      Right lower leg: Edema present.      Left lower leg: Edema present.      Comments: Has B/L pedal swelling.  Has BUE swelling.   Skin:     General: Skin is warm and dry.   Neurological:      Mental Status: He is alert and oriented to person, place, and time.   Psychiatric:         Mood and Affect: Mood normal.         Behavior: Behavior normal.         Fluids    Intake/Output Summary (Last 24 hours) at 1/1/2022 1023  Last data filed at 1/1/2022 0900  Gross per 24 hour   Intake 940 ml   Output 900 ml   Net 40 ml       Laboratory                      Assessment/Plan  * Hemorrhagic stroke (HCC)- (present on admission)  Assessment & Plan  Had dysarthria, facial droop, and left  hemiparesis  CT: right basal ganglia hemorrhage with mild mass effect  Non-surgical management  Off chronic anticoagulation   On Provigil per Physiatry    Anemia  Assessment & Plan  Hb: 11.8 --> 9.2 (12/27 ; likely error) --> 12.5 (12/28)  Fe: 34, sats 17%  FOB (-)  On Prilosec  On Fe supplements  Monitor    Edema  Assessment & Plan  US LLE: negative DVT  US LUE: the median antecubital vein is incompressible & in the forearm the cephalic vein   US RUE: evidence of acute superficial venous thrombosis in the median cubital vein  On abx for thrombophlebitis  Monitor    Fever- (present on admission)  Assessment & Plan  S/P fever  WBC's: 13.5 --> 11.3 (12/27) --> 9.4 (12/28)  Urine cultures: E. Coli  BC x 2: neg  CXR negative acute  Covod negative  Had thrombophlebitis  Had UTI  Had otitis externa  S/P Zosyn (1/1)    Right ear pain- (present on admission)  Assessment & Plan  Pain much improved recently  Otoscopic Exam 12/25/21:  TM difficult to visualize due to cerumen, R auditory canal erythematous  Otoscopic exam (12/29): Has some cerumen but no erythema visualized  Concerning for otitis externa  On Zosyn (was also treating UTI and thrombophlebitis)  Note: Cipro Otic gtts not available at this facility    Hypertension- (present on admission)  Assessment & Plan  BP a little elevated recently  On Norvasc  On Cozaar  On Hydralazine --> will increase dose a little  Cont to monitor    Hyperlipemia- (present on admission)  Assessment & Plan  On Lipitor    AF (atrial fibrillation) (HCC)- (present on admission)  Assessment & Plan  HR ok  Not on any rate controlling meds  Anticoagulation presently on hold due to ICH  Note: was on chronic Xarelto  Monitor

## 2022-01-02 LAB
APPEARANCE UR: CLEAR
BACTERIA #/AREA URNS HPF: ABNORMAL /HPF
BILIRUB UR QL STRIP.AUTO: NEGATIVE
COLOR UR: YELLOW
EPI CELLS #/AREA URNS HPF: NEGATIVE /HPF
ERYTHROCYTE [DISTWIDTH] IN BLOOD BY AUTOMATED COUNT: 46.1 FL (ref 35.9–50)
GLUCOSE UR STRIP.AUTO-MCNC: NEGATIVE MG/DL
HCT VFR BLD AUTO: 38.3 % (ref 42–52)
HGB BLD-MCNC: 12.6 G/DL (ref 14–18)
HYALINE CASTS #/AREA URNS LPF: ABNORMAL /LPF
KETONES UR STRIP.AUTO-MCNC: NEGATIVE MG/DL
LEUKOCYTE ESTERASE UR QL STRIP.AUTO: ABNORMAL
MCH RBC QN AUTO: 29.1 PG (ref 27–33)
MCHC RBC AUTO-ENTMCNC: 32.9 G/DL (ref 33.7–35.3)
MCV RBC AUTO: 88.5 FL (ref 81.4–97.8)
MICRO URNS: ABNORMAL
NITRITE UR QL STRIP.AUTO: NEGATIVE
PH UR STRIP.AUTO: 5 [PH] (ref 5–8)
PLATELET # BLD AUTO: 337 K/UL (ref 164–446)
PMV BLD AUTO: 8.7 FL (ref 9–12.9)
PROT UR QL STRIP: NEGATIVE MG/DL
RBC # BLD AUTO: 4.33 M/UL (ref 4.7–6.1)
RBC # URNS HPF: ABNORMAL /HPF
RBC UR QL AUTO: NEGATIVE
SP GR UR STRIP.AUTO: 1.02
UROBILINOGEN UR STRIP.AUTO-MCNC: 0.2 MG/DL
WBC # BLD AUTO: 11.7 K/UL (ref 4.8–10.8)
WBC #/AREA URNS HPF: ABNORMAL /HPF

## 2022-01-02 PROCEDURE — 97130 THER IVNTJ EA ADDL 15 MIN: CPT

## 2022-01-02 PROCEDURE — 700111 HCHG RX REV CODE 636 W/ 250 OVERRIDE (IP): Performed by: STUDENT IN AN ORGANIZED HEALTH CARE EDUCATION/TRAINING PROGRAM

## 2022-01-02 PROCEDURE — 85027 COMPLETE CBC AUTOMATED: CPT

## 2022-01-02 PROCEDURE — 700102 HCHG RX REV CODE 250 W/ 637 OVERRIDE(OP): Performed by: HOSPITALIST

## 2022-01-02 PROCEDURE — A9270 NON-COVERED ITEM OR SERVICE: HCPCS | Performed by: PHYSICAL MEDICINE & REHABILITATION

## 2022-01-02 PROCEDURE — 700102 HCHG RX REV CODE 250 W/ 637 OVERRIDE(OP): Performed by: PHYSICAL MEDICINE & REHABILITATION

## 2022-01-02 PROCEDURE — 97112 NEUROMUSCULAR REEDUCATION: CPT

## 2022-01-02 PROCEDURE — A9270 NON-COVERED ITEM OR SERVICE: HCPCS

## 2022-01-02 PROCEDURE — 99232 SBSQ HOSP IP/OBS MODERATE 35: CPT | Performed by: HOSPITALIST

## 2022-01-02 PROCEDURE — 81001 URINALYSIS AUTO W/SCOPE: CPT

## 2022-01-02 PROCEDURE — 97530 THERAPEUTIC ACTIVITIES: CPT

## 2022-01-02 PROCEDURE — 87086 URINE CULTURE/COLONY COUNT: CPT

## 2022-01-02 PROCEDURE — 700105 HCHG RX REV CODE 258: Performed by: STUDENT IN AN ORGANIZED HEALTH CARE EDUCATION/TRAINING PROGRAM

## 2022-01-02 PROCEDURE — A9270 NON-COVERED ITEM OR SERVICE: HCPCS | Performed by: HOSPITALIST

## 2022-01-02 PROCEDURE — 700102 HCHG RX REV CODE 250 W/ 637 OVERRIDE(OP)

## 2022-01-02 PROCEDURE — 97032 APPL MODALITY 1+ESTIM EA 15: CPT

## 2022-01-02 PROCEDURE — 99232 SBSQ HOSP IP/OBS MODERATE 35: CPT | Performed by: PHYSICAL MEDICINE & REHABILITATION

## 2022-01-02 PROCEDURE — 87186 SC STD MICRODIL/AGAR DIL: CPT

## 2022-01-02 PROCEDURE — 770010 HCHG ROOM/CARE - REHAB SEMI PRIVAT*

## 2022-01-02 PROCEDURE — 97129 THER IVNTJ 1ST 15 MIN: CPT

## 2022-01-02 PROCEDURE — 36415 COLL VENOUS BLD VENIPUNCTURE: CPT

## 2022-01-02 PROCEDURE — 87077 CULTURE AEROBIC IDENTIFY: CPT

## 2022-01-02 RX ORDER — CEFTRIAXONE 2 G/1
INJECTION, POWDER, FOR SOLUTION INTRAMUSCULAR; INTRAVENOUS
Status: ACTIVE
Start: 2022-01-02 | End: 2022-01-03

## 2022-01-02 RX ORDER — LANOLIN ALCOHOL/MO/W.PET/CERES
CREAM (GRAM) TOPICAL
Status: COMPLETED
Start: 2022-01-02 | End: 2022-01-02

## 2022-01-02 RX ORDER — LANOLIN ALCOHOL/MO/W.PET/CERES
3 CREAM (GRAM) TOPICAL
Status: DISCONTINUED | OUTPATIENT
Start: 2022-01-02 | End: 2022-01-03

## 2022-01-02 RX ORDER — HYDRALAZINE HYDROCHLORIDE 25 MG/1
50 TABLET, FILM COATED ORAL EVERY 8 HOURS
Status: DISCONTINUED | OUTPATIENT
Start: 2022-01-02 | End: 2022-01-06 | Stop reason: HOSPADM

## 2022-01-02 RX ORDER — SODIUM CHLORIDE 9 MG/ML
INJECTION, SOLUTION INTRAVENOUS
Status: ACTIVE
Start: 2022-01-02 | End: 2022-01-03

## 2022-01-02 RX ADMIN — FERROUS SULFATE TAB 325 MG (65 MG ELEMENTAL FE) 325 MG: 325 (65 FE) TAB at 08:18

## 2022-01-02 RX ADMIN — MELATONIN TAB 3 MG 3 MG: 3 TAB at 18:15

## 2022-01-02 RX ADMIN — MODAFINIL 100 MG: 100 TABLET ORAL at 08:18

## 2022-01-02 RX ADMIN — AMLODIPINE BESYLATE 10 MG: 5 TABLET ORAL at 05:38

## 2022-01-02 RX ADMIN — ATORVASTATIN CALCIUM 40 MG: 40 TABLET, FILM COATED ORAL at 20:11

## 2022-01-02 RX ADMIN — OMEPRAZOLE 20 MG: 20 CAPSULE, DELAYED RELEASE ORAL at 20:11

## 2022-01-02 RX ADMIN — ACETAMINOPHEN 1000 MG: 500 TABLET, FILM COATED ORAL at 08:25

## 2022-01-02 RX ADMIN — SENNOSIDES AND DOCUSATE SODIUM 2 TABLET: 50; 8.6 TABLET ORAL at 08:18

## 2022-01-02 RX ADMIN — OMEPRAZOLE 20 MG: 20 CAPSULE, DELAYED RELEASE ORAL at 08:18

## 2022-01-02 RX ADMIN — SENNOSIDES AND DOCUSATE SODIUM 2 TABLET: 50; 8.6 TABLET ORAL at 20:11

## 2022-01-02 RX ADMIN — CEFTRIAXONE SODIUM 2 G: 2 INJECTION, POWDER, FOR SOLUTION INTRAMUSCULAR; INTRAVENOUS at 20:15

## 2022-01-02 RX ADMIN — HYDRALAZINE HYDROCHLORIDE 50 MG: 25 TABLET, FILM COATED ORAL at 22:07

## 2022-01-02 RX ADMIN — HYDRALAZINE HYDROCHLORIDE 75 MG: 25 TABLET, FILM COATED ORAL at 05:38

## 2022-01-02 RX ADMIN — HYDRALAZINE HYDROCHLORIDE 50 MG: 25 TABLET, FILM COATED ORAL at 14:44

## 2022-01-02 ASSESSMENT — ENCOUNTER SYMPTOMS
FEVER: 0
DIARRHEA: 0
BLURRED VISION: 0
DIZZINESS: 0
NERVOUS/ANXIOUS: 0
COUGH: 0

## 2022-01-02 ASSESSMENT — PAIN DESCRIPTION - PAIN TYPE: TYPE: ACUTE PAIN

## 2022-01-02 ASSESSMENT — FIBROSIS 4 INDEX: FIB4 SCORE: 0.95

## 2022-01-02 NOTE — PROGRESS NOTES
Handoff shift report given to SLICK Haines. POC discussed. Pt is as;eep in bed w/ no s/s distress noted. Call light and bedside table w/in reach.

## 2022-01-02 NOTE — CARE PLAN
The patient is Stable - Low risk of patient condition declining or worsening    Shift Goals: Safety  Clinical Goals: sleep  Patient Goals: sleep well    Pt reports sleeping well last night.      Problem: Bladder / Voiding  Goal: Patient will establish and maintain regular urinary output  Outcome: Progressing: Voids to urinal. Continent.     Problem: Infection  Goal: Patient will remain free from infection  Outcome: Progressing: WBC's 11.7, pt afebrile. Urine collected for UA and labeled for lab.

## 2022-01-02 NOTE — PROGRESS NOTES
Shift report received from CORNELIA Florentino RN, POC discussed. Pt is awake in bed w/ no s/s distress noted. Pt reports he slept well last night. Call light and bedside table w/in reach. Monitoring in progress.

## 2022-01-02 NOTE — PROGRESS NOTES
"Rehab Progress Note     CC: Left sided weakness    Interval Events (Subjective): Seen in room laying in bed dozing off. Easily awoken to voice  Psych: reports bad sleep last night due racing thoughts - he would like to take the melatonin earlier - he takes benadryl at home to sleep  GI: continent, daily BM  : doing well with no pain with urination  MSK: denies pain    Objective:  Vitals: /68   Pulse 78   Temp 36.8 °C (98.3 °F) (Temporal)   Resp 16   Ht 1.664 m (5' 5.51\")   Wt 116 kg (255 lb 11.7 oz)   SpO2 95%   Gen: NAD, Body mass index is 41.89 kg/m².  HEENT:  NC/AT, PERRLA, moist mucous membranes  Cardio: RRR, no mumurs  Pulm: CTAB, with normal respiratory effort  Abd: Soft NTND, active bowel sounds,   Ext: No peripheral edema. No calf tenderness. No clubbing/cyanosis. +dorsal pedalis pulses bilaterally.      Recent Results (from the past 72 hour(s))   CBC WITHOUT DIFFERENTIAL    Collection Time: 01/02/22  5:44 AM   Result Value Ref Range    WBC 11.7 (H) 4.8 - 10.8 K/uL    RBC 4.33 (L) 4.70 - 6.10 M/uL    Hemoglobin 12.6 (L) 14.0 - 18.0 g/dL    Hematocrit 38.3 (L) 42.0 - 52.0 %    MCV 88.5 81.4 - 97.8 fL    MCH 29.1 27.0 - 33.0 pg    MCHC 32.9 (L) 33.7 - 35.3 g/dL    RDW 46.1 35.9 - 50.0 fL    Platelet Count 337 164 - 446 K/uL    MPV 8.7 (L) 9.0 - 12.9 fL       Current Facility-Administered Medications   Medication Frequency   • melatonin tablet 3 mg QHS   • hydrALAZINE (APRESOLINE) tablet 75 mg Q8HRS   • ferrous sulfate tablet 325 mg QDAY with Breakfast   • modafinil (PROVIGIL) tablet 100 mg QAM   • omeprazole (PRILOSEC) capsule 20 mg BID   • Respiratory Therapy Consult Continuous RT   • Pharmacy Consult Request ...Pain Management Review 1 Each PHARMACY TO DOSE   • acetaminophen (Tylenol) tablet 650 mg Q4HRS PRN   • docusate sodium (ENEMEEZ) enema 283 mg QDAY PRN   • sodium phosphate (Fleet) enema 133 mL QDAY PRN   • artificial tears ophthalmic solution 1 Drop PRN   • benzocaine-menthol (CEPACOL) " lozenge 1 Lozenge Q2HRS PRN   • mag hydrox-al hydrox-simeth (MAALOX PLUS ES or MYLANTA DS) suspension 20 mL Q2HRS PRN   • ondansetron (ZOFRAN ODT) dispertab 4 mg 4X/DAY PRN    Or   • ondansetron (ZOFRAN) syringe/vial injection 4 mg 4X/DAY PRN   • traZODone (DESYREL) tablet 50 mg QHS PRN   • sodium chloride (OCEAN) 0.65 % nasal spray 2 Spray PRN   • midazolam (VERSED) 5 mg/mL (1 mL vial) PRN   • senna-docusate (PERICOLACE or SENOKOT S) 8.6-50 MG per tablet 2 Tablet BID    And   • polyethylene glycol/lytes (MIRALAX) PACKET 1 Packet QDAY PRN    And   • magnesium hydroxide (MILK OF MAGNESIA) suspension 30 mL QDAY PRN    And   • bisacodyl (DULCOLAX) suppository 10 mg QDAY PRN   • amLODIPine (NORVASC) tablet 10 mg Q DAY   • losartan (COZAAR) tablet 100 mg DAILY   • hydrALAZINE (APRESOLINE) tablet 10 mg TID PRN   • cloNIDine (CATAPRES) tablet 0.1 mg TID PRN   • acetaminophen (TYLENOL) tablet 1,000 mg TID   • butalbital/apap/caffeine -40 mg (Fioricet) per tablet 1 Tablet Q6HRS PRN   • atorvastatin (LIPITOR) tablet 40 mg Q EVENING       Orders Placed This Encounter   Procedures   • Diet Order Diet: Regular (solids cut up into bite size pieces, No Straw; t-dine); Tray Modifications (optional): No Straws     Standing Status:   Standing     Number of Occurrences:   1     Order Specific Question:   Diet:     Answer:   Regular [1]     Comments:   solids cut up into bite size pieces, No Straw; t-dine     Order Specific Question:   Tray Modifications (optional)     Answer:   No Straws       Assessment:  Active Hospital Problems    Diagnosis    • *Hemorrhagic stroke (HCC)    • Anemia    • Hypokalemia    • Thrombocytopenia (HCC)    • Fever    • Left-sided neglect    • Edema    • Impaired mobility and ADLs    • Right ear pain    • Morbid obesity with BMI of 40.0-44.9, adult (HCC)    • GERD (gastroesophageal reflux disease)    • AF (atrial fibrillation) (HCC)    • Hyperlipemia    • Hypertension        Medical Decision Making and  Plan:  1/1:  #Impaired sleep:   good sleep hygiene with lights on/window shade up during the day, out of bed and in chair for meals, and in bed with lights off and minimal interruptions at night.  Ordered time change for melatonin 3mg QHS  Continue trazodone 50mg QHS PRN insomnia  Could benefit from psychology    Continue:    Large right basal ganglia hemorrhagic stroke  Left hemiparesis  Left visual field cut  Left neglect  Cognitive impairment  -Continue comprehensive acute inpatient rehabilitation  PT/OT/SLP, 1 hr each discipline, 5 days per week  -Atorvastatin 40 mg nightly for secondary prevention  -Per neurology okay to restart Xarelto in 2 weeks, so we will check a CT scan first, patient and wife not interested in restarting  -Patient had fall on 12/30 resulting in head strike, CT was ordered showed no significant change if not improvement in hemorrhage with surrounding edema and mild increase in midline shift. Neurosurgery was contacted and recommended no change in management.  Outpatient follow-up with stroke bridge, Dr. Figueroa, referrals made     Sedation  Discontinued melatonin  Discontinued gabapentin  Discontinued PRN hydralazine  Start Provigil  12/27: Sent to ED to check Stat CT, resolution of AMS in ED, CT head not completed      Headache, resolved   Likely from brain swelling  Continue scheduled Tylenol, dose increased  As needed Fioricet, last use never  Continue salt tabs 3 mg TID to keep sodium above 140     Hypertension  Amlodipine 10 mg  Losartan 100 mg  Increased dose of hydralazine 25 mg every 8 to 50 mg every 8, increased again to 75 mg every 8 hours  As needed clonidine for systolic blood pressures over 160  Appreciate hospitalist assistance     Paroxysmal atrial fibrillation  Xarelto currently on hold for 2 weeks per neurology  May benefit from beta-blocker, per review of outpatient notes has been on metoprolol and amiodarone in the past  Appreciate hospitalist  assistance     Edema  Previously on IV Lasix  Checked Doppler ultrasound of the left upper extremity and left lower extremity, lower extremities negative for DVT     Left arm thrombophlebitis  Right arm superficial thrombus  Initially started on Keflex, now switched to IV antibiotics, stop date is 1/1   No need for full anti-coagulation, which also would be contra-indicated     Fever, resolved  Checked UA - positiive and chest x-ray - megative  Urine culture pending  Checked blood cultures - NTG  Was started on Keflex for thrombophlebitis, now switched to IV antibiotics  Appreciate hospitalist assistance     Anemia  Mild  Monitor with weekly labs     GERD  Omeprazole     Insomnia, continues  Melatonin and hydralazine discontinued due to sedation     Morbid obesity  Due to excess calories  BMI 41.89  Outpatient nutritional counseling     Right ear pain  Otitis externa  Evaluate with otoscope  On IV antibiotics, continues to need IV access   Appreciate hospitalist assistance     Bowel program  Continue bowel medications  Last BM 12/26     Bladder program  Check PVRs - 33  Bladder scan for no voids  ICP for over 400 cc  Scheduled toileting     DVT prophylaxis  Contraindicated given hemorrhage.   Compression stockings on in am, off in pm.  Increase mobility. Monitor for swelling.    Total time:  26 minutes.  I spent greater than 50% of the time for patient care and coordination on this date, including unit/floor time, and face-to-face time with the patient as per assessment and plan    Arden Pfeiffer D.O.

## 2022-01-02 NOTE — THERAPY
Occupational Therapy  Daily Treatment     Patient Name: Gokul Baca  Age:  71 y.o., Sex:  male  Medical Record #: [id number]  Today's Date: 1/2/2022     Precautions  Precautions: (P) Fall Risk,Other (See Comments)  Comments: (P) L maida, L neglect    Safety   ADL Safety : Requires Supervision for Safety,Requires Physical Assist for Safety,Impaired Insight into Safety,Requires Cueing for Safety  Bathroom Safety: Requires Supervision for Safety,Impaired Insight into Safety,Requires Physical Assist for Safety,Requires Cuing for Safety  Comments: see functional levels below for ADL performance details.    Subjective    Pt reports he did not sleep well, but agreeable to OT. His spouse was present and supportive.     Objective       01/02/22 1331   Precautions   Precautions Fall Risk;Other (See Comments)   Comments L maida, L neglect   Cognition    Level of Consciousness Alert   Neuro-Muscular Treatments   Neuro-Muscular Treatments Electrical Stimulation;Sequencing;Tactile Cuing;Verbal Cuing   Comments see note for RT-300 FES tx   Interdisciplinary Plan of Care Collaboration   IDT Collaboration with  Family / Caregiver   Patient Position at End of Therapy In Bed;Bed Alarm On;Call Light within Reach;Tray Table within Reach;Phone within Reach   Collaboration Comments spouse present for session   OT Total Time Spent   OT Individual Total Time Spent (Mins) 60   OT Charge Group   OT Electrical Stimulation Attended 1   OT Neuromuscular Re-education / Balance 2   OT Therapy Activity 1     EOB<>w/c stand pivot txfr with min A, cues for safety.     Pt set up on RT-300 FES for LUE neuro re-ed, ROM and sensory input.  Electrodes applied to L scap stabilizers, shoulder, biceps, triceps, and wrist flexors/extensors. LUE placed in arm trough with ace wrap applied at L wrist for increased stability while allowing for finger flex/ext with stimulation. Muscle stimulation parameters assessed for each muscle group to pt tolerance to e-stim  and muscle contraction observed.  Pt tolerated 7 min 10 sec of cycling, including warm up and cool down periods with results as follows: distance travelled: 0.47 miles, energy expended: 0.03 kCal, energy per hour: 0.41 kCal/hr, avg power: 0.47 W, avg stimulation: 2.83 uC, avg asymmetry: Right 1%.  Pt tolerated well with no c/o pain, but limited by fatigue.     Discussed home bathroom set up with pt and spouse. They have a walk in shower with small threshold and have a shower chair w/ armrests. Pt also has a 3:1 commode. Spouse plans to have GB installed in bathroom and HR installed by stairs at home entry. Spouse plans to take pictures of home and send to pt.     Assessment    Pt tolerated OT session well. He was primarily limited by fatigue with RT-300 FES and would benefit from an additional session with OT to ensure optimal settings prior to use as adjunct tx. Pt making progress with functional transfers and completed with min A this session. He con't to be limited by L neglect and L hemiparesis.   Strengths: Independent prior level of function,Pleasant and cooperative,Supportive family,Willingly participates in therapeutic activities  Barriers: Bladder incontinence,Bowel incontinence,Decreased endurance,Difficulty following instructions,Fatigue,Hemiparesis,Impaired activity tolerance,Impaired balance,Impaired insight/denial of deficits,Impaired functional cognition,Impulsive,Limited mobility    Plan    ADLs, functional transfers, LUE neuro re-ed, RT-300 (ID: [id number], PIN: [id number]), activities to encourage L side attention, threshold shower transfers, DME needs for d/c.     Occupational Therapy Goals (Active)     Problem: Bathing     Dates: Start: 12/28/21       Goal: STG-Within one week, patient will bathe with min A using AE as needed.     Dates: Start: 12/28/21             Problem: Dressing     Dates: Start: 12/25/21       Goal: STG-Within one week, patient will dress UB     Dates: Start: 12/25/21        Description: 1) Individualized Goal:  at a Min A level with fading cues for L maida technique.     Goal Note filed on 12/28/21 1443 by Tavia Moerira, OT     Requires mod A.                Problem: Functional Transfers     Dates: Start: 12/25/21       Goal: STG-Within one week, patient will transfer to toilet     Dates: Start: 12/25/21       Description: 1) Individualized Goal:  at a Mod A level with AD/DME PRN.     Goal Note filed on 12/28/21 1443 by Tavia Moreira, OT     Con't to require mod-max x2 for safety.               Problem: OT Long Term Goals     Dates: Start: 12/25/21       Goal: LTG-By discharge, patient will complete basic self care tasks     Dates: Start: 12/25/21       Description: 1) Individualized Goal:  at a Min A to Mod I level with AD/AE/DME PRN.        Goal: LTG-By discharge, patient will perform bathroom transfers     Dates: Start: 12/25/21       Description: 1) Individualized Goal:  at a Min A to Mod I level with use of AE/AD/DME PRN.

## 2022-01-02 NOTE — PROGRESS NOTES
Hospital Medicine Daily Progress Note      Chief Complaint:  Hypertension  Afib  Fever  Leukocytosis    Interval History:  BP dipped lower this am -- was asymptomatic.    Review of Systems  Review of Systems   Constitutional: Negative for fever.   Eyes: Negative for blurred vision.   Respiratory: Negative for cough.    Cardiovascular: Negative for chest pain.   Gastrointestinal: Negative for diarrhea.   Musculoskeletal: Negative for joint pain.   Neurological: Negative for dizziness.   Psychiatric/Behavioral: The patient is not nervous/anxious.         Physical Exam  Temp:  [36.4 °C (97.6 °F)-36.8 °C (98.3 °F)] 36.8 °C (98.3 °F)  Pulse:  [58-78] 78  Resp:  [16-18] 16  BP: ()/(58-78) 117/68    Physical Exam  Vitals and nursing note reviewed.   Constitutional:       Appearance: He is not diaphoretic.   HENT:      Mouth/Throat:      Pharynx: No oropharyngeal exudate or posterior oropharyngeal erythema.   Eyes:      Extraocular Movements: Extraocular movements intact.   Neck:      Vascular: No carotid bruit.   Cardiovascular:      Rate and Rhythm: Normal rate and regular rhythm.   Pulmonary:      Effort: Pulmonary effort is normal.      Breath sounds: No wheezing or rales.   Abdominal:      Palpations: Abdomen is soft.      Tenderness: There is no abdominal tenderness.   Musculoskeletal:      Right lower leg: Edema present.      Left lower leg: Edema present.      Comments: Has B/L pedal swelling.  Has BUE swelling.   Skin:     General: Skin is warm and dry.   Neurological:      Mental Status: He is alert and oriented to person, place, and time.   Psychiatric:         Mood and Affect: Mood normal.         Behavior: Behavior normal.         Fluids    Intake/Output Summary (Last 24 hours) at 1/2/2022 1007  Last data filed at 1/2/2022 0922  Gross per 24 hour   Intake 800 ml   Output 1250 ml   Net -450 ml       Laboratory  Recent Labs     01/02/22  0544   WBC 11.7*   RBC 4.33*   HEMOGLOBIN 12.6*   HEMATOCRIT 38.3*    MCV 88.5   MCH 29.1   MCHC 32.9*   RDW 46.1   PLATELETCT 337   MPV 8.7*                     Assessment/Plan  * Hemorrhagic stroke (HCC)- (present on admission)  Assessment & Plan  Had dysarthria, facial droop, and left hemiparesis  CT: right basal ganglia hemorrhage with mild mass effect  Non-surgical management  Off chronic anticoagulation   On Provigil per Physiatry    Anemia  Assessment & Plan  Hb: 11.8 --> 9.2 (12/27 ; likely error) --> 12.5 (12/28) --> 12.6 (1/2)  Fe: 34, sats 17%  FOB (-)  On Prilosec  On Fe supplements  Monitor    Edema  Assessment & Plan  US LLE: negative DVT  US LUE: the median antecubital vein is incompressible & in the forearm the cephalic vein c/w SVT  US RUE: evidence of acute superficial venous thrombosis in the median cubital vein  Monitor    Fever- (present on admission)  Assessment & Plan  S/P fever -- (but has been on scheduled Tylenol -- will d/c and observe  WBC's: 13.5 --> 11.3 (12/27) --> 9.4 (12/28) --> 11.7 (1/2)  Urine cultures: E. Coli  BC x 2: neg  CXR negative acute  Covod negative  Had thrombophlebitis  Had UTI  Had otitis externa  S/P Zosyn (1/1)  Will check repeat U/A    Right ear pain- (present on admission)  Assessment & Plan  Pain resolved  Otoscopic Exam 12/25/21:  TM difficult to visualize due to cerumen, R auditory canal erythematous  Otoscopic exam (12/29): Has some cerumen but no erythema visualized  Concerning for otitis externa  S/P Zosyn (was also treating UTI and thrombophlebitis)  Note: Cipro Otic gtts not available at this facility    Hypertension- (present on admission)  Assessment & Plan  BP better recently but dipped a little lower this am -- Cozaar was held  On Norvasc  On Cozaar  On Hydralazine --> will decreased dose  Cont to monitor    Hyperlipemia- (present on admission)  Assessment & Plan  On Lipitor    AF (atrial fibrillation) (HCC)- (present on admission)  Assessment & Plan  HR ok  Not on any rate controlling meds  Anticoagulation presently  on hold due to ICH  Note: was on chronic Xarelto  Monitor

## 2022-01-02 NOTE — CARE PLAN
The patient is Watcher - Medium risk of patient condition declining or worsening    Clinical Goals: safety    Problem: Fall Risk - Rehab  Goal: Patient will remain free from falls  Outcome: Progressing. Remains free from fall today. Uses call light appropriately. Has not attempted self transfers. Hourly rounding maintained. Bed locked and in low position, personal items w/in reach. Falls prevention discussed w/ pt. Pt verbalizing understanding.     Problem: Infection  Goal: Patient will remain free from infection  Outcome: Progressing. VSS, oriented x 4, PICC line removed per order w/ occlusive pressure dressing (sterile gauze covered by tegaderm) placed.

## 2022-01-02 NOTE — PROGRESS NOTES
Shift report received from CORNELIA Perry RN, POC discussed. Pt is asleep in bed w/ no s/s distress noted. Call light and bedside table w/in reach. Monitoring in progress.

## 2022-01-02 NOTE — CARE PLAN
"The patient is Stable - Low risk of patient condition declining or worsening    Shift Goals  Clinical Goals: sleep  Patient Goals: sleep well      Problem: Pain - Standard  Goal: Alleviation of pain or a reduction in pain to the patient’s comfort goal  Outcome: Progressing: Pt declined any pain at bedtime but was informed to ring the call light anytime during the night if he needed any pain medication. Pt stated he understood.       Problem: Fall Risk - Rehab  Goal: Patient will remain free from falls  Outcome: Progressing: Pt uses call light consistently and appropriately. Waits for assistance does not attempt self transfer this shift. Able to verbalize needs.      [facility] [place] Fall risk Assessment Score: 20    High fall risk Interventions   - Bed and strip alarm   -Room near nursing station  - Yellow sign by the door   - Yellow wrist band \"Fall risk\"  - Do not leave patient unattended in the bathroom  - Fall risk education provided          "

## 2022-01-02 NOTE — THERAPY
Speech Language Pathology  Daily Treatment     Patient Name: Gokul Baca  Age:  71 y.o., Sex:  male  Medical Record #: [id number]  Today's Date: 1/2/2022     Precautions  Precautions: Fall Risk,Other (See Comments)  Comments: L maida, L neglect    Subjective    Pt seen at bedside; pleasant and cooperative during ST     Objective       01/02/22 1031   Interdisciplinary Plan of Care Collaboration   IDT Collaboration with  Certified Nursing Assistant   Patient Position at End of Therapy In Bed;Bed Alarm On;Call Light within Reach;Tray Table within Reach;Phone within Reach   Collaboration Comments CNA assisted with repositioning pt at the beginning of ST   SLP Total Time Spent   SLP Individual Total Time Spent (Mins) 30   Treatment Charges   SLP Cognitive Skill Development First 15 Minutes 1   SLP Cognitive Skill Development Additional 15 Minutes 1       Assessment    Pt recalled visual scanning strategies introduced in previous ST session indep. Pt completed analyzing problems activity while implementing visual scanning strategies, requiring min cues (scan to marginal line/use hand as guide). Pt required min cues to maintain eye contact during conversation with therapist sitting on pt's left side.     Strengths: Good insight into deficits/needs,Independent prior level of function,Making steady progress towards goals,Pleasant and cooperative,Supportive family,Motivated for self care and independence,Willingly participates in therapeutic activities,Effective communication skills,Able to follow instructions  Barriers: Impaired functional cognition,Hemiparesis (mild dysarthria)    Plan    Continue to target visual scanning, sequencing, and problem solving    Passport items to be completed:  Express basic needs, understand food/liquid recommendations, consistently follow swallow precautions, manage finances, manage medications, arrive to therapy appointments on time, complete daily memory log entries, solve problems  related to safety situations, review education related to hospitalization, complete caregiver training     Speech Therapy Problems (Active)     Problem: Memory STGs     Dates: Start: 12/25/21       Goal: STG-Within one week, patient will     Dates: Start: 12/25/21       Description: Participate in administration of complete standardized cog-ling evaluation to better determine functional cog-ling needs and better guide POC.      Goal Note filed on 12/29/21 1309 by Annmarie Nguyễn MS,CCC-SLP     Should be completed today with subsequent goals pending results.                Problem: Problem Solving STGs     Dates: Start: 12/25/21       Goal: STG-Within one week, patient will     Dates: Start: 12/25/21       Description: Complete visual scanning tasks (e.g. cancellation tasks, functional reading) with 80% acc. And moderate assistance to attend to left side.     Goal Note filed on 12/29/21 8691 by Annmarie Nguyễn MS,CCC-SLP     Will continue to target.             Goal: STG-Within one week, patient will     Dates: Start: 12/29/21       Description: Patient will demonstrate safety planning, problem solving and sequencing for self care and balance maintenace with 80% acc with min cues to improve.          Problem: Speech/Swallowing LTGs     Dates: Start: 12/25/21       Goal: LTG-By discharge, patient will safely swallow     Dates: Start: 12/25/21       Description: Safest/least restrictive diet to maintain adequate nutrition and hydration.        Goal: LTG-By discharge, patient will     Dates: Start: 12/25/21       Description: Demonstrate independent level of cog-ling functions to ensure safe return to PLOF.          Problem: Swallowing STGs     Dates: Start: 12/25/21

## 2022-01-03 PROBLEM — D72.829 LEUKOCYTOSIS: Status: ACTIVE | Noted: 2022-01-03

## 2022-01-03 PROBLEM — D50.9 IRON DEFICIENCY ANEMIA: Status: ACTIVE | Noted: 2022-01-03

## 2022-01-03 PROBLEM — N30.90 CYSTITIS: Status: ACTIVE | Noted: 2022-01-03

## 2022-01-03 PROBLEM — R25.2 SPASTICITY: Status: ACTIVE | Noted: 2022-01-03

## 2022-01-03 PROCEDURE — 700102 HCHG RX REV CODE 250 W/ 637 OVERRIDE(OP): Performed by: PHYSICAL MEDICINE & REHABILITATION

## 2022-01-03 PROCEDURE — 700102 HCHG RX REV CODE 250 W/ 637 OVERRIDE(OP): Performed by: HOSPITALIST

## 2022-01-03 PROCEDURE — A9270 NON-COVERED ITEM OR SERVICE: HCPCS | Performed by: PHYSICAL MEDICINE & REHABILITATION

## 2022-01-03 PROCEDURE — 97130 THER IVNTJ EA ADDL 15 MIN: CPT

## 2022-01-03 PROCEDURE — 99232 SBSQ HOSP IP/OBS MODERATE 35: CPT | Performed by: HOSPITALIST

## 2022-01-03 PROCEDURE — 97112 NEUROMUSCULAR REEDUCATION: CPT | Mod: CQ

## 2022-01-03 PROCEDURE — A9270 NON-COVERED ITEM OR SERVICE: HCPCS | Performed by: HOSPITALIST

## 2022-01-03 PROCEDURE — 770010 HCHG ROOM/CARE - REHAB SEMI PRIVAT*

## 2022-01-03 PROCEDURE — 97110 THERAPEUTIC EXERCISES: CPT | Mod: CQ

## 2022-01-03 PROCEDURE — 97535 SELF CARE MNGMENT TRAINING: CPT

## 2022-01-03 PROCEDURE — 97129 THER IVNTJ 1ST 15 MIN: CPT

## 2022-01-03 PROCEDURE — 97116 GAIT TRAINING THERAPY: CPT | Mod: CQ

## 2022-01-03 PROCEDURE — 99233 SBSQ HOSP IP/OBS HIGH 50: CPT | Performed by: PHYSICAL MEDICINE & REHABILITATION

## 2022-01-03 PROCEDURE — 97530 THERAPEUTIC ACTIVITIES: CPT | Mod: CQ

## 2022-01-03 RX ORDER — BACLOFEN 10 MG/1
10 TABLET ORAL
Status: DISCONTINUED | OUTPATIENT
Start: 2022-01-03 | End: 2022-01-06 | Stop reason: HOSPADM

## 2022-01-03 RX ORDER — LANOLIN ALCOHOL/MO/W.PET/CERES
6 CREAM (GRAM) TOPICAL
Status: DISCONTINUED | OUTPATIENT
Start: 2022-01-03 | End: 2022-01-06 | Stop reason: HOSPADM

## 2022-01-03 RX ORDER — ASCORBIC ACID 500 MG
500 TABLET ORAL
Status: DISCONTINUED | OUTPATIENT
Start: 2022-01-04 | End: 2022-01-06 | Stop reason: HOSPADM

## 2022-01-03 RX ADMIN — SENNOSIDES AND DOCUSATE SODIUM 2 TABLET: 50; 8.6 TABLET ORAL at 20:25

## 2022-01-03 RX ADMIN — BACLOFEN 10 MG: 10 TABLET ORAL at 20:26

## 2022-01-03 RX ADMIN — SENNOSIDES AND DOCUSATE SODIUM 2 TABLET: 50; 8.6 TABLET ORAL at 07:45

## 2022-01-03 RX ADMIN — ATORVASTATIN CALCIUM 40 MG: 40 TABLET, FILM COATED ORAL at 20:26

## 2022-01-03 RX ADMIN — HYDRALAZINE HYDROCHLORIDE 50 MG: 25 TABLET, FILM COATED ORAL at 20:29

## 2022-01-03 RX ADMIN — TRAZODONE HYDROCHLORIDE 50 MG: 50 TABLET ORAL at 20:26

## 2022-01-03 RX ADMIN — OMEPRAZOLE 20 MG: 20 CAPSULE, DELAYED RELEASE ORAL at 07:45

## 2022-01-03 RX ADMIN — AMLODIPINE BESYLATE 10 MG: 5 TABLET ORAL at 05:38

## 2022-01-03 RX ADMIN — OMEPRAZOLE 20 MG: 20 CAPSULE, DELAYED RELEASE ORAL at 20:25

## 2022-01-03 RX ADMIN — FERROUS SULFATE TAB 325 MG (65 MG ELEMENTAL FE) 325 MG: 325 (65 FE) TAB at 07:45

## 2022-01-03 RX ADMIN — MELATONIN TAB 3 MG 6 MG: 3 TAB at 18:08

## 2022-01-03 RX ADMIN — MODAFINIL 100 MG: 100 TABLET ORAL at 07:45

## 2022-01-03 RX ADMIN — HYDRALAZINE HYDROCHLORIDE 50 MG: 25 TABLET, FILM COATED ORAL at 05:38

## 2022-01-03 RX ADMIN — HYDRALAZINE HYDROCHLORIDE 50 MG: 25 TABLET, FILM COATED ORAL at 14:10

## 2022-01-03 ASSESSMENT — ENCOUNTER SYMPTOMS
NERVOUS/ANXIOUS: 0
FEVER: 0
CHILLS: 0
SHORTNESS OF BREATH: 0
DIARRHEA: 0
NAUSEA: 0
ABDOMINAL PAIN: 0
VOMITING: 0

## 2022-01-03 ASSESSMENT — ACTIVITIES OF DAILY LIVING (ADL)
TOILETING_LEVEL_OF_ASSIST_DESCRIPTION: ASSIST TO PULL PANTS UP;ASSIST TO PULL PANTS DOWN;ASSIST FOR STANDING BALANCE;GRAB BAR;INCREASED TIME;SUPERVISION FOR SAFETY;VERBAL CUEING
TUB_SHOWER_TRANSFER_DESCRIPTION: GRAB BAR;SHOWER BENCH;INCREASED TIME;SET-UP OF EQUIPMENT;SUPERVISION FOR SAFETY;VERBAL CUEING
TOILET_TRANSFER_DESCRIPTION: GRAB BAR;INCREASED TIME;SET-UP OF EQUIPMENT;SUPERVISION FOR SAFETY;VERBAL CUEING
BED_CHAIR_WHEELCHAIR_TRANSFER_DESCRIPTION: ADAPTIVE EQUIPMENT;INCREASED TIME;SUPERVISION FOR SAFETY;VERBAL CUEING
BED_CHAIR_WHEELCHAIR_TRANSFER_DESCRIPTION: INCREASED TIME;SET-UP OF EQUIPMENT;SUPERVISION FOR SAFETY;VERBAL CUEING

## 2022-01-03 ASSESSMENT — GAIT ASSESSMENTS
DEVIATION: INCREASED BASE OF SUPPORT;BRADYKINETIC;DECREASED HEEL STRIKE;DECREASED TOE OFF
ASSISTIVE DEVICE: FRONT WHEEL WALKER;PARALLEL BARS
GAIT LEVEL OF ASSIST: MINIMAL ASSIST

## 2022-01-03 NOTE — PROGRESS NOTES
Hospital Medicine Daily Progress Note      Chief Complaint:  Hypertension  Afib  Fever  Leukocytosis    Interval History:  No complaints.  Doing ok.    Review of Systems  Review of Systems   Constitutional: Negative for chills and fever.   Respiratory: Negative for shortness of breath.    Cardiovascular: Negative for chest pain.   Gastrointestinal: Negative for abdominal pain, diarrhea, nausea and vomiting.   Psychiatric/Behavioral: The patient is not nervous/anxious.         Physical Exam  Temp:  [36.6 °C (97.8 °F)-36.8 °C (98.2 °F)] 36.6 °C (97.8 °F)  Pulse:  [68-78] 73  Resp:  [17-20] 20  BP: (102-135)/(60-72) 132/66  SpO2:  [93 %-95 %] 93 %    Physical Exam  Vitals and nursing note reviewed.   Constitutional:       Appearance: Normal appearance.   HENT:      Head: Atraumatic.   Eyes:      Conjunctiva/sclera: Conjunctivae normal.      Pupils: Pupils are equal, round, and reactive to light.   Cardiovascular:      Rate and Rhythm: Normal rate and regular rhythm.      Heart sounds: No murmur heard.      Pulmonary:      Effort: Pulmonary effort is normal.      Breath sounds: No stridor. No wheezing or rales.   Abdominal:      Palpations: Abdomen is soft.      Tenderness: There is no abdominal tenderness.   Musculoskeletal:      Cervical back: Normal range of motion and neck supple.      Right lower leg: Edema present.      Left lower leg: Edema present.      Comments: Has B/L pedal swelling.  Has BUE swelling.   Skin:     General: Skin is warm and dry.      Findings: No rash.   Neurological:      Mental Status: He is alert and oriented to person, place, and time.   Psychiatric:         Mood and Affect: Mood normal.         Behavior: Behavior normal.         Fluids    Intake/Output Summary (Last 24 hours) at 1/3/2022 1023  Last data filed at 1/3/2022 0900  Gross per 24 hour   Intake 340 ml   Output 1050 ml   Net -710 ml       Laboratory  Recent Labs     01/02/22  0544   WBC 11.7*   RBC 4.33*   HEMOGLOBIN 12.6*    HEMATOCRIT 38.3*   MCV 88.5   MCH 29.1   MCHC 32.9*   RDW 46.1   PLATELETCT 337   MPV 8.7*                     Assessment/Plan  * Hemorrhagic stroke (HCC)- (present on admission)  Assessment & Plan  Had dysarthria, facial droop, and left hemiparesis  CT: right basal ganglia hemorrhage with mild mass effect  Non-surgical management  Off chronic anticoagulation   On Provigil per Physiatry    Anemia  Assessment & Plan  Hb: 11.8 --> 9.2 (12/27 ; likely error) --> 12.5 (12/28) --> 12.6 (1/2)  Fe: 34, sats 17%  FOB (-)  On Prilosec  On Fe supplements  Monitor    Edema  Assessment & Plan  US LLE: negative DVT  US LUE: the median antecubital vein is incompressible & in the forearm the cephalic vein c/w SVT  US RUE: evidence of acute superficial venous thrombosis in the median cubital vein  Monitor    Fever- (present on admission)  Assessment & Plan  S/P fever -- was on scheduled Tylenol -- now off  WBC's: 13.5 --> 11.3 (12/27) --> 9.4 (12/28) --> 11.7 (1/2)  Urine cultures: E. Coli  BC x 2: neg  CXR negative acute  Covod negative  Had thrombophlebitis  Had UTI  Had otitis externa  S/P Zosyn (1/1)  Repeat U/A: positive --> will get C&S    Right ear pain- (present on admission)  Assessment & Plan  Resolved  Otoscopic Exam 12/25/21:  TM difficult to visualize due to cerumen, R auditory canal erythematous  Otoscopic exam (12/29): Has some cerumen but no erythema visualized  Concerning for otitis externa  S/P Zosyn (was also treating UTI and thrombophlebitis)  Note: Cipro Otic gtts not available at this facility    Hypertension- (present on admission)  Assessment & Plan  BP better recently   Off Cozaar  On Norvasc  On Hydralazine --> dose decreased recently  Cont to monitor    Hyperlipemia- (present on admission)  Assessment & Plan  On Lipitor    AF (atrial fibrillation) (HCC)- (present on admission)  Assessment & Plan  HR ok  Not on any rate controlling meds  Anticoagulation presently on hold due to ICH  Note: was on chronic  Xarelto  Monitor

## 2022-01-03 NOTE — CARE PLAN
"The patient is Stable - Low risk of patient condition declining or worsening    Shift Goals  Clinical Goals: sleep  Patient Goals: sleep well    Problem: Skin Integrity  Goal: Skin integrity is maintained or improved  Outcome: Progressing  Note:   Brayden Score: 17    Patient's skin remains intact and free from new or accidental injury this shift; no s/s of infection. RN wound protocol checked. Encouraged hydration and educated about the importance of nutrition to keep skin integrity. Will continue to monitor.       Problem: Fall Risk - Rehab  Goal: Patient will remain free from falls  Outcome: Progressing  Note: [place] Fall risk Assessment Score: 16    High fall risk Interventions   - Bed and strip alarm   - Yellow sign by the door   - Yellow wrist band \"Fall risk\"  - Room near to the nurse station  - Do not leave patient unattended in the bathroom  - Fall risk education provided     "

## 2022-01-03 NOTE — PROGRESS NOTES
"Rehab Progress Note     Date of Service: 1/3/2022  Chief Complaint: Follow-up stroke    Interval Events (Subjective)    Patient seen and examined today in the therapy gym.  He has made incredible amount of progress since I last saw him a week ago.  He did have a fall on the 30th where he hit his head and was sent to the emergency room for head CT.  This showed decreased hemorrhage but increased edema for which neurosurgery was consulted with no change in management.  Per therapy patient was initially total assist x2 and now is at min assist for transfers.  Both patient and therapy feels like he may not be ready however for discharge home on the seventh and may need some longer time.  Patient reports some spasms in his left leg at night.  He also continues to have difficulty sleeping he says in part due to temperature dysregulation in his room.  He has no other concerns or complaints today.    ROS: No changes to bowel, bladder, pain, mood, or sleep.       Objective:  VITAL SIGNS: /72   Pulse 75   Temp 36.5 °C (97.7 °F) (Oral)   Resp 20   Ht 1.664 m (5' 5.51\")   Wt 114 kg (251 lb 5.2 oz)   SpO2 96%   BMI 40.56 kg/m²   Gen: alert, no apparent distress  Neuro: notable for improved left hemiparesis, left visual field cut, left neglect, hyperreflexic on the left with sustained clonus at the left ankle      Recent Results (from the past 72 hour(s))   CBC WITHOUT DIFFERENTIAL    Collection Time: 01/02/22  5:44 AM   Result Value Ref Range    WBC 11.7 (H) 4.8 - 10.8 K/uL    RBC 4.33 (L) 4.70 - 6.10 M/uL    Hemoglobin 12.6 (L) 14.0 - 18.0 g/dL    Hematocrit 38.3 (L) 42.0 - 52.0 %    MCV 88.5 81.4 - 97.8 fL    MCH 29.1 27.0 - 33.0 pg    MCHC 32.9 (L) 33.7 - 35.3 g/dL    RDW 46.1 35.9 - 50.0 fL    Platelet Count 337 164 - 446 K/uL    MPV 8.7 (L) 9.0 - 12.9 fL   URINALYSIS    Collection Time: 01/02/22  1:10 PM   Result Value Ref Range    Color Yellow     Character Clear     Specific Gravity 1.017 <1.035    Ph 5.0 5.0 " - 8.0    Glucose Negative Negative mg/dL    Ketones Negative Negative mg/dL    Protein Negative Negative mg/dL    Bilirubin Negative Negative    Urobilinogen, Urine 0.2 Negative    Nitrite Negative Negative    Leukocyte Esterase Small (A) Negative    Occult Blood Negative Negative    Micro Urine Req Microscopic    URINE MICROSCOPIC (W/UA)    Collection Time: 01/02/22  1:10 PM   Result Value Ref Range    WBC  (A) /hpf    RBC 0-2 (A) /hpf    Bacteria Many (A) None /hpf    Epithelial Cells Negative /hpf    Hyaline Cast 0-2 /lpf       Current Facility-Administered Medications   Medication Frequency   • melatonin tablet 3 mg QHS   • hydrALAZINE (APRESOLINE) tablet 50 mg Q8HRS   • ferrous sulfate tablet 325 mg QDAY with Breakfast   • modafinil (PROVIGIL) tablet 100 mg QAM   • omeprazole (PRILOSEC) capsule 20 mg BID   • Respiratory Therapy Consult Continuous RT   • Pharmacy Consult Request ...Pain Management Review 1 Each PHARMACY TO DOSE   • acetaminophen (Tylenol) tablet 650 mg Q4HRS PRN   • docusate sodium (ENEMEEZ) enema 283 mg QDAY PRN   • sodium phosphate (Fleet) enema 133 mL QDAY PRN   • artificial tears ophthalmic solution 1 Drop PRN   • benzocaine-menthol (CEPACOL) lozenge 1 Lozenge Q2HRS PRN   • mag hydrox-al hydrox-simeth (MAALOX PLUS ES or MYLANTA DS) suspension 20 mL Q2HRS PRN   • ondansetron (ZOFRAN ODT) dispertab 4 mg 4X/DAY PRN    Or   • ondansetron (ZOFRAN) syringe/vial injection 4 mg 4X/DAY PRN   • traZODone (DESYREL) tablet 50 mg QHS PRN   • sodium chloride (OCEAN) 0.65 % nasal spray 2 Spray PRN   • midazolam (VERSED) 5 mg/mL (1 mL vial) PRN   • senna-docusate (PERICOLACE or SENOKOT S) 8.6-50 MG per tablet 2 Tablet BID    And   • polyethylene glycol/lytes (MIRALAX) PACKET 1 Packet QDAY PRN    And   • magnesium hydroxide (MILK OF MAGNESIA) suspension 30 mL QDAY PRN    And   • bisacodyl (DULCOLAX) suppository 10 mg QDAY PRN   • amLODIPine (NORVASC) tablet 10 mg Q DAY   • hydrALAZINE (APRESOLINE)  tablet 10 mg TID PRN   • cloNIDine (CATAPRES) tablet 0.1 mg TID PRN   • butalbital/apap/caffeine -40 mg (Fioricet) per tablet 1 Tablet Q6HRS PRN   • atorvastatin (LIPITOR) tablet 40 mg Q EVENING       Orders Placed This Encounter   Procedures   • Diet Order Diet: Regular (solids cut up into bite size pieces, No Straw; t-dine); Tray Modifications (optional): No Straws     Standing Status:   Standing     Number of Occurrences:   1     Order Specific Question:   Diet:     Answer:   Regular [1]     Comments:   solids cut up into bite size pieces, No Straw; t-dine     Order Specific Question:   Tray Modifications (optional)     Answer:   No Straws       Radiology    12/29/2021 6:51 PM     HISTORY/REASON FOR EXAM:  Head trauma, minor (Age >= 65y).  Injury. Pain.     TECHNIQUE/EXAM DESCRIPTION AND NUMBER OF VIEWS:  CT of the head without contrast.     Contiguous axial sections were obtained from the skull base through the vertex.     Up to date radiation dose reduction adjustments have been utilized to meet ALARA standards for radiation dose reduction.     COMPARISON:  12/21/2021     FINDINGS:     Known right temporal and basal ganglia parenchymal hemorrhage measures 3.7 x 5.3 cm and is mildly decreased in size and density compared to the prior examination. Surrounding edema is increased with increased mass effect. There is approximately 7 mm   midline shift to the left. No new hemorrhage is identified. There is a small mucous retention cyst within the left maxillary sinus. Remaining visualized paranasal sinuses and mastoid air cells are clear. Calvarium is intact.     IMPRESSION:     1.  Known right temporal and basal ganglia parenchymal hemorrhage is mildly decreased in size and density compared to the prior examination.  2.  There is, however, increased surrounding edema with increased mass effect and approximately 7 mm midline shift to the left.  3.  No new hemorrhage is identified.          Assessment:  Active  Hospital Problems    Diagnosis    • *Hemorrhagic stroke (HCC)    • Fever    • Left-sided neglect    • Impaired mobility and ADLs    • Right ear pain    • Morbid obesity with BMI of 40.0-44.9, adult (HCC)    • GERD (gastroesophageal reflux disease)    • AF (atrial fibrillation) (HCC)    • Hyperlipemia    • Hypertension      This patient is a 71 y.o. male admitted for acute inpatient rehabilitation with Hemorrhagic stroke (HCC).    Estimated discharge date per covering physician at last week's weekly conference is January 7.  We will reassess this on Wednesday as he may need an extension.    Medical Decision Making and Plan:    Large right basal ganglia hemorrhagic stroke  Left hemiparesis, improved  Left visual field cut, continues  Left neglect, continues  Cognitive impairment, improved  Continue full rehab program  PT/OT/SLP, 1 hr each discipline, 5 days per week    Statin    Per neurology okay to restart Xarelto in 2 weeks from admission to rehab, so we will check a CT scan first, patient and wife not interested in restarting    Outpatient follow-up with stroke bridge, Dr. Figueroa, referrals made    Sedation, improved  Continue Provigil    Spasticity  Start 10 mg baclofen at night  Outpatient follow-up with Dr. Figueroa    Headache, resolved  Likely from brain swelling  As needed Tylenol, last use never since admission     Hypertension  Amlodipine 10 mg  Losartan 100 mg  Hydralazine 50 mg every 8  As needed clonidine for systolic blood pressures over 160  Appreciate hospitalist assistance     Paroxysmal atrial fibrillation  Xarelto currently on hold for 2 weeks per neurology  May benefit from beta-blocker, per review of outpatient notes has been on metoprolol and amiodarone in the past  Appreciate hospitalist assistance     Edema, improved  Bilateral superficial thrombi upper extremities  Negative lower extremity Dopplers  Positive bilateral upper extremity Dopplers for superficial thrombi, no need for full treatment  and contraindicated given his hemorrhage    Left arm thrombophlebitis, resolved  Treated with antibiotics    Leukocytosis  Positive urinalysis  Culture pending  Recheck in the morning     Iron deficiency anemia  Ferrous sulfate and vitamin C  Monitor with weekly labs     GERD/GI prophylaxis  Omeprazole     Insomnia, continues  Schedule melatonin  Adding baclofen for spasticity  As needed trazodone     Morbid obesity  Due to excess calories  BMI 40.56  Outpatient nutritional counseling     Bowel program  Continue bowel medications  Last BM 1/3    Bladder program  Check PVRs  Not retaining  Bladder scan for no voids  ICP for over 400 cc  Scheduled toileting    DVT prophylaxis  Contraindicated given hemorrhage.   Compression stockings on in am, off in pm.  Increase mobility. Monitor for swelling.    Total time:  35 minutes.  I spent greater than 50% of the time for patient care, counseling, and coordination on this date, including patient face-to face time, unit/floor time with review of records/pertinent lab data and studies, as well as discussing diagnostic evaluation/work up, planned therapeutic interventions, and future disposition of care, as per the interval events/subjective and the assessment and plan as noted above.                Valencia Ramirez M.D.  Physical Medicine and Rehabilitation

## 2022-01-03 NOTE — THERAPY
"Physical Therapy   Daily Treatment     Patient Name: Gokul Baca  Age:  71 y.o., Sex:  male  Medical Record #: [id number]  Today's Date: 1/3/2022     Precautions  Precautions: Fall Risk,Other (See Comments)  Comments: L maida, L neglect    Subjective    Pt resting in bed, agreed to therapy    \"can you help me find my phone \"     Objective       01/03/22 1301   Gait Functional Level of Assist    Gait Level Of Assist Minimal Assist   Assistive Device Front Wheel Walker;Parallel Bars   Distance (Feet)   (10' x 4 in // and 40' x 1 FWW)   Deviation Increased Base Of Support;Bradykinetic;Decreased Heel Strike;Decreased Toe Off  (wc follow with )   Stairs Functional Level of Assist   Level of Assist with Stairs Unable to Participate   # of Stairs Climbed 0   Stairs Description Safety concerns   Transfer Functional Level of Assist   Bed, Chair, Wheelchair Transfer Minimal Assist   Bed Chair Wheelchair Transfer Description Adaptive equipment;Increased time;Supervision for safety;Verbal cueing  (stand step transfer with FWW)   Sitting Lower Body Exercises   Sitting Lower Body Exercises Yes   Nustep Resistance Level 1;Time (See Comments)  (6' B LE/UE manual cues for neutral hip rotation L LE)   Bed Mobility    Supine to Sit Minimal Assist   Sit to Stand Contact Guard Assist   Neuro-Muscular Treatments   Neuro-Muscular Treatments Postural Facilitation;Sequencing;Verbal Cuing   Comments alt toe taps to 4\" step with using colored dots as targets, 1 x 10 R, 1 x 10 L and 1 x 10 alt R/L. STS sequencing from wc <> FWW focusing in L hand on walker and R hand to push up on wc.    Interdisciplinary Plan of Care Collaboration   IDT Collaboration with  Physician;Occupational Therapist   Patient Position at End of Therapy Seated   Collaboration Comments MD observed pt transfer during therapy, OT: CLOF/transfer   PT Total Time Spent   PT Individual Total Time Spent (Mins) 60   PT Charge Group   PT Gait Training 1   PT Therapeutic " Exercise 1   PT Neuromuscular Re-Education / Balance 1   PT Therapeutic Activities 1   Supervising Physical Therapist Alphonso Hemphill     Stand step transfer with FWW Tee/CGA, cues for hand placement.     Encouraged pt to remain up in wc post PT for inc act tolerance    Assessment    Pt demos improving activity tolerance and increased amb distance with FWW this session. Cont to present with L neglect requiring max vc to attend to L side and to use L UE functionally.   Strengths: Able to follow instructions,Alert and oriented,Independent prior level of function,Motivated for self care and independence,Pleasant and cooperative,Supportive family,Willingly participates in therapeutic activities  Barriers: Decreased endurance,Difficulty following instructions,Hemiplegia,Fatigue,Home accessibility,Impaired activity tolerance,Impaired balance,Impaired carryover of learning,Impaired functional cognition,Impulsive,Limited mobility    Plan    Bed mobility , stand pivot transfer training, neuro re ed, L LE motor control, standing balance, gait with FWW for endurance/ hand rail to focus on gait quality, initiate step in //, initial set up on  L LE     Passport items to be completed:  Get in/out of bed safely, in/out of a vehicle, safely use mobility device, walk or wheel around home/community, navigate up and down stairs, show how to get up/down from the ground, ensure home is accessible, demonstrate HEP, complete caregiver training    Physical Therapy Problems (Active)     Problem: Balance     Dates: Start: 12/25/21       Goal: STG-Within one week, patient will maintain dynamic sitting x2 minutes without loss of balance.     Dates: Start: 12/25/21             Problem: Mobility     Dates: Start: 12/25/21       Goal: STG-Within one week, patient will ambulate 20ft using maida rail with min A and wheelchair follow.     Dates: Start: 12/25/21             Problem: Mobility Transfers     Dates: Start: 12/25/21       Goal:  STG-Within one week, patient will perform bed mobility with mod A using maida techniques.     Dates: Start: 12/25/21          Goal: STG-Within one week, patient will transfer bed to chair with 1 person assist.     Dates: Start: 12/25/21             Problem: PT-Long Term Goals     Dates: Start: 12/25/21       Goal: LTG-By discharge, patient will ambulate 30ft with min A and most appropriate AD.     Dates: Start: 12/25/21          Goal: LTG-By discharge, patient will transfer one surface to another with min A using AD as needed.     Dates: Start: 12/25/21          Goal: LTG-By discharge, patient will ambulate up/down 2 stairs with L ascending rail and min A.     Dates: Start: 12/25/21          Goal: LTG-By discharge, patient will transfer in/out of a car with mod A using AD as needed.     Dates: Start: 12/25/21          Goal: LTG-By discharge, patient will perform bed mobility with supervision using maida techniques.     Dates: Start: 12/25/21

## 2022-01-03 NOTE — THERAPY
Occupational Therapy  Daily Treatment     Patient Name: Gokul Baca  Age:  71 y.o., Sex:  male  Medical Record #: [id number]  Today's Date: 1/3/2022     Precautions  Precautions: (P) Fall Risk,Other (See Comments)  Comments: (P) L maida, L neglect    Safety   ADL Safety : Requires Supervision for Safety,Requires Physical Assist for Safety,Impaired Insight into Safety,Requires Cueing for Safety  Bathroom Safety: Requires Supervision for Safety,Impaired Insight into Safety,Requires Physical Assist for Safety,Requires Cuing for Safety  Comments: see functional levels below for ADL performance details.    Subjective    Pt reports fatigue d/t ongoing difficulty sleeping at night, but agreeable to OT session.     Objective       01/03/22 0831   Precautions   Precautions Fall Risk;Other (See Comments)   Comments L maida, L neglect   Cognition    Level of Consciousness Alert   Functional Level of Assist   Eating Supervision   Eating Description Increased time;Set-up of equipment or meal/tube feeding   Grooming Supervision;Seated   Grooming Description Increased time;Supervision for safety;Verbal cueing  (oral care, shaving and washing face )   Bathing Moderate Assist   Bathing Description Grab bar;Hand held shower;Tub bench;Increased time;Set-up of equipment;Supervision for safety;Verbal cueing  (A to wash feet and buttocks)   Upper Body Dressing Minimal Assist   Upper Body Dressing Description Increased time;Set-up of equipment;Supervision for safety;Verbal cueing  (A to pull shirt down in back, cues for orientation of shirt)   Lower Body Dressing Maximal Assist   Lower Body Dressing Description Assist with threading into pant leg;Increased time;Set-up of equipment;Supervision for safety;Verbal cueing  (A to thread LLE into brief,CGA stand bal;total A tread socks)   Toileting Maximal Assist   Toileting Description Assist to pull pants up;Assist to pull pants down;Assist for standing balance;Grab bar;Increased  time;Supervision for safety;Verbal cueing   Bed, Chair, Wheelchair Transfer Minimal Assist   Bed Chair Wheelchair Transfer Description Increased time;Set-up of equipment;Supervision for safety;Verbal cueing  (SPT)   Toilet Transfers Minimal Assist   Toilet Transfer Description Grab bar;Increased time;Set-up of equipment;Supervision for safety;Verbal cueing  (w/c<>toilet stand step via GB)   Tub / Shower Transfers Moderate Assist   Tub Shower Transfer Description Grab bar;Shower bench;Increased time;Set-up of equipment;Supervision for safety;Verbal cueing  (w/c<>shower bench stand step txfr via GB)   Bed Mobility    Supine to Sit Minimal Assist   Scooting Minimal Assist   Rolling Minimum Assist to Lt.   Skilled Intervention Sequencing;Verbal Cuing   Interdisciplinary Plan of Care Collaboration   Patient Position at End of Therapy Seated;Chair Alarm On;Self Releasing Lap Belt Applied;Call Light within Reach;Tray Table within Reach;Phone within Reach  (Seatbelt alarm)   OT Total Time Spent   OT Individual Total Time Spent (Mins) 60   OT Charge Group   OT Self Care / ADL 4       Assessment    Pt tolerated OT session well and making progress with ADLs and transfers. He completed stand pivot transfers with min-mod A x1 via GB, and able to verbalize sequence of transfers. Pt con't to require intermittent cues for maida technique, L side attention, and to incorporate use of LUE into ADL tasks.   Strengths: Independent prior level of function,Pleasant and cooperative,Supportive family,Willingly participates in therapeutic activities  Barriers: Bladder incontinence,Bowel incontinence,Decreased endurance,Difficulty following instructions,Fatigue,Hemiparesis,Impaired activity tolerance,Impaired balance,Impaired insight/denial of deficits,Impaired functional cognition,Impulsive,Limited mobility    Plan    ADLs, functional transfers, LUE neuro re-ed, RT-300 (ID: [id number], PIN: [id number]), activities to encourage L side attention,  threshold shower transfers, DME needs for d/c.     Occupational Therapy Goals (Active)     Problem: Bathing     Dates: Start: 12/28/21       Goal: STG-Within one week, patient will bathe with min A using AE as needed.     Dates: Start: 12/28/21             Problem: Dressing     Dates: Start: 12/25/21       Goal: STG-Within one week, patient will dress UB     Dates: Start: 12/25/21       Description: 1) Individualized Goal:  at a Min A level with fading cues for L maida technique.     Goal Note filed on 12/28/21 1443 by Tavia Moreira, OT     Requires mod A.                Problem: Functional Transfers     Dates: Start: 12/25/21       Goal: STG-Within one week, patient will transfer to toilet     Dates: Start: 12/25/21       Description: 1) Individualized Goal:  at a Mod A level with AD/DME PRN.     Goal Note filed on 12/28/21 1443 by Tavia Moreira, OT     Con't to require mod-max x2 for safety.               Problem: OT Long Term Goals     Dates: Start: 12/25/21       Goal: LTG-By discharge, patient will complete basic self care tasks     Dates: Start: 12/25/21       Description: 1) Individualized Goal:  at a Min A to Mod I level with AD/AE/DME PRN.        Goal: LTG-By discharge, patient will perform bathroom transfers     Dates: Start: 12/25/21       Description: 1) Individualized Goal:  at a Min A to Mod I level with use of AE/AD/DME PRN.

## 2022-01-03 NOTE — THERAPY
Speech Language Pathology  Daily Treatment     Patient Name: Gokul Baca  Age:  71 y.o., Sex:  male  Medical Record #: [id number]  Today's Date: 1/3/2022     Precautions  Precautions: Fall Risk,Other (See Comments)  Comments: L maida, L neglect    Subjective    Pt pleasant and cooperative, motivated by progress made thus far at [facility].     Objective       01/03/22 1003   SLP Total Time Spent   SLP Individual Total Time Spent (Mins) 60   Treatment Charges   SLP Cognitive Skill Development First 15 Minutes 1   SLP Cognitive Skill Development Additional 15 Minutes 3       Assessment    Medications reviewed with written aid formulated with pt acting as scribe, pt required spv-MIN verbal cues for attention to location on medication chart and entering information on far left side of page. Pt required MIN cues for awareness of current medications. Indep formulated list of questions for MD related to both sleep aids and stool softeners. Pt presented with visual scanning with functional reading and target word OF, pt indep located 7/11 targets, increased to 8/11 (remaining missing targets all located on left side of page). Pt reporting fatigue and requested to finish in future ST session.     Strengths: Good insight into deficits/needs,Independent prior level of function,Making steady progress towards goals,Pleasant and cooperative,Supportive family,Motivated for self care and independence,Willingly participates in therapeutic activities,Effective communication skills,Able to follow instructions  Barriers: Impaired functional cognition,Hemiparesis (mild dysarthria)    Plan    Cont visual scanning task, initiate medication management tasks and financial management tasks.     Speech Therapy Problems (Active)     Problem: Memory STGs     Dates: Start: 12/25/21       Goal: STG-Within one week, patient will     Dates: Start: 12/25/21       Description: Participate in administration of complete standardized cog-ling evaluation to  better determine functional cog-ling needs and better guide POC.      Goal Note filed on 12/29/21 1309 by Annmarie Nguyễn MS,CCC-SLP     Should be completed today with subsequent goals pending results.                Problem: Problem Solving STGs     Dates: Start: 12/25/21       Goal: STG-Within one week, patient will     Dates: Start: 12/25/21       Description: Complete visual scanning tasks (e.g. cancellation tasks, functional reading) with 80% acc. And moderate assistance to attend to left side.     Goal Note filed on 12/29/21 1309 by Annmarie Nguyễn MS,CCC-SLP     Will continue to target.             Goal: STG-Within one week, patient will     Dates: Start: 12/29/21       Description: Patient will demonstrate safety planning, problem solving and sequencing for self care and balance maintenace with 80% acc with min cues to improve.          Problem: Speech/Swallowing LTGs     Dates: Start: 12/25/21       Goal: LTG-By discharge, patient will safely swallow     Dates: Start: 12/25/21       Description: Safest/least restrictive diet to maintain adequate nutrition and hydration.        Goal: LTG-By discharge, patient will     Dates: Start: 12/25/21       Description: Demonstrate independent level of cog-ling functions to ensure safe return to PLOF.          Problem: Swallowing STGs     Dates: Start: 12/25/21

## 2022-01-03 NOTE — CARE PLAN
The patient is Stable - Low risk of patient condition declining or worsening    Shift Goals: safety  Clinical Goals: sleep  Patient Goals: sleep well    Problem: Pain - Standard  Goal: Alleviation of pain or a reduction in pain to the patient’s comfort goal  Outcome: Progressing: Denies pain. No analgesics administered.      Problem: Fall Risk - Rehab  Goal: Patient will remain free from falls  Outcome: Progressing: pt oriented x4, uses call light appropriately, is not attempting self transfer. Fall precautions maintained.

## 2022-01-03 NOTE — PROGRESS NOTES
19:12 Dr Nayak hospitalist returned call. TORB to culture urine. Order for IV Rocephin placed by hospitalist.

## 2022-01-04 ENCOUNTER — APPOINTMENT (OUTPATIENT)
Dept: RADIOLOGY | Facility: REHABILITATION | Age: 72
DRG: 056 | End: 2022-01-04
Attending: HOSPITALIST
Payer: MEDICARE

## 2022-01-04 ENCOUNTER — ANCILLARY PROCEDURE (OUTPATIENT)
Dept: CARDIOLOGY | Facility: REHABILITATION | Age: 72
DRG: 056 | End: 2022-01-04
Attending: HOSPITALIST
Payer: MEDICARE

## 2022-01-04 PROBLEM — H60.90 OTITIS EXTERNA: Status: ACTIVE | Noted: 2021-12-24

## 2022-01-04 PROBLEM — N39.0 UTI (URINARY TRACT INFECTION): Status: ACTIVE | Noted: 2021-12-25

## 2022-01-04 LAB
BASOPHILS # BLD AUTO: 0.6 % (ref 0–1.8)
BASOPHILS # BLD: 0.06 K/UL (ref 0–0.12)
EOSINOPHIL # BLD AUTO: 0.16 K/UL (ref 0–0.51)
EOSINOPHIL NFR BLD: 1.7 % (ref 0–6.9)
ERYTHROCYTE [DISTWIDTH] IN BLOOD BY AUTOMATED COUNT: 45.4 FL (ref 35.9–50)
HCT VFR BLD AUTO: 36.8 % (ref 42–52)
HGB BLD-MCNC: 12.3 G/DL (ref 14–18)
IMM GRANULOCYTES # BLD AUTO: 0.03 K/UL (ref 0–0.11)
IMM GRANULOCYTES NFR BLD AUTO: 0.3 % (ref 0–0.9)
LYMPHOCYTES # BLD AUTO: 2.27 K/UL (ref 1–4.8)
LYMPHOCYTES NFR BLD: 24 % (ref 22–41)
MCH RBC QN AUTO: 29.4 PG (ref 27–33)
MCHC RBC AUTO-ENTMCNC: 33.4 G/DL (ref 33.7–35.3)
MCV RBC AUTO: 88 FL (ref 81.4–97.8)
MONOCYTES # BLD AUTO: 1.13 K/UL (ref 0–0.85)
MONOCYTES NFR BLD AUTO: 11.9 % (ref 0–13.4)
NEUTROPHILS # BLD AUTO: 5.81 K/UL (ref 1.82–7.42)
NEUTROPHILS NFR BLD: 61.5 % (ref 44–72)
NRBC # BLD AUTO: 0 K/UL
NRBC BLD-RTO: 0 /100 WBC
PLATELET # BLD AUTO: 358 K/UL (ref 164–446)
PMV BLD AUTO: 8.7 FL (ref 9–12.9)
RBC # BLD AUTO: 4.18 M/UL (ref 4.7–6.1)
WBC # BLD AUTO: 9.5 K/UL (ref 4.8–10.8)

## 2022-01-04 PROCEDURE — 97130 THER IVNTJ EA ADDL 15 MIN: CPT

## 2022-01-04 PROCEDURE — 700102 HCHG RX REV CODE 250 W/ 637 OVERRIDE(OP): Performed by: HOSPITALIST

## 2022-01-04 PROCEDURE — 99232 SBSQ HOSP IP/OBS MODERATE 35: CPT | Performed by: PHYSICAL MEDICINE & REHABILITATION

## 2022-01-04 PROCEDURE — 93970 EXTREMITY STUDY: CPT

## 2022-01-04 PROCEDURE — A9270 NON-COVERED ITEM OR SERVICE: HCPCS | Performed by: HOSPITALIST

## 2022-01-04 PROCEDURE — 97116 GAIT TRAINING THERAPY: CPT

## 2022-01-04 PROCEDURE — 71045 X-RAY EXAM CHEST 1 VIEW: CPT

## 2022-01-04 PROCEDURE — A9270 NON-COVERED ITEM OR SERVICE: HCPCS | Performed by: PHYSICAL MEDICINE & REHABILITATION

## 2022-01-04 PROCEDURE — 36415 COLL VENOUS BLD VENIPUNCTURE: CPT

## 2022-01-04 PROCEDURE — 97129 THER IVNTJ 1ST 15 MIN: CPT

## 2022-01-04 PROCEDURE — 97535 SELF CARE MNGMENT TRAINING: CPT

## 2022-01-04 PROCEDURE — 93970 EXTREMITY STUDY: CPT | Mod: 26 | Performed by: INTERNAL MEDICINE

## 2022-01-04 PROCEDURE — 700102 HCHG RX REV CODE 250 W/ 637 OVERRIDE(OP): Performed by: PHYSICAL MEDICINE & REHABILITATION

## 2022-01-04 PROCEDURE — 99232 SBSQ HOSP IP/OBS MODERATE 35: CPT | Performed by: HOSPITALIST

## 2022-01-04 PROCEDURE — 770010 HCHG ROOM/CARE - REHAB SEMI PRIVAT*

## 2022-01-04 PROCEDURE — 85025 COMPLETE CBC W/AUTO DIFF WBC: CPT

## 2022-01-04 PROCEDURE — 97530 THERAPEUTIC ACTIVITIES: CPT

## 2022-01-04 RX ADMIN — OMEPRAZOLE 20 MG: 20 CAPSULE, DELAYED RELEASE ORAL at 20:46

## 2022-01-04 RX ADMIN — ATORVASTATIN CALCIUM 40 MG: 40 TABLET, FILM COATED ORAL at 20:47

## 2022-01-04 RX ADMIN — MODAFINIL 100 MG: 100 TABLET ORAL at 09:05

## 2022-01-04 RX ADMIN — SENNOSIDES AND DOCUSATE SODIUM 2 TABLET: 50; 8.6 TABLET ORAL at 09:06

## 2022-01-04 RX ADMIN — HYDRALAZINE HYDROCHLORIDE 50 MG: 25 TABLET, FILM COATED ORAL at 20:47

## 2022-01-04 RX ADMIN — SENNOSIDES AND DOCUSATE SODIUM 2 TABLET: 50; 8.6 TABLET ORAL at 20:46

## 2022-01-04 RX ADMIN — OXYCODONE HYDROCHLORIDE AND ACETAMINOPHEN 500 MG: 500 TABLET ORAL at 09:05

## 2022-01-04 RX ADMIN — FERROUS SULFATE TAB 325 MG (65 MG ELEMENTAL FE) 325 MG: 325 (65 FE) TAB at 09:05

## 2022-01-04 RX ADMIN — HYDRALAZINE HYDROCHLORIDE 50 MG: 25 TABLET, FILM COATED ORAL at 06:23

## 2022-01-04 RX ADMIN — BACLOFEN 10 MG: 10 TABLET ORAL at 20:46

## 2022-01-04 RX ADMIN — MELATONIN TAB 3 MG 6 MG: 3 TAB at 20:46

## 2022-01-04 RX ADMIN — OMEPRAZOLE 20 MG: 20 CAPSULE, DELAYED RELEASE ORAL at 09:07

## 2022-01-04 RX ADMIN — TRAZODONE HYDROCHLORIDE 50 MG: 50 TABLET ORAL at 20:47

## 2022-01-04 RX ADMIN — ACETAMINOPHEN 650 MG: 325 TABLET ORAL at 14:34

## 2022-01-04 RX ADMIN — AMLODIPINE BESYLATE 10 MG: 5 TABLET ORAL at 06:23

## 2022-01-04 RX ADMIN — HYDRALAZINE HYDROCHLORIDE 50 MG: 25 TABLET, FILM COATED ORAL at 14:35

## 2022-01-04 ASSESSMENT — ACTIVITIES OF DAILY LIVING (ADL)
BED_CHAIR_WHEELCHAIR_TRANSFER_DESCRIPTION: ADAPTIVE EQUIPMENT;SET-UP OF EQUIPMENT;SQUAT PIVOT TRANSFER TO WHEELCHAIR;SUPERVISION FOR SAFETY;VERBAL CUEING
TOILET_TRANSFER_DESCRIPTION: GRAB BAR;INCREASED TIME;INITIAL PREPARATION FOR TASK;SET-UP OF EQUIPMENT;SUPERVISION FOR SAFETY;VERBAL CUEING

## 2022-01-04 ASSESSMENT — ENCOUNTER SYMPTOMS
CHILLS: 0
SHORTNESS OF BREATH: 0
BRUISES/BLEEDS EASILY: 0
EYES NEGATIVE: 1
POLYDIPSIA: 0
NAUSEA: 0
FOCAL WEAKNESS: 1
PALPITATIONS: 0
FEVER: 0
COUGH: 0
ABDOMINAL PAIN: 0
VOMITING: 0

## 2022-01-04 ASSESSMENT — GAIT ASSESSMENTS: GAIT LEVEL OF ASSIST: MODERATE ASSIST

## 2022-01-04 NOTE — CARE PLAN
Problem: Knowledge Deficit - Standard  Goal: Patient and family/care givers will demonstrate understanding of plan of care, disease process/condition, diagnostic tests and medications  Outcome: Progressing   Pt education given regarding plan of care, pt shows good understanding, will continue to reinforce education and continue to monitor.       Problem: Fall Risk - Rehab  Goal: Patient will remain free from falls  Outcome: Progressing   Pt education given regarding fall precautions AND safety measures, pt shows good understanding, has not attempted to self transfer this shift, will continue to reinforce education and continue to monitor.

## 2022-01-04 NOTE — PROGRESS NOTES
"Rehab Progress Note     Date of Service: 1/4/2022  Chief Complaint: Follow-up stroke    Interval Events (Subjective)    Patient seen and examined today in his room.  His wife is present.  He reports he finally slept well last night did not notice any spasms in his left leg.  He also reports the temperature in his room is excellent.  Wife and patient feel like he will not be ready for discharge home in 3 days.  Wife is concerned about the level of assistance he is currently requiring she is concerned about her being her back and helping him transfer.  They both agree he would benefit from further rehabilitation and skilled nursing facility for several more weeks.  Updated case management.  Patient has no new concerns or complaints today.  We did discuss the possibility patient's inability to return to driving due to his left visual field cut and neglect.    ROS: No changes to bowel, bladder, pain, or mood.        Objective:  VITAL SIGNS: /68   Pulse 64   Temp 36.2 °C (97.2 °F) (Temporal)   Resp 18   Ht 1.664 m (5' 5.51\")   Wt 114 kg (251 lb 5.2 oz)   SpO2 94%   BMI 40.56 kg/m²   Gen: alert, no apparent distress  Neuro: notable for left hemiparesis and incoordination, left visual field cut, left neglect      Recent Results (from the past 72 hour(s))   CBC WITHOUT DIFFERENTIAL    Collection Time: 01/02/22  5:44 AM   Result Value Ref Range    WBC 11.7 (H) 4.8 - 10.8 K/uL    RBC 4.33 (L) 4.70 - 6.10 M/uL    Hemoglobin 12.6 (L) 14.0 - 18.0 g/dL    Hematocrit 38.3 (L) 42.0 - 52.0 %    MCV 88.5 81.4 - 97.8 fL    MCH 29.1 27.0 - 33.0 pg    MCHC 32.9 (L) 33.7 - 35.3 g/dL    RDW 46.1 35.9 - 50.0 fL    Platelet Count 337 164 - 446 K/uL    MPV 8.7 (L) 9.0 - 12.9 fL   URINALYSIS    Collection Time: 01/02/22  1:10 PM   Result Value Ref Range    Color Yellow     Character Clear     Specific Gravity 1.017 <1.035    Ph 5.0 5.0 - 8.0    Glucose Negative Negative mg/dL    Ketones Negative Negative mg/dL    Protein Negative " Negative mg/dL    Bilirubin Negative Negative    Urobilinogen, Urine 0.2 Negative    Nitrite Negative Negative    Leukocyte Esterase Small (A) Negative    Occult Blood Negative Negative    Micro Urine Req Microscopic    URINE MICROSCOPIC (W/UA)    Collection Time: 01/02/22  1:10 PM   Result Value Ref Range    WBC  (A) /hpf    RBC 0-2 (A) /hpf    Bacteria Many (A) None /hpf    Epithelial Cells Negative /hpf    Hyaline Cast 0-2 /lpf   URINE CULTURE-EXISTING-LESS THAN 48 HOURS    Collection Time: 01/02/22  1:10 PM    Specimen: Urine, Clean Catch   Result Value Ref Range    Significant Indicator POS (POS)     Source UR     Site URINE, CLEAN CATCH     Culture Result - (A)     Culture Result (A)      Lactose fermenting Gram negative brown  >100,000 cfu/mL     CBC WITH DIFFERENTIAL    Collection Time: 01/04/22  5:40 AM   Result Value Ref Range    WBC 9.5 4.8 - 10.8 K/uL    RBC 4.18 (L) 4.70 - 6.10 M/uL    Hemoglobin 12.3 (L) 14.0 - 18.0 g/dL    Hematocrit 36.8 (L) 42.0 - 52.0 %    MCV 88.0 81.4 - 97.8 fL    MCH 29.4 27.0 - 33.0 pg    MCHC 33.4 (L) 33.7 - 35.3 g/dL    RDW 45.4 35.9 - 50.0 fL    Platelet Count 358 164 - 446 K/uL    MPV 8.7 (L) 9.0 - 12.9 fL    Neutrophils-Polys 61.50 44.00 - 72.00 %    Lymphocytes 24.00 22.00 - 41.00 %    Monocytes 11.90 0.00 - 13.40 %    Eosinophils 1.70 0.00 - 6.90 %    Basophils 0.60 0.00 - 1.80 %    Immature Granulocytes 0.30 0.00 - 0.90 %    Nucleated RBC 0.00 /100 WBC    Neutrophils (Absolute) 5.81 1.82 - 7.42 K/uL    Lymphs (Absolute) 2.27 1.00 - 4.80 K/uL    Monos (Absolute) 1.13 (H) 0.00 - 0.85 K/uL    Eos (Absolute) 0.16 0.00 - 0.51 K/uL    Baso (Absolute) 0.06 0.00 - 0.12 K/uL    Immature Granulocytes (abs) 0.03 0.00 - 0.11 K/uL    NRBC (Absolute) 0.00 K/uL       Current Facility-Administered Medications   Medication Frequency   • baclofen (LIORESAL) tablet 10 mg QHS   • melatonin tablet 6 mg QHS   • ascorbic acid (Vitamin C) tablet 500 mg QDAY with Breakfast   • hydrALAZINE  (APRESOLINE) tablet 50 mg Q8HRS   • ferrous sulfate tablet 325 mg QDAY with Breakfast   • modafinil (PROVIGIL) tablet 100 mg QAM   • omeprazole (PRILOSEC) capsule 20 mg BID   • Respiratory Therapy Consult Continuous RT   • Pharmacy Consult Request ...Pain Management Review 1 Each PHARMACY TO DOSE   • acetaminophen (Tylenol) tablet 650 mg Q4HRS PRN   • docusate sodium (ENEMEEZ) enema 283 mg QDAY PRN   • sodium phosphate (Fleet) enema 133 mL QDAY PRN   • artificial tears ophthalmic solution 1 Drop PRN   • benzocaine-menthol (CEPACOL) lozenge 1 Lozenge Q2HRS PRN   • mag hydrox-al hydrox-simeth (MAALOX PLUS ES or MYLANTA DS) suspension 20 mL Q2HRS PRN   • ondansetron (ZOFRAN ODT) dispertab 4 mg 4X/DAY PRN    Or   • ondansetron (ZOFRAN) syringe/vial injection 4 mg 4X/DAY PRN   • traZODone (DESYREL) tablet 50 mg QHS PRN   • sodium chloride (OCEAN) 0.65 % nasal spray 2 Spray PRN   • midazolam (VERSED) 5 mg/mL (1 mL vial) PRN   • senna-docusate (PERICOLACE or SENOKOT S) 8.6-50 MG per tablet 2 Tablet BID    And   • polyethylene glycol/lytes (MIRALAX) PACKET 1 Packet QDAY PRN    And   • magnesium hydroxide (MILK OF MAGNESIA) suspension 30 mL QDAY PRN    And   • bisacodyl (DULCOLAX) suppository 10 mg QDAY PRN   • amLODIPine (NORVASC) tablet 10 mg Q DAY   • hydrALAZINE (APRESOLINE) tablet 10 mg TID PRN   • cloNIDine (CATAPRES) tablet 0.1 mg TID PRN   • butalbital/apap/caffeine -40 mg (Fioricet) per tablet 1 Tablet Q6HRS PRN   • atorvastatin (LIPITOR) tablet 40 mg Q EVENING       Orders Placed This Encounter   Procedures   • Diet Order Diet: Regular (solids cut up into bite size pieces, No Straw; t-dine); Tray Modifications (optional): No Straws     Standing Status:   Standing     Number of Occurrences:   1     Order Specific Question:   Diet:     Answer:   Regular [1]     Comments:   solids cut up into bite size pieces, No Straw; t-dine     Order Specific Question:   Tray Modifications (optional)     Answer:   No Straws        Radiology    12/29/2021 6:51 PM     HISTORY/REASON FOR EXAM:  Head trauma, minor (Age >= 65y).  Injury. Pain.     TECHNIQUE/EXAM DESCRIPTION AND NUMBER OF VIEWS:  CT of the head without contrast.     Contiguous axial sections were obtained from the skull base through the vertex.     Up to date radiation dose reduction adjustments have been utilized to meet ALARA standards for radiation dose reduction.     COMPARISON:  12/21/2021     FINDINGS:     Known right temporal and basal ganglia parenchymal hemorrhage measures 3.7 x 5.3 cm and is mildly decreased in size and density compared to the prior examination. Surrounding edema is increased with increased mass effect. There is approximately 7 mm   midline shift to the left. No new hemorrhage is identified. There is a small mucous retention cyst within the left maxillary sinus. Remaining visualized paranasal sinuses and mastoid air cells are clear. Calvarium is intact.     IMPRESSION:     1.  Known right temporal and basal ganglia parenchymal hemorrhage is mildly decreased in size and density compared to the prior examination.  2.  There is, however, increased surrounding edema with increased mass effect and approximately 7 mm midline shift to the left.  3.  No new hemorrhage is identified.          Assessment:  Active Hospital Problems    Diagnosis    • *Hemorrhagic stroke (HCC)    • Fever    • Left-sided neglect    • Impaired mobility and ADLs    • Right ear pain    • Morbid obesity with BMI of 40.0-44.9, adult (HCC)    • GERD (gastroesophageal reflux disease)    • AF (atrial fibrillation) (HCC)    • Hyperlipemia    • Hypertension      This patient is a 71 y.o. male admitted for acute inpatient rehabilitation with Hemorrhagic stroke (HCC).    Estimated discharge date per covering physician at last week's weekly conference is January 7.  We will reassess this on Wednesday as he may need an extension.    Further discussion today with patient and wife determined he  would benefit from several more weeks of rehabilitation at a skilled nursing facility.  This is to decrease the burden of care when he eventually goes home.  He continues to make excellent recovery.  Case management was updated.    Medical Decision Making and Plan:    Large right basal ganglia hemorrhagic stroke  Left hemiparesis, improved  Left visual field cut, continues  Left neglect, continues  Cognitive impairment, improved  Continue full rehab program  PT/OT/SLP, 1 hr each discipline, 5 days per week    Statin    Per neurology okay to restart Xarelto in 2 weeks from admission to rehab, so we will check a CT scan first, patient and wife not interested in restarting, I can discuss this in follow-up with neurology    Outpatient follow-up with stroke bridge, Dr. Figueroa, referrals made    Sedation, improved/resolved  Continue Provigil    Spasticity  Started 10 mg baclofen at night 1/3  Outpatient follow-up with Dr. Figueroa    Headache, resolved  Likely from brain swelling  As needed Tylenol, last use never since re-admission     Hypertension, improved  Amlodipine 10 mg  Losartan 100 mg  Hydralazine 50 mg every 8  As needed clonidine for systolic blood pressures over 160  Appreciate hospitalist assistance     Paroxysmal atrial fibrillation  Xarelto currently on hold for 2 weeks per neurology, wife and patient wished to hold off on restarting for now  May benefit from beta-blocker, per review of outpatient notes has been on metoprolol and amiodarone in the past  Appreciate hospitalist assistance     Edema, improved  Bilateral superficial thrombi upper extremities  Negative lower extremity Dopplers  Positive bilateral upper extremity Dopplers for superficial thrombi, no need for full treatment and contraindicated given his hemorrhage  Repeat Dopplers today    Left arm thrombophlebitis, resolved  Treated with antibiotics    Leukocytosis, resolved  Positive urinalysis  Culture with gram-negative rods  Appreciate  hospitalist assistance     Iron deficiency anemia  Ferrous sulfate and vitamin C  Monitor with weekly labs     GERD/GI prophylaxis  Omeprazole     Insomnia, improved  Schedule melatonin  Added baclofen for spasticity  As needed trazodone     Morbid obesity  Due to excess calories  BMI 40.56  Outpatient nutritional counseling     Bowel program  Continue bowel medications  Last BM 1/3    Bladder program  Check PVRs as needed  Not retaining  Bladder scan for no voids  ICP for over 400 cc  Scheduled toileting    DVT prophylaxis  Contraindicated given hemorrhage.   Compression stockings on in am, off in pm.  Increase mobility. Monitor for swelling.    Total time:  26 minutes.  I spent greater than 50% of the time for patient care, counseling, and coordination on this date, including patient face-to face time, unit/floor time with review of records/pertinent lab data and studies, as well as discussing diagnostic evaluation/work up, planned therapeutic interventions, and future disposition of care, as per the interval events/subjective and the assessment and plan as noted above.    I have performed a physical exam, reviewed and updated ROS, as well as the assessment and plan today 1/4/2022. In review of note from 1/3/2021 there are no new changes except as documented above.                  Valencia Ramirez M.D.  Physical Medicine and Rehabilitation

## 2022-01-04 NOTE — THERAPY
"Occupational Therapy  Daily Treatment     Patient Name: Gokul Baca  Age:  71 y.o., Sex:  male  Medical Record #: [id number]  Today's Date: 1/4/2022     Precautions  Precautions: (P) Fall Risk,Other (See Comments)  Comments: (P) L maida, L neglect    Safety   ADL Safety : Requires Supervision for Safety,Requires Physical Assist for Safety,Impaired Insight into Safety,Requires Cueing for Safety  Bathroom Safety: Requires Supervision for Safety,Impaired Insight into Safety,Requires Physical Assist for Safety,Requires Cuing for Safety  Comments: see functional levels below for ADL performance details.    Subjective    \"I'm ready to work\"     Objective       01/04/22 0901   Precautions   Precautions Fall Risk;Other (See Comments)   Comments L maida, L neglect   Cognition    Level of Consciousness Alert   Functional Level of Assist   Grooming Supervision;Seated   Grooming Description Increased time;Supervision for safety;Verbal cueing  (cues for visually scanning to locate items)   Toileting Maximal Assist   Toileting Description Assist for standing balance;Grab bar;Assist to pull pants up;Assist for hygiene;Initial preparation for task;Verbal cueing;Supervision for safety   Toilet Transfers Minimal Assist   Toilet Transfer Description Grab bar;Increased time;Initial preparation for task;Set-up of equipment;Supervision for safety;Verbal cueing  (w/c<>toilet stand step via GB)   Interdisciplinary Plan of Care Collaboration   IDT Collaboration with  Nursing   Patient Position at End of Therapy Seated;Chair Alarm On;Self Releasing Lap Belt Applied;Call Light within Reach;Tray Table within Reach;Phone within Reach  (seatbelt alarm on)   Collaboration Comments RN for medications   OT Total Time Spent   OT Individual Total Time Spent (Mins) 60   OT Charge Group   OT Self Care / ADL 4     Reviewed pictures of pt's home that spouse sent, including bathroom, bedroom, hallways and entry.   Threshold shower transfer training " completed to simulate pt's home set up. He has a threshold shower with glass door, horizontal GB on inner wall, HHSH, and shower chair with armrests. Pt completed transfer from FWW using GB with min A and initial demonstration/cues for sequencing. Discussed benefit of installing an additional GB closer to shower entrance for added safety.  Toilet transfer training completed simulating pt's home set up. Pt has a BSC that he can put over his toilet. Also discussed installing a GB on wall next to toilet for increased safety. Pt completed transfer from FWW level with min A and initial demonstration/cues for sequencing.     Assessment    Pt tolerated OT session well. He is consistently completing transfers with min A and use of GB. Pt cont' to require cues for visually scanning environment, and for increased task attention. Pt would benefit from having additional GB installed in his bathroom at home, near toilet and shower entry.   Strengths: Independent prior level of function,Pleasant and cooperative,Supportive family,Willingly participates in therapeutic activities  Barriers: Bladder incontinence,Bowel incontinence,Decreased endurance,Difficulty following instructions,Fatigue,Hemiparesis,Impaired activity tolerance,Impaired balance,Impaired insight/denial of deficits,Impaired functional cognition,Impulsive,Limited mobility    Plan    ADLs, functional transfers, LUE neuro re-ed, RT-300 (ID: [id number], PIN: [id number]), activities to encourage L side attention, family training with spouse (focus on threshold shower transfers, DME needs for d/c).    Occupational Therapy Goals (Active)     Problem: Bathing     Dates: Start: 12/28/21       Goal: STG-Within one week, patient will bathe with min A using AE as needed.     Dates: Start: 12/28/21             Problem: Dressing     Dates: Start: 12/25/21       Goal: STG-Within one week, patient will dress UB     Dates: Start: 12/25/21       Description: 1) Individualized Goal:  at a Min  A level with fading cues for L maida technique.     Goal Note filed on 12/28/21 1443 by Tavia Moreira, OT     Requires mod A.                Problem: Functional Transfers     Dates: Start: 12/25/21       Goal: STG-Within one week, patient will transfer to toilet     Dates: Start: 12/25/21       Description: 1) Individualized Goal:  at a Mod A level with AD/DME PRN.     Goal Note filed on 12/28/21 1443 by Tavia Moreira, OT     Con't to require mod-max x2 for safety.               Problem: OT Long Term Goals     Dates: Start: 12/25/21       Goal: LTG-By discharge, patient will complete basic self care tasks     Dates: Start: 12/25/21       Description: 1) Individualized Goal:  at a Min A to Mod I level with AD/AE/DME PRN.        Goal: LTG-By discharge, patient will perform bathroom transfers     Dates: Start: 12/25/21       Description: 1) Individualized Goal:  at a Min A to Mod I level with use of AE/AD/DME PRN.

## 2022-01-04 NOTE — CARE PLAN
"The patient is Stable - Low risk of patient condition declining or worsening    Shift Goals  Clinical Goals: sleep  Patient Goals: sleep well    Progress made toward(s) clinical / shift goals:    Problem: Fall Risk - Rehab  Goal: Patient will remain free from falls  Note: [facility] [place] Fall risk Assessment Score: 17    High fall risk Interventions   - Alarming seatbelt  - Bed and strip alarm   - Yellow sign by the door   - Yellow wrist band \"Fall risk\"  - Room near to the nurse station  - Do not leave patient unattended in the bathroom  - Fall risk education provided    Pt uses call light consistently and appropriately. Waits for assistance does not attempt self transfer this shift. Able to verbalize needs.      Problem: Pain - Standard  Goal: Alleviation of pain or a reduction in pain to the patient’s comfort goal  Note: Patient able to verbalize pain level and verbalize an acceptable level of pain.    Patient received Melatonin and Trazodone ( per request ) last night. Sleeping comfortably during rounds. NO non-verbal cues of pain or discomfort. VSS.          "

## 2022-01-04 NOTE — ASSESSMENT & PLAN NOTE
Had recurrent elevated WBC on 1/2/22  UA  WBC w/ many bacteria  UCx >100,000 Klebsiella, pansensitive except to Unasyn  CXR left perihilar atelectasis vs pneumonitis, started IS  Continue Omnicef x 5 days

## 2022-01-04 NOTE — THERAPY
Speech Language Pathology  Daily Treatment     Patient Name: Gokul Baca  Age:  71 y.o., Sex:  male  Medical Record #: [id number]  Today's Date: 1/4/2022     Precautions  Precautions: Fall Risk,Other (See Comments)  Comments: L maida, L neglect    Subjective    Pt pleasant and cooperative, SO present and supportive, both with many questions and redirection to task and stroke education at hand.      Objective       01/04/22 1003   SLP Total Time Spent   SLP Individual Total Time Spent (Mins) 60   Treatment Charges   SLP Cognitive Skill Development First 15 Minutes 1   SLP Cognitive Skill Development Additional 15 Minutes 3       Assessment    Pt cont functional reading task from day prior addressing left neglect with MIN cues overall. Pt with good explanation to SO re current cognitive deficits and impact on completion of daily tasks and activities. Pt educated that returning to driving will not be rec at this time with pt to follow up with MD after ongoing intervention and recovery. Stroke education initiated however unable to be completed in its entirety due to time constraints.     Strengths: Good insight into deficits/needs,Independent prior level of function,Making steady progress towards goals,Pleasant and cooperative,Supportive family,Motivated for self care and independence,Willingly participates in therapeutic activities,Effective communication skills,Able to follow instructions  Barriers: Impaired functional cognition,Hemiparesis (mild dysarthria)    Plan    Cont stroke education     Speech Therapy Problems (Active)     Problem: Memory STGs     Dates: Start: 12/25/21       Goal: STG-Within one week, patient will     Dates: Start: 12/25/21       Description: Participate in administration of complete standardized cog-ling evaluation to better determine functional cog-ling needs and better guide POC.      Goal Note filed on 12/29/21 8439 by Annmarie Nguyễn MS,CCC-SLP     Should be completed today with  subsequent goals pending results.                Problem: Problem Solving STGs     Dates: Start: 12/25/21       Goal: STG-Within one week, patient will     Dates: Start: 12/25/21       Description: Complete visual scanning tasks (e.g. cancellation tasks, functional reading) with 80% acc. And moderate assistance to attend to left side.     Goal Note filed on 12/29/21 1309 by Annmarie Nguyễn MS,CCC-SLP     Will continue to target.             Goal: STG-Within one week, patient will     Dates: Start: 12/29/21       Description: Patient will demonstrate safety planning, problem solving and sequencing for self care and balance maintenace with 80% acc with min cues to improve.          Problem: Speech/Swallowing LTGs     Dates: Start: 12/25/21       Goal: LTG-By discharge, patient will safely swallow     Dates: Start: 12/25/21       Description: Safest/least restrictive diet to maintain adequate nutrition and hydration.        Goal: LTG-By discharge, patient will     Dates: Start: 12/25/21       Description: Demonstrate independent level of cog-ling functions to ensure safe return to PLOF.          Problem: Swallowing STGs     Dates: Start: 12/25/21

## 2022-01-04 NOTE — PROGRESS NOTES
Hospital Medicine Daily Progress Note      Chief Complaint  Fever  Leukocytosis  Atrial Fibrillation  Hypertension    Interval Problem Update  Pt reports no more pain in right ear and decreased pain and swelling in left arm.    Review of Systems  Review of Systems   Constitutional: Negative for chills and fever.   HENT: Negative.    Eyes: Negative.    Respiratory: Negative for cough and shortness of breath.    Cardiovascular: Negative for chest pain and palpitations.   Gastrointestinal: Negative for abdominal pain, nausea and vomiting.   Skin: Negative for itching and rash.   Neurological: Positive for focal weakness.   Endo/Heme/Allergies: Negative for polydipsia. Does not bruise/bleed easily.        Physical Exam  Temp:  [36.2 °C (97.2 °F)-36.8 °C (98.3 °F)] 36.2 °C (97.2 °F)  Pulse:  [64-75] 64  Resp:  [18-20] 18  BP: (111-134)/(68-72) 111/68  SpO2:  [91 %-96 %] 94 %    Physical Exam  Vitals reviewed.   Constitutional:       General: He is not in acute distress.     Appearance: Normal appearance. He is not ill-appearing.   HENT:      Head: Normocephalic and atraumatic.      Right Ear: External ear normal.      Left Ear: External ear normal.      Nose: Nose normal.      Mouth/Throat:      Pharynx: Oropharynx is clear.   Eyes:      General:         Right eye: No discharge.         Left eye: No discharge.      Extraocular Movements: Extraocular movements intact.      Conjunctiva/sclera: Conjunctivae normal.   Cardiovascular:      Rate and Rhythm: Normal rate and regular rhythm.   Pulmonary:      Effort: Pulmonary effort is normal. No respiratory distress.      Breath sounds: Normal breath sounds. No wheezing.   Abdominal:      General: Bowel sounds are normal. There is no distension.      Palpations: Abdomen is soft.      Tenderness: There is no abdominal tenderness. There is no guarding or rebound.   Musculoskeletal:      Cervical back: Normal range of motion and neck supple.      Left lower leg: Edema present.       Comments: BUE trace edema   Skin:     General: Skin is warm and dry.   Neurological:      Mental Status: He is alert.      Comments: Awake and alert         Fluids    Intake/Output Summary (Last 24 hours) at 1/4/2022 1010  Last data filed at 1/4/2022 0835  Gross per 24 hour   Intake 720 ml   Output 400 ml   Net 320 ml       Laboratory  Recent Labs     01/02/22  0544 01/04/22  0540   WBC 11.7* 9.5   RBC 4.33* 4.18*   HEMOGLOBIN 12.6* 12.3*   HEMATOCRIT 38.3* 36.8*   MCV 88.5 88.0   MCH 29.1 29.4   MCHC 32.9* 33.4*   RDW 46.1 45.4   PLATELETCT 337 358   MPV 8.7* 8.7*                     Assessment/Plan  * Hemorrhagic stroke (HCC)- (present on admission)  Assessment & Plan  Had dysarthria, facial droop, and left hemiparesis w/ presenting   CT right basal ganglia hemorrhage with mild mass effect  Non-surgical management  Chronic anticoagulation reversed  S/P Nicardipine gtt, BP management  S/P hypertonic saline, now off NaCl  On Provigil per Physiatry    Leukocytosis  Assessment & Plan  Had recurrent elevated WBC on 1/2/22  UA  WBC w/ many bacteria  UCx pending  Need to check CXR as well    Anemia- (present on admission)  Assessment & Plan  Has downtrending Hb w/ normocytic indices  Suspect hemodilution from IV abx volume load  Fe 34, now on Fe and Vit C supplements  Follow H/H    Edema- (present on admission)  Assessment & Plan  U/S LUE +SVT, negative DVT  U/S LLE negative DVT  U/S RUE +SVT, negative DVT  Completed Zosyn for LUE thrombophlebitis  Request F/U Venous Duplexes to assess for propagation    UTI (urinary tract infection)- (present on admission)  Assessment & Plan  Tmax 101.9 w/ new leukocytosis on 12/25/21  UA 10-20 WBC w/ negative bacteria  UCx 10-50,000 E Coli, pansensitive except to Levaquin  BCx x 2 NGTD  Completed Zosyn 1/1/22    Otitis externa- (present on admission)  Assessment & Plan  Otoscopic Exam by me 12/25/21:  TM difficult to visualize due to cerumen, R auditory canal  erythematous  Concerning for otitis externa  Ideally, would have treated w/ Cipro Otic gtts but not available at this facility  Was on Zosyn for UTI and Thrombophlebitis, now completed  Unable to do F/U Otoscopic Exam 12/27/21 due to non-functioning equipment  Otoscopic Exam per Dr. Santiago 12/29/21:  R auditory canal w/ resolved erythema    Hypertension- (present on admission)  Assessment & Plan  On Norvasc and Hydralazine  Now off Losartan  Observe blood pressure trends     Hyperlipemia- (present on admission)  Assessment & Plan  On Lipitor    AF (atrial fibrillation) (HCC)- (present on admission)  Assessment & Plan  Not on any rate controlling agents  Monitor for tachydysrhythmias  On chronic Xarelto  Anticoagulation presently on hold due to ICH     Full Code

## 2022-01-05 LAB
ALBUMIN SERPL BCP-MCNC: 3.9 G/DL (ref 3.2–4.9)
ALBUMIN/GLOB SERPL: 1.3 G/DL
ALP SERPL-CCNC: 82 U/L (ref 30–99)
ALT SERPL-CCNC: 19 U/L (ref 2–50)
ANION GAP SERPL CALC-SCNC: 14 MMOL/L (ref 7–16)
AST SERPL-CCNC: 21 U/L (ref 12–45)
BACTERIA UR CULT: ABNORMAL
BACTERIA UR CULT: ABNORMAL
BILIRUB SERPL-MCNC: 0.3 MG/DL (ref 0.1–1.5)
BUN SERPL-MCNC: 17 MG/DL (ref 8–22)
CALCIUM SERPL-MCNC: 8.9 MG/DL (ref 8.5–10.5)
CHLORIDE SERPL-SCNC: 99 MMOL/L (ref 96–112)
CO2 SERPL-SCNC: 22 MMOL/L (ref 20–33)
CREAT SERPL-MCNC: 0.75 MG/DL (ref 0.5–1.4)
ERYTHROCYTE [DISTWIDTH] IN BLOOD BY AUTOMATED COUNT: 45.4 FL (ref 35.9–50)
GLOBULIN SER CALC-MCNC: 2.9 G/DL (ref 1.9–3.5)
GLUCOSE SERPL-MCNC: 95 MG/DL (ref 65–99)
HCT VFR BLD AUTO: 38 % (ref 42–52)
HGB BLD-MCNC: 12.6 G/DL (ref 14–18)
MAGNESIUM SERPL-MCNC: 1.9 MG/DL (ref 1.5–2.5)
MCH RBC QN AUTO: 29.5 PG (ref 27–33)
MCHC RBC AUTO-ENTMCNC: 33.2 G/DL (ref 33.7–35.3)
MCV RBC AUTO: 89 FL (ref 81.4–97.8)
PHOSPHATE SERPL-MCNC: 3.4 MG/DL (ref 2.5–4.5)
PLATELET # BLD AUTO: 361 K/UL (ref 164–446)
PMV BLD AUTO: 8.6 FL (ref 9–12.9)
POTASSIUM SERPL-SCNC: 3.8 MMOL/L (ref 3.6–5.5)
PROT SERPL-MCNC: 6.8 G/DL (ref 6–8.2)
RBC # BLD AUTO: 4.27 M/UL (ref 4.7–6.1)
SIGNIFICANT IND 70042: ABNORMAL
SITE SITE: ABNORMAL
SODIUM SERPL-SCNC: 135 MMOL/L (ref 135–145)
SOURCE SOURCE: ABNORMAL
WBC # BLD AUTO: 8.6 K/UL (ref 4.8–10.8)

## 2022-01-05 PROCEDURE — 83735 ASSAY OF MAGNESIUM: CPT

## 2022-01-05 PROCEDURE — 700102 HCHG RX REV CODE 250 W/ 637 OVERRIDE(OP): Performed by: HOSPITALIST

## 2022-01-05 PROCEDURE — 97530 THERAPEUTIC ACTIVITIES: CPT | Mod: CQ

## 2022-01-05 PROCEDURE — 99233 SBSQ HOSP IP/OBS HIGH 50: CPT | Performed by: HOSPITALIST

## 2022-01-05 PROCEDURE — A9270 NON-COVERED ITEM OR SERVICE: HCPCS | Performed by: HOSPITALIST

## 2022-01-05 PROCEDURE — A9270 NON-COVERED ITEM OR SERVICE: HCPCS | Performed by: PHYSICAL MEDICINE & REHABILITATION

## 2022-01-05 PROCEDURE — 84100 ASSAY OF PHOSPHORUS: CPT

## 2022-01-05 PROCEDURE — 80053 COMPREHEN METABOLIC PANEL: CPT

## 2022-01-05 PROCEDURE — 700102 HCHG RX REV CODE 250 W/ 637 OVERRIDE(OP): Performed by: PHYSICAL MEDICINE & REHABILITATION

## 2022-01-05 PROCEDURE — 97535 SELF CARE MNGMENT TRAINING: CPT

## 2022-01-05 PROCEDURE — 97130 THER IVNTJ EA ADDL 15 MIN: CPT

## 2022-01-05 PROCEDURE — 97116 GAIT TRAINING THERAPY: CPT | Mod: CQ

## 2022-01-05 PROCEDURE — 770010 HCHG ROOM/CARE - REHAB SEMI PRIVAT*

## 2022-01-05 PROCEDURE — 99233 SBSQ HOSP IP/OBS HIGH 50: CPT | Performed by: PHYSICAL MEDICINE & REHABILITATION

## 2022-01-05 PROCEDURE — 97129 THER IVNTJ 1ST 15 MIN: CPT

## 2022-01-05 PROCEDURE — 97112 NEUROMUSCULAR REEDUCATION: CPT | Mod: CQ

## 2022-01-05 PROCEDURE — 36415 COLL VENOUS BLD VENIPUNCTURE: CPT

## 2022-01-05 PROCEDURE — 85027 COMPLETE CBC AUTOMATED: CPT

## 2022-01-05 PROCEDURE — 97530 THERAPEUTIC ACTIVITIES: CPT

## 2022-01-05 RX ORDER — CEFDINIR 300 MG/1
300 CAPSULE ORAL EVERY 12 HOURS
Status: DISCONTINUED | OUTPATIENT
Start: 2022-01-05 | End: 2022-01-06 | Stop reason: HOSPADM

## 2022-01-05 RX ADMIN — SENNOSIDES AND DOCUSATE SODIUM 2 TABLET: 50; 8.6 TABLET ORAL at 21:00

## 2022-01-05 RX ADMIN — ATORVASTATIN CALCIUM 40 MG: 40 TABLET, FILM COATED ORAL at 21:00

## 2022-01-05 RX ADMIN — AMLODIPINE BESYLATE 10 MG: 5 TABLET ORAL at 05:42

## 2022-01-05 RX ADMIN — OXYCODONE HYDROCHLORIDE AND ACETAMINOPHEN 500 MG: 500 TABLET ORAL at 10:25

## 2022-01-05 RX ADMIN — HYDRALAZINE HYDROCHLORIDE 50 MG: 25 TABLET, FILM COATED ORAL at 21:00

## 2022-01-05 RX ADMIN — SENNOSIDES AND DOCUSATE SODIUM 2 TABLET: 50; 8.6 TABLET ORAL at 10:24

## 2022-01-05 RX ADMIN — BACLOFEN 10 MG: 10 TABLET ORAL at 21:00

## 2022-01-05 RX ADMIN — OMEPRAZOLE 20 MG: 20 CAPSULE, DELAYED RELEASE ORAL at 10:25

## 2022-01-05 RX ADMIN — CEFDINIR 300 MG: 300 CAPSULE ORAL at 15:45

## 2022-01-05 RX ADMIN — OMEPRAZOLE 20 MG: 20 CAPSULE, DELAYED RELEASE ORAL at 21:00

## 2022-01-05 RX ADMIN — MODAFINIL 100 MG: 100 TABLET ORAL at 10:24

## 2022-01-05 RX ADMIN — CEFDINIR 300 MG: 300 CAPSULE ORAL at 20:59

## 2022-01-05 RX ADMIN — TRAZODONE HYDROCHLORIDE 50 MG: 50 TABLET ORAL at 21:00

## 2022-01-05 RX ADMIN — HYDRALAZINE HYDROCHLORIDE 50 MG: 25 TABLET, FILM COATED ORAL at 05:42

## 2022-01-05 RX ADMIN — HYDRALAZINE HYDROCHLORIDE 50 MG: 25 TABLET, FILM COATED ORAL at 15:45

## 2022-01-05 RX ADMIN — FERROUS SULFATE TAB 325 MG (65 MG ELEMENTAL FE) 325 MG: 325 (65 FE) TAB at 10:25

## 2022-01-05 RX ADMIN — MELATONIN TAB 3 MG 6 MG: 3 TAB at 21:00

## 2022-01-05 ASSESSMENT — ACTIVITIES OF DAILY LIVING (ADL)
TOILET_TRANSFER_DESCRIPTION: GRAB BAR;INCREASED TIME;SUPERVISION FOR SAFETY;VERBAL CUEING
TOILETING_LEVEL_OF_ASSIST_DESCRIPTION: ASSIST FOR STANDING BALANCE;GRAB BAR;INCREASED TIME;SET-UP OF EQUIPMENT;SUPERVISION FOR SAFETY;VERBAL CUEING
BED_CHAIR_WHEELCHAIR_TRANSFER_DESCRIPTION: ADAPTIVE EQUIPMENT;INCREASED TIME;SET-UP OF EQUIPMENT;SUPERVISION FOR SAFETY;VERBAL CUEING

## 2022-01-05 ASSESSMENT — ENCOUNTER SYMPTOMS
EYES NEGATIVE: 1
POLYDIPSIA: 0
PALPITATIONS: 0
VOMITING: 0
BRUISES/BLEEDS EASILY: 0
FOCAL WEAKNESS: 1
ABDOMINAL PAIN: 0
NAUSEA: 0
SHORTNESS OF BREATH: 0
COUGH: 0
CHILLS: 0
FEVER: 0

## 2022-01-05 ASSESSMENT — GAIT ASSESSMENTS
DISTANCE (FEET): 40
ASSISTIVE DEVICE: FRONT WHEEL WALKER

## 2022-01-05 NOTE — DISCHARGE PLANNING
Patient accepted at [facility] for tomorrow at 3pm.  Updated unit clerk, wife, therapy scheduling and MD.

## 2022-01-05 NOTE — THERAPY
"Physical Therapy   Daily Treatment     Patient Name: Gokul Baca  Age:  71 y.o., Sex:  male  Medical Record #: [id number]  Today's Date: 1/5/2022     Precautions  Precautions: Fall Risk  Comments: L maida, task attention    Subjective    Pt resting in the bed, agreed to therapy      Objective       01/05/22 1401   Pain   Intervention Declines   Gait Functional Level of Assist    Gait Level Of Assist   (vector with ~ 15lb BWS)   Assistive Device Front Wheel Walker   Distance (Feet) 40   # of Times Distance was Traveled 2   Deviation Increased Base Of Support;Ataxic;Decreased Heel Strike;Decreased Toe Off  (cues for task attention )   Wheelchair Functional Level of Assist   Wheelchair Assist Stand by Assist   Distance Wheelchair (Feet or Distance) 25   Wheelchair Description Extra time;Impaired coordination;Limited by fatigue;Safety concerns;Supervision for safety;Verbal cueing   Stairs Functional Level of Assist   Level of Assist with Stairs Minimal Assist   # of Stairs Climbed 1   Stairs Description Supervision for safety;Safety concerns;Extra time;Hand rails  (4\" step in // with B UE support (vector harness))   Transfer Functional Level of Assist   Bed, Chair, Wheelchair Transfer Contact Guard Assist   Bed Chair Wheelchair Transfer Description Adaptive equipment;Increased time;Set-up of equipment;Supervision for safety;Verbal cueing  (cues for walker management)   Sitting Lower Body Exercises   Sitting Lower Body Exercises Yes   Sit to Stand 1 set of 10;Other Resistance (See Comments)  (1 x 5 from bed <> FWW CGA)   Neuro-Muscular Treatments   Neuro-Muscular Treatments Sequencing;Weight Shift Left;Verbal Cuing;Postural Facilitation   Comments gait training in vector harness with 15lbs BWS pt amb with FWW 40' x 1 and 45' x 1    Interdisciplinary Plan of Care Collaboration   IDT Collaboration with  Family / Caregiver;Occupational Therapist   Patient Position at End of Therapy Seated;Self Releasing Lap Belt " Applied;Call Light within Reach   Collaboration Comments Dc planning with pt and spouse via telephone ramp vs hand rails for home entry   PT Total Time Spent   PT Individual Total Time Spent (Mins) 60   PT Charge Group   PT Gait Training 2   PT Neuromuscular Re-Education / Balance 1   PT Therapeutic Activities 1   Supervising Physical Therapist Mychal See     discussed indications for and benefits of aquatic therapy, pt is interested in participating SO will bring in swimming trunks    STS transfer training focusing on hand placement, equal wbing and glute activation    Discussed via phone with patient and spouse home entry, pt has 1 ERICA through garage, recommending hand rails vs ramp     Assessment    Pt cont to require significant re directions to task during structured therapy session, improving endurance and gait tolerance noted, highly motivated toward independence with mobility.barriers include L neglect, impaired balance and generalized weakness  Strengths: Able to follow instructions,Alert and oriented,Independent prior level of function,Motivated for self care and independence,Pleasant and cooperative,Supportive family,Willingly participates in therapeutic activities  Barriers: Decreased endurance,Difficulty following instructions,Hemiplegia,Fatigue,Home accessibility,Impaired activity tolerance,Impaired balance,Impaired carryover of learning,Impaired functional cognition,Impulsive,Limited mobility    Plan    Bed mobility on std bed, stand pivot transfer training, neuro re ed, L LE motor control, standing balance, gait with FWW for endurance/ hand rail to focus on gait quality, initiate step in //, initial set up on  L LE     Passport items to be completed:  Passport items to be completed:  Get in/out of bed safely, in/out of a vehicle, safely use mobility device, walk or wheel around home/community, navigate up and down stairs, show how to get up/down from the ground, ensure home is accessible,  demonstrate HEP, complete caregiver training    Physical Therapy Problems (Active)     Problem: Mobility     Dates: Start: 01/05/22       Goal: STG-Within one week, patient will ambulate 30ft with min A and most appropriate AD.     Dates: Start: 01/05/22          Goal: STG-Within one week, patient will ambulate up/down 2 stairs with L ascending rail and min A.     Dates: Start: 01/05/22             Problem: Mobility Transfers     Dates: Start: 01/05/22       Goal: STG-Within one week, patient will perform bed mobility with supervision using maida techniques.     Dates: Start: 01/05/22          Goal: STG-Within one week, patient will transfer one surface to another with min A using AD as needed.     Dates: Start: 01/05/22          Goal: STG-Within one week, patient will ambulate up/down 2 stairs with L ascending rail and min A.     Dates: Start: 01/05/22             Problem: PT-Long Term Goals     Dates: Start: 12/25/21       Goal: LTG-By discharge, patient will ambulate 30ft with min A and most appropriate AD.     Dates: Start: 12/25/21          Goal: LTG-By discharge, patient will transfer one surface to another with min A using AD as needed.     Dates: Start: 12/25/21          Goal: LTG-By discharge, patient will ambulate up/down 2 stairs with L ascending rail and min A.     Dates: Start: 12/25/21          Goal: LTG-By discharge, patient will transfer in/out of a car with mod A using AD as needed.     Dates: Start: 12/25/21          Goal: LTG-By discharge, patient will perform bed mobility with supervision using maida techniques.     Dates: Start: 12/25/21

## 2022-01-05 NOTE — CARE PLAN
Problem: Pain - Standard  Goal: Alleviation of pain or a reduction in pain to the patient’s comfort goal  Outcome: Progressing  Note: Patient able to verbalize pain level and verbalize an acceptable level of pain.    The patient is Stable - Low risk of patient condition declining or worsening    Shift Goals  Clinical Goals: sleep  Patient Goals: sleep well    Progress made toward(s) clinical / shift goals:  wnl    Patient is not progressing towards the following goals:

## 2022-01-05 NOTE — CARE PLAN
Problem: Problem Solving STGs  Goal: STG-Within one week, patient will  Description: Patient will demonstrate safety planning, problem solving and sequencing for self care and balance maintenace with 80% acc with min cues to improve.  Outcome: Not Met     Problem: Problem Solving STGs  Goal: STG-Within one week, patient will  Description: Complete visual scanning tasks (e.g. cancellation tasks, functional reading) with 80% acc. And moderate assistance to attend to left side.   Outcome: Met     Problem: Memory STGs  Goal: STG-Within one week, patient will  Description: Participate in administration of complete standardized cog-ling evaluation to better determine functional cog-ling needs and better guide POC.    Outcome: Met

## 2022-01-05 NOTE — PROGRESS NOTES
"Rehab Progress Note     Date of Service: 1/5/2022  Chief Complaint: Follow-up stroke    Interval Events (Subjective)    Patient seen and examined today in his room.  His wife is at bedside.  Her talking to potential case against his neighbors with a feel caused his high blood pressure and then caused his hemorrhagic stroke.  Patient reports he slept very well last night two nights in a row.  His urinalysis did come back with Klebsiella which she started on antibiotics per the hospitalist.  In addition he continues to have superficial thrombus in his arms for which this will need to be monitored further once he discharges to skilled nursing facility.  Patient has no new concerns or complaints today.    ROS: No changes to bowel, bladder, pain, mood, or sleep.           Objective:  VITAL SIGNS: /67   Pulse 67   Temp 36.8 °C (98.2 °F) (Oral)   Resp 20   Ht 1.664 m (5' 5.51\")   Wt 114 kg (251 lb 5.2 oz)   SpO2 94%   BMI 40.56 kg/m²   Gen: alert, no apparent distress  Neuro: notable for left neglect, left-sided incoordination, left visual field cut      Recent Results (from the past 72 hour(s))   CBC WITH DIFFERENTIAL    Collection Time: 01/04/22  5:40 AM   Result Value Ref Range    WBC 9.5 4.8 - 10.8 K/uL    RBC 4.18 (L) 4.70 - 6.10 M/uL    Hemoglobin 12.3 (L) 14.0 - 18.0 g/dL    Hematocrit 36.8 (L) 42.0 - 52.0 %    MCV 88.0 81.4 - 97.8 fL    MCH 29.4 27.0 - 33.0 pg    MCHC 33.4 (L) 33.7 - 35.3 g/dL    RDW 45.4 35.9 - 50.0 fL    Platelet Count 358 164 - 446 K/uL    MPV 8.7 (L) 9.0 - 12.9 fL    Neutrophils-Polys 61.50 44.00 - 72.00 %    Lymphocytes 24.00 22.00 - 41.00 %    Monocytes 11.90 0.00 - 13.40 %    Eosinophils 1.70 0.00 - 6.90 %    Basophils 0.60 0.00 - 1.80 %    Immature Granulocytes 0.30 0.00 - 0.90 %    Nucleated RBC 0.00 /100 WBC    Neutrophils (Absolute) 5.81 1.82 - 7.42 K/uL    Lymphs (Absolute) 2.27 1.00 - 4.80 K/uL    Monos (Absolute) 1.13 (H) 0.00 - 0.85 K/uL    Eos (Absolute) 0.16 0.00 - " 0.51 K/uL    Baso (Absolute) 0.06 0.00 - 0.12 K/uL    Immature Granulocytes (abs) 0.03 0.00 - 0.11 K/uL    NRBC (Absolute) 0.00 K/uL   CBC WITHOUT DIFFERENTIAL    Collection Time: 01/05/22  6:40 AM   Result Value Ref Range    WBC 8.6 4.8 - 10.8 K/uL    RBC 4.27 (L) 4.70 - 6.10 M/uL    Hemoglobin 12.6 (L) 14.0 - 18.0 g/dL    Hematocrit 38.0 (L) 42.0 - 52.0 %    MCV 89.0 81.4 - 97.8 fL    MCH 29.5 27.0 - 33.0 pg    MCHC 33.2 (L) 33.7 - 35.3 g/dL    RDW 45.4 35.9 - 50.0 fL    Platelet Count 361 164 - 446 K/uL    MPV 8.6 (L) 9.0 - 12.9 fL   Comp Metabolic Panel    Collection Time: 01/05/22  6:40 AM   Result Value Ref Range    Sodium 135 135 - 145 mmol/L    Potassium 3.8 3.6 - 5.5 mmol/L    Chloride 99 96 - 112 mmol/L    Co2 22 20 - 33 mmol/L    Anion Gap 14.0 7.0 - 16.0    Glucose 95 65 - 99 mg/dL    Bun 17 8 - 22 mg/dL    Creatinine 0.75 0.50 - 1.40 mg/dL    Calcium 8.9 8.5 - 10.5 mg/dL    AST(SGOT) 21 12 - 45 U/L    ALT(SGPT) 19 2 - 50 U/L    Alkaline Phosphatase 82 30 - 99 U/L    Total Bilirubin 0.3 0.1 - 1.5 mg/dL    Albumin 3.9 3.2 - 4.9 g/dL    Total Protein 6.8 6.0 - 8.2 g/dL    Globulin 2.9 1.9 - 3.5 g/dL    A-G Ratio 1.3 g/dL   MAGNESIUM    Collection Time: 01/05/22  6:40 AM   Result Value Ref Range    Magnesium 1.9 1.5 - 2.5 mg/dL   PHOSPHORUS    Collection Time: 01/05/22  6:40 AM   Result Value Ref Range    Phosphorus 3.4 2.5 - 4.5 mg/dL   ESTIMATED GFR    Collection Time: 01/05/22  6:40 AM   Result Value Ref Range    GFR If African American >60 >60 mL/min/1.73 m 2    GFR If Non African American >60 >60 mL/min/1.73 m 2       Current Facility-Administered Medications   Medication Frequency   • cefdinir (OMNICEF) capsule 300 mg Q12HRS   • baclofen (LIORESAL) tablet 10 mg QHS   • melatonin tablet 6 mg QHS   • ascorbic acid (Vitamin C) tablet 500 mg QDAY with Breakfast   • hydrALAZINE (APRESOLINE) tablet 50 mg Q8HRS   • ferrous sulfate tablet 325 mg QDAY with Breakfast   • modafinil (PROVIGIL) tablet 100 mg QAM    • omeprazole (PRILOSEC) capsule 20 mg BID   • Respiratory Therapy Consult Continuous RT   • Pharmacy Consult Request ...Pain Management Review 1 Each PHARMACY TO DOSE   • acetaminophen (Tylenol) tablet 650 mg Q4HRS PRN   • docusate sodium (ENEMEEZ) enema 283 mg QDAY PRN   • sodium phosphate (Fleet) enema 133 mL QDAY PRN   • artificial tears ophthalmic solution 1 Drop PRN   • benzocaine-menthol (CEPACOL) lozenge 1 Lozenge Q2HRS PRN   • mag hydrox-al hydrox-simeth (MAALOX PLUS ES or MYLANTA DS) suspension 20 mL Q2HRS PRN   • ondansetron (ZOFRAN ODT) dispertab 4 mg 4X/DAY PRN    Or   • ondansetron (ZOFRAN) syringe/vial injection 4 mg 4X/DAY PRN   • traZODone (DESYREL) tablet 50 mg QHS PRN   • sodium chloride (OCEAN) 0.65 % nasal spray 2 Spray PRN   • midazolam (VERSED) 5 mg/mL (1 mL vial) PRN   • senna-docusate (PERICOLACE or SENOKOT S) 8.6-50 MG per tablet 2 Tablet BID    And   • polyethylene glycol/lytes (MIRALAX) PACKET 1 Packet QDAY PRN    And   • magnesium hydroxide (MILK OF MAGNESIA) suspension 30 mL QDAY PRN    And   • bisacodyl (DULCOLAX) suppository 10 mg QDAY PRN   • amLODIPine (NORVASC) tablet 10 mg Q DAY   • hydrALAZINE (APRESOLINE) tablet 10 mg TID PRN   • cloNIDine (CATAPRES) tablet 0.1 mg TID PRN   • butalbital/apap/caffeine -40 mg (Fioricet) per tablet 1 Tablet Q6HRS PRN   • atorvastatin (LIPITOR) tablet 40 mg Q EVENING       Orders Placed This Encounter   Procedures   • Diet Order Diet: Regular (solids cut up into bite size pieces, No Straw; t-dine); Tray Modifications (optional): No Straws     Standing Status:   Standing     Number of Occurrences:   1     Order Specific Question:   Diet:     Answer:   Regular [1]     Comments:   solids cut up into bite size pieces, No Straw; t-dine     Order Specific Question:   Tray Modifications (optional)     Answer:   No Straws       Radiology    12/29/2021 6:51 PM     HISTORY/REASON FOR EXAM:  Head trauma, minor (Age >= 65y).  Injury.  Pain.     TECHNIQUE/EXAM DESCRIPTION AND NUMBER OF VIEWS:  CT of the head without contrast.     Contiguous axial sections were obtained from the skull base through the vertex.     Up to date radiation dose reduction adjustments have been utilized to meet ALARA standards for radiation dose reduction.     COMPARISON:  12/21/2021     FINDINGS:     Known right temporal and basal ganglia parenchymal hemorrhage measures 3.7 x 5.3 cm and is mildly decreased in size and density compared to the prior examination. Surrounding edema is increased with increased mass effect. There is approximately 7 mm   midline shift to the left. No new hemorrhage is identified. There is a small mucous retention cyst within the left maxillary sinus. Remaining visualized paranasal sinuses and mastoid air cells are clear. Calvarium is intact.     IMPRESSION:     1.  Known right temporal and basal ganglia parenchymal hemorrhage is mildly decreased in size and density compared to the prior examination.  2.  There is, however, increased surrounding edema with increased mass effect and approximately 7 mm midline shift to the left.  3.  No new hemorrhage is identified.          Assessment:  Active Hospital Problems    Diagnosis    • *Hemorrhagic stroke (HCC)    • Fever    • Left-sided neglect    • Impaired mobility and ADLs    • Right ear pain    • Morbid obesity with BMI of 40.0-44.9, adult (HCC)    • GERD (gastroesophageal reflux disease)    • AF (atrial fibrillation) (HCC)    • Hyperlipemia    • Hypertension      This patient is a 71 y.o. male admitted for acute inpatient rehabilitation with Hemorrhagic stroke (HCC).    I led and attended the weekly conference today, and agree with the IDT conference documentation and plan of care as noted below.    Date of conference: 1/5/2022    Goals and barriers: See IDT note.    Biggest barriers: Left neglect, left visual field cut, impaired attention    CM/social support: Wife supportive but patient needs to  be more independent prior to his return home    Anticipated DC date: 1/7, to skilled nursing facility for further rehabilitation    Follow up: PCP, [facility], Dr. Figueroa        Medical Decision Making and Plan:    Large right basal ganglia hemorrhagic stroke  Left hemiparesis, improved  Left visual field cut, continues  Left neglect, continues  Cognitive impairment, improved  Continue full rehab program  PT/OT/SLP, 1 hr each discipline, 5 days per week    Statin    Per neurology okay to restart Xarelto in 2 weeks from admission to rehab, so we will check a CT scan first, patient and wife not interested in restarting, they discuss this in follow-up with neurology    Outpatient follow-up with stroke pat, Dr. Figueroa, referrals made    Sedation, improved/resolved  Continue Provigil    Spasticity, improved  Started 10 mg baclofen at night 1/3  Outpatient follow-up with Dr. Figueroa    Headache, resolved  Likely from brain swelling  As needed Tylenol, last use never since re-admission     Hypertension, improved  Amlodipine 10 mg  Hydralazine 50 mg every 8  Losartan discontinued  Appreciate hospitalist assistance     Paroxysmal atrial fibrillation  Xarelto currently on hold for 2 weeks per neurology, wife and patient wished to hold off on restarting for now  May benefit from beta-blocker, per review of outpatient notes has been on metoprolol and amiodarone in the past  Appreciate hospitalist assistance     Edema, improved  Bilateral superficial thrombi upper extremities  Negative lower extremity Dopplers  Positive bilateral upper extremity Dopplers for superficial thrombi, no need for full treatment and contraindicated given his hemorrhage  Repeat Dopplers with extension on the right side post though still superficial  Will need to be monitored at the skilled nursing facility    Left arm thrombophlebitis, resolved  Treated with antibiotics    Leukocytosis, resolved  Positive urinalysis  Culture with gram-negative  rods, Klebsiella  Started on Omnicef  Appreciate hospitalist assistance     Iron deficiency anemia  Ferrous sulfate and vitamin C  Monitor with weekly labs     GERD/GI prophylaxis  Omeprazole     Insomnia, improved/resolved  Scheduled melatonin  Added baclofen for spasticity  As needed trazodone, last use 1/4     Morbid obesity  Due to excess calories  BMI 40.56  Outpatient nutritional counseling     Bowel program  Continue bowel medications  Last BM 1/5    Bladder program  Check PVRs as needed  Not retaining  Bladder scan for no voids  ICP for over 400 cc  Scheduled toileting    DVT prophylaxis  Contraindicated given hemorrhage.   Compression stockings on in am, off in pm.  Increase mobility. Monitor for swelling.    Total time:  40 minutes.  I spent greater than 50% of the time for patient care, counseling, and coordination on this date, including patient face-to face time, unit/floor time with review of records/pertinent lab data and studies, as well as discussing diagnostic evaluation/work up, planned therapeutic interventions, and future disposition of care, as per the interval events/subjective and the assessment and plan as noted above.        Valencia Ramirez M.D.  Physical Medicine and Rehabilitation

## 2022-01-05 NOTE — CARE PLAN
Problem: Skin Integrity  Goal: Skin integrity is maintained or improved  Note: Patient's skin remains free from new or accidental injury this shift.       Problem: Fall Risk - Rehab  Goal: Patient will remain free from falls  Note: Patient remains free from falls this shift. Patient was educated on using the call light to prevent falls. Patients bed is in the lowest position. The patients belongings are placed in near proximity to the patient. Patients bed alarm is on.   The patient is Stable - Low risk of patient condition declining or worsening

## 2022-01-05 NOTE — THERAPY
Occupational Therapy  Daily Treatment     Patient Name: Gokul Baca  Age:  71 y.o., Sex:  male  Medical Record #: [id number]  Today's Date: 1/5/2022     Precautions  Precautions: (P) Fall Risk  Comments: (P) L maida, task attention    Safety   ADL Safety : Requires Supervision for Safety,Requires Physical Assist for Safety,Impaired Insight into Safety,Requires Cueing for Safety  Bathroom Safety: Requires Supervision for Safety,Impaired Insight into Safety,Requires Physical Assist for Safety,Requires Cuing for Safety  Comments: see functional levels below for ADL performance details.    Subjective    Pt reports that he slept well and ready for therapy.      Objective       01/05/22 0831   Precautions   Precautions Fall Risk   Comments L maida, task attention   Cognition    Level of Consciousness Alert   Functional Level of Assist   Grooming Supervision;Seated   Grooming Description Increased time;Supervision for safety  (distant supv)   Upper Body Dressing Stand by Assist   Upper Body Dressing Description Increased time;Set-up of equipment;Supervision for safety;Verbal cueing  (vc's for orientation of shirt)   Lower Body Dressing Moderate Assist   Lower Body Dressing Description Increased time;Set-up of equipment;Supervision for safety;Verbal cueing  (assist to don socks and thread LLE into brief)   Toileting Minimal Assist   Toileting Description Assist for standing balance;Grab bar;Increased time;Set-up of equipment;Supervision for safety;Verbal cueing   Toilet Transfers Contact Guard Assist   Toilet Transfer Description Grab bar;Increased time;Supervision for safety;Verbal cueing  (w/c<>toilet stand step txfr via GB)   Hand Strengthening   Hand Strengthening Left ;Left Pinch   Comment Placement of graded clothespins on horizontal rungs x10 reps using yellow and red resistance clothespins; pt completed with increased time and cues for visual scanning to locate items on tabletop.   Interdisciplinary Plan of  "Care Collaboration   IDT Collaboration with  Family / Caregiver   Patient Position at End of Therapy Seated;Chair Alarm On;Self Releasing Lap Belt Applied;Call Light within Reach;Tray Table within Reach;Phone within Reach   Collaboration Comments discussed equipment needs with spouse   OT Total Time Spent   OT Individual Total Time Spent (Mins) 60   OT Charge Group   OT Self Care / ADL 2   OT Therapy Activity 2     Stand step transfer from EOB>w/c with initial demonstration, CGA-Min A.     Pt educated on \"Lighthouse strategy\" for visually scanning environment to improve safety and to locate items.    Time spent discussing equipment needs with spouse, including GB placement recommendations in pt's shower and near toilet, placement of commode over toilet and commode/urinal near bed for nighttime toileting needs, getting a mattress bedrail for increased independence with bed mobility, and use of FWW for transfers.     Assessment    Pt continues to make progress with ADLs and functional transfers. He was able to complete hygiene and clothing mgt with CGA for balance. Spouse highly supportive and receptive to recommendations for home modifications and equipment.   Strengths: Independent prior level of function,Pleasant and cooperative,Supportive family,Willingly participates in therapeutic activities  Barriers: Bladder incontinence,Bowel incontinence,Decreased endurance,Difficulty following instructions,Fatigue,Hemiparesis,Impaired activity tolerance,Impaired balance,Impaired insight/denial of deficits,Impaired functional cognition,Impulsive,Limited mobility    Plan    ADLs, functional transfers using FWW, LUL neuro re-ed, RT-300 (ID: [id number], PIN: [id number]), activities to encourage L side attention, family training with spouse (focus on threshold shower transfers, DME needs for d/c).    Occupational Therapy Goals (Active)     Problem: Bathing     Dates: Start: 12/28/21       Goal: STG-Within one week, patient will bathe " with min A using AE as needed.     Dates: Start: 12/28/21             Problem: Dressing     Dates: Start: 12/25/21       Goal: STG-Within one week, patient will dress UB     Dates: Start: 12/25/21       Description: 1) Individualized Goal:  at a Min A level with fading cues for L maida technique.     Goal Note filed on 12/28/21 1443 by Tavia Moreira, OT     Requires mod A.                Problem: Functional Transfers     Dates: Start: 12/25/21       Goal: STG-Within one week, patient will transfer to toilet     Dates: Start: 12/25/21       Description: 1) Individualized Goal:  at a Mod A level with AD/DME PRN.     Goal Note filed on 12/28/21 1443 by Tavia Moreira, OT     Con't to require mod-max x2 for safety.               Problem: OT Long Term Goals     Dates: Start: 12/25/21       Goal: LTG-By discharge, patient will complete basic self care tasks     Dates: Start: 12/25/21       Description: 1) Individualized Goal:  at a Min A to Mod I level with AD/AE/DME PRN.        Goal: LTG-By discharge, patient will perform bathroom transfers     Dates: Start: 12/25/21       Description: 1) Individualized Goal:  at a Min A to Mod I level with use of AE/AD/DME PRN.

## 2022-01-05 NOTE — THERAPY
Speech Language Pathology  Daily Treatment     Patient Name: Gokul Baca  Age:  71 y.o., Sex:  male  Medical Record #: [id number]  Today's Date: 1/5/2022     Precautions  Precautions: Fall Risk  Comments: L maida, task attention    Subjective    Pt pleasant and cooperative.      Objective       01/05/22 1003   SLP Total Time Spent   SLP Individual Total Time Spent (Mins) 60   Treatment Charges   SLP Cognitive Skill Development First 15 Minutes 1   SLP Cognitive Skill Development Additional 15 Minutes 3       Assessment    Pt presented with checkbook register task, pt unable to complete due to time constraints. Pt with slow rate of completion, redirections required and encouragement for cont completion. Pt completed what was completed during session with 100% accuracy indep. Ongoing education provided to pt and SO re: attention to detail, taking his time, checking his work and rec for check/balanacing at home with SO to provide SPV for all meds and finances.    Strengths: Good insight into deficits/needs,Independent prior level of function,Making steady progress towards goals,Pleasant and cooperative,Supportive family,Motivated for self care and independence,Willingly participates in therapeutic activities,Effective communication skills,Able to follow instructions  Barriers: Impulsive,Impaired functional cognition    Plan    Initiate outcome assessment     Speech Therapy Problems (Active)     Problem: Problem Solving STGs     Dates: Start: 12/25/21       Goal: STG-Within one week, patient will     Dates: Start: 12/29/21       Description: Patient will demonstrate safety planning, problem solving and sequencing for self care and balance maintenace with 80% acc with min cues to improve.       Goal: STG-Within one week, patient will complete functional medication management and financial management tasks with 80% accuracy provided MIN A     Dates: Start: 01/05/22             Problem: Speech/Swallowing LTGs     Dates:  Start: 12/25/21       Goal: LTG-By discharge, patient will safely swallow     Dates: Start: 12/25/21       Description: Safest/least restrictive diet to maintain adequate nutrition and hydration.        Goal: LTG-By discharge, patient will     Dates: Start: 12/25/21       Description: Demonstrate independent level of cog-ling functions to ensure safe return to PLOF.          Problem: Swallowing STGs     Dates: Start: 12/25/21

## 2022-01-05 NOTE — THERAPY
Physical Therapy   Daily Treatment     Patient Name: Gokul Baca  Age:  71 y.o., Sex:  male  Medical Record #: [id number]  Today's Date: 1/4/2022     Precautions  Precautions: Fall Risk  Comments: L maida, task attention    Subjective    Patient agreeable to PT; on phone initially; would like to work on LiteGait/ambulation.     Objective       01/04/22 1331   Precautions   Precautions Fall Risk   Comments L maida, task attention   Vitals   O2 (LPM) 0   O2 Delivery Device None - Room Air   Pain   Intervention Repositioned;Nurse Notified   Pain 0 - 10 Group   Location Foot   Location Orientation Left  (dorsal)   Description Cramping;Sharp   Comfort Goal 0   Therapist Pain Assessment 9  (during LiteGait usage (ambulation); resolves in sitting)   Gait Functional Level of Assist    Gait Level Of Assist Moderate Assist   Assistive Device   (LiteGait over treadmill)   Distance (Feet)   (2 rep anterior, 1 rep retroambulation; 36 feet in 1:24 total)   # of Times Distance was Traveled   (3 reps total; spent 20 min on LiteGait total)   Deviation Increased Base Of Support;Bradykinetic;Antalgic;Decreased Heel Strike;Decreased Toe Off  (cueing for task attention & re-direction)   Transfer Functional Level of Assist   Bed, Chair, Wheelchair Transfer Minimal Assist   Bed Chair Wheelchair Transfer Description Adaptive equipment;Set-up of equipment;Squat pivot transfer to wheelchair;Supervision for safety;Verbal cueing   Bed Mobility    Supine to Sit Minimal Assist   Sit to Stand Minimal Assist  (CGA in // bars x2 reps, Min A at w/c or EOB x2 reps)   Scooting Minimal Assist   Rolling Minimum Assist to Lt.   Interdisciplinary Plan of Care Collaboration   IDT Collaboration with  Physical Therapist Assistant (PTA);Occupational Therapist;Nursing   Patient Position at End of Therapy Seated;Chair Alarm On;Self Releasing Lap Belt Applied;Call Light within Reach;Tray Table within Reach;Phone within Reach   Collaboration Comments CLOF and  treatment goals (focus on LiteGait or  usage).  Discussed foot pain with RN at end of session, meds admin.   PT Total Time Spent   PT Individual Total Time Spent (Mins) 60   PT Charge Group   PT Gait Training 2   PT Therapeutic Activities 2       Assessment    Patient limited by task attention but good motivation; improving cognition; improving awareness of L LE/UE usage with mobility; pleasant; limited standing tolerance to 8 min but increased pain during ambulation today (RN notified).    Strengths: Able to follow instructions,Alert and oriented,Independent prior level of function,Motivated for self care and independence,Pleasant and cooperative,Supportive family,Willingly participates in therapeutic activities  Barriers: Decreased endurance,Difficulty following instructions,Hemiplegia,Fatigue,Home accessibility,Impaired activity tolerance,Impaired balance,Impaired carryover of learning,Impaired functional cognition,Impulsive,Limited mobility    Plan    Bed mobility, stand pivot transfer training, neuro re ed, L LE motor control, standing balance, gait with FWW for endurance/ hand rail to focus on gait quality, initiate step in //, initial set up on  L LE    Passport items to be completed:  Passport items to be completed:  Get in/out of bed safely, in/out of a vehicle, safely use mobility device, walk or wheel around home/community, navigate up and down stairs, show how to get up/down from the ground, ensure home is accessible, demonstrate HEP, complete caregiver training    Physical Therapy Problems (Active)     Problem: Balance     Dates: Start: 12/25/21       Goal: STG-Within one week, patient will maintain dynamic sitting x2 minutes without loss of balance.     Dates: Start: 12/25/21             Problem: Mobility     Dates: Start: 12/25/21       Goal: STG-Within one week, patient will ambulate 20ft using maida rail with min A and wheelchair follow.     Dates: Start: 12/25/21             Problem:  Mobility Transfers     Dates: Start: 12/25/21       Goal: STG-Within one week, patient will perform bed mobility with mod A using maida techniques.     Dates: Start: 12/25/21          Goal: STG-Within one week, patient will transfer bed to chair with 1 person assist.     Dates: Start: 12/25/21             Problem: PT-Long Term Goals     Dates: Start: 12/25/21       Goal: LTG-By discharge, patient will ambulate 30ft with min A and most appropriate AD.     Dates: Start: 12/25/21          Goal: LTG-By discharge, patient will transfer one surface to another with min A using AD as needed.     Dates: Start: 12/25/21          Goal: LTG-By discharge, patient will ambulate up/down 2 stairs with L ascending rail and min A.     Dates: Start: 12/25/21          Goal: LTG-By discharge, patient will transfer in/out of a car with mod A using AD as needed.     Dates: Start: 12/25/21          Goal: LTG-By discharge, patient will perform bed mobility with supervision using maida techniques.     Dates: Start: 12/25/21

## 2022-01-05 NOTE — PROGRESS NOTES
Hospital Medicine Daily Progress Note      Chief Complaint  Fever  Leukocytosis  Atrial Fibrillation  Hypertension    Interval Problem Update  Pt visiting w/ wife at bedside.  Neither w/ new complaints or concerns.  Labs, microbiology, and imaging results reviewed and discussed.    Review of Systems  Review of Systems   Constitutional: Negative for chills and fever.   HENT: Negative.    Eyes: Negative.    Respiratory: Negative for cough and shortness of breath.    Cardiovascular: Negative for chest pain and palpitations.   Gastrointestinal: Negative for abdominal pain, nausea and vomiting.   Skin: Negative for itching and rash.   Neurological: Positive for focal weakness.   Endo/Heme/Allergies: Negative for polydipsia. Does not bruise/bleed easily.        Physical Exam  Temp:  [36.8 °C (98.2 °F)-36.9 °C (98.4 °F)] 36.8 °C (98.2 °F)  Pulse:  [67-70] 67  Resp:  [20] 20  BP: (118-125)/(67-70) 118/67  SpO2:  [94 %] 94 %    Physical Exam  Vitals reviewed.   Constitutional:       General: He is not in acute distress.     Appearance: Normal appearance. He is not ill-appearing.   HENT:      Head: Normocephalic and atraumatic.      Right Ear: External ear normal.      Left Ear: External ear normal.      Nose: Nose normal.      Mouth/Throat:      Pharynx: Oropharynx is clear.   Eyes:      General:         Right eye: No discharge.         Left eye: No discharge.      Extraocular Movements: Extraocular movements intact.      Conjunctiva/sclera: Conjunctivae normal.   Cardiovascular:      Rate and Rhythm: Normal rate and regular rhythm.   Pulmonary:      Effort: Pulmonary effort is normal. No respiratory distress.      Breath sounds: Normal breath sounds. No wheezing.   Abdominal:      General: Bowel sounds are normal. There is no distension.      Palpations: Abdomen is soft.      Tenderness: There is no abdominal tenderness. There is no guarding or rebound.   Musculoskeletal:      Cervical back: Normal range of motion and neck  supple.      Right lower leg: No edema.      Left lower leg: No edema.      Comments: BUE trace edema   Skin:     General: Skin is warm and dry.   Neurological:      Mental Status: He is alert.      Comments: Left sided weakness         Fluids    Intake/Output Summary (Last 24 hours) at 1/5/2022 1223  Last data filed at 1/5/2022 0900  Gross per 24 hour   Intake 1000 ml   Output 650 ml   Net 350 ml       Laboratory  Recent Labs     01/04/22  0540 01/05/22  0640   WBC 9.5 8.6   RBC 4.18* 4.27*   HEMOGLOBIN 12.3* 12.6*   HEMATOCRIT 36.8* 38.0*   MCV 88.0 89.0   MCH 29.4 29.5   MCHC 33.4* 33.2*   RDW 45.4 45.4   PLATELETCT 358 361   MPV 8.7* 8.6*     Recent Labs     01/05/22  0640   SODIUM 135   POTASSIUM 3.8   CHLORIDE 99   CO2 22   GLUCOSE 95   BUN 17   CREATININE 0.75   CALCIUM 8.9                 Assessment/Plan  * Hemorrhagic stroke (HCC)- (present on admission)  Assessment & Plan  Had dysarthria, facial droop, and left hemiparesis w/ presenting   CT right basal ganglia hemorrhage with mild mass effect  Non-surgical management  Chronic anticoagulation reversed  S/P Nicardipine gtt, continue BP management  S/P hypertonic saline, now off NaCl  On Provigil per Physiatry    Leukocytosis  Assessment & Plan  Had recurrent elevated WBC on 1/2/22  UA  WBC w/ many bacteria  UCx >100,000 Klebsiella, pansensitive except to Unasyn  CXR left perihilar atelectasis vs pneumonitis, start IS  Start Omnicef x 5 days    Anemia- (present on admission)  Assessment & Plan  Has downtrending Hb w/ normocytic indices  Suspect hemodilution from IV abx volume load  Fe 34, now on Fe and Vit C supplements  Follow H/H    Edema- (present on admission)  Assessment & Plan  U/S LUE +SVT, negative DVT, completed Zosyn for thrombophlebitis  U/S RUE +SVT, negative DVT, PICC now removed  U/S LLE negative DVT, edema resolved  F/U BUE U/S 1/4/22 show progression of RUE SVT, unable to compare LUE SVT to prior study as tech did not scan  forearm this time  Recommend continued serial imaging    UTI (urinary tract infection)- (present on admission)  Assessment & Plan  Tmax 101.9 w/ new leukocytosis on 12/25/21  UA 10-20 WBC w/ negative bacteria  UCx 10-50,000 E Coli, pansensitive except to Levaquin  BCx x 2 NGTD  Completed Zosyn 1/1/22    Otitis externa- (present on admission)  Assessment & Plan  Otoscopic Exam by me 12/25/21:  TM difficult to visualize due to cerumen, R auditory canal erythematous  Concerning for otitis externa  Ideally, would have treated w/ Cipro Otic gtts but not available at this facility  Was on Zosyn for UTI and Thrombophlebitis, now completed  Unable to do F/U Otoscopic Exam 12/27/21 due to non-functioning equipment  Otoscopic Exam per Dr. Santiago 12/29/21:  R auditory canal w/ resolved erythema    Hypertension- (present on admission)  Assessment & Plan  On Norvasc and Hydralazine  Observe blood pressure trends     Hyperlipemia- (present on admission)  Assessment & Plan  On Lipitor    AF (atrial fibrillation) (HCC)- (present on admission)  Assessment & Plan  Not on any rate controlling agents  Monitor for tachydysrhythmias  On chronic Xarelto  Anticoagulation presently on hold due to ICH     Full Code    Discussed w/ pt, wife, and Dr. Ramirez

## 2022-01-05 NOTE — CARE PLAN
Problem: Mobility  Goal: STG-Within one week, patient will ambulate 20ft using maida rail with min A and wheelchair follow.  Outcome: Met     Problem: Balance  Goal: STG-Within one week, patient will maintain dynamic sitting x2 minutes without loss of balance.  Outcome: Met     Problem: Mobility Transfers  Goal: STG-Within one week, patient will perform bed mobility with mod A using maida techniques.  Outcome: Met  Goal: STG-Within one week, patient will transfer bed to chair with 1 person assist.  Outcome: Met

## 2022-01-05 NOTE — CARE PLAN
Problem: Bathing  Goal: STG-Within one week, patient will bathe with min A using AE as needed.  Outcome: Not Met  Note: Con't to require mod A.      Problem: Dressing  Goal: STG-Within one week, patient will dress UB  Description: 1) Individualized Goal:  at a Min A level with fading cues for L maida technique.   Outcome: Met     Problem: Functional Transfers  Goal: STG-Within one week, patient will transfer to toilet  Description: 1) Individualized Goal:  at a Mod A level with AD/DME PRN.   Outcome: Met

## 2022-01-06 ENCOUNTER — APPOINTMENT (OUTPATIENT)
Dept: RADIOLOGY | Facility: REHABILITATION | Age: 72
DRG: 056 | End: 2022-01-06
Attending: HOSPITALIST
Payer: MEDICARE

## 2022-01-06 VITALS
WEIGHT: 251.32 LBS | TEMPERATURE: 97.9 F | DIASTOLIC BLOOD PRESSURE: 82 MMHG | HEIGHT: 66 IN | OXYGEN SATURATION: 95 % | HEART RATE: 65 BPM | RESPIRATION RATE: 18 BRPM | BODY MASS INDEX: 40.39 KG/M2 | SYSTOLIC BLOOD PRESSURE: 138 MMHG

## 2022-01-06 PROBLEM — M25.552 HIP PAIN, LEFT: Status: ACTIVE | Noted: 2022-01-06

## 2022-01-06 PROCEDURE — A9270 NON-COVERED ITEM OR SERVICE: HCPCS | Performed by: HOSPITALIST

## 2022-01-06 PROCEDURE — 99232 SBSQ HOSP IP/OBS MODERATE 35: CPT | Performed by: HOSPITALIST

## 2022-01-06 PROCEDURE — 700102 HCHG RX REV CODE 250 W/ 637 OVERRIDE(OP): Performed by: HOSPITALIST

## 2022-01-06 PROCEDURE — 99239 HOSP IP/OBS DSCHRG MGMT >30: CPT | Performed by: PHYSICAL MEDICINE & REHABILITATION

## 2022-01-06 PROCEDURE — 73502 X-RAY EXAM HIP UNI 2-3 VIEWS: CPT | Mod: LT

## 2022-01-06 PROCEDURE — 700102 HCHG RX REV CODE 250 W/ 637 OVERRIDE(OP): Performed by: PHYSICAL MEDICINE & REHABILITATION

## 2022-01-06 PROCEDURE — A9270 NON-COVERED ITEM OR SERVICE: HCPCS | Performed by: PHYSICAL MEDICINE & REHABILITATION

## 2022-01-06 RX ORDER — AMOXICILLIN 250 MG
2 CAPSULE ORAL 2 TIMES DAILY
Qty: 120 TABLET | Refills: 0 | Status: SHIPPED
Start: 2022-01-06 | End: 2022-02-18

## 2022-01-06 RX ORDER — ATORVASTATIN CALCIUM 40 MG/1
40 TABLET, FILM COATED ORAL EVERY EVENING
Qty: 30 TABLET | Refills: 0 | Status: SHIPPED
Start: 2022-01-06 | End: 2022-02-15

## 2022-01-06 RX ORDER — ASCORBIC ACID 500 MG
500 TABLET ORAL
Qty: 30 TABLET | Refills: 0 | Status: SHIPPED
Start: 2022-01-07 | End: 2022-06-14

## 2022-01-06 RX ORDER — LANOLIN ALCOHOL/MO/W.PET/CERES
6 CREAM (GRAM) TOPICAL
Qty: 30 TABLET | Refills: 0 | Status: SHIPPED
Start: 2022-01-06 | End: 2022-01-19

## 2022-01-06 RX ORDER — TRAZODONE HYDROCHLORIDE 50 MG/1
50 TABLET ORAL
Status: SHIPPED
Start: 2022-01-06 | End: 2022-02-18

## 2022-01-06 RX ORDER — BACLOFEN 10 MG/1
10 TABLET ORAL
Qty: 90 TABLET | Refills: 0 | Status: SHIPPED
Start: 2022-01-06 | End: 2022-02-15

## 2022-01-06 RX ORDER — HYDRALAZINE HYDROCHLORIDE 50 MG/1
50 TABLET, FILM COATED ORAL EVERY 8 HOURS
Qty: 90 TABLET | Refills: 0 | Status: SHIPPED
Start: 2022-01-06 | End: 2022-02-11

## 2022-01-06 RX ORDER — OMEPRAZOLE 20 MG/1
20 CAPSULE, DELAYED RELEASE ORAL 2 TIMES DAILY
Qty: 60 CAPSULE | Refills: 0 | Status: SHIPPED
Start: 2022-01-06 | End: 2022-06-14

## 2022-01-06 RX ORDER — AMLODIPINE BESYLATE 10 MG/1
10 TABLET ORAL DAILY
Qty: 30 TABLET | Refills: 0 | Status: SHIPPED
Start: 2022-01-07 | End: 2022-01-19

## 2022-01-06 RX ORDER — CEFDINIR 300 MG/1
300 CAPSULE ORAL EVERY 12 HOURS
Refills: 0 | Status: SHIPPED
Start: 2022-01-06 | End: 2022-01-12

## 2022-01-06 RX ORDER — ACETAMINOPHEN 325 MG/1
650 TABLET ORAL EVERY 4 HOURS PRN
Qty: 30 TABLET | Refills: 0 | Status: SHIPPED
Start: 2022-01-06 | End: 2022-02-18

## 2022-01-06 RX ORDER — FERROUS SULFATE 325(65) MG
325 TABLET ORAL
Qty: 30 TABLET | Status: SHIPPED
Start: 2022-01-07 | End: 2022-01-19

## 2022-01-06 RX ADMIN — OMEPRAZOLE 20 MG: 20 CAPSULE, DELAYED RELEASE ORAL at 09:54

## 2022-01-06 RX ADMIN — CEFDINIR 300 MG: 300 CAPSULE ORAL at 09:54

## 2022-01-06 RX ADMIN — HYDRALAZINE HYDROCHLORIDE 50 MG: 25 TABLET, FILM COATED ORAL at 05:17

## 2022-01-06 RX ADMIN — AMLODIPINE BESYLATE 10 MG: 5 TABLET ORAL at 05:17

## 2022-01-06 RX ADMIN — HYDRALAZINE HYDROCHLORIDE 50 MG: 25 TABLET, FILM COATED ORAL at 15:01

## 2022-01-06 RX ADMIN — FERROUS SULFATE TAB 325 MG (65 MG ELEMENTAL FE) 325 MG: 325 (65 FE) TAB at 09:54

## 2022-01-06 RX ADMIN — OXYCODONE HYDROCHLORIDE AND ACETAMINOPHEN 500 MG: 500 TABLET ORAL at 09:54

## 2022-01-06 RX ADMIN — MODAFINIL 100 MG: 100 TABLET ORAL at 09:54

## 2022-01-06 RX ADMIN — SENNOSIDES AND DOCUSATE SODIUM 1 TABLET: 50; 8.6 TABLET ORAL at 09:54

## 2022-01-06 ASSESSMENT — ENCOUNTER SYMPTOMS
EYES NEGATIVE: 1
ABDOMINAL PAIN: 0
POLYDIPSIA: 0
COUGH: 0
BRUISES/BLEEDS EASILY: 0
PALPITATIONS: 0
SHORTNESS OF BREATH: 0
CHILLS: 0
FOCAL WEAKNESS: 1
VOMITING: 0
NAUSEA: 0
FEVER: 0

## 2022-01-06 NOTE — CARE PLAN
Problem: Fall Risk - Rehab  Goal: Patient will remain free from falls  Note: Patient remains free from falls this shift. Patient was educated on using the call light to prevent falls. Patients bed is in the lowest position. The patients belongings are placed in near proximity to the patient.       Problem: Skin Integrity  Goal: Patient's skin integrity will be maintained or improve  Note: Patient's skin remains free from new or accidental injury this shift.   The patient is Stable - Low risk of patient condition declining or worsening

## 2022-01-06 NOTE — DISCHARGE INSTRUCTIONS
[facility] NURSING DISCHARGE INSTRUCTIONS    Blood Pressure : 135/71  Weight: 114 kg (251 lb 5.2 oz)  Nursing recommendations for Gokul Baca at time of discharge are as follows:  Client verbalized understanding of all discharge instructions and prescriptions.     Review all your home medications and newly ordered medications with your doctor and/or pharmacist. Follow medication instructions as directed by your doctor and/or pharmacist.    Pain Management:   Discharge Pain Medication Instructions:  Comfort Goal: Comfort with Movement,Perform Activity,Sleep Comfortably,Stay Alert  Notify your primary care provider if pain is unrelieved with these measures, if the pain is new, or increased in intensity.    Discharge Skin Characteristics: Warm,Dry  Discharge Skin Exam: Clear    Skin / Wound Care Instructions: Please contact your primary care physician for any change in skin integrity.     If You Have Surgical Incisions / Wounds:  Monitor surgical site(s) for signs of increased swelling, redness or symptoms of drainage from the site or fever as this could indicate signs and symptoms of infection. If these symptoms are noted, notifiy your primary care provider.      Discharge Safety Instructions: Should Not Be Left Alone In The House     Discharge Safety Concerns: Balance Problems (Dizziness, Light Headedness),Impaired Judgement,History Of Falls,Unsteady Gait,Weakness  The interdisciplinary team has made recommendation that you should not be left alone  in the house due to balance problem, impaired judgment, history of falls, weakness and unsteady gait  Anti-embolic stockings are not required to increase circulation to the lower extremities.    Discharge Diet: regular, no straws     Discharge Liquids: thin  Discharge Bowel Function: Incontinent  Please contact your primary care physician for any changes in bowel habits.  Discharge Bowel Program:    Discharge Bladder Function:  Incontinent  Discharge Urinary Devices: None      Nursing Discharge Plan:   Influenza Vaccine Indication: Not indicated: Previously immunized this influenza season and > 8 years of age    Case Management Discharge Instructions:   Discharge Location:    Agency Name/Address/Phone:    Home Health:    Outpatient Services:    DME Provider/Phone:    Medical Equipment Ordered:    Prescription Faxed to:        Hemorrhagic Stroke    A hemorrhagic stroke is the sudden death of brain tissue that occurs when a blood vessel in the brain leaks or bursts (ruptures), causing bleeding in or around the brain(hemorrhage). When this happens, areas of the brain do not get enough oxygen, and blood builds up and presses on areas of the brain. Lack of oxygen and pressure from hemorrhaging can lead to brain damage and death.  There are two major types of hemorrhagic stroke:  · Intracerebral hemorrhage. This happens if bleeding occurs within the brain tissue.  · Subarachnoid hemorrhage. This happens if bleeding occurs in the area between the brain and the membrane that covers the brain (subarachnoid space).  Hemorrhagic stroke is a medical emergency. It can cause temporary or permanent brain damage and loss of brain function.  What are the causes?  This condition is caused by a blood vessel leaking or rupturing, which may be the result of:  · Part of a weakened blood vessel wall bulging or ballooning out (cerebral aneurysm).  · A hardened, thin blood vessel cracking open and allowing blood to leak out. Blood vessels may become hardened and thin due to plaque buildup.  · Tangled blood vessels in the brain (brain arteriovenous malformation).  · Protein buildup in artery walls in the brain (amyloid angiopathy).  · Inflamed blood vessels (vasculitis).  · A tumor in the brain.  · High blood pressure (hypertension).  What increases the risk?  The following factors may make you more likely to develop this condition:  · Hypertension.  · Having  abnormal blood vessels present since birth (congenital abnormality).  · Bleeding disorders, such as hemophilia, sickle cell disease, or liver disease.  · The blood becoming too thin while taking blood thinners (anticoagulants).  · Aging.  · Moderate or heavy alcohol use.  · Using drugs, such as cocaine or methamphetamines.  What are the signs or symptoms?  Symptoms of this condition usually appear suddenly, and may include:  · Weakness or numbness of the face, arm, or leg, especially on one side of the body.  · Confusion.  · Difficulty speaking (aphasia) or understanding speech.  · Difficulty seeing out of one or both eyes.  · Difficulty walking or moving the arms or legs.  · Dizziness.  · Loss of balance or coordination.  · Seizures.  · A severe headache with no known cause. This headache may feel like the worst headache a person has ever experienced.  How is this diagnosed?  This condition may be diagnosed based on:  · Your symptoms.  · Your medical history.  · A physical exam.  · Tests, including:  ? Blood tests.  ? CT scan.  ? MRI.  ? CT angiography (CTA) or magnetic resonance angiography (MRA).  · Catheter angiogram. In this procedure, dye is injected through a long, thin tube (catheter) into one of your arteries. Then, X-rays are taken. The X-rays will show whether there is a blockage or a problem in a blood vessel.  How is this treated?  This condition is a medical emergency that must be treated in a hospital immediately. The goals of treatment are to stop bleeding, reduce pressure on the brain, relieve symptoms, and prevent complications. Treatment for this condition may include:  · Medicines that:  ? Lower blood pressure (antihypertensives).  ? Relieve pain (analgesics).  ? Relieve nausea or vomiting.  ? Stop or prevent seizures (anticonvulsants).  ? Relieve fever.  ? Prevent blood vessels in the brain from spasming in response to bleeding.  ? Control bleeding in the brain.  · Assisted breathing  (ventilation). This involves using a machine to help you breathe (ventilator).  · Receiving donated blood products through an IV (transfusion). You will receive cells that help your blood clot.  · Placement of a tube (shunt) in the brain to relieve pressure.  · Physical, speech, or occupational therapy.  · Surgery to stop bleeding, remove a blood clot or tumor, or reduce pressure.  Treatment depends on the cause, severity, and duration of symptoms. Medicines and changes to your diet may be used to help treat and manage risk factors for stroke, such as diabetes and high blood pressure. Recovery from hemorrhagic stroke varies widely. Talk with your health care provider about what to expect during your recovery.  Follow these instructions at home:  Activity  · Use a walker or a cane as told by your health care provider.  · Return to your normal activities as told by your health care provider. Ask your health care provider what activities are safe for you.  · Rest. Rest helps the brain to heal. Make sure you:  ? Get plenty of sleep. Avoid staying up late at night.  ? Keep a consistent sleep schedule. Try to go to sleep and wake up at about the same time every day.  ? Avoid activities that cause physical or mental stress.  Lifestyle  · Do not drink alcohol if:  ? Your health care provider tells you not to drink.  ? You are pregnant, may be pregnant, or are planning to become pregnant.  · If you drink alcohol:  ? Limit how much you use to:  § 0-1 drink a day for women.  § 0-2 drinks a day for men.  ? Be aware of how much alcohol is in your drink. In the [place] one drink equals one 12 oz bottle of beer (355 mL), one 5 oz glass of wine (148 mL), or one 1½ oz glass of hard liquor (44 mL).  General instructions  · Do not drive or use heavy machinery until your health care provider approves.  · Take over-the-counter and prescription medicines only as told by your health care provider.  · Keep all follow-up visits as told by  "your health care provider, including visits with therapists. This is important.  How is this prevented?  Your risk of stroke can be decreased by working with your health care provider to treat:  · High blood pressure.  · High cholesterol.  · Diabetes.  · Heart disease.  · Obesity.  Your risk of stroke can also be decreased by quitting smoking, limiting alcohol, and staying physically active. If you take the blood thinner warfarin, have your bloodwork monitored frequently by your health care provider.  Contact a health care provider if:  You develop any of the following symptoms:  · Headaches that keep coming back (chronic headaches).  · Nausea.  · Vision problems.  · Increased sensitivity to noise or light.  · Depression or mood swings.  · Anxiety or irritability.  · Memory problems.  · Difficulty concentrating or paying attention.  · Sleep problems.  · Feeling tired all of the time.  Get help right away if you:  · Have a partial or total loss of consciousness.  · Are taking blood thinners and you fall or you experience minor injury to the head.  · Have a bleeding disorder and you fall or you experience minor trauma to the head.  · Have any symptoms of a stroke. \"BE FAST\" is an easy way to remember the main warning signs of a stroke:  ? B - Balance. Signs are dizziness, sudden trouble walking, or loss of balance.  ? E - Eyes. Signs are trouble seeing or a sudden change in vision.  ? F - Face. Signs are sudden weakness or numbness of the face, or the face or eyelid drooping on one side.  ? A - Arms. Signs are weakness or numbness in an arm. This happens suddenly and usually on one side of the body.  ? S - Speech. Signs are sudden trouble speaking, slurred speech, or trouble understanding what people say.  ? T - Time. Time to call emergency services. Write down what time symptoms started.  · Have other signs of a stroke, such as:  ? A sudden, severe headache with no known cause.  ? Nausea or " vomiting.  ? Seizure.  These symptoms may represent a serious problem that is an emergency. Do not wait to see if the symptoms will go away. Get medical help right away. Call your local emergency services (911 in the [place] Do not drive yourself to the hospital.  Summary  · Hemorrhagic stroke is a medical emergency.  · Hemorrhagic stroke is caused by bleeding in or around the brain.  · Know the signs and symptoms of stroke.  · Know your stroke risk factors. Work with your health care provider to decrease your risk of stroke.  This information is not intended to replace advice given to you by your health care provider. Make sure you discuss any questions you have with your health care provider.  Document Released: 06/07/2011 Document Revised: 01/23/2020 Document Reviewed: 01/24/2020  [employer] Patient Education © 2020 [employer]        Discharge Medication Instructions:  Below are the medications your physician expects you to take upon discharge:Fall Prevention in the Home, Adult  Falls can cause injuries. They can happen to people of all ages. There are many things you can do to make your home safe and to help prevent falls. Ask for help when making these changes, if needed.  What actions can I take to prevent falls?  General Instructions  · Use good lighting in all rooms. Replace any light bulbs that burn out.  · Turn on the lights when you go into a dark area. Use night-lights.  · Keep items that you use often in easy-to-reach places. Lower the shelves around your home if necessary.  · Set up your furniture so you have a clear path. Avoid moving your furniture around.  · Do not have throw rugs and other things on the floor that can make you trip.  · Avoid walking on wet floors.  · If any of your floors are uneven, fix them.  · Add color or contrast paint or tape to clearly chas and help you see:  ? Any grab bars or handrails.  ? First and last steps of stairways.  ? Where the edge of each step is.  · If you use  a stepladder:  ? Make sure that it is fully opened. Do not climb a closed stepladder.  ? Make sure that both sides of the stepladder are locked into place.  ? Ask someone to hold the stepladder for you while you use it.  · If there are any pets around you, be aware of where they are.  What can I do in the bathroom?         · Keep the floor dry. Clean up any water that spills onto the floor as soon as it happens.  · Remove soap buildup in the tub or shower regularly.  · Use non-skid mats or decals on the floor of the tub or shower.  · Attach bath mats securely with double-sided, non-slip rug tape.  · If you need to sit down in the shower, use a plastic, non-slip stool.  · Install grab bars by the toilet and in the tub and shower. Do not use towel bars as grab bars.  What can I do in the bedroom?  · Make sure that you have a light by your bed that is easy to reach.  · Do not use any sheets or blankets that are too big for your bed. They should not hang down onto the floor.  · Have a firm chair that has side arms. You can use this for support while you get dressed.  What can I do in the kitchen?  · Clean up any spills right away.  · If you need to reach something above you, use a strong step stool that has a grab bar.  · Keep electrical cords out of the way.  · Do not use floor polish or wax that makes floors slippery. If you must use wax, use non-skid floor wax.  What can I do with my stairs?  · Do not leave any items on the stairs.  · Make sure that you have a light switch at the top of the stairs and the bottom of the stairs. If you do not have them, ask someone to add them for you.  · Make sure that there are handrails on both sides of the stairs, and use them. Fix handrails that are broken or loose. Make sure that handrails are as long as the stairways.  · Install non-slip stair treads on all stairs in your home.  · Avoid having throw rugs at the top or bottom of the stairs. If you do have throw rugs, attach them  to the floor with carpet tape.  · Choose a carpet that does not hide the edge of the steps on the stairway.  · Check any carpeting to make sure that it is firmly attached to the stairs. Fix any carpet that is loose or worn.  What can I do on the outside of my home?  · Use bright outdoor lighting.  · Regularly fix the edges of walkways and driveways and fix any cracks.  · Remove anything that might make you trip as you walk through a door, such as a raised step or threshold.  · Trim any bushes or trees on the path to your home.  · Regularly check to see if handrails are loose or broken. Make sure that both sides of any steps have handrails.  · Install guardrails along the edges of any raised decks and porches.  · Clear walking paths of anything that might make someone trip, such as tools or rocks.  · Have any leaves, snow, or ice cleared regularly.  · Use sand or salt on walking paths during winter.  · Clean up any spills in your garage right away. This includes grease or oil spills.  What other actions can I take?  · Wear shoes that:  ? Have a low heel. Do not wear high heels.  ? Have rubber bottoms.  ? Are comfortable and fit you well.  ? Are closed at the toe. Do not wear open-toe sandals.  · Use tools that help you move around (mobility aids) if they are needed. These include:  ? Canes.  ? Walkers.  ? Scooters.  ? Crutches.  · Review your medicines with your doctor. Some medicines can make you feel dizzy. This can increase your chance of falling.  Ask your doctor what other things you can do to help prevent falls.  Where to find more information  · Centers for Disease Control and Prevention, [place]AD[employer]: https://cdc.gov  · National [place] on A[place]: https://wj3vxex.rosalind.nih.gov  Contact a doctor if:  · You are afraid of falling at home.  · You feel weak, drowsy, or dizzy at home.  · You fall at home.  Summary  · There are many simple things that you can do to make your home safe and to help prevent falls.  · Ways to  make your home safe include removing tripping hazards and installing grab bars in the bathroom.  · Ask for help when making these changes in your home.  This information is not intended to replace advice given to you by your health care provider. Make sure you discuss any questions you have with your health care provider.  Document Released: 10/14/2010 Document Revised: 04/09/2020 Document Reviewed: 08/02/2018  [employer] Patient Education © 2020 [employer]  COVID-19  COVID-19 is a respiratory infection that is caused by a virus called severe acute respiratory syndrome coronavirus 2 (SARS-CoV-2). The disease is also known as coronavirus disease or novel coronavirus. In some people, the virus may not cause any symptoms. In others, it may cause a serious infection. The infection can get worse quickly and can lead to complications, such as:  · Pneumonia, or infection of the lungs.  · Acute respiratory distress syndrome or ARDS. This is fluid build-up in the lungs.  · Acute respiratory failure. This is a condition in which there is not enough oxygen passing from the lungs to the body.  · Sepsis or septic shock. This is a serious bodily reaction to an infection.  · Blood clotting problems.  · Secondary infections due to bacteria or fungus.  The virus that causes COVID-19 is contagious. This means that it can spread from person to person through droplets from coughs and sneezes (respiratory secretions).  What are the causes?  This illness is caused by a virus. You may catch the virus by:  · Breathing in droplets from an infected person's cough or sneeze.  · Touching something, like a table or a doorknob, that was exposed to the virus (contaminated) and then touching your mouth, nose, or eyes.  What increases the risk?  Risk for infection  You are more likely to be infected with this virus if you:  · Live in or travel to an area with a COVID-19 outbreak.  · Come in contact with a sick person who recently traveled to an  area with a COVID-19 outbreak.  · Provide care for or live with a person who is infected with COVID-19.  Risk for serious illness  You are more likely to become seriously ill from the virus if you:  · Are 65 years of age or older.  · Have a long-term disease that lowers your body's ability to fight infection (immunocompromised).  · Live in a nursing home or long-term care facility.  · Have a long-term (chronic) disease such as:  ? Chronic lung disease, including chronic obstructive pulmonary disease or asthma  ? Heart disease.  ? Diabetes.  ? Chronic kidney disease.  ? Liver disease.  · Are obese.  What are the signs or symptoms?  Symptoms of this condition can range from mild to severe. Symptoms may appear any time from 2 to 14 days after being exposed to the virus. They include:  · A fever.  · A cough.  · Difficulty breathing.  · Chills.  · Muscle pains.  · A sore throat.  · Loss of taste or smell.  Some people may also have stomach problems, such as nausea, vomiting, or diarrhea.  Other people may not have any symptoms of COVID-19.  How is this diagnosed?  This condition may be diagnosed based on:  · Your signs and symptoms, especially if:  ? You live in an area with a COVID-19 outbreak.  ? You recently traveled to or from an area where the virus is common.  ? You provide care for or live with a person who was diagnosed with COVID-19.  · A physical exam.  · Lab tests, which may include:  ? A nasal swab to take a sample of fluid from your nose.  ? A throat swab to take a sample of fluid from your throat.  ? A sample of mucus from your lungs (sputum).  ? Blood tests.  · Imaging tests, which may include, X-rays, CT scan, or ultrasound.  How is this treated?  At present, there is no medicine to treat COVID-19. Medicines that treat other diseases are being used on a trial basis to see if they are effective against COVID-19.  Your health care provider will talk with you about ways to treat your symptoms. For most  people, the infection is mild and can be managed at home with rest, fluids, and over-the-counter medicines.  Treatment for a serious infection usually takes places in a hospital intensive care unit (ICU). It may include one or more of the following treatments. These treatments are given until your symptoms improve.  · Receiving fluids and medicines through an IV.  · Supplemental oxygen. Extra oxygen is given through a tube in the nose, a face mask, or a yarbrough.  · Positioning you to lie on your stomach (prone position). This makes it easier for oxygen to get into the lungs.  · Continuous positive airway pressure (CPAP) or bi-level positive airway pressure (BPAP) machine. This treatment uses mild air pressure to keep the airways open. A tube that is connected to a motor delivers oxygen to the body.  · Ventilator. This treatment moves air into and out of the lungs by using a tube that is placed in your windpipe.  · Tracheostomy. This is a procedure to create a hole in the neck so that a breathing tube can be inserted.  · Extracorporeal membrane oxygenation (ECMO). This procedure gives the lungs a chance to recover by taking over the functions of the heart and lungs. It supplies oxygen to the body and removes carbon dioxide.  Follow these instructions at home:  Lifestyle  · If you are sick, stay home except to get medical care. Your health care provider will tell you how long to stay home. Call your health care provider before you go for medical care.  · Rest at home as told by your health care provider.  · Do not use any products that contain nicotine or tobacco, such as cigarettes, e-cigarettes, and chewing tobacco. If you need help quitting, ask your health care provider.  · Return to your normal activities as told by your health care provider. Ask your health care provider what activities are safe for you.  General instructions  · Take over-the-counter and prescription medicines only as told by your health care  provider.  · Drink enough fluid to keep your urine pale yellow.  · Keep all follow-up visits as told by your health care provider. This is important.  How is this prevented?    There is no vaccine to help prevent COVID-19 infection. However, there are steps you can take to protect yourself and others from this virus.  To protect yourself:   · Do not travel to areas where COVID-19 is a risk. The areas where COVID-19 is reported change often. To identify high-risk areas and travel restrictions, check the CDC travel website: wwwnc.cdc.gov/travel/notices  · If you live in, or must travel to, an area where COVID-19 is a risk, take precautions to avoid infection.  ? Stay away from people who are sick.  ? Wash your hands often with soap and water for 20 seconds. If soap and water are not available, use an alcohol-based hand .  ? Avoid touching your mouth, face, eyes, or nose.  ? Avoid going out in public, follow guidance from your state and local health authorities.  ? If you must go out in public, wear a cloth face covering or face mask.  ? Disinfect objects and surfaces that are frequently touched every day. This may include:  § Counters and tables.  § Doorknobs and light switches.  § Sinks and faucets.  § Electronics, such as phones, remote controls, keyboards, computers, and tablets.  To protect others:  If you have symptoms of COVID-19, take steps to prevent the virus from spreading to others.  · If you think you have a COVID-19 infection, contact your health care provider right away. Tell your health care team that you think you may have a COVID-19 infection.  · Stay home. Leave your house only to seek medical care. Do not use public transport.  · Do not travel while you are sick.  · Wash your hands often with soap and water for 20 seconds. If soap and water are not available, use alcohol-based hand .  · Stay away from other members of your household. Let healthy household members care for children  and pets, if possible. If you have to care for children or pets, wash your hands often and wear a mask. If possible, stay in your own room, separate from others. Use a different bathroom.  · Make sure that all people in your household wash their hands well and often.  · Cough or sneeze into a tissue or your sleeve or elbow. Do not cough or sneeze into your hand or into the air.  · Wear a cloth face covering or face mask.  Where to find more information  · Centers for Disease Control and Prevention: www.cdc.gov/coronavirus/2019-ncov/index.html  · World Health Organization: www.who.int/health-topics/coronavirus  Contact a health care provider if:  · You live in or have traveled to an area where COVID-19 is a risk and you have symptoms of the infection.  · You have had contact with someone who has COVID-19 and you have symptoms of the infection.  Get help right away if:  · You have trouble breathing.  · You have pain or pressure in your chest.  · You have confusion.  · You have bluish lips and fingernails.  · You have difficulty waking from sleep.  · You have symptoms that get worse.  These symptoms may represent a serious problem that is an emergency. Do not wait to see if the symptoms will go away. Get medical help right away. Call your local emergency services (911 in the [place] Do not drive yourself to the hospital. Let the emergency medical personnel know if you think you have COVID-19.  Summary  · COVID-19 is a respiratory infection that is caused by a virus. It is also known as coronavirus disease or novel coronavirus. It can cause serious infections, such as pneumonia, acute respiratory distress syndrome, acute respiratory failure, or sepsis.  · The virus that causes COVID-19 is contagious. This means that it can spread from person to person through droplets from coughs and sneezes.  · You are more likely to develop a serious illness if you are 65 years of age or older, have a weak immunity, live in a nursing  home, or have chronic disease.  · There is no medicine to treat COVID-19. Your health care provider will talk with you about ways to treat your symptoms.  · Take steps to protect yourself and others from infection. Wash your hands often and disinfect objects and surfaces that are frequently touched every day. Stay away from people who are sick and wear a mask if you are sick.  This information is not intended to replace advice given to you by your health care provider. Make sure you discuss any questions you have with your health care provider.  Document Released: 01/23/2020 Document Revised: 05/14/2020 Document Reviewed: 01/23/2020  [employer] Patient Education © 2020 [employer]  Depression / Suicide Risk    As you are discharged from this [facility] facility, it is important to learn how to keep safe from harming yourself.    Recognize the warning signs:  · Abrupt changes in personality, positive or negative- including increase in energy   · Giving away possessions  · Change in eating patterns- significant weight changes-  positive or negative  · Change in sleeping patterns- unable to sleep or sleeping all the time   · Unwillingness or inability to communicate  · Depression  · Unusual sadness, discouragement and loneliness  · Talk of wanting to die  · Neglect of personal appearance   · Rebelliousness- reckless behavior  · Withdrawal from people/activities they love  · Confusion- inability to concentrate     If you or a loved one observes any of these behaviors or has concerns about self-harm, here's what you can do:  · Talk about it- your feelings and reasons for harming yourself  · Remove any means that you might use to hurt yourself (examples: pills, rope, extension cords, firearm)  · Get professional help from the community (Mental Health, Substance Abuse, psychological counseling)  · Do not be alone:Call your Safe Contact- someone whom you trust who will be there for you.  · Call your local CRISIS HOTLINE  337-4392 or 500-668-3648  · Call your local Children's Mobile Crisis Response Team [place] (668) 875-4536 or www.[employer]  · Call the toll free National Suicide Prevention Hotlines   · National Suicide Prevention Lifeline 655-294-RIMC (2482)  · National [employer] Line Network 800-SUICIDE (563-6005)

## 2022-01-06 NOTE — CARE PLAN
Problem: Toileting  Goal: STG-Within one week, patient will complete toileting tasks with min-mod A using AE as needed.   Outcome: Met     Problem: OT Long Term Goals  Goal: LTG-By discharge, patient will complete basic self care tasks  Description: 1) Individualized Goal:  at a Min A to Mod I level with AD/AE/DME PRN.   Outcome: Discharged - Not Met  Note: Requires mod A for bathing and LB dressing; d/c to SNF.   Goal: LTG-By discharge, patient will perform bathroom transfers  Description: 1) Individualized Goal:  at a Min A to Mod I level with use of AE/AD/DME PRN.   Outcome: Discharged - Not Met  Note: Requires mod A for shower transfers; d/c to SNF.      Problem: Bathing  Goal: STG-Within one week, patient will bathe with min A using AE as needed.  Outcome: Discharged - Not Met  Note: Requires mod A; d/c to SNF.      Problem: Functional Transfers  Goal: STG-Within one week, patient will transfer to step in shower with SBA using AE/DME as needed.   Outcome: Discharged - Not Met  Note: Requires min A; d/c to SNF.

## 2022-01-06 NOTE — DISCHARGE SUMMARY
"Admission Date: 12/24/2021    Discharge Date: 1/6/2022    Attending Provider: Valencia Ramirez MD    Admission Diagnosis:   Active Hospital Problems    Diagnosis    • *Hemorrhagic stroke (HCC)    • Hip pain, left    • Spasticity    • Cystitis    • Iron deficiency anemia    • Leukocytosis    • Anemia    • Hypokalemia    • Thrombocytopenia (HCC)    • UTI (urinary tract infection)    • Left-sided neglect    • Edema    • Impaired mobility and ADLs    • Otitis externa    • Morbid obesity with BMI of 40.0-44.9, adult (HCC)    • GERD (gastroesophageal reflux disease)    • AF (atrial fibrillation) (HCC)    • Hyperlipemia    • Hypertension        Discharge Diagnosis:  Active Hospital Problems    Diagnosis    • *Hemorrhagic stroke (HCC)    • Hip pain, left    • Spasticity    • Cystitis    • Iron deficiency anemia    • Leukocytosis    • Anemia    • Hypokalemia    • Thrombocytopenia (HCC)    • UTI (urinary tract infection)    • Left-sided neglect    • Edema    • Impaired mobility and ADLs    • Otitis externa    • Morbid obesity with BMI of 40.0-44.9, adult (HCC)    • GERD (gastroesophageal reflux disease)    • AF (atrial fibrillation) (HCC)    • Hyperlipemia    • Hypertension        HPI per H&P:  Per Dr. Pfeiffer's consult \"The patient is a 71 y.o. male with a past medical history of obstructive sleep apnea, hypertension, hyperlipidemia and paroxysmal atrial fibrillation on Xarelto;  who presented on 12/19/2021  3:31 AM with left sided droop and left sided weakness and found to have a basal ganglionic hemorrhage involving the putamen and globus pallidus on the right, extending right up to the posterior limb of the internal capsule. Per documentation, wife woke up early AM due to noise and woke up  to check for intruders. It was neighbor's party in backyard. Shortly after the patient woke up, developed left-sided weakness for which EMS were called and patient was brought to emergency room.\"     Patient was monitored " in the ICU and on IV antihypertensives.  He received prothrombin complex conference for Xarelto reversal.  He was started on hyper tonic saline and oral sodium tabs for initial goal of a sodium of 150, now with a goal of 140.  Repeat head CT 6 hours for admission found increase in the size of his hemorrhage though repeat imaging the following day was stable.  He did not require any surgery. HgbA1c 5.5, .     Patient required IV diuresis with noted anasarca, thought secondary to IV fluids.  He had an echocardiogram that showed ejection fraction of 70%.  Patient had a PICC line placed on 12/22 due to difficulties maintaining peripheral IV access.  His Xarelto was discontinued due to his hemorrhage.  Per neurology okay to restart Xarelto in 2 weeks.     Patient current reports left-sided weakness, a sense that his left arm does not belong to him, swelling in his extremities, and a mild headache.  He denies any issues with urination other than polyuria due to the diuretics and is currently has a condom catheter in place.  He moved his bowels this morning.  He also reports a left visual field cut new since his stroke.  In addition he is complaining of right ear pain.  Patient reports he is not sure he wants to restart Xarelto.     Patient was evaluated by Rehab Medicine physician and Physical Therapy, Occupational Therapy and Speech Therapy and determined to be appropriate for acute inpatient rehab and was transferred to [facility] on 12/24/2021 12:44 PM.    Rehab Hospital Course by Problem List:    Large right basal ganglia hemorrhagic stroke  Left hemiparesis, improved  Left visual field cut, continues  Left neglect, continues  Cognitive impairment, improved     Statin     Per neurology okay to restart Xarelto in 2 weeks from admission to rehab, so we will check a CT scan first, patient and wife not interested in restarting, they discuss this in follow-up with neurology     Outpatient  follow-up with stroke bridge, Dr. Figueroa, referrals made     Sedation, resolved  Was on Provigil, discontinued on discharge     Spasticity, improved/resolved  Started 10 mg baclofen at night 1/3  Outpatient follow-up with Dr. Figueroa     Headache, resolved  Likely from brain swelling  As needed Tylenol     Hypertension, improved  Amlodipine 10 mg  Hydralazine 50 mg every 8  Losartan discontinued     Paroxysmal atrial fibrillation  Xarelto currently on hold for 2 weeks per neurology, wife and patient wished to hold off on restarting for now  May benefit from beta-blocker, per review of outpatient notes has been on metoprolol and amiodarone in the past  Currently in sinus rhythm     Edema, improved  Bilateral superficial thrombi upper extremities  Negative lower extremity Dopplers  Positive bilateral upper extremity Dopplers for superficial thrombi, no need for full treatment and contraindicated given his hemorrhage  Repeat Dopplers with extension on the right side post though still superficial  Will need to be monitored at the skilled nursing facility weekly for extension     Left arm thrombophlebitis, resolved  Treated with antibiotics     Leukocytosis, resolved  Cystitis  Positive urinalysis  Culture with gram-negative rods, Klebsiella  Started on Omnicef, to complete 6 more days at [facility]     Iron deficiency anemia  Ferrous sulfate and vitamin C  Stable     GERD/GI prophylaxis  Omeprazole     Insomnia, improved/resolved  Scheduled melatonin  Added baclofen for spasticity  Scheduled Trazodone added     Morbid obesity  Due to excess calories  BMI 40.56  Outpatient nutritional counseling     Bowel program  Continue bowel medications  Last BM 1/5     Bladder program  Check PVRs as needed  Not retaining       DVT prophylaxis  Contraindicated given hemorrhage.   Compression stockings on in am, off in pm.  Increase mobility. Monitor for swelling.    Radiology    Upper Extremity   Venous Duplex Report      Vascular Laboratory    CONCLUSIONS   At the left elbow, the median antecubital vein is incompressible & in the    forearm the cephalic vein is incompressible with acute to subacute    appearing softly echogenic material in the lumen consistent with    superficial thrombus.   No deep venous thrombosis.       NASIM TELLEZ      Exam Date:     12/24/2021 16:23  _____________________________________________________    Upper Extremity   Venous Duplex Report      Vascular Laboratory   CONCLUSIONS   Right upper extremity venous duplex imaging.    No evidence of deep venous thrombosis.    Evidence of acute superficial venous thrombosis in the median cubital vein.      KOKO TELLEZEL      Exam Date:     12/25/2021 15:49    _____________________________________________________    Upper Extremity   Venous Duplex Report      Vascular Laboratory   CONCLUSIONS   Evidence of acute superficial venous thrombosis in the right basilic vein    at the distal bicep and in the median antecubital vein, indicating    progression of new thrombus in the right basilic veins at the distal biceps    compared to last study done on 12/25/2021.      Evidence of known acute superficial venous thrombosis in the median    antecubital vein. Cannot compare to prior study done on 12/24/2021 as    forearm was not scanned this time.      TELLEZNASIM      Exam Date:     01/04/2022 15:38    _____________________________________________________    Labs    Lab Results   Component Value Date/Time    SODIUM 135 01/05/2022 06:40 AM    POTASSIUM 3.8 01/05/2022 06:40 AM    CHLORIDE 99 01/05/2022 06:40 AM    CO2 22 01/05/2022 06:40 AM    GLUCOSE 95 01/05/2022 06:40 AM    BUN 17 01/05/2022 06:40 AM    CREATININE 0.75 01/05/2022 06:40 AM      Lab Results   Component Value Date/Time    WBC 8.6 01/05/2022 06:40 AM    RBC 4.27 (L) 01/05/2022 06:40 AM    HEMOGLOBIN 12.6 (L) 01/05/2022 06:40 AM    HEMATOCRIT 38.0 (L) 01/05/2022 06:40 AM    MCV 89.0 01/05/2022 06:40 AM    MCH 29.5 01/05/2022  06:40 AM    MCHC 33.2 (L) 01/05/2022 06:40 AM    MPV 8.6 (L) 01/05/2022 06:40 AM    NEUTSPOLYS 61.50 01/04/2022 05:40 AM    LYMPHOCYTES 24.00 01/04/2022 05:40 AM    MONOCYTES 11.90 01/04/2022 05:40 AM    EOSINOPHILS 1.70 01/04/2022 05:40 AM    BASOPHILS 0.60 01/04/2022 05:40 AM        Functional Status at Discharge  Eating:  Supervision  Eating Description:  Increased time,Set-up of equipment or meal/tube feeding  Grooming:  Supervision,Seated  Grooming Description:  Increased time,Supervision for safety (distant supv)  Bathing:  Moderate Assist  Bathing Description:  Grab bar,Hand held shower,Tub bench,Increased time,Set-up of equipment,Supervision for safety,Verbal cueing (A to wash feet and buttocks)  Upper Body Dressing:  Stand by Assist  Upper Body Dressing Description:  Increased time,Set-up of equipment,Supervision for safety,Verbal cueing (vc's for orientation of shirt)  Lower Body Dressing:  Moderate Assist  Lower Body Dressing Description:  Increased time,Set-up of equipment,Supervision for safety,Verbal cueing (assist to don socks and thread LLE into brief)  Discharge Location : Skilled Nursing Facility  Patient Discharging with Assist of: Spouse / Significant Other  Level of Supervision Required: 24 Hour Supervision  Recommended Equipment for Discharge: Front-Wheeled Walker;Manual Wheelchair;Shower Chair;3 in 1 Commode;Grab Bars by Toilet;Grab Bars in Tub / Shower;Hand Held Shower Head;Reacher  Recommended Services Upon Discharge: Other (See Comments) (OT at SNF)  Long Term Goals Met: 0  Long Term Goals Not Met: 2  Reason(s) for Goals Not Met: Pt con't to require mod A with bathing and LB dressing, and min-mod A wiht transfers; d/c to SNF.  Criteria for Termination of Services: Maximum Function Achieved for Inpatient Rehabilitation  Walk:   (vector with ~ 15lb BWS)  Distance Walked:  40  Number of Times Distance Was Traveled:  2  Assistive Device:  Front Wheel Walker  Gait Deviation:  Increased Base Of  "Support,Ataxic,Decreased Heel Strike,Decreased Toe Off (cues for task attention )  Wheelchair:  Stand by Assist  Distance Propelled:  25   Wheelchair Description:  Extra time,Impaired coordination,Limited by fatigue,Safety concerns,Supervision for safety,Verbal cueing  Stairs Minimal Assist  Stairs Description Supervision for safety,Safety concerns,Extra time,Hand rails (4\" step in // with B UE support (vector harness))     Comprehension:  Supervision  Comprehension Description:  Glasses  Expression:  Modified Independent  Expression Description:  Verbal cueing  Social Interaction:  Independent  Social Interaction Description:     Problem Solving:  Minimal Assist  Problem Solving Description:  Increased time,Verbal cueing,Therapy schedule,Seat belt,Bed/chair alarm  Memory:  Minimal Assist  Memory Description:  Bed/chair alarm,Verbal cueing,Therapy schedule,Increased time,Seat belt       Valencia MOREL M.D., personally performed a complete drug regimen review and no potential clinically significant medication issues were identified.     Discharge Medication:     Medication List      START taking these medications      Instructions   acetaminophen 325 MG Tabs  Commonly known as: Tylenol   Take 2 Tablets by mouth every four hours as needed for Mild Pain.  Dose: 650 mg     ascorbic acid 500 MG tablet  Start taking on: January 7, 2022  Commonly known as: Vitamin C   Take 1 Tablet by mouth every morning with breakfast.  Dose: 500 mg     atorvastatin 40 MG Tabs  Commonly known as: LIPITOR   Take 1 Tablet by mouth every evening.  Dose: 40 mg     baclofen 10 MG Tabs  Commonly known as: LIORESAL   Take 1 Tablet by mouth at bedtime.  Dose: 10 mg     cefdinir 300 MG Caps  Commonly known as: OMNICEF   Take 1 Capsule by mouth every 12 hours for 6 days.  Dose: 300 mg     ferrous sulfate 325 (65 Fe) MG tablet  Start taking on: January 7, 2022   Take 1 Tablet by mouth every morning with breakfast.  Dose: 325 mg   "   senna-docusate 8.6-50 MG Tabs  Commonly known as: PERICOLACE or SENOKOT S   Take 2 Tablets by mouth 2 times a day.  Dose: 2 Tablet     traZODone 50 MG Tabs  Commonly known as: DESYREL   Take 1 Tablet by mouth at bedtime.  Dose: 50 mg        CHANGE how you take these medications      Instructions   hydrALAZINE 50 MG Tabs  What changed:   · medication strength  · how much to take  Commonly known as: APRESOLINE   Take 1 Tablet by mouth every 8 hours.  Dose: 50 mg     melatonin 3 MG Tabs  What changed:   · medication strength  · how much to take  · when to take this  · reasons to take this   Take 2 Tablets by mouth at bedtime.  Dose: 6 mg     omeprazole 20 MG delayed-release capsule  What changed: when to take this  Commonly known as: PRILOSEC   Take 1 Capsule by mouth 2 times a day.  Dose: 20 mg        CONTINUE taking these medications      Instructions   amLODIPine 10 MG Tabs  Start taking on: January 7, 2022  Commonly known as: NORVASC   Take 1 Tablet by mouth every day.  Dose: 10 mg        STOP taking these medications    losartan 100 MG Tabs  Commonly known as: COZAAR     pravastatin 20 MG Tabs  Commonly known as: PRAVACHOL     sodium chloride 1 GM Tabs  Commonly known as: SALT     ursodiol 300 MG Caps  Commonly known as: ACTIGALL            Discharge Diet:  Regular    Discharge Activity:  Do not return to driving until cleared by a physician.         Disposition:  Patient to discharge to skilled nursing facility for more rehabilitation.    Follow-up & Discharge Instructions:  PCP, Dr. Figueroa, Stroke bridge clinic     Condition on Discharge:  Good    More than 35 minutes was spent on discharging this patient, including face-to-face time, prescription management, and the dictation of this note.    Valencia Ramirez M.D.    Date of Service: 1/6/2022

## 2022-01-06 NOTE — CARE PLAN
Problem: Speech/Swallowing LTGs  Goal: LTG-By discharge, patient will safely swallow  Description: Safest/least restrictive diet to maintain adequate nutrition and hydration.   Outcome: Met     Problem: Problem Solving STGs  Goal: STG-Within one week, patient will  Description: Patient will demonstrate safety planning, problem solving and sequencing for self care and balance maintenace with 80% acc with min cues to improve.  Outcome: Met     Problem: Speech/Swallowing LTGs  Goal: LTG-By discharge, patient will  Description: Demonstrate independent level of cog-ling functions to ensure safe return to PLOF.  Outcome: Discharged - Not Met     Problem: Problem Solving STGs  Goal: STG-Within one week, patient will complete functional medication management and financial management tasks with 80% accuracy provided MIN A  Outcome: Discharged - Not Met

## 2022-01-06 NOTE — PROGRESS NOTES
Hospital Medicine Daily Progress Note      Chief Complaint  Fever  Leukocytosis  Atrial Fibrillation  Hypertension    Interval Problem Update  Pt slept well but c/o left hip pain.    Review of Systems  Review of Systems   Constitutional: Negative for chills and fever.   HENT: Negative.    Eyes: Negative.    Respiratory: Negative for cough and shortness of breath.    Cardiovascular: Negative for chest pain and palpitations.   Gastrointestinal: Negative for abdominal pain, nausea and vomiting.   Musculoskeletal: Positive for joint pain.   Skin: Negative for itching and rash.   Neurological: Positive for focal weakness.   Endo/Heme/Allergies: Negative for polydipsia. Does not bruise/bleed easily.        Physical Exam  Temp:  [36.6 °C (97.8 °F)-36.7 °C (98.1 °F)] 36.6 °C (97.9 °F)  Pulse:  [65-68] 65  Resp:  [18-20] 18  BP: (106-142)/(56-75) 115/68  SpO2:  [95 %-97 %] 95 %    Physical Exam  Vitals reviewed.   Constitutional:       General: He is not in acute distress.     Appearance: Normal appearance. He is not ill-appearing.   HENT:      Head: Normocephalic and atraumatic.      Right Ear: External ear normal.      Left Ear: External ear normal.      Nose: Nose normal.      Mouth/Throat:      Pharynx: Oropharynx is clear.   Eyes:      General:         Right eye: No discharge.         Left eye: No discharge.      Extraocular Movements: Extraocular movements intact.      Conjunctiva/sclera: Conjunctivae normal.   Cardiovascular:      Rate and Rhythm: Normal rate and regular rhythm.   Pulmonary:      Effort: Pulmonary effort is normal. No respiratory distress.      Breath sounds: Normal breath sounds. No wheezing.   Abdominal:      General: Bowel sounds are normal. There is no distension.      Palpations: Abdomen is soft.      Tenderness: There is no abdominal tenderness. There is no guarding or rebound.   Musculoskeletal:      Cervical back: Normal range of motion and neck supple.      Right lower leg: No edema.       Left lower leg: No edema.      Comments: BUE trace edema   Skin:     General: Skin is warm and dry.   Neurological:      Mental Status: He is alert.      Comments: Left sided weakness         Fluids    Intake/Output Summary (Last 24 hours) at 1/6/2022 1050  Last data filed at 1/6/2022 0700  Gross per 24 hour   Intake 240 ml   Output 300 ml   Net -60 ml       Laboratory  Recent Labs     01/04/22  0540 01/05/22  0640   WBC 9.5 8.6   RBC 4.18* 4.27*   HEMOGLOBIN 12.3* 12.6*   HEMATOCRIT 36.8* 38.0*   MCV 88.0 89.0   MCH 29.4 29.5   MCHC 33.4* 33.2*   RDW 45.4 45.4   PLATELETCT 358 361   MPV 8.7* 8.6*     Recent Labs     01/05/22  0640   SODIUM 135   POTASSIUM 3.8   CHLORIDE 99   CO2 22   GLUCOSE 95   BUN 17   CREATININE 0.75   CALCIUM 8.9                 Assessment/Plan  * Hemorrhagic stroke (HCC)- (present on admission)  Assessment & Plan  Had dysarthria, facial droop, and left hemiparesis w/ presenting   CT right basal ganglia hemorrhage with mild mass effect  Non-surgical management  Chronic anticoagulation reversed  S/P Nicardipine gtt, continue BP management  S/P hypertonic saline, now off NaCl  On Provigil per Physiatry    Hip pain, left  Assessment & Plan  Suspect 2/2 decreased mobility from stroke  But check X-ray R/O other    Leukocytosis  Assessment & Plan  Had recurrent elevated WBC on 1/2/22  UA  WBC w/ many bacteria  UCx >100,000 Klebsiella, pansensitive except to Unasyn  CXR left perihilar atelectasis vs pneumonitis, started IS  Continue Omnicef x 5 days    Anemia- (present on admission)  Assessment & Plan  Has downtrending Hb w/ normocytic indices  Suspect hemodilution from IV abx volume load  Fe 34, now on Fe and Vit C supplements  Follow H/H    Edema- (present on admission)  Assessment & Plan  U/S LUE +SVT, negative DVT, completed Zosyn for thrombophlebitis  U/S RUE +SVT, negative DVT, PICC now removed  U/S LLE negative DVT, edema resolved  F/U BUE U/S 1/4/22 show progression of RUE SVT,  unable to compare LUE SVT to prior study as tech did not scan forearm this time  Recommend continued serial imaging    UTI (urinary tract infection)- (present on admission)  Assessment & Plan  Tmax 101.9 w/ new leukocytosis on 12/25/21  UA 10-20 WBC w/ negative bacteria  UCx 10-50,000 E Coli, pansensitive except to Levaquin  BCx x 2 NGTD  Completed Zosyn 1/1/22    Otitis externa- (present on admission)  Assessment & Plan  Otoscopic Exam by me 12/25/21:  TM difficult to visualize due to cerumen, R auditory canal erythematous  Concerning for otitis externa  Ideally, would have treated w/ Cipro Otic gtts but not available at this facility  Was on Zosyn for UTI and Thrombophlebitis, now completed  Unable to do F/U Otoscopic Exam 12/27/21 due to non-functioning equipment  Otoscopic Exam per Dr. Santiago 12/29/21:  R auditory canal w/ resolved erythema    Hypertension- (present on admission)  Assessment & Plan  Blood pressure controlled on Norvasc and Hydralazine    Hyperlipemia- (present on admission)  Assessment & Plan  On Lipitor    AF (atrial fibrillation) (HCC)- (present on admission)  Assessment & Plan  Not on any rate controlling agents  Monitor for tachydysrhythmias  On chronic Xarelto  Anticoagulation presently on hold due to ICH     Full Code    Discussed w/ pt and Dr. Ramirez

## 2022-01-06 NOTE — CARE PLAN
Problem: PT-Long Term Goals  Goal: LTG-By discharge, patient will ambulate 30ft with min A and most appropriate AD.  Outcome: Met  Goal: LTG-By discharge, patient will transfer one surface to another with min A using AD as needed.  Outcome: Met  Goal: LTG-By discharge, patient will ambulate up/down 2 stairs with L ascending rail and min A.  Outcome: Discharged - Not Met  Goal: LTG-By discharge, patient will transfer in/out of a car with mod A using AD as needed.  Outcome: Discharged - Not Met  Goal: LTG-By discharge, patient will perform bed mobility with supervision using maida techniques.  Outcome: Discharged - Not Met     Problem: Mobility Transfers  Goal: STG-Within one week, patient will perform bed mobility with supervision using maida techniques.  Outcome: Discharged - Not Met  Goal: STG-Within one week, patient will transfer one surface to another with min A using AD as needed.  Outcome: Met  Goal: STG-Within one week, patient will transfer in/out of car with mod A using AD as needed.  Outcome: Discharged - Not Met     Problem: Mobility  Goal: STG-Within one week, patient will ambulate 30ft with min A and most appropriate AD.  Outcome: Met  Goal: STG-Within one week, patient will ambulate up/down 2 stairs with L ascending rail and min A.  Outcome: Discharged - Not Met

## 2022-01-06 NOTE — CARE PLAN
Problem: Psychosocial  Goal: Patient's level of anxiety will decrease  Outcome: Progressing  Note: Pt attitude and anxiety have improved and patient pleasant today. Will monitor.   The patient is Watcher - Medium risk of patient condition declining or worsening    Shift Goals  Clinical Goals: sleep  Patient Goals: sleep well    Progress made toward(s) clinical / shift goals:  wnl    Patient is not progressing towards the following goals:

## 2022-01-07 NOTE — PROGRESS NOTES
Patient discharged to home per order.  Discharge instructions reviewed with patient and spouse; they verbalize understanding and signed copies placed in chart.  Patient has all belongings; signed copy of form in chart.  Patient left facility at 1520 via [facility] accompanied by rehab staff and spouse.  Have enjoyed working with this pleasant patient.

## 2022-01-19 ENCOUNTER — OFFICE VISIT (OUTPATIENT)
Dept: NEUROLOGY | Facility: MEDICAL CENTER | Age: 72
End: 2022-01-19
Attending: NURSE PRACTITIONER
Payer: MEDICARE

## 2022-01-19 VITALS
RESPIRATION RATE: 14 BRPM | DIASTOLIC BLOOD PRESSURE: 70 MMHG | SYSTOLIC BLOOD PRESSURE: 114 MMHG | WEIGHT: 227.96 LBS | TEMPERATURE: 98.2 F | HEIGHT: 65 IN | OXYGEN SATURATION: 98 % | HEART RATE: 70 BPM | BODY MASS INDEX: 37.98 KG/M2

## 2022-01-19 DIAGNOSIS — I10 PRIMARY HYPERTENSION: ICD-10-CM

## 2022-01-19 DIAGNOSIS — I48.0 PAROXYSMAL ATRIAL FIBRILLATION (HCC): ICD-10-CM

## 2022-01-19 DIAGNOSIS — I61.9 HEMORRHAGIC STROKE (HCC): ICD-10-CM

## 2022-01-19 DIAGNOSIS — K64.9 HEMORRHOIDS, UNSPECIFIED HEMORRHOID TYPE: ICD-10-CM

## 2022-01-19 DIAGNOSIS — G47.33 OSA (OBSTRUCTIVE SLEEP APNEA): ICD-10-CM

## 2022-01-19 PROCEDURE — 99215 OFFICE O/P EST HI 40 MIN: CPT | Performed by: NURSE PRACTITIONER

## 2022-01-19 PROCEDURE — 99212 OFFICE O/P EST SF 10 MIN: CPT | Performed by: NURSE PRACTITIONER

## 2022-01-19 PROCEDURE — G2212 PROLONG OUTPT/OFFICE VIS: HCPCS | Performed by: NURSE PRACTITIONER

## 2022-01-19 RX ORDER — FERROUS SULFATE 325(65) MG
325 TABLET ORAL
Qty: 30 TABLET | Refills: 11
Start: 2022-01-19 | End: 2022-02-15 | Stop reason: SDUPTHER

## 2022-01-19 ASSESSMENT — ENCOUNTER SYMPTOMS
FOCAL WEAKNESS: 1
BLURRED VISION: 1
NERVOUS/ANXIOUS: 0
COUGH: 1
FEVER: 0
FALLS: 1
BLOOD IN STOOL: 1
DEPRESSION: 0
SENSORY CHANGE: 1
PALPITATIONS: 0
DIZZINESS: 1
BRUISES/BLEEDS EASILY: 0
SPEECH CHANGE: 0
HEADACHES: 0

## 2022-01-19 ASSESSMENT — FIBROSIS 4 INDEX: FIB4 SCORE: 0.92

## 2022-01-19 NOTE — PROGRESS NOTES
Subjective     HPI    Gokul Baca is a 71 y.o.right handed male who presents to [facility] for evaluation of right temporal and BG IPH.     He presented to the ER on 12/19/2021with acute smptoms of left sided weakness.  His wife had woken him up in the middle of the night because someone was in the yard, shortly after he woke up, he noticed the symptoms. NIHSS 10 on admission. ICH score 1 (ICH volume).  INR 1.23 on admission.  Blood pressure on admission 192/91.    He is here with his wife today.  He is at [facility], he is getting therapy. He states that prior to stroke blood pressure at home was 120s-140s, but he only checked intermittently.  He is not sure if he was using his CPAP that night.       PMH includes HTN, HLD, PAF, ROB on CPAP  Social History:        He is here today with his wife.  He has a hemorrhoid that has been bleeding, he is very uncomfortable and would like a referral .    Review of Systems   Constitutional: Negative for fever.   HENT: Negative for nosebleeds.    Eyes: Positive for blurred vision.        Decreased vision left eye   Respiratory: Positive for cough.    Cardiovascular: Negative for chest pain and palpitations.   Gastrointestinal: Positive for blood in stool.        Rectal bleeding from hemorrhoid   Genitourinary: Negative for hematuria.   Musculoskeletal: Positive for falls.   Skin: Negative for rash.   Neurological: Positive for dizziness, sensory change and focal weakness. Negative for speech change and headaches.   Endo/Heme/Allergies: Does not bruise/bleed easily.   Psychiatric/Behavioral: Negative for depression. The patient is not nervous/anxious.          Objective        I personally reviewed imaging below and agree with the findings  CT head 12/19/2021  1.4 x 3.6 cm parenchymal hemorrhage in the right basal ganglia. Mild mass effect in the right lateral ventricle. No significant midline shift.      CTA head 12/19/2021  1.No hemodynamically  significant narrowing of the major intracranial vessels.  2. Redemonstration of parenchymal hemorrhage in the right basal ganglia.    CTA neck 12/19/2021  No evidence of flow-limiting stenosis in the cervical carotid or cervical vertebral arteries.    CT head 12/19/2021  1.Increase in size of right temporal intra-axial hematoma now measuring 6.7 x 4.3 cm and previously 3.4 x 2.9 cm     2.  Mild mass effect with 2-3 mm right to left shift     2. Underlying brain white matter change and volume loss    CT head 12/29/2021    1.Increase in size of right temporal intra-axial hematoma now measuring 6.7 x 4.3 cm and previously 3.4 x 2.9 cm     2.  Mild mass effect with 2-3 mm right to left shift     3Underlying brain white matter change and volume loss    TTE:  LVEF 70%, LA size WNL, LYNDSEY 13.     Stroke Labs:  , A1C 5.5      PHYSICAL ASSESSMENT  Constitutional:  Alert, no apparent distress,  Psych:   mood and affect WNL  Muskuloskeletal:  Moves all extremities equally, slight drift LUE, strength 4/5 LUE, 5/5 RUE/BLE   NEUROLOGICAL ASSESSMENT  Oriented X 4, speech fluent, naming and memory intact  CN II: inferior/superior LHH. Fundoscopic exam is normal with sharp discs and no vascular changes. Pupils are 4 mm and briskly reactive to light.   CN III: IV, VI  EOMs intact, no ptosis  CN V: Facial sensation is intact to pinprick in all 3 divisions bilaterally. Corneal responses are intact.  CN VII: Face is symmetric with normal eye closure and smile.  CN VIII Hearing is normal to rubbing fingers  CN IX, X: Palate elevates symmetrically. Phonation is normal.  CN XI: Head turning and shoulder shrug are intact  CN XII: Tongue is midline with normal movements and no atrophy.                           Sensation to PP equal bilaterally                 No limb ataxia with finger to nose and heel to shin                 Ambulates with steady gait.                 Rhomberg negative                Biceps,brachioradialis, tricep,  and patellar reflexex all 2+     Cardiovascular:    S1S2, no abnormal rhythm auscultated, no peripheral edema  Neck:                     No carotid bruits noted   Pulmonary:            Respirations easy, lungs clear to auscultation all fields.     Skin:                     No obvious rashes.    Iniital NIHSS  10       Current NIHSS    1a. LOC: 0  1b. LOC Questions: 0  1c. LOC Commands: 0  2. Best Gaze:0  3. Visual Fields: 2  4. Facial Paresis: 0  5a. Motor arm left: 1  5b. Motor arm right: 0  6a. Motor leg left: 0  6b. Motor leg right: 0  7. Sensory: 0  8. Best Language: 0  9. Limb Ataxia: 0  10. Dysarthria: 0  11. Extinction/Inattention: 0    Total Score Current 3          Current mRS  3      Assessment & Plan      1. Hemorrhagic stroke (HCC):  Large right temporal IPH, secondary to anticoagulation in the setting of hypertension.    Stroke Risk Factors include:               Afib, ROB, HTN,, HLD    Avoid NSAIDS    LDL goal < 70, current 124, continue Atorvastatin 40mg discussed medication side effects, will need follow up with primary care evaluate liver function at intervals and refill  Exercise at least 30 minutes daily, avoid red meat, fried foods, butter, cheese.   Eat 5-6 servings of vegetables and fruits daily, choose lean white meat without skin (chicken, turkey, white fish)--baked, broiled or grilled.    Repeat CT scan no sooner than 3/19/2022    2. Primary hypertension  Blood pressure goal less than 130/80, currently 114/70    3. Paroxysmal atrial fibrillation (HCC)      No anticoagulation now due to size of bleed      Follow up with cardiology for further recommendations on anti-coagulation, possibly he is a candidate for Watchman, patient understands that he would have to be on AC for a short period before and after Watchman, I will not clear him for anti-coagulation until we repeat the CT scan.    4. Hemorrhoids, unspecified hemorrhoid type     Referral to general surgery    5. ROB      Recommend that  he begin to use CPAP ASAP, Rx sent with patient for current facility        If any new signs of stroke:  sudden weakness, numbness, speech difficulty (slurring or difficulty finding words), imbalance, incoordination, worse headache of life or vision loss occur, call 911.      Take all medications as prescribed unless instructed by your provider.      I spent a total of 85 minutes caring for patient,  my time includes counseling, review of systems, HPI and assessment, review of images, labs and testing as above.  I reviewed the hospital records, PMH, social and family history.   I have counseled patient on stroke prevention strategies, stroke symptoms and mimics.  Diet and exercise modifications.  We discussed medication side effects and instructions.       I have provided patient a written personalized stroke prevention plan, it is filed under the media tab under ‘[facility]”.        Follow up in 10 weeks

## 2022-01-19 NOTE — PATIENT INSTRUCTIONS
Please make appointment with cardiology for ongoing treatment for atrial fibrillation, will likely need to go back on anticoagulation (blood thinner) at some point,  You may ask cardiologist about the possibility of a Watchman as opposed to long-term blood thinners.    I gave you a referral for surgery for hemorrhoid.      Do not repeat CT scan before 3/19/2022/.      Please use CPAP every night, failure to use CPAP can cause irritation to the heart and invoke atrial fibrillation.           Atrial Fibrillation    Atrial fibrillation is a type of heartbeat that is irregular or fast (rapid). If you have this condition, your heart beats without any order. This makes it hard for your heart to pump blood in a normal way. Having this condition gives you more risk for stroke, heart failure, and other heart problems.  Atrial fibrillation may start all of a sudden and then stop on its own, or it may become a long-lasting problem.  What are the causes?  This condition may be caused by heart conditions, such as:  · High blood pressure.  · Heart failure.  · Heart valve disease.  · Heart surgery.  Other causes include:  · Pneumonia.  · Obstructive sleep apnea.  · Lung cancer.  · Thyroid disease.  · Drinking too much alcohol.  Sometimes the cause is not known.  What increases the risk?  You are more likely to develop this condition if:  · You smoke.  · You are older.  · You have diabetes.  · You are overweight.  · You have a family history of this condition.  · You exercise often and hard.  What are the signs or symptoms?  Common symptoms of this condition include:  · A feeling like your heart is beating very fast.  · Chest pain.  · Feeling short of breath.  · Feeling light-headed or weak.  · Getting tired easily.  Follow these instructions at home:  Medicines  · Take over-the-counter and prescription medicines only as told by your doctor.  · If your doctor gives you a blood-thinning medicine, take it exactly as told. Taking too  "much of it can cause bleeding. Taking too little of it does not protect you against clots. Clots can cause a stroke.  Lifestyle         · Do not use any tobacco products. These include cigarettes, chewing tobacco, and e-cigarettes. If you need help quitting, ask your doctor.  · Do not drink alcohol.  · Do not drink beverages that have caffeine. These include coffee, soda, and tea.  · Follow diet instructions as told by your doctor.  · Exercise regularly as told by your doctor.  General instructions  · If you have a condition that causes breathing to stop for a short period of time (apnea), treat it as told by your doctor.  · Keep a healthy weight. Do not use diet pills unless your doctor says they are safe for you. Diet pills may make heart problems worse.  · Keep all follow-up visits as told by your doctor. This is important.  Contact a doctor if:  · You notice a change in the speed, rhythm, or strength of your heartbeat.  · You are taking a blood-thinning medicine and you see more bruising.  · You get tired more easily when you move or exercise.  · You have a sudden change in weight.  Get help right away if:    · You have pain in your chest or your belly (abdomen).  · You have trouble breathing.  · You have blood in your vomit, poop, or pee (urine).  · You have any signs of a stroke. \"BE FAST\" is an easy way to remember the main warning signs:  ? B - Balance. Signs are dizziness, sudden trouble walking, or loss of balance.  ? E - Eyes. Signs are trouble seeing or a change in how you see.  ? F - Face. Signs are sudden weakness or loss of feeling in the face, or the face or eyelid drooping on one side.  ? A - Arms. Signs are weakness or loss of feeling in an arm. This happens suddenly and usually on one side of the body.  ? S - Speech. Signs are sudden trouble speaking, slurred speech, or trouble understanding what people say.  ? T - Time. Time to call emergency services. Write down what time symptoms " started.  · You have other signs of a stroke, such as:  ? A sudden, very bad headache with no known cause.  ? Feeling sick to your stomach (nausea).  ? Throwing up (vomiting).  ? Jerky movements you cannot control (seizure).  These symptoms may be an emergency. Do not wait to see if the symptoms will go away. Get medical help right away. Call your local emergency services (911 in the [place] Do not drive yourself to the hospital.  Summary  · Atrial fibrillation is a type of heartbeat that is irregular or fast (rapid).  · You are at higher risk of this condition if you smoke, are older, have diabetes, or are overweight.  · Follow your doctor's instructions about medicines, diet, exercise, and follow-up visits.  · Get help right away if you think that you have signs of a stroke.  This information is not intended to replace advice given to you by your health care provider. Make sure you discuss any questions you have with your health care provider.  Document Released: 09/26/2009 Document Revised: 02/21/2019 Document Reviewed: 02/08/2019  [employer]vier Patient Education © 2020 [employer]

## 2022-02-07 ENCOUNTER — APPOINTMENT (OUTPATIENT)
Dept: PHYSICAL MEDICINE AND REHAB | Facility: REHABILITATION | Age: 72
End: 2022-02-07
Payer: MEDICARE

## 2022-02-08 ENCOUNTER — HOSPITAL ENCOUNTER (OUTPATIENT)
Dept: LAB | Facility: MEDICAL CENTER | Age: 72
End: 2022-02-08
Attending: FAMILY MEDICINE
Payer: MEDICARE

## 2022-02-08 LAB
ALBUMIN SERPL BCP-MCNC: 3.3 G/DL (ref 3.2–4.9)
ALBUMIN/GLOB SERPL: 0.9 G/DL
ALP SERPL-CCNC: 98 U/L (ref 30–99)
ALT SERPL-CCNC: 9 U/L (ref 2–50)
ANION GAP SERPL CALC-SCNC: 13 MMOL/L (ref 7–16)
AST SERPL-CCNC: 13 U/L (ref 12–45)
BASOPHILS # BLD AUTO: 0.6 % (ref 0–1.8)
BASOPHILS # BLD: 0.06 K/UL (ref 0–0.12)
BILIRUB SERPL-MCNC: 0.4 MG/DL (ref 0.1–1.5)
BUN SERPL-MCNC: 12 MG/DL (ref 8–22)
CALCIUM SERPL-MCNC: 8.6 MG/DL (ref 8.5–10.5)
CHLORIDE SERPL-SCNC: 105 MMOL/L (ref 96–112)
CHOLEST SERPL-MCNC: 126 MG/DL (ref 100–199)
CO2 SERPL-SCNC: 26 MMOL/L (ref 20–33)
CREAT SERPL-MCNC: 1.32 MG/DL (ref 0.5–1.4)
EOSINOPHIL # BLD AUTO: 0.49 K/UL (ref 0–0.51)
EOSINOPHIL NFR BLD: 4.7 % (ref 0–6.9)
ERYTHROCYTE [DISTWIDTH] IN BLOOD BY AUTOMATED COUNT: 48.3 FL (ref 35.9–50)
FASTING STATUS PATIENT QL REPORTED: NORMAL
GLOBULIN SER CALC-MCNC: 3.5 G/DL (ref 1.9–3.5)
GLUCOSE SERPL-MCNC: 82 MG/DL (ref 65–99)
HCT VFR BLD AUTO: 40.7 % (ref 42–52)
HDLC SERPL-MCNC: 36 MG/DL
HGB BLD-MCNC: 13.7 G/DL (ref 14–18)
IMM GRANULOCYTES # BLD AUTO: 0.03 K/UL (ref 0–0.11)
IMM GRANULOCYTES NFR BLD AUTO: 0.3 % (ref 0–0.9)
LDLC SERPL CALC-MCNC: 70 MG/DL
LYMPHOCYTES # BLD AUTO: 1.96 K/UL (ref 1–4.8)
LYMPHOCYTES NFR BLD: 18.9 % (ref 22–41)
MCH RBC QN AUTO: 29.5 PG (ref 27–33)
MCHC RBC AUTO-ENTMCNC: 33.7 G/DL (ref 33.7–35.3)
MCV RBC AUTO: 87.5 FL (ref 81.4–97.8)
MONOCYTES # BLD AUTO: 0.91 K/UL (ref 0–0.85)
MONOCYTES NFR BLD AUTO: 8.8 % (ref 0–13.4)
NEUTROPHILS # BLD AUTO: 6.91 K/UL (ref 1.82–7.42)
NEUTROPHILS NFR BLD: 66.7 % (ref 44–72)
NRBC # BLD AUTO: 0 K/UL
NRBC BLD-RTO: 0 /100 WBC
PLATELET # BLD AUTO: 426 K/UL (ref 164–446)
PMV BLD AUTO: 10 FL (ref 9–12.9)
POTASSIUM SERPL-SCNC: 3.2 MMOL/L (ref 3.6–5.5)
PROT SERPL-MCNC: 6.8 G/DL (ref 6–8.2)
RBC # BLD AUTO: 4.65 M/UL (ref 4.7–6.1)
SODIUM SERPL-SCNC: 144 MMOL/L (ref 135–145)
TRIGL SERPL-MCNC: 100 MG/DL (ref 0–149)
WBC # BLD AUTO: 10.4 K/UL (ref 4.8–10.8)

## 2022-02-08 PROCEDURE — 80061 LIPID PANEL: CPT

## 2022-02-08 PROCEDURE — 85025 COMPLETE CBC W/AUTO DIFF WBC: CPT

## 2022-02-08 PROCEDURE — 82306 VITAMIN D 25 HYDROXY: CPT

## 2022-02-08 PROCEDURE — 80053 COMPREHEN METABOLIC PANEL: CPT

## 2022-02-08 PROCEDURE — 36415 COLL VENOUS BLD VENIPUNCTURE: CPT

## 2022-02-09 LAB — 25(OH)D3 SERPL-MCNC: 44 NG/ML (ref 30–100)

## 2022-02-11 ENCOUNTER — OFFICE VISIT (OUTPATIENT)
Dept: CARDIOLOGY | Facility: MEDICAL CENTER | Age: 72
End: 2022-02-11
Payer: MEDICARE

## 2022-02-11 VITALS
OXYGEN SATURATION: 97 % | BODY MASS INDEX: 35.16 KG/M2 | SYSTOLIC BLOOD PRESSURE: 124 MMHG | WEIGHT: 211 LBS | HEART RATE: 58 BPM | DIASTOLIC BLOOD PRESSURE: 80 MMHG | RESPIRATION RATE: 14 BRPM | HEIGHT: 65 IN

## 2022-02-11 DIAGNOSIS — I48.91 ATRIAL FIBRILLATION, UNSPECIFIED TYPE (HCC): ICD-10-CM

## 2022-02-11 PROCEDURE — 99214 OFFICE O/P EST MOD 30 MIN: CPT | Mod: 25 | Performed by: INTERNAL MEDICINE

## 2022-02-11 PROCEDURE — 93000 ELECTROCARDIOGRAM COMPLETE: CPT | Performed by: INTERNAL MEDICINE

## 2022-02-11 RX ORDER — HYDRALAZINE HYDROCHLORIDE 10 MG/1
10 TABLET, FILM COATED ORAL 3 TIMES DAILY
COMMUNITY
Start: 2022-02-09 | End: 2022-03-30

## 2022-02-11 RX ORDER — LOSARTAN POTASSIUM 25 MG/1
25 TABLET ORAL DAILY
COMMUNITY
End: 2022-03-02

## 2022-02-11 RX ORDER — PRAVASTATIN SODIUM 10 MG
20 TABLET ORAL NIGHTLY
COMMUNITY
End: 2022-04-26 | Stop reason: SDUPTHER

## 2022-02-11 ASSESSMENT — FIBROSIS 4 INDEX: FIB4 SCORE: 0.7

## 2022-02-11 NOTE — PROGRESS NOTES
Arrhythmia Clinic Note (Established patient)    DOS: 2/11/2022    Chief complaint/Reason for consult: AF/AFL    Interval History: 70 y/o M with pAF/AFL. Taken off amiodarone last fall without arrhythmia recurrence. Was on Xarelto for CVA ppx. In December developed stroke-like symptoms and found to have intraparenchymal hemorrhage in setting of hypertension. Complex course including non-surgical management. Taken off OAC permanently by neurology. Seeing me to discuss management moving forward.    ROS (+ highlighted in bold):  Constitutional: Fevers/chills/fatigue/weightloss  HEENT: Blurry vision/eye pain/sore throat/hearing loss  Respiratory: Shortness of breath/cough  Cardiovascular: Chest pain/palpitations/edema/orthopnea/syncope  GI: Nausea/vomitting/diarrhea  MSK: Arthralgias/myagias/muscle weakness  Skin: Rash/sores  Neurological: Numbness/tremors/vertigo  Endocrine: Excessive thirst/polyuria/cold intolerance/heat intolerance  Psych: Depression/anxiety    Past Medical History:   Diagnosis Date   • Atrial fibrillation (HCC)    • GERD (gastroesophageal reflux disease)    • Hyperlipidemia    • Hypertension        Past Surgical History:   Procedure Laterality Date   • KNEE ARTHROPLASTY TOTAL Left        Social History     Socioeconomic History   • Marital status:      Spouse name: Not on file   • Number of children: Not on file   • Years of education: Not on file   • Highest education level: Not on file   Occupational History   • Not on file   Tobacco Use   • Smoking status: Never Smoker   • Smokeless tobacco: Never Used   Substance and Sexual Activity   • Alcohol use: Never   • Drug use: Never   • Sexual activity: Not on file   Other Topics Concern   • Not on file   Social History Narrative   • Not on file     Social Determinants of Health     Financial Resource Strain:    • Difficulty of Paying Living Expenses: Not on file   Food Insecurity:    • Worried About Running Out of Food in the Last Year: Not on  file   • Ran Out of Food in the Last Year: Not on file   Transportation Needs:    • Lack of Transportation (Medical): Not on file   • Lack of Transportation (Non-Medical): Not on file   Physical Activity:    • Days of Exercise per Week: Not on file   • Minutes of Exercise per Session: Not on file   Stress:    • Feeling of Stress : Not on file   Social Connections:    • Frequency of Communication with Friends and Family: Not on file   • Frequency of Social Gatherings with Friends and Family: Not on file   • Attends Mosque Services: Not on file   • Active Member of Clubs or Organizations: Not on file   • Attends Club or Organization Meetings: Not on file   • Marital Status: Not on file   Intimate Partner Violence:    • Fear of Current or Ex-Partner: Not on file   • Emotionally Abused: Not on file   • Physically Abused: Not on file   • Sexually Abused: Not on file   Housing Stability:    • Unable to Pay for Housing in the Last Year: Not on file   • Number of Places Lived in the Last Year: Not on file   • Unstable Housing in the Last Year: Not on file       Family History   Problem Relation Age of Onset   • Heart Disease Mother    • Heart Disease Brother    • Alcohol abuse Son        Allergies   Allergen Reactions   • Lactose        Current Outpatient Medications   Medication Sig Dispense Refill   • hydrALAZINE (APRESOLINE) 10 MG Tab Take 10 mg by mouth 3 times a day.     • metoprolol tartrate (LOPRESSOR) 25 MG Tab Take 25 mg by mouth 2 times a day.     • losartan (COZAAR) 25 MG Tab Take 25 mg by mouth every day.     • pravastatin (PRAVACHOL) 10 MG Tab Take 10 mg by mouth every evening.     • Misc. Devices Misc 1 Each at bedtime. Please use CPAP every night, he has a history of sleep apnea and atrial fibrillation. He has home machine 1 Each 0   • omeprazole (PRILOSEC) 20 MG delayed-release capsule Take 1 Capsule by mouth 2 times a day. 60 Capsule 0   • ascorbic acid (VITAMIN C) 500 MG tablet Take 1 Tablet by mouth  "every morning with breakfast. 30 Tablet 0   • atorvastatin (LIPITOR) 40 MG Tab Take 1 Tablet by mouth every evening. 30 Tablet 0   • baclofen (LIORESAL) 10 MG Tab Take 1 Tablet by mouth at bedtime. 90 Tablet 0   • ferrous sulfate 325 (65 Fe) MG tablet Take 1 Tablet by mouth every morning with breakfast. (Patient not taking: Reported on 2/11/2022) 30 Tablet 11   • Melatonin 3 MG Cap Take 6 mg by mouth at bedtime. (Patient not taking: Reported on 2/11/2022)     • acetaminophen (TYLENOL) 325 MG Tab Take 2 Tablets by mouth every four hours as needed for Mild Pain. (Patient not taking: Reported on 2/11/2022) 30 Tablet 0   • senna-docusate (PERICOLACE OR SENOKOT S) 8.6-50 MG Tab Take 2 Tablets by mouth 2 times a day. (Patient not taking: Reported on 2/11/2022) 120 Tablet 0   • traZODone (DESYREL) 50 MG Tab Take 1 Tablet by mouth at bedtime. (Patient not taking: Reported on 2/11/2022)       No current facility-administered medications for this visit.       Physical Exam:  Vitals:    02/11/22 0820   BP: 124/80   BP Location: Left arm   Patient Position: Sitting   BP Cuff Size: Adult   Pulse: (!) 58   Resp: 14   SpO2: 97%   Weight: 95.7 kg (211 lb)   Height: 1.651 m (5' 5\")     General appearance: NAD, conversant   Eyes: anicteric sclerae, moist conjunctivae; no lid-lag; PERRLA  HENT: Atraumatic; oropharynx clear with moist mucous membranes and no mucosal ulcerations; normal hard and soft palate  Neck: Trachea midline; FROM, supple, no thyromegaly or lymphadenopathy  Lungs: CTA, with normal respiratory effort and no intercostal retractions  CV: RRR, no MRGs, no JVD  Abdomen: Soft, non-tender; no masses or HSM  Extremities: No peripheral edema or extremity lymphadenopathy  Skin: Normal temperature, turgor and texture; no rash, ulcers or subcutaneous nodules  Psych: Appropriate affect, alert and oriented to person, place and time    Data:  Lipids:   Lab Results   Component Value Date/Time    CHOLSTRLTOT 126 02/08/2022 08:42 AM "    TRIGLYCERIDE 100 02/08/2022 08:42 AM    HDL 36 (A) 02/08/2022 08:42 AM    LDL 70 02/08/2022 08:42 AM        BMP:  Lab Results   Component Value Date/Time    SODIUM 144 02/08/2022 0842    POTASSIUM 3.2 (L) 02/08/2022 0842    CHLORIDE 105 02/08/2022 0842    CO2 26 02/08/2022 0842    GLUCOSE 82 02/08/2022 0842    BUN 12 02/08/2022 0842    CREATININE 1.32 02/08/2022 0842    CALCIUM 8.6 02/08/2022 0842    ANION 13.0 02/08/2022 0842        TSH:   Lab Results   Component Value Date/Time    TSHULTRASEN 0.914 11/25/2020 0723        THYROXINE (T4):   No results found for: FREEDIR     CBC:   Lab Results   Component Value Date/Time    WBC 10.4 02/08/2022 08:42 AM    RBC 4.65 (L) 02/08/2022 08:42 AM    HEMOGLOBIN 13.7 (L) 02/08/2022 08:42 AM    HEMATOCRIT 40.7 (L) 02/08/2022 08:42 AM    MCV 87.5 02/08/2022 08:42 AM    MCH 29.5 02/08/2022 08:42 AM    MCHC 33.7 02/08/2022 08:42 AM    RDW 48.3 02/08/2022 08:42 AM    PLATELETCT 426 02/08/2022 08:42 AM    MPV 10.0 02/08/2022 08:42 AM    NEUTSPOLYS 66.70 02/08/2022 08:42 AM    LYMPHOCYTES 18.90 (L) 02/08/2022 08:42 AM    MONOCYTES 8.80 02/08/2022 08:42 AM    EOSINOPHILS 4.70 02/08/2022 08:42 AM    BASOPHILS 0.60 02/08/2022 08:42 AM    IMMGRAN 0.30 02/08/2022 08:42 AM    NRBC 0.00 02/08/2022 08:42 AM    NEUTS 6.91 02/08/2022 08:42 AM    LYMPHS 1.96 02/08/2022 08:42 AM    MONOS 0.91 (H) 02/08/2022 08:42 AM    EOS 0.49 02/08/2022 08:42 AM    BASO 0.06 02/08/2022 08:42 AM    IMMGRANAB 0.03 02/08/2022 08:42 AM    NRBCAB 0.00 02/08/2022 08:42 AM        CBC w/o DIFF  Lab Results   Component Value Date/Time    WBC 10.4 02/08/2022 08:42 AM    RBC 4.65 (L) 02/08/2022 08:42 AM    HEMOGLOBIN 13.7 (L) 02/08/2022 08:42 AM    MCV 87.5 02/08/2022 08:42 AM    MCH 29.5 02/08/2022 08:42 AM    MCHC 33.7 02/08/2022 08:42 AM    RDW 48.3 02/08/2022 08:42 AM    MPV 10.0 02/08/2022 08:42 AM       Prior echo/stress reviewed: Normal EF    EKG interpreted by me: NSR    Impression/Plan:  1. Atrial  fibrillation, unspecified type (HCC)  EKG     1. PAF/AFL  2. Hemorrhagic CVA  3. HTN    - SBP well controlled now  - [id number] is 2 and he has clear indication for discontinuation of OAC. I will refer him to Dr Goyal for LAAO implant  - No arrhythmia recurrence since stopping amiodarone    Prince Brown MD  Cardiac Electrophysiology

## 2022-02-15 ENCOUNTER — OFFICE VISIT (OUTPATIENT)
Dept: MEDICAL GROUP | Facility: PHYSICIAN GROUP | Age: 72
End: 2022-02-15
Payer: MEDICARE

## 2022-02-15 VITALS
SYSTOLIC BLOOD PRESSURE: 122 MMHG | WEIGHT: 216 LBS | TEMPERATURE: 97.9 F | HEIGHT: 66 IN | BODY MASS INDEX: 34.72 KG/M2 | OXYGEN SATURATION: 94 % | RESPIRATION RATE: 16 BRPM | DIASTOLIC BLOOD PRESSURE: 78 MMHG | HEART RATE: 88 BPM

## 2022-02-15 DIAGNOSIS — J34.89 NASAL DRYNESS: ICD-10-CM

## 2022-02-15 DIAGNOSIS — R19.7 DIARRHEA, UNSPECIFIED TYPE: ICD-10-CM

## 2022-02-15 DIAGNOSIS — E87.6 HYPOKALEMIA: ICD-10-CM

## 2022-02-15 DIAGNOSIS — D69.6 THROMBOCYTOPENIA (HCC): ICD-10-CM

## 2022-02-15 DIAGNOSIS — N18.30 STAGE 3 CHRONIC KIDNEY DISEASE, UNSPECIFIED WHETHER STAGE 3A OR 3B CKD: ICD-10-CM

## 2022-02-15 DIAGNOSIS — I61.9 HEMORRHAGIC STROKE (HCC): ICD-10-CM

## 2022-02-15 DIAGNOSIS — E66.01 MORBID OBESITY WITH BMI OF 40.0-44.9, ADULT (HCC): ICD-10-CM

## 2022-02-15 DIAGNOSIS — I62.9 INTRACRANIAL HEMORRHAGE (HCC): ICD-10-CM

## 2022-02-15 DIAGNOSIS — D50.8 OTHER IRON DEFICIENCY ANEMIA: ICD-10-CM

## 2022-02-15 DIAGNOSIS — D64.9 ANEMIA, UNSPECIFIED TYPE: ICD-10-CM

## 2022-02-15 DIAGNOSIS — Z74.09 IMPAIRED MOBILITY AND ADLS: ICD-10-CM

## 2022-02-15 DIAGNOSIS — Z78.9 IMPAIRED MOBILITY AND ADLS: ICD-10-CM

## 2022-02-15 DIAGNOSIS — I10 PRIMARY HYPERTENSION: ICD-10-CM

## 2022-02-15 DIAGNOSIS — E66.9 OBESITY (BMI 30-39.9): ICD-10-CM

## 2022-02-15 PROCEDURE — 99214 OFFICE O/P EST MOD 30 MIN: CPT | Performed by: NURSE PRACTITIONER

## 2022-02-15 RX ORDER — POTASSIUM CHLORIDE 1.5 G/1.58G
POWDER, FOR SOLUTION ORAL
Qty: 33 PACKET | Refills: 1 | Status: SHIPPED | OUTPATIENT
Start: 2022-02-15 | End: 2022-02-18

## 2022-02-15 RX ORDER — FERROUS SULFATE 325(65) MG
TABLET ORAL
Qty: 30 TABLET | Refills: 3 | Status: SHIPPED | OUTPATIENT
Start: 2022-02-15 | End: 2022-03-16 | Stop reason: SDUPTHER

## 2022-02-15 ASSESSMENT — FIBROSIS 4 INDEX: FIB4 SCORE: 0.7

## 2022-02-15 NOTE — ASSESSMENT & PLAN NOTE
Pt reports nasal dryness and irritation w/use of CPAP. Has been using it for a while. He needed to cut the visit short to go home and lie down but I did manage to do a quick physical exam.     I advised pt to use humidification on CPAP and recommended Doc Med Nasogel.

## 2022-02-15 NOTE — ASSESSMENT & PLAN NOTE
Most recent CMP 2/8/22 showed 3.2, down from 3.8 in Jan.     Sent KCl to pharm; 20 mEq bid x 3 days then qd; remeasure in 7-10 days

## 2022-02-15 NOTE — ASSESSMENT & PLAN NOTE
Reports diarrhea x 2 weeks; on the BRAT diet; has issues w/lectin, so he has to be careful w/what he eats; is concerned he may not be getting enough nourishment.  Hx of bypass several years ago. Asking if I can order lab for parasites    Plan: advised pt to get a liquid protein supplement and cont w/gatorade for electrolytes; rec sugar free but he doesn't like the idea of extra chemicals in his body; also recommended he investigate Total Restore for leaky gut from Dr Patton; he was familiar w/his products-takes a probiotic from his line     Stool culture ordered

## 2022-02-15 NOTE — ASSESSMENT & PLAN NOTE
Notes from Dr Gerri Armstrong  Fe 34; indicated pt was taking iron and vit c but pt isn't; CBC from 2/8 showed h/h 13.7/40.7

## 2022-02-15 NOTE — ASSESSMENT & PLAN NOTE
Pt would like a referral to PT/OT post stroke to the PT in [place] on [address]; he doesn't feel he's strong enough to do it now, but would like it for the near future    Pt cut the appt short because he was very tired. He wanted to go home to lie down. I advised him we can do a f/up in 1 week over the phone.

## 2022-02-15 NOTE — ASSESSMENT & PLAN NOTE
Reports hx of stroke mid Dec; see note from cardiology dated 2/11; referred to Dr Goyal for possible LAAO implant    Pt has appt 3/16/22

## 2022-02-16 ENCOUNTER — TELEPHONE (OUTPATIENT)
Dept: URGENT CARE | Facility: PHYSICIAN GROUP | Age: 72
End: 2022-02-16

## 2022-02-16 PROBLEM — E66.9 OBESITY (BMI 30-39.9): Status: ACTIVE | Noted: 2022-02-16

## 2022-02-16 NOTE — ASSESSMENT & PLAN NOTE
Well controlled; taking all bp meds as prescribed     Plan: has been feeling fatigued; advised to measure bp 3x daily and aim for 100-130/60-90; if <100/60, lower the hydralazine dose from 10 mg tid to bid and 5 mg bid if still low. F/up w/virtual visit in 1 week w/home bp values

## 2022-02-16 NOTE — ASSESSMENT & PLAN NOTE
Most recent H/H a week ago was 13.7/40.7    Pt stopped taking his iron recently; he was advised pt to start taking iron again as directed; refill sent to pharm

## 2022-02-16 NOTE — PROGRESS NOTES
Chief Complaint   Patient presents with   • Establish Care   • Referral Needed     Physical theraphy        Subjective:     HPI:   Gokul Baca is a 71 y.o. male here to discuss the evaluation and management of:      Diarrhea  Reports diarrhea x 2 weeks; on the BRAT diet; has issues w/lectin, so he has to be careful w/what he eats; is concerned he may not be getting enough nourishment.  Hx of bypass several years ago. Asking if I can order lab for parasites    Plan: advised pt to get a liquid protein supplement and cont w/gatorade for electrolytes; rec sugar free but he doesn't like the idea of extra chemicals in his body; also recommended he investigate Total Restore for leaky gut from Dr Patton; he was familiar w/his products-takes a probiotic from his line     Stool culture ordered      Intracranial hemorrhage (HCC)  See notes from cardio and neuro; has appt w/cardio 3/22 and neuro 3/30    Impaired mobility and ADLs  Pt would like a referral to PT/OT post stroke to the PT in [place] on [address]; he doesn't feel he's strong enough to do it now, but would like it for the near future    Pt cut the appt short because he was very tired. He wanted to go home to lie down. I advised him we can do a f/up in 1 week over the phone.     Nasal dryness  Pt reports nasal dryness and irritation w/use of CPAP. Has been using it for a while. He needed to cut the visit short to go home and lie down but I did manage to do a quick physical exam.     I advised pt to use humidification on CPAP and recommended Doc Med Nasogel.     Hemorrhagic stroke (HCC)  Reports hx of stroke mid Dec; see note from cardiology dated 2/11; referred to Dr Goyal for possible LAAO implant    Pt has appt 3/16/22    Hypokalemia  Most recent CMP 2/8/22 showed 3.2, down from 3.8 in Jan.     Sent KCl to pharm; 20 mEq bid x 3 days then qd; remeasure in 7-10 days    Anemia  Notes from Dr Gerri Tee beg Jan  Fe 34; indicated pt was taking iron and vit c but pt  isn't; CBC from 2/8 showed h/h 13.7/40.7    Hypertension  Well controlled; taking all bp meds as prescribed     Plan: has been feeling fatigued; advised to measure bp 3x daily and aim for 100-130/60-90; if <100/60, lower the hydralazine dose from 10 mg tid to bid and 5 mg bid if still low. F/up w/virtual visit in 1 week w/home bp values     Iron deficiency anemia    Most recent H/H a week ago was 13.7/40.7    Pt stopped taking his iron recently; he was advised pt to start taking iron again as directed; refill sent to pharm          ROS:  Gen: no fevers/chills, no changes in weight  Eyes: no changes in vision  ENT: no sore throat, no hearing loss, no bloody nose  Pulm: no sob, no cough  CV: no chest pain, no palpitations  GI: no nausea/vomiting, no diarrhea  : no dysuria  MSk: no myalgias  Skin: no rash  Neuro: no headaches, no numbness/tingling  Heme/Lymph: no easy bruising    Allergies   Allergen Reactions   • Lactose        Current medicines (including changes today)  Current Outpatient Medications   Medication Sig Dispense Refill   • ferrous sulfate 325 (65 Fe) MG tablet 1 tab by mouth once daily with breakfast 30 Tablet 3   • potassium chloride (KLOR-CON) 20 MEQ Pack Mix one packet a day with juice or other liquid twice daily days 1-3; on day 4, decrease to once daily 33 Packet 1   • hydrALAZINE (APRESOLINE) 10 MG Tab Take 10 mg by mouth 3 times a day.     • metoprolol tartrate (LOPRESSOR) 25 MG Tab Take 25 mg by mouth 2 times a day.     • losartan (COZAAR) 25 MG Tab Take 25 mg by mouth every day.     • pravastatin (PRAVACHOL) 10 MG Tab Take 20 mg by mouth every evening.     • Misc. Devices Misc 1 Each at bedtime. Please use CPAP every night, he has a history of sleep apnea and atrial fibrillation. He has home machine 1 Each 0   • omeprazole (PRILOSEC) 20 MG delayed-release capsule Take 1 Capsule by mouth 2 times a day. 60 Capsule 0   • ascorbic acid (VITAMIN C) 500 MG tablet Take 1 Tablet by mouth every  morning with breakfast. 30 Tablet 0   • acetaminophen (TYLENOL) 325 MG Tab Take 2 Tablets by mouth every four hours as needed for Mild Pain. (Patient not taking: Reported on 2/11/2022) 30 Tablet 0   • senna-docusate (PERICOLACE OR SENOKOT S) 8.6-50 MG Tab Take 2 Tablets by mouth 2 times a day. (Patient not taking: Reported on 2/11/2022) 120 Tablet 0   • traZODone (DESYREL) 50 MG Tab Take 1 Tablet by mouth at bedtime. (Patient not taking: Reported on 2/11/2022)       No current facility-administered medications for this visit.       Social History     Tobacco Use   • Smoking status: Never Smoker   • Smokeless tobacco: Never Used   Vaping Use   • Vaping Use: Never used   Substance Use Topics   • Alcohol use: Never   • Drug use: Never       Patient Active Problem List    Diagnosis Date Noted   • Obesity (BMI 30-39.9) 02/16/2022   • Diarrhea 02/15/2022   • Nasal dryness 02/15/2022   • Hemorrhoids 01/19/2022   • Hip pain, left 01/06/2022   • Spasticity 01/03/2022   • Cystitis 01/03/2022   • Iron deficiency anemia 01/03/2022   • Leukocytosis 01/03/2022   • Anemia 12/27/2021   • Hypokalemia 12/26/2021   • Thrombocytopenia ([facility]) 12/26/2021   • UTI (urinary tract infection) 12/25/2021   • 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   • Edema 12/25/2021   • Hemorrhagic stroke ([facility]) 12/24/2021   • Impaired mobility and ADLs 12/24/2021   • Otitis externa 12/24/2021   • Morbid obesity with BMI of 40.0-44.9, adult ([facility]) 12/24/2021   • GERD (gastroesophageal reflux disease) 12/23/2021   • Intracranial hemorrhage ([facility]) 12/19/2021   • AF (atrial fibrillation) ([facility]) 12/19/2021   • Hyperlipemia 12/19/2021   • Hypertension 12/19/2021       Family History   Problem Relation Age of Onset   • Heart Disease Mother    • Heart Disease Brother    • Alcohol abuse Son           Objective:     Office Visit on 02/11/2022   Component Date Value Ref Range Status   • Report 02/11/2022    Preliminary                    Value:[facility]    Test  Date:  2022  Pt Name:    NASIM TELLEZ                Department: [facility]  MRN:        [id number]                      Room:  Gender:     Male                         Technician:   :        1950                   Requested By:JOSHUA BELTRÁN  Order #:    [id number]                    Tremayne MD:    Measurements  Intervals                                Axis  Rate:       57                           P:          23  MS:         172                          QRS:        58  QRSD:       92                           T:          66  QT:         440  QTc:        429    Interpretive Statements  SINUS BRADYCARDIA  Compared to ECG 2021 04:04:23  Sinus rhythm no longer present  ST (T wave) deviation no longer present     Hospital Outpatient Visit on 2022   Component Date Value Ref Range Status   • WBC 2022 10.4  4.8 - 10.8 K/uL Final   • RBC 2022 4.65 (A) 4.70 - 6.10 M/uL Final   • Hemoglobin 2022 13.7 (A) 14.0 - 18.0 g/dL Final   • Hematocrit 2022 40.7 (A) 42.0 - 52.0 % Final   • MCV 2022 87.5  81.4 - 97.8 fL Final   • MCH 2022 29.5  27.0 - 33.0 pg Final   • MCHC 2022 33.7  33.7 - 35.3 g/dL Final   • RDW 2022 48.3  35.9 - 50.0 fL Final   • Platelet Count 2022 426  164 - 446 K/uL Final   • MPV 2022 10.0  9.0 - 12.9 fL Final   • Neutrophils-Polys 2022 66.70  44.00 - 72.00 % Final   • Lymphocytes 2022 18.90 (A) 22.00 - 41.00 % Final   • Monocytes 2022 8.80  0.00 - 13.40 % Final   • Eosinophils 2022 4.70  0.00 - 6.90 % Final   • Basophils 2022 0.60  0.00 - 1.80 % Final   • Immature Granulocytes 2022 0.30  0.00 - 0.90 % Final   • Nucleated RBC 2022 0.00  /100 WBC Final   • Neutrophils (Absolute) 2022 6.91  1.82 - 7.42 K/uL Final    Includes immature neutrophils, if present.   • Lymphs (Absolute) 2022 1.96  1.00 - 4.80 K/uL Final   • Monos (Absolute) 2022 0.91 (A) 0.00 - 0.85 K/uL Final   • Eos  (Absolute) 02/08/2022 0.49  0.00 - 0.51 K/uL Final   • Baso (Absolute) 02/08/2022 0.06  0.00 - 0.12 K/uL Final   • Immature Granulocytes (abs) 02/08/2022 0.03  0.00 - 0.11 K/uL Final   • NRBC (Absolute) 02/08/2022 0.00  K/uL Final   • Sodium 02/08/2022 144  135 - 145 mmol/L Final   • Potassium 02/08/2022 3.2 (A) 3.6 - 5.5 mmol/L Final   • Chloride 02/08/2022 105  96 - 112 mmol/L Final   • Co2 02/08/2022 26  20 - 33 mmol/L Final   • Anion Gap 02/08/2022 13.0  7.0 - 16.0 Final   • Glucose 02/08/2022 82  65 - 99 mg/dL Final   • Bun 02/08/2022 12  8 - 22 mg/dL Final   • Creatinine 02/08/2022 1.32  0.50 - 1.40 mg/dL Final   • Calcium 02/08/2022 8.6  8.5 - 10.5 mg/dL Final   • AST(SGOT) 02/08/2022 13  12 - 45 U/L Final   • ALT(SGPT) 02/08/2022 9  2 - 50 U/L Final   • Alkaline Phosphatase 02/08/2022 98  30 - 99 U/L Final   • Total Bilirubin 02/08/2022 0.4  0.1 - 1.5 mg/dL Final   • Albumin 02/08/2022 3.3  3.2 - 4.9 g/dL Final   • Total Protein 02/08/2022 6.8  6.0 - 8.2 g/dL Final   • Globulin 02/08/2022 3.5  1.9 - 3.5 g/dL Final   • A-G Ratio 02/08/2022 0.9  g/dL Final   • Cholesterol,Tot 02/08/2022 126  100 - 199 mg/dL Final   • Triglycerides 02/08/2022 100  0 - 149 mg/dL Final   • HDL 02/08/2022 36 (A) >=40 mg/dL Final   • LDL 02/08/2022 70  <100 mg/dL Final   • Fasting Status 02/08/2022 Fasting   Final   • 25-Hydroxy   Vitamin D 25 02/08/2022 44  30 - 100 ng/mL Final    Comment: Adult Ranges:   <20 ng/mL - Deficiency  20-29 ng/mL - Insufficiency   ng/mL - Sufficiency  Effective 3/18/2020, this electrochemiluminescence binding assay is  performed using Roche gregg e immunoassay analyzer.  The [employer] Vitamin D  total II assay is intended for the quantitative determination of total 25  hydroxyvitamin D in human serum and plasma. This assay is to be used as an  aid in the assessment of vitamin D sufficiency in adults.     • GFR If  02/08/2022 >60  >60 mL/min/1.73 m 2 Final   • GFR If Non African  "American 02/08/2022 54 (A) >60 mL/min/1.73 m 2 Final       /78 (BP Location: Left arm, Patient Position: Sitting, BP Cuff Size: Large adult)   Pulse 88   Temp 36.6 °C (97.9 °F) (Temporal)   Resp 16   Ht 1.676 m (5' 6\")   Wt 98 kg (216 lb)   SpO2 94%  Body mass index is 34.86 kg/m².    Physical Exam:    Physical Exam:  Constitutional: pt appears tired; using walker to ambulate; unable to finish full appointment d/t fatigue   Skin: warm, dry, intact, no evidence of rash or concerning lesions  Head: Atraumatic without lesions.  Eyes: Conjunctivae and extraocular motions are normal. Pupils are equal, round, and reactive to light. No scleral icterus.   Nose: mild rhinorrhea, mild redness bilat  Ears:  External ears unremarkable. TMs normal; bilaterally  Mouth/Throat: Tongue normal. Oropharynx is clear and moist. Posterior pharynx without erythema or exudates.  Neck: Supple, trachea midline. No thyromegaly present. No cervical or supraclavicular lymphadenopathy.  Cardiovascular: Regular rate and rhythm without murmur. Radial pulses are intact and equal bilaterally.  Pulmonary: Clear to ausculation. Normal effort. No rales, ronchi, or wheezing.  Extremities: No cyanosis, clubbing, erythema, nor edema.   Neurological: Alert and oriented x 3.   Psychiatric:  Behavior, mood, and affect are appropriate but appears tired.               Assessment and Plan:     The following treatment plan was discussed:  I have reviewed all labs with patient and answered all questions.    1. Intracranial hemorrhage (HCC)    2. Diarrhea, unspecified type  - Complete O&P; Future    3. Impaired mobility and ADLs    4. Nasal dryness    5. Hemorrhagic stroke (HCC)    6. Hypokalemia    7. Anemia, unspecified type    8. Primary hypertension    9. Other iron deficiency anemia    10. Morbid obesity with BMI of 40.0-44.9, adult (HCC)    11. Thrombocytopenia (HCC)    12. Stage 3 chronic kidney disease, unspecified whether stage 3a or 3b CKD " (HCC)    13. Obesity (BMI 30-39.9)    Other orders  - ferrous sulfate 325 (65 Fe) MG tablet; 1 tab by mouth once daily with breakfast  Dispense: 30 Tablet; Refill: 3  - potassium chloride (KLOR-CON) 20 MEQ Pack; Mix one packet a day with juice or other liquid twice daily days 1-3; on day 4, decrease to once daily  Dispense: 33 Packet; Refill: 1      Any change or worsening of signs or symptoms, patient encouraged to follow-up or report to emergency room for further evaluation. Patient verbalizes understanding and agrees.    Follow-Up: Return in about 2 weeks (around 3/1/2022) for home bp values .  Ok to do virtual visit if needed      PLEASE NOTE: This dictation was created using voice recognition software. I have made every reasonable attempt to correct obvious errors, but I expect that there are errors of grammar and possibly content that I did not discover before finalizing the note.

## 2022-02-16 NOTE — PROGRESS NOTES
Chief Complaint   Patient presents with   • Establish Care   • Referral Needed     Physical theraphy        Subjective:     HPI:   Gokul Baca is a 71 y.o. male here to discuss the evaluation and management of:      Diarrhea  Reports diarrhea x 2 weeks; on the BRAT diet; has issues w/lectin, so he has to be careful w/what he eats; is concerned he may not be getting enough nourishment.  Hx of bypass several years ago. Asking if I can order lab for parasites    Plan: advised pt to get a liquid protein supplement and cont w/gatorade for electrolytes; rec sugar free but he doesn't like the idea of extra chemicals in his body; also recommended he investigate [employer] for leaky gut from Dr Patton; he was familiar w/his products-takes a probiotic from his line       Intracranial hemorrhage (HCC)  See notes from cardio and neuro    Impaired mobility and ADLs  Pt would like a referral to PT/OT post stroke to the PT in [place] on [address]; he doesn't feel he's strong enough to do it now, but would like it for the near future    Pt cut the appt short because he was very tired. He wanted to go home to lie down. I advised him we can do a f/up in 1 week over the phone.     Nasal dryness  Pt reports nasal dryness and irritation w/use of CPAP. Has been using it for a while. He needed to cut the visit short to go home and lie down but I did manage to do a quick physical exam.     I advised pt to use humidification on CPAP. Will also call and recommend Doc Med Nasogel.     Hemorrhagic stroke (HCC)  Reports hx of stroke mid Dec; see note from cardiology dated 2/11; referred to Dr Goyal for possible LAAO implant    Pt has appt 3/16/22    Hypokalemia  Most recent CMP 2/8/22 showed 3.2, down from 3.8 in Jan. Will add K 20 mEq bid x 3 days then qd; remeasure in 1 week     Anemia  Notes from Dr Gerri wheeler Jan  Fe 34; indicated pt was taking iron and vit c but pt isn't; CBC from 2/8 showed h/h 13.7/40.7    Hypertension  Well  controlled; taking all bp meds as prescribed     Plan: has been feeling fatigued; advised to measure bp 3x daily and aim for 100-130/60-90; if <100/60, lower the hydralazine dose from 10 mg tid to bid and 5 mg bid if still low. F/up w/virtual visit in 1 week w/home bp values     Iron deficiency anemia  Pt stopped taking his iron; advised to start taking once daily as noted in his last progress note          ROS:  Gen: no fevers/chills, no changes in weight  Eyes: no changes in vision  ENT: no sore throat, no hearing loss, no bloody nose  Pulm: no sob, no cough  CV: no chest pain, no palpitations  GI: no nausea/vomiting, no diarrhea  : no dysuria  MSk: no myalgias  Skin: no rash  Neuro: no headaches, no numbness/tingling  Heme/Lymph: no easy bruising    Allergies   Allergen Reactions   • Lactose        Current medicines (including changes today)  Current Outpatient Medications   Medication Sig Dispense Refill   • ferrous sulfate 325 (65 Fe) MG tablet 1 tab by mouth once daily with breakfast 30 Tablet 3   • potassium chloride (KLOR-CON) 20 MEQ Pack Mix one packet a day with juice or other liquid twice daily days 1-3; on day 4, decrease to once daily 33 Packet 1   • hydrALAZINE (APRESOLINE) 10 MG Tab Take 10 mg by mouth 3 times a day.     • metoprolol tartrate (LOPRESSOR) 25 MG Tab Take 25 mg by mouth 2 times a day.     • losartan (COZAAR) 25 MG Tab Take 25 mg by mouth every day.     • pravastatin (PRAVACHOL) 10 MG Tab Take 20 mg by mouth every evening.     • Misc. Devices Misc 1 Each at bedtime. Please use CPAP every night, he has a history of sleep apnea and atrial fibrillation. He has home machine 1 Each 0   • omeprazole (PRILOSEC) 20 MG delayed-release capsule Take 1 Capsule by mouth 2 times a day. 60 Capsule 0   • ascorbic acid (VITAMIN C) 500 MG tablet Take 1 Tablet by mouth every morning with breakfast. 30 Tablet 0   • acetaminophen (TYLENOL) 325 MG Tab Take 2 Tablets by mouth every four hours as needed for  Mild Pain. (Patient not taking: Reported on 2/11/2022) 30 Tablet 0   • senna-docusate (PERICOLACE OR SENOKOT S) 8.6-50 MG Tab Take 2 Tablets by mouth 2 times a day. (Patient not taking: Reported on 2/11/2022) 120 Tablet 0   • traZODone (DESYREL) 50 MG Tab Take 1 Tablet by mouth at bedtime. (Patient not taking: Reported on 2/11/2022)       No current facility-administered medications for this visit.       Social History     Tobacco Use   • Smoking status: Never Smoker   • Smokeless tobacco: Never Used   Vaping Use   • Vaping Use: Never used   Substance Use Topics   • Alcohol use: Never   • Drug use: Never       Patient Active Problem List    Diagnosis Date Noted   • Diarrhea 02/15/2022   • Nasal dryness 02/15/2022   • Hemorrhoids 01/19/2022   • Hip pain, left 01/06/2022   • Spasticity 01/03/2022   • Cystitis 01/03/2022   • Iron deficiency anemia 01/03/2022   • Leukocytosis 01/03/2022   • Anemia 12/27/2021   • Hypokalemia 12/26/2021   • Thrombocytopenia (HCC) 12/26/2021   • UTI (urinary tract infection) 12/25/2021   • 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   • Edema 12/25/2021   • Hemorrhagic stroke ([facility]) 12/24/2021   • Impaired mobility and ADLs 12/24/2021   • Otitis externa 12/24/2021   • Morbid obesity with BMI of 40.0-44.9, adult ([facility]) 12/24/2021   • GERD (gastroesophageal reflux disease) 12/23/2021   • Intracranial hemorrhage ([facility]) 12/19/2021   • AF (atrial fibrillation) ([facility]) 12/19/2021   • Hyperlipemia 12/19/2021   • Hypertension 12/19/2021       Family History   Problem Relation Age of Onset   • Heart Disease Mother    • Heart Disease Brother    • Alcohol abuse Son           Objective:     Office Visit on 02/11/2022   Component Date Value Ref Range Status   • Report 02/11/2022    Preliminary                    Value:[facility]    Test Date:  2022-02-11  Pt Name:    NASIM TELLEZ                Department: [facility]  MRN:        [id number]                      Room:  Gender:     Male                          Technician: PRIMO  :        1950                   Requested By:JOSHUA BELTRÁN  Order #:    [id number]                    Reading MD:    Measurements  Intervals                                Axis  Rate:       57                           P:          23  VA:         172                          QRS:        58  QRSD:       92                           T:          66  QT:         440  QTc:        429    Interpretive Statements  SINUS BRADYCARDIA  Compared to ECG 2021 04:04:23  Sinus rhythm no longer present  ST (T wave) deviation no longer present     Hospital Outpatient Visit on 2022   Component Date Value Ref Range Status   • WBC 2022 10.4  4.8 - 10.8 K/uL Final   • RBC 2022 4.65* 4.70 - 6.10 M/uL Final   • Hemoglobin 2022 13.7* 14.0 - 18.0 g/dL Final   • Hematocrit 2022 40.7* 42.0 - 52.0 % Final   • MCV 2022 87.5  81.4 - 97.8 fL Final   • MCH 2022 29.5  27.0 - 33.0 pg Final   • MCHC 2022 33.7  33.7 - 35.3 g/dL Final   • RDW 2022 48.3  35.9 - 50.0 fL Final   • Platelet Count 2022 426  164 - 446 K/uL Final   • MPV 2022 10.0  9.0 - 12.9 fL Final   • Neutrophils-Polys 2022 66.70  44.00 - 72.00 % Final   • Lymphocytes 2022 18.90* 22.00 - 41.00 % Final   • Monocytes 2022 8.80  0.00 - 13.40 % Final   • Eosinophils 2022 4.70  0.00 - 6.90 % Final   • Basophils 2022 0.60  0.00 - 1.80 % Final   • Immature Granulocytes 2022 0.30  0.00 - 0.90 % Final   • Nucleated RBC 2022 0.00  /100 WBC Final   • Neutrophils (Absolute) 2022 6.91  1.82 - 7.42 K/uL Final    Includes immature neutrophils, if present.   • Lymphs (Absolute) 2022 1.96  1.00 - 4.80 K/uL Final   • Monos (Absolute) 2022 0.91* 0.00 - 0.85 K/uL Final   • Eos (Absolute) 2022 0.49  0.00 - 0.51 K/uL Final   • Baso (Absolute) 2022 0.06  0.00 - 0.12 K/uL Final   • Immature Granulocytes (abs) 2022 0.03  0.00 - 0.11 K/uL  Final   • NRBC (Absolute) 02/08/2022 0.00  K/uL Final   • Sodium 02/08/2022 144  135 - 145 mmol/L Final   • Potassium 02/08/2022 3.2* 3.6 - 5.5 mmol/L Final   • Chloride 02/08/2022 105  96 - 112 mmol/L Final   • Co2 02/08/2022 26  20 - 33 mmol/L Final   • Anion Gap 02/08/2022 13.0  7.0 - 16.0 Final   • Glucose 02/08/2022 82  65 - 99 mg/dL Final   • Bun 02/08/2022 12  8 - 22 mg/dL Final   • Creatinine 02/08/2022 1.32  0.50 - 1.40 mg/dL Final   • Calcium 02/08/2022 8.6  8.5 - 10.5 mg/dL Final   • AST(SGOT) 02/08/2022 13  12 - 45 U/L Final   • ALT(SGPT) 02/08/2022 9  2 - 50 U/L Final   • Alkaline Phosphatase 02/08/2022 98  30 - 99 U/L Final   • Total Bilirubin 02/08/2022 0.4  0.1 - 1.5 mg/dL Final   • Albumin 02/08/2022 3.3  3.2 - 4.9 g/dL Final   • Total Protein 02/08/2022 6.8  6.0 - 8.2 g/dL Final   • Globulin 02/08/2022 3.5  1.9 - 3.5 g/dL Final   • A-G Ratio 02/08/2022 0.9  g/dL Final   • Cholesterol,Tot 02/08/2022 126  100 - 199 mg/dL Final   • Triglycerides 02/08/2022 100  0 - 149 mg/dL Final   • HDL 02/08/2022 36* >=40 mg/dL Final   • LDL 02/08/2022 70  <100 mg/dL Final   • Fasting Status 02/08/2022 Fasting   Final   • 25-Hydroxy   Vitamin D 25 02/08/2022 44  30 - 100 ng/mL Final    Comment: Adult Ranges:   <20 ng/mL - Deficiency  20-29 ng/mL - Insufficiency   ng/mL - Sufficiency  Effective 3/18/2020, this electrochemiluminescence binding assay is  performed using Roche gregg e immunoassay analyzer.  The [employer] Vitamin D  total II assay is intended for the quantitative determination of total 25  hydroxyvitamin D in human serum and plasma. This assay is to be used as an  aid in the assessment of vitamin D sufficiency in adults.     • GFR If  02/08/2022 >60  >60 mL/min/1.73 m 2 Final   • GFR If Non  02/08/2022 54* >60 mL/min/1.73 m 2 Final       /78 (BP Location: Left arm, Patient Position: Sitting, BP Cuff Size: Large adult)   Pulse 88   Temp 36.6 °C (97.9 °F)  "(Temporal)   Resp 16   Ht 1.676 m (5' 6\")   Wt 98 kg (216 lb)   SpO2 94%  Body mass index is 34.86 kg/m².    Physical Exam:  Constitutional: Well-developed and well-nourished male in NAD. Not diaphoretic. No distress.   Skin: warm, dry, intact, no evidence of rash or concerning lesions  Head: Atraumatic without lesions.  Eyes: Conjunctivae and extraocular motions are normal. Pupils are equal, round, and reactive to light. No scleral icterus.   Ears:  External ears unremarkable. TMs normal; bilaterally  Mouth/Throat: Tongue normal. Oropharynx is clear and moist. Posterior pharynx without erythema or exudates.  Neck: Supple, trachea midline. No thyromegaly present. No cervical or supraclavicular lymphadenopathy.  Cardiovascular: Regular rate and rhythm without murmur. Radial pulses are intact and equal bilaterally.  Pulmonary: Clear to ausculation. Normal effort. No rales, ronchi, or wheezing.  Abdomen: Soft, non tender, and without distention. Active bowel sounds in all four quadrants. No rebound, guarding, masses   Extremities: No cyanosis, clubbing, erythema, nor edema.   Neurological: Alert and oriented x 3. No cranial nerve deficit. 5/5 myotomes. Sensation intact.   Psychiatric:  Behavior, mood, and affect are appropriate.           Assessment and Plan:     The following treatment plan was discussed:  I have reviewed all labs with patient and answered all questions.    1. Intracranial hemorrhage (HCC)    2. Diarrhea, unspecified type  - Complete O&P; Future    3. Impaired mobility and ADLs    4. Nasal dryness    5. Hemorrhagic stroke (HCC)    6. Hypokalemia    7. Anemia, unspecified type    8. Primary hypertension    9. Other iron deficiency anemia    Other orders  - ferrous sulfate 325 (65 Fe) MG tablet; 1 tab by mouth once daily with breakfast  Dispense: 30 Tablet; Refill: 3  - potassium chloride (KLOR-CON) 20 MEQ Pack; Mix one packet a day with juice or other liquid twice daily days 1-3; on day 4, decrease " to once daily  Dispense: 33 Packet; Refill: 1      Any change or worsening of signs or symptoms, patient encouraged to follow-up or report to emergency room for further evaluation. Patient verbalizes understanding and agrees.    Follow-Up: No follow-ups on file.     1-2 weeks       PLEASE NOTE: This dictation was created using voice recognition software. I have made every reasonable attempt to correct obvious errors, but I expect that there are errors of grammar and possibly content that I did not discover before finalizing the note.

## 2022-02-17 ENCOUNTER — HOSPITAL ENCOUNTER (OUTPATIENT)
Dept: LAB | Facility: MEDICAL CENTER | Age: 72
End: 2022-02-17
Attending: NURSE PRACTITIONER
Payer: MEDICARE

## 2022-02-17 DIAGNOSIS — E87.6 HYPOKALEMIA: ICD-10-CM

## 2022-02-17 DIAGNOSIS — R19.7 DIARRHEA, UNSPECIFIED TYPE: ICD-10-CM

## 2022-02-17 DIAGNOSIS — N18.30 STAGE 3 CHRONIC KIDNEY DISEASE, UNSPECIFIED WHETHER STAGE 3A OR 3B CKD: ICD-10-CM

## 2022-02-17 LAB
MAGNESIUM SERPL-MCNC: 1.5 MG/DL (ref 1.5–2.5)
POTASSIUM SERPL-SCNC: 3 MMOL/L (ref 3.6–5.5)

## 2022-02-17 PROCEDURE — 87177 OVA AND PARASITES SMEARS: CPT

## 2022-02-17 PROCEDURE — 87209 SMEAR COMPLEX STAIN: CPT

## 2022-02-17 PROCEDURE — 36415 COLL VENOUS BLD VENIPUNCTURE: CPT

## 2022-02-17 PROCEDURE — 84132 ASSAY OF SERUM POTASSIUM: CPT

## 2022-02-17 PROCEDURE — 83735 ASSAY OF MAGNESIUM: CPT

## 2022-02-18 ENCOUNTER — TELEPHONE (OUTPATIENT)
Dept: MEDICAL GROUP | Facility: PHYSICIAN GROUP | Age: 72
End: 2022-02-18

## 2022-02-18 ENCOUNTER — OFFICE VISIT (OUTPATIENT)
Dept: MEDICAL GROUP | Facility: PHYSICIAN GROUP | Age: 72
End: 2022-02-18
Payer: MEDICARE

## 2022-02-18 VITALS
HEIGHT: 66 IN | BODY MASS INDEX: 34.23 KG/M2 | OXYGEN SATURATION: 97 % | RESPIRATION RATE: 14 BRPM | WEIGHT: 213 LBS | TEMPERATURE: 97.8 F | SYSTOLIC BLOOD PRESSURE: 122 MMHG | DIASTOLIC BLOOD PRESSURE: 70 MMHG | HEART RATE: 88 BPM

## 2022-02-18 DIAGNOSIS — E87.6 HYPOKALEMIA: ICD-10-CM

## 2022-02-18 PROCEDURE — 99214 OFFICE O/P EST MOD 30 MIN: CPT | Performed by: FAMILY MEDICINE

## 2022-02-18 RX ORDER — POTASSIUM CHLORIDE 20 MEQ/1
20 TABLET, EXTENDED RELEASE ORAL 2 TIMES DAILY
Qty: 60 TABLET | Refills: 11 | Status: SHIPPED | OUTPATIENT
Start: 2022-02-18 | End: 2022-03-16 | Stop reason: SDUPTHER

## 2022-02-18 RX ORDER — LANOLIN ALCOHOL/MO/W.PET/CERES
CREAM (GRAM) TOPICAL
COMMUNITY
Start: 2022-02-15 | End: 2022-02-18

## 2022-02-18 ASSESSMENT — FIBROSIS 4 INDEX: FIB4 SCORE: 0.7

## 2022-02-18 NOTE — TELEPHONE ENCOUNTER
NICOLAS Galarza at [place] called to touch base regarding patient being seen today for hypokalemia.  Serum K+ was 2.7 in ER.  They are administering IV K+ and have given PO K+ which patient is tolerating well.  After IV K+ replacement, patient will be discharged with prescription PO K+ and strict ER precautions.  Geovanni recommends rechecking labs in a week.  Patient has an appointment scheduled for 2/25/22 with Dr. Waldron.

## 2022-02-18 NOTE — ASSESSMENT & PLAN NOTE
71-year-old male with recent history of hemorrhagic stroke, history of atrial fibrillation, not currently on anticoagulation due to recent hemorrhagic stroke, worsening potassium now down to 3.0 with significant weakness and anorexia.  Patient also unable to tolerate liquids by mouth likely severely dehydrated.  Encouraged to go to the nearest ER for IV potassium and IV fluids and lab evaluation.  They would like to go to [place] ER by personal vehicle.  Brief notes printed for the ER doctor and done as previous care was at [facility].    Patient with recent hemorrhagic stroke  From hospital notes:   Intracranial hemorrhage; CT imaging on 12/19 found a 3.4x 3.6cm parenchymal hemorrhage in the right basal ganglia with mild mass effect in the right lateral ventricle. Neurology and neurosurgery were consulted. Neurosurgery recommended no surgical intervention. Patient received prothrombin complex concentrates for Xarelto reversal and admitted to the ICU for close monitoring. His blood pressure on admission was 190 systolic. He was started on nicardipine drip for pressure goal of less than 140 systolic. Was started on hypertonic saline drip and oral sodium tabs initially for a goal of sodium of 150, however as patient began to clinically improve without meeting this goal it was decided to not aggressively pursue this sodium goal. Anticoagulation was held.  Repeat CT head 6 hours from admission did find increase in size of hematoma to 6.7x 4.3 cm. Follow-up CT on 12/21/21 found the hematoma to be stable.

## 2022-02-18 NOTE — PROGRESS NOTES
Subjective:   Gokul Baca is a 71 y.o. male here today for evaluation and management of:     Hypokalemia  71-year-old male with recent history of hemorrhagic stroke, history of atrial fibrillation, not currently on anticoagulation due to recent hemorrhagic stroke, worsening potassium now down to 3.0 with significant weakness and anorexia.  Patient also unable to tolerate liquids by mouth likely severely dehydrated.  Encouraged to go to the nearest ER for IV potassium and IV fluids and lab evaluation.  They would like to go to [place] ER by personal vehicle.  Brief notes printed for the ER doctor and done as previous care was at [facility].    Patient with recent hemorrhagic stroke  From hospital notes:   Intracranial hemorrhage; CT imaging on 12/19 found a 3.4x 3.6cm parenchymal hemorrhage in the right basal ganglia with mild mass effect in the right lateral ventricle. Neurology and neurosurgery were consulted. Neurosurgery recommended no surgical intervention. Patient received prothrombin complex concentrates for Xarelto reversal and admitted to the ICU for close monitoring. His blood pressure on admission was 190 systolic. He was started on nicardipine drip for pressure goal of less than 140 systolic. Was started on hypertonic saline drip and oral sodium tabs initially for a goal of sodium of 150, however as patient began to clinically improve without meeting this goal it was decided to not aggressively pursue this sodium goal. Anticoagulation was held.  Repeat CT head 6 hours from admission did find increase in size of hematoma to 6.7x 4.3 cm. Follow-up CT on 12/21/21 found the hematoma to be stable.         Current medicines (including changes today)  Current Outpatient Medications   Medication Sig Dispense Refill   • potassium chloride SA (KDUR) 20 MEQ Tab CR Take 1 Tablet by mouth 2 times a day. 60 Tablet 11   • Magnesium 100 MG Cap Take 1 Capsule by mouth every day. 30 Capsule 3   • ferrous sulfate  "325 (65 Fe) MG tablet 1 tab by mouth once daily with breakfast 30 Tablet 3   • hydrALAZINE (APRESOLINE) 10 MG Tab Take 10 mg by mouth 3 times a day.     • metoprolol tartrate (LOPRESSOR) 25 MG Tab Take 25 mg by mouth 2 times a day.     • losartan (COZAAR) 25 MG Tab Take 25 mg by mouth every day.     • pravastatin (PRAVACHOL) 10 MG Tab Take 20 mg by mouth every evening.     • Misc. Devices Misc 1 Each at bedtime. Please use CPAP every night, he has a history of sleep apnea and atrial fibrillation. He has home machine 1 Each 0   • omeprazole (PRILOSEC) 20 MG delayed-release capsule Take 1 Capsule by mouth 2 times a day. 60 Capsule 0   • ascorbic acid (VITAMIN C) 500 MG tablet Take 1 Tablet by mouth every morning with breakfast. 30 Tablet 0   • senna-docusate (PERICOLACE OR SENOKOT S) 8.6-50 MG Tab Take 2 Tablets by mouth 2 times a day. (Patient not taking: No sig reported) 120 Tablet 0   • traZODone (DESYREL) 50 MG Tab Take 1 Tablet by mouth at bedtime. (Patient not taking: No sig reported)       No current facility-administered medications for this visit.     He  has a past medical history of Atrial fibrillation (HCC), GERD (gastroesophageal reflux disease), Hyperlipidemia, and Hypertension.    ROS  No chest pain, no shortness of breath, no abdominal pain       Objective:     /70   Pulse 88   Temp 36.6 °C (97.8 °F) (Temporal)   Resp 14   Ht 1.676 m (5' 6\")   Wt 96.6 kg (213 lb)   SpO2 97%  Body mass index is 34.38 kg/m².   Physical Exam:  Constitutional: Alert, he appears weak and tired.  Skin: Warm, dry, good turgor, no rashes in visible areas.  Eye: Equal, round and reactive, conjunctiva clear, lids normal.  ENMT: Lips without lesions, good dentition, oropharynx clear.  Neck: Trachea midline, no masses, no thyromegaly. No cervical or supraclavicular lymphadenopathy  Respiratory: Unlabored respiratory effort, lungs clear to auscultation, no wheezes, no ronchi.  (Get this to whom it may " concern  Cardiovascular: Normal S1, S2, no murmur, no edema.  Abdomen: Soft, non-tender, no masses, no hepatosplenomegaly.  Psych: Alert and oriented x3, normal affect and mood.        Assessment and Plan:   The following treatment plan was discussed    1. Hypokalemia    - MAGNESIUM; Future  - Renal Function Panel; Future    Declines transport by EMS.  Will go by personal vehicle.  Followup: No follow-ups on file.

## 2022-02-22 LAB — EKG IMPRESSION: NORMAL

## 2022-02-24 LAB — OVA AND PARASITE, FECAL INTERPRETATION Q0595: NEGATIVE

## 2022-02-25 ENCOUNTER — HOSPITAL ENCOUNTER (OUTPATIENT)
Dept: LAB | Facility: MEDICAL CENTER | Age: 72
End: 2022-02-25
Attending: FAMILY MEDICINE
Payer: MEDICARE

## 2022-02-25 ENCOUNTER — OFFICE VISIT (OUTPATIENT)
Dept: MEDICAL GROUP | Facility: PHYSICIAN GROUP | Age: 72
End: 2022-02-25
Payer: MEDICARE

## 2022-02-25 VITALS
OXYGEN SATURATION: 95 % | BODY MASS INDEX: 34.23 KG/M2 | RESPIRATION RATE: 18 BRPM | TEMPERATURE: 97.4 F | SYSTOLIC BLOOD PRESSURE: 138 MMHG | HEIGHT: 66 IN | HEART RATE: 73 BPM | DIASTOLIC BLOOD PRESSURE: 84 MMHG | WEIGHT: 213 LBS

## 2022-02-25 DIAGNOSIS — E87.6 HYPOKALEMIA: ICD-10-CM

## 2022-02-25 DIAGNOSIS — D64.9 ANEMIA, UNSPECIFIED TYPE: ICD-10-CM

## 2022-02-25 DIAGNOSIS — R53.82 CHRONIC FATIGUE: ICD-10-CM

## 2022-02-25 LAB
ALBUMIN SERPL BCP-MCNC: 3.5 G/DL (ref 3.2–4.9)
BUN SERPL-MCNC: 11 MG/DL (ref 8–22)
CALCIUM SERPL-MCNC: 9 MG/DL (ref 8.5–10.5)
CHLORIDE SERPL-SCNC: 100 MMOL/L (ref 96–112)
CO2 SERPL-SCNC: 26 MMOL/L (ref 20–33)
CREAT SERPL-MCNC: 0.96 MG/DL (ref 0.5–1.4)
GLUCOSE SERPL-MCNC: 86 MG/DL (ref 65–99)
PHOSPHATE SERPL-MCNC: 3.4 MG/DL (ref 2.5–4.5)
POTASSIUM SERPL-SCNC: 4.2 MMOL/L (ref 3.6–5.5)
SODIUM SERPL-SCNC: 139 MMOL/L (ref 135–145)

## 2022-02-25 PROCEDURE — 82607 VITAMIN B-12: CPT

## 2022-02-25 PROCEDURE — 84443 ASSAY THYROID STIM HORMONE: CPT

## 2022-02-25 PROCEDURE — 36415 COLL VENOUS BLD VENIPUNCTURE: CPT

## 2022-02-25 PROCEDURE — 84439 ASSAY OF FREE THYROXINE: CPT

## 2022-02-25 PROCEDURE — 80069 RENAL FUNCTION PANEL: CPT

## 2022-02-25 PROCEDURE — 82746 ASSAY OF FOLIC ACID SERUM: CPT

## 2022-02-25 PROCEDURE — 99214 OFFICE O/P EST MOD 30 MIN: CPT | Performed by: FAMILY MEDICINE

## 2022-02-25 RX ORDER — ONDANSETRON 4 MG/1
4 TABLET, FILM COATED ORAL EVERY 4 HOURS PRN
Qty: 60 TABLET | Refills: 5 | Status: SHIPPED | OUTPATIENT
Start: 2022-02-25 | End: 2022-03-16 | Stop reason: SDUPTHER

## 2022-02-25 ASSESSMENT — FIBROSIS 4 INDEX: FIB4 SCORE: 0.7

## 2022-02-25 NOTE — ASSESSMENT & PLAN NOTE
hypokakemia since intracranial hemorrhage, severe fatigue and weakness, was sent to [facility] ER, found to have K of 2.4 received IV and oral K supplements.  Started 500mg magnesium    He is on hydralazine, losartan metoprolol, iron supplement, pravastatin, omeprazole     independent

## 2022-02-25 NOTE — PROGRESS NOTES
"Subjective:   Gokul Baca is a 71 y.o. male here today for evaluation and management of:     Hypokalemia  hypokakemia since intracranial hemorrhage, severe fatigue and weakness, was sent to [facility], found to have K of 2.4 received IV and oral K supplements.  Started 500mg magnesium    He is on hydralazine, losartan metoprolol, iron supplement, pravastatin, omeprazole           Current medicines (including changes today)  Current Outpatient Medications   Medication Sig Dispense Refill   • ondansetron (ZOFRAN) 4 MG Tab tablet Take 1 Tablet by mouth every four hours as needed for Nausea/Vomiting. 60 Tablet 5   • potassium chloride SA (KDUR) 20 MEQ Tab CR Take 1 Tablet by mouth 2 times a day. 60 Tablet 11   • Magnesium 100 MG Cap Take 1 Capsule by mouth every day. 30 Capsule 3   • ferrous sulfate 325 (65 Fe) MG tablet 1 tab by mouth once daily with breakfast 30 Tablet 3   • hydrALAZINE (APRESOLINE) 10 MG Tab Take 10 mg by mouth 3 times a day.     • metoprolol tartrate (LOPRESSOR) 25 MG Tab Take 25 mg by mouth 2 times a day.     • losartan (COZAAR) 25 MG Tab Take 25 mg by mouth every day.     • pravastatin (PRAVACHOL) 10 MG Tab Take 20 mg by mouth every evening.     • Misc. Devices Misc 1 Each at bedtime. Please use CPAP every night, he has a history of sleep apnea and atrial fibrillation. He has home machine 1 Each 0   • omeprazole (PRILOSEC) 20 MG delayed-release capsule Take 1 Capsule by mouth 2 times a day. 60 Capsule 0   • ascorbic acid (VITAMIN C) 500 MG tablet Take 1 Tablet by mouth every morning with breakfast. 30 Tablet 0     No current facility-administered medications for this visit.     He  has a past medical history of Atrial fibrillation (HCC), GERD (gastroesophageal reflux disease), Hyperlipidemia, and Hypertension.    ROS  No chest pain, no shortness of breath, no abdominal pain       Objective:     /84   Pulse 73   Temp 36.3 °C (97.4 °F)   Resp 18   Ht 1.676 m (5' 6\")   Wt 96.6 kg (213 " lb)   SpO2 95%  Body mass index is 34.38 kg/m².   Physical Exam:  Constitutional: Alert, lucid, responding to questions, very fatigued, head on the table.     Skin: Warm, dry, good turgor, no rashes in visible areas.  Eye: Equal, round and reactive, conjunctiva clear, lids normal.  ENMT: Lips without lesions, good dentition, oropharynx clear.  Neck: Trachea midline, no masses, no thyromegaly. No cervical or supraclavicular lymphadenopathy  Respiratory: Unlabored respiratory effort, lungs clear to auscultation, no wheezes, no ronchi.  Cardiovascular: Normal S1, S2, no murmur, no edema.  Abdomen: Soft, non-tender, no masses, no hepatosplenomegaly.  Psych: Alert and oriented x3, normal affect and mood.        Assessment and Plan:   The following treatment plan was discussed    1. Hypokalemia    - URINALYSIS; Future  - Renal Function Panel; Future  - VITAMIN B12; Future  - FOLATE; Future    2. Anemia, unspecified type      3. Chronic fatigue    - TSH WITH REFLEX TO FT4; Future      Followup: Return for as scheduled in March.

## 2022-02-26 ENCOUNTER — HOSPITAL ENCOUNTER (EMERGENCY)
Facility: MEDICAL CENTER | Age: 72
End: 2022-02-26
Attending: EMERGENCY MEDICINE
Payer: MEDICARE

## 2022-02-26 VITALS
DIASTOLIC BLOOD PRESSURE: 71 MMHG | HEART RATE: 63 BPM | RESPIRATION RATE: 14 BRPM | WEIGHT: 211 LBS | BODY MASS INDEX: 33.91 KG/M2 | HEIGHT: 66 IN | OXYGEN SATURATION: 94 % | SYSTOLIC BLOOD PRESSURE: 139 MMHG | TEMPERATURE: 98.3 F

## 2022-02-26 DIAGNOSIS — R53.83 OTHER FATIGUE: ICD-10-CM

## 2022-02-26 DIAGNOSIS — N39.0 ACUTE UTI: ICD-10-CM

## 2022-02-26 LAB
ALBUMIN SERPL BCP-MCNC: 3.5 G/DL (ref 3.2–4.9)
ALBUMIN/GLOB SERPL: 0.9 G/DL
ALP SERPL-CCNC: 129 U/L (ref 30–99)
ALT SERPL-CCNC: 7 U/L (ref 2–50)
ANION GAP SERPL CALC-SCNC: 15 MMOL/L (ref 7–16)
APPEARANCE UR: ABNORMAL
AST SERPL-CCNC: 15 U/L (ref 12–45)
BACTERIA #/AREA URNS HPF: ABNORMAL /HPF
BASOPHILS # BLD AUTO: 0.6 % (ref 0–1.8)
BASOPHILS # BLD: 0.07 K/UL (ref 0–0.12)
BILIRUB SERPL-MCNC: 0.4 MG/DL (ref 0.1–1.5)
BILIRUB UR QL STRIP.AUTO: NEGATIVE
BUN SERPL-MCNC: 9 MG/DL (ref 8–22)
CALCIUM SERPL-MCNC: 9 MG/DL (ref 8.5–10.5)
CHLORIDE SERPL-SCNC: 101 MMOL/L (ref 96–112)
CO2 SERPL-SCNC: 24 MMOL/L (ref 20–33)
COLOR UR: YELLOW
CREAT SERPL-MCNC: 0.89 MG/DL (ref 0.5–1.4)
EKG IMPRESSION: NORMAL
EOSINOPHIL # BLD AUTO: 0.11 K/UL (ref 0–0.51)
EOSINOPHIL NFR BLD: 0.9 % (ref 0–6.9)
EPI CELLS #/AREA URNS HPF: NEGATIVE /HPF
ERYTHROCYTE [DISTWIDTH] IN BLOOD BY AUTOMATED COUNT: 47.9 FL (ref 35.9–50)
FOLATE SERPL-MCNC: 15.2 NG/ML
GLOBULIN SER CALC-MCNC: 3.9 G/DL (ref 1.9–3.5)
GLUCOSE SERPL-MCNC: 92 MG/DL (ref 65–99)
GLUCOSE UR STRIP.AUTO-MCNC: NEGATIVE MG/DL
HCT VFR BLD AUTO: 40.7 % (ref 42–52)
HGB BLD-MCNC: 13.6 G/DL (ref 14–18)
IMM GRANULOCYTES # BLD AUTO: 0.13 K/UL (ref 0–0.11)
IMM GRANULOCYTES NFR BLD AUTO: 1 % (ref 0–0.9)
KETONES UR STRIP.AUTO-MCNC: 15 MG/DL
LACTATE BLD-SCNC: 1.1 MMOL/L (ref 0.5–2)
LEUKOCYTE ESTERASE UR QL STRIP.AUTO: ABNORMAL
LYMPHOCYTES # BLD AUTO: 1.89 K/UL (ref 1–4.8)
LYMPHOCYTES NFR BLD: 15 % (ref 22–41)
MCH RBC QN AUTO: 28.9 PG (ref 27–33)
MCHC RBC AUTO-ENTMCNC: 33.4 G/DL (ref 33.7–35.3)
MCV RBC AUTO: 86.4 FL (ref 81.4–97.8)
MICRO URNS: ABNORMAL
MONOCYTES # BLD AUTO: 0.86 K/UL (ref 0–0.85)
MONOCYTES NFR BLD AUTO: 6.8 % (ref 0–13.4)
NEUTROPHILS # BLD AUTO: 9.51 K/UL (ref 1.82–7.42)
NEUTROPHILS NFR BLD: 75.7 % (ref 44–72)
NITRITE UR QL STRIP.AUTO: POSITIVE
NRBC # BLD AUTO: 0 K/UL
NRBC BLD-RTO: 0 /100 WBC
PH UR STRIP.AUTO: 7.5 [PH] (ref 5–8)
PLATELET # BLD AUTO: 387 K/UL (ref 164–446)
PMV BLD AUTO: 8.7 FL (ref 9–12.9)
POTASSIUM SERPL-SCNC: 4.2 MMOL/L (ref 3.6–5.5)
PROT SERPL-MCNC: 7.4 G/DL (ref 6–8.2)
PROT UR QL STRIP: NEGATIVE MG/DL
RBC # BLD AUTO: 4.71 M/UL (ref 4.7–6.1)
RBC # URNS HPF: ABNORMAL /HPF
RBC UR QL AUTO: ABNORMAL
SODIUM SERPL-SCNC: 140 MMOL/L (ref 135–145)
SP GR UR STRIP.AUTO: 1.01
T4 FREE SERPL-MCNC: 1.59 NG/DL (ref 0.93–1.7)
TROPONIN T SERPL-MCNC: 12 NG/L (ref 6–19)
TSH SERPL DL<=0.005 MIU/L-ACNC: 0.15 UIU/ML (ref 0.38–5.33)
UROBILINOGEN UR STRIP.AUTO-MCNC: 0.2 MG/DL
VIT B12 SERPL-MCNC: 2772 PG/ML (ref 211–911)
WBC # BLD AUTO: 12.6 K/UL (ref 4.8–10.8)
WBC #/AREA URNS HPF: ABNORMAL /HPF

## 2022-02-26 PROCEDURE — 87086 URINE CULTURE/COLONY COUNT: CPT

## 2022-02-26 PROCEDURE — 700111 HCHG RX REV CODE 636 W/ 250 OVERRIDE (IP): Performed by: EMERGENCY MEDICINE

## 2022-02-26 PROCEDURE — 80053 COMPREHEN METABOLIC PANEL: CPT

## 2022-02-26 PROCEDURE — 84484 ASSAY OF TROPONIN QUANT: CPT

## 2022-02-26 PROCEDURE — 87077 CULTURE AEROBIC IDENTIFY: CPT

## 2022-02-26 PROCEDURE — 87186 SC STD MICRODIL/AGAR DIL: CPT

## 2022-02-26 PROCEDURE — 83605 ASSAY OF LACTIC ACID: CPT

## 2022-02-26 PROCEDURE — 85025 COMPLETE CBC W/AUTO DIFF WBC: CPT

## 2022-02-26 PROCEDURE — 87040 BLOOD CULTURE FOR BACTERIA: CPT | Mod: 91

## 2022-02-26 PROCEDURE — 93005 ELECTROCARDIOGRAM TRACING: CPT | Performed by: EMERGENCY MEDICINE

## 2022-02-26 PROCEDURE — 96374 THER/PROPH/DIAG INJ IV PUSH: CPT

## 2022-02-26 PROCEDURE — 99285 EMERGENCY DEPT VISIT HI MDM: CPT

## 2022-02-26 PROCEDURE — 81001 URINALYSIS AUTO W/SCOPE: CPT

## 2022-02-26 RX ORDER — CEFDINIR 300 MG/1
300 CAPSULE ORAL 2 TIMES DAILY
Qty: 14 CAPSULE | Refills: 0 | Status: SHIPPED | OUTPATIENT
Start: 2022-02-26 | End: 2022-03-05

## 2022-02-26 RX ORDER — ONDANSETRON 4 MG/1
4 TABLET, ORALLY DISINTEGRATING ORAL ONCE
Status: COMPLETED | OUTPATIENT
Start: 2022-02-26 | End: 2022-02-26

## 2022-02-26 RX ORDER — CEFTRIAXONE 2 G/1
2 INJECTION, POWDER, FOR SOLUTION INTRAMUSCULAR; INTRAVENOUS ONCE
Status: COMPLETED | OUTPATIENT
Start: 2022-02-26 | End: 2022-02-26

## 2022-02-26 RX ADMIN — ONDANSETRON 4 MG: 4 TABLET, ORALLY DISINTEGRATING ORAL at 16:59

## 2022-02-26 RX ADMIN — CEFTRIAXONE SODIUM 2 G: 2 INJECTION, POWDER, FOR SOLUTION INTRAMUSCULAR; INTRAVENOUS at 14:36

## 2022-02-26 ASSESSMENT — ENCOUNTER SYMPTOMS
DIARRHEA: 1
NAUSEA: 1
CHILLS: 0
VOMITING: 0
FEVER: 0
DIZZINESS: 1
HEADACHES: 1

## 2022-02-26 ASSESSMENT — LIFESTYLE VARIABLES: DO YOU DRINK ALCOHOL: NO

## 2022-02-26 ASSESSMENT — FIBROSIS 4 INDEX: FIB4 SCORE: 0.7

## 2022-02-26 NOTE — ED PROVIDER NOTES
"ED Provider Note    Scribed for Avinash Robertson M.D. by Lou Muniz. 2/26/2022, 12:53 PM.    Primary care provider: Cheryl M. Demucha, A.P.R.N.  Means of arrival: Walk in   History obtained from: Patient  History limited by: None    CHIEF COMPLAINT  Chief Complaint   Patient presents with   • Fatigue     Pt reports that he had a hemorrhagic stroke about a month ago, went to [facility] rehab and life care, now pt is at home. Pt reports that every morning when he wakes up, he has low energy and it persists throughout the day. Pt thinks he is not absorbing his nutrients from food post stroke, pt wants to be evaluated for a SIADH. Pt was seen in [place] and was told his electrolytes were low, pt has been taking potassium pills.    • Loose Stools     Pt reports his bowel movements haven't been formed, loose and small amounts.        HPI  Gokul Baca is a 71 y.o. male with atrial fibrillation who presents to the Emergency Department for evaluation of fatigue following life care. The patient had a hemorrhagic stroke about a month ago, and then was in life care. After leaving life care, about a month ago, the patient has felt very weak. Gokul feels as if \"the life is being sucked out of him\", which is very unusual. He speculates that he may not be getting any nutrition. The patient has been unable to eat solid foods for about two weeks because the taste of food makes him throw up. He is unsure if this is due to the COVID he contracted while in life care or due to the stroke. However, he will try to drink 6 ounce plant protein drinks 3 times a day for the past two weeks. He admits to associated symptoms of nausea, dizziness, left sided head pain, diarrhea, and inability to sleep. But denies dysuria, fever, chills, or vomiting. No alleviating factors were reported. The patient recently established with a new PCP in [place]: Dr. Waldron. He had a work up done in [place] and was told that his electrolytes and potassium " were low. Gokul is not on blood thinners.     REVIEW OF SYSTEMS  Review of Systems   Constitutional: Positive for malaise/fatigue. Negative for chills and fever.   HENT: Negative for congestion.    Gastrointestinal: Positive for diarrhea and nausea. Negative for vomiting.   Genitourinary: Negative for dysuria.   Neurological: Positive for dizziness and headaches.   Psychiatric/Behavioral:        Inability to sleep   All other systems reviewed and are negative.      PAST MEDICAL HISTORY   has a past medical history of Atrial fibrillation (HCC), GERD (gastroesophageal reflux disease), Hyperlipidemia, and Hypertension.    SURGICAL HISTORY   has a past surgical history that includes knee arthroplasty total (Left).    SOCIAL HISTORY  Social History     Tobacco Use   • Smoking status: Never Smoker   • Smokeless tobacco: Never Used   Vaping Use   • Vaping Use: Never used   Substance Use Topics   • Alcohol use: Never   • Drug use: Never      Social History     Substance and Sexual Activity   Drug Use Never       FAMILY HISTORY  Family History   Problem Relation Age of Onset   • Heart Disease Mother    • Heart Disease Brother    • Alcohol abuse Son        CURRENT MEDICATIONS  Home Medications     Reviewed by Raquel Mora R.N. (Registered Nurse) on 02/26/22 at 1000  Med List Status: <None>   Medication Last Dose Status   ascorbic acid (VITAMIN C) 500 MG tablet  Active   ferrous sulfate 325 (65 Fe) MG tablet  Active   hydrALAZINE (APRESOLINE) 10 MG Tab  Active   losartan (COZAAR) 25 MG Tab  Active   Magnesium 100 MG Cap  Active   metoprolol tartrate (LOPRESSOR) 25 MG Tab  Active   Misc. Devices Misc  Active   omeprazole (PRILOSEC) 20 MG delayed-release capsule  Active   ondansetron (ZOFRAN) 4 MG Tab tablet  Active   potassium chloride SA (KDUR) 20 MEQ Tab CR  Active   pravastatin (PRAVACHOL) 10 MG Tab  Active                ALLERGIES  Allergies   Allergen Reactions   • Lactose        PHYSICAL EXAM  VITAL SIGNS: BP  "127/77   Pulse 70   Temp 36.7 °C (98.1 °F) (Temporal)   Resp 16   Ht 1.676 m (5' 6\")   Wt 95.7 kg (211 lb)   SpO2 93%   BMI 34.06 kg/m²   Vitals reviewed.  Constitutional: Well developed, Well nourished, No acute distress, Non-toxic appearance.   HENT: Normocephalic, Atraumatic, Bilateral external ears normal, Oropharynx moist, No oral exudates, Nose normal.   Eyes: PERRL, EOMI, Conjunctiva normal, No discharge.   Neck: Normal range of motion, No tenderness, Supple, No stridor.   Cardiovascular: Normal heart rate, Normal rhythm, No murmurs, No rubs, No gallops.   Thorax & Lungs: Normal breath sounds, No respiratory distress, No wheezing, No chest tenderness.   Abdomen: Obese. Bowel sounds normal, Soft, No tenderness  Skin: Warm, Dry, No erythema, No rash.   Back: No tenderness, No CVA tenderness.   Musculoskeletal: Good range of motion in all major joints. No edema. No tenderness to palpation or major deformities noted.   Neurologic: Cranial nerves 2-12 intact. Alert, Normal motor function, Normal sensory function, No focal deficits noted.   Psychiatric: Affect normal    LABS  Results for orders placed or performed during the hospital encounter of 02/26/22   CBC WITH DIFFERENTIAL   Result Value Ref Range    WBC 12.6 (H) 4.8 - 10.8 K/uL    RBC 4.71 4.70 - 6.10 M/uL    Hemoglobin 13.6 (L) 14.0 - 18.0 g/dL    Hematocrit 40.7 (L) 42.0 - 52.0 %    MCV 86.4 81.4 - 97.8 fL    MCH 28.9 27.0 - 33.0 pg    MCHC 33.4 (L) 33.7 - 35.3 g/dL    RDW 47.9 35.9 - 50.0 fL    Platelet Count 387 164 - 446 K/uL    MPV 8.7 (L) 9.0 - 12.9 fL    Neutrophils-Polys 75.70 (H) 44.00 - 72.00 %    Lymphocytes 15.00 (L) 22.00 - 41.00 %    Monocytes 6.80 0.00 - 13.40 %    Eosinophils 0.90 0.00 - 6.90 %    Basophils 0.60 0.00 - 1.80 %    Immature Granulocytes 1.00 (H) 0.00 - 0.90 %    Nucleated RBC 0.00 /100 WBC    Neutrophils (Absolute) 9.51 (H) 1.82 - 7.42 K/uL    Lymphs (Absolute) 1.89 1.00 - 4.80 K/uL    Monos (Absolute) 0.86 (H) 0.00 - " 0.85 K/uL    Eos (Absolute) 0.11 0.00 - 0.51 K/uL    Baso (Absolute) 0.07 0.00 - 0.12 K/uL    Immature Granulocytes (abs) 0.13 (H) 0.00 - 0.11 K/uL    NRBC (Absolute) 0.00 K/uL   COMP METABOLIC PANEL   Result Value Ref Range    Sodium 140 135 - 145 mmol/L    Potassium 4.2 3.6 - 5.5 mmol/L    Chloride 101 96 - 112 mmol/L    Co2 24 20 - 33 mmol/L    Anion Gap 15.0 7.0 - 16.0    Glucose 92 65 - 99 mg/dL    Bun 9 8 - 22 mg/dL    Creatinine 0.89 0.50 - 1.40 mg/dL    Calcium 9.0 8.5 - 10.5 mg/dL    AST(SGOT) 15 12 - 45 U/L    ALT(SGPT) 7 2 - 50 U/L    Alkaline Phosphatase 129 (H) 30 - 99 U/L    Total Bilirubin 0.4 0.1 - 1.5 mg/dL    Albumin 3.5 3.2 - 4.9 g/dL    Total Protein 7.4 6.0 - 8.2 g/dL    Globulin 3.9 (H) 1.9 - 3.5 g/dL    A-G Ratio 0.9 g/dL   URINALYSIS,CULTURE IF INDICATED    Specimen: Urine, Clean Catch   Result Value Ref Range    Color Yellow     Character Cloudy (A)     Specific Gravity 1.007 <1.035    Ph 7.5 5.0 - 8.0    Glucose Negative Negative mg/dL    Ketones 15 (A) Negative mg/dL    Protein Negative Negative mg/dL    Bilirubin Negative Negative    Urobilinogen, Urine 0.2 Negative    Nitrite Positive (A) Negative    Leukocyte Esterase Large (A) Negative    Occult Blood Trace (A) Negative    Micro Urine Req Microscopic    URINE MICROSCOPIC (W/UA)   Result Value Ref Range    WBC Packed (A) /hpf    RBC 2-5 (A) /hpf    Bacteria Moderate (A) None /hpf    Epithelial Cells Negative /hpf   ESTIMATED GFR   Result Value Ref Range    GFR If African American >60 >60 mL/min/1.73 m 2    GFR If Non African American >60 >60 mL/min/1.73 m 2   LACTIC ACID   Result Value Ref Range    Lactic Acid 1.1 0.5 - 2.0 mmol/L   TROPONIN   Result Value Ref Range    Troponin T 12 6 - 19 ng/L   EKG (NOW)   Result Value Ref Range    Report       [facility] Emergency Dept.    Test Date:  2022-02-26  Pt Name:    NASIM TELLEZ                Department: ER  MRN:        [id number]                      Room:       BL  16  Gender:     Male                         Technician: [id number]  :        1950                   Requested By:JOSE DE JESUS TRAYLOR  Order #:    [id number]                    Reading MD: JOSE DE JESUS TRAYLOR. MACY    Measurements  Intervals                                Axis  Rate:       58                           P:          29  ND:         175                          QRS:        7  QRSD:       92                           T:          2  QT:         461  QTc:        453    Interpretive Statements  Sinus bradycardia  Borderline T wave abnormalities  Compared to ECG 2022 08:34:51  T-wave abnormality now present  Electronically Signed On 2022 16:23:43 PST by JOSE DE JESUS TRAYLOR. [facility]         No orders to display       COURSE & MEDICAL DECISION MAKING  Pertinent Labs & Imaging studies reviewed. (See chart for details)    12:53 PM Patient seen and examined at bedside. The patient presents with fatigue after life care, and the differential diagnosis includes but is not limited to Urinary tract infection vs Anemia vs Cystitis vs Poor nutritional intake vs Depression vs Insomnia vs Infection vs Thyroid disease. Ordered for CBC with diff, CMP, Urinalysis, and Urine Culture to evaluate. Patient will be treated with Rocephin injection 2 g and Zofran 4 mg for his symptoms.      Chart is reviewed for baseline labs and previous work-up.  He had recent thyroid studies.    I think his fatigue is unlikely to be cardiac in nature.  EKG is nondiagnostic and troponin is negative.  Thyroid studies are unremarkable.  They are concerned about the possibility of SIADH but his labs do not suggest this.  Metabolic panel is normal and CBC is not significantly changed.  He does not have significant anemia.    Is not septic or toxic.  His lungs are clear he does not have a cough I doubt this is pneumonia.  He has no new or focal weakness just the same general weakness that he has had.  When I asked him why he decided to come in today the  patient states that he has had the symptoms for some time and is just tired of it.  They have not crescendoed or worsen in any way.    He does have some diarrhea 1 or 2 episodes a day.  He does have some risk factors for C. difficile with recent hospitalizations and recent periods of time in inpatient settings.  I will send a C. difficile.  He has been able to collect this and will order this as an outpatient.      Urinalysis does show that he has a UTI.  The patient's cultures are reviewed he had 2 previous positive cultures there were nearly pansensitive.  He will be given Rocephin.      4:52 PM - Patient was reevaluated at bedside. I discussed plan for discharge and follow up as outlined below. The patient verbalizes they feel comfortable going home. The patient is stable for discharge at this time and will return for any new or worsening symptoms. Patient verbalizes understanding and support with my plan for discharge.     At this point I think the remainder of his work-up for his fatigue can be performed as an outpatient.  He has no signs of sepsis.  He was treated with IV and then oral antibiotics.  He will follow up with his doctor collect a C. difficile as an outpatient.  He will return for worsening symptoms or other concerns.  Questions are answered is agreeable with the plan.       The patient will return for new or worsening symptoms and is stable at the time of discharge.    The patient is referred to a primary physician for blood pressure management, diabetic screening, and for all other preventative health concerns.      DISPOSITION:  Patient will be discharged home in stable condition.    FOLLOW UP:  Cheryl M. Demucha, OJHN.P.R.N.  [address] [place] [address] [phone]    Schedule an appointment as soon as possible for a visit in 2 days        OUTPATIENT MEDICATIONS:  New Prescriptions    CEFDINIR (OMNICEF) 300 MG CAP    Take 1 Capsule by mouth 2 times a day for 7 days.         FINAL  IMPRESSION  1. Other fatigue    2. Acute UTI          Lou MOREL (Scribe), am scribing for, and in the presence of, Avinash Robertson M.D..    Electronically signed by: Lou Muniz (Scribe), 2/26/2022    Avinash MOREL M.D. personally performed the services described in this documentation, as scribed by Lou Muniz in my presence, and it is both accurate and complete.    The note accurately reflects work and decisions made by me.  Avinash Robertson M.D.  2/26/2022  6:13 PM

## 2022-02-26 NOTE — ED TRIAGE NOTES
"Chief Complaint   Patient presents with   • Fatigue     Pt reports that he had a hemorrhagic stroke about a month ago, went to [facility] rehab and life care, now pt is at home. Pt reports that every morning when he wakes up, he has low energy and it persists throughout the day. Pt thinks he is not absorbing his nutrients from food post stroke, pt wants to be evaluated for a SIADH. Pt was seen in [place] and was told his electrolytes were low, pt has been taking potassium pills.    • Loose Stools     Pt reports his bowel movements haven't been formed, loose and small amounts.      /83   Pulse 75   Temp 36.7 °C (98.1 °F) (Temporal)   Resp 16   Ht 1.676 m (5' 6\")   Wt 95.7 kg (211 lb)   SpO2 94%   BMI 34.06 kg/m²     Pt is wheeled in and out of triage in a hospital wheelchair. Appropriate PPE worn throughout entire encounter. Pt placed back in the lobby and educated about triage process.    "

## 2022-02-27 NOTE — DISCHARGE INSTRUCTIONS
Follow-up with your doctor.  Return to emerge department for worsening symptoms or other concerns.  If your symptoms do not improve with treatment of your UTI you may require further work-up.  This can be done with your doctor.  Take antibiotics as prescribed.

## 2022-02-27 NOTE — ED NOTES
Discharge instructions provided to pt and pt's wife including f/u and Rx. All questions answered and pt verbalized understanding.

## 2022-02-28 LAB
BACTERIA UR CULT: ABNORMAL
BACTERIA UR CULT: ABNORMAL
SIGNIFICANT IND 70042: ABNORMAL
SITE SITE: ABNORMAL
SOURCE SOURCE: ABNORMAL

## 2022-02-28 NOTE — ED NOTES
ED Positive Culture Follow-up/Notification Note:    Date: 2/28/2022     Patient seen in the ED on 2/26/2022 for fatigue.   1. Other fatigue    2. Acute UTI       Discharge Medication List as of 2/26/2022  5:00 PM      START taking these medications    Details   cefdinir (OMNICEF) 300 MG Cap Take 1 Capsule by mouth 2 times a day for 7 days., Disp-14 Capsule, R-0, Normal             Allergies: Lactose     Vitals:    02/26/22 1503 02/26/22 1543 02/26/22 1603 02/26/22 1637   BP: (!) 166/81  139/71    Pulse: 60 63 66 63   Resp: 18 18 18 14   Temp:   36.8 °C (98.3 °F)    TempSrc:   Temporal    SpO2: 95% 88% 94% 94%   Weight:       Height:           Final cultures:   Results     Procedure Component Value Units Date/Time    URINE CULTURE(NEW) [[id number]]  (Abnormal)  (Susceptibility) Collected: 02/26/22 1006    Order Status: Completed Specimen: Urine Updated: 02/28/22 1002     Significant Indicator POS     Source UR     Site -     Culture Result -      Klebsiella pneumoniae  >100,000 cfu/mL      Narrative:      Indication for culture:->Acute unexplained altered mental  status ONLY after ruling out other recognized cause    Susceptibility     Klebsiella pneumoniae (1)     Antibiotic Interpretation Microscan   Method Status    Amikacin  [*]  Sensitive <=16 mcg/mL MIGUELINA Final    Amoxicillin/CA  [*]  Sensitive <=8/4 mcg/mL MIGUELINA Final    Aztreonam  [*]  Sensitive <=4 mcg/mL MIGUELINA Final    Ceftolozane+Tazobactam  [*]  Sensitive <=2 mcg/mL MIGUELINA Final    Ceftriaxone Sensitive <=1 mcg/mL MIGUELINA Final    Ceftazidime  [*]  Sensitive <=1 mcg/mL MIGUELINA Final    Cefazolin Sensitive 4 mcg/mL MIGUELINA Final     Breakpoints when Cefazolin is used for therapy of infections  other than uncomplicated UTIs due to Enterobacterales are as  follows:  MIGUELINA and Interpretation:  <=2 S  4 I  >=8 R       Ciprofloxacin Sensitive <=0.25 mcg/mL MIGUELINA Final    Cefepime Sensitive <=2 mcg/mL MIGUELINA Final    Cefuroxime Sensitive <=4 mcg/mL MIGUELINA Final    Ceftazidime+Avibactam  [*]   "Sensitive <=4 mcg/mL MIGUELINA Final    Ampicillin/sulbactam Intermediate 16/8 mcg/mL MIGUELINA Final    Ertapenem  [*]  Sensitive <=0.5 mcg/mL MIGUELINA Final    Tobramycin Sensitive <=2 mcg/mL MIGUELINA Final    Nitrofurantoin Intermediate 64 mcg/mL MIGUELINA Final    Gentamicin Sensitive <=2 mcg/mL MIGUELINA Final    Imipenem  [*]  Sensitive <=1 mcg/mL MIGUELINA Final    Levofloxacin Sensitive <=0.5 mcg/mL MIGUELINA Final    Meropenem  [*]  Sensitive <=1 mcg/mL MIGUELINA Final    Meropenem/Vaborbactam  [*]  Sensitive <=2 mcg/mL MIGUELINA Final    Minocycline Sensitive <=4 mcg/mL MIGUELINA Final    Pip/Tazobactam Sensitive <=8 mcg/mL MIGUELINA Final    Trimeth/Sulfa Sensitive <=0.5/9.5 mcg/mL MIGUELINA Final    Tetracycline  [*]  Sensitive <=4 mcg/mL MIGUELINA Final    Tigecycline Sensitive <=2 mcg/mL MIGUELINA Final           [*]  Suppressed Antibiotic                 BLOOD CULTURE [[id number]] Collected: 02/26/22 1358    Order Status: Completed Specimen: Blood from Peripheral Updated: 02/27/22 0725     Significant Indicator NEG     Source BLD     Site PERIPHERAL     Culture Result No Growth  Note: Blood cultures are incubated for 5 days and  are monitored continuously.Positive blood cultures  are called to the RN and reported as soon as  they are identified.      Narrative:      Per Hospital Policy: Only change Specimen Src: to \"Line\" if  specified by physician order.  Right Hand    BLOOD CULTURE [[id number]] Collected: 02/26/22 1434    Order Status: Completed Specimen: Blood from Peripheral Updated: 02/27/22 0725     Significant Indicator NEG     Source BLD     Site PERIPHERAL     Culture Result No Growth  Note: Blood cultures are incubated for 5 days and  are monitored continuously.Positive blood cultures  are called to the RN and reported as soon as  they are identified.      Narrative:      Per Hospital Policy: Only change Specimen Src: to \"Line\" if  specified by physician order.  No site indicated    URINALYSIS,CULTURE IF INDICATED [[id number]]  (Abnormal) Collected: 02/26/22 1006    Order Status: " Completed Specimen: Urine, Clean Catch Updated: 02/26/22 1034     Color Yellow     Character Cloudy     Specific Gravity 1.007     Ph 7.5     Glucose Negative mg/dL      Ketones 15 mg/dL      Protein Negative mg/dL      Bilirubin Negative     Urobilinogen, Urine 0.2     Nitrite Positive     Leukocyte Esterase Large     Occult Blood Trace     Micro Urine Req Microscopic    Narrative:      Indication for culture:->Acute unexplained altered mental  status ONLY after ruling out other recognized cause    C Diff by PCR rflx Toxin [[id number]] Collected: 02/26/22 0000    Order Status: Canceled Specimen: Stool           Plan:   Discussed culture results with patient, reports that he is feeling significantly better. He reports urinary symptoms have improved. Denies diarrhea. Also, he is able to eat solid foods. He has felt significantly a lot better since las seen in the ER.  Appropriate antibiotic therapy prescribed. No changes required based upon culture result.      Niki Downs, PharmD, BCPS   PGY2 Infectious Diseases Pharmacy Resident  [phone]

## 2022-03-01 ENCOUNTER — APPOINTMENT (OUTPATIENT)
Dept: RADIOLOGY | Facility: MEDICAL CENTER | Age: 72
End: 2022-03-01
Attending: NURSE PRACTITIONER
Payer: MEDICARE

## 2022-03-02 ENCOUNTER — OFFICE VISIT (OUTPATIENT)
Dept: CARDIOLOGY | Facility: MEDICAL CENTER | Age: 72
End: 2022-03-02
Payer: MEDICARE

## 2022-03-02 ENCOUNTER — TELEPHONE (OUTPATIENT)
Dept: CARDIOLOGY | Facility: MEDICAL CENTER | Age: 72
End: 2022-03-02

## 2022-03-02 VITALS
WEIGHT: 211 LBS | SYSTOLIC BLOOD PRESSURE: 118 MMHG | DIASTOLIC BLOOD PRESSURE: 76 MMHG | BODY MASS INDEX: 33.91 KG/M2 | RESPIRATION RATE: 16 BRPM | HEIGHT: 66 IN | OXYGEN SATURATION: 97 % | HEART RATE: 57 BPM

## 2022-03-02 DIAGNOSIS — I62.9 INTRACRANIAL HEMORRHAGE (HCC): ICD-10-CM

## 2022-03-02 DIAGNOSIS — I48.91 ATRIAL FIBRILLATION, UNSPECIFIED TYPE (HCC): ICD-10-CM

## 2022-03-02 PROCEDURE — 99215 OFFICE O/P EST HI 40 MIN: CPT | Performed by: INTERNAL MEDICINE

## 2022-03-02 ASSESSMENT — FIBROSIS 4 INDEX: FIB4 SCORE: 1.01

## 2022-03-02 NOTE — TELEPHONE ENCOUNTER
----- Message from Jhonathan Goyal M.D. sent at 3/2/2022 12:05 PM PST -----  Let's schedule WATCHMAN next available

## 2022-03-02 NOTE — TELEPHONE ENCOUNTER
Called and spoke with patient - we are scheduling out into May for this procedure. As soon as I'm able to coordinate the watchman date with anesthesia I will call this patient to schedule. He has been added to the watchman list.

## 2022-03-02 NOTE — PROGRESS NOTES
Arrhythmia Clinic Note (New Patient)    DOS: 3/2/2022    Referring physician: Prince Brown    Chief complaint/Reason for consult: paroxysmal AF/AFL    HPI:  Pt is a 72 yo M. He has a history of paroxysmal AF/AFL. In the past had undergone cardioversion and previously managed with amiodarone. Due to concerns for long term toxicity he was taken off the amiodarone. Currently no rhythm management. He was on xarelto for stroke prevention. 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 Had gone to rehab and home now. Regained most of his function. Does still have visual field deficits. Referred for consideration of DANNI closure. Off OAC for now.    ROS (+ in BOLD):  Constitutional: Fevers/chills/fatigue/weightloss  HEENT: Blurry vision/eye pain/sore throat/hearing loss/headaches  Respiratory: Shortness of breath/cough  Cardiovascular: Chest pain/palpitations/edema/orthopnea/syncope  GI: Nausea/vomitting/diarrhea  MSK: Arthralgias/myagias/ muscle weakness  Skin: Rash/sores  Neurological: Numbness/tremors/vertigo  Endocrine: Excessive thirst/polyuria/cold intolerance/heat intolerance  Psych: Depression/anxiety      Past Medical History:   Diagnosis Date   • Atrial fibrillation (HCC)    • GERD (gastroesophageal reflux disease)    • Hyperlipidemia    • Hypertension        Past Surgical History:   Procedure Laterality Date   • KNEE ARTHROPLASTY TOTAL Left        Social History     Socioeconomic History   • Marital status:      Spouse name: Not on file   • Number of children: Not on file   • Years of education: Not on file   • Highest education level: Not on file   Occupational History   • Not on file   Tobacco Use   • Smoking status: Never Smoker   • Smokeless tobacco: Never Used   Vaping Use   • Vaping Use: Never used   Substance and Sexual Activity   • Alcohol use: Never   • Drug use: Never   • Sexual activity: Not on file   Other Topics Concern   • Not on file   Social  History Narrative   • Not on file     Social Determinants of Health     Financial Resource Strain: Not on file   Food Insecurity: Not on file   Transportation Needs: Not on file   Physical Activity: Not on file   Stress: Not on file   Social Connections: Not on file   Intimate Partner Violence: Not on file   Housing Stability: Not on file       Family History   Problem Relation Age of Onset   • Heart Disease Mother    • Heart Disease Brother    • Alcohol abuse Son        Allergies   Allergen Reactions   • Lactose        Current Outpatient Medications   Medication Sig Dispense Refill   • cefdinir (OMNICEF) 300 MG Cap Take 1 Capsule by mouth 2 times a day for 7 days. 14 Capsule 0   • ondansetron (ZOFRAN) 4 MG Tab tablet Take 1 Tablet by mouth every four hours as needed for Nausea/Vomiting. 60 Tablet 5   • potassium chloride SA (KDUR) 20 MEQ Tab CR Take 1 Tablet by mouth 2 times a day. 60 Tablet 11   • Magnesium 100 MG Cap Take 1 Capsule by mouth every day. 30 Capsule 3   • ferrous sulfate 325 (65 Fe) MG tablet 1 tab by mouth once daily with breakfast 30 Tablet 3   • hydrALAZINE (APRESOLINE) 10 MG Tab Take 10 mg by mouth 3 times a day.     • metoprolol tartrate (LOPRESSOR) 25 MG Tab Take 25 mg by mouth 2 times a day.     • pravastatin (PRAVACHOL) 10 MG Tab Take 20 mg by mouth every evening.     • Misc. Devices Misc 1 Each at bedtime. Please use CPAP every night, he has a history of sleep apnea and atrial fibrillation. He has home machine 1 Each 0   • omeprazole (PRILOSEC) 20 MG delayed-release capsule Take 1 Capsule by mouth 2 times a day. 60 Capsule 0   • ascorbic acid (VITAMIN C) 500 MG tablet Take 1 Tablet by mouth every morning with breakfast. 30 Tablet 0   • losartan (COZAAR) 25 MG Tab Take 25 mg by mouth every day. (Patient not taking: Reported on 3/2/2022)       No current facility-administered medications for this visit.       Physical Exam:  Vitals:    03/02/22 0906   BP: 118/76   BP Location: Right arm  "  Patient Position: Sitting   BP Cuff Size: Adult   Pulse: (!) 57   Resp: 16   SpO2: 97%   Weight: 95.7 kg (211 lb)   Height: 1.676 m (5' 6\")     General appearance: NAD, conversant  Eyes: anicteric sclerae, no lid-lag; PERRLA  HENT: Atraumatic; moist mucous membranes, no ulcerations  Neck: Trachea midline; FROM, supple, no thyromegaly  Lungs: CTA, with normal respiratory effort and no intercostal retractions  CV: RRR, no MRGs, no JVD  Abdomen: Soft, non-tender; normal bowel sounds, no HSM  Extremities: No peripheral edema. No clubbing or cyanosis.  Skin: Normal temperature, turgor and texture; no rash or ulcers  Psych: Appropriate affect, alert and oriented to person, place and time      Data:  Labs reviewed    Prior echo/stress reviewed:  LVEF 70%      EKG interpreted by me:  Sinus    CTA showing carotid and aortic plaque    Impression/Plan:  1. Atrial fibrillation, unspecified type (HCC)  EKG   2. Intracranial hemorrhage (HCC)       -CHADSVasc of 3 accounting for HTN, age and evidence of vascular disease (aortic and carotid plaque seen on imaging), not counting CVA as this was primary hemorrhagic  -I agree long term cardioembolic risk merits some protection   -Risks/benefits of WATCHMAN discussed, also discussed restarting Eliquis in the short term post device  -All questions were answered and they would like to proceed    Jhonathan Goyal MD      "

## 2022-03-03 LAB
BACTERIA BLD CULT: NORMAL
BACTERIA BLD CULT: NORMAL
EKG IMPRESSION: NORMAL
SIGNIFICANT IND 70042: NORMAL
SIGNIFICANT IND 70042: NORMAL
SITE SITE: NORMAL
SITE SITE: NORMAL
SOURCE SOURCE: NORMAL
SOURCE SOURCE: NORMAL

## 2022-03-03 PROCEDURE — 93000 ELECTROCARDIOGRAM COMPLETE: CPT | Performed by: INTERNAL MEDICINE

## 2022-03-06 ENCOUNTER — PATIENT MESSAGE (OUTPATIENT)
Dept: NEUROLOGY | Facility: MEDICAL CENTER | Age: 72
End: 2022-03-06
Payer: MEDICARE

## 2022-03-13 SDOH — HEALTH STABILITY: PHYSICAL HEALTH: ON AVERAGE, HOW MANY MINUTES DO YOU ENGAGE IN EXERCISE AT THIS LEVEL?: 0 MIN

## 2022-03-13 SDOH — ECONOMIC STABILITY: FOOD INSECURITY: WITHIN THE PAST 12 MONTHS, YOU WORRIED THAT YOUR FOOD WOULD RUN OUT BEFORE YOU GOT MONEY TO BUY MORE.: NEVER TRUE

## 2022-03-13 SDOH — ECONOMIC STABILITY: INCOME INSECURITY: IN THE LAST 12 MONTHS, WAS THERE A TIME WHEN YOU WERE NOT ABLE TO PAY THE MORTGAGE OR RENT ON TIME?: NO

## 2022-03-13 SDOH — HEALTH STABILITY: MENTAL HEALTH
STRESS IS WHEN SOMEONE FEELS TENSE, NERVOUS, ANXIOUS, OR CAN'T SLEEP AT NIGHT BECAUSE THEIR MIND IS TROUBLED. HOW STRESSED ARE YOU?: ONLY A LITTLE

## 2022-03-13 SDOH — ECONOMIC STABILITY: HOUSING INSECURITY: IN THE LAST 12 MONTHS, HOW MANY PLACES HAVE YOU LIVED?: 1

## 2022-03-13 SDOH — ECONOMIC STABILITY: INCOME INSECURITY: HOW HARD IS IT FOR YOU TO PAY FOR THE VERY BASICS LIKE FOOD, HOUSING, MEDICAL CARE, AND HEATING?: NOT HARD AT ALL

## 2022-03-13 SDOH — ECONOMIC STABILITY: TRANSPORTATION INSECURITY
IN THE PAST 12 MONTHS, HAS THE LACK OF TRANSPORTATION KEPT YOU FROM MEDICAL APPOINTMENTS OR FROM GETTING MEDICATIONS?: NO

## 2022-03-13 SDOH — ECONOMIC STABILITY: TRANSPORTATION INSECURITY
IN THE PAST 12 MONTHS, HAS LACK OF RELIABLE TRANSPORTATION KEPT YOU FROM MEDICAL APPOINTMENTS, MEETINGS, WORK OR FROM GETTING THINGS NEEDED FOR DAILY LIVING?: NO

## 2022-03-13 SDOH — ECONOMIC STABILITY: HOUSING INSECURITY
IN THE LAST 12 MONTHS, WAS THERE A TIME WHEN YOU DID NOT HAVE A STEADY PLACE TO SLEEP OR SLEPT IN A SHELTER (INCLUDING NOW)?: PATIENT REFUSED

## 2022-03-13 SDOH — ECONOMIC STABILITY: TRANSPORTATION INSECURITY
IN THE PAST 12 MONTHS, HAS LACK OF TRANSPORTATION KEPT YOU FROM MEETINGS, WORK, OR FROM GETTING THINGS NEEDED FOR DAILY LIVING?: NO

## 2022-03-13 SDOH — ECONOMIC STABILITY: FOOD INSECURITY: WITHIN THE PAST 12 MONTHS, THE FOOD YOU BOUGHT JUST DIDN'T LAST AND YOU DIDN'T HAVE MONEY TO GET MORE.: PATIENT DECLINED

## 2022-03-13 SDOH — HEALTH STABILITY: PHYSICAL HEALTH: ON AVERAGE, HOW MANY DAYS PER WEEK DO YOU ENGAGE IN MODERATE TO STRENUOUS EXERCISE (LIKE A BRISK WALK)?: 0 DAYS

## 2022-03-13 SDOH — ECONOMIC STABILITY: HOUSING INSECURITY
IN THE LAST 12 MONTHS, WAS THERE A TIME WHEN YOU DID NOT HAVE A STEADY PLACE TO SLEEP OR SLEPT IN A SHELTER (INCLUDING NOW)?: NO

## 2022-03-13 ASSESSMENT — SOCIAL DETERMINANTS OF HEALTH (SDOH)
HOW OFTEN DO YOU HAVE SIX OR MORE DRINKS ON ONE OCCASION: NEVER
HOW OFTEN DO YOU ATTEND CHURCH OR RELIGIOUS SERVICES?: PATIENT DECLINED
HOW OFTEN DO YOU ATTEND CHURCH OR RELIGIOUS SERVICES?: PATIENT DECLINED
WITHIN THE PAST 12 MONTHS, YOU WORRIED THAT YOUR FOOD WOULD RUN OUT BEFORE YOU GOT THE MONEY TO BUY MORE: NEVER TRUE
HOW OFTEN DO YOU GET TOGETHER WITH FRIENDS OR RELATIVES?: PATIENT DECLINED
IN A TYPICAL WEEK, HOW MANY TIMES DO YOU TALK ON THE PHONE WITH FAMILY, FRIENDS, OR NEIGHBORS?: MORE THAN THREE TIMES A WEEK
DO YOU BELONG TO ANY CLUBS OR ORGANIZATIONS SUCH AS CHURCH GROUPS UNIONS, FRATERNAL OR ATHLETIC GROUPS, OR SCHOOL GROUPS?: YES
DO YOU BELONG TO ANY CLUBS OR ORGANIZATIONS SUCH AS CHURCH GROUPS UNIONS, FRATERNAL OR ATHLETIC GROUPS, OR SCHOOL GROUPS?: YES
HOW OFTEN DO YOU HAVE A DRINK CONTAINING ALCOHOL: NEVER
IN A TYPICAL WEEK, HOW MANY TIMES DO YOU TALK ON THE PHONE WITH FAMILY, FRIENDS, OR NEIGHBORS?: MORE THAN THREE TIMES A WEEK
HOW OFTEN DO YOU ATTENT MEETINGS OF THE CLUB OR ORGANIZATION YOU BELONG TO?: PATIENT DECLINED
HOW OFTEN DO YOU GET TOGETHER WITH FRIENDS OR RELATIVES?: PATIENT DECLINED
HOW HARD IS IT FOR YOU TO PAY FOR THE VERY BASICS LIKE FOOD, HOUSING, MEDICAL CARE, AND HEATING?: NOT HARD AT ALL
HOW OFTEN DO YOU ATTENT MEETINGS OF THE CLUB OR ORGANIZATION YOU BELONG TO?: PATIENT DECLINED
HOW MANY DRINKS CONTAINING ALCOHOL DO YOU HAVE ON A TYPICAL DAY WHEN YOU ARE DRINKING: PATIENT DECLINED

## 2022-03-13 ASSESSMENT — LIFESTYLE VARIABLES
HOW OFTEN DO YOU HAVE SIX OR MORE DRINKS ON ONE OCCASION: NEVER
HOW OFTEN DO YOU HAVE A DRINK CONTAINING ALCOHOL: NEVER
HOW MANY STANDARD DRINKS CONTAINING ALCOHOL DO YOU HAVE ON A TYPICAL DAY: PATIENT DECLINED

## 2022-03-16 ENCOUNTER — OFFICE VISIT (OUTPATIENT)
Dept: MEDICAL GROUP | Facility: PHYSICIAN GROUP | Age: 72
End: 2022-03-16
Payer: MEDICARE

## 2022-03-16 ENCOUNTER — HOSPITAL ENCOUNTER (OUTPATIENT)
Facility: MEDICAL CENTER | Age: 72
End: 2022-03-16
Attending: FAMILY MEDICINE
Payer: MEDICARE

## 2022-03-16 VITALS
RESPIRATION RATE: 16 BRPM | BODY MASS INDEX: 33.43 KG/M2 | DIASTOLIC BLOOD PRESSURE: 78 MMHG | OXYGEN SATURATION: 95 % | HEIGHT: 66 IN | HEART RATE: 80 BPM | WEIGHT: 208 LBS | SYSTOLIC BLOOD PRESSURE: 130 MMHG | TEMPERATURE: 97.5 F

## 2022-03-16 DIAGNOSIS — R82.90 CLOUDY URINE: ICD-10-CM

## 2022-03-16 DIAGNOSIS — Z11.59 ENCOUNTER FOR HEPATITIS C SCREENING TEST FOR LOW RISK PATIENT: ICD-10-CM

## 2022-03-16 DIAGNOSIS — D64.9 ANEMIA, UNSPECIFIED TYPE: ICD-10-CM

## 2022-03-16 DIAGNOSIS — Z74.09 IMPAIRED MOBILITY AND ADLS: ICD-10-CM

## 2022-03-16 DIAGNOSIS — N39.0 URINARY TRACT INFECTION WITHOUT HEMATURIA, SITE UNSPECIFIED: ICD-10-CM

## 2022-03-16 DIAGNOSIS — Z78.9 IMPAIRED MOBILITY AND ADLS: ICD-10-CM

## 2022-03-16 DIAGNOSIS — E87.6 HYPOKALEMIA: ICD-10-CM

## 2022-03-16 DIAGNOSIS — D69.6 THROMBOCYTOPENIA (HCC): ICD-10-CM

## 2022-03-16 PROCEDURE — 81001 URINALYSIS AUTO W/SCOPE: CPT

## 2022-03-16 PROCEDURE — 87077 CULTURE AEROBIC IDENTIFY: CPT

## 2022-03-16 PROCEDURE — 87086 URINE CULTURE/COLONY COUNT: CPT

## 2022-03-16 PROCEDURE — 87186 SC STD MICRODIL/AGAR DIL: CPT

## 2022-03-16 PROCEDURE — 99214 OFFICE O/P EST MOD 30 MIN: CPT | Performed by: FAMILY MEDICINE

## 2022-03-16 RX ORDER — POTASSIUM CHLORIDE 20 MEQ/1
20 TABLET, EXTENDED RELEASE ORAL DAILY
Qty: 15 TABLET | Refills: 0 | Status: SHIPPED | OUTPATIENT
Start: 2022-03-16 | End: 2022-04-20 | Stop reason: SDUPTHER

## 2022-03-16 RX ORDER — POTASSIUM CHLORIDE 20 MEQ/1
20 TABLET, EXTENDED RELEASE ORAL DAILY
Qty: 90 TABLET | Refills: 3 | Status: SHIPPED | OUTPATIENT
Start: 2022-03-16 | End: 2022-03-16 | Stop reason: SDUPTHER

## 2022-03-16 RX ORDER — FERROUS SULFATE 325(65) MG
TABLET ORAL
Qty: 90 TABLET | Refills: 3 | Status: SHIPPED | OUTPATIENT
Start: 2022-03-16 | End: 2022-06-14

## 2022-03-16 RX ORDER — ONDANSETRON 4 MG/1
4 TABLET, FILM COATED ORAL EVERY 4 HOURS PRN
Qty: 60 TABLET | Refills: 5 | Status: SHIPPED | OUTPATIENT
Start: 2022-03-16 | End: 2022-03-17 | Stop reason: SDUPTHER

## 2022-03-16 RX ORDER — POTASSIUM CHLORIDE 20 MEQ/1
20 TABLET, EXTENDED RELEASE ORAL DAILY
Qty: 90 TABLET | Refills: 3 | Status: SHIPPED | OUTPATIENT
Start: 2022-03-16 | End: 2022-03-29

## 2022-03-16 ASSESSMENT — FIBROSIS 4 INDEX: FIB4 SCORE: 1.01

## 2022-03-17 DIAGNOSIS — R82.90 CLOUDY URINE: ICD-10-CM

## 2022-03-17 LAB
APPEARANCE UR: ABNORMAL
BACTERIA #/AREA URNS HPF: ABNORMAL /HPF
BILIRUB UR QL STRIP.AUTO: NEGATIVE
COLOR UR: YELLOW
EPI CELLS #/AREA URNS HPF: NEGATIVE /HPF
GLUCOSE UR STRIP.AUTO-MCNC: NEGATIVE MG/DL
HYALINE CASTS #/AREA URNS LPF: ABNORMAL /LPF
KETONES UR STRIP.AUTO-MCNC: NEGATIVE MG/DL
LEUKOCYTE ESTERASE UR QL STRIP.AUTO: ABNORMAL
MICRO URNS: ABNORMAL
NITRITE UR QL STRIP.AUTO: NEGATIVE
PH UR STRIP.AUTO: 6 [PH] (ref 5–8)
PROT UR QL STRIP: NEGATIVE MG/DL
RBC # URNS HPF: ABNORMAL /HPF
RBC UR QL AUTO: NEGATIVE
SP GR UR STRIP.AUTO: 1.02
UROBILINOGEN UR STRIP.AUTO-MCNC: 0.2 MG/DL
WBC #/AREA URNS HPF: ABNORMAL /HPF

## 2022-03-17 RX ORDER — ONDANSETRON 4 MG/1
4 TABLET, FILM COATED ORAL EVERY 4 HOURS PRN
Qty: 10 TABLET | Refills: 0 | Status: ON HOLD | OUTPATIENT
Start: 2022-03-17 | End: 2022-07-14

## 2022-03-17 RX ORDER — KETOCONAZOLE 20 MG/G
0.5 CREAM TOPICAL DAILY
Qty: 30 G | Refills: 5 | Status: ON HOLD | OUTPATIENT
Start: 2022-03-17 | End: 2022-07-14

## 2022-03-17 NOTE — PROGRESS NOTES
Subjective:   Gokul Baca is a 71 y.o. male here today for evaluation and management of:     Impaired mobility and ADLs  Gokul is 71, he continues to have weakness after his stroke and also has some persistent personality changes.   Will check labs,     UTI (urinary tract infection)  Repeat urinalysis ordered. He currently has no dysuria/hematuria. Has continued weakness.     Thrombocytopenia (HCC)  No adverse bleeding episodes, repeat cbc ordered     Hypokalemia  Hypokalemia improving.   Repeat labs ordered. He is on potassium 20 meq daily and magnesium 100mg daily         Current medicines (including changes today)  Current Outpatient Medications   Medication Sig Dispense Refill   • sulfamethoxazole-trimethoprim (BACTRIM DS) 800-160 MG tablet Take 1 Tablet by mouth 2 times a day for 7 days. 14 Tablet 0   • Multiple Vitamin (ONE-A-DAY MENS PO) Take 1 Tablet by mouth.     • potassium chloride SA (KDUR) 20 MEQ Tab CR Take 1 Tablet by mouth every day. 15 Tablet 0   • ferrous sulfate 325 (65 Fe) MG tablet 1 tab by mouth once daily with breakfast 90 Tablet 3   • metoprolol tartrate (LOPRESSOR) 25 MG Tab Take 0.5 Tablets by mouth 2 times a day. 90 Tablet 3   • Magnesium 100 MG Cap Take 1 Capsule by mouth every day. 30 Capsule 3   • hydrALAZINE (APRESOLINE) 10 MG Tab Take 10 mg by mouth 3 times a day.     • pravastatin (PRAVACHOL) 10 MG Tab Take 20 mg by mouth every evening.     • Misc. Devices Misc 1 Each at bedtime. Please use CPAP every night, he has a history of sleep apnea and atrial fibrillation. He has home machine 1 Each 0   • omeprazole (PRILOSEC) 20 MG delayed-release capsule Take 1 Capsule by mouth 2 times a day. 60 Capsule 0   • ascorbic acid (VITAMIN C) 500 MG tablet Take 1 Tablet by mouth every morning with breakfast. 30 Tablet 0   • ketoconazole (NIZORAL) 2 % Cream Apply 0.5 g topically every day. For foot rash 30 g 5   • ondansetron (ZOFRAN) 4 MG Tab tablet Take 1 Tablet by mouth every four hours as  "needed for Nausea/Vomiting. Till mail order Rx arrives. 10 Tablet 0     No current facility-administered medications for this visit.     He  has a past medical history of Atrial fibrillation (HCC), GERD (gastroesophageal reflux disease), Hyperlipidemia, and Hypertension.    ROS  No chest pain, no shortness of breath, no abdominal pain       Objective:     /78   Pulse 80   Temp 36.4 °C (97.5 °F)   Resp 16   Ht 1.679 m (5' 6.1\")   Wt 94.3 kg (208 lb)   SpO2 95%  Body mass index is 33.47 kg/m².   Physical Exam:  Constitutional: Alert, no distress, well-groomed.  Skin: No rashes in visible areas.  Eye: Round. Conjunctiva clear, lids normal. No icterus.   ENMT: Lips pink without lesions, good dentition, moist mucous membranes. Phonation normal.  Neck: No masses, no thyromegaly. Moves freely without pain.  Respiratory: Unlabored respiratory effort, no cough or audible wheeze  Psych: Alert and oriented x3, normal affect and mood.          Assessment and Plan:   The following treatment plan was discussed    1. Anemia, unspecified type    - ferrous sulfate 325 (65 Fe) MG tablet; 1 tab by mouth once daily with breakfast  Dispense: 90 Tablet; Refill: 3  - CBC WITH DIFFERENTIAL; Future  - IRON/TOTAL IRON BIND; Future    2. Cloudy urine    - URINALYSIS; Future  - URINE CULTURE(NEW); Future    3. Hypokalemia    - Comp Metabolic Panel; Future    4. Encounter for hepatitis C screening test for low risk patient    - HCV Scrn ( [id number] 1xLife); Future      Followup: Return in about 3 months (around 2022) for hypokalemia, anemia, hx of stroke.         "

## 2022-03-21 ENCOUNTER — HOSPITAL ENCOUNTER (OUTPATIENT)
Dept: RADIOLOGY | Facility: MEDICAL CENTER | Age: 72
End: 2022-03-21
Attending: NURSE PRACTITIONER
Payer: MEDICARE

## 2022-03-21 DIAGNOSIS — I61.9 HEMORRHAGIC STROKE (HCC): ICD-10-CM

## 2022-03-21 PROCEDURE — 70450 CT HEAD/BRAIN W/O DYE: CPT | Mod: MG

## 2022-03-22 ENCOUNTER — HOSPITAL ENCOUNTER (OUTPATIENT)
Dept: LAB | Facility: MEDICAL CENTER | Age: 72
End: 2022-03-22
Attending: FAMILY MEDICINE
Payer: MEDICARE

## 2022-03-22 DIAGNOSIS — E87.6 HYPOKALEMIA: ICD-10-CM

## 2022-03-22 DIAGNOSIS — Z11.59 ENCOUNTER FOR HEPATITIS C SCREENING TEST FOR LOW RISK PATIENT: ICD-10-CM

## 2022-03-22 DIAGNOSIS — D64.9 ANEMIA, UNSPECIFIED TYPE: ICD-10-CM

## 2022-03-22 LAB
ALBUMIN SERPL BCP-MCNC: 4 G/DL (ref 3.2–4.9)
ALBUMIN/GLOB SERPL: 1.4 G/DL
ALP SERPL-CCNC: 253 U/L (ref 30–99)
ALT SERPL-CCNC: 27 U/L (ref 2–50)
ANION GAP SERPL CALC-SCNC: 15 MMOL/L (ref 7–16)
AST SERPL-CCNC: 29 U/L (ref 12–45)
BASOPHILS # BLD AUTO: 0.8 % (ref 0–1.8)
BASOPHILS # BLD: 0.08 K/UL (ref 0–0.12)
BILIRUB SERPL-MCNC: 0.3 MG/DL (ref 0.1–1.5)
BUN SERPL-MCNC: 12 MG/DL (ref 8–22)
CALCIUM SERPL-MCNC: 9.3 MG/DL (ref 8.5–10.5)
CHLORIDE SERPL-SCNC: 101 MMOL/L (ref 96–112)
CO2 SERPL-SCNC: 22 MMOL/L (ref 20–33)
CREAT SERPL-MCNC: 0.69 MG/DL (ref 0.5–1.4)
EOSINOPHIL # BLD AUTO: 0.3 K/UL (ref 0–0.51)
EOSINOPHIL NFR BLD: 3.1 % (ref 0–6.9)
ERYTHROCYTE [DISTWIDTH] IN BLOOD BY AUTOMATED COUNT: 54.4 FL (ref 35.9–50)
GFR SERPLBLD CREATININE-BSD FMLA CKD-EPI: 100 ML/MIN/1.73 M 2
GLOBULIN SER CALC-MCNC: 2.9 G/DL (ref 1.9–3.5)
GLUCOSE SERPL-MCNC: 87 MG/DL (ref 65–99)
HCT VFR BLD AUTO: 37.9 % (ref 42–52)
HCV AB SER QL: NORMAL
HGB BLD-MCNC: 12 G/DL (ref 14–18)
IMM GRANULOCYTES # BLD AUTO: 0.04 K/UL (ref 0–0.11)
IMM GRANULOCYTES NFR BLD AUTO: 0.4 % (ref 0–0.9)
IRON SATN MFR SERPL: 28 % (ref 15–55)
IRON SERPL-MCNC: 64 UG/DL (ref 50–180)
LYMPHOCYTES # BLD AUTO: 1.53 K/UL (ref 1–4.8)
LYMPHOCYTES NFR BLD: 15.7 % (ref 22–41)
MAGNESIUM SERPL-MCNC: 1.7 MG/DL (ref 1.5–2.5)
MCH RBC QN AUTO: 29.3 PG (ref 27–33)
MCHC RBC AUTO-ENTMCNC: 31.7 G/DL (ref 33.7–35.3)
MCV RBC AUTO: 92.4 FL (ref 81.4–97.8)
MONOCYTES # BLD AUTO: 0.93 K/UL (ref 0–0.85)
MONOCYTES NFR BLD AUTO: 9.5 % (ref 0–13.4)
NEUTROPHILS # BLD AUTO: 6.89 K/UL (ref 1.82–7.42)
NEUTROPHILS NFR BLD: 70.5 % (ref 44–72)
NRBC # BLD AUTO: 0 K/UL
NRBC BLD-RTO: 0 /100 WBC
PHOSPHATE SERPL-MCNC: 3.8 MG/DL (ref 2.5–4.5)
PLATELET # BLD AUTO: 308 K/UL (ref 164–446)
PMV BLD AUTO: 10.4 FL (ref 9–12.9)
POTASSIUM SERPL-SCNC: 4.7 MMOL/L (ref 3.6–5.5)
PROT SERPL-MCNC: 6.9 G/DL (ref 6–8.2)
RBC # BLD AUTO: 4.1 M/UL (ref 4.7–6.1)
SODIUM SERPL-SCNC: 138 MMOL/L (ref 135–145)
TIBC SERPL-MCNC: 231 UG/DL (ref 250–450)
UIBC SERPL-MCNC: 167 UG/DL (ref 110–370)
WBC # BLD AUTO: 9.8 K/UL (ref 4.8–10.8)

## 2022-03-22 PROCEDURE — 83540 ASSAY OF IRON: CPT

## 2022-03-22 PROCEDURE — G0472 HEP C SCREEN HIGH RISK/OTHER: HCPCS | Mod: GA

## 2022-03-22 PROCEDURE — 83550 IRON BINDING TEST: CPT

## 2022-03-22 PROCEDURE — 80053 COMPREHEN METABOLIC PANEL: CPT

## 2022-03-22 PROCEDURE — 36415 COLL VENOUS BLD VENIPUNCTURE: CPT | Mod: GA

## 2022-03-22 PROCEDURE — 84100 ASSAY OF PHOSPHORUS: CPT

## 2022-03-22 PROCEDURE — 83735 ASSAY OF MAGNESIUM: CPT

## 2022-03-22 PROCEDURE — 85025 COMPLETE CBC W/AUTO DIFF WBC: CPT

## 2022-03-22 RX ORDER — SULFAMETHOXAZOLE AND TRIMETHOPRIM 800; 160 MG/1; MG/1
1 TABLET ORAL 2 TIMES DAILY
Qty: 14 TABLET | Refills: 0 | Status: SHIPPED | OUTPATIENT
Start: 2022-03-22 | End: 2022-03-29

## 2022-03-22 NOTE — RESULT ENCOUNTER NOTE
Please let pt know antibiotic for UTI sent to [facility].     Released to [facility]  Andrew Hodgson,  Your urine culture shows klebsiella bacteria, sensitive to bactrim. I've sent a Rx for bactrim to [facility]: one tablet twice a day for 7 days.   Jennifer Waldron M.D.

## 2022-03-24 DIAGNOSIS — R74.8 HIGH SERUM ALKALINE PHOSPHATASE LEVEL: ICD-10-CM

## 2022-03-24 NOTE — RESULT ENCOUNTER NOTE
Released to [facility] Hodgson,  Your labs show your alk phos is quite high, TIBC is a bit low but iron level is normal, H/H shows anemia worsened. I've ordered a GGT lab could you have this done before your visit with me on the 30th please? It will give me more information on next steps to evaluate the alkaline phosphatase.   Jennifer Waldron M.D.

## 2022-03-29 NOTE — ASSESSMENT & PLAN NOTE
Hypokalemia improving.   Repeat labs ordered. He is on potassium 20 meq daily and magnesium 100mg daily

## 2022-03-29 NOTE — ASSESSMENT & PLAN NOTE
Gokul is 71, he continues to have weakness after his stroke and also has some persistent personality changes.   Will check labs,

## 2022-03-30 ENCOUNTER — OFFICE VISIT (OUTPATIENT)
Dept: NEUROLOGY | Facility: MEDICAL CENTER | Age: 72
End: 2022-03-30
Attending: NURSE PRACTITIONER
Payer: MEDICARE

## 2022-03-30 ENCOUNTER — OFFICE VISIT (OUTPATIENT)
Dept: MEDICAL GROUP | Facility: PHYSICIAN GROUP | Age: 72
End: 2022-03-30
Payer: MEDICARE

## 2022-03-30 VITALS
OXYGEN SATURATION: 97 % | SYSTOLIC BLOOD PRESSURE: 110 MMHG | HEART RATE: 60 BPM | RESPIRATION RATE: 14 BRPM | BODY MASS INDEX: 32.18 KG/M2 | WEIGHT: 205 LBS | DIASTOLIC BLOOD PRESSURE: 68 MMHG | TEMPERATURE: 97.2 F | HEIGHT: 67 IN

## 2022-03-30 VITALS
OXYGEN SATURATION: 97 % | DIASTOLIC BLOOD PRESSURE: 72 MMHG | WEIGHT: 205 LBS | TEMPERATURE: 97.6 F | HEART RATE: 71 BPM | RESPIRATION RATE: 14 BRPM | SYSTOLIC BLOOD PRESSURE: 110 MMHG | BODY MASS INDEX: 32.18 KG/M2 | HEIGHT: 67 IN

## 2022-03-30 DIAGNOSIS — E87.6 HYPOKALEMIA: ICD-10-CM

## 2022-03-30 DIAGNOSIS — Z66 DNR (DO NOT RESUSCITATE): ICD-10-CM

## 2022-03-30 DIAGNOSIS — G47.33 OSA (OBSTRUCTIVE SLEEP APNEA): ICD-10-CM

## 2022-03-30 DIAGNOSIS — I48.0 PAROXYSMAL ATRIAL FIBRILLATION (HCC): ICD-10-CM

## 2022-03-30 DIAGNOSIS — D64.9 ANEMIA, UNSPECIFIED TYPE: ICD-10-CM

## 2022-03-30 DIAGNOSIS — I48.91 ATRIAL FIBRILLATION, UNSPECIFIED TYPE (HCC): ICD-10-CM

## 2022-03-30 DIAGNOSIS — R74.8 HIGH SERUM ALKALINE PHOSPHATASE LEVEL: ICD-10-CM

## 2022-03-30 DIAGNOSIS — N30.90 CYSTITIS: ICD-10-CM

## 2022-03-30 DIAGNOSIS — Z78.9 DNI (DO NOT INTUBATE): ICD-10-CM

## 2022-03-30 DIAGNOSIS — I10 PRIMARY HYPERTENSION: ICD-10-CM

## 2022-03-30 DIAGNOSIS — I61.9 HEMORRHAGIC STROKE (HCC): ICD-10-CM

## 2022-03-30 PROCEDURE — 99212 OFFICE O/P EST SF 10 MIN: CPT | Performed by: NURSE PRACTITIONER

## 2022-03-30 PROCEDURE — 99214 OFFICE O/P EST MOD 30 MIN: CPT | Performed by: FAMILY MEDICINE

## 2022-03-30 PROCEDURE — 99214 OFFICE O/P EST MOD 30 MIN: CPT | Performed by: NURSE PRACTITIONER

## 2022-03-30 ASSESSMENT — ENCOUNTER SYMPTOMS
BLURRED VISION: 0
PALPITATIONS: 0
WEAKNESS: 1
HEADACHES: 0
NERVOUS/ANXIOUS: 1
MEMORY LOSS: 1
NECK PAIN: 0
HEARTBURN: 0
DIZZINESS: 0
NAUSEA: 0
DOUBLE VISION: 0
BACK PAIN: 0
FOCAL WEAKNESS: 0
COUGH: 1
DEPRESSION: 0

## 2022-03-30 ASSESSMENT — FIBROSIS 4 INDEX
FIB4 SCORE: 1.25
FIB4 SCORE: 1.25

## 2022-03-30 NOTE — ASSESSMENT & PLAN NOTE
He is on a course of bactrim for UTI and doing much better with better energy.   He has no blood in the urine, no pain with urination.

## 2022-03-30 NOTE — ASSESSMENT & PLAN NOTE
Patient had afibb first diagnosed in 2019 after pneumonia.   He was on xarelto till he had an ICH and stroke.   Follow up brain CT showed no bleed or swelling. He is going to be scheduled for watchman procedure with cardiology. I will send a message to Dr. Goyal.   His neurologist is ready to restart him on blood thinners. He cannot restart on blood thinners. He will follow up with cardiology regarding the watchman's procedure.   Currently progressed from walker to cane, improved energy levels, walking more and good overall improvement of energy and mood levels. Working consistently with PT, able to eat more and taking his vitamins and mediations.

## 2022-03-30 NOTE — PATIENT INSTRUCTIONS
[facility]  (this is the referral for the hemorrhoids)  [address]  [place] [address]  Phone: [phone]              Stroke Prevention  Some medical conditions and lifestyle choices can lead to a higher risk for a stroke. You can help to prevent a stroke by making nutrition, lifestyle, and other changes.  What nutrition changes can be made?    · Eat healthy foods.  ? Choose foods that are high in fiber. These include:  § Fresh fruits.  § Fresh vegetables.  § Whole grains.  ? Eat at least 5 or more servings of fruits and vegetables each day. Try to fill half of your plate at each meal with fruits and vegetables.  ? Choose lean protein foods. These include:  § Lowfat (lean) cuts of meat.  § Chicken without skin.  § Fish.  § Tofu.  § Beans.  § Nuts.  ? Eat low-fat dairy products.  ? Avoid foods that:  § Are high in salt (sodium).  § Have saturated fat.  § Have trans fat.  § Have cholesterol.  § Are processed.  § Are premade.  · Follow eating guidelines as told by your doctor. These may include:  ? Reducing how many calories you eat and drink each day.  ? Limiting how much salt you eat or drink each day to 1,500 milligrams (mg).  ? Using only healthy fats for cooking. These include:  § Olive oil.  § Canola oil.  § Sunflower oil.  ? Counting how many carbohydrates you eat and drink each day.  What lifestyle changes can be made?  · Try to stay at a healthy weight. Talk to your doctor about what a good weight is for you.  · Get at least 30 minutes of moderate physical activity at least 5 days a week. This can include:  ? Fast walking.  ? Biking.  ? Swimming.  · Do not use any products that have nicotine or tobacco. This includes cigarettes and e-cigarettes. If you need help quitting, ask your doctor. Avoid being around tobacco smoke in general.  · Limit how much alcohol you drink to no more than 1 drink a day for nonpregnant women and 2 drinks a day for men. One drink equals 12 oz of beer, 5  "oz of wine, or 1½ oz of hard liquor.  · Do not use drugs.  · Avoid taking birth control pills. Talk to your doctor about the risks of taking birth control pills if:  ? You are over 35 years old.  ? You smoke.  ? You get migraines.  ? You have had a blood clot.  What other changes can be made?  · Manage your cholesterol.  ? It is important to eat a healthy diet.  ? If your cholesterol cannot be managed through your diet, you may also need to take medicines. Take medicines as told by your doctor.  · Manage your diabetes.  ? It is important to eat a healthy diet and to exercise regularly.  ? If your blood sugar cannot be managed through diet and exercise, you may need to take medicines. Take medicines as told by your doctor.  · Control your high blood pressure (hypertension).  ? Try to keep your blood pressure below 130/80. This can help lower your risk of stroke.  ? It is important to eat a healthy diet and to exercise regularly.  ? If your blood pressure cannot be managed through diet and exercise, you may need to take medicines. Take medicines as told by your doctor.  ? Ask your doctor if you should check your blood pressure at home.  ? Have your blood pressure checked every year. Do this even if your blood pressure is normal.  · Talk to your doctor about getting checked for a sleep disorder. Signs of this can include:  ? Snoring a lot.  ? Feeling very tired.  · Take over-the-counter and prescription medicines only as told by your doctor. These may include aspirin or blood thinners (antiplatelets or anticoagulants).  · Make sure that any other medical conditions you have are managed.  Where to find more information  · American Stroke Association: www.strokeassociation.org  · National Stroke Association: www.stroke.org  Get help right away if:  · You have any symptoms of stroke. \"BE FAST\" is an easy way to remember the main warning signs:  ? B - Balance. Signs are dizziness, sudden trouble walking, or loss of " balance.  ? E - Eyes. Signs are trouble seeing or a sudden change in how you see.  ? F - Face. Signs are sudden weakness or loss of feeling of the face, or the face or eyelid drooping on one side.  ? A - Arms. Signs are weakness or loss of feeling in an arm. This happens suddenly and usually on one side of the body.  ? S - Speech. Signs are sudden trouble speaking, slurred speech, or trouble understanding what people say.  ? T - Time. Time to call emergency services. Write down what time symptoms started.  · You have other signs of stroke, such as:  ? A sudden, very bad headache with no known cause.  ? Feeling sick to your stomach (nausea).  ? Throwing up (vomiting).  ? Jerky movements you cannot control (seizure).  These symptoms may represent a serious problem that is an emergency. Do not wait to see if the symptoms will go away. Get medical help right away. Call your local emergency services (911 in the [place] Do not drive yourself to the hospital.  Summary  · You can prevent a stroke by eating healthy, exercising, not smoking, drinking less alcohol, and treating other health problems, such as diabetes, high blood pressure, or high cholesterol.  · Do not use any products that contain nicotine or tobacco, such as cigarettes and e-cigarettes.  · Get help right away if you have any signs or symptoms of a stroke.  This information is not intended to replace advice given to you by your health care provider. Make sure you discuss any questions you have with your health care provider.  Document Released: 06/18/2013 Document Revised: 02/13/2020 Document Reviewed: 03/21/2018  Elsevier Patient Education © 2020 [employer]

## 2022-03-30 NOTE — ASSESSMENT & PLAN NOTE
Patient wishes to be DNR and DNI, polst form completed, scanned. He wishes to continue with all other medical treatment.

## 2022-03-30 NOTE — PROGRESS NOTES
Subjective:   Gokul Baca is a 71 y.o. male here today for evaluation and management of:     Hypokalemia  Improved with potassium supplement.   Has improved energy levels.     AF (atrial fibrillation) (HCC)  Patient had afibb first diagnosed in 2019 after pneumonia.   He was on xarelto till he had an ICH and stroke.   Follow up brain CT showed no bleed or swelling. He is going to be scheduled for watchman procedure with cardiology. I will send a message to Dr. Goyal.   His neurologist is ready to restart him on blood thinners. He cannot restart on blood thinners. He will follow up with cardiology regarding the watchman's procedure.   Currently progressed from walker to cane, improved energy levels, walking more and good overall improvement of energy and mood levels. Working consistently with PT, able to eat more and taking his vitamins and mediations.       Cystitis  He is on a course of bactrim for UTI and doing much better with better energy.   He has no blood in the urine, no pain with urination.     DNR (do not resuscitate)  Patient wishes to be DNR and DNI, polst form completed, scanned. He wishes to continue with all other medical treatment.     DNI (do not intubate)  Patient wishes to be DNR and DNI, polst form completed, scanned. He wishes to continue with all other medical treatment.      Repeat labs to check on anemia, iron levels, alk phos, ggt, potassium levels.     Current medicines (including changes today)  Current Outpatient Medications   Medication Sig Dispense Refill   • ketoconazole (NIZORAL) 2 % Cream Apply 0.5 g topically every day. For foot rash 30 g 5   • ondansetron (ZOFRAN) 4 MG Tab tablet Take 1 Tablet by mouth every four hours as needed for Nausea/Vomiting. Till mail order Rx arrives. 10 Tablet 0   • Multiple Vitamin (ONE-A-DAY MENS PO) Take 1 Tablet by mouth.     • potassium chloride SA (KDUR) 20 MEQ Tab CR Take 1 Tablet by mouth every day. 15 Tablet 0   • ferrous sulfate 325 (65 Fe)  "MG tablet 1 tab by mouth once daily with breakfast 90 Tablet 3   • metoprolol tartrate (LOPRESSOR) 25 MG Tab Take 0.5 Tablets by mouth 2 times a day. 90 Tablet 3   • Magnesium 100 MG Cap Take 1 Capsule by mouth every day. 30 Capsule 3   • pravastatin (PRAVACHOL) 10 MG Tab Take 20 mg by mouth every evening.     • Misc. Devices Misc 1 Each at bedtime. Please use CPAP every night, he has a history of sleep apnea and atrial fibrillation. He has home machine 1 Each 0   • omeprazole (PRILOSEC) 20 MG delayed-release capsule Take 1 Capsule by mouth 2 times a day. 60 Capsule 0   • ascorbic acid (VITAMIN C) 500 MG tablet Take 1 Tablet by mouth every morning with breakfast. 30 Tablet 0     No current facility-administered medications for this visit.     He  has a past medical history of Atrial fibrillation (HCC), GERD (gastroesophageal reflux disease), Hyperlipidemia, and Hypertension.    ROS  No chest pain, no shortness of breath, no abdominal pain       Objective:     /72   Pulse 71   Temp 36.4 °C (97.6 °F) (Temporal)   Resp 14   Ht 1.689 m (5' 6.5\")   Wt 93 kg (205 lb)   SpO2 97%  Body mass index is 32.59 kg/m².   Physical Exam: walks with a cane.   Constitutional: Alert, no distress.  Skin: Warm, dry, good turgor, no rashes in visible areas.  Eye: Equal, round and reactive, conjunctiva clear, lids normal.  ENMT: Lips without lesions, good dentition, oropharynx clear.  Neck: Trachea midline, no masses, no thyromegaly. No cervical or supraclavicular lymphadenopathy  Respiratory: Unlabored respiratory effort, lungs clear to auscultation, no wheezes, no ronchi.  Cardiovascular: Normal S1, S2, no murmur, no edema.  Abdomen: Soft, non-tender, no masses, no hepatosplenomegaly.  Psych: Alert and oriented x3, normal affect and mood.        Assessment and Plan:   The following treatment plan was discussed    1. Hypokalemia    - Comp Metabolic Panel; Future    2. Atrial fibrillation, unspecified type (HCC)  No " anticoagulation due to risk of brain bleed.   Follow up with cardiology regarding watchman procedure.   - CBC WITH DIFFERENTIAL; Future    3. Cystitis      4. High serum alkaline phosphatase level    - GAMMA GT (GGT); Future    5. Anemia, unspecified type    - IRON/TOTAL IRON BIND; Future  - FERRITIN; Future  - VITAMIN B12; Future  - FOLATE; Future      Followup: Return for as sched in June.

## 2022-03-30 NOTE — PROGRESS NOTES
Subjective     HPI  Gokul Baca is a 71 y.o.right handed male who presents for follow up.    I last saw him in the [facility]roke Clinic on  1/19/2022 for right temporal and BG IPH>       He presented to the ER on 12/19/2021with acute smptoms of left sided weakness.  His wife had woken him up in the middle of the night because someone was in the yard, shortly after he woke up, he noticed the symptoms. NIHSS 10 on admission. ICH score 1 (ICH volume).  INR 1.23 on admission. He had been on Xarelto for atrial fibrillation.   Blood pressure on admission 192/91.     He is here with his wife today. He is home now, he is doing well. 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  Blood pressure at home mostly less than 130s/70s. He normally ambulates with a cane, using a wheelchair today due to back to back appointments.       PMH includes HTN, HLD, PAF, ROB on CPAP  Social History:  Never smoker, denies alcohol or drug use.            Review of Systems   HENT: Negative for nosebleeds.    Eyes: Negative for blurred vision and double vision.   Respiratory: Positive for cough.    Cardiovascular: Negative for chest pain and palpitations.   Gastrointestinal: Negative for heartburn and nausea.   Musculoskeletal: Negative for back pain and neck pain.   Neurological: Positive for weakness. Negative for dizziness, focal weakness and headaches.   Psychiatric/Behavioral: Positive for memory loss. Negative for depression. The patient is nervous/anxious.             Objective      I personally reviewed imaging below and agree with the findings    CT head 3/21/2022  1 Interval resolution of intracranial hemorrhage.  2.  Encephalomalacia present in the RIGHT posterior basal ganglia and frontoparietal white matter consistent with chronic infarct.  3.  No acute intracranial hemorrhage or territorial infarct.  4.  Moderate diffuse atrophy.       CT head 12/19/2021  4. x 3.6 cm parenchymal hemorrhage in the  "right basal ganglia. Mild mass effect in the right lateral ventricle. No significant midline shift.        CTA head 12/19/2021  1.No hemodynamically significant narrowing of the major intracranial vessels.  2. Redemonstration of parenchymal hemorrhage in the right basal ganglia.     CTA neck 12/19/2021  No evidence of flow-limiting stenosis in the cervical carotid or cervical vertebral arteries.     CT head 12/19/2021  1.Increase in size of right temporal intra-axial hematoma now measuring 6.7 x 4.3 cm and previously 3.4 x 2.9 cm     2.  Mild mass effect with 2-3 mm right to left shift     1. Underlying brain white matter change and volume loss     CT head 12/29/2021     1.Increase in size of right temporal intra-axial hematoma now measuring 6.7 x 4.3 cm and previously 3.4 x 2.9 cm     2.  Mild mass effect with 2-3 mm right to left shift     3Underlying brain white matter change and volume loss     TTE:  LVEF 70%, LA size WNL, LYNDSEY 13.      Stroke Labs:  , A1C 5.5      /68 (BP Location: Right arm, Patient Position: Sitting, BP Cuff Size: Adult)   Pulse 60   Temp 36.2 °C (97.2 °F)   Resp 14   Ht 1.689 m (5' 6.5\")   Wt 93 kg (205 lb)   SpO2 97%   BMI 32.59 kg/m²      PHYSICAL ASSESSMENT  Constitutional:  Alert, no apparent distress,  Psych:   mood and affect WNL  Muskuloskeletal:  Moves all extremities equally, strength 5/5  BUE/BLE flexors/extensors, no drift  NEUROLOGICAL ASSESSMENT  Oriented X 4, speech fluent, naming and memory intact  CN II: Inferior/Superior LHH. F  CN III: IV, VI Visual scanning is very slow, , no ptosis  CN V: Facial sensation is intact to pinprick in all 3 divisions bilaterally. Corneal responses are intact.  CN VII: Face is symmetric with normal eye closure and smile.  CN VIII Hearing is normal to rubbing fingers  CN IX, X: Palate elevates symmetrically. Phonation is normal.  CN XI: Head turning and shoulder shrug are intact  CN XII: Tongue is midline with normal " movements and no atrophy.                           Sensation to PP equal bilaterally                 No limb ataxia with finger to nose and heel to shin                 In wheelchair today                                      Cardiovascular:    S1S2, no abnormal rhythm auscultated, no peripheral edema  Neck:                     No carotid bruits noted   Pulmonary:            Respirations easy, lungs clear to auscultation all fields.     Skin:                     No obvious rashes.    Iniital NIHSS  10      Current NIHSS    1a. LOC: 0  1b. LOC Questions: 0  1c. LOC Commands: 0  2. Best Gaze:0  3. Visual Fields: 2  4. Facial Paresis: 0  5a. Motor arm left: 0  5b. Motor arm right: 0  6a. Motor leg left: 0  6b. Motor leg right: 0  7. Sensory: 0  8. Best Language: 0  9. Limb Ataxia: 0  10. Dysarthria: 0  11. Extinction/Inattention: 0    Total Score Current  2          Current mRS  3           Assessment & Plan     1. Hemorrhagic stroke (HCC):  Large right temporal IPH, secondary to anticoagulation in the setting of hypertension.   The bleeding has resolved, his blood pressure is at goal, from a neurological perspective, he may resume anticoagulation as required prior to Watchman procedure.      Stroke Risk Factors include:               Afib, ROB, HTN,, HLD     Avoid NSAIDS     LDL goal < 70, current 124, continue Atorvastatin 40mg discussed medication side effects, will need follow up with primary care evaluate liver function at intervals and refill  Exercise at least 30 minutes daily, avoid red meat, fried foods, butter, cheese.   Eat 5-6 servings of vegetables and fruits daily, choose lean white meat without skin (chicken, turkey, white fish)--baked, broiled or grilled.     Referred to occupational therapy for visual scanning deficits.      2. Primary hypertension  Blood pressure goal less than 130/80, currently 110/68       3. Paroxysmal atrial fibrillation (HCC)     See #1.             4 ROB      Continue  CPAP      If any new signs of stroke:  sudden weakness, numbness, speech difficulty (slurring or difficulty finding words), imbalance, incoordination, worse headache of life or vision loss occur, call 911.       Take all medications as prescribed unless instructed by your provider.        I spent a total of 33  minutes caring for patient,  my time includes counseling, review of systems, HPI and assessment, review of images, labs and testing as above.  I reviewed the hospital records, PMH, social and family history.   I have counseled patient on stroke prevention strategies, stroke symptoms and mimics.  Diet and exercise modifications.  We discussed medication side effects and instructions.        I have provided patient a written personalized stroke prevention plan, it is filed under the media tab under ‘[facility]”.           Follow up PRN

## 2022-04-07 ENCOUNTER — OFFICE VISIT (OUTPATIENT)
Dept: URGENT CARE | Facility: PHYSICIAN GROUP | Age: 72
End: 2022-04-07
Payer: MEDICARE

## 2022-04-07 VITALS
TEMPERATURE: 97.2 F | HEART RATE: 67 BPM | BODY MASS INDEX: 32.95 KG/M2 | WEIGHT: 205 LBS | OXYGEN SATURATION: 98 % | HEIGHT: 66 IN | SYSTOLIC BLOOD PRESSURE: 146 MMHG | DIASTOLIC BLOOD PRESSURE: 74 MMHG | RESPIRATION RATE: 14 BRPM

## 2022-04-07 DIAGNOSIS — S91.209A AVULSION OF TOENAIL, INITIAL ENCOUNTER: ICD-10-CM

## 2022-04-07 PROCEDURE — 99213 OFFICE O/P EST LOW 20 MIN: CPT | Performed by: STUDENT IN AN ORGANIZED HEALTH CARE EDUCATION/TRAINING PROGRAM

## 2022-04-07 ASSESSMENT — FIBROSIS 4 INDEX: FIB4 SCORE: 1.25

## 2022-04-07 NOTE — TELEPHONE ENCOUNTER
Patient scheduled for watchman w/PAULETTE on 5-17-22 with Dr. Goyal. Patient has been instructed to check in at 12:00 or 2:00 case time. Message sent to authorizations. Watchman rep aware of this date.

## 2022-04-08 NOTE — PROGRESS NOTES
Subjective:   CHIEF COMPLAINT  Chief Complaint   Patient presents with   • Nail Problem   • Toe Pain   • Foot Swelling     Pinky toe put on sock and pulled the nail up this morning. Pt noticed swelling this morning also.        HPI  Gokul Baca is a 71 y.o. male who presents with a chief complaint of pain in his left fifth toenail.  There today he was trying to put his sock on, caught his toenail subsequently lifting the nail.  Now he is experiencing pain.  Patient currently in a wheelchair.    REVIEW OF SYSTEMS  General: no fever or chills  GI: no nausea or vomiting  See HPI for further details.    PAST MEDICAL HISTORY  Patient Active Problem List    Diagnosis Date Noted   • DNR (do not resuscitate) 03/30/2022   • DNI (do not intubate) 03/30/2022   • ROB (obstructive sleep apnea) 03/30/2022   • Obesity (BMI 30-39.9) 02/16/2022   • Diarrhea 02/15/2022   • Nasal dryness 02/15/2022   • Hemorrhoids 01/19/2022   • Hip pain, left 01/06/2022   • Spasticity 01/03/2022   • Cystitis 01/03/2022   • Iron deficiency anemia 01/03/2022   • Leukocytosis 01/03/2022   • Anemia 12/27/2021   • Hypokalemia 12/26/2021   • Thrombocytopenia ([facility]) 12/26/2021   • UTI (urinary tract infection) 12/25/2021   • 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   • Edema 12/25/2021   • Hemorrhagic stroke ([facility]) 12/24/2021   • Impaired mobility and ADLs 12/24/2021   • Otitis externa 12/24/2021   • Morbid obesity with BMI of 40.0-44.9, adult ([facility]) 12/24/2021   • GERD (gastroesophageal reflux disease) 12/23/2021   • Intracranial hemorrhage ([facility]) 12/19/2021   • AF (atrial fibrillation) ([facility]) 12/19/2021   • Hyperlipemia 12/19/2021   • Primary hypertension 12/19/2021       SURGICAL HISTORY   has a past surgical history that includes knee arthroplasty total (Left).    ALLERGIES  Allergies   Allergen Reactions   • Lactose        CURRENT MEDICATIONS  Home Medications     Reviewed by Dereck Sullivan Ass't (Medical Assistant) on 04/07/22 at 1820  Med List Status:  "<None>   Medication Last Dose Status   ascorbic acid (VITAMIN C) 500 MG tablet Taking Active   ferrous sulfate 325 (65 Fe) MG tablet Taking Active   ketoconazole (NIZORAL) 2 % Cream Taking Active   Magnesium 100 MG Cap Taking Active   metoprolol tartrate (LOPRESSOR) 25 MG Tab Taking Active   Misc. Devices Misc Taking Active   Multiple Vitamin (ONE-A-DAY MENS PO) Taking Active   omeprazole (PRILOSEC) 20 MG delayed-release capsule Taking Active   ondansetron (ZOFRAN) 4 MG Tab tablet Taking Active   potassium chloride SA (KDUR) 20 MEQ Tab CR Taking Active   pravastatin (PRAVACHOL) 10 MG Tab Taking Active                SOCIAL HISTORY  Social History     Tobacco Use   • Smoking status: Never Smoker   • Smokeless tobacco: Never Used   Vaping Use   • Vaping Use: Never used   Substance and Sexual Activity   • Alcohol use: Never   • Drug use: Never   • Sexual activity: Not on file       FAMILY HISTORY  Family History   Problem Relation Age of Onset   • Heart Disease Mother    • Heart Disease Brother    • Alcohol abuse Son           Objective:   PHYSICAL EXAM  VITAL SIGNS: /74   Pulse 67   Temp 36.2 °C (97.2 °F) (Temporal)   Resp 14   Ht 1.676 m (5' 6\")   Wt 93 kg (205 lb)   SpO2 98%   BMI 33.09 kg/m²     Gen: no acute distress, normal voice  Skin: dry, intact, moist mucosal membranes  Head: Atraumatic, normocephalic  Psych: normal affect, normal judgement, alert, awake  Musculoskeletal: Left foot: Fifth digit with onychomycosis and partial toenail avulsion.  Proximal nail plate was still attached to the nailbed but distal extent of the nail plate was lifted.      Assessment/Plan:     1. Avulsion of toenail, initial encounter     Patient with partial avulsion of toenail.  Area was covered with 2% topical lidocaine jelly and I attempted to reapproximate nail plate however the patient did not tolerate any manipulation of his toe.  Subsequently I placed a Steri-Strip over the toenail to hold in place.  A referral " was noted to follow-up with podiatry.    Differential diagnosis, natural history, supportive care, and indications for immediate follow-up discussed. All questions answered. Patient agrees with the plan of care.    Follow-up as needed if symptoms worsen or fail to improve to PCP, Urgent care or Emergency Room.    Please note that this dictation was created using voice recognition software. I have made a reasonable attempt to correct obvious errors, but I expect that there are errors of grammar and possibly content that I did not discover before finalizing the note.

## 2022-04-19 ENCOUNTER — PATIENT MESSAGE (OUTPATIENT)
Dept: MEDICAL GROUP | Facility: PHYSICIAN GROUP | Age: 72
End: 2022-04-19
Payer: MEDICARE

## 2022-04-20 RX ORDER — POTASSIUM CHLORIDE 20 MEQ/1
20 TABLET, EXTENDED RELEASE ORAL DAILY
Qty: 90 TABLET | Refills: 3 | Status: SHIPPED | OUTPATIENT
Start: 2022-04-20 | End: 2022-04-26

## 2022-04-20 RX ORDER — POTASSIUM CHLORIDE 20 MEQ/1
20 TABLET, EXTENDED RELEASE ORAL DAILY
Qty: 15 TABLET | Refills: 0 | Status: SHIPPED | OUTPATIENT
Start: 2022-04-20 | End: 2022-06-14

## 2022-04-25 ENCOUNTER — PATIENT MESSAGE (OUTPATIENT)
Dept: MEDICAL GROUP | Facility: PHYSICIAN GROUP | Age: 72
End: 2022-04-25
Payer: MEDICARE

## 2022-04-25 DIAGNOSIS — I10 PRIMARY HYPERTENSION: ICD-10-CM

## 2022-04-25 DIAGNOSIS — E78.5 HYPERLIPIDEMIA, UNSPECIFIED HYPERLIPIDEMIA TYPE: ICD-10-CM

## 2022-04-26 RX ORDER — HYDRALAZINE HYDROCHLORIDE 10 MG/1
10 TABLET, FILM COATED ORAL 3 TIMES DAILY
Qty: 270 TABLET | Refills: 3 | Status: SHIPPED | OUTPATIENT
Start: 2022-04-26 | End: 2022-07-06 | Stop reason: SDUPTHER

## 2022-04-26 RX ORDER — PRAVASTATIN SODIUM 10 MG
20 TABLET ORAL NIGHTLY
Qty: 90 TABLET | Refills: 3 | Status: SHIPPED | OUTPATIENT
Start: 2022-04-26 | End: 2022-05-18

## 2022-04-26 RX ORDER — LOSARTAN POTASSIUM 25 MG/1
25 TABLET ORAL DAILY
Qty: 90 TABLET | Refills: 3 | Status: SHIPPED | OUTPATIENT
Start: 2022-04-26 | End: 2022-10-13

## 2022-04-26 NOTE — PATIENT COMMUNICATION
Received request via: Patient    Was the patient seen in the last year in this department? Yes  Last OV 3/30/22    Does the patient have an active prescription (recently filled or refills available) for medication(s) requested? No     Requested Prescriptions     Pending Prescriptions Disp Refills   • pravastatin (PRAVACHOL) 10 MG Tab 90 Tablet 3     Sig: Take 2 Tablets by mouth every evening.   • hydrALAZINE (APRESOLINE) 10 MG Tab 270 Tablet 3     Sig: Take 1 Tablet by mouth 3 times a day.   • losartan (COZAAR) 25 MG Tab 90 Tablet 3     Sig: Take 1 Tablet by mouth every day.

## 2022-04-28 ENCOUNTER — PRE-ADMISSION TESTING (OUTPATIENT)
Dept: ADMISSIONS | Facility: MEDICAL CENTER | Age: 72
DRG: 274 | End: 2022-04-28
Attending: INTERNAL MEDICINE
Payer: MEDICARE

## 2022-04-28 DIAGNOSIS — E78.5 HYPERLIPIDEMIA, UNSPECIFIED HYPERLIPIDEMIA TYPE: ICD-10-CM

## 2022-04-28 RX ORDER — PRAVASTATIN SODIUM 20 MG
20 TABLET ORAL NIGHTLY
Qty: 90 TABLET | Refills: 3 | Status: SHIPPED | OUTPATIENT
Start: 2022-04-28 | End: 2022-05-28

## 2022-04-28 NOTE — TELEPHONE ENCOUNTER
PATIENT IS REQUESTING A SMALL PRESCRIPTION TO LOCAL PHARMACY UNTIL 90 DAY SUPPLY RECEIVED FROM [facility].    Requested Prescriptions     Pending Prescriptions Disp Refills   • pravastatin (PRAVACHOL) 20 MG Tab 30 Tablet 0     Sig: Take 1 Tablet by mouth every evening for 30 days.

## 2022-05-13 ENCOUNTER — PRE-ADMISSION TESTING (OUTPATIENT)
Dept: ADMISSIONS | Facility: MEDICAL CENTER | Age: 72
DRG: 274 | End: 2022-05-13
Attending: INTERNAL MEDICINE
Payer: MEDICARE

## 2022-05-13 DIAGNOSIS — Z01.810 PRE-OPERATIVE CARDIOVASCULAR EXAMINATION: ICD-10-CM

## 2022-05-13 DIAGNOSIS — Z01.812 PRE-OPERATIVE LABORATORY EXAMINATION: ICD-10-CM

## 2022-05-13 LAB
ABO GROUP BLD: NORMAL
ANION GAP SERPL CALC-SCNC: 10 MMOL/L (ref 7–16)
APTT PPP: 30.2 SEC (ref 24.7–36)
BASOPHILS # BLD AUTO: 0.9 % (ref 0–1.8)
BASOPHILS # BLD: 0.05 K/UL (ref 0–0.12)
BLD GP AB SCN SERPL QL: NORMAL
BUN SERPL-MCNC: 11 MG/DL (ref 8–22)
CALCIUM SERPL-MCNC: 9.2 MG/DL (ref 8.5–10.5)
CHLORIDE SERPL-SCNC: 103 MMOL/L (ref 96–112)
CO2 SERPL-SCNC: 24 MMOL/L (ref 20–33)
CREAT SERPL-MCNC: 0.69 MG/DL (ref 0.5–1.4)
EKG IMPRESSION: NORMAL
EOSINOPHIL # BLD AUTO: 0.34 K/UL (ref 0–0.51)
EOSINOPHIL NFR BLD: 6.3 % (ref 0–6.9)
ERYTHROCYTE [DISTWIDTH] IN BLOOD BY AUTOMATED COUNT: 41.4 FL (ref 35.9–50)
GFR SERPLBLD CREATININE-BSD FMLA CKD-EPI: 100 ML/MIN/1.73 M 2
GLUCOSE SERPL-MCNC: 90 MG/DL (ref 65–99)
HCT VFR BLD AUTO: 41.1 % (ref 42–52)
HGB BLD-MCNC: 14.3 G/DL (ref 14–18)
IMM GRANULOCYTES # BLD AUTO: 0.01 K/UL (ref 0–0.11)
IMM GRANULOCYTES NFR BLD AUTO: 0.2 % (ref 0–0.9)
INR PPP: 0.97 (ref 0.87–1.13)
LYMPHOCYTES # BLD AUTO: 1.74 K/UL (ref 1–4.8)
LYMPHOCYTES NFR BLD: 32.2 % (ref 22–41)
MCH RBC QN AUTO: 30.6 PG (ref 27–33)
MCHC RBC AUTO-ENTMCNC: 34.8 G/DL (ref 33.7–35.3)
MCV RBC AUTO: 88 FL (ref 81.4–97.8)
MONOCYTES # BLD AUTO: 0.58 K/UL (ref 0–0.85)
MONOCYTES NFR BLD AUTO: 10.7 % (ref 0–13.4)
NEUTROPHILS # BLD AUTO: 2.69 K/UL (ref 1.82–7.42)
NEUTROPHILS NFR BLD: 49.7 % (ref 44–72)
NRBC # BLD AUTO: 0 K/UL
NRBC BLD-RTO: 0 /100 WBC
PLATELET # BLD AUTO: 247 K/UL (ref 164–446)
PMV BLD AUTO: 9.1 FL (ref 9–12.9)
POTASSIUM SERPL-SCNC: 3.8 MMOL/L (ref 3.6–5.5)
PROTHROMBIN TIME: 12.6 SEC (ref 12–14.6)
RBC # BLD AUTO: 4.67 M/UL (ref 4.7–6.1)
RH BLD: NORMAL
SODIUM SERPL-SCNC: 137 MMOL/L (ref 135–145)
WBC # BLD AUTO: 5.4 K/UL (ref 4.8–10.8)

## 2022-05-13 PROCEDURE — 86901 BLOOD TYPING SEROLOGIC RH(D): CPT

## 2022-05-13 PROCEDURE — 36415 COLL VENOUS BLD VENIPUNCTURE: CPT

## 2022-05-13 PROCEDURE — 86900 BLOOD TYPING SEROLOGIC ABO: CPT

## 2022-05-13 PROCEDURE — 93005 ELECTROCARDIOGRAM TRACING: CPT

## 2022-05-13 PROCEDURE — 85730 THROMBOPLASTIN TIME PARTIAL: CPT

## 2022-05-13 PROCEDURE — 80048 BASIC METABOLIC PNL TOTAL CA: CPT

## 2022-05-13 PROCEDURE — 85610 PROTHROMBIN TIME: CPT

## 2022-05-13 PROCEDURE — 85025 COMPLETE CBC W/AUTO DIFF WBC: CPT

## 2022-05-13 PROCEDURE — 93010 ELECTROCARDIOGRAM REPORT: CPT | Performed by: INTERNAL MEDICINE

## 2022-05-13 PROCEDURE — 86850 RBC ANTIBODY SCREEN: CPT

## 2022-05-16 ENCOUNTER — TELEPHONE (OUTPATIENT)
Dept: CARDIOLOGY | Facility: MEDICAL CENTER | Age: 72
End: 2022-05-16
Payer: MEDICARE

## 2022-05-16 NOTE — TELEPHONE ENCOUNTER
Phone Number Called: [phone]    Call outcome: Spoke to patient wife    Message: Called to follow up with questions regarding Watchman procedure. Informed patient that patient will need to be NPO 8 hours prior to the procedure. Is ok for patient to take his AM medications.

## 2022-05-17 ENCOUNTER — APPOINTMENT (OUTPATIENT)
Dept: CARDIOLOGY | Facility: MEDICAL CENTER | Age: 72
DRG: 274 | End: 2022-05-17
Attending: INTERNAL MEDICINE
Payer: MEDICARE

## 2022-05-17 ENCOUNTER — ANESTHESIA (OUTPATIENT)
Dept: CARDIOLOGY | Facility: MEDICAL CENTER | Age: 72
DRG: 274 | End: 2022-05-17
Payer: MEDICARE

## 2022-05-17 ENCOUNTER — HOSPITAL ENCOUNTER (INPATIENT)
Facility: MEDICAL CENTER | Age: 72
LOS: 1 days | DRG: 274 | End: 2022-05-18
Attending: INTERNAL MEDICINE | Admitting: INTERNAL MEDICINE
Payer: MEDICARE

## 2022-05-17 ENCOUNTER — TELEPHONE (OUTPATIENT)
Dept: CARDIOLOGY | Facility: MEDICAL CENTER | Age: 72
End: 2022-05-17
Payer: MEDICARE

## 2022-05-17 ENCOUNTER — ANESTHESIA EVENT (OUTPATIENT)
Dept: CARDIOLOGY | Facility: MEDICAL CENTER | Age: 72
DRG: 274 | End: 2022-05-17
Payer: MEDICARE

## 2022-05-17 DIAGNOSIS — Z95.818 PRESENCE OF WATCHMAN LEFT ATRIAL APPENDAGE CLOSURE DEVICE: ICD-10-CM

## 2022-05-17 DIAGNOSIS — I48.91 ATRIAL FIBRILLATION, UNSPECIFIED TYPE (HCC): ICD-10-CM

## 2022-05-17 PROBLEM — I48.0 PAF (PAROXYSMAL ATRIAL FIBRILLATION) (HCC): Status: ACTIVE | Noted: 2022-05-17

## 2022-05-17 LAB — ACT BLD: 249 SEC (ref 74–137)

## 2022-05-17 PROCEDURE — 700102 HCHG RX REV CODE 250 W/ 637 OVERRIDE(OP): Performed by: ANESTHESIOLOGY

## 2022-05-17 PROCEDURE — 700101 HCHG RX REV CODE 250: Performed by: ANESTHESIOLOGY

## 2022-05-17 PROCEDURE — 160002 HCHG RECOVERY MINUTES (STAT)

## 2022-05-17 PROCEDURE — 160036 HCHG PACU - EA ADDL 30 MINS PHASE I

## 2022-05-17 PROCEDURE — 700111 HCHG RX REV CODE 636 W/ 250 OVERRIDE (IP)

## 2022-05-17 PROCEDURE — A9270 NON-COVERED ITEM OR SERVICE: HCPCS | Performed by: INTERNAL MEDICINE

## 2022-05-17 PROCEDURE — 700102 HCHG RX REV CODE 250 W/ 637 OVERRIDE(OP): Performed by: INTERNAL MEDICINE

## 2022-05-17 PROCEDURE — 02L73DK OCCLUSION OF LEFT ATRIAL APPENDAGE WITH INTRALUMINAL DEVICE, PERCUTANEOUS APPROACH: ICD-10-PCS | Performed by: INTERNAL MEDICINE

## 2022-05-17 PROCEDURE — 700117 HCHG RX CONTRAST REV CODE 255: Performed by: INTERNAL MEDICINE

## 2022-05-17 PROCEDURE — 33340 PERQ CLSR TCAT L ATR APNDGE: CPT | Mod: Q0 | Performed by: INTERNAL MEDICINE

## 2022-05-17 PROCEDURE — 160035 HCHG PACU - 1ST 60 MINS PHASE I

## 2022-05-17 PROCEDURE — 700105 HCHG RX REV CODE 258: Performed by: ANESTHESIOLOGY

## 2022-05-17 PROCEDURE — 01926 ANES IVNTL RAD ICR ICAR/AORT: CPT | Performed by: ANESTHESIOLOGY

## 2022-05-17 PROCEDURE — 85347 COAGULATION TIME ACTIVATED: CPT

## 2022-05-17 PROCEDURE — A9270 NON-COVERED ITEM OR SERVICE: HCPCS | Performed by: ANESTHESIOLOGY

## 2022-05-17 PROCEDURE — 770020 HCHG ROOM/CARE - TELE (206)

## 2022-05-17 PROCEDURE — C1894 INTRO/SHEATH, NON-LASER: HCPCS

## 2022-05-17 PROCEDURE — 93355 ECHO TRANSESOPHAGEAL (TEE): CPT

## 2022-05-17 PROCEDURE — 700111 HCHG RX REV CODE 636 W/ 250 OVERRIDE (IP): Performed by: ANESTHESIOLOGY

## 2022-05-17 PROCEDURE — 700105 HCHG RX REV CODE 258: Performed by: INTERNAL MEDICINE

## 2022-05-17 RX ORDER — LIDOCAINE HYDROCHLORIDE 20 MG/ML
INJECTION, SOLUTION EPIDURAL; INFILTRATION; INTRACAUDAL; PERINEURAL PRN
Status: DISCONTINUED | OUTPATIENT
Start: 2022-05-17 | End: 2022-05-17 | Stop reason: SURG

## 2022-05-17 RX ORDER — METOPROLOL TARTRATE 1 MG/ML
1 INJECTION, SOLUTION INTRAVENOUS
Status: DISCONTINUED | OUTPATIENT
Start: 2022-05-17 | End: 2022-05-17 | Stop reason: HOSPADM

## 2022-05-17 RX ORDER — DEXAMETHASONE SODIUM PHOSPHATE 4 MG/ML
INJECTION, SOLUTION INTRA-ARTICULAR; INTRALESIONAL; INTRAMUSCULAR; INTRAVENOUS; SOFT TISSUE PRN
Status: DISCONTINUED | OUTPATIENT
Start: 2022-05-17 | End: 2022-05-17 | Stop reason: SURG

## 2022-05-17 RX ORDER — CEFAZOLIN SODIUM 1 G/3ML
INJECTION, POWDER, FOR SOLUTION INTRAMUSCULAR; INTRAVENOUS PRN
Status: DISCONTINUED | OUTPATIENT
Start: 2022-05-17 | End: 2022-05-17 | Stop reason: SURG

## 2022-05-17 RX ORDER — OXYCODONE HCL 5 MG/5 ML
10 SOLUTION, ORAL ORAL
Status: COMPLETED | OUTPATIENT
Start: 2022-05-17 | End: 2022-05-17

## 2022-05-17 RX ORDER — SODIUM CHLORIDE, SODIUM LACTATE, POTASSIUM CHLORIDE, CALCIUM CHLORIDE 600; 310; 30; 20 MG/100ML; MG/100ML; MG/100ML; MG/100ML
INJECTION, SOLUTION INTRAVENOUS CONTINUOUS
Status: DISCONTINUED | OUTPATIENT
Start: 2022-05-17 | End: 2022-05-17 | Stop reason: HOSPADM

## 2022-05-17 RX ORDER — HYDRALAZINE HYDROCHLORIDE 20 MG/ML
INJECTION INTRAMUSCULAR; INTRAVENOUS PRN
Status: DISCONTINUED | OUTPATIENT
Start: 2022-05-17 | End: 2022-05-17 | Stop reason: SURG

## 2022-05-17 RX ORDER — HYDRALAZINE HYDROCHLORIDE 20 MG/ML
INJECTION INTRAMUSCULAR; INTRAVENOUS
Status: COMPLETED
Start: 2022-05-17 | End: 2022-05-17

## 2022-05-17 RX ORDER — PROTAMINE SULFATE 10 MG/ML
INJECTION, SOLUTION INTRAVENOUS
Status: COMPLETED
Start: 2022-05-17 | End: 2022-05-17

## 2022-05-17 RX ORDER — HALOPERIDOL 5 MG/ML
1 INJECTION INTRAMUSCULAR
Status: DISCONTINUED | OUTPATIENT
Start: 2022-05-17 | End: 2022-05-17 | Stop reason: HOSPADM

## 2022-05-17 RX ORDER — OXYCODONE HCL 5 MG/5 ML
5 SOLUTION, ORAL ORAL
Status: COMPLETED | OUTPATIENT
Start: 2022-05-17 | End: 2022-05-17

## 2022-05-17 RX ORDER — DIPHENHYDRAMINE HYDROCHLORIDE 50 MG/ML
12.5 INJECTION INTRAMUSCULAR; INTRAVENOUS
Status: DISCONTINUED | OUTPATIENT
Start: 2022-05-17 | End: 2022-05-17 | Stop reason: HOSPADM

## 2022-05-17 RX ORDER — ONDANSETRON 2 MG/ML
4 INJECTION INTRAMUSCULAR; INTRAVENOUS
Status: DISCONTINUED | OUTPATIENT
Start: 2022-05-17 | End: 2022-05-17 | Stop reason: HOSPADM

## 2022-05-17 RX ORDER — HYDRALAZINE HYDROCHLORIDE 20 MG/ML
5 INJECTION INTRAMUSCULAR; INTRAVENOUS
Status: DISCONTINUED | OUTPATIENT
Start: 2022-05-17 | End: 2022-05-17 | Stop reason: HOSPADM

## 2022-05-17 RX ORDER — IPRATROPIUM BROMIDE AND ALBUTEROL SULFATE 2.5; .5 MG/3ML; MG/3ML
3 SOLUTION RESPIRATORY (INHALATION)
Status: DISCONTINUED | OUTPATIENT
Start: 2022-05-17 | End: 2022-05-17 | Stop reason: HOSPADM

## 2022-05-17 RX ORDER — HEPARIN SODIUM 200 [USP'U]/100ML
INJECTION, SOLUTION INTRAVENOUS
Status: COMPLETED
Start: 2022-05-17 | End: 2022-05-17

## 2022-05-17 RX ORDER — SODIUM CHLORIDE, SODIUM LACTATE, POTASSIUM CHLORIDE, CALCIUM CHLORIDE 600; 310; 30; 20 MG/100ML; MG/100ML; MG/100ML; MG/100ML
INJECTION, SOLUTION INTRAVENOUS CONTINUOUS
Status: ACTIVE | OUTPATIENT
Start: 2022-05-17 | End: 2022-05-17

## 2022-05-17 RX ORDER — HYDRALAZINE HYDROCHLORIDE 10 MG/1
10 TABLET, FILM COATED ORAL 3 TIMES DAILY
Status: DISCONTINUED | OUTPATIENT
Start: 2022-05-17 | End: 2022-05-18 | Stop reason: HOSPADM

## 2022-05-17 RX ORDER — ONDANSETRON 2 MG/ML
INJECTION INTRAMUSCULAR; INTRAVENOUS PRN
Status: DISCONTINUED | OUTPATIENT
Start: 2022-05-17 | End: 2022-05-17 | Stop reason: SURG

## 2022-05-17 RX ORDER — METOPROLOL TARTRATE 1 MG/ML
INJECTION, SOLUTION INTRAVENOUS PRN
Status: DISCONTINUED | OUTPATIENT
Start: 2022-05-17 | End: 2022-05-17 | Stop reason: SURG

## 2022-05-17 RX ORDER — LOSARTAN POTASSIUM 25 MG/1
25 TABLET ORAL DAILY
Status: DISCONTINUED | OUTPATIENT
Start: 2022-05-17 | End: 2022-05-18 | Stop reason: HOSPADM

## 2022-05-17 RX ORDER — SODIUM CHLORIDE 9 MG/ML
INJECTION, SOLUTION INTRAVENOUS CONTINUOUS
Status: DISCONTINUED | OUTPATIENT
Start: 2022-05-17 | End: 2022-05-18 | Stop reason: HOSPADM

## 2022-05-17 RX ORDER — HEPARIN SODIUM 1000 [USP'U]/ML
INJECTION, SOLUTION INTRAVENOUS; SUBCUTANEOUS
Status: COMPLETED
Start: 2022-05-17 | End: 2022-05-17

## 2022-05-17 RX ADMIN — SODIUM CHLORIDE: 9 INJECTION, SOLUTION INTRAVENOUS at 15:56

## 2022-05-17 RX ADMIN — METOPROLOL TARTRATE 12.5 MG: 25 TABLET, FILM COATED ORAL at 17:28

## 2022-05-17 RX ADMIN — DEXAMETHASONE SODIUM PHOSPHATE 8 MG: 4 INJECTION, SOLUTION INTRA-ARTICULAR; INTRALESIONAL; INTRAMUSCULAR; INTRAVENOUS; SOFT TISSUE at 13:28

## 2022-05-17 RX ADMIN — ROCURONIUM BROMIDE 50 MG: 10 INJECTION, SOLUTION INTRAVENOUS at 13:28

## 2022-05-17 RX ADMIN — ONDANSETRON 4 MG: 2 INJECTION INTRAMUSCULAR; INTRAVENOUS at 13:59

## 2022-05-17 RX ADMIN — PHENYLEPHRINE HYDROCHLORIDE 50 MCG/MIN: 10 INJECTION INTRAVENOUS at 13:45

## 2022-05-17 RX ADMIN — PROPOFOL 50 MG: 10 INJECTION, EMULSION INTRAVENOUS at 13:59

## 2022-05-17 RX ADMIN — HEPARIN SODIUM 4000 UNITS: 200 INJECTION, SOLUTION INTRAVENOUS at 13:43

## 2022-05-17 RX ADMIN — LOSARTAN POTASSIUM 25 MG: 25 TABLET, FILM COATED ORAL at 17:28

## 2022-05-17 RX ADMIN — OXYCODONE HYDROCHLORIDE 5 MG: 5 SOLUTION ORAL at 15:05

## 2022-05-17 RX ADMIN — HYDRALAZINE HYDROCHLORIDE 5 MG: 20 INJECTION INTRAMUSCULAR; INTRAVENOUS at 15:05

## 2022-05-17 RX ADMIN — HYDRALAZINE HYDROCHLORIDE 10 MG: 10 TABLET, FILM COATED ORAL at 17:28

## 2022-05-17 RX ADMIN — PROPOFOL 200 MG: 10 INJECTION, EMULSION INTRAVENOUS at 13:28

## 2022-05-17 RX ADMIN — HYDRALAZINE HYDROCHLORIDE 5 MG: 20 INJECTION INTRAMUSCULAR; INTRAVENOUS at 14:05

## 2022-05-17 RX ADMIN — LIDOCAINE HYDROCHLORIDE 100 MG: 20 INJECTION, SOLUTION EPIDURAL; INFILTRATION; INTRACAUDAL at 13:28

## 2022-05-17 RX ADMIN — IOHEXOL 50 ML: 350 INJECTION, SOLUTION INTRAVENOUS at 13:54

## 2022-05-17 RX ADMIN — SUGAMMADEX 200 MG: 100 INJECTION, SOLUTION INTRAVENOUS at 13:59

## 2022-05-17 RX ADMIN — PROTAMINE SULFATE 30 MG: 10 INJECTION, SOLUTION INTRAVENOUS at 14:00

## 2022-05-17 RX ADMIN — CEFAZOLIN 2 G: 330 INJECTION, POWDER, FOR SOLUTION INTRAMUSCULAR; INTRAVENOUS at 13:28

## 2022-05-17 RX ADMIN — METOPROLOL TARTRATE 5 MG: 5 INJECTION, SOLUTION INTRAVENOUS at 13:59

## 2022-05-17 RX ADMIN — HEPARIN SODIUM 10000 UNITS: 1000 INJECTION, SOLUTION INTRAVENOUS; SUBCUTANEOUS at 13:38

## 2022-05-17 RX ADMIN — APIXABAN 5 MG: 5 TABLET, FILM COATED ORAL at 17:28

## 2022-05-17 RX ADMIN — SODIUM CHLORIDE, POTASSIUM CHLORIDE, SODIUM LACTATE AND CALCIUM CHLORIDE: 600; 310; 30; 20 INJECTION, SOLUTION INTRAVENOUS at 13:00

## 2022-05-17 ASSESSMENT — COGNITIVE AND FUNCTIONAL STATUS - GENERAL
SUGGESTED CMS G CODE MODIFIER MOBILITY: CH
MOBILITY SCORE: 24
DAILY ACTIVITIY SCORE: 24
SUGGESTED CMS G CODE MODIFIER DAILY ACTIVITY: CH

## 2022-05-17 ASSESSMENT — CHA2DS2 SCORE
SEX: MALE
CHA2DS2 VASC SCORE: 2
CHF OR LEFT VENTRICULAR DYSFUNCTION: NO
AGE 65 TO 74: YES
HYPERTENSION: YES
PRIOR STROKE OR TIA OR THROMBOEMBOLISM: NO
DIABETES: NO
AGE 75 OR GREATER: NO
VASCULAR DISEASE: NO

## 2022-05-17 ASSESSMENT — LIFESTYLE VARIABLES
HAVE YOU EVER FELT YOU SHOULD CUT DOWN ON YOUR DRINKING: NO
TOTAL SCORE: 0
TOTAL SCORE: 0
EVER HAD A DRINK FIRST THING IN THE MORNING TO STEADY YOUR NERVES TO GET RID OF A HANGOVER: NO
HOW MANY TIMES IN THE PAST YEAR HAVE YOU HAD 5 OR MORE DRINKS IN A DAY: 0
ON A TYPICAL DAY WHEN YOU DRINK ALCOHOL HOW MANY DRINKS DO YOU HAVE: 0
HAVE PEOPLE ANNOYED YOU BY CRITICIZING YOUR DRINKING: NO
CONSUMPTION TOTAL: NEGATIVE
EVER FELT BAD OR GUILTY ABOUT YOUR DRINKING: NO
AVERAGE NUMBER OF DAYS PER WEEK YOU HAVE A DRINK CONTAINING ALCOHOL: 0
DOES PATIENT WANT TO STOP DRINKING: NO
ALCOHOL_USE: NO
TOTAL SCORE: 0

## 2022-05-17 ASSESSMENT — PAIN DESCRIPTION - PAIN TYPE
TYPE: ACUTE PAIN;SURGICAL PAIN
TYPE: ACUTE PAIN
TYPE: ACUTE PAIN;SURGICAL PAIN
TYPE: ACUTE PAIN;SURGICAL PAIN

## 2022-05-17 ASSESSMENT — FIBROSIS 4 INDEX: FIB4 SCORE: 1.56

## 2022-05-17 ASSESSMENT — PAIN SCALES - GENERAL: PAIN_LEVEL: 2

## 2022-05-17 NOTE — ANESTHESIA PROCEDURE NOTES
PAULETTE    Date/Time: 5/17/2022 1:33 PM  Performed by: Lyndon Ramires M.D.  Authorized by: Lyndon Ramires M.D.     Start Time:5/17/2022 1:33 PM  Preanesthetic Checklist: patient identified, IV checked, site marked, risks and benefits discussed, surgical consent, monitors and equipment checked, pre-op evaluation and timeout performed    Indication for PAULETTE: diagnostic   Patient Location: OR  Intubated: Yes  Bite Block: Yes  Heart Visualized: Yes  Insertion: atraumatic    **See FULL PAULETTE report in patient's chart via [facility]**

## 2022-05-17 NOTE — H&P
"[facility] Pre-procedure History and Physical    Date of service: 5/17/2022    Reason for visit/Chief complaint: pAF/AFL    HPI:   Pt is a 72 yo M. History of pAF/AFL prior intracranial bleed. Off OAC due to bleeding concerns. Referred for WATCHMAN. Here for procedure.    Past Medical History:   Diagnosis Date   • Anemia    • Atrial fibrillation (HCC)    • GERD (gastroesophageal reflux disease)    • Heart burn    • High cholesterol    • Hyperlipidemia    • Hypertension    • Iron deficiency anemia    • Pneumonia     as a young adult    • Sleep apnea     cpap    • Snoring    • Stroke (HCC) 12/19/2021    left sided weakness    • UTI (urinary tract infection) 03/2022     Past Surgical History:   Procedure Laterality Date   • KNEE ARTHROPLASTY TOTAL Left    • OTHER      sinus sx      Family History   Problem Relation Age of Onset   • Heart Disease Mother    • Heart Disease Brother    • Alcohol abuse Son          Physical Exam:  Vitals:    05/17/22 1232 05/17/22 1247   Temp:  36.4 °C (97.6 °F)   TempSrc:  Temporal   Weight: 97.1 kg (214 lb 1.1 oz)    Height: 1.689 m (5' 6.5\")      Gen: NAD, conversant  HEENT: PERRL, EOMI  LUNGS: CTA B, no w/r/r  CV: RRR, no m/r/g, no JVD  Abd: Soft, NT/ND, +BS  Ext: no edema, warm and well perfused    Labs reviewed    EKG interpreted by me:  NSR    Impression/Recs:  1. Atrial fibrillation, unspecified type (HCC)  EC-PAULETTE W/O CONT    EC-PAULETTE W/O CONT    CL-LEFT ATRIAL APPENDAGE CLOSURE    CL-LEFT ATRIAL APPENDAGE CLOSURE     -Risks/benefits/alternatives discussed  -All questions answered  -Proceed with device    Jhonathan Goyal MD    "

## 2022-05-17 NOTE — ANESTHESIA TIME REPORT
Anesthesia Start and Stop Event Times     Date Time Event    5/17/2022 1306 Ready for Procedure     1317 Anesthesia Start     1421 Anesthesia Stop        Responsible Staff  05/17/22    Name Role Begin End    Lyndon Ramires M.D. Anesth 1317 1421        Overtime Reason:  no overtime (within assigned shift)    Comments:

## 2022-05-17 NOTE — ANESTHESIA POSTPROCEDURE EVALUATION
Patient: Gokul Baca    Procedure Summary     Date: 05/17/22 Room / Location: [facility] IMAGING - CATH LAB [facility]    Anesthesia Start: 1317 Anesthesia Stop: 1421    Procedure: CL-LEFT ATRIAL APPENDAGE CLOSURE Diagnosis:       Atrial fibrillation, unspecified type (HCC)      (See Associated Dx)    Scheduled Providers: Jhonathan Goyal M.D.; Lyndon Ramires M.D. Responsible Provider: Lyndon Ramires M.D.    Anesthesia Type: general ASA Status: 3          Final Anesthesia Type: general  Last vitals  BP   Blood Pressure : (!) 169/94    Temp   36.3 °C (97.3 °F)    Pulse   76   Resp   18    SpO2   94 %      Anesthesia Post Evaluation    Patient location during evaluation: PACU  Patient participation: complete - patient participated  Level of consciousness: awake  Pain score: 2    Airway patency: patent  Anesthetic complications: no  Cardiovascular status: hemodynamically stable and hypertensive  Respiratory status: acceptable  Hydration status: euvolemic    PONV: none          There were no known complications for this encounter.     Nurse Pain Score: 2 (NPRS)

## 2022-05-17 NOTE — ANESTHESIA PROCEDURE NOTES
Airway    Date/Time: 5/17/2022 1:29 PM  Performed by: Lyndon Ramires M.D.  Authorized by: Lyndon Ramires M.D.     Location:  OR  Urgency:  Elective  Indications for Airway Management:  Anesthesia      Spontaneous Ventilation: absent    Sedation Level:  Deep  Preoxygenated: Yes    Patient Position:  Sniffing  Mask Difficulty Assessment:  1 - vent by mask  Final Airway Type:  Endotracheal airway  Final Endotracheal Airway:  ETT  Cuffed: Yes    Technique Used for Successful ETT Placement:  Direct laryngoscopy  Devices/Methods Used in Placement:  Cricoid pressure    Insertion Site:  Oral  Blade Type:  Monalisa  Laryngoscope Blade/Videolaryngoscope Blade Size:  4  ETT Size (mm):  7.5  Measured from:  Teeth  ETT to Teeth (cm):  22  Placement Verified by: auscultation and capnometry    Cormack-Lehane Classification:  Grade I - full view of glottis  Number of Attempts at Approach:  1

## 2022-05-17 NOTE — TELEPHONE ENCOUNTER
LM on patient's voicemail - he can only have enough water to rinse down his morning medications during the 8 hours of fasting. Requested a call back with any additional questions.

## 2022-05-17 NOTE — ANESTHESIA PREPROCEDURE EVALUATION
Date/Time: 05/17/22 1430    Scheduled providers: Jhonathan Goyal M.D.; Lyndon Ramires M.D.    Procedure: CL-LEFT ATRIAL APPENDAGE CLOSURE    Diagnosis: Atrial fibrillation, unspecified type (HCC) [I48.91]    Indications: See Associated Dx    Location: [facility] IMAGING - CATH LAB - [facility]          Relevant Problems   ANESTHESIA   (positive) ROB (obstructive sleep apnea)      CARDIAC   (positive) AF (atrial fibrillation) (HCC)   (positive) Primary hypertension      GI   (positive) GERD (gastroesophageal reflux disease)      Other   (positive) Hemorrhagic stroke (HCC)   (positive) Hyperlipemia   (positive) Impaired mobility and ADLs   (positive) Intracranial hemorrhage (HCC)   (positive) Thrombocytopenia (HCC)   (positive) UTI (urinary tract infection)   Echo from 12/2021 reviewed. No concerns.    Physical Exam    Airway   Mallampati: II  TM distance: >3 FB  Neck ROM: full       Cardiovascular - normal exam  Rhythm: regular  Rate: normal  (-) murmur     Dental - normal exam           Pulmonary - normal exam  Breath sounds clear to auscultation     Abdominal    Neurological - normal exam               Anesthesia Plan    ASA 3   ASA physical status 3 criteria: CVA or TIA - history (> 3 months)    Plan - general       Airway plan will be ETT  PAULETTE Planned        Induction: intravenous      Pertinent diagnostic labs and testing reviewed    Informed Consent:    Anesthetic plan and risks discussed with patient.    Use of blood products discussed with: patient whom consented to blood products.

## 2022-05-17 NOTE — OP REPORT
"[facility] Electrophysiology/Structural Heart Procedure Note     Procedure(s) Performed:   1) Watchman DANNI closure    Indication(s):   1) Paroxysmal AF/AFL  2) Hemorrhagic stroke     Physician(s): Jhonathan Goyal M.D.     Resident/Assistant(s): None     Anesthesia: General endotracheal anesthetic with Dr. Lyndon Ramires     Specimen(s) Removed: None     Estimated Blood Loss:  30cc     Complications:  None     Description of Procedure:   After informed written consent, the patient was brought to the cath lab in the fasting, non-sedated state. The patient was prepped and draped in the usual sterile fashion. Femoral venous access was obtained using the modified Seldinger technique. In the right femoral vein, an 8 Fr sheath were inserted over 0.035” guidewire and exchanged for 16 Fr outer sheath. An 8.5 Fr Ida trans-septal sheath was used for trans-septal access. PAULETTE was used to identify the atrial septum, and left atrial appendage.  A Ida trans-septal needle was inserted to into the trans-septal sheath, and under ultrasound guidance a inferior/posterior trans-septal location was used to cross into the left atrium. Intravenous heparin was given to target an -300. A stiff exchange length 0.035\" wire was inserted into the left atrium/left pulmonary veins, over which we exchanged to the WATCHMAN delivery sheath. The WATCHMAN device was prepped and flushed. A pigtail catheter was advanced into the delivery sheath into the left atrium and then after counter clockwise torque maneuvered into the body of the left atrial appendage. Cine was taken to verify the location of the pigtail in the appendage and to assess for depth. The sheath was then advanced over the pigtail until sufficient depth was reached.  The pigtail was removed, and under wet to wet connection the WATCHMAN device was advanced through the delivery sheath until markers were aligned. The sheath was retracted slowly to deploy the device. After the " device was deployed we assessed again for leak with contrast injected through the sheath as well as a tug test. We verified good position, anchoring, size, and seal with no/minimal leak with PAULETTE. After all criteria were met we detached the device from the delivery system. At the end of the procedure, heparin was reversed with protamine, the catheter and sheaths were removed, and hemostasis was achieved by manual compression along with a figure of 8 silk suture. Following recovery from anesthesia, the patient was transferred to the PACU in good condition.     Fluoroscopy time:  3:48 minutes    Contrast used: 50cc     Device implanted:  Size 24mm   LOT # [id number]  Max appendage pre-measurement 17 mm  Max device measurement 19.8 mm (18%) compression     Impressions:    1. Successful DANNI closure      Recommendations:  1. Initiate Eliquis  2. Admit to monitored bedrest.  3. Echo and removal of figure of 8 suture in the AM

## 2022-05-17 NOTE — CARE PLAN
Problem: Fall Risk  Goal: Patient will remain free from falls  Outcome: Progressing     Problem: Skin Integrity  Goal: Skin integrity is maintained or improved  Outcome: Progressing   The patient is Stable - Low risk of patient condition declining or worsening         Progress made toward(s) clinical / shift goals:  bedrest until 2015, calls appropriately for needs.     Patient is not progressing towards the following goals:

## 2022-05-17 NOTE — OR NURSING
1415 Pt arrived to PACU with Anesthesiologist and cath lab RN. AAOx4. Even, unlabored respirations. VSS. Denies pain. Denies nausea. Right groin dressing CDI. 6 hours bedrest ordered until 2015.    1450 POC update given to Adriana over the phone. All questions answered.     1523 Pt meets floor criteria. Report called to SLICK Harrison on tele.     1530 Pt transported to [facility] with RN. Chart and monitor with patient. Belongings and walker with wife per pt.

## 2022-05-17 NOTE — PROGRESS NOTES
Patient arrived to unit with wife at bedside. Oriented on POC and need for bedrest until 2015. Denies needs at this time. Will monitor.

## 2022-05-17 NOTE — TELEPHONE ENCOUNTER
SS    Caller: Gokul De Luna  Topic/issue: pt has procedure today at noon and is wondering if he is able to have anything to drink prior.  Callback Number: [phone]    Thank You,  Halie VALDEZ

## 2022-05-18 ENCOUNTER — APPOINTMENT (OUTPATIENT)
Dept: CARDIOLOGY | Facility: MEDICAL CENTER | Age: 72
DRG: 274 | End: 2022-05-18
Attending: NURSE PRACTITIONER
Payer: MEDICARE

## 2022-05-18 VITALS
RESPIRATION RATE: 18 BRPM | SYSTOLIC BLOOD PRESSURE: 153 MMHG | OXYGEN SATURATION: 96 % | WEIGHT: 214.07 LBS | DIASTOLIC BLOOD PRESSURE: 88 MMHG | HEIGHT: 67 IN | HEART RATE: 71 BPM | BODY MASS INDEX: 33.6 KG/M2 | TEMPERATURE: 97.2 F

## 2022-05-18 LAB
LV EJECT FRACT  99904: 60
LV EJECT FRACT MOD 2C 99903: 56.56
LV EJECT FRACT MOD 4C 99902: 48.18
LV EJECT FRACT MOD BP 99901: 50.77

## 2022-05-18 PROCEDURE — 93308 TTE F-UP OR LMTD: CPT | Mod: 26 | Performed by: INTERNAL MEDICINE

## 2022-05-18 PROCEDURE — 700102 HCHG RX REV CODE 250 W/ 637 OVERRIDE(OP): Performed by: INTERNAL MEDICINE

## 2022-05-18 PROCEDURE — 93308 TTE F-UP OR LMTD: CPT

## 2022-05-18 PROCEDURE — 99238 HOSP IP/OBS DSCHRG MGMT 30/<: CPT | Performed by: NURSE PRACTITIONER

## 2022-05-18 PROCEDURE — A9270 NON-COVERED ITEM OR SERVICE: HCPCS | Performed by: INTERNAL MEDICINE

## 2022-05-18 RX ADMIN — HYDRALAZINE HYDROCHLORIDE 10 MG: 10 TABLET, FILM COATED ORAL at 05:55

## 2022-05-18 RX ADMIN — LOSARTAN POTASSIUM 25 MG: 25 TABLET, FILM COATED ORAL at 05:55

## 2022-05-18 RX ADMIN — METOPROLOL TARTRATE 12.5 MG: 25 TABLET, FILM COATED ORAL at 03:13

## 2022-05-18 RX ADMIN — APIXABAN 5 MG: 5 TABLET, FILM COATED ORAL at 05:55

## 2022-05-18 ASSESSMENT — PAIN DESCRIPTION - PAIN TYPE: TYPE: ACUTE PAIN

## 2022-05-18 NOTE — CARE PLAN
The patient is Stable - Low risk of patient condition declining or worsening    Shift Goals  Clinical Goals: Remain hemodynamically stable, R groin site remains clean/dry/intact  Patient Goals: Home tomorrow    Progress made toward(s) clinical / shift goals:    Problem: Fall Risk  Goal: Patient will remain free from falls  Outcome: Progressing     Problem: Knowledge Deficit - Standard  Goal: Patient and family/care givers will demonstrate understanding of plan of care, disease process/condition, diagnostic tests and medications  Outcome: Progressing       Patient is not progressing towards the following goals: N/A

## 2022-05-18 NOTE — PROGRESS NOTES
Received report and assumed care of patient. Patient is alert and oriented x4. Patient is in no signs of distress. R groin site CDI-soft. Patient was updated on the plan of care for the day. Call light within reach, bed in low position, 2 side rails up. All fall precautions in place.

## 2022-05-18 NOTE — PROGRESS NOTES
4 Eyes Skin Assessment Completed by SLICK Lance and SLICK Harrison.    Head WDL  Ears WDL  Nose WDL  Mouth WDL  Neck WDL  Breast/Chest WDL  Shoulder Blades WDL  Spine WDL  (R) Arm/Elbow/Hand WDL  (L) Arm/Elbow/Hand WDL  Abdomen WDL  Groin Incision--to Right groin from Watchman procedure   Scrotum/Coccyx/Buttocks WDL  (R) Leg WDL  (L) Leg WDL  (R) Heel/Foot/Toe WDL  (L) Heel/Foot/Toe WDL          Devices In Places Tele Box, Blood Pressure Cuff and Pulse Ox      Interventions In Place Pillows and Pressure Redistribution Mattress    Possible Skin Injury No    Pictures Uploaded Into Epic N/A  Wound Consult Placed N/A  RN Wound Prevention Protocol Ordered No

## 2022-05-18 NOTE — DISCHARGE INSTRUCTIONS
[facility] POST WATCHMAN INSTRUCTIONS  No lifting > 10 lbs x 1 week.  No baths or hot tubs x 1 week.  May shower on 5/19/2022 and take off groin dressing and leave site uncovered.  Continue to monitor site daily for warmth, redness, discolored drainage    Please do not miss any doses of your blood thinner.  Please keep all follow up appointments scheduled for you.  You will be seen in one week for wound check and have a follow up with Dr. Goyal's office in approximately 4 weeks.        Please walk and take deep breaths after discharge.  After discharge, if you experience severe chest pain, shortness of breath, neurological changes, high fever, severe dizziness, trouble with catheter site needs to be seen in the emergency dept.     Discharge Instructions    Discharged to home by car with relative. Discharged via wheelchair, hospital escort: Yes.  Special equipment needed: Not Applicable    Be sure to schedule a follow-up appointment with your primary care doctor or any specialists as instructed.     Discharge Plan:   Diet Plan: Discussed  Activity Level: Discussed  Confirmed Follow up Appointment: Appointment Scheduled  Confirmed Symptoms Management: Discussed  Medication Reconciliation Updated: Yes    I understand that a diet low in cholesterol, fat, and sodium is recommended for good health. Unless I have been given specific instructions below for another diet, I accept this instruction as my diet prescription.   Other diet: Heart healthy    Special Instructions: None    Is patient discharged on Warfarin / Coumadin?   No     Depression / Suicide Risk    As you are discharged from this [facility] facility, it is important to learn how to keep safe from harming yourself.    Recognize the warning signs:  Abrupt changes in personality, positive or negative- including increase in energy   Giving away possessions  Change in eating patterns- significant weight changes-  positive or negative  Change in sleeping patterns-  unable to sleep or sleeping all the time   Unwillingness or inability to communicate  Depression  Unusual sadness, discouragement and loneliness  Talk of wanting to die  Neglect of personal appearance   Rebelliousness- reckless behavior  Withdrawal from people/activities they love  Confusion- inability to concentrate     If you or a loved one observes any of these behaviors or has concerns about self-harm, here's what you can do:  Talk about it- your feelings and reasons for harming yourself  Remove any means that you might use to hurt yourself (examples: pills, rope, extension cords, firearm)  Get professional help from the community (Mental Health, Substance Abuse, psychological counseling)  Do not be alone:Call your Safe Contact- someone whom you trust who will be there for you.  Call your local CRISIS HOTLINE 467-4170 or 346-818-6511  Call your local Children's Mobile Crisis Response Team [place] (638) 596-9448 or www.[employer]  Call the toll free National Suicide Prevention Hotlines   National Suicide Prevention Lifeline 262-756-OSKX (0020)  National Hope Line Network 800-SUICIDE (403-7891)

## 2022-05-18 NOTE — PROGRESS NOTES
Telemetry Strip     Strip printed at   Measurements/rhythm from strip were as follows:  Rhythm: SR   HR: 81  Measurements: 0.19/0.07/0.38  Ectopy: PVC              Normal Values  Rhythm SR  HR Range    Measurements 0.12-0.20 / 0.06-0.10  / 0.30-0.52

## 2022-05-18 NOTE — PROGRESS NOTES
Received bedside report from NOC RN. Pt is A&Ox4. Pt is sitting up in bed, no signs of labored breathing or pain. Denies CP/SOB. Pt on RA. Call light & personal belongings within reach, bed in lowest position & locked, and bed alarm is on. Fall precautions in place and education provided on how to use call light, hourly rounding in place. Educated on calling before getting OOB. Pt updated on plan of care for the shift. Pt declines any additional needs at this time.

## 2022-05-18 NOTE — DISCHARGE SUMMARY
Discharge Summary    CHIEF COMPLAINT ON ADMISSION  Elective admission for WATCHMAN procedure    CODE STATUS  Prior    HPI & HOSPITAL COURSE  Gokul Baca is a very pleasant 71 y.o. 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 Patient underwent successful placement of WATCHMAN by Dr. Goyal on 5/17/2022.     Patient has been seen and examined in EP rounds this AM. Monitored rhythm this am is NSR with rare PVC's. Patient denies having any chest pain, dyspnea, dizziness, paraesthesias, or other complaints. Physical exam is without acute abnormality. The figure eight suture was removed from the right femoral access site without evidence of hematoma or bleeding. Patient has been out of bed and ambulated without difficulty. Post WATCHMAN limited TTE showed no evidence of pericardial effusion.     Echo Conclusions 5/18/2022:  CONCLUSIONS  Compared to the images of the prior study 12/19/21  Normal left ventricular systolic function.  No pericardial effusion.    Therefore, he is discharged in good and stable condition with close outpatient follow-up arranged as listed below.     PROCEDURES  WATCHMAN procedure, Dr. Goyal, 5/17/2022.  Please see full procedure note for details.     FOLLOW UP  Future Appointments   Date Time Provider Department Center   5/24/2022  2:00 PM NON PROVIDER-[facility] B [facility] None   6/15/2022 11:20 AM Jennifer Waldron M.D. [facility]   6/23/2022  4:15 PM KETTY Aaron [facility] None   7/19/2022 10:00 AM Vic Flores M.D. [facility] None       MEDICATIONS ON DISCHARGE     Medication List      START taking these medications      Instructions   apixaban 5mg Tabs  Commonly known as: ELIQUIS   Take 1 Tablet by mouth 2 times a day. Indications: Thromboembolism secondary to Atrial Fibrillation  Dose: 5 mg        CHANGE how you take these medications      Instructions   ferrous sulfate 325 (65 Fe) MG tablet  What changed:   · how much to  take  · how to take this  · when to take this   1 tab by mouth once daily with breakfast     losartan 25 MG Tabs  What changed: when to take this  Commonly known as: COZAAR   Take 1 Tablet by mouth every day.  Dose: 25 mg     potassium chloride SA 20 MEQ Tbcr  What changed:   · how much to take  · when to take this  Commonly known as: Mike   Doctor's comments: Till mail order pharmacy medication is received.  Take 1 Tablet by mouth every day.  Dose: 20 mEq     pravastatin 20 MG Tabs  What changed: Another medication with the same name was removed. Continue taking this medication, and follow the directions you see here.  Commonly known as: PRAVACHOL   Take 1 Tablet by mouth every evening for 30 days.  Dose: 20 mg        CONTINUE taking these medications      Instructions   ascorbic acid 500 MG tablet  Commonly known as: Vitamin C   Take 1 Tablet by mouth every morning with breakfast.  Dose: 500 mg     hydrALAZINE 10 MG Tabs  Commonly known as: APRESOLINE   Take 1 Tablet by mouth 3 times a day.  Dose: 10 mg     ketoconazole 2 % Crea  Commonly known as: NIZORAL   Apply 0.5 g topically every day. For foot rash  Dose: 0.5 g     Magnesium 400 MG Caps   Take 1 Tablet by mouth 1/2 hour after lunch.  Dose: 1 Tablet     metoprolol tartrate 25 MG Tabs  Commonly known as: LOPRESSOR   Take 0.5 Tablets by mouth 2 times a day.  Dose: 12.5 mg     Misc. Devices Misc   1 Each at bedtime. Please use CPAP every night, he has a history of sleep apnea and atrial fibrillation. He has home machine  Dose: 1 Each     NON SPECIFIED   Take 1 Tablet by mouth 2 times a day. Bio complete 3  Dose: 1 Tablet     omeprazole 20 MG delayed-release capsule  Commonly known as: PRILOSEC   Take 1 Capsule by mouth 2 times a day.  Dose: 20 mg     ondansetron 4 MG Tabs tablet  Commonly known as: ZOFRAN   Take 1 Tablet by mouth every four hours as needed for Nausea/Vomiting. Till mail order Rx arrives.  Dose: 4 mg     ONE-A-DAY MENS PO   Take 1 Tablet by mouth  1/2 hour after lunch.  Dose: 1 Tablet          ACTIVITY/POST WATCHMAN INSTRUCTIONS  No lifting > 10 lbs x 1 week.  No baths or hot tubs x 1 week.  May shower on 5/19/22 and take off groin dressing and leave uncovered.  Continue to monitor site daily for warmth, redness, discolored drainage    Please do not miss any doses of your blood thinner.  Please keep all follow up appointments scheduled for you.       Please walk and take deep breaths after discharge.  After discharge, if  experiences severe chest pain, shortness of breath, neurological changes, high fever, severe dizziness, trouble with catheter site needs to be seen in the emergency dept.

## 2022-05-24 ENCOUNTER — TELEPHONE (OUTPATIENT)
Dept: CARDIOLOGY | Facility: MEDICAL CENTER | Age: 72
End: 2022-05-24
Payer: MEDICARE

## 2022-05-24 NOTE — TELEPHONE ENCOUNTER
Phone Number Called: [phone]    Call outcome: Spoke to patient regarding message below.    Message: Called to follow up with patient regarding WATCHMAN wound site. Patient states he has no pain. He has no discharge. Declines hematoma. Patient will come to his appointment with Emily on 6/23/2022.

## 2022-05-26 NOTE — TELEPHONE ENCOUNTER
Caller: Gokul Baca    Topic/issue: Gokul called back again and wanted to talk about after care regarding his wound site from the procedure.    Callback Number: [phone]      Thank you,  Marion HADLEY

## 2022-05-26 NOTE — TELEPHONE ENCOUNTER
SS    1. Caller Name: Gokul Ruby Watson      Call Back Number: [phone] (home) [phone] (work)        How would the patient prefer to be contacted with a response: Phone call OK to leave a detailed message    Patient called and is requesting a call back for care instructions for the WATCHMAN that was placed. Please advise.

## 2022-06-14 ENCOUNTER — OFFICE VISIT (OUTPATIENT)
Dept: MEDICAL GROUP | Facility: PHYSICIAN GROUP | Age: 72
End: 2022-06-14
Payer: MEDICARE

## 2022-06-14 VITALS
TEMPERATURE: 60 F | HEIGHT: 66 IN | HEART RATE: 78 BPM | BODY MASS INDEX: 35.03 KG/M2 | RESPIRATION RATE: 14 BRPM | DIASTOLIC BLOOD PRESSURE: 80 MMHG | SYSTOLIC BLOOD PRESSURE: 128 MMHG | OXYGEN SATURATION: 97 % | WEIGHT: 218 LBS

## 2022-06-14 DIAGNOSIS — Z78.9 IMPAIRED MOBILITY AND ADLS: ICD-10-CM

## 2022-06-14 DIAGNOSIS — I48.91 ATRIAL FIBRILLATION, UNSPECIFIED TYPE (HCC): ICD-10-CM

## 2022-06-14 DIAGNOSIS — Z74.09 IMPAIRED MOBILITY AND ADLS: ICD-10-CM

## 2022-06-14 DIAGNOSIS — I61.9 HEMORRHAGIC STROKE (HCC): ICD-10-CM

## 2022-06-14 PROBLEM — N30.90 CYSTITIS: Status: RESOLVED | Noted: 2022-01-03 | Resolved: 2022-06-14

## 2022-06-14 PROBLEM — R19.7 DIARRHEA: Status: RESOLVED | Noted: 2022-02-15 | Resolved: 2022-06-14

## 2022-06-14 PROBLEM — D72.829 LEUKOCYTOSIS: Status: RESOLVED | Noted: 2022-01-03 | Resolved: 2022-06-14

## 2022-06-14 PROCEDURE — 99214 OFFICE O/P EST MOD 30 MIN: CPT | Performed by: FAMILY MEDICINE

## 2022-06-14 ASSESSMENT — FIBROSIS 4 INDEX: FIB4 SCORE: 1.56

## 2022-06-14 NOTE — PROGRESS NOTES
Chief Complaint   Patient presents with   • Atrial Fibrillation     F/V Dx: Atrial fibrillation, unspecified type (HCC)       Subjective     Gokul Baca is a 71 y.o. male who presents today for follow up.    Patient underwent DANNI appendage closure via WATCHMAN with Dr. 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     Other past medical history pertinent for ROB on CPAP, iron deficiency anemia, HTN, HLD, GERD and AFAFL.     Today patient states that he is overall feeling well. Denies having any significant concerns or complaints to discuss today. Reports that his right femoral access site is well healed.     Past Medical History:   Diagnosis Date   • Anemia    • Atrial fibrillation (HCC)    • GERD (gastroesophageal reflux disease)    • Heart burn    • High cholesterol    • Hyperlipidemia    • Hypertension    • Iron deficiency anemia    • Pneumonia     as a young adult    • Sleep apnea     cpap    • Snoring    • Stroke (HCC) 12/19/2021    left sided weakness    • UTI (urinary tract infection) 03/2022     Past Surgical History:   Procedure Laterality Date   • KNEE ARTHROPLASTY TOTAL Left    • OTHER      sinus sx      Family History   Problem Relation Age of Onset   • Heart Disease Mother    • Heart Disease Brother    • Alcohol abuse Son      Social History     Socioeconomic History   • Marital status:      Spouse name: Not on file   • Number of children: Not on file   • Years of education: Not on file   • Highest education level: Master's degree (e.g., MA, MS, Leslie, MEd, MSW, AMY)   Occupational History   • Not on file   Tobacco Use   • Smoking status: Never Smoker   • Smokeless tobacco: Never Used   Vaping Use   • Vaping Use: Never used   Substance and Sexual Activity   • Alcohol use: Never   • Drug use: Never   • Sexual activity: Not on file   Other Topics Concern   • Not on file   Social History Narrative   • Not on file     Social Determinants of Health     Financial Resource  Strain: Low Risk    • Difficulty of Paying Living Expenses: Not hard at all   Food Insecurity: Unknown   • Worried About Running Out of Food in the Last Year: Never true   • Ran Out of Food in the Last Year: Patient refused   Transportation Needs: No Transportation Needs   • Lack of Transportation (Medical): No   • Lack of Transportation (Non-Medical): No   Physical Activity: Inactive   • Days of Exercise per Week: 0 days   • Minutes of Exercise per Session: 0 min   Stress: No Stress Concern Present   • Feeling of Stress : Only a little   Social Connections: Unknown   • Frequency of Communication with Friends and Family: More than three times a week   • Frequency of Social Gatherings with Friends and Family: Patient refused   • Attends Shinto Services: Patient refused   • Active Member of Clubs or Organizations: Yes   • Attends Club or Organization Meetings: Patient refused   • Marital Status:    Intimate Partner Violence: Not on file   Housing Stability: Unknown   • Unable to Pay for Housing in the Last Year: No   • Number of Places Lived in the Last Year: 1   • Unstable Housing in the Last Year: Patient refused     Allergies   Allergen Reactions   • Lactose      Stomach upset      Outpatient Encounter Medications as of 6/23/2022   Medication Sig Dispense Refill   • Ascorbic Acid (VITAMIN C PO) Take  by mouth.     • apixaban (ELIQUIS) 5mg Tab Take 1 Tablet by mouth 2 times a day. Indications: Thromboembolism secondary to Atrial Fibrillation 60 Tablet 1   • hydrALAZINE (APRESOLINE) 10 MG Tab Take 1 Tablet by mouth 3 times a day. 270 Tablet 3   • losartan (COZAAR) 25 MG Tab Take 1 Tablet by mouth every day. (Patient taking differently: Take 25 mg by mouth every morning.) 90 Tablet 3   • ketoconazole (NIZORAL) 2 % Cream Apply 0.5 g topically every day. For foot rash 30 g 5   • ondansetron (ZOFRAN) 4 MG Tab tablet Take 1 Tablet by mouth every four hours as needed for Nausea/Vomiting. Till mail order Rx  "arrives. 10 Tablet 0   • metoprolol tartrate (LOPRESSOR) 25 MG Tab Take 0.5 Tablets by mouth 2 times a day. 90 Tablet 3   • Misc. Devices Misc 1 Each at bedtime. Please use CPAP every night, he has a history of sleep apnea and atrial fibrillation. He has home machine 1 Each 0   • [DISCONTINUED] NON SPECIFIED Take 1 Tablet by mouth 2 times a day. Bio complete 3     • [DISCONTINUED] Magnesium 400 MG Cap Take 1 Tablet by mouth 1/2 hour after lunch.     • [DISCONTINUED] potassium chloride SA (KDUR) 20 MEQ Tab CR Take 1 Tablet by mouth every day. (Patient taking differently: Take 10 mEq by mouth 2 times a day.) 15 Tablet 0   • [DISCONTINUED] Multiple Vitamin (ONE-A-DAY MENS PO) Take 1 Tablet by mouth 1/2 hour after lunch.     • [DISCONTINUED] ferrous sulfate 325 (65 Fe) MG tablet 1 tab by mouth once daily with breakfast (Patient taking differently: Take 162.5 mg by mouth every 48 hours. 1 tab by mouth once daily with breakfast) 90 Tablet 3   • [DISCONTINUED] omeprazole (PRILOSEC) 20 MG delayed-release capsule Take 1 Capsule by mouth 2 times a day. 60 Capsule 0   • [DISCONTINUED] ascorbic acid (VITAMIN C) 500 MG tablet Take 1 Tablet by mouth every morning with breakfast. 30 Tablet 0     No facility-administered encounter medications on file as of 6/23/2022.     Review of Systems   Constitutional: Negative for malaise/fatigue and weight loss.   Respiratory: Negative for shortness of breath.    Cardiovascular: Negative for chest pain, palpitations, orthopnea, claudication, leg swelling and PND.   Neurological: Negative for dizziness and weakness.   All other systems reviewed and are negative.             Objective     /82 (BP Location: Right arm, Patient Position: Sitting, BP Cuff Size: Adult)   Pulse (!) 59   Resp 14   Ht 1.676 m (5' 6\")   Wt 97.5 kg (215 lb)   SpO2 96%   BMI 34.70 kg/m²     Physical Exam  Constitutional:       General: He is not in acute distress.     Appearance: He is well-developed.   HENT: "      Head: Normocephalic.   Eyes:      Extraocular Movements: Extraocular movements intact.   Neck:      Vascular: No carotid bruit or JVD.   Cardiovascular:      Rate and Rhythm: Normal rate and regular rhythm.      Heart sounds: Normal heart sounds. No murmur heard.  Pulmonary:      Effort: Pulmonary effort is normal.      Breath sounds: Normal breath sounds.   Abdominal:      General: There is no distension.      Palpations: Abdomen is soft.   Musculoskeletal:      Cervical back: Normal range of motion.      Right lower leg: No edema.      Left lower leg: No edema.   Skin:     General: Skin is warm and dry.   Neurological:      Mental Status: He is alert and oriented to person, place, and time.   Psychiatric:         Mood and Affect: Mood normal.         Behavior: Behavior normal.         Thought Content: Thought content normal.                Assessment & Plan     1. Atrial fibrillation, unspecified type (HCC)  EKG       Medical Decision Making: Today's Assessment/Status/Plan:   AF/AFL:  - Maintaining NSR.   - OAC with Eliquis 5 mg BID, tolerating well with no bleeding problems, will continue as below.  - Continue lopressor 12.5 mg BID.     DANNI Appendage Closure:  - S/P WATCHMAN placement by Dr. Goyal on 3/1/2022. PAULETTE ordered, if PAULETTE shows well seated WATCHMAN with no significant wiley-device leak patient may stop OAC and start ASA 81 mg daily.     HTN:  - Well controlled.  - Continue BB as above and losartan 25 mg daily.     Patient will follow up as listed below or earlier if needed. Encouraged patient to contact our office should any questions or concerns arise in the mean time.       Future Appointments   Date Time Provider Department Center   7/14/2022  9:00 AM [facility] EXAM 3 [facility]   7/19/2022 10:00 AM Vic Flores M.D. [facility] None   7/26/2022  9:00 AM [facility]   9/15/2022  1:40 PM Jennifer Waldron M.D. [facility]   12/20/2022 11:00 AM Prince Brown M.D. [facility] None

## 2022-06-14 NOTE — ASSESSMENT & PLAN NOTE
He recently had watchman procedure done after hemorrhagic stroke on blood thinners. He is currently on eliquis now.   He had an excellent experience at the [facility] and enjoyed the personalized care of all the staff and even loved the pot pie!  He has improved energy levels and improved BP at today's visit.   He had an allergic reaction with the statin with his tendons so stopped it.   He is now no longer needing a walker except on uneven surfaces.   He has no pain or infection or bleeding and has a pacemaker check coming up.     He is very grateful for his wife's help even though he knows he has some personality changes due to his stroke and is more short in his behavior and responses.      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

## 2022-06-14 NOTE — ASSESSMENT & PLAN NOTE
S/p watchman procedure, doing very well after surgery.   Had hemorrhagic stroke with xarelto, doing fine currently on eliquis, cardiology will determine if and when he can discontinue this.   Continues on metoprolol for rate control.

## 2022-06-14 NOTE — ASSESSMENT & PLAN NOTE
Continues to use his walker intermittently.   He notes stress has improved since his wife and him have settled more into their new home, and both of them have their computers set up.

## 2022-06-14 NOTE — PROGRESS NOTES
Subjective:   Gokul Baca is a 71 y.o. male here today for evaluation and management of:     Hemorrhagic stroke (HCC)  He recently had watchman procedure done after hemorrhagic stroke on blood thinners. He is currently on eliquis now.   He had an excellent experience at the [facility] and enjoyed the personalized care of all the staff and even loved the pot pie!  He has improved energy levels and improved BP at today's visit.   He had an allergic reaction with the statin with his tendons so stopped it.   He is now no longer needing a walker except on uneven surfaces.   He has no pain or infection or bleeding and has a pacemaker check coming up.     He is very grateful for his wife's help even though he knows he has some personality changes due to his stroke and is more short in his behavior and responses.      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         Impaired mobility and ADLs  Continues to use his walker intermittently.   He notes stress has improved since his wife and him have settled more into their new home, and both of them have their computers set up.       AF (atrial fibrillation) (HCC)  S/p watchman procedure, doing very well after surgery.   Had hemorrhagic stroke with xarelto, doing fine currently on eliquis, cardiology will determine if and when he can discontinue this.   Continues on metoprolol for rate control.          Current medicines (including changes today)  Current Outpatient Medications   Medication Sig Dispense Refill   • apixaban (ELIQUIS) 5mg Tab Take 1 Tablet by mouth 2 times a day. Indications: Thromboembolism secondary to Atrial Fibrillation 60 Tablet 1   • hydrALAZINE (APRESOLINE) 10 MG Tab Take 1 Tablet by mouth 3 times a day. 270 Tablet 3   • losartan (COZAAR) 25 MG Tab Take 1 Tablet by mouth every day. (Patient taking differently: Take 25 mg by mouth every morning.) 90 Tablet 3   • ketoconazole  "(NIZORAL) 2 % Cream Apply 0.5 g topically every day. For foot rash 30 g 5   • ondansetron (ZOFRAN) 4 MG Tab tablet Take 1 Tablet by mouth every four hours as needed for Nausea/Vomiting. Till mail order Rx arrives. 10 Tablet 0   • metoprolol tartrate (LOPRESSOR) 25 MG Tab Take 0.5 Tablets by mouth 2 times a day. 90 Tablet 3   • Misc. Devices Misc 1 Each at bedtime. Please use CPAP every night, he has a history of sleep apnea and atrial fibrillation. He has home machine 1 Each 0     No current facility-administered medications for this visit.     He  has a past medical history of Anemia, Atrial fibrillation (HCC), GERD (gastroesophageal reflux disease), Heart burn, High cholesterol, Hyperlipidemia, Hypertension, Iron deficiency anemia, Pneumonia, Sleep apnea, Snoring, Stroke (HCC) (12/19/2021), and UTI (urinary tract infection) (03/2022).    ROS  No chest pain, no shortness of breath, no abdominal pain       Objective:     /80   Pulse 78   Temp (!) 15.6 °C (60 °F) (Temporal)   Resp 14   Ht 1.676 m (5' 6\")   Wt 98.9 kg (218 lb)   SpO2 97%  Body mass index is 35.19 kg/m².   Physical Exam:  Constitutional: Alert, no distress.  Skin: Warm, dry, good turgor, no rashes in visible areas.  Eye: Equal, round and reactive, conjunctiva clear, lids normal.  ENMT: Lips without lesions, good dentition, oropharynx clear.  Neck: Trachea midline, no masses, no thyromegaly. No cervical or supraclavicular lymphadenopathy  Respiratory: Unlabored respiratory effort, lungs clear to auscultation, no wheezes, no ronchi.  Cardiovascular: Normal S1, S2, no murmur, no edema.  Abdomen: Soft, non-tender, no masses, no hepatosplenomegaly.  Psych: Alert and oriented x3, normal affect and mood.        Assessment and Plan:   The following treatment plan was discussed    1. Hemorrhagic stroke (HCC)  S/p watchman, has follow up with cardiology later this month.       Followup: Return in about 3 months (around 9/14/2022) for s/p stroke, " HTN.

## 2022-06-17 ENCOUNTER — HOSPITAL ENCOUNTER (OUTPATIENT)
Dept: LAB | Facility: MEDICAL CENTER | Age: 72
End: 2022-06-17
Attending: FAMILY MEDICINE
Payer: MEDICARE

## 2022-06-17 DIAGNOSIS — E87.6 HYPOKALEMIA: ICD-10-CM

## 2022-06-17 DIAGNOSIS — R74.8 HIGH SERUM ALKALINE PHOSPHATASE LEVEL: ICD-10-CM

## 2022-06-17 DIAGNOSIS — I48.91 ATRIAL FIBRILLATION, UNSPECIFIED TYPE (HCC): ICD-10-CM

## 2022-06-17 DIAGNOSIS — D64.9 ANEMIA, UNSPECIFIED TYPE: ICD-10-CM

## 2022-06-17 LAB
ALBUMIN SERPL BCP-MCNC: 4.3 G/DL (ref 3.2–4.9)
ALBUMIN/GLOB SERPL: 1.5 G/DL
ALP SERPL-CCNC: 143 U/L (ref 30–99)
ALT SERPL-CCNC: 14 U/L (ref 2–50)
ANION GAP SERPL CALC-SCNC: 12 MMOL/L (ref 7–16)
AST SERPL-CCNC: 20 U/L (ref 12–45)
BASOPHILS # BLD AUTO: 1.6 % (ref 0–1.8)
BASOPHILS # BLD: 0.09 K/UL (ref 0–0.12)
BILIRUB SERPL-MCNC: 0.4 MG/DL (ref 0.1–1.5)
BUN SERPL-MCNC: 16 MG/DL (ref 8–22)
CALCIUM SERPL-MCNC: 9.2 MG/DL (ref 8.5–10.5)
CHLORIDE SERPL-SCNC: 107 MMOL/L (ref 96–112)
CO2 SERPL-SCNC: 23 MMOL/L (ref 20–33)
CREAT SERPL-MCNC: 0.77 MG/DL (ref 0.5–1.4)
EOSINOPHIL # BLD AUTO: 0.77 K/UL (ref 0–0.51)
EOSINOPHIL NFR BLD: 13.4 % (ref 0–6.9)
ERYTHROCYTE [DISTWIDTH] IN BLOOD BY AUTOMATED COUNT: 40.2 FL (ref 35.9–50)
FERRITIN SERPL-MCNC: 33.5 NG/ML (ref 22–322)
FOLATE SERPL-MCNC: 19.7 NG/ML
GFR SERPLBLD CREATININE-BSD FMLA CKD-EPI: 97 ML/MIN/1.73 M 2
GGT SERPL-CCNC: 256 U/L (ref 7–51)
GLOBULIN SER CALC-MCNC: 2.9 G/DL (ref 1.9–3.5)
GLUCOSE SERPL-MCNC: 87 MG/DL (ref 65–99)
HCT VFR BLD AUTO: 41.7 % (ref 42–52)
HGB BLD-MCNC: 14 G/DL (ref 14–18)
IMM GRANULOCYTES # BLD AUTO: 0 K/UL (ref 0–0.11)
IMM GRANULOCYTES NFR BLD AUTO: 0 % (ref 0–0.9)
IRON SATN MFR SERPL: 24 % (ref 15–55)
IRON SERPL-MCNC: 75 UG/DL (ref 50–180)
LYMPHOCYTES # BLD AUTO: 1.95 K/UL (ref 1–4.8)
LYMPHOCYTES NFR BLD: 33.9 % (ref 22–41)
MCH RBC QN AUTO: 29.4 PG (ref 27–33)
MCHC RBC AUTO-ENTMCNC: 33.6 G/DL (ref 33.7–35.3)
MCV RBC AUTO: 87.4 FL (ref 81.4–97.8)
MONOCYTES # BLD AUTO: 0.66 K/UL (ref 0–0.85)
MONOCYTES NFR BLD AUTO: 11.5 % (ref 0–13.4)
NEUTROPHILS # BLD AUTO: 2.28 K/UL (ref 1.82–7.42)
NEUTROPHILS NFR BLD: 39.6 % (ref 44–72)
NRBC # BLD AUTO: 0 K/UL
NRBC BLD-RTO: 0 /100 WBC
PLATELET # BLD AUTO: 265 K/UL (ref 164–446)
PMV BLD AUTO: 11 FL (ref 9–12.9)
POTASSIUM SERPL-SCNC: 3.9 MMOL/L (ref 3.6–5.5)
PROT SERPL-MCNC: 7.2 G/DL (ref 6–8.2)
RBC # BLD AUTO: 4.77 M/UL (ref 4.7–6.1)
SODIUM SERPL-SCNC: 142 MMOL/L (ref 135–145)
TIBC SERPL-MCNC: 307 UG/DL (ref 250–450)
UIBC SERPL-MCNC: 232 UG/DL (ref 110–370)
VIT B12 SERPL-MCNC: 873 PG/ML (ref 211–911)
WBC # BLD AUTO: 5.8 K/UL (ref 4.8–10.8)

## 2022-06-17 PROCEDURE — 82607 VITAMIN B-12: CPT

## 2022-06-17 PROCEDURE — 80053 COMPREHEN METABOLIC PANEL: CPT

## 2022-06-17 PROCEDURE — 82728 ASSAY OF FERRITIN: CPT

## 2022-06-17 PROCEDURE — 85025 COMPLETE CBC W/AUTO DIFF WBC: CPT

## 2022-06-17 PROCEDURE — 83550 IRON BINDING TEST: CPT

## 2022-06-17 PROCEDURE — 82746 ASSAY OF FOLIC ACID SERUM: CPT

## 2022-06-17 PROCEDURE — 83540 ASSAY OF IRON: CPT

## 2022-06-17 PROCEDURE — 82977 ASSAY OF GGT: CPT

## 2022-06-17 PROCEDURE — 36415 COLL VENOUS BLD VENIPUNCTURE: CPT

## 2022-06-21 DIAGNOSIS — R74.8 ELEVATED LIVER ENZYMES: ICD-10-CM

## 2022-06-21 NOTE — RESULT ENCOUNTER NOTE
Released to whit Hodgson,  Your labs show high ggt which means high alk phos is not from bone but from liver. All other labs look ok!  Next step is to evaluate alcohol intake, medications that could cause elevation and check a liver ultrasound which I have ordered.   To discuss in detail make a clinic appointment with me please, or we can discuss at our Sept 15th appointment.   Jennifer Waldron M.D.

## 2022-06-23 ENCOUNTER — OFFICE VISIT (OUTPATIENT)
Dept: CARDIOLOGY | Facility: MEDICAL CENTER | Age: 72
End: 2022-06-23
Payer: MEDICARE

## 2022-06-23 VITALS
HEIGHT: 66 IN | WEIGHT: 215 LBS | DIASTOLIC BLOOD PRESSURE: 82 MMHG | BODY MASS INDEX: 34.55 KG/M2 | OXYGEN SATURATION: 96 % | SYSTOLIC BLOOD PRESSURE: 128 MMHG | RESPIRATION RATE: 14 BRPM | HEART RATE: 59 BPM

## 2022-06-23 DIAGNOSIS — I48.91 ATRIAL FIBRILLATION, UNSPECIFIED TYPE (HCC): ICD-10-CM

## 2022-06-23 DIAGNOSIS — Z95.818 PRESENCE OF WATCHMAN LEFT ATRIAL APPENDAGE CLOSURE DEVICE: ICD-10-CM

## 2022-06-23 PROCEDURE — 93000 ELECTROCARDIOGRAM COMPLETE: CPT | Performed by: NURSE PRACTITIONER

## 2022-06-23 PROCEDURE — 99214 OFFICE O/P EST MOD 30 MIN: CPT | Mod: 25 | Performed by: NURSE PRACTITIONER

## 2022-06-23 ASSESSMENT — FIBROSIS 4 INDEX: FIB4 SCORE: 1.39

## 2022-06-27 ENCOUNTER — TELEPHONE (OUTPATIENT)
Dept: CARDIOLOGY | Facility: MEDICAL CENTER | Age: 72
End: 2022-06-27
Payer: MEDICARE

## 2022-06-27 NOTE — TELEPHONE ENCOUNTER
Received call from [facility] scheduling, and she advised they are unable to schedule the PAULETTE as they do not have the order on their end to schedule. Advised PAULETTE is scheduled by our procedure schedulers here in office, noted below Janeth has scheduled pt for 07/19/22. Confirmed this with Gavi and she advised she will just call pt back to schedule an active US order. She verbalized understanding, appreciative of clarification.

## 2022-06-27 NOTE — TELEPHONE ENCOUNTER
Patient scheduled for post watchman PAULETTE w/anesthesia on 7-19-22 with Dr. Car. Patient has been instructed to check in at 9:00 for 11:00 case time. Message sent to boogie ELIZONDO to Dr. Car.

## 2022-06-27 NOTE — TELEPHONE ENCOUNTER
----- Message from KETTY Aaron sent at 6/24/2022  5:23 PM PDT -----  Regarding: PAULETTE  Please schedule post WATCHMAN PAULETTE any day after 7/5/2022.    Thanks,    SAWYER

## 2022-06-28 ENCOUNTER — TELEPHONE (OUTPATIENT)
Dept: CARDIOLOGY | Facility: MEDICAL CENTER | Age: 72
End: 2022-06-28
Payer: MEDICARE

## 2022-06-28 ENCOUNTER — TELEPHONE (OUTPATIENT)
Dept: URGENT CARE | Facility: PHYSICIAN GROUP | Age: 72
End: 2022-06-28
Payer: MEDICARE

## 2022-06-28 DIAGNOSIS — Z95.818 PRESENCE OF WATCHMAN LEFT ATRIAL APPENDAGE CLOSURE DEVICE: ICD-10-CM

## 2022-06-28 ASSESSMENT — ENCOUNTER SYMPTOMS
DIZZINESS: 0
WEAKNESS: 0
PALPITATIONS: 0
WEIGHT LOSS: 0
PND: 0
CLAUDICATION: 0
ORTHOPNEA: 0
SHORTNESS OF BREATH: 0

## 2022-06-28 NOTE — TELEPHONE ENCOUNTER
Recvd call from Adriana, patient's wife. Patient now scheduled for post watchman PAULETTE w/anesthesia on 7-14-22 with Dr. Hooker. Patient has been instructed to check in at 7:00 for 9:00 case time. Message sent to authorquyen ELIZONDO to Dr. Hooker.

## 2022-06-28 NOTE — TELEPHONE ENCOUNTER
Kimberly,    I recvd a call from Adriana, this patient's wife. Can you please take a look and see if the [facility] has Eliquis available? Please call her at [phone]    Thank You,  Janeth

## 2022-06-29 ENCOUNTER — PHARMACY VISIT (OUTPATIENT)
Dept: PHARMACY | Facility: MEDICAL CENTER | Age: 72
End: 2022-06-29
Payer: COMMERCIAL

## 2022-06-29 PROCEDURE — RXMED WILLOW AMBULATORY MEDICATION CHARGE: Performed by: NURSE PRACTITIONER

## 2022-06-29 RX ORDER — POTASSIUM CHLORIDE 20 MEQ/1
20 TABLET, EXTENDED RELEASE ORAL DAILY
Qty: 90 TABLET | Refills: 0 | Status: SHIPPED | OUTPATIENT
Start: 2022-06-29 | End: 2022-09-02

## 2022-06-29 NOTE — TELEPHONE ENCOUNTER
"Called [facility] pharmacy, spoke with Barbara (sp?). She confirmed recent inventory received and there is plenty of apixaban 5mg tablets supply. Advised will send 30-day sample script electronically.    Called pt's spouse Adriana and discussed above. Provided her with address and phone number to [facility] pharmacy. Did advise her to call pharmacy first before picking up med to make sure it's ready. She said pt's insurance \"won't kick in until 07/21/22\" for his medications. Confirmed with her pt's current scheduled tests and FV. He has PAULETTE on 07/14/22. She confirmed and verbalized understanding. Provided her with call back number to office if any questions/concerns in the mean time. She was very appreciative.  "

## 2022-06-29 NOTE — TELEPHONE ENCOUNTER
Refill for potassium 20 meq daily sent to [facility] for 90 pills. Zero refills.   Jennifer Waldron M.D.     Triage note addendum.    Dr. Johnson, Triage Officer had asked me to evaluate this patient for admission.  At the time of my evaluation, the patient's systolic blood pressure is still 220s and above despite a dose of IV labetalol.  A second dose is being given and SBP at 216.  Patient has complained of dizziness.  At this rate I and Dr. Johnson have requested Dr. nAders to reevaluate if patient needs to be in the ICU as the patient remains profoundly hypertensive at paramters that may require Q1hr monitoring and a drip. ICU admissions are handled by the intensivists.  To clarify we did NOT REFUSE to hospitalize the patient. The patient will need to be hospitalized but if a drip is needed then it should be in the ICU.  It is reported that Dr. Rivers, intensivist will evaluate.  I and Dr. Johnson had indicated to the ED staff that we will be willing to take the patient to telemetry if her SBP is at least 200 or low , she remains asymptomatic AND doesn't require a drip and q1hr blood pressure monitoring.

## 2022-07-05 ENCOUNTER — TELEPHONE (OUTPATIENT)
Dept: MEDICAL GROUP | Facility: PHYSICIAN GROUP | Age: 72
End: 2022-07-05

## 2022-07-05 NOTE — TELEPHONE ENCOUNTER
Patient spouse, Adriana, in clinic requesting a refill for HYDRALAZINE be sent to [facility]. Medication bottle was misplaced and they are unable to locate it and are leaving for out of town first thing in the morning.    Additionally requesting refill for METOPROLOL TARTRATE be sent to [facility] as well.

## 2022-07-06 DIAGNOSIS — I10 PRIMARY HYPERTENSION: ICD-10-CM

## 2022-07-06 RX ORDER — HYDRALAZINE HYDROCHLORIDE 10 MG/1
10 TABLET, FILM COATED ORAL 3 TIMES DAILY
Qty: 270 TABLET | Refills: 3 | Status: SHIPPED | OUTPATIENT
Start: 2022-07-06 | End: 2023-07-01

## 2022-07-06 NOTE — TELEPHONE ENCOUNTER
Received request via: Patient    Was the patient seen in the last year in this department? Yes    Does the patient have an active prescription (recently filled or refills available) for medication(s) requested? No     Last Office Visit:06/14/2022  Last Labs:06/17/2022

## 2022-07-13 ENCOUNTER — APPOINTMENT (OUTPATIENT)
Dept: ADMISSIONS | Facility: MEDICAL CENTER | Age: 72
End: 2022-07-13
Attending: INTERNAL MEDICINE
Payer: MEDICARE

## 2022-07-14 ENCOUNTER — HOSPITAL ENCOUNTER (OUTPATIENT)
Facility: MEDICAL CENTER | Age: 72
End: 2022-07-14
Attending: INTERNAL MEDICINE | Admitting: INTERNAL MEDICINE
Payer: MEDICARE

## 2022-07-14 ENCOUNTER — ANESTHESIA (OUTPATIENT)
Dept: CARDIOLOGY | Facility: MEDICAL CENTER | Age: 72
End: 2022-07-14
Payer: MEDICARE

## 2022-07-14 ENCOUNTER — APPOINTMENT (OUTPATIENT)
Dept: CARDIOLOGY | Facility: MEDICAL CENTER | Age: 72
End: 2022-07-14
Attending: NURSE PRACTITIONER
Payer: MEDICARE

## 2022-07-14 ENCOUNTER — ANESTHESIA EVENT (OUTPATIENT)
Dept: CARDIOLOGY | Facility: MEDICAL CENTER | Age: 72
End: 2022-07-14
Payer: MEDICARE

## 2022-07-14 VITALS
RESPIRATION RATE: 18 BRPM | TEMPERATURE: 98 F | HEIGHT: 66 IN | BODY MASS INDEX: 34.51 KG/M2 | SYSTOLIC BLOOD PRESSURE: 203 MMHG | HEART RATE: 56 BPM | DIASTOLIC BLOOD PRESSURE: 92 MMHG | OXYGEN SATURATION: 95 % | WEIGHT: 214.73 LBS

## 2022-07-14 DIAGNOSIS — Z95.818 PRESENCE OF WATCHMAN LEFT ATRIAL APPENDAGE CLOSURE DEVICE: ICD-10-CM

## 2022-07-14 PROCEDURE — 93312 ECHO TRANSESOPHAGEAL: CPT

## 2022-07-14 PROCEDURE — 700111 HCHG RX REV CODE 636 W/ 250 OVERRIDE (IP): Performed by: ANESTHESIOLOGY

## 2022-07-14 PROCEDURE — 700105 HCHG RX REV CODE 258: Performed by: INTERNAL MEDICINE

## 2022-07-14 PROCEDURE — 93312 ECHO TRANSESOPHAGEAL: CPT | Mod: 26 | Performed by: INTERNAL MEDICINE

## 2022-07-14 PROCEDURE — 01922 ANES N-INVAS IMG/RADJ THER: CPT | Performed by: ANESTHESIOLOGY

## 2022-07-14 PROCEDURE — 160035 HCHG PACU - 1ST 60 MINS PHASE I

## 2022-07-14 PROCEDURE — 160002 HCHG RECOVERY MINUTES (STAT)

## 2022-07-14 PROCEDURE — 160046 HCHG PACU - 1ST 60 MINS PHASE II

## 2022-07-14 RX ORDER — SODIUM CHLORIDE, SODIUM LACTATE, POTASSIUM CHLORIDE, CALCIUM CHLORIDE 600; 310; 30; 20 MG/100ML; MG/100ML; MG/100ML; MG/100ML
INJECTION, SOLUTION INTRAVENOUS CONTINUOUS
Status: DISCONTINUED | OUTPATIENT
Start: 2022-07-14 | End: 2022-07-14 | Stop reason: HOSPADM

## 2022-07-14 RX ORDER — HALOPERIDOL 5 MG/ML
1 INJECTION INTRAMUSCULAR
Status: DISCONTINUED | OUTPATIENT
Start: 2022-07-14 | End: 2022-07-14 | Stop reason: HOSPADM

## 2022-07-14 RX ORDER — ONDANSETRON 2 MG/ML
4 INJECTION INTRAMUSCULAR; INTRAVENOUS
Status: DISCONTINUED | OUTPATIENT
Start: 2022-07-14 | End: 2022-07-14 | Stop reason: HOSPADM

## 2022-07-14 RX ORDER — DIPHENHYDRAMINE HYDROCHLORIDE 50 MG/ML
12.5 INJECTION INTRAMUSCULAR; INTRAVENOUS
Status: DISCONTINUED | OUTPATIENT
Start: 2022-07-14 | End: 2022-07-14 | Stop reason: HOSPADM

## 2022-07-14 RX ADMIN — SODIUM CHLORIDE, POTASSIUM CHLORIDE, SODIUM LACTATE AND CALCIUM CHLORIDE: 600; 310; 30; 20 INJECTION, SOLUTION INTRAVENOUS at 08:10

## 2022-07-14 RX ADMIN — PROPOFOL 150 MG: 10 INJECTION, EMULSION INTRAVENOUS at 09:59

## 2022-07-14 ASSESSMENT — FIBROSIS 4 INDEX: FIB4 SCORE: 1.39

## 2022-07-14 NOTE — ANESTHESIA POSTPROCEDURE EVALUATION
Patient: Gokul Baca    Procedure Summary     Date: 07/14/22 Room / Location: [facility] - ECHOCARDIOLOGY [facility]    Anesthesia Start: 0947 Anesthesia Stop: 1012    Procedure: EC-PAULETTE W/O CONT Diagnosis:       Presence of Watchman left atrial appendage closure device      Presence of other cardiac implants and grafts    Scheduled Providers: Olga Hooker M.D.; Elfego Arreguin M.D. Responsible Provider: Elfego Arreguin M.D.    Anesthesia Type: MAC ASA Status: 3          Final Anesthesia Type: MAC  Last vitals  BP   Blood Pressure : (!) 213/91    Temp   36.3 °C (97.4 °F)    Pulse   (!) 56   Resp   18    SpO2   96 %      Anesthesia Post Evaluation    Patient location during evaluation: PACU  Patient participation: complete - patient participated  Level of consciousness: awake and alert    Airway patency: patent  Anesthetic complications: no  Cardiovascular status: hemodynamically stable  Respiratory status: acceptable  Hydration status: euvolemic    PONV: none          There were no known complications for this encounter.     Nurse Pain Score: 0 (NPRS)

## 2022-07-14 NOTE — ANESTHESIA PREPROCEDURE EVALUATION
Date/Time: 07/14/22 0900    Scheduled providers: Olga Hooker M.D.; Elfego Arreguin M.D.    Procedure: EC-PAULETTE W/O CONT    Diagnosis:       Presence of Watchman left atrial appendage closure device [Z95.818]      Presence of other cardiac implants and grafts [Z95.818]    Location: [facility] - ECHOCARDIOLOGY [facility]          Relevant Problems   ANESTHESIA   (positive) ROB (obstructive sleep apnea)      CARDIAC   (positive) AF (atrial fibrillation) (HCC)   (positive) Hemorrhoids   (positive) PAF (paroxysmal atrial fibrillation) (HCC)   (positive) Primary hypertension      GI   (positive) GERD (gastroesophageal reflux disease)       Physical Exam    Airway   Mallampati: II  TM distance: >3 FB  Neck ROM: full       Cardiovascular - normal exam  Rhythm: regular  Rate: normal  (-) murmur     Dental - normal exam           Pulmonary - normal exam  Breath sounds clear to auscultation     Abdominal    Neurological - normal exam                 Anesthesia Plan    ASA 3   ASA physical status 3 criteria: morbid obesity - BMI greater than or equal to 40    Plan - MAC               Induction: intravenous    Postoperative Plan: Postoperative administration of opioids is intended.    Pertinent diagnostic labs and testing reviewed    Informed Consent:    Anesthetic plan and risks discussed with patient.    Use of blood products discussed with: patient whom consented to blood products.

## 2022-07-14 NOTE — OR NURSING
Report called to Romina KRUGER. Plan of care discussed. Patient VS at baseline. Elevated BP does not require treatment at this time per Dr Arreguin, baseline BP elevated. Nursing communication placed. Patient and wife instructed to be sure to take home bp medication upon discharge. Patient alert and oriented. Denies nausea.

## 2022-07-14 NOTE — PROGRESS NOTES
Med rec complete per pt and familyat bedside  Interviewed pt with family at bedside with permission from pt  Allergies reviewed and updated.

## 2022-07-14 NOTE — PROCEDURES
Patient was brought to cath lab holding.  Consent was obtained. Risks and benefits of procedures were explained.    Anesthesia was used for sedation process.    Indication: to assess position of watchman device.    Complication: none    Diagnosis: the watchman device is well seated within the left atrial appendage without evidence of intracavitary flow.      Olga Hooker MD.

## 2022-07-14 NOTE — ANESTHESIA TIME REPORT
Anesthesia Start and Stop Event Times     Date Time Event    7/14/2022 0947 Ready for Procedure     0947 Anesthesia Start     1018 Anesthesia Stop        Responsible Staff  07/14/22    Name Role Begin End    Elfego Arreguin M.D. Anesth 0947 1018        Overtime Reason:  no overtime (within assigned shift)    Comments:

## 2022-07-14 NOTE — DISCHARGE INSTRUCTIONS
What to Expect Post Anesthesia    Rest and take it easy for the first 24 hours.  A responsible adult is recommended to remain with you during that time.  It is normal to feel sleepy.  We encourage you to not do anything that requires balance, judgment or coordination.    FOR 24 HOURS DO NOT:  Drive, operate machinery or run household appliances.  Drink beer or alcoholic beverages.  Make important decisions or sign legal documents.    To avoid nausea, slowly advance diet as tolerated, avoiding spicy or greasy foods for the first day.  Add more substantial food to your diet according to your provider's instructions.  Babies can be fed formula or breast milk as soon as they are hungry.  INCREASE FLUIDS AND FIBER TO AVOID CONSTIPATION.    MILD FLU-LIKE SYMPTOMS ARE NORMAL.  YOU MAY EXPERIENCE GENERALIZED MUSCLE ACHES, THROAT IRRITATION, HEADACHE AND/OR SOME NAUSEA.    If any questions arise, call your provider.  If your provider is not available, please feel free to call the Surgical Center at [phone].    MEDICATIONS: Resume taking daily medication.  Take prescribed pain medication with food.  If no medication is prescribed, you may take non-aspirin pain medication if needed.  PAIN MEDICATION CAN BE VERY CONSTIPATING.  Take a stool softener or laxative such as senokot, pericolace, or milk of magnesia if needed.    Diet    Resume pre-operative diet upon discharge from the hospital. Depending on how you are feeling and whether you have nausea or not, you may wish to stay with a bland diet for the first few days. However, you can advance your diet as quickly as you feel ready.    ENDOSCOPY HOME CARE INSTRUCTIONS    PAULETTE - TRANSESOPHAGEAL ECHOCARDIOGRAM  1. Don't drive or drink alcohol for 24 hours. The medication you received will make you too drowsy.  2. If you begin to vomit bloody material, or develop black or bloody stools, call your doctor as soon as possible.  3. If you have any neck, chest, abdominal pain or  temp of 100 degrees, call your doctor.  4. Continue anticoagulation until you hear from Dr. Goyal's office.      You should call [phone] if you develop problems with breathing or chest pain.  If any questions arise, call your doctor. If your doctor is not available, please feel free to call [phone]. You can also call the HEALTH HOTLINE open 24 hours/day, 7 days/week and speak to a nurse at [phone], or toll free [phone].    I acknowledge receipt and understanding of these Home Care Instructions.

## 2022-07-14 NOTE — OR NURSING
Pt arrived to PACU II at 1046. Pt aa/ox4. Pt's BP still elevated and Dr. Orozco notified. MD cleared patient for discharge since he is asymptomatic and pt instructed to take his BP home meds as soon as he gets home and to call/go to the ER if he develops worst symptoms. Discharge criteria met. Denies need for pain meds at this time. Pt changed into clothing with assistance. Discharge instructions given; pt and family verbalized understanding and questions answered.  IV removed, tip intact. Will follow-up with cardiology in 1-2 weeks. Pt discharged home and escorted via w/c with CNA in stable condition.

## 2022-07-19 ENCOUNTER — OFFICE VISIT (OUTPATIENT)
Dept: OPHTHALMOLOGY | Facility: MEDICAL CENTER | Age: 72
End: 2022-07-19
Payer: MEDICARE

## 2022-07-19 DIAGNOSIS — H52.213 IRREGULAR ASTIGMATISM OF BOTH EYES: ICD-10-CM

## 2022-07-19 DIAGNOSIS — I62.9 INTRACRANIAL HEMORRHAGE (HCC): ICD-10-CM

## 2022-07-19 DIAGNOSIS — I61.9 HEMORRHAGIC STROKE (HCC): ICD-10-CM

## 2022-07-19 DIAGNOSIS — H25.13 AGE-RELATED NUCLEAR CATARACT OF BOTH EYES: ICD-10-CM

## 2022-07-19 PROBLEM — I63.9 STROKE (HCC): Status: ACTIVE | Noted: 2021-12-19

## 2022-07-19 PROCEDURE — 92250 FUNDUS PHOTOGRAPHY W/I&R: CPT | Performed by: OPHTHALMOLOGY

## 2022-07-19 PROCEDURE — 92083 EXTENDED VISUAL FIELD XM: CPT | Performed by: OPHTHALMOLOGY

## 2022-07-19 PROCEDURE — 99205 OFFICE O/P NEW HI 60 MIN: CPT | Mod: 25 | Performed by: OPHTHALMOLOGY

## 2022-07-19 RX ORDER — ASPIRIN 81 MG/1
81 TABLET ORAL DAILY
Qty: 100 TABLET | Refills: 0 | Status: ON HOLD | OUTPATIENT
Start: 2022-07-19 | End: 2022-09-13

## 2022-07-19 ASSESSMENT — ENCOUNTER SYMPTOMS: BLURRED VISION: 1

## 2022-07-19 ASSESSMENT — TONOMETRY
OS_IOP_MMHG: 13
OD_IOP_MMHG: 13
IOP_METHOD: I-CARE

## 2022-07-19 ASSESSMENT — REFRACTION_WEARINGRX
OD_AXIS: 032
SPECS_TYPE: PAL
OD_ADD: +2.50
OS_CYLINDER: +1.50
OS_ADD: +2.50
OS_SPHERE: -1.50
OD_CYLINDER: +1.25
OS_AXIS: 017
OD_SPHERE: -1.25

## 2022-07-19 ASSESSMENT — VISUAL ACUITY
OD_CC: J1
OS_CC+: -2
OD_CC+: +3
OS_CC: 20/50
OD_PH_CC: 20/25
METHOD: SNELLEN - LINEAR
CORRECTION_TYPE: GLASSES
OD_PH_CC+: -1
OD_CC: 20/40
OS_CC: J1
OS_PH_CC: 20/40

## 2022-07-19 ASSESSMENT — REFRACTION_MANIFEST
OD_CYLINDER: +2.00
OS_CYLINDER: +2.75
OS_AXIS: 170
OD_AXIS: 180
OD_SPHERE: -1.75
OS_SPHERE: -3.00

## 2022-07-19 ASSESSMENT — CONF VISUAL FIELD
OD_INFERIOR_TEMPORAL_RESTRICTION: 3
OS_INFERIOR_TEMPORAL_RESTRICTION: 3

## 2022-07-19 ASSESSMENT — EXTERNAL EXAM - RIGHT EYE: OD_EXAM: NORMAL

## 2022-07-19 ASSESSMENT — SLIT LAMP EXAM - LIDS
COMMENTS: NORMAL
COMMENTS: NORMAL

## 2022-07-19 ASSESSMENT — CUP TO DISC RATIO
OD_RATIO: 0.1
OS_RATIO: 0.1

## 2022-07-19 ASSESSMENT — EXTERNAL EXAM - LEFT EYE: OS_EXAM: NORMAL

## 2022-07-19 NOTE — ASSESSMENT & PLAN NOTE
7/19/2022 - Right temporal hemorrhagic stroke leading to the left inferior quadrant defect. Demonstrated the defect on HVF and discussed implications.

## 2022-07-19 NOTE — PROGRESS NOTES
Peds/Neuro Ophthalmology:   Vic Flores M.D.    Date & Time note created:    7/19/2022   4:12 PM     Referring MD / APRN:  Jennifer Waldron M.D., No att. providers found    Patient ID:  Name:             Gokul Baca   YOB: 1950  Age:                 71 y.o.  male   MRN:               [id number]    Chief Complaint/Reason for Visit:     Other (New patient for Hemorrhagic Stroke)      History of Present Illness:    Gokul Baca is a 71 y.o. male   Pt is here for New patient evaluation for Hemorrhagic Stroke. Pt states vision has decreased mainly computer range and near even with glasses. Pt states since stroke his depth perception and peripheral are off mainly the left side. Pt does notice when he has worked all day his eye feel tired. Use lid wipes for the build up that occurs. No headaches.       Review of Systems:  Review of Systems   Eyes: Positive for blurred vision.        Depth perception off  Peripheral on the left side   Cardiovascular:        Hemorrhagic stroke   All other systems reviewed and are negative.      Past Medical History:   Past Medical History:   Diagnosis Date   • Anemia    • Atrial fibrillation (HCC)    • GERD (gastroesophageal reflux disease)    • Heart burn    • High cholesterol    • Hyperlipidemia    • Hypertension    • Iron deficiency anemia    • Pneumonia     as a young adult    • Sleep apnea     cpap    • Snoring    • Stroke (HCC) 12/19/2021    left sided weakness    • UTI (urinary tract infection) 03/2022       Past Surgical History:  Past Surgical History:   Procedure Laterality Date   • KNEE ARTHROPLASTY TOTAL Left    • OTHER      sinus sx        Current Outpatient Medications:  Current Outpatient Medications   Medication Sig Dispense Refill   • MAGNESIUM PO Take  by mouth.     • aspirin 81 MG EC tablet Take 1 Tablet by mouth every day. 100 Tablet 0   • metoprolol tartrate (LOPRESSOR) 25 MG Tab Take 0.5 Tablets by mouth 2 times a day. 90 Tablet 3    • hydrALAZINE (APRESOLINE) 10 MG Tab Take 1 Tablet by mouth 3 times a day. 270 Tablet 3   • potassium chloride SA (KDUR) 20 MEQ Tab CR Take 1 Tablet by mouth every day. 90 Tablet 0   • Ascorbic Acid (VITAMIN C PO) Take 1 Tablet by mouth every day.     • losartan (COZAAR) 25 MG Tab Take 1 Tablet by mouth every day. 90 Tablet 3   • Misc. Devices Misc 1 Each at bedtime. Please use CPAP every night, he has a history of sleep apnea and atrial fibrillation. He has home machine 1 Each 0     No current facility-administered medications for this visit.       Allergies:  Allergies   Allergen Reactions   • Lactose      Stomach upset        Family History:  Family History   Problem Relation Age of Onset   • Heart Disease Mother    • Heart Disease Brother    • Alcohol abuse Son        Social History:  Social History     Socioeconomic History   • Marital status:      Spouse name: Not on file   • Number of children: Not on file   • Years of education: Not on file   • Highest education level: Master's degree (e.g., MA, MS, Leslie, MEd, MSW, AMY)   Occupational History   • Not on file   Tobacco Use   • Smoking status: Never Smoker   • Smokeless tobacco: Never Used   Vaping Use   • Vaping Use: Never used   Substance and Sexual Activity   • Alcohol use: Never   • Drug use: Never   • Sexual activity: Not on file   Other Topics Concern   • Not on file   Social History Narrative    Retired     Social Determinants of Health     Financial Resource Strain: Low Risk    • Difficulty of Paying Living Expenses: Not hard at all   Food Insecurity: Unknown   • Worried About Running Out of Food in the Last Year: Never true   • Ran Out of Food in the Last Year: Patient refused   Transportation Needs: No Transportation Needs   • Lack of Transportation (Medical): No   • Lack of Transportation (Non-Medical): No   Physical Activity: Inactive   • Days of Exercise per Week: 0 days   • Minutes of Exercise per Session: 0 min   Stress: No Stress  Concern Present   • Feeling of Stress : Only a little   Social Connections: Unknown   • Frequency of Communication with Friends and Family: More than three times a week   • Frequency of Social Gatherings with Friends and Family: Patient refused   • Attends Oriental orthodox Services: Patient refused   • Active Member of Clubs or Organizations: Yes   • Attends Club or Organization Meetings: Patient refused   • Marital Status:    Intimate Partner Violence: Not on file   Housing Stability: Unknown   • Unable to Pay for Housing in the Last Year: No   • Number of Places Lived in the Last Year: 1   • Unstable Housing in the Last Year: Patient refused          Physical Exam:  Physical Exam    Oriented x 3  Weight/BMI: There is no height or weight on file to calculate BMI.  There were no vitals taken for this visit.    Base Eye Exam     Visual Acuity (Snellen - Linear)       Right Left    Dist cc 20/40 +3 20/50 -2    Dist ph cc 20/25 -1 20/40    Near cc J1 J1    Correction: Glasses          Tonometry ([facility], 10:03 AM)       Right Left    Pressure 13 13          Pupils       Pupils    Right PERRL    Left PERRL          Visual Fields       Right Left                            Extraocular Movement       Right Left     Full, Ortho Full, Ortho          Neuro/Psych     Oriented x3: Yes    Mood/Affect: Normal          Dilation     Both eyes: able to view without dilation @ 11:47 AM            Additional Tests     Color       Right Left    Ishihara 12/12 11/12          Stereo     Fly: +    Animals: 3/3    Circles: 8/9            Slit Lamp and Fundus Exam     External Exam       Right Left    External Normal Normal          Slit Lamp Exam       Right Left    Lids/Lashes Normal Normal    Conjunctiva/Sclera White and quiet White and quiet    Cornea Clear Clear    Anterior Chamber Deep and quiet Deep and quiet    Iris Round and reactive Round and reactive    Lens Clear Clear    Vitreous Posterior vitreous detachment Posterior vitreous  detachment          Fundus Exam       Right Left    Disc Normal Normal    C/D Ratio 0.1 0.1    Macula Normal Normal    Vessels Normal Normal    Periphery Normal Normal            Refraction     Wearing Rx       Sphere Cylinder Axis Add    Right -1.25 +1.25 032 +2.50    Left -1.50 +1.50 017 +2.50    Age: 6m    Type: PAL          Manifest Refraction       Sphere Cylinder Axis    Right -1.75 +2.00 180    Left -3.00 +2.75 170          Final Rx       Sphere Cylinder Axis Add    Right -1.75 +2.00 180 +2.75    Left -3.00 +2.75 107 +2.75                Pertinent Lab/Test/Imaging Review:  extensive review of admission notes including CT images    Assessment and Plan:     Hemorrhagic stroke ([facility])  7/19/2022 - Right temporal hemorrhagic stroke leading to the left inferior quadrant defect. Demonstrated the defect on HVF and discussed implications.       Stroke ([facility])  7/189/2022- No evidence of an ischemic optic neuropathy. His optic nerves are small and tilted, but without pallor. OCT NFL thickness 75 OD and 64 OS    Age-related nuclear cataract of both eyes  7/19/2022 - Bilateral NS cataracts.     Irregular astigmatism of both eyes  7/19/2022 - Can improve central acuity with an adjustment in glasses rx. Might need full frame computer glasses  Greater than 60 minute were spent examining the patient, reviewing records from referring providers including MRI images if available and records within the [facility] system, counselling the patient and family regarding the examination findings, diagnostic testing preformed, recommendations for management, prognosis, further testing and referrals as appropriate and follow up. This in addition to time spent interpreting separate billable tests done during the visit.       Vic Flores M.D.

## 2022-07-19 NOTE — ASSESSMENT & PLAN NOTE
7/19/2022 - Can improve central acuity with an adjustment in glasses rx. Might need full frame computer glasses

## 2022-07-19 NOTE — ASSESSMENT & PLAN NOTE
7/189/2022- No evidence of an ischemic optic neuropathy. His optic nerves are small and tilted, but without pallor. OCT NFL thickness 75 OD and 64 OS

## 2022-07-19 NOTE — PROCEDURES
Ave NFL thickness 75 OD and 64 OS   PATIENT STATES HE USES [facility]  BUT HE DID JUST  THE LEVOTHYROXINE AT [facility]  WHEN HE COMES IN FOR HIS APPOINTMENT ON THE 30TH WE HAVE TO SWITCH PRIMARY PHARMACY

## 2022-07-20 NOTE — OP REPORT
Date of service: 07/14/2022    Patient was brought to cath lab holding.  Consent was obtained. Risks and benefits of procedures were explained.     Anesthesia service was used for sedation process.     Indication: to assess position of watchman device.     Complication: none     Diagnosis: the watchman device is well seated within the left atrial appendage without evidence of intracavitary flow.     Discharge home.  Follow up with EP clinic.     Olga Hooker MD.

## 2022-07-22 ENCOUNTER — OFFICE VISIT (OUTPATIENT)
Dept: URGENT CARE | Facility: PHYSICIAN GROUP | Age: 72
End: 2022-07-22
Payer: MEDICARE

## 2022-07-22 VITALS
OXYGEN SATURATION: 99 % | HEART RATE: 55 BPM | RESPIRATION RATE: 18 BRPM | DIASTOLIC BLOOD PRESSURE: 60 MMHG | TEMPERATURE: 96.5 F | BODY MASS INDEX: 33.75 KG/M2 | HEIGHT: 66 IN | WEIGHT: 210 LBS | SYSTOLIC BLOOD PRESSURE: 108 MMHG

## 2022-07-22 DIAGNOSIS — R03.1 LOW BLOOD PRESSURE READING: ICD-10-CM

## 2022-07-22 PROCEDURE — 99213 OFFICE O/P EST LOW 20 MIN: CPT | Performed by: STUDENT IN AN ORGANIZED HEALTH CARE EDUCATION/TRAINING PROGRAM

## 2022-07-22 ASSESSMENT — ENCOUNTER SYMPTOMS
ABDOMINAL PAIN: 0
CONSTIPATION: 0
WHEEZING: 0
FEVER: 0
DIARRHEA: 0
DIZZINESS: 0
PALPITATIONS: 0
NAUSEA: 0
DIAPHORESIS: 0
VOMITING: 0
CLAUDICATION: 0
WEIGHT LOSS: 0
EYES NEGATIVE: 1
ORTHOPNEA: 0
SHORTNESS OF BREATH: 0
HEADACHES: 0
CHILLS: 0
MUSCULOSKELETAL NEGATIVE: 1

## 2022-07-22 ASSESSMENT — FIBROSIS 4 INDEX: FIB4 SCORE: 1.39

## 2022-07-22 NOTE — PROGRESS NOTES
"Subjective     Gokul Baca is a 71 y.o. male who presents with Heat Exposure and Hypertension ( 90/74 on Wednesday after being exposed to heat. )            Patient presents today for concern of blood pressure readings at home.  Patient states that he was at a conference in [place] on Wednesday and conference room did not have AC.  Patient states he was extremely hot/dehydrated at conference.  Patient went home after conference and lie down and hydrated with water.  Patient has felt a little \"off\" since and has been taking blood pressure at home which have been lower than normal.  Patinet was taken off elquis this week and changed to daily aspirin.  Patient also currently taking metoprolol and losartan.  Patient is followed by cardiology.      Review of Systems   Constitutional: Positive for malaise/fatigue. Negative for chills, diaphoresis, fever and weight loss.   HENT: Negative.    Eyes: Negative.    Respiratory: Negative for shortness of breath and wheezing.    Cardiovascular: Negative for chest pain, palpitations, orthopnea, claudication and leg swelling.   Gastrointestinal: Negative for abdominal pain, constipation, diarrhea, nausea and vomiting.   Genitourinary: Negative.    Musculoskeletal: Negative.    Neurological: Negative for dizziness and headaches.              Objective     /60   Pulse (!) 55   Temp 35.8 °C (96.5 °F) (Temporal)   Resp 18   Ht 1.676 m (5' 6\")   Wt 95.3 kg (210 lb)   SpO2 99%   BMI 33.89 kg/m²      Physical Exam  Vitals reviewed.   Constitutional:       Appearance: Normal appearance.   HENT:      Nose: Nose normal.      Mouth/Throat:      Mouth: Mucous membranes are moist.      Pharynx: Oropharynx is clear.   Eyes:      Extraocular Movements: Extraocular movements intact.      Conjunctiva/sclera: Conjunctivae normal.      Pupils: Pupils are equal, round, and reactive to light.   Cardiovascular:      Rate and Rhythm: Regular rhythm. Bradycardia present.      Heart sounds: " Normal heart sounds.   Pulmonary:      Effort: Pulmonary effort is normal.   Abdominal:      General: Abdomen is flat. Bowel sounds are normal.      Palpations: Abdomen is soft.   Skin:     General: Skin is warm and dry.   Neurological:      General: No focal deficit present.      Mental Status: He is alert and oriented to person, place, and time.      GCS: GCS eye subscore is 4. GCS verbal subscore is 5. GCS motor subscore is 6.      Cranial Nerves: Cranial nerves are intact.      Motor: Motor function is intact.      Coordination: Coordination is intact. Romberg sign negative. Coordination normal.      Gait: Gait is intact.   Psychiatric:         Mood and Affect: Mood normal.         Behavior: Behavior normal.         Thought Content: Thought content normal.         Judgment: Judgment normal.                             Assessment & Plan        1. Low blood pressure reading  Patient's physical exam in office within normal limits.  Patient's blood pressure reading was 108/60 with a pulse of 55.     It was recommended that patient go to ER for further evaluation.  Patient states he would rather go home and monitor blood pressure and contact cardiologist verse going to ER for further evaluation.  Since patient will not go to ER, patient advised to contact cardiologist regarding current medications and possible adjustments needed due to recent low blood pressure readings.      I personally reviewed prior external notes and test results pertinent to today's visit.     Update after office visit: After patient left the office he reached out to [facility] for heart and vascular health and was recommended to monitor blood pressures and if they continue to be low he was advised to cut his losartan dose in half. Patient to follow above instruction.    Patient instructed to go to nearest emergency department if symptoms fail to improve, for any change in condition, further concerns, or new concerning symptoms.      Patient states understanding and agree with the plan of care and discharge instructions.

## 2022-07-25 ENCOUNTER — APPOINTMENT (OUTPATIENT)
Dept: URGENT CARE | Facility: PHYSICIAN GROUP | Age: 72
End: 2022-07-25
Payer: MEDICARE

## 2022-07-25 ENCOUNTER — HOSPITAL ENCOUNTER (EMERGENCY)
Facility: MEDICAL CENTER | Age: 72
End: 2022-07-25
Payer: MEDICARE

## 2022-07-25 ENCOUNTER — HOSPITAL ENCOUNTER (OUTPATIENT)
Dept: LAB | Facility: MEDICAL CENTER | Age: 72
End: 2022-07-25
Attending: FAMILY MEDICINE
Payer: MEDICARE

## 2022-07-25 ENCOUNTER — OFFICE VISIT (OUTPATIENT)
Dept: MEDICAL GROUP | Facility: PHYSICIAN GROUP | Age: 72
End: 2022-07-25

## 2022-07-25 VITALS
RESPIRATION RATE: 20 BRPM | WEIGHT: 213.3 LBS | HEART RATE: 73 BPM | BODY MASS INDEX: 34.28 KG/M2 | HEIGHT: 66 IN | DIASTOLIC BLOOD PRESSURE: 70 MMHG | TEMPERATURE: 97.2 F | OXYGEN SATURATION: 97 % | SYSTOLIC BLOOD PRESSURE: 122 MMHG

## 2022-07-25 VITALS
SYSTOLIC BLOOD PRESSURE: 156 MMHG | HEIGHT: 66 IN | BODY MASS INDEX: 34.65 KG/M2 | RESPIRATION RATE: 14 BRPM | DIASTOLIC BLOOD PRESSURE: 87 MMHG | WEIGHT: 215.61 LBS | HEART RATE: 66 BPM | TEMPERATURE: 97.9 F | OXYGEN SATURATION: 99 %

## 2022-07-25 DIAGNOSIS — G47.33 OSA (OBSTRUCTIVE SLEEP APNEA): ICD-10-CM

## 2022-07-25 DIAGNOSIS — R74.8 HIGH SERUM ALKALINE PHOSPHATASE LEVEL: ICD-10-CM

## 2022-07-25 DIAGNOSIS — I48.20 CHRONIC ATRIAL FIBRILLATION (HCC): ICD-10-CM

## 2022-07-25 LAB
ALBUMIN SERPL BCP-MCNC: 4.4 G/DL (ref 3.2–4.9)
ALBUMIN/GLOB SERPL: 1.3 G/DL
ALP SERPL-CCNC: 207 U/L (ref 30–99)
ALT SERPL-CCNC: 30 U/L (ref 2–50)
ANION GAP SERPL CALC-SCNC: 13 MMOL/L (ref 7–16)
AST SERPL-CCNC: 32 U/L (ref 12–45)
BASOPHILS # BLD AUTO: 1.1 % (ref 0–1.8)
BASOPHILS # BLD: 0.08 K/UL (ref 0–0.12)
BILIRUB SERPL-MCNC: 0.2 MG/DL (ref 0.1–1.5)
BUN SERPL-MCNC: 24 MG/DL (ref 8–22)
CALCIUM SERPL-MCNC: 9.4 MG/DL (ref 8.5–10.5)
CHLORIDE SERPL-SCNC: 105 MMOL/L (ref 96–112)
CO2 SERPL-SCNC: 22 MMOL/L (ref 20–33)
CREAT SERPL-MCNC: 0.98 MG/DL (ref 0.5–1.4)
EKG IMPRESSION: NORMAL
EOSINOPHIL # BLD AUTO: 0.8 K/UL (ref 0–0.51)
EOSINOPHIL NFR BLD: 10.7 % (ref 0–6.9)
ERYTHROCYTE [DISTWIDTH] IN BLOOD BY AUTOMATED COUNT: 44.3 FL (ref 35.9–50)
GFR SERPLBLD CREATININE-BSD FMLA CKD-EPI: 83 ML/MIN/1.73 M 2
GGT SERPL-CCNC: 381 U/L (ref 7–51)
GLOBULIN SER CALC-MCNC: 3.3 G/DL (ref 1.9–3.5)
GLUCOSE SERPL-MCNC: 100 MG/DL (ref 65–99)
HCT VFR BLD AUTO: 44.8 % (ref 42–52)
HGB BLD-MCNC: 14.7 G/DL (ref 14–18)
IMM GRANULOCYTES # BLD AUTO: 0.01 K/UL (ref 0–0.11)
IMM GRANULOCYTES NFR BLD AUTO: 0.1 % (ref 0–0.9)
LYMPHOCYTES # BLD AUTO: 2.46 K/UL (ref 1–4.8)
LYMPHOCYTES NFR BLD: 32.8 % (ref 22–41)
MCH RBC QN AUTO: 29.4 PG (ref 27–33)
MCHC RBC AUTO-ENTMCNC: 32.8 G/DL (ref 33.7–35.3)
MCV RBC AUTO: 89.6 FL (ref 81.4–97.8)
MONOCYTES # BLD AUTO: 0.88 K/UL (ref 0–0.85)
MONOCYTES NFR BLD AUTO: 11.7 % (ref 0–13.4)
NEUTROPHILS # BLD AUTO: 3.27 K/UL (ref 1.82–7.42)
NEUTROPHILS NFR BLD: 43.6 % (ref 44–72)
NRBC # BLD AUTO: 0 K/UL
NRBC BLD-RTO: 0 /100 WBC
PLATELET # BLD AUTO: 334 K/UL (ref 164–446)
PMV BLD AUTO: 10.2 FL (ref 9–12.9)
POTASSIUM SERPL-SCNC: 4.5 MMOL/L (ref 3.6–5.5)
PROT SERPL-MCNC: 7.7 G/DL (ref 6–8.2)
RBC # BLD AUTO: 5 M/UL (ref 4.7–6.1)
SODIUM SERPL-SCNC: 140 MMOL/L (ref 135–145)
TROPONIN T SERPL-MCNC: 9 NG/L (ref 6–19)
WBC # BLD AUTO: 7.5 K/UL (ref 4.8–10.8)

## 2022-07-25 PROCEDURE — 36415 COLL VENOUS BLD VENIPUNCTURE: CPT

## 2022-07-25 PROCEDURE — 93005 ELECTROCARDIOGRAM TRACING: CPT

## 2022-07-25 PROCEDURE — 302449 STATCHG TRIAGE ONLY (STATISTIC)

## 2022-07-25 PROCEDURE — 80053 COMPREHEN METABOLIC PANEL: CPT

## 2022-07-25 PROCEDURE — 85025 COMPLETE CBC W/AUTO DIFF WBC: CPT

## 2022-07-25 PROCEDURE — 84484 ASSAY OF TROPONIN QUANT: CPT

## 2022-07-25 PROCEDURE — 82977 ASSAY OF GGT: CPT

## 2022-07-25 PROCEDURE — 99213 OFFICE O/P EST LOW 20 MIN: CPT | Performed by: NURSE PRACTITIONER

## 2022-07-25 RX ORDER — OMEPRAZOLE 20 MG/1
20 CAPSULE, DELAYED RELEASE ORAL 2 TIMES DAILY
COMMUNITY

## 2022-07-25 ASSESSMENT — FIBROSIS 4 INDEX
FIB4 SCORE: 1.39
FIB4 SCORE: 1.39

## 2022-07-25 NOTE — LETTER
[facility]  Jennifer Waldron M.D.  [address] [place]  [place] [address]  Fax: [phone]   Authorization for Release/Disclosure of   Protected Health Information   Name: GOKUL TELLEZ : 1950 SSN: [id number]   Address: [facility] [place] [address] Phone:    [phone] (home) [phone] (work)   I authorize the entity listed below to release/disclose the PHI below to:   [facility]Jennifer Waldron M.D. and Cheryl M. Demucha, A.P.R.N.   Provider or Entity Name:     Address   City, State, Zip   Phone:      Fax:     Reason for request: continuity of care   Information to be released:    [  ] LAST COLONOSCOPY,  including any PATH REPORT and follow-up  [  ] LAST FIT/COLOGUARD RESULT [  ] LAST DEXA  [  ] LAST MAMMOGRAM  [  ] LAST PAP  [  ] LAST LABS [  ] RETINA EXAM REPORT  [  ] IMMUNIZATION RECORDS  [xxx] Release all info      [  ] Check here and initial the line next to each item to release ALL health information INCLUDING  _____ Care and treatment for drug and / or alcohol abuse  _____ HIV testing, infection status, or AIDS  _____ Genetic Testing    DATES OF SERVICE OR TIME PERIOD TO BE DISCLOSED: _____________  I understand and acknowledge that:  * This Authorization may be revoked at any time by you in writing, except if your health information has already been used or disclosed.  * Your health information that will be used or disclosed as a result of you signing this authorization could be re-disclosed by the recipient. If this occurs, your re-disclosed health information may no longer be protected by State or Federal laws.  * You may refuse to sign this Authorization. Your refusal will not affect your ability to obtain treatment.  * This Authorization becomes effective upon signing and will  on (date) __________.      If no date is indicated, this Authorization will  one (1) year from the signature date.    Name: Gokul Tellez    Signature:   Date:     2022        PLEASE FAX REQUESTED RECORDS BACK TO: [phone]

## 2022-07-26 ENCOUNTER — OFFICE VISIT (OUTPATIENT)
Dept: CARDIOLOGY | Facility: MEDICAL CENTER | Age: 72
End: 2022-07-26

## 2022-07-26 ENCOUNTER — HOSPITAL ENCOUNTER (OUTPATIENT)
Dept: RADIOLOGY | Facility: MEDICAL CENTER | Age: 72
End: 2022-07-26
Attending: FAMILY MEDICINE
Payer: MEDICARE

## 2022-07-26 VITALS
OXYGEN SATURATION: 97 % | HEIGHT: 66 IN | SYSTOLIC BLOOD PRESSURE: 120 MMHG | RESPIRATION RATE: 14 BRPM | BODY MASS INDEX: 34.07 KG/M2 | WEIGHT: 212 LBS | HEART RATE: 70 BPM | DIASTOLIC BLOOD PRESSURE: 76 MMHG

## 2022-07-26 DIAGNOSIS — R74.8 ELEVATED LIVER ENZYMES: ICD-10-CM

## 2022-07-26 DIAGNOSIS — I48.0 PAROXYSMAL ATRIAL FIBRILLATION (HCC): ICD-10-CM

## 2022-07-26 DIAGNOSIS — I10 ESSENTIAL HYPERTENSION, BENIGN: ICD-10-CM

## 2022-07-26 DIAGNOSIS — Z95.818 PRESENCE OF WATCHMAN LEFT ATRIAL APPENDAGE CLOSURE DEVICE: ICD-10-CM

## 2022-07-26 PROCEDURE — 99214 OFFICE O/P EST MOD 30 MIN: CPT | Performed by: NURSE PRACTITIONER

## 2022-07-26 PROCEDURE — 76705 ECHO EXAM OF ABDOMEN: CPT

## 2022-07-26 PROCEDURE — 93000 ELECTROCARDIOGRAM COMPLETE: CPT | Performed by: NURSE PRACTITIONER

## 2022-07-26 ASSESSMENT — FIBROSIS 4 INDEX: FIB4 SCORE: 1.21

## 2022-07-26 NOTE — ASSESSMENT & PLAN NOTE
Dx w/borderline ROB less than a year ago w/at home sleep study   Was prescribed a CPAP didn't fit well  Isn't able to tolerate it so he isn't sleeping well, not wearing it  CPAP and more hasn't come out recently

## 2022-07-26 NOTE — PROGRESS NOTES
Chief Complaint   Patient presents with   • Atrial Fibrillation     Follow up         Subjective     Gokul Baca is a 71 y.o. male who presents today for follow up with c/o recurrent AF.     Patient underwent DANNI appendage closure via WATCHMAN with Dr. 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     Other past medical history pertinent for ROB on CPAP, iron deficiency anemia, HTN, HLD, GERD and AF H/O WATCHMAN on 5/17/2022.     Today patient states that starting last Wednesday he has been having episodes of pAF for about the last 5 days. He does feel that he is going in and out of rhythm and not consistently in AF. He is having some intermittent chest discomfort that he does get when he is in AF, it is one of the ways that he knows he is out of rhythm. Chest pain is stable today.     Past Medical History:   Diagnosis Date   • Anemia    • Atrial fibrillation (HCC)    • GERD (gastroesophageal reflux disease)    • Heart burn    • High cholesterol    • Hyperlipidemia    • Hypertension    • Iron deficiency anemia    • Pneumonia     as a young adult    • Sleep apnea     cpap    • Snoring    • Stroke (HCC) 12/19/2021    left sided weakness    • UTI (urinary tract infection) 03/2022     Past Surgical History:   Procedure Laterality Date   • KNEE ARTHROPLASTY TOTAL Left    • OTHER      sinus sx      Family History   Problem Relation Age of Onset   • Heart Disease Mother    • Heart Disease Brother    • Alcohol abuse Son      Social History     Socioeconomic History   • Marital status:      Spouse name: Not on file   • Number of children: Not on file   • Years of education: Not on file   • Highest education level: Master's degree (e.g., MA, MS, Leslie, MEd, MSW, AMY)   Occupational History   • Not on file   Tobacco Use   • Smoking status: Never Smoker   • Smokeless tobacco: Never Used   Vaping Use   • Vaping Use: Never used   Substance and Sexual Activity   • Alcohol use: Never   • Drug use:  Never   • Sexual activity: Not on file   Other Topics Concern   • Not on file   Social History Narrative    Retired     Social Determinants of Health     Financial Resource Strain: Low Risk    • Difficulty of Paying Living Expenses: Not hard at all   Food Insecurity: Unknown   • Worried About Running Out of Food in the Last Year: Never true   • Ran Out of Food in the Last Year: Patient refused   Transportation Needs: No Transportation Needs   • Lack of Transportation (Medical): No   • Lack of Transportation (Non-Medical): No   Physical Activity: Inactive   • Days of Exercise per Week: 0 days   • Minutes of Exercise per Session: 0 min   Stress: No Stress Concern Present   • Feeling of Stress : Only a little   Social Connections: Unknown   • Frequency of Communication with Friends and Family: More than three times a week   • Frequency of Social Gatherings with Friends and Family: Patient refused   • Attends Jain Services: Patient refused   • Active Member of Clubs or Organizations: Yes   • Attends Club or Organization Meetings: Patient refused   • Marital Status:    Intimate Partner Violence: Not on file   Housing Stability: Unknown   • Unable to Pay for Housing in the Last Year: No   • Number of Places Lived in the Last Year: 1   • Unstable Housing in the Last Year: Patient refused     Allergies   Allergen Reactions   • Lactose      Stomach upset      Outpatient Encounter Medications as of 7/26/2022   Medication Sig Dispense Refill   • omeprazole (PRILOSEC) 20 MG delayed-release capsule Take 20 mg by mouth 2 times a day.     • MAGNESIUM PO Take  by mouth.     • aspirin 81 MG EC tablet Take 1 Tablet by mouth every day. 100 Tablet 0   • metoprolol tartrate (LOPRESSOR) 25 MG Tab Take 0.5 Tablets by mouth 2 times a day. 90 Tablet 3   • hydrALAZINE (APRESOLINE) 10 MG Tab Take 1 Tablet by mouth 3 times a day. 270 Tablet 3   • potassium chloride SA (KDUR) 20 MEQ Tab CR Take 1 Tablet by mouth every day. 90 Tablet  "0   • Ascorbic Acid (VITAMIN C PO) Take 1 Tablet by mouth every day.     • losartan (COZAAR) 25 MG Tab Take 1 Tablet by mouth every day. 90 Tablet 3   • Misc. Devices Misc 1 Each at bedtime. Please use CPAP every night, he has a history of sleep apnea and atrial fibrillation. He has home machine 1 Each 0     No facility-administered encounter medications on file as of 7/26/2022.     Review of Systems   Constitutional: Positive for malaise/fatigue. Negative for weight loss.   Respiratory: Negative for shortness of breath.    Cardiovascular: Positive for chest pain (Chest discomfort wtih AF). Negative for palpitations, orthopnea, claudication, leg swelling and PND.   Neurological: Negative for dizziness and weakness.   All other systems reviewed and are negative.             Objective     /76 (BP Location: Left arm, Patient Position: Sitting)   Pulse 70   Resp 14   Ht 1.676 m (5' 6\")   Wt 96.2 kg (212 lb)   SpO2 97%   BMI 34.22 kg/m²     Physical Exam  Constitutional:       General: He is not in acute distress.     Appearance: He is well-developed.   HENT:      Head: Normocephalic.   Eyes:      Extraocular Movements: Extraocular movements intact.   Neck:      Vascular: No carotid bruit or JVD.   Cardiovascular:      Rate and Rhythm: Normal rate. Rhythm irregular.   Pulmonary:      Effort: Pulmonary effort is normal.      Breath sounds: Normal breath sounds.   Abdominal:      General: There is no distension.      Palpations: Abdomen is soft.   Musculoskeletal:      Cervical back: Normal range of motion.      Right lower leg: No edema.      Left lower leg: No edema.   Skin:     General: Skin is warm and dry.   Neurological:      Mental Status: He is alert and oriented to person, place, and time.   Psychiatric:         Mood and Affect: Mood normal.         Behavior: Behavior normal.         Thought Content: Thought content normal.                Assessment & Plan     1. Paroxysmal atrial fibrillation (HCC)  " EKG   2. Presence of Watchman left atrial appendage closure device     3. Essential hypertension, benign         Medical Decision Making: Today's Assessment/Status/Plan:   AF/AFL:  - EKG today shows AF, rate well controlled in the 60's.   - Continue lopressor 12.5 mg BID.   - Discussed AAD or ablation for rhythm management, patient prefers to try AAD first. Could possibly try Flecainide as patient did have a normal NM stress test and echo in 2019. Will discuss with Dr. Brown and notify patient of his recommendations.   - Chest discomfort is typically correlated with AF for patient. Currently stable. No evidence of ischemia on EKG. Will need ER for worsening chest pain.     DANNI Appendage Closure:  - S/P WATCHMAN placement by Dr. Goyal on 3/1/2022. PAULETTE ordered showed WATCHMAN was well seated with no significant wiley-device leak, now on daily low dose ASA 81 mg.     HTN:  - Well controlled.  - Continue BB as above and losartan 25 mg daily.     Patient will follow up as listed below or earlier if needed. Encouraged patient to contact our office should any questions or concerns arise in the mean time.       Future Appointments   Date Time Provider Department Center   9/15/2022  1:40 PM Jennifer Waldron M.D. [facility]   10/13/2022  3:45 PM NINO Aaron. [facility] None   12/20/2022 11:00 AM Prince Brown M.D. [facility] None   1/9/2023 10:00 AM Vic Flores M.D. [facility] None

## 2022-07-26 NOTE — PROGRESS NOTES
"Subjective:     CC: No chief complaint on file.      HPI:   Gokul presents today with    AF (atrial fibrillation) (Formerly [facility])  F/b cardiology; last appt was June; he has f/up in Dec  Was seen at Urgent Care for low bp recently  After the visit, notes indicate that pt reached out to cardiology and was advised to monitor blood pressures and if they continue to be low he was advised to cut his losartan dose in half. Patient to follow above instruction.      Reports having palpitations Sat and Sun so he scheduled an appt w/me; they have since resolved but he's feeling very fatigued    EKG showed a fib; will reach out to cardio to have them call pt to schedule f/up in very near future (move appt in from Dec)        ROB (obstructive sleep apnea)  Dx w/borderline ROB less than a year ago w/at home sleep study   Was prescribed a CPAP didn't fit well  Isn't able to tolerate it so he isn't sleeping well, not wearing it  CPAP and more hasn't come out recently               ROS per HPI    Objective:     Exam:  /70   Pulse 73   Temp 36.2 °C (97.2 °F) (Temporal)   Resp 20   Ht 1.676 m (5' 6\")   Wt 96.8 kg (213 lb 4.8 oz)   SpO2 97%   BMI 34.43 kg/m²  Body mass index is 34.43 kg/m².    Physical Exam:  Constitutional: Well-developed and well-nourished male  Not diaphoretic. No distress.   Skin: warm, dry, intact, no evidence of rash or concerning lesions  Head: Atraumatic without lesions.  Eyes: Conjunctivae and extraocular motions are normal. Pupils are equal, round. No scleral icterus.   Ears:  External ears unremarkable.   Neck: Supple, trachea midline. No thyromegaly present. No cervical or supraclavicular lymphadenopathy.  Cardiovascular: Regular rate and rhythm without murmur.   Pulmonary: Clear to ausculation. Normal effort. No rales, ronchi, or wheezing.  Extremities: No cyanosis, clubbing, erythema, nor edema.   Neurological: Alert and oriented x 3.   Psychiatric:  Behavior, mood, and affect are " appropriate.        Assessment & Plan:     71 y.o. male with the following -     1. Chronic atrial fibrillation (HCC)  EKG in office showed evidence of A. Fib  Pt is not currently taking elliquis since he underwent the watchman procedure  Need to r/o MI vs PE  due to this and patient's fatigue I am advising him to go to the ER   they will drive straight there for evaluation   copy of EKG given to patient follow-up with cardio ASAP we will send a note to cardiologist               Return in about 2 weeks (around 8/8/2022) for f/up after hospital visit .    Please note that this dictation was created using voice recognition software. I have made every reasonable attempt to correct obvious errors, but I expect that there are errors of grammar and possibly content that I did not discover before finalizing the note.

## 2022-07-26 NOTE — ED TRIAGE NOTES
"Patient vital signs rechecked and documented per [facility]. Patient denies any new needs at this time.  Patient updated on wait times, thanked for patience. Pt informed to alert triage tech or triage RN with any needs and/or changes in condition; patient verbalized understanding. Patient stated \"I feel about the same. Just tired. I want to go home and sleep.\"   "

## 2022-07-26 NOTE — ASSESSMENT & PLAN NOTE
F/b cardiology; last appt was June; he has f/up in Dec  Was seen at Urgent Care for low bp recently  After the visit, notes indicate that pt reached out to cardiology and was advised to monitor blood pressures and if they continue to be low he was advised to cut his losartan dose in half. Patient to follow above instruction.      Reports having palpitations Sat and Sun so he scheduled an appt w/me; they have since resolved but he's feeling very fatigued    EKG showed a fib; will reach out to cardio to have them call pt to schedule f/up in very near future (move appt in from Dec)

## 2022-07-26 NOTE — ED TRIAGE NOTES
.  Chief Complaint   Patient presents with   • Chest Pain     Intermittent sharp upper left chest pain      Pt ambulate to triage with above complaints. Pt notes S/S began Saturday, denies SOB, nausea. Hx of Afib. Pt stopped blood thinners last week per cardiologist, Watchman placed in June.

## 2022-07-27 NOTE — RESULT ENCOUNTER NOTE
[facility]  Your ultrasound of liver and gall bladder are normal!  Jennifer Waldron M.D.

## 2022-07-28 ENCOUNTER — PATIENT MESSAGE (OUTPATIENT)
Dept: CARDIOLOGY | Facility: MEDICAL CENTER | Age: 72
End: 2022-07-28
Payer: MEDICARE

## 2022-07-28 DIAGNOSIS — R74.8 ELEVATED SERUM GGT LEVEL: ICD-10-CM

## 2022-07-28 NOTE — RESULT ENCOUNTER NOTE
Released to whit Hodgson,  Your labs shows GGT is increasing now at 381, normal is 51.   I've ordered the follow up AMA lab and placed a referral to Gastroenterology to help with further evaluation of the liver as the ultrasound of liver is normal.   Jennifer Waldron M.D.

## 2022-07-29 ASSESSMENT — ENCOUNTER SYMPTOMS
CLAUDICATION: 0
PALPITATIONS: 0
PND: 0
ORTHOPNEA: 0
WEIGHT LOSS: 0
WEAKNESS: 0
SHORTNESS OF BREATH: 0
DIZZINESS: 0

## 2022-08-01 ENCOUNTER — PATIENT MESSAGE (OUTPATIENT)
Dept: CARDIOLOGY | Facility: MEDICAL CENTER | Age: 72
End: 2022-08-01
Payer: MEDICARE

## 2022-08-01 DIAGNOSIS — I48.0 PAROXYSMAL ATRIAL FIBRILLATION (HCC): ICD-10-CM

## 2022-08-01 NOTE — TELEPHONE ENCOUNTER
----- Message from KETTY Aaron sent at 7/29/2022  1:43 PM PDT -----  Regarding: FW: Recurrent AF  Hi Gavi,    Would you please let patient know that Dr. Brown agrees with Flecainide. Please have him start Flecainide 50 mg PO BID. Continue Metoprolol as well. I would like for him to have a repeat EKG completed in 7-10 days.     Thank you!!    SAWYER      ----- Message -----  From: Prince Brown M.D.  Sent: 7/29/2022   7:59 AM PDT  To: KETTY Aaron  Subject: RE: Recurrent AF                                 Agree sounds like a good plan  ----- Message -----  From: KETTY Aaron  Sent: 7/28/2022  10:32 AM PDT  To: Prince Brown M.D.  Subject: Recurrent AF                                     Patient having increased pAF burden. Discussed ablation, patient would like to try medication first.    Normal TTE and NM stress test in 2019 (see care everywhere).    No first degree or bundle on EKG.     S/P WATCHAMN in May now on ASA 81 mg daily.     I was thinking about trying Flecainide if you were in agreement?    Thank you for your time!    SAWYER

## 2022-08-04 RX ORDER — FLECAINIDE ACETATE 50 MG/1
50 TABLET ORAL 2 TIMES DAILY
Qty: 60 TABLET | Refills: 11 | Status: SHIPPED | OUTPATIENT
Start: 2022-08-04 | End: 2022-09-02

## 2022-08-11 DIAGNOSIS — R74.8 ELEVATED SERUM GGT LEVEL: ICD-10-CM

## 2022-08-18 ENCOUNTER — TELEPHONE (OUTPATIENT)
Dept: MEDICAL GROUP | Facility: PHYSICIAN GROUP | Age: 72
End: 2022-08-18
Payer: MEDICARE

## 2022-08-18 NOTE — PATIENT COMMUNICATION
Prince Brown M.D.  Syeda Boudreaux R.N.  Let's schedule for a FV on 9/2 to discuss ablation thanks     Patient scheduled for 9/2/2022 at 10:20 AM

## 2022-08-18 NOTE — TELEPHONE ENCOUNTER
Pls request records. Wife sent msg: pt in ER at [facility].   Thank you  Jennifer Waldron M.D.

## 2022-08-18 NOTE — PATIENT COMMUNICATION
"Phone Number Called: [phone]    Call outcome: Spoke to patient regarding message below.    Message: Called to follow up with patient regarding visit to [facility].     Patient had sharp pains and he went to [facility]. He thinks he had an episode of atrial fibrillation that has since resolved. Patient has been off the flecainide since Aug 9 due to dizziness at the recommendation of his primary care provider, but he did not check his heart rate or blood pressure while on the flecainide. The \"zap job\" patient and wife were asking about getting Gokul in for is an ablation. Patient states he is currently out of atrial fibrillation and is feeling better.   "

## 2022-08-19 ENCOUNTER — TELEPHONE (OUTPATIENT)
Dept: CARDIOLOGY | Facility: MEDICAL CENTER | Age: 72
End: 2022-08-19
Payer: MEDICARE

## 2022-08-19 NOTE — TELEPHONE ENCOUNTER
Faxed a records request to [facility].  P#:[phone]  F#:[phone]  Fax confirmation received and scanned into media.

## 2022-08-20 LAB — EKG IMPRESSION: NORMAL

## 2022-09-02 ENCOUNTER — OFFICE VISIT (OUTPATIENT)
Dept: CARDIOLOGY | Facility: MEDICAL CENTER | Age: 72
End: 2022-09-02
Payer: MEDICARE

## 2022-09-02 ENCOUNTER — TELEPHONE (OUTPATIENT)
Dept: CARDIOLOGY | Facility: MEDICAL CENTER | Age: 72
End: 2022-09-02

## 2022-09-02 VITALS
HEART RATE: 48 BPM | OXYGEN SATURATION: 99 % | SYSTOLIC BLOOD PRESSURE: 126 MMHG | DIASTOLIC BLOOD PRESSURE: 72 MMHG | WEIGHT: 216.8 LBS | HEIGHT: 66 IN | RESPIRATION RATE: 12 BRPM | BODY MASS INDEX: 34.84 KG/M2

## 2022-09-02 DIAGNOSIS — I48.20 CHRONIC ATRIAL FIBRILLATION (HCC): ICD-10-CM

## 2022-09-02 LAB — EKG IMPRESSION: NORMAL

## 2022-09-02 PROCEDURE — 93000 ELECTROCARDIOGRAM COMPLETE: CPT | Performed by: INTERNAL MEDICINE

## 2022-09-02 PROCEDURE — 99215 OFFICE O/P EST HI 40 MIN: CPT | Performed by: INTERNAL MEDICINE

## 2022-09-02 ASSESSMENT — FIBROSIS 4 INDEX: FIB4 SCORE: 1.21

## 2022-09-02 NOTE — TELEPHONE ENCOUNTER
Patient scheduled for afib ablation on 9-22-22 with Dr. Brown. Patient has been instructed to check in at 9:30 for 11:30 case time. Message sent to authorizations. Emailed Cart[facility].

## 2022-09-02 NOTE — TELEPHONE ENCOUNTER
Called patient to schedule - he is at [facility] right now. I will hold time on 9-22-22 for this procedure. I will call him back around 2:00 today to go over the details.

## 2022-09-02 NOTE — PROGRESS NOTES
Arrhythmia Clinic Note (Established patient)    DOS: 9/2/2022    Chief complaint/Reason for consult: Afib    Interval History: 70 y/o M with pAF/AFL. Had more episodes, started flecainide but could not tolerate this due to dizziness. Was in the ER last week with AF and RVR. Interested in ablation.     ROS (+ highlighted in bold):  Constitutional: Fevers/chills/fatigue/weightloss  HEENT: Blurry vision/eye pain/sore throat/hearing loss  Respiratory: Shortness of breath/cough  Cardiovascular: Chest pain/palpitations/edema/orthopnea/syncope  GI: Nausea/vomitting/diarrhea  MSK: Arthralgias/myagias/muscle weakness  Skin: Rash/sores  Neurological: Numbness/tremors/vertigo  Endocrine: Excessive thirst/polyuria/cold intolerance/heat intolerance  Psych: Depression/anxiety    Past Medical History:   Diagnosis Date    Anemia     Atrial fibrillation (HCC)     GERD (gastroesophageal reflux disease)     Heart burn     High cholesterol     Hyperlipidemia     Hypertension     Iron deficiency anemia     Pneumonia     as a young adult     Sleep apnea     cpap     Snoring     Stroke (HCC) 12/19/2021    left sided weakness     UTI (urinary tract infection) 03/2022       Past Surgical History:   Procedure Laterality Date    KNEE ARTHROPLASTY TOTAL Left     OTHER      sinus sx        Social History     Socioeconomic History    Marital status:      Spouse name: Not on file    Number of children: Not on file    Years of education: Not on file    Highest education level: Master's degree (e.g., MA, MS, Leslie, MEd, MSW, AMY)   Occupational History    Not on file   Tobacco Use    Smoking status: Never    Smokeless tobacco: Never   Vaping Use    Vaping Use: Never used   Substance and Sexual Activity    Alcohol use: Never    Drug use: Never    Sexual activity: Not on file   Other Topics Concern    Not on file   Social History Narrative    Retired     Social Determinants of Health     Financial Resource Strain: Low Risk     Difficulty of  Paying Living Expenses: Not hard at all   Food Insecurity: Unknown    Worried About Running Out of Food in the Last Year: Never true    Ran Out of Food in the Last Year: Patient refused   Transportation Needs: No Transportation Needs    Lack of Transportation (Medical): No    Lack of Transportation (Non-Medical): No   Physical Activity: Inactive    Days of Exercise per Week: 0 days    Minutes of Exercise per Session: 0 min   Stress: No Stress Concern Present    Feeling of Stress : Only a little   Social Connections: Unknown    Frequency of Communication with Friends and Family: More than three times a week    Frequency of Social Gatherings with Friends and Family: Patient refused    Attends Alevism Services: Patient refused    Active Member of Clubs or Organizations: Yes    Attends Club or Organization Meetings: Patient refused    Marital Status:    Intimate Partner Violence: Not on file   Housing Stability: Unknown    Unable to Pay for Housing in the Last Year: No    Number of Places Lived in the Last Year: 1    Unstable Housing in the Last Year: Patient refused       Family History   Problem Relation Age of Onset    Heart Disease Mother     Heart Disease Brother     Alcohol abuse Son        Allergies   Allergen Reactions    Ibuprofen     Lactose      Stomach upset        Current Outpatient Medications   Medication Sig Dispense Refill    omeprazole (PRILOSEC) 20 MG delayed-release capsule Take 20 mg by mouth 2 times a day.      MAGNESIUM PO Take  by mouth.      aspirin 81 MG EC tablet Take 1 Tablet by mouth every day. 100 Tablet 0    metoprolol tartrate (LOPRESSOR) 25 MG Tab Take 0.5 Tablets by mouth 2 times a day. 90 Tablet 3    hydrALAZINE (APRESOLINE) 10 MG Tab Take 1 Tablet by mouth 3 times a day. 270 Tablet 3    Ascorbic Acid (VITAMIN C PO) Take 1 Tablet by mouth every day.      losartan (COZAAR) 25 MG Tab Take 1 Tablet by mouth every day. 90 Tablet 3    Misc. Devices Misc 1 Each at bedtime. Please  "use CPAP every night, he has a history of sleep apnea and atrial fibrillation. He has home machine 1 Each 0     No current facility-administered medications for this visit.       Physical Exam:  Vitals:    09/02/22 1010   BP: 126/72   BP Location: Right arm   Patient Position: Sitting   BP Cuff Size: Adult   Pulse: (!) 48   Resp: 12   SpO2: 99%   Weight: 98.3 kg (216 lb 12.8 oz)   Height: 1.676 m (5' 6\")     General appearance: NAD, conversant   Eyes: anicteric sclerae, moist conjunctivae; no lid-lag; PERRLA  HENT: Atraumatic; oropharynx clear with moist mucous membranes and no mucosal ulcerations; normal hard and soft palate  Neck: Trachea midline; FROM, supple, no thyromegaly or lymphadenopathy  Lungs: CTA, with normal respiratory effort and no intercostal retractions  CV: RRR, no MRGs, no JVD  Abdomen: Soft, non-tender; no masses or HSM  Extremities: No peripheral edema or extremity lymphadenopathy  Skin: Normal temperature, turgor and texture; no rash, ulcers or subcutaneous nodules  Psych: Appropriate affect, alert and oriented to person, place and time    Data:  Lipids:   Lab Results   Component Value Date/Time    CHOLSTRLTOT 126 02/08/2022 08:42 AM    TRIGLYCERIDE 100 02/08/2022 08:42 AM    HDL 36 (A) 02/08/2022 08:42 AM    LDL 70 02/08/2022 08:42 AM        BMP:  Lab Results   Component Value Date/Time    SODIUM 140 07/25/2022 1957    POTASSIUM 4.5 07/25/2022 1957    CHLORIDE 105 07/25/2022 1957    CO2 22 07/25/2022 1957    GLUCOSE 100 (H) 07/25/2022 1957    BUN 24 (H) 07/25/2022 1957    CREATININE 0.98 07/25/2022 1957    CALCIUM 9.4 07/25/2022 1957    ANION 13.0 07/25/2022 1957        TSH:   Lab Results   Component Value Date/Time    TSHULTRASEN 0.150 (L) 02/25/2022 1445        THYROXINE (T4):   No results found for: MONE     CBC:   Lab Results   Component Value Date/Time    WBC 7.5 07/25/2022 07:57 PM    RBC 5.00 07/25/2022 07:57 PM    HEMOGLOBIN 14.7 07/25/2022 07:57 PM    HEMATOCRIT 44.8 07/25/2022 " 07:57 PM    MCV 89.6 07/25/2022 07:57 PM    MCH 29.4 07/25/2022 07:57 PM    MCHC 32.8 (L) 07/25/2022 07:57 PM    RDW 44.3 07/25/2022 07:57 PM    PLATELETCT 334 07/25/2022 07:57 PM    MPV 10.2 07/25/2022 07:57 PM    NEUTSPOLYS 43.60 (L) 07/25/2022 07:57 PM    LYMPHOCYTES 32.80 07/25/2022 07:57 PM    MONOCYTES 11.70 07/25/2022 07:57 PM    EOSINOPHILS 10.70 (H) 07/25/2022 07:57 PM    BASOPHILS 1.10 07/25/2022 07:57 PM    IMMGRAN 0.10 07/25/2022 07:57 PM    NRBC 0.00 07/25/2022 07:57 PM    NEUTS 3.27 07/25/2022 07:57 PM    LYMPHS 2.46 07/25/2022 07:57 PM    MONOS 0.88 (H) 07/25/2022 07:57 PM    EOS 0.80 (H) 07/25/2022 07:57 PM    BASO 0.08 07/25/2022 07:57 PM    IMMGRANAB 0.01 07/25/2022 07:57 PM    NRBCAB 0.00 07/25/2022 07:57 PM        CBC w/o DIFF  Lab Results   Component Value Date/Time    WBC 7.5 07/25/2022 07:57 PM    RBC 5.00 07/25/2022 07:57 PM    HEMOGLOBIN 14.7 07/25/2022 07:57 PM    MCV 89.6 07/25/2022 07:57 PM    MCH 29.4 07/25/2022 07:57 PM    MCHC 32.8 (L) 07/25/2022 07:57 PM    RDW 44.3 07/25/2022 07:57 PM    MPV 10.2 07/25/2022 07:57 PM       Prior echo/stress reviewed: Normal EF     EKG interpreted by me: Sinus aubrey    Impression/Plan:  1. Chronic atrial fibrillation (HCC)  EKG    CL-EP ABLATION ATRIAL FIBRILLATION    EC-PAULETTE W/O CONT        PAF  AFL  Anticoagulation management, h/o DANNI-C    Discussed AF/AFL ablation given his failure of AAD. We discussed pulmonary vein isolation for therapeutic management and continued rhythm control.  We discussed the risks and benefits of this procedure.  Risks include 1-3% risk of major cardiovascular event including stroke, myocardial infarction, phrenic nerve damage, esophageal injury and/or fistula formation, cardiac perforation, pericardial effusion, tamponade, major bleeding, or death.  I quoted a 70 to 80% chance free of atrial fibrillation at 12 months.  We discussed that he may also need a second procedure.    Start OAC for 2 months after ablation, plan AF  ablation with CTI      Prince Brown MD  Cardiac Electrophysiology

## 2022-09-02 NOTE — TELEPHONE ENCOUNTER
----- Message from Prince Brown M.D. sent at 9/2/2022 11:30 AM PDT -----  Please schedule Afib ablation next available, no meds to hold, thanks

## 2022-09-07 NOTE — TELEPHONE ENCOUNTER
Recvd call from patient requesting to reschedule this procedure. Patient now scheduled for 9-13-22 with Dr. Brown. Patient has been instructed to check in at 11:00 for 1:00 case time. Message sent to authorizations. Emailed Beverly.

## 2022-09-12 ENCOUNTER — PRE-ADMISSION TESTING (OUTPATIENT)
Dept: ADMISSIONS | Facility: MEDICAL CENTER | Age: 72
End: 2022-09-12
Attending: INTERNAL MEDICINE
Payer: MEDICARE

## 2022-09-12 DIAGNOSIS — Z01.810 PRE-OPERATIVE CARDIOVASCULAR EXAMINATION: ICD-10-CM

## 2022-09-12 DIAGNOSIS — Z01.812 PRE-OPERATIVE LABORATORY EXAMINATION: ICD-10-CM

## 2022-09-12 LAB
ALBUMIN SERPL BCP-MCNC: 4 G/DL (ref 3.2–4.9)
ALBUMIN/GLOB SERPL: 1.3 G/DL
ALP SERPL-CCNC: 132 U/L (ref 30–99)
ALT SERPL-CCNC: 14 U/L (ref 2–50)
ANION GAP SERPL CALC-SCNC: 12 MMOL/L (ref 7–16)
AST SERPL-CCNC: 18 U/L (ref 12–45)
BASOPHILS # BLD AUTO: 1.2 % (ref 0–1.8)
BASOPHILS # BLD: 0.07 K/UL (ref 0–0.12)
BILIRUB SERPL-MCNC: 0.5 MG/DL (ref 0.1–1.5)
BUN SERPL-MCNC: 17 MG/DL (ref 8–22)
CALCIUM SERPL-MCNC: 9.2 MG/DL (ref 8.5–10.5)
CHLORIDE SERPL-SCNC: 105 MMOL/L (ref 96–112)
CO2 SERPL-SCNC: 22 MMOL/L (ref 20–33)
CREAT SERPL-MCNC: 0.93 MG/DL (ref 0.5–1.4)
EKG IMPRESSION: NORMAL
EOSINOPHIL # BLD AUTO: 0.4 K/UL (ref 0–0.51)
EOSINOPHIL NFR BLD: 6.7 % (ref 0–6.9)
ERYTHROCYTE [DISTWIDTH] IN BLOOD BY AUTOMATED COUNT: 44.2 FL (ref 35.9–50)
GFR SERPLBLD CREATININE-BSD FMLA CKD-EPI: 88 ML/MIN/1.73 M 2
GLOBULIN SER CALC-MCNC: 3 G/DL (ref 1.9–3.5)
GLUCOSE SERPL-MCNC: 116 MG/DL (ref 65–99)
HCT VFR BLD AUTO: 43.6 % (ref 42–52)
HGB BLD-MCNC: 14.4 G/DL (ref 14–18)
IMM GRANULOCYTES # BLD AUTO: 0.01 K/UL (ref 0–0.11)
IMM GRANULOCYTES NFR BLD AUTO: 0.2 % (ref 0–0.9)
INR PPP: 1.04 (ref 0.87–1.13)
LYMPHOCYTES # BLD AUTO: 1.84 K/UL (ref 1–4.8)
LYMPHOCYTES NFR BLD: 30.9 % (ref 22–41)
MCH RBC QN AUTO: 28.6 PG (ref 27–33)
MCHC RBC AUTO-ENTMCNC: 33 G/DL (ref 33.7–35.3)
MCV RBC AUTO: 86.5 FL (ref 81.4–97.8)
MONOCYTES # BLD AUTO: 0.56 K/UL (ref 0–0.85)
MONOCYTES NFR BLD AUTO: 9.4 % (ref 0–13.4)
NEUTROPHILS # BLD AUTO: 3.07 K/UL (ref 1.82–7.42)
NEUTROPHILS NFR BLD: 51.6 % (ref 44–72)
NRBC # BLD AUTO: 0 K/UL
NRBC BLD-RTO: 0 /100 WBC
PLATELET # BLD AUTO: 222 K/UL (ref 164–446)
PMV BLD AUTO: 9.9 FL (ref 9–12.9)
POTASSIUM SERPL-SCNC: 3.4 MMOL/L (ref 3.6–5.5)
PROT SERPL-MCNC: 7 G/DL (ref 6–8.2)
PROTHROMBIN TIME: 13.5 SEC (ref 12–14.6)
RBC # BLD AUTO: 5.04 M/UL (ref 4.7–6.1)
SODIUM SERPL-SCNC: 139 MMOL/L (ref 135–145)
WBC # BLD AUTO: 6 K/UL (ref 4.8–10.8)

## 2022-09-12 PROCEDURE — 36415 COLL VENOUS BLD VENIPUNCTURE: CPT

## 2022-09-12 PROCEDURE — 85025 COMPLETE CBC W/AUTO DIFF WBC: CPT

## 2022-09-12 PROCEDURE — 93005 ELECTROCARDIOGRAM TRACING: CPT

## 2022-09-12 PROCEDURE — 80053 COMPREHEN METABOLIC PANEL: CPT

## 2022-09-12 PROCEDURE — 85610 PROTHROMBIN TIME: CPT

## 2022-09-12 PROCEDURE — 93010 ELECTROCARDIOGRAM REPORT: CPT | Performed by: INTERNAL MEDICINE

## 2022-09-12 ASSESSMENT — FIBROSIS 4 INDEX: FIB4 SCORE: 1.21

## 2022-09-13 ENCOUNTER — HOSPITAL ENCOUNTER (OUTPATIENT)
Facility: MEDICAL CENTER | Age: 72
End: 2022-09-14
Attending: INTERNAL MEDICINE | Admitting: INTERNAL MEDICINE
Payer: MEDICARE

## 2022-09-13 ENCOUNTER — APPOINTMENT (OUTPATIENT)
Dept: CARDIOLOGY | Facility: MEDICAL CENTER | Age: 72
End: 2022-09-13
Attending: INTERNAL MEDICINE
Payer: MEDICARE

## 2022-09-13 ENCOUNTER — ANESTHESIA (OUTPATIENT)
Dept: CARDIOLOGY | Facility: MEDICAL CENTER | Age: 72
End: 2022-09-13
Payer: MEDICARE

## 2022-09-13 ENCOUNTER — ANESTHESIA EVENT (OUTPATIENT)
Dept: CARDIOLOGY | Facility: MEDICAL CENTER | Age: 72
End: 2022-09-13
Payer: MEDICARE

## 2022-09-13 ENCOUNTER — TELEPHONE (OUTPATIENT)
Dept: CARDIOLOGY | Facility: MEDICAL CENTER | Age: 72
End: 2022-09-13

## 2022-09-13 ENCOUNTER — APPOINTMENT (OUTPATIENT)
Dept: RADIOLOGY | Facility: MEDICAL CENTER | Age: 72
End: 2022-09-13
Attending: INTERNAL MEDICINE
Payer: MEDICARE

## 2022-09-13 DIAGNOSIS — I48.0 PAROXYSMAL ATRIAL FIBRILLATION (HCC): ICD-10-CM

## 2022-09-13 DIAGNOSIS — Z86.79 S/P ABLATION OF ATRIAL FIBRILLATION: ICD-10-CM

## 2022-09-13 DIAGNOSIS — I48.20 CHRONIC ATRIAL FIBRILLATION (HCC): ICD-10-CM

## 2022-09-13 DIAGNOSIS — Z98.890 S/P ABLATION OF ATRIAL FIBRILLATION: ICD-10-CM

## 2022-09-13 LAB
ACT BLD: 254 SEC (ref 74–137)
ACT BLD: 341 SEC (ref 74–137)
EKG IMPRESSION: NORMAL

## 2022-09-13 PROCEDURE — 700105 HCHG RX REV CODE 258: Performed by: ANESTHESIOLOGY

## 2022-09-13 PROCEDURE — 700101 HCHG RX REV CODE 250

## 2022-09-13 PROCEDURE — 00537 ANES CARDIAC EP PROCEDURES: CPT | Performed by: ANESTHESIOLOGY

## 2022-09-13 PROCEDURE — 93655 ICAR CATH ABLTJ DSCRT ARRHYT: CPT | Performed by: INTERNAL MEDICINE

## 2022-09-13 PROCEDURE — 700102 HCHG RX REV CODE 250 W/ 637 OVERRIDE(OP): Performed by: ANESTHESIOLOGY

## 2022-09-13 PROCEDURE — A9270 NON-COVERED ITEM OR SERVICE: HCPCS | Performed by: ANESTHESIOLOGY

## 2022-09-13 PROCEDURE — 93010 ELECTROCARDIOGRAM REPORT: CPT | Mod: 59 | Performed by: INTERNAL MEDICINE

## 2022-09-13 PROCEDURE — 85347 COAGULATION TIME ACTIVATED: CPT | Mod: 91

## 2022-09-13 PROCEDURE — C1894 INTRO/SHEATH, NON-LASER: HCPCS

## 2022-09-13 PROCEDURE — 700102 HCHG RX REV CODE 250 W/ 637 OVERRIDE(OP): Performed by: INTERNAL MEDICINE

## 2022-09-13 PROCEDURE — 700111 HCHG RX REV CODE 636 W/ 250 OVERRIDE (IP)

## 2022-09-13 PROCEDURE — 160002 HCHG RECOVERY MINUTES (STAT)

## 2022-09-13 PROCEDURE — A9270 NON-COVERED ITEM OR SERVICE: HCPCS | Performed by: INTERNAL MEDICINE

## 2022-09-13 PROCEDURE — 93623 PRGRMD STIMJ&PACG IV RX NFS: CPT | Mod: 26 | Performed by: INTERNAL MEDICINE

## 2022-09-13 PROCEDURE — 93312 ECHO TRANSESOPHAGEAL: CPT

## 2022-09-13 PROCEDURE — 93005 ELECTROCARDIOGRAM TRACING: CPT | Performed by: INTERNAL MEDICINE

## 2022-09-13 PROCEDURE — 160046 HCHG PACU - 1ST 60 MINS PHASE II

## 2022-09-13 PROCEDURE — 700111 HCHG RX REV CODE 636 W/ 250 OVERRIDE (IP): Performed by: ANESTHESIOLOGY

## 2022-09-13 PROCEDURE — 93656 COMPRE EP EVAL ABLTJ ATR FIB: CPT | Performed by: INTERNAL MEDICINE

## 2022-09-13 PROCEDURE — G0378 HOSPITAL OBSERVATION PER HR: HCPCS

## 2022-09-13 PROCEDURE — 160035 HCHG PACU - 1ST 60 MINS PHASE I

## 2022-09-13 PROCEDURE — 700101 HCHG RX REV CODE 250: Performed by: ANESTHESIOLOGY

## 2022-09-13 PROCEDURE — 160036 HCHG PACU - EA ADDL 30 MINS PHASE I

## 2022-09-13 RX ORDER — LABETALOL HYDROCHLORIDE 5 MG/ML
5 INJECTION, SOLUTION INTRAVENOUS
Status: DISCONTINUED | OUTPATIENT
Start: 2022-09-13 | End: 2022-09-13 | Stop reason: HOSPADM

## 2022-09-13 RX ORDER — DEXMEDETOMIDINE HYDROCHLORIDE 100 UG/ML
INJECTION, SOLUTION INTRAVENOUS
Status: COMPLETED
Start: 2022-09-13 | End: 2022-09-13

## 2022-09-13 RX ORDER — HEPARIN SODIUM,PORCINE 1000/ML
VIAL (ML) INJECTION PRN
Status: DISCONTINUED | OUTPATIENT
Start: 2022-09-13 | End: 2022-09-13 | Stop reason: SURG

## 2022-09-13 RX ORDER — OXYCODONE HCL 5 MG/5 ML
5 SOLUTION, ORAL ORAL
Status: COMPLETED | OUTPATIENT
Start: 2022-09-13 | End: 2022-09-13

## 2022-09-13 RX ORDER — PROTAMINE SULFATE 10 MG/ML
INJECTION, SOLUTION INTRAVENOUS
Status: COMPLETED
Start: 2022-09-13 | End: 2022-09-13

## 2022-09-13 RX ORDER — LIDOCAINE HYDROCHLORIDE 20 MG/ML
INJECTION, SOLUTION INFILTRATION; PERINEURAL
Status: COMPLETED
Start: 2022-09-13 | End: 2022-09-13

## 2022-09-13 RX ORDER — MIDAZOLAM HYDROCHLORIDE 1 MG/ML
1 INJECTION INTRAMUSCULAR; INTRAVENOUS
Status: DISCONTINUED | OUTPATIENT
Start: 2022-09-13 | End: 2022-09-13 | Stop reason: HOSPADM

## 2022-09-13 RX ORDER — CEFAZOLIN SODIUM 1 G/3ML
INJECTION, POWDER, FOR SOLUTION INTRAMUSCULAR; INTRAVENOUS PRN
Status: DISCONTINUED | OUTPATIENT
Start: 2022-09-13 | End: 2022-09-13 | Stop reason: SURG

## 2022-09-13 RX ORDER — HYDRALAZINE HYDROCHLORIDE 20 MG/ML
INJECTION INTRAMUSCULAR; INTRAVENOUS
Status: COMPLETED
Start: 2022-09-13 | End: 2022-09-13

## 2022-09-13 RX ORDER — HEPARIN SODIUM 1000 [USP'U]/ML
INJECTION, SOLUTION INTRAVENOUS; SUBCUTANEOUS
Status: COMPLETED
Start: 2022-09-13 | End: 2022-09-13

## 2022-09-13 RX ORDER — HYDRALAZINE HYDROCHLORIDE 20 MG/ML
INJECTION INTRAMUSCULAR; INTRAVENOUS PRN
Status: DISCONTINUED | OUTPATIENT
Start: 2022-09-13 | End: 2022-09-13 | Stop reason: SURG

## 2022-09-13 RX ORDER — SODIUM CHLORIDE, SODIUM LACTATE, POTASSIUM CHLORIDE, CALCIUM CHLORIDE 600; 310; 30; 20 MG/100ML; MG/100ML; MG/100ML; MG/100ML
INJECTION, SOLUTION INTRAVENOUS
Status: DISCONTINUED | OUTPATIENT
Start: 2022-09-13 | End: 2022-09-13 | Stop reason: SURG

## 2022-09-13 RX ORDER — ISOPROTERENOL HYDROCHLORIDE 0.2 MG/ML
INJECTION, SOLUTION INTRAVENOUS
Status: COMPLETED
Start: 2022-09-13 | End: 2022-09-13

## 2022-09-13 RX ORDER — PROTAMINE SULFATE 10 MG/ML
INJECTION, SOLUTION INTRAVENOUS PRN
Status: DISCONTINUED | OUTPATIENT
Start: 2022-09-13 | End: 2022-09-13 | Stop reason: SURG

## 2022-09-13 RX ORDER — LIDOCAINE HYDROCHLORIDE 20 MG/ML
INJECTION, SOLUTION EPIDURAL; INFILTRATION; INTRACAUDAL; PERINEURAL PRN
Status: DISCONTINUED | OUTPATIENT
Start: 2022-09-13 | End: 2022-09-13 | Stop reason: SURG

## 2022-09-13 RX ORDER — DEXAMETHASONE SODIUM PHOSPHATE 4 MG/ML
INJECTION, SOLUTION INTRA-ARTICULAR; INTRALESIONAL; INTRAMUSCULAR; INTRAVENOUS; SOFT TISSUE PRN
Status: DISCONTINUED | OUTPATIENT
Start: 2022-09-13 | End: 2022-09-13 | Stop reason: SURG

## 2022-09-13 RX ORDER — ONDANSETRON 2 MG/ML
4 INJECTION INTRAMUSCULAR; INTRAVENOUS
Status: DISCONTINUED | OUTPATIENT
Start: 2022-09-13 | End: 2022-09-13 | Stop reason: HOSPADM

## 2022-09-13 RX ORDER — HYDRALAZINE HYDROCHLORIDE 10 MG/1
10 TABLET, FILM COATED ORAL 3 TIMES DAILY
Status: DISCONTINUED | OUTPATIENT
Start: 2022-09-13 | End: 2022-09-14 | Stop reason: HOSPADM

## 2022-09-13 RX ORDER — HEPARIN SODIUM 200 [USP'U]/100ML
INJECTION, SOLUTION INTRAVENOUS
Status: COMPLETED
Start: 2022-09-13 | End: 2022-09-13

## 2022-09-13 RX ORDER — HYDRALAZINE HYDROCHLORIDE 20 MG/ML
5 INJECTION INTRAMUSCULAR; INTRAVENOUS
Status: DISCONTINUED | OUTPATIENT
Start: 2022-09-13 | End: 2022-09-13 | Stop reason: HOSPADM

## 2022-09-13 RX ORDER — DIPHENHYDRAMINE HYDROCHLORIDE 50 MG/ML
12.5 INJECTION INTRAMUSCULAR; INTRAVENOUS
Status: DISCONTINUED | OUTPATIENT
Start: 2022-09-13 | End: 2022-09-13 | Stop reason: HOSPADM

## 2022-09-13 RX ORDER — DEXMEDETOMIDINE HYDROCHLORIDE 100 UG/ML
INJECTION, SOLUTION INTRAVENOUS PRN
Status: DISCONTINUED | OUTPATIENT
Start: 2022-09-13 | End: 2022-09-13 | Stop reason: SURG

## 2022-09-13 RX ORDER — OXYCODONE HCL 5 MG/5 ML
10 SOLUTION, ORAL ORAL
Status: COMPLETED | OUTPATIENT
Start: 2022-09-13 | End: 2022-09-13

## 2022-09-13 RX ORDER — ONDANSETRON 2 MG/ML
INJECTION INTRAMUSCULAR; INTRAVENOUS PRN
Status: DISCONTINUED | OUTPATIENT
Start: 2022-09-13 | End: 2022-09-13 | Stop reason: SURG

## 2022-09-13 RX ORDER — MIDAZOLAM HYDROCHLORIDE 1 MG/ML
INJECTION INTRAMUSCULAR; INTRAVENOUS
Status: COMPLETED
Start: 2022-09-13 | End: 2022-09-13

## 2022-09-13 RX ORDER — HALOPERIDOL 5 MG/ML
1 INJECTION INTRAMUSCULAR
Status: DISCONTINUED | OUTPATIENT
Start: 2022-09-13 | End: 2022-09-13 | Stop reason: HOSPADM

## 2022-09-13 RX ORDER — BUPIVACAINE HYDROCHLORIDE 2.5 MG/ML
INJECTION, SOLUTION EPIDURAL; INFILTRATION; INTRACAUDAL
Status: COMPLETED
Start: 2022-09-13 | End: 2022-09-13

## 2022-09-13 RX ORDER — SODIUM CHLORIDE, SODIUM LACTATE, POTASSIUM CHLORIDE, CALCIUM CHLORIDE 600; 310; 30; 20 MG/100ML; MG/100ML; MG/100ML; MG/100ML
INJECTION, SOLUTION INTRAVENOUS CONTINUOUS
Status: DISCONTINUED | OUTPATIENT
Start: 2022-09-13 | End: 2022-09-13 | Stop reason: HOSPADM

## 2022-09-13 RX ADMIN — PROPOFOL 50 MG: 10 INJECTION, EMULSION INTRAVENOUS at 16:19

## 2022-09-13 RX ADMIN — HEPARIN SODIUM: 1000 INJECTION, SOLUTION INTRAVENOUS; SUBCUTANEOUS at 15:25

## 2022-09-13 RX ADMIN — BUPIVACAINE HYDROCHLORIDE: 2.5 INJECTION, SOLUTION EPIDURAL; INFILTRATION; INTRACAUDAL; PERINEURAL at 15:25

## 2022-09-13 RX ADMIN — DEXAMETHASONE SODIUM PHOSPHATE 8 MG: 4 INJECTION, SOLUTION INTRA-ARTICULAR; INTRALESIONAL; INTRAMUSCULAR; INTRAVENOUS; SOFT TISSUE at 15:52

## 2022-09-13 RX ADMIN — HEPARIN SODIUM 6000 UNITS: 1000 INJECTION, SOLUTION INTRAVENOUS; SUBCUTANEOUS at 15:56

## 2022-09-13 RX ADMIN — ISOPROTERENOL HYDROCHLORIDE 0.2 MG: 0.2 INJECTION, SOLUTION INTRACARDIAC; INTRAMUSCULAR; INTRAVENOUS; SUBCUTANEOUS at 16:26

## 2022-09-13 RX ADMIN — HYDRALAZINE HYDROCHLORIDE 10 MG: 10 TABLET, FILM COATED ORAL at 21:54

## 2022-09-13 RX ADMIN — FENTANYL CITRATE 50 MCG: 50 INJECTION, SOLUTION INTRAMUSCULAR; INTRAVENOUS at 15:46

## 2022-09-13 RX ADMIN — APIXABAN 5 MG: 5 TABLET, FILM COATED ORAL at 23:05

## 2022-09-13 RX ADMIN — HEPARIN SODIUM 15000 UNITS: 1000 INJECTION, SOLUTION INTRAVENOUS; SUBCUTANEOUS at 15:36

## 2022-09-13 RX ADMIN — HEPARIN SODIUM 6000 UNITS: 200 INJECTION, SOLUTION INTRAVENOUS at 15:26

## 2022-09-13 RX ADMIN — FENTANYL CITRATE 50 MCG: 50 INJECTION, SOLUTION INTRAMUSCULAR; INTRAVENOUS at 16:17

## 2022-09-13 RX ADMIN — LIDOCAINE HYDROCHLORIDE: 20 INJECTION, SOLUTION INFILTRATION; PERINEURAL at 15:25

## 2022-09-13 RX ADMIN — DEXMEDETOMIDINE 5 MCG: 200 INJECTION, SOLUTION INTRAVENOUS at 16:13

## 2022-09-13 RX ADMIN — FENTANYL CITRATE 50 MCG: 50 INJECTION, SOLUTION INTRAMUSCULAR; INTRAVENOUS at 15:20

## 2022-09-13 RX ADMIN — METOPROLOL TARTRATE 12.5 MG: 25 TABLET, FILM COATED ORAL at 21:52

## 2022-09-13 RX ADMIN — ONDANSETRON 4 MG: 2 INJECTION INTRAMUSCULAR; INTRAVENOUS at 16:12

## 2022-09-13 RX ADMIN — PROPOFOL 200 MG: 10 INJECTION, EMULSION INTRAVENOUS at 15:09

## 2022-09-13 RX ADMIN — HYDRALAZINE HYDROCHLORIDE 10 MG: 20 INJECTION INTRAMUSCULAR; INTRAVENOUS at 16:25

## 2022-09-13 RX ADMIN — ROCURONIUM BROMIDE 30 MG: 10 INJECTION, SOLUTION INTRAVENOUS at 16:11

## 2022-09-13 RX ADMIN — LIDOCAINE HYDROCHLORIDE 100 MG: 20 INJECTION, SOLUTION EPIDURAL; INFILTRATION; INTRACAUDAL at 15:09

## 2022-09-13 RX ADMIN — DEXMEDETOMIDINE 10 MCG: 200 INJECTION, SOLUTION INTRAVENOUS at 16:14

## 2022-09-13 RX ADMIN — FENTANYL CITRATE 50 MCG: 50 INJECTION, SOLUTION INTRAMUSCULAR; INTRAVENOUS at 15:09

## 2022-09-13 RX ADMIN — SODIUM CHLORIDE, POTASSIUM CHLORIDE, SODIUM LACTATE AND CALCIUM CHLORIDE: 600; 310; 30; 20 INJECTION, SOLUTION INTRAVENOUS at 15:01

## 2022-09-13 RX ADMIN — CEFAZOLIN 2 G: 330 INJECTION, POWDER, FOR SOLUTION INTRAMUSCULAR; INTRAVENOUS at 15:15

## 2022-09-13 RX ADMIN — MIDAZOLAM 2 MG: 1 INJECTION INTRAMUSCULAR; INTRAVENOUS at 15:09

## 2022-09-13 RX ADMIN — SUGAMMADEX 200 MG: 100 INJECTION, SOLUTION INTRAVENOUS at 16:42

## 2022-09-13 RX ADMIN — DEXMEDETOMIDINE 10 MCG: 200 INJECTION, SOLUTION INTRAVENOUS at 16:22

## 2022-09-13 RX ADMIN — PROTAMINE SULFATE 50 MG: 10 INJECTION, SOLUTION INTRAVENOUS at 16:37

## 2022-09-13 RX ADMIN — ROCURONIUM BROMIDE 50 MG: 10 INJECTION, SOLUTION INTRAVENOUS at 15:09

## 2022-09-13 RX ADMIN — OXYCODONE HYDROCHLORIDE 5 MG: 5 SOLUTION ORAL at 17:59

## 2022-09-13 RX ADMIN — SODIUM CHLORIDE, POTASSIUM CHLORIDE, SODIUM LACTATE AND CALCIUM CHLORIDE: 600; 310; 30; 20 INJECTION, SOLUTION INTRAVENOUS at 15:32

## 2022-09-13 RX ADMIN — PROPOFOL 50 MG: 10 INJECTION, EMULSION INTRAVENOUS at 15:46

## 2022-09-13 ASSESSMENT — PAIN SCALES - GENERAL: PAIN_LEVEL: 0

## 2022-09-13 ASSESSMENT — CHA2DS2 SCORE
CHA2DS2 VASC SCORE: 4
VASCULAR DISEASE: NO
PRIOR STROKE OR TIA OR THROMBOEMBOLISM: YES
HYPERTENSION: YES
SEX: MALE
DIABETES: NO
AGE 65 TO 74: YES
AGE 75 OR GREATER: NO
CHF OR LEFT VENTRICULAR DYSFUNCTION: NO

## 2022-09-13 ASSESSMENT — FIBROSIS 4 INDEX: FIB4 SCORE: 1.5

## 2022-09-13 NOTE — OP REPORT
Electrophysiology Procedure Note  [facility]    Procedures Performed:  Pulmonary Vein Isolation  Ablation of additional arrhythmia  Intracardiac Echocardiography  Three-dimensional intracardiac mapping  IV isoproterenol infusion with programmed stimulation  Trans-esophageal echocardiogram    Electrophysiologist: Prince Brown MD    Assistant(s): None    Anesthesia: General anesthesia was provided by Dr. Villanueva of the Anesthesiology service.    Statement of Medical Necessity: This is a 71  year-old male with history of symptomatic paroxysmal atrial  fibrillation     Pre-procedure ECG: Sinus     Post-procedure ECG: Sinus     Description of Procedure:    Access and catheter placement: After obtaining informed written consent, the patient was  brought to the EP lab in the fasting, non-sedated stated. The patient was sedated and intubated  by the anesthesiologist. The patient was prepped and draped in the usual sterile fashion. Using  the modified Seldinger technique, access was obtained in the right  femoral vein. Guidewires were advanced into the IVC. In the right femoral vein, 3 sheaths of   8F were placed. A deflectable  decapolar catheter was advanced through the LFV to the coronary sinus. A 8F intracardiac  echo catheter was advanced to the right atrium.     At baseline state we targeted the CTI for ablation for treatment of clinical typical flutter. Ablation was performed at 35W to create a line of block >160ms.    Through an 8F sheath,  a long wire was advanced to the SVC. The short sheath was  changed out for a medium-curl Vizigo (Biosense Godoy) sheath which was advanced to the SVC. The wire was  removed and the dilator was flushed. A 98-cm transseptal needle (backstitch) was advanced to the tip of the  dilator. The transseptal needle was attached to the manifold and  flushed. Under intracardiac echocardiographic guidance, the sheath/needle   system was withdrawn to the fossa ovalis. At this  time, 15,000 units of heparin were administered.   Additional boluses of heparin were given as needed to keep -350 sec during LA dwell time.   The needle was advanced out of the dilator, and RF energy applied via the Moran needle.   ICE visualized the needle passage through the fossa ovalis into the left  atrium. Confirmation of left atrial location was confirmed by injecting saline and transducing  pressure. The dilator was advanced over the needle into the left atrium, and then the sheath was advanced over the dilator. The dilator and  needle were removed. The sheath was flushed and connected to a continuous heparinized   saline drip.       A duodecapolar Penta-Ray catheter ([employer]-Godoy) was  advanced through the Vizigo sheath into the left atrium. A 3-dimensional electroanatomical  map was constructed using the CARTO system. The Penta-ray was removed and a 3.5-mm externally-irrigated ablation catheter (Biosense-Godoy   Thermocool ST SF DF-curve) was advanced through the Vizigo sheath into the left atrium.     Attention was first turned to the left pulmonary veins. The Penta-Ray  catheter was placed in the LSPV and LIPV which showed conduction into the vein. A circumferential  ablation line using radiofrequency energy 20-40W was placed which resulted in LSPV and LIPV isolation.  The catheters were then moved to the RSPV and RIPV which showed evidence of conduction into the veins,   and ablation 20-40W was performed circumferentially around these veins   resulting in RSPV and RIPV isolation. Power was reduced near the esophagus. The RPVs required ulcy ablation.   The Penta-Ray was advanced into all four pulmonary veins and persistent conduction block into the right and left pulmonary veins was demonstrated.     Isuprel 2-4mcg/min was administered and burst pacing was performed in the left atrium without induction of sustained AF, AFL, or SVT.     The left atrial sheath and catheter were withdrawn  to the right atrium.   ICE visualization   of the pericardium confirmed no pericardial effusion at the end of the case. The  patient was awakened from anesthesia, catheters and sheaths were removed, Vascade MVP closure devices were deployed, and manual  pressure was held on bilateral groins until hemostasis was achieved.     Electrophysiological Findings:  Sinus cycle length 858 msec  Intervals-   H-V 47 msec   msec   msec   msec  AV block CL <250 ms    Total RF time: 1261 sec  Fluoro time: 0 min    Estimated blood loss - 30 mL    Complications: None    Impression:  1. Atrial fibrillation, paroxysmal  2. Successful isolation of all four pulmonary veins  3. Typical atrial flutter  4. Successful ablation of CTI with bidirectional block for treatment of flutter    Recommendations:  1. Bed rest for 2 hours  2. Telemetry monitoring during recovery  3. Anticoagulant therapy for 2 months  4. Follow up in Arrhythmia clinic in 4 weeks      Prince Brown MD  Cardiac Electrophysiology

## 2022-09-13 NOTE — PROGRESS NOTES
Patient has heart rate in the 40's=50's, /82, patient asymptomatic. RN notified the cath lab supervisor to relay message to anesthesia.He said he would do so.     RN attempted to start IV but was unable t, ultrasound was then used with different RN and still unable to find vein and start IV. RN messaged the cath lab supervisor and he is aware.   no

## 2022-09-13 NOTE — ANESTHESIA PREPROCEDURE EVALUATION
Date/Time: 09/13/22 1330    Scheduled providers: Prince Brown M.D.; Elizabeth Hope M.D.    Procedure: CL-EP ABLATION ATRIAL FIBRILLATION    Diagnosis:       Chronic atrial fibrillation (HCC) [I48.20]      Chronic atrial fibrillation, unspecified [I48.20]    Indications: See Associated Dx    Location: [facility] CATH LAB - [facility]          Relevant Problems   ANESTHESIA   (positive) ROB (obstructive sleep apnea)      NEURO   (positive) Stroke (HCC)      CARDIAC   (positive) AF (atrial fibrillation) (HCC)   (positive) Hemorrhoids   (positive) PAF (paroxysmal atrial fibrillation) (HCC)   (positive) Primary hypertension      GI   (positive) GERD (gastroesophageal reflux disease)       Physical Exam    Airway   Mallampati: II  TM distance: >3 FB  Neck ROM: full       Cardiovascular   Rhythm: irregular  Rate: normal     Dental - normal exam           Pulmonary   Breath sounds clear to auscultation     Abdominal - normal exam     Neurological     Comments: parasthesia on left side    Walks with walker  Weakness left side             Anesthesia Plan    ASA 3   ASA physical status 3 criteria: CVA or TIA - history (> 3 months)    Plan - general       Airway plan will be ETT          Induction: intravenous    Postoperative Plan: Postoperative administration of opioids is intended.    Pertinent diagnostic labs and testing reviewed    Informed Consent:    Anesthetic plan and risks discussed with patient.    Use of blood products discussed with: whom consented to blood products.

## 2022-09-13 NOTE — ANESTHESIA PROCEDURE NOTES
Airway    Date/Time: 9/13/2022 3:11 PM  Performed by: Rosita Villanueva M.D.  Authorized by: Rosita Villanueva M.D.     Location:  OR  Urgency:  Elective  Difficult Airway: No    Indications for Airway Management:  Anesthesia      Spontaneous Ventilation: absent    Sedation Level:  Deep  Preoxygenated: Yes    Patient Position:  Sniffing  MILS Maintained Throughout: No    Mask Difficulty Assessment:  1 - vent by mask  Final Airway Type:  Endotracheal airway  Final Endotracheal Airway:  ETT  Cuffed: Yes    Technique Used for Successful ETT Placement:  Direct laryngoscopy  Devices/Methods Used in Placement:  Intubating stylet    Insertion Site:  Oral  Blade Type:  [place]  Laryngoscope Blade/Videolaryngoscope Blade Size:  2  ETT Size (mm):  7.0  Placement Verified by: capnometry    Cormack-Lehane Classification:  Grade I - full view of glottis  Number of Attempts at Approach:  1

## 2022-09-13 NOTE — DISCHARGE INSTRUCTIONS
[facility] Post Ablation Patient Instructions:  No lifting > 10 lbs x 1 week.    No soaking in baths, hot tubs, pools x 1 week.  May shower the day after discharge and take off groin dressings and leave uncovered.  Continue to monitor sites daily for warmth, redness, discolored drainage.  It is common to have a small lump in the area where the cather was (usually the size of a small marble); this will go away but takes approximately 6 weeks to normalize.   3.   Please take all medications as prescribed to you; please do not stop any medications prescribed post ablation unless directed by your healthcare provider.    4.   Please do not miss any doses of your blood thinner (if you have been started on, or take chronic blood thinners) without discussion with your healthcare provider first.   5.   Please walk and take deep breaths after discharge.  After discharge, if  experiences neurological changes/signs of stroke, high fever, you should be seen in the emergency dept.   6.   It is possible you may experience some chest discomfort or chest tightness post ablation.  This is usually secondary to inflammation and irritation of the tissues at the area of the ablation.  If this occurs, it is advised to try 400 mg of Ibuprofen with food as needed up to three times a day for a maximum of two days.  This should help to decrease pain and tissue inflammation.          **Please notify the office ([phone]) if this occurs.         ** DO NOT TAKE Ibuprofen IF HISTORY OF SIGNIFICANT BLEEDING OR KIDNEY DISEASE WITHOUT DISCUSSING WITH YOUR CARDIOLOGY PROVIDER FIRST.          ** If pain becomes severe or you have additional symptoms you may need to be medically evaluated; please contact the cardiology office ([phone]) for further direction.   7. It is possible that you may experience arrhythmia/Atrial Fibrillation post ablation.  This is secondary to irritation and inflammation of the  cardiac tissues from the ablation.  If you have atrial fibrillation all day or feel poorly with it, please notify your cardiologist's via phone ([phone]) or [employer].    8.  Please contact call our office ([phone]) or message via [employer] message if you have any questions or concerns post procedurally.  9. You need to be seen for post ablation follow up 2-4 weeks post procedure.  If you do not have a follow up appointment scheduled, please call [phone] to schedule your follow up appointment.       Cardiac Ablation    Cardiac ablation is a procedure to stop some heart tissue from causing problems. The heart has many electrical connections. Sometimes these connections make the heart beat very fast or irregularly. Removing some problem areas can improve the heart rhythm or make it normal.  What happens before the procedure?  Follow instructions from your doctor about what you cannot eat or drink.  Ask your doctor about:  Changing or stopping your normal medicines. This is important if you take diabetes medicines or blood thinners.  Taking medicines such as aspirin and ibuprofen. These medicines can thin your blood. Do not take these medicines before your procedure if your doctor tells you not to.  Plan to have someone take you home.  If you will be going home right after the procedure, plan to have someone with you for 24 hours.  What happens during the procedure?  To lower your risk of infection:  Your health care team will wash or sanitize their hands.  Your skin will be washed with soap.  Hair may be removed from your neck or groin.  An IV tube will be put into one of your veins.  You will be given a medicine to help you relax (sedative).  Skin on your neck or groin will be numbed.  A cut (incision) will be made in your neck or groin.  A needle will be put through your cut and into a vein in your neck or groin.  A tube (catheter) will be put into the needle. The tube will be moved to your heart. X-rays  (fluoroscopy) will be used to help guide the tube.  Small devices (electrodes) on the tip of the tube will send out electrical currents.  Dye may be put through the tube. This helps your surgeon see your heart.  Electrical energy will be used to scar (ablate) some heart tissue. Your surgeon may use:  Heat (radiofrequency energy).  Laser energy.  Extreme cold (cryoablation).  The tube will be taken out.  Pressure will be held on your cut. This helps stop bleeding.  A bandage (dressing) will be put on your cut.  The procedure may vary.  What happens after the procedure?  You will be monitored until your medicines have worn off.  Your cut will be watched for bleeding. You will need to lie still for a few hours.  Do not drive for 24 hours or as long as your doctor tells you.  Summary  Cardiac ablation is a procedure to stop some heart tissue from causing problems.  Electrical energy will be used to scar (ablate) some heart tissue.  This information is not intended to replace advice given to you by your health care provider. Make sure you discuss any questions you have with your health care provider.  Document Released: 08/20/2014 Document Revised: 11/30/2018 Document Reviewed: 11/06/2017  [employer] Patient Education © 2020 [employer]    Discharge Instructions    Discharged to home by car with relative. Discharged via wheelchair, hospital escort: Yes.  Special equipment needed: Not Applicable    Be sure to schedule a follow-up appointment with your primary care doctor or any specialists as instructed.     Discharge Plan:   Diet Plan: Discussed  Activity Level: Discussed  Confirmed Follow up Appointment: Appointment Scheduled  Confirmed Symptoms Management: Discussed  Medication Reconciliation Updated: Yes    I understand that a diet low in cholesterol, fat, and sodium is recommended for good health. Unless I have been given specific instructions below for another diet, I accept this instruction as my diet prescription.    Other diet: Cardiac diet    Special Instructions: None    -Is this patient being discharged with medication to prevent blood clots?  No    Is patient discharged on Warfarin / Coumadin?   No

## 2022-09-14 ENCOUNTER — PHARMACY VISIT (OUTPATIENT)
Dept: PHARMACY | Facility: MEDICAL CENTER | Age: 72
End: 2022-09-14
Payer: COMMERCIAL

## 2022-09-14 VITALS
HEART RATE: 69 BPM | WEIGHT: 213.85 LBS | RESPIRATION RATE: 17 BRPM | HEIGHT: 66 IN | TEMPERATURE: 98.4 F | BODY MASS INDEX: 34.37 KG/M2 | OXYGEN SATURATION: 95 % | DIASTOLIC BLOOD PRESSURE: 70 MMHG | SYSTOLIC BLOOD PRESSURE: 137 MMHG

## 2022-09-14 PROBLEM — Z98.890 S/P ABLATION OF ATRIAL FIBRILLATION: Status: ACTIVE | Noted: 2022-09-14

## 2022-09-14 PROBLEM — Z86.79 S/P ABLATION OF ATRIAL FIBRILLATION: Status: ACTIVE | Noted: 2022-09-14

## 2022-09-14 LAB — EKG IMPRESSION: NORMAL

## 2022-09-14 PROCEDURE — 700102 HCHG RX REV CODE 250 W/ 637 OVERRIDE(OP): Performed by: INTERNAL MEDICINE

## 2022-09-14 PROCEDURE — A9270 NON-COVERED ITEM OR SERVICE: HCPCS | Performed by: INTERNAL MEDICINE

## 2022-09-14 PROCEDURE — G0378 HOSPITAL OBSERVATION PER HR: HCPCS

## 2022-09-14 PROCEDURE — 93010 ELECTROCARDIOGRAM REPORT: CPT | Performed by: INTERNAL MEDICINE

## 2022-09-14 PROCEDURE — RXMED WILLOW AMBULATORY MEDICATION CHARGE: Performed by: NURSE PRACTITIONER

## 2022-09-14 PROCEDURE — 93005 ELECTROCARDIOGRAM TRACING: CPT | Performed by: INTERNAL MEDICINE

## 2022-09-14 RX ADMIN — METOPROLOL TARTRATE 12.5 MG: 25 TABLET, FILM COATED ORAL at 05:01

## 2022-09-14 RX ADMIN — HYDRALAZINE HYDROCHLORIDE 10 MG: 10 TABLET, FILM COATED ORAL at 08:40

## 2022-09-14 RX ADMIN — APIXABAN 5 MG: 5 TABLET, FILM COATED ORAL at 05:01

## 2022-09-14 NOTE — PROGRESS NOTES
Discharge orders acknowledged, Pt aware and compliant with new orders, D/C teaching completed, Pt able to verbalize needs and complaint with discharge orders. Medication delivered to pt prior to discharge. IV removed.

## 2022-09-14 NOTE — OR NURSING
Pt arrives from procedure room to PACU at 1655. Pt identification verified by team, pt placed on all monitors with alarms audible, report and care of pt received from Anesthesiologist and RN. Assessment completed, pt changed into hospital gown and provided with warm blankets.     1730- EKG completed. Dr. Brown at bedside, pt aware of plan for 2 hrs of bedrest/ tele monitoring and that he is ok to discharge after 2030. Call placed to pt spouse (Adriana) with update.     1800- Pt medicated for headache with oxycodone.     1830- Pt states pain better after oxycodone.     1900- Pt ambulatory to restroom with use of walker. Ambulated with steady gait, able to void without difficulty. Back to [facility], provided with pudding cup and water, tolerating well.     1915- Report called to SLICK Miranda in phase II.

## 2022-09-14 NOTE — PROGRESS NOTES
Monitor Summary:     Rhythm: Sinus Rhythm    Rate: 66-79    Measurement: .19/.08/.37    Ectopy: O PACs, R PVCs,     12 hr cc

## 2022-09-14 NOTE — CARE PLAN
The patient is Watcher - Medium risk of patient condition declining or worsening    Shift Goals  Clinical Goals: Monitor tele, d/c in AM  Patient Goals: Rest, FOOD    Progress made toward(s) clinical / shift goals:    Problem: Knowledge Deficit - Standard  Goal: Patient and family/care givers will demonstrate understanding of plan of care, disease process/condition, diagnostic tests and medications  Outcome: Progressing

## 2022-09-14 NOTE — PROGRESS NOTES
Assumed care of patient, received bedside report from NOC RN. Patient is A&O X 4, but forgetful. Pain 0/10. Vital signs stable overnight, on RA. On tele monitor, SR 72. POC discussed with patient and he verbalized understanding. Call light within reach and fall precautions in place. Bed locked and in lowest position.

## 2022-09-14 NOTE — PROGRESS NOTES
Bedside report received from previous RN, pt care assumed, assessment completed. Pt is A&O 4, pain 0/10, sinus rhythm on the monitor. Updated on POC, questions answered. Bed in lowest, locked position, treaded socks on, call light and belongings within reach. Fall precautions in place.

## 2022-09-14 NOTE — PROGRESS NOTES
4 Eyes Skin Assessment Completed by SLICK Murphy and SLICK Lindquist.    Head WDL  Ears WDL  Nose WDL  Mouth WDL  Neck WDL  Breast/Chest Redness and Rash  Shoulder Blades WDL  Spine Redness and Blanching  (R) Arm/Elbow/Hand WDL  (L) Arm/Elbow/Hand WDL  Abdomen WDL  Groin WDL  Scrotum/Coccyx/Buttocks WDL  (R) Leg WDL  (L) Leg WDL  (R) Heel/Foot/Toe WDL  (L) Heel/Foot/Toe WDL          Devices In Places Tele Box and Pulse Ox      Interventions In Place Pillows    Possible Skin Injury No    Pictures Uploaded Into Epic Yes  Wound Consult Placed N/A  RN Wound Prevention Protocol Ordered No

## 2022-09-14 NOTE — DISCHARGE SUMMARY
[facility] EP Discharge Summary    Admission Date: 9/13/22  Discharge Date: 9/14/2022    CHIEF COMPLAINT ON ADMISSION: Paroxysmal atrial fibrillation    DISCHARGE DIAGNOSIS:  S/p paroxysmal Atrial fibrillation ablation    PROCEDURES:  Atrial fibrillation ablation    HPI & HOSPITAL COURSE:  Mr Baca is a 71 y.o. male with paroxysmal atrial fibrillation, that was becoming much more bothersome, did not tolerate Flecainide, has had visit to ED for AFib with RVR, decided to undergo ablation after seeing Dr. Brown on 9/2/22. Patient admitted for overnight observation. He is feeling fine this morning, maintaining NSR. Up and walking without complaints. R femoral site uncomplicated.     Procedure Performed on 9/13/22  Pulmonary Vein Isolation  Ablation of additional arrhythmia  Intracardiac Echocardiography  Three-dimensional intracardiac mapping  IV isoproterenol infusion with programmed stimulation  Trans-esophageal echocardiogram  Impression:  1. Atrial fibrillation, paroxysmal  2. Successful isolation of all four pulmonary veins  3. Typical atrial flutter  4. Successful ablation of CTI with bidirectional block for treatment of flutter  Recommendations:  1. Bed rest for 2 hours  2. Telemetry monitoring during recovery  3. Anticoagulant therapy for 2 months  4. Follow up in Arrhythmia clinic in 4 weeks    MEDICATIONS ON DISCHARGE     Medication List        START taking these medications        Instructions   Eliquis 5mg Tabs  Generic drug: apixaban   Doctor's comments: Please use 1st month free coupon is possible  Take 1 Tablet by mouth 2 times a day. Indications: Thromboembolism secondary to Atrial Fibrillation  Dose: 5 mg            CONTINUE taking these medications        Instructions   hydrALAZINE 10 MG Tabs  Commonly known as: APRESOLINE   Take 1 Tablet by mouth 3 times a day.  Dose: 10 mg     losartan 25 MG Tabs  Commonly known as: COZAAR   Take 1 Tablet by mouth every day.  Dose: 25 mg     metoprolol tartrate 25 MG  "Tabs  Commonly known as: LOPRESSOR   Take 0.5 Tablets by mouth 2 times a day.  Dose: 12.5 mg     omeprazole 20 MG delayed-release capsule  Commonly known as: PRILOSEC   Take 20 mg by mouth 2 times a day.  Dose: 20 mg     VITAMIN C PO   Take 1 Tablet by mouth every day.  Dose: 1 Tablet            STOP taking these medications      aspirin 81 MG EC tablet              PHYSICAL EXAM  Body mass index is 34.52 kg/m². /70   Pulse 69   Temp 36.9 °C (98.4 °F) (Temporal)   Resp 17   Ht 1.676 m (5' 6\")   Wt 97 kg (213 lb 13.5 oz)   SpO2 95%    Vitals:    09/14/22 0005 09/14/22 0442 09/14/22 0714 09/14/22 0840   BP: 122/74 115/73 136/69 137/70   Pulse: 73 80 64 69   Resp: 18 18 17    Temp: 36.5 °C (97.7 °F) 35.9 °C (96.6 °F) 36.9 °C (98.4 °F)    TempSrc: Temporal Temporal Temporal    SpO2: 91% 94% 95%    Weight:       Height:        Oxygen Therapy:  Pulse Oximetry: 95 %, O2 (LPM): 0, O2 Delivery Device: None - Room Air    General: no apparent distress  Lungs: normal respiratory effort, no without crackles, no wheezing or rhonchi.  Heart: normal rate, regular rhythm, no murmur.  EXT: no edema bilateral lower extremities. R femoral access site is soft, C/D/I dressing  Abdomen: soft, non tender, non distended,  Neurological: No focal deficits, no facial asymmetry.  Normal speech.  Skin: Warm extremities, no rash.    LABORATORY:  Lab Results   Component Value Date/Time    SODIUM 139 09/12/2022 09:35 AM    POTASSIUM 3.4 (L) 09/12/2022 09:35 AM    CHLORIDE 105 09/12/2022 09:35 AM    CO2 22 09/12/2022 09:35 AM    GLUCOSE 116 (H) 09/12/2022 09:35 AM    BUN 17 09/12/2022 09:35 AM    CREATININE 0.93 09/12/2022 09:35 AM        Lab Results   Component Value Date/Time    WBC 6.0 09/12/2022 09:35 AM    HEMOGLOBIN 14.4 09/12/2022 09:35 AM    HEMATOCRIT 43.6 09/12/2022 09:35 AM    PLATELETCT 222 09/12/2022 09:35 AM        FOLLOW UP:  Future Appointments   Date Time Provider Department Center   9/15/2022  1:40 PM Jennifer Waldron, " M.D. [facility]   10/13/2022  9:30 AM KETTY Veronica [facility] None   12/20/2022 11:00 AM Prince Brown M.D. [facility] None   1/9/2023 10:00 AM Vic Flores M.D. [facility] None       DISCHARGE DISPOSITION: Home in stable condition.    DISCHARGE INSTRUCTIONS:    [facility] Post Ablation Patient Instructions:  No lifting > 10 lbs x 1 week.    No soaking in baths, hot tubs, pools x 1 week.  May shower the day after discharge and take off groin dressings and leave uncovered.  Continue to monitor sites daily for warmth, redness, discolored drainage.  It is common to have a small lump in the area where the cather was (usually the size of a small marble); this will go away but takes approximately 6 weeks to normalize.   3.   Please take all medications as prescribed to you; please do not stop any medications prescribed post ablation unless directed by your healthcare provider.    4.   Please do not miss any doses of your blood thinner (if you have been started on, or take chronic blood thinners) without discussion with your healthcare provider first.   5.   Please walk and take deep breaths after discharge.  After discharge, if  experiences neurological changes/signs of stroke, high fever, you should be seen in the emergency dept.   6.   It is possible you may experience some chest discomfort or chest tightness post ablation.  This is usually secondary to inflammation and irritation of the tissues at the area of the ablation.  If this occurs, it is advised to try 400 mg of Ibuprofen with food as needed up to three times a day for a maximum of two days.  This should help to decrease pain and tissue inflammation.          **Please notify the office ([phone]) if this occurs.         ** DO NOT TAKE Ibuprofen IF HISTORY OF SIGNIFICANT BLEEDING OR KIDNEY DISEASE WITHOUT DISCUSSING WITH YOUR CARDIOLOGY PROVIDER FIRST.          ** If pain becomes severe or you have additional  symptoms you may need to be medically evaluated; please contact the cardiology office ([phone]) for further direction.   7. It is possible that you may experience arrhythmia/Atrial Fibrillation post ablation.  This is secondary to irritation and inflammation of the cardiac tissues from the ablation.  If you have atrial fibrillation all day or feel poorly with it, please notify your cardiologist's via phone ([phone]) or [facility].    8.  Please contact call our office ([phone]) or message via [employer] message if you have any questions or concerns post procedurally.  9. You need to be seen for post ablation follow up 2-4 weeks post procedure.  If you do not have a follow up appointment scheduled, please call [phone] to schedule your follow up appointment.          The above discharge plan was created in collaboration with Dr. Brown.  The patient verbalizes understanding and is in agreement of the discharge plan.    NINO Azar.   9/14/2022 8:51 AM

## 2022-09-14 NOTE — OR NURSING
Report given to SLICK Murphy on tele. Pt transported via wheelchair to T813 with RN and tele monitor. Belongings with pt.

## 2022-09-14 NOTE — PROGRESS NOTES
Patient wheeled downstairs to D/C lounge. All belongings with patient and tele box removed. Medications with patient.

## 2022-09-14 NOTE — OR NURSING
1918 Report received from SLICK Moran.     1930 Pt arrived to Phase II into room 32. AAOx4. VSS. Denies pain and nausea. Right groin dressing CDI and soft. Belongings returned to pt bedside.    2015 Call received from patient's wife, she is in ambulance to [facility] 2/2 knee injury. Pt does not have a ride home or someone to stay with him overnight. Page sent to Dr. Brown. Orders received to admit pt to tell tonight and give one time does of Xarelto tonight.    Assumed care for the patient. Patient was cooperative with the assessment. Patient denied S.I/H. I/A/V/H. Patient stated that she was sad and depressed because she was missing her family. Patient was meal and medicine compliant. Patient stated that she has had no BM since  4 days. Patient was offered PRN medicine for constipation, but patient refused, stated that \" I am gonna go, when I gotta go\"      Will continue to monitor. Patient was observed responding to internal stimuli at one time in the day room. No aggressive behavior noted. No PRNs required. Patient slept for about 6 hours.

## 2022-09-14 NOTE — ANESTHESIA TIME REPORT
Anesthesia Start and Stop Event Times     Date Time Event    9/13/2022 1501 Ready for Procedure     1501 Anesthesia Start     1700 Anesthesia Stop        Responsible Staff  09/13/22    Name Role Begin End    Rosita Villanueva M.D. Anesth 1501 1700        Overtime Reason:  overtime with call-back    Comments:

## 2022-09-16 ENCOUNTER — TELEPHONE (OUTPATIENT)
Dept: URGENT CARE | Facility: PHYSICIAN GROUP | Age: 72
End: 2022-09-16
Payer: MEDICARE

## 2022-09-28 DIAGNOSIS — I61.9 HEMORRHAGIC STROKE (HCC): ICD-10-CM

## 2022-09-30 ENCOUNTER — OFFICE VISIT (OUTPATIENT)
Dept: URGENT CARE | Facility: PHYSICIAN GROUP | Age: 72
End: 2022-09-30
Payer: MEDICARE

## 2022-09-30 VITALS
DIASTOLIC BLOOD PRESSURE: 92 MMHG | HEIGHT: 66 IN | BODY MASS INDEX: 34.72 KG/M2 | OXYGEN SATURATION: 98 % | SYSTOLIC BLOOD PRESSURE: 168 MMHG | HEART RATE: 54 BPM | RESPIRATION RATE: 16 BRPM | TEMPERATURE: 97.7 F | WEIGHT: 216 LBS

## 2022-09-30 DIAGNOSIS — R07.89 CHEST DISCOMFORT: ICD-10-CM

## 2022-09-30 DIAGNOSIS — Z98.890 S/P ABLATION OF ATRIAL FIBRILLATION: ICD-10-CM

## 2022-09-30 DIAGNOSIS — Z86.79 S/P ABLATION OF ATRIAL FIBRILLATION: ICD-10-CM

## 2022-09-30 DIAGNOSIS — I10 PRIMARY HYPERTENSION: ICD-10-CM

## 2022-09-30 PROCEDURE — 99214 OFFICE O/P EST MOD 30 MIN: CPT | Performed by: FAMILY MEDICINE

## 2022-09-30 PROCEDURE — 93000 ELECTROCARDIOGRAM COMPLETE: CPT | Performed by: FAMILY MEDICINE

## 2022-09-30 ASSESSMENT — FIBROSIS 4 INDEX: FIB4 SCORE: 1.5

## 2022-09-30 NOTE — PROGRESS NOTES
Subjective     Gokul Baca is a 71 y.o. male who presents with Chest Pain (Chest pain x 23 minutes was advised to go to the ER but is just wanting an EKG. Post op ablation x 1 wk ago. Numbness on left side. )    - This is a pleasant and nontoxic appearing 71 y.o. male who has come to the walk-in clinic today for:    #1) ~30 min ago was at home and felt some pain Lt chest and goes down lt arm a little. Worried he needs to get an EKG done. No associated NVFC/SOB/Diaphoresis/Dizziness.     #2) Hx Afib s/p ablation ~ 2wks ago.     #3) Hx HTN, BP running high today      ALLERGIES:  Codeine, Ibuprofen, and Lactose     PMH:  Past Medical History:   Diagnosis Date    Anemia     Atrial fibrillation (HCC)     GERD (gastroesophageal reflux disease)     Heart burn     High cholesterol     Hyperlipidemia     Hypertension     Iron deficiency anemia     Pneumonia     as a young adult     Sleep apnea     cpap     Snoring     Stroke (HCC) 12/19/2021    left sided weakness     UTI (urinary tract infection) 03/2022        PSH:  Past Surgical History:   Procedure Laterality Date    KNEE ARTHROPLASTY TOTAL Left     OTHER      sinus sx        MEDS:    Current Outpatient Medications:     apixaban (ELIQUIS) 5mg Tab, Take 1 Tablet by mouth 2 times a day. Indications: Thromboembolism secondary to Atrial Fibrillation, Disp: 60 Tablet, Rfl: 0    omeprazole (PRILOSEC) 20 MG delayed-release capsule, Take 20 mg by mouth 2 times a day., Disp: , Rfl:     metoprolol tartrate (LOPRESSOR) 25 MG Tab, Take 0.5 Tablets by mouth 2 times a day., Disp: 90 Tablet, Rfl: 3    hydrALAZINE (APRESOLINE) 10 MG Tab, Take 1 Tablet by mouth 3 times a day., Disp: 270 Tablet, Rfl: 3    Ascorbic Acid (VITAMIN C PO), Take 1 Tablet by mouth every day., Disp: , Rfl:     losartan (COZAAR) 25 MG Tab, Take 1 Tablet by mouth every day., Disp: 90 Tablet, Rfl: 3    ** I have documented what I find to be significant in regards to past medical, social, family and surgical  "history  in my HPI or under PMH/PSH/FH review section, otherwise it is noncontributory **           HPI    Review of Systems   Cardiovascular:  Positive for chest pain.   All other systems reviewed and are negative.           Objective     BP (!) 168/92   Pulse (!) 54   Temp 36.5 °C (97.7 °F) (Temporal)   Resp 16   Ht 1.676 m (5' 6\")   Wt 98 kg (216 lb)   SpO2 98%   BMI 34.86 kg/m²      Physical Exam  Vitals and nursing note reviewed.   Constitutional:       General: He is not in acute distress.     Appearance: Normal appearance. He is well-developed.   HENT:      Head: Normocephalic.   Cardiovascular:      Heart sounds: Normal heart sounds. No murmur heard.  Pulmonary:      Effort: Pulmonary effort is normal. No respiratory distress.      Breath sounds: Normal breath sounds.   Neurological:      Mental Status: He is alert.      Motor: No abnormal muscle tone.   Psychiatric:         Mood and Affect: Mood normal.         Behavior: Behavior normal.                           Assessment & Plan       1. Chest discomfort  EKG - Clinic Performed      2. S/P ablation of atrial fibrillation        3. Primary hypertension          EKG: NSR 55, no acute changes       - Dx, plan & d/c instructions discussed (asked to go to ER for further eval of his nonspecific chest pain. Says he will have wife take him to [facility] ER)    Follow up with your regular primary care providers office for a recheck on today's visit.     Patient left in stable condition     Pertinent prior office visit notes in [employer] [place] have been reviewed by me today on day of this visit.       "

## 2022-10-03 DIAGNOSIS — I61.9 HEMORRHAGIC STROKE (HCC): ICD-10-CM

## 2022-10-03 LAB — EKG IMPRESSION: NORMAL

## 2022-10-04 ENCOUNTER — PATIENT MESSAGE (OUTPATIENT)
Dept: CARDIOLOGY | Facility: MEDICAL CENTER | Age: 72
End: 2022-10-04
Payer: MEDICARE

## 2022-10-04 DIAGNOSIS — Z98.890 S/P ABLATION OF ATRIAL FIBRILLATION: ICD-10-CM

## 2022-10-04 DIAGNOSIS — Z86.79 S/P ABLATION OF ATRIAL FIBRILLATION: ICD-10-CM

## 2022-10-04 PROCEDURE — RXMED WILLOW AMBULATORY MEDICATION CHARGE: Performed by: INTERNAL MEDICINE

## 2022-10-07 ENCOUNTER — PHARMACY VISIT (OUTPATIENT)
Dept: PHARMACY | Facility: MEDICAL CENTER | Age: 72
End: 2022-10-07
Payer: COMMERCIAL

## 2022-10-13 ENCOUNTER — OFFICE VISIT (OUTPATIENT)
Dept: CARDIOLOGY | Facility: MEDICAL CENTER | Age: 72
End: 2022-10-13
Payer: MEDICARE

## 2022-10-13 VITALS
RESPIRATION RATE: 12 BRPM | SYSTOLIC BLOOD PRESSURE: 178 MMHG | DIASTOLIC BLOOD PRESSURE: 100 MMHG | WEIGHT: 217 LBS | HEART RATE: 57 BPM | OXYGEN SATURATION: 98 % | BODY MASS INDEX: 34.87 KG/M2 | HEIGHT: 66 IN

## 2022-10-13 DIAGNOSIS — I10 PRIMARY HYPERTENSION: ICD-10-CM

## 2022-10-13 DIAGNOSIS — Z95.818 PRESENCE OF WATCHMAN LEFT ATRIAL APPENDAGE CLOSURE DEVICE: ICD-10-CM

## 2022-10-13 DIAGNOSIS — Z86.79 S/P ABLATION OF ATRIAL FIBRILLATION: ICD-10-CM

## 2022-10-13 DIAGNOSIS — Z98.890 S/P ABLATION OF ATRIAL FIBRILLATION: ICD-10-CM

## 2022-10-13 DIAGNOSIS — I61.9 HEMORRHAGIC STROKE (HCC): ICD-10-CM

## 2022-10-13 DIAGNOSIS — G47.33 OSA (OBSTRUCTIVE SLEEP APNEA): ICD-10-CM

## 2022-10-13 DIAGNOSIS — I48.0 PAROXYSMAL ATRIAL FIBRILLATION (HCC): ICD-10-CM

## 2022-10-13 DIAGNOSIS — Z79.01 CHRONIC ANTICOAGULATION: ICD-10-CM

## 2022-10-13 PROCEDURE — 99214 OFFICE O/P EST MOD 30 MIN: CPT | Performed by: NURSE PRACTITIONER

## 2022-10-13 PROCEDURE — 93000 ELECTROCARDIOGRAM COMPLETE: CPT | Performed by: NURSE PRACTITIONER

## 2022-10-13 RX ORDER — LOSARTAN POTASSIUM 50 MG/1
50 TABLET ORAL DAILY
Qty: 90 TABLET | Refills: 3 | Status: SHIPPED | OUTPATIENT
Start: 2022-10-13 | End: 2023-08-08

## 2022-10-13 ASSESSMENT — ENCOUNTER SYMPTOMS
PND: 0
BLOOD IN STOOL: 0
SORE THROAT: 0
ORTHOPNEA: 0
DIZZINESS: 0
WHEEZING: 0
HEMOPTYSIS: 0
SPUTUM PRODUCTION: 0
SENSORY CHANGE: 0
LOSS OF CONSCIOUSNESS: 0
SHORTNESS OF BREATH: 0
TREMORS: 0
PALPITATIONS: 0
DIARRHEA: 0
HEARTBURN: 0
NAUSEA: 0
HEADACHES: 0
ABDOMINAL PAIN: 0
VOMITING: 0
CHILLS: 0
TINGLING: 0
COUGH: 0
SPEECH CHANGE: 0
WEIGHT LOSS: 0
EYES NEGATIVE: 1
FEVER: 0
FOCAL WEAKNESS: 1
STRIDOR: 0

## 2022-10-13 ASSESSMENT — FIBROSIS 4 INDEX: FIB4 SCORE: 1.5

## 2022-10-13 NOTE — PROGRESS NOTES
Chief Complaint   Patient presents with    Atrial Fibrillation     Fv Dx: Chronic atrial fibrillation        Subjective     Gokul Baca is a 71 y.o. male who presents today for follow-up after ablation of slow symptomatic paroxysmal atrial fibrillation by Dr. Brown 9/13/2022.    Procedure completed with pulmonary vein isolation for treatment of atrial fibrillation and CTI RFA with bidirectional block for treatment of typical RA flutter.    Past additional medical history significant for hypertension, prior CVA with ICH with residual left-sided weakness, prior WATCHMAN device implanted 5/17/2022 secondary to history of hemorrhagic CVA.    Today in follow up he is accompanied by his wife.  Reports feelings of fluttering post discharge, was seen at  in [place] and was confirmed to be SR.  Since then, reports no known arrhthymias.  Reports feels improved in SR.     Past Medical History:   Diagnosis Date    Anemia     Atrial fibrillation (HCC)     GERD (gastroesophageal reflux disease)     Heart burn     High cholesterol     Hyperlipidemia     Hypertension     Iron deficiency anemia     Pneumonia     as a young adult     Sleep apnea     cpap     Snoring     Stroke (HCC) 12/19/2021    left sided weakness     UTI (urinary tract infection) 03/2022     Past Surgical History:   Procedure Laterality Date    KNEE ARTHROPLASTY TOTAL Left     OTHER      sinus sx      Family History   Problem Relation Age of Onset    Heart Disease Mother     Heart Disease Brother     Alcohol abuse Son      Social History     Socioeconomic History    Marital status:      Spouse name: Not on file    Number of children: Not on file    Years of education: Not on file    Highest education level: Master's degree (e.g., MA, MS, Leslie, MEd, MSW, AMY)   Occupational History    Not on file   Tobacco Use    Smoking status: Never    Smokeless tobacco: Never   Vaping Use    Vaping Use: Never used   Substance and Sexual Activity    Alcohol use:  "Never    Drug use: Never    Sexual activity: Not on file   Other Topics Concern    Not on file   Social History Narrative    Retired     Social Determinants of Health     Financial Resource Strain: Low Risk     Difficulty of Paying Living Expenses: Not hard at all   Food Insecurity: Unknown    Worried About Running Out of Food in the Last Year: Never true    Ran Out of Food in the Last Year: Patient refused   Transportation Needs: No Transportation Needs    Lack of Transportation (Medical): No    Lack of Transportation (Non-Medical): No   Physical Activity: Inactive    Days of Exercise per Week: 0 days    Minutes of Exercise per Session: 0 min   Stress: No Stress Concern Present    Feeling of Stress : Only a little   Social Connections: Unknown    Frequency of Communication with Friends and Family: More than three times a week    Frequency of Social Gatherings with Friends and Family: Patient refused    Attends Church Services: Patient refused    Active Member of Clubs or Organizations: Yes    Attends Club or Organization Meetings: Patient refused    Marital Status:    Intimate Partner Violence: Not on file   Housing Stability: Unknown    Unable to Pay for Housing in the Last Year: No    Number of Places Lived in the Last Year: 1    Unstable Housing in the Last Year: Patient refused     Allergies   Allergen Reactions    Codeine Unspecified     \"Become out of it\"    Ibuprofen Hives    Lactose Unspecified     Stomach upset      Outpatient Encounter Medications as of 10/13/2022   Medication Sig Dispense Refill    apixaban (ELIQUIS) 5mg Tab Take 1 Tablet by mouth 2 times a day. Indications: Thromboembolism secondary to Atrial Fibrillation 60 Tablet 0    omeprazole (PRILOSEC) 20 MG delayed-release capsule Take 20 mg by mouth 2 times a day.      metoprolol tartrate (LOPRESSOR) 25 MG Tab Take 0.5 Tablets by mouth 2 times a day. 90 Tablet 3    hydrALAZINE (APRESOLINE) 10 MG Tab Take 1 Tablet by mouth 3 times a " "day. 270 Tablet 3    Ascorbic Acid (VITAMIN C PO) Take 1 Tablet by mouth every day.      losartan (COZAAR) 25 MG Tab Take 1 Tablet by mouth every day. 90 Tablet 3     No facility-administered encounter medications on file as of 10/13/2022.     Review of Systems   Constitutional:  Negative for chills, fever, malaise/fatigue and weight loss.   HENT:  Negative for congestion, sore throat and tinnitus.    Eyes: Negative.    Respiratory:  Negative for cough, hemoptysis, sputum production, shortness of breath, wheezing and stridor.    Cardiovascular:  Negative for chest pain, palpitations, orthopnea, leg swelling and PND.   Gastrointestinal:  Negative for abdominal pain, blood in stool, diarrhea, heartburn, nausea and vomiting.   Skin:  Negative for rash.   Neurological:  Positive for focal weakness (slight residual L sided weakness, LUE>LLE). Negative for dizziness, tingling, tremors, sensory change, speech change, loss of consciousness and headaches.            Objective     BP (!) 178/100 (BP Location: Left arm, Patient Position: Sitting, BP Cuff Size: Adult)   Pulse (!) 57   Resp 12   Ht 1.676 m (5' 6\")   Wt 98.4 kg (217 lb)   SpO2 98%   BMI 35.02 kg/m²     Physical Exam  Constitutional:       Appearance: Normal appearance. He is well-developed.   HENT:      Head: Normocephalic and atraumatic.      Mouth/Throat:      Mouth: Mucous membranes are moist.      Pharynx: Oropharynx is clear.   Eyes:      Extraocular Movements: Extraocular movements intact.      Conjunctiva/sclera: Conjunctivae normal.      Pupils: Pupils are equal, round, and reactive to light.   Neck:      Vascular: No JVD.   Cardiovascular:      Rate and Rhythm: Normal rate and regular rhythm.      Pulses: Normal pulses.      Heart sounds: Normal heart sounds. No murmur heard.    No friction rub. No gallop.   Pulmonary:      Effort: Pulmonary effort is normal.      Breath sounds: Normal breath sounds. No wheezing or rales.   Chest:      Chest wall: " No tenderness.   Musculoskeletal:         General: Normal range of motion.      Cervical back: Normal range of motion and neck supple.      Right lower leg: No edema.      Left lower leg: No edema.   Skin:     General: Skin is warm and dry.      Capillary Refill: Capillary refill takes 2 to 3 seconds.   Neurological:      Mental Status: He is alert and oriented to person, place, and time.   Psychiatric:         Mood and Affect: Mood normal.         Behavior: Behavior normal.         Thought Content: Thought content normal.         Judgment: Judgment normal.     EKG Reviewed by myself 10/13/22  Sinus bradycardia          Assessment & Plan     1. Paroxysmal atrial fibrillation (HCC)  EKG      2. S/P ablation of atrial fibrillation        3. Primary hypertension  Basic Metabolic Panel      4. Presence of Watchman left atrial appendage closure device        5. Hemorrhagic stroke (HCC)        6. Chronic anticoagulation        7. ROB (obstructive sleep apnea)            Medical Decision Making: Today's Assessment/Status/Plan:   1.  Paroxysmal atrial fibrillation  2.  Ablation of atrial fibrillation  - Status post ablation of paroxysmal atrial fibrillation and flutter by Dr. Brown 9/12/2022.  Procedure completed with PVI and RA CTI RFA.  -Reports things he is mostly maintaining sinus at home post ablation.  There was one question of possible arrhythmia had EKG completed at urgent care which was sinus.  Twelve-lead EKG today in office reveals sinus bradycardia.  -We discussed healing timeframe/course and expectations post ablation.  He is aware of when to contact the office for arrhythmia concerns.  -He will continue Eliquis 5 mg twice daily through 2-month date post procedure then may stop secondary to prior watchman implant and he will at that time resume EC ASA 81 mg daily.  -Continue low-dose metoprolol at this time.  May be able to wean in the future.    3.  Hypertension  - Blood pressure not controlled in office today.   Prior blood pressure evaluations at [facility] uncontrolled as well.  Increase losartan to 50 mg daily.  He is advised to start checking his blood pressure at home and log if after 1 week remains elevated he is to call us or his PCP for further medication adjustment.    4.  Watchman  5.  History of hemorrhagic stroke  -Watchman implanted secondary to indication for ongoing anticoagulation with hemorrhagic stroke.    6.  Anticoagulation  - Tolerating Eliquis 5 mg twice daily well at this time.  No neurologic changes.        Return to clinic in 2 months as scheduled, sooner if clinical condition changes.  He is advised to call our office or contact us via [employer] should questions or concerns arise.    Please note this documentation was created using voice recognition software.  Every reasonable effort was made to correct any obvious spelling or grammatical errors prior to the signing of this note however there is a chance I was unable to see all errors before signing of this note.

## 2022-10-17 ENCOUNTER — HOSPITAL ENCOUNTER (OUTPATIENT)
Dept: LAB | Facility: MEDICAL CENTER | Age: 72
End: 2022-10-17
Attending: INTERNAL MEDICINE
Payer: MEDICARE

## 2022-10-19 RX ORDER — DULOXETIN HYDROCHLORIDE 20 MG/1
20 CAPSULE, DELAYED RELEASE ORAL DAILY
Qty: 90 CAPSULE | Refills: 0 | Status: SHIPPED | OUTPATIENT
Start: 2022-10-19 | End: 2022-11-28 | Stop reason: SINTOL

## 2022-10-19 RX ORDER — GABAPENTIN 100 MG/1
100 CAPSULE ORAL 3 TIMES DAILY
Qty: 90 CAPSULE | Refills: 3 | Status: CANCELLED | OUTPATIENT
Start: 2022-10-19

## 2022-11-02 ENCOUNTER — APPOINTMENT (OUTPATIENT)
Dept: OCCUPATIONAL THERAPY | Facility: REHABILITATION | Age: 72
End: 2022-11-02
Attending: NURSE PRACTITIONER
Payer: MEDICARE

## 2022-11-02 LAB — EKG IMPRESSION: NORMAL

## 2022-11-02 NOTE — OP THERAPY EVALUATION
Outpatient Occupational Therapy  DRIVING EVALUATION    [facility] Occupational Therapy [address]  [address]  [place] [address]  Phone:  [phone]  Fax:  [phone]    Date of Evaluation: 11/02/2022  Name of Evaluator: LILIANA King/L    Background  Patient Name: Gokul Baca  YOB: 1950 Age: 71 y.o. Sex: male      Referring Provider: KETTY Guo [facility] [address]  [place] [address]   Referring Diagnosis Hemorrhagic stroke (HCC) [I61.9]     Occupational Therapy Occurrence Codes             Concerns: ***    Driving Evaluation       Physical Assessment:   Additional Physical Assessment Notes:      Full ocular ROM.  Smooth pursuits, saccades, and convergence WNL.  Peripheral vision: 85 degrees each eye for a total of 170 degrees of arc.       Driving Simulator Tasks:   Configurable Crash Avoidance Scenarios:     Scenario 1:     Country road, dry and good visibility      Scenario 2:     Country road, dry and good visibility      Scenario 3:     City road, dry and good visibility      Scenario 4:     City road, dry and good visibility    Dividing and Focusing Attention:     Scenario 1:     Country road, dry and good visibility      Scenario 2:     City road, dry and good visibility      Free Driving Scenario 1:    Country road, dry and good visibility    Comments: Practice session to acclimate to the road, recommended speed and use of steering wheel, gas and brake pedals.      Free Driving Scenario 2:    City road, dry and good visibility    Comments: Practice session to acclimate to the road, recommended speed and use of steering wheel, gas and brake pedals.          Referring provider co-signature:  I have reviewed this evaluation and my co-signature certifies my agreement with the contents.    Physician Signature: ________________________________ Date: ______________

## 2022-11-07 ENCOUNTER — TELEPHONE (OUTPATIENT)
Dept: CARDIOLOGY | Facility: MEDICAL CENTER | Age: 72
End: 2022-11-07
Payer: MEDICARE

## 2022-11-07 NOTE — TELEPHONE ENCOUNTER
EA    Caller: Gokul Baca     Office Name, phone number, fax number:   Dr. Cleveland, PH: [phone]  [address] [place] [address] in [facility]    Fax clearance to [phone]    Procedure Name: Unknown yet- possible extraction    Procedure Scheduled Date: N/A will be sending in the surgical clearance, he would like to know how many days prior to stop the Eloquis.    Callback Number: [phone] (home) [phone] (work)     Thank you  Halie VALDEZ

## 2022-11-10 NOTE — TELEPHONE ENCOUNTER
Caller: Gokul Baca      Office Name, phone number, fax number:   Dr. Cleveland, PH: [phone]  [address] [place] [address] in [facility]    Procedure Name: Unknown yet- possible extraction     Procedure Scheduled Date: 11/10      Callback Number: [phone] (home) [phone] (work)     Thank you   Meseret ROSE

## 2022-11-14 ENCOUNTER — TELEPHONE (OUTPATIENT)
Dept: CARDIOLOGY | Facility: MEDICAL CENTER | Age: 72
End: 2022-11-14
Payer: MEDICARE

## 2022-11-28 ENCOUNTER — OFFICE VISIT (OUTPATIENT)
Dept: MEDICAL GROUP | Facility: PHYSICIAN GROUP | Age: 72
End: 2022-11-28
Payer: MEDICARE

## 2022-11-28 VITALS
OXYGEN SATURATION: 98 % | HEART RATE: 65 BPM | DIASTOLIC BLOOD PRESSURE: 98 MMHG | RESPIRATION RATE: 16 BRPM | TEMPERATURE: 97.1 F | BODY MASS INDEX: 34.52 KG/M2 | SYSTOLIC BLOOD PRESSURE: 138 MMHG | WEIGHT: 214.8 LBS | HEIGHT: 66 IN

## 2022-11-28 DIAGNOSIS — R41.4 LEFT-SIDED NEGLECT: ICD-10-CM

## 2022-11-28 DIAGNOSIS — I10 PRIMARY HYPERTENSION: ICD-10-CM

## 2022-11-28 PROCEDURE — 99214 OFFICE O/P EST MOD 30 MIN: CPT | Performed by: NURSE PRACTITIONER

## 2022-11-28 ASSESSMENT — FIBROSIS 4 INDEX: FIB4 SCORE: 1.51688812977321948

## 2022-11-29 NOTE — ASSESSMENT & PLAN NOTE
Chronic condition.  Patient is currently on losartan 50 mg daily.  Patient also on aspirin 81 mg at night.  Blood pressure today mildly elevated in clinic with blood pressure reading of 148/98 and repeat blood pressure 138/98.  Patient denies any chest pain, dizziness, headache, or leg swelling.

## 2022-11-29 NOTE — ASSESSMENT & PLAN NOTE
Chronic condition.  Patient has previous CVA December 2021.  Today he is expressing that he has neuropathy pain on left side including left lower extremity and foot, left upper extremity and fingertips, and left side of face.  He reports that this is intermittent however is unchanged since CVA in December 2021.  He does continue with OT.  Previously saw a neurologist however has not had an appointment recently.  Does follow with a [facility] doctor in [place].  Was wondering about possible vitamin deficiency including vitamin B's and vitamin A, E, D.

## 2022-11-29 NOTE — PROGRESS NOTES
"Chief Complaint   Patient presents with    Other     Pt has had neuropathy in the left arm to fingertips and from hip to toes for the last 11 months. The symptoms and the pain level fluctuate depending on activity level and weather.        Subjective:     HPI:   Gokul Baca is a 72 y.o. male here to discuss the evaluation and management of:      Left-sided neglect  Chronic condition.  Patient has previous CVA December 2021.  Today he is expressing that he has neuropathy pain on left side including left lower extremity and foot, left upper extremity and fingertips, and left side of face.  He reports that this is intermittent however is unchanged since CVA in December 2021.  He does continue with OT.  Previously saw a neurologist however has not had an appointment recently.  Does follow with a  doctor in [place].  Was wondering about possible vitamin deficiency including vitamin B's and vitamin A, E, D.    Primary hypertension  Chronic condition.  Patient is currently on losartan 50 mg daily.  Patient also on aspirin 81 mg at night.  Blood pressure today mildly elevated in clinic with blood pressure reading of 148/98 and repeat blood pressure 138/98.  Patient denies any chest pain, dizziness, headache, or leg swelling.        ROS:  Gen: no fevers/chills, no changes in weight  Pulm: no sob, no cough  CV: no chest pain, no palpitations  GI: no nausea/vomiting, no diarrhea  MSk: no myalgias  Neuro: Positive per HPI      Allergies   Allergen Reactions    Codeine Unspecified     \"Become out of it\"    Ibuprofen Hives    Lactose Unspecified     Stomach upset        Current medicines (including changes today)  Current Outpatient Medications   Medication Sig Dispense Refill    aspirin EC (ECOTRIN) 81 MG Tablet Delayed Response Take 81 mg by mouth every day.      losartan (COZAAR) 50 MG Tab Take 1 Tablet by mouth every day. 90 Tablet 3    omeprazole (PRILOSEC) 20 MG delayed-release capsule Take 20 mg by mouth " "2 times a day.      hydrALAZINE (APRESOLINE) 10 MG Tab Take 1 Tablet by mouth 3 times a day. 270 Tablet 3    Ascorbic Acid (VITAMIN C PO) Take 1 Tablet by mouth every day.       No current facility-administered medications for this visit.          Objective:       BP (!) 138/98 (BP Location: Right arm, Patient Position: Sitting, BP Cuff Size: Adult)   Pulse 65   Temp 36.2 °C (97.1 °F) (Temporal)   Resp 16   Ht 1.676 m (5' 6\")   Wt 97.4 kg (214 lb 12.8 oz)   SpO2 98%  Body mass index is 34.67 kg/m².    Physical Exam:  Constitutional: Well-developed and well-nourished male in NAD. Not diaphoretic. No distress.   Skin: warm, dry, intact  Cardiovascular: Regular rate and rhythm without murmur. Radial pulses are intact and equal bilaterally.  Pulmonary: Clear to ausculation. Normal effort. No rales, ronchi, or wheezing.  Extremities: No cyanosis, clubbing, erythema, nor edema.   Musculoskeletal: Bilateral upper and lower extremity the strength equal  Neurological: Alert and oriented x 3.  Decreased sensation to left lower extremity compared to right.  Psychiatric:  Behavior, mood, and affect are appropriate.      Assessment and Plan:     The following treatment plan was discussed:  I have personally reviewed previous office visit notes from cardiology, urgent care, and primary care providers.      1. Left-sided neglect  Chronic condition.  Patient now expresses neuropathy on left side.  Recommended patient follow-up with his neurologist.  Did discuss with patient at length about testing for vitamin deficiency and discussed that insurance may or may not cover all cost.  Patient has decided not to have lab orders placed today.  He reports that he will follow-up with his [facility] doctor down in [place].  Also discussed starting medication such as gabapentin however patient declined at this time.    2. Primary hypertension  Chronic condition with mild exacerbation.  Blood pressure readings elevated today in " clinic.  Patient will continue on losartan 50 mg tablet daily.  Recommend patient do blood pressure log at home and if blood pressures are averaging greater than 140/90 patient should come back in for reevaluation in the next 15 to 30 days.  Patient is currently asymptomatic.      Any change or worsening of signs or symptoms, patient encouraged to follow-up or report to urgent care or emergency room for further evaluation. Patient verbalizes understanding and agrees.    Follow-Up: Return in about 10 weeks (around 2/6/2023) for Neuropathy and , HTN.      PLEASE NOTE: This dictation was created using voice recognition software. I have made every reasonable attempt to correct obvious errors, but I expect that there are errors of grammar and possibly content that I did not discover before finalizing the note.

## 2022-12-16 ENCOUNTER — OCCUPATIONAL THERAPY (OUTPATIENT)
Dept: OCCUPATIONAL THERAPY | Facility: REHABILITATION | Age: 72
End: 2022-12-16
Attending: NURSE PRACTITIONER
Payer: MEDICARE

## 2022-12-16 DIAGNOSIS — R29.898 WEAKNESS OF BOTH HANDS: ICD-10-CM

## 2022-12-16 DIAGNOSIS — I69.30 LATE EFFECTS OF CEREBRAL ISCHEMIC STROKE: ICD-10-CM

## 2022-12-16 PROCEDURE — 97750 PHYSICAL PERFORMANCE TEST: CPT

## 2022-12-16 ASSESSMENT — BALANCE ASSESSMENTS
BALANCE - SITTING STATIC: NORMAL
BALANCE - SITTING DYNAMIC: NORMAL
BALANCE - STANDING STATIC: GOOD
BALANCE - STANDING DYNAMIC: FAIR +

## 2022-12-16 NOTE — OP THERAPY EVALUATION
Outpatient Occupational Therapy  DRIVING EVALUATION    [facility] Occupational Therapy [address]  [address]  [facility] [address]  Phone:  [phone]  Fax:  [phone]    Date of Evaluation: 12/16/2022  Name of Evaluator: Ashlee Schmidt OTR/L    Background  Patient Name: Gokul Baca  YOB: 1950 Age: 72 y.o. Sex: male      Referring Provider: KETTY Guo [address] [address]   Referring Diagnosis Hemorrhagic stroke (HCC) [I61.9]     Occupational Therapy Occurrence Codes             Concerns: Since his stroke a year ago, client has not driven on his own.  He has started taking lessons with [employer] and has completed 2 lessons.  Prior to his stroke, client drove day/night in all weather and used the freeway.     Driving Evaluation     Vehicle/ Details:     Make: [employer]    Model: Convertigo    Year: 2016    Features: automatic and power steering    Comments: Client has a current [place] drivers license    Physical Assessment:   Auditory:     Hearing aids: no hearing aid    Hearing problems: no hearing problem    Range of Motion:     Upper extremity (left): within functional limits    Upper extremity (right): within functional limits    Lower extremity (left): within functional limits    Lower extremity (right): within functional limits    Cervical neck (left): within functional limits    Cervical neck (right): within functional limits    Thoracic rotation (left): within functional limits    Thoracic rotation (right): within functional limits    Strength:     Upper extremity (left): within functional limits    Upper extremity (right): within functional limits    Lower extremity (left): within functional limits    Lower extremity (right): within functional limits     (left): within functional limits     (right): within functional limits    Coordination:     Upper extremity (left): within functional limits         Finger to finger: within functional limits    Upper extremity (right): within functional limits        Finger to finger: within functional limits    Lower extremity (left): within functional limits        Heel to shin: within functional limits    Lower extremity (right): within functional limits        Heel to shin: within functional limits    Sensation:   Upper extremity (left):    Light touch: intact    Proprioception: intact   Upper extremity (right):    Light touch: intact    Proprioception: intact   Lower extremity (left):    Light touch: impaired (neuropathy)    Proprioception: intact   Lower extremity (right):    Light touch: intact    Proprioception: intact     Balance:     Sitting (static): Normal    Sitting (dynamic): Normal    Standing (static): Good    Standing (dynamic): Fair +    Sit to stand: independent    Rapid Pace Walk Test: 11 (mobilizes with a 4WW) sec    Cognition:     [facility] Mental Examination (UMS): 29/30    Trail Making Test B: 120 (successfully completed with no errors) sec    Sign Identification: 10/10    Vision:     Last eye exam: 10/16/2022 (seen by a neuro Opthalmologist)    Previous eye problems: none    Previous eye treatments: corrective lenses    Corrective lens used since: since his 30s    Corrective lens used during: daytime and nighttime    Near acuity (Snellen Chart) Binocular: 30    Far acuity (Snellen Chart) Binocular: 40    Contrast sensitivity (day): adequate    Contrast sensitivity (night): diminished    Depth perception: adequate    Color vision: intact    MVPT-3 Visual Closure Subset:        Correct answers: (ex) (A), 22 (B), 23 (A), 24 (B), 25 (C), 26 (B), 27 (D), 28 (A), 29 (D), 30 (C), 31 (D), 32 (A) and 33 (C)        Score: 13/13        Accuracy: 100% correct        Pass/Fail: pass    Additional Physical Assessment Notes:      Full ocular ROM.  Smooth pursuits, saccades, and convergence WNL.  Peripheral vision: 85 degrees each eye for a total of 170  degrees of arc.       Driving Simulator Tasks:   Motor Skills:     Using the accelerator: safe    Using the brake: safe    Using the steering wheel: safe    Reaction time to applying the brake: pass        Comments: 0.9 and 0.8 seconds    Following distance 3-4 sec rule: pass        Comments: Able to bring initial speed of 55 mph to recommended speed of 40 mph with good control of steering wheel.  Able to keep a safe distance from the van in front.    Configurable Crash Avoidance Scenarios:     Scenario 1:     country road, dry and good visibility    Visibility: Able to stay in shruti and recommended speed.  Slowed for oncoming car overtaking a bus    Pass/Fail: pass      Scenario 2:     country road,dusk, dry and good visibility    Visibility: Able to stay in shruti and recommended speed.  Moved over slightly in shruti for oncoming large truck and kept a safe distance from moped and successfully overtoke when safe to safe to do so.    Pass/Fail: pass      Scenario 3:     city road, dry and good visibility    Visibility: Able to stay in shruti and recommended speed.  Stopped for pedestrian allison walking across road.    Pass/Fail: pass      Scenario 4:     city road, dry and good visibility    Visibility: Able to stay in shruti and recommended speed.  Kept a safe distance from bike in front in heavy traffic.    Pass/Fail: pass    Dividing and Focusing Attention:     Scenario 1:     country road, dry and good visibility    Pass/Fail: pass    Comments: Able to stay in shruti and recommended speed.  Saw deer coming from right side and slowed down to let it pass      Scenario 2:     city road, dry and good visibility    Pass/Fail: pass    Comments: Able to manuever into left shruti in preparation to turn left ont road and into correct shruti.  Stopped for allison walker crossing road to catch bus on other side      Free Driving Scenario 1:    country road, dry and good visibility    Comments: practice session to acclimate to the road,  "recommended speed and use of steering wheel, gas and brake pedals      Free Driving Scenario 2:    city road, dry and good visibility    Comments: practice session to acclimate to the road, recommended speed and use of steering wheel, gas and brake pedals      Evaluation:     Pass/Fail: pass    Evaluation Comments: The objective findings indicate that Mr. Gokul Baca has the physical, visual, visual-perceptual, and cognitive skills necessary to safely operate a vehicle.  He exhibited adequate reaction times and good safety distances with all simulator tasks.  In both rural and city settings where there were mild to moderate distractions, he did not have any accidents in multiple crash avoidance scenarios with pedestrians, animals, vehicles, or stationary objects.   He obeyed all regulatory signs, speed limits, maintained mid-shruti position at all times, followed all verbal directions, safely passed other vehicles, and was able to divide and focus his attention throughout the driving simulation without difficulty.  Overall, Mr. Baca demonstrated good safety awareness, judgement, and anticipatory skills.  Based on his performance today, I recommend he transition back to driving under the following conditions: during the day, good weather conditions, at low traffic times, on familiar routes, avoidance of the \"spaghetti bowl\", minimize distractions, and with his wife as a passenger during his first 5 drives to provide him with \"on-the-road\" feedback and continue working with his  at [employer].  Mr. Baca and his wife were in full agreement with these recommendations.    Operating a motor vehicle is a privilege in the [facility].  When a medical condition interferes with a person's ability to drive safely, it is the responsibility of the physician to approve driving or revoke the patient's license.  This test was not an on-the-road driving evaluation.  It was intended to " identify the physical, visual, perceptual and cognitive deficits that may impair an individual's ability to safely operate a motor vehicle.    Please contact me with any questions regarding this case at [facility] Physical Therapy & Rehab at [phone].  Thank you for this referral and the safety concerns for your patients and others.    Sincerely,    LILIANA King/L         Referring provider co-signature:  I have reviewed this evaluation and my co-signature certifies my agreement with the contents.    Physician Signature: ________________________________ Date: ______________

## 2022-12-20 ENCOUNTER — OFFICE VISIT (OUTPATIENT)
Dept: CARDIOLOGY | Facility: MEDICAL CENTER | Age: 72
End: 2022-12-20
Payer: MEDICARE

## 2022-12-20 VITALS
OXYGEN SATURATION: 98 % | SYSTOLIC BLOOD PRESSURE: 140 MMHG | HEART RATE: 74 BPM | BODY MASS INDEX: 33.75 KG/M2 | DIASTOLIC BLOOD PRESSURE: 86 MMHG | WEIGHT: 210 LBS | RESPIRATION RATE: 12 BRPM | HEIGHT: 66 IN

## 2022-12-20 DIAGNOSIS — I48.0 PAROXYSMAL ATRIAL FIBRILLATION (HCC): ICD-10-CM

## 2022-12-20 PROCEDURE — 93000 ELECTROCARDIOGRAM COMPLETE: CPT | Performed by: INTERNAL MEDICINE

## 2022-12-20 PROCEDURE — 99214 OFFICE O/P EST MOD 30 MIN: CPT | Mod: 25 | Performed by: INTERNAL MEDICINE

## 2022-12-20 ASSESSMENT — FIBROSIS 4 INDEX: FIB4 SCORE: 1.51688812977321948

## 2022-12-20 NOTE — PROGRESS NOTES
Arrhythmia Clinic Note (Established patient)    DOS: 12/20/2022    Chief complaint/Reason for consult: Afib    Interval History: 71 y/o M with h/o Afib, hemorrhagic CVA, DANNI-C now off OAC, AF ablation. He is doing well from a cardiac standpoint. We mainly discussed his CVA sequelae today.     ROS (+ highlighted in bold):  Constitutional: Fevers/chills/fatigue/weightloss  HEENT: Blurry vision/eye pain/sore throat/hearing loss  Respiratory: Shortness of breath/cough  Cardiovascular: Chest pain/palpitations/edema/orthopnea/syncope  GI: Nausea/vomitting/diarrhea  MSK: Arthralgias/myagias/muscle weakness  Skin: Rash/sores  Neurological: Numbness/tremors/vertigo  Endocrine: Excessive thirst/polyuria/cold intolerance/heat intolerance  Psych: Depression/anxiety    Past Medical History:   Diagnosis Date    Anemia     Atrial fibrillation (HCC)     GERD (gastroesophageal reflux disease)     Heart burn     High cholesterol     Hyperlipidemia     Hypertension     Iron deficiency anemia     Pneumonia     as a young adult     Sleep apnea     cpap     Snoring     Stroke (HCC) 12/19/2021    left sided weakness     UTI (urinary tract infection) 03/2022       Past Surgical History:   Procedure Laterality Date    KNEE ARTHROPLASTY TOTAL Left     OTHER      sinus sx        Social History     Socioeconomic History    Marital status:      Spouse name: Not on file    Number of children: Not on file    Years of education: Not on file    Highest education level: Master's degree (e.g., MA, MS, Leslie, MEd, MSW, AMY)   Occupational History    Not on file   Tobacco Use    Smoking status: Never    Smokeless tobacco: Never   Vaping Use    Vaping Use: Never used   Substance and Sexual Activity    Alcohol use: Never    Drug use: Never    Sexual activity: Not on file   Other Topics Concern    Not on file   Social History Narrative    Retired     Social Determinants of Health     Financial Resource Strain: Low Risk     Difficulty of Paying  "Living Expenses: Not hard at all   Food Insecurity: Unknown    Worried About Running Out of Food in the Last Year: Never true    Ran Out of Food in the Last Year: Patient refused   Transportation Needs: No Transportation Needs    Lack of Transportation (Medical): No    Lack of Transportation (Non-Medical): No   Physical Activity: Inactive    Days of Exercise per Week: 0 days    Minutes of Exercise per Session: 0 min   Stress: No Stress Concern Present    Feeling of Stress : Only a little   Social Connections: Unknown    Frequency of Communication with Friends and Family: More than three times a week    Frequency of Social Gatherings with Friends and Family: Patient refused    Attends Muslim Services: Patient refused    Active Member of Clubs or Organizations: Yes    Attends Club or Organization Meetings: Patient refused    Marital Status:    Intimate Partner Violence: Not on file   Housing Stability: Unknown    Unable to Pay for Housing in the Last Year: No    Number of Places Lived in the Last Year: 1    Unstable Housing in the Last Year: Patient refused       Family History   Problem Relation Age of Onset    Heart Disease Mother     Heart Disease Brother     Alcohol abuse Son        Allergies   Allergen Reactions    Codeine Unspecified     \"Become out of it\"    Ibuprofen Hives    Lactose Unspecified     Stomach upset        Current Outpatient Medications   Medication Sig Dispense Refill    aspirin EC (ECOTRIN) 81 MG Tablet Delayed Response Take 81 mg by mouth every day.      losartan (COZAAR) 50 MG Tab Take 1 Tablet by mouth every day. 90 Tablet 3    omeprazole (PRILOSEC) 20 MG delayed-release capsule Take 20 mg by mouth 2 times a day.      hydrALAZINE (APRESOLINE) 10 MG Tab Take 1 Tablet by mouth 3 times a day. 270 Tablet 3    Ascorbic Acid (VITAMIN C PO) Take 1 Tablet by mouth every day.       No current facility-administered medications for this visit.       Physical Exam:  Vitals:    12/20/22 1047 " "  BP: (!) 140/86   BP Location: Right arm   Patient Position: Sitting   BP Cuff Size: Adult   Pulse: 74   Resp: 12   SpO2: 98%   Weight: 95.3 kg (210 lb)   Height: 1.676 m (5' 6\")     General appearance: NAD, conversant   Eyes: anicteric sclerae, moist conjunctivae; no lid-lag; PERRLA  HENT: Atraumatic; oropharynx clear with moist mucous membranes and no mucosal ulcerations; normal hard and soft palate  Neck: Trachea midline; FROM, supple, no thyromegaly or lymphadenopathy  Lungs: CTA, with normal respiratory effort and no intercostal retractions  CV: RRR, no MRGs, no JVD  Abdomen: Soft, non-tender; no masses or HSM  Extremities: No peripheral edema or extremity lymphadenopathy  Skin: Normal temperature, turgor and texture; no rash, ulcers or subcutaneous nodules  Psych: Appropriate affect, alert and oriented to person, place and time    Data:  Lipids:   Lab Results   Component Value Date/Time    CHOLSTRLTOT 126 02/08/2022 08:42 AM    TRIGLYCERIDE 100 02/08/2022 08:42 AM    HDL 36 (A) 02/08/2022 08:42 AM    LDL 70 02/08/2022 08:42 AM        BMP:  Lab Results   Component Value Date/Time    SODIUM 139 09/12/2022 0935    POTASSIUM 3.4 (L) 09/12/2022 0935    CHLORIDE 105 09/12/2022 0935    CO2 22 09/12/2022 0935    GLUCOSE 116 (H) 09/12/2022 0935    BUN 17 09/12/2022 0935    CREATININE 0.93 09/12/2022 0935    CALCIUM 9.2 09/12/2022 0935    ANION 12.0 09/12/2022 0935        TSH:   Lab Results   Component Value Date/Time    TSHULTRASEN 0.150 (L) 02/25/2022 1445        THYROXINE (T4):   No results found for: MONE     CBC:   Lab Results   Component Value Date/Time    WBC 6.0 09/12/2022 09:35 AM    RBC 5.04 09/12/2022 09:35 AM    HEMOGLOBIN 14.4 09/12/2022 09:35 AM    HEMATOCRIT 43.6 09/12/2022 09:35 AM    MCV 86.5 09/12/2022 09:35 AM    MCH 28.6 09/12/2022 09:35 AM    MCHC 33.0 (L) 09/12/2022 09:35 AM    RDW 44.2 09/12/2022 09:35 AM    PLATELETCT 222 09/12/2022 09:35 AM    MPV 9.9 09/12/2022 09:35 AM    NEUTSPOLYS 51.60 " 09/12/2022 09:35 AM    LYMPHOCYTES 30.90 09/12/2022 09:35 AM    MONOCYTES 9.40 09/12/2022 09:35 AM    EOSINOPHILS 6.70 09/12/2022 09:35 AM    BASOPHILS 1.20 09/12/2022 09:35 AM    IMMGRAN 0.20 09/12/2022 09:35 AM    NRBC 0.00 09/12/2022 09:35 AM    NEUTS 3.07 09/12/2022 09:35 AM    LYMPHS 1.84 09/12/2022 09:35 AM    MONOS 0.56 09/12/2022 09:35 AM    EOS 0.40 09/12/2022 09:35 AM    BASO 0.07 09/12/2022 09:35 AM    IMMGRANAB 0.01 09/12/2022 09:35 AM    NRBCAB 0.00 09/12/2022 09:35 AM        CBC w/o DIFF  Lab Results   Component Value Date/Time    WBC 6.0 09/12/2022 09:35 AM    RBC 5.04 09/12/2022 09:35 AM    HEMOGLOBIN 14.4 09/12/2022 09:35 AM    MCV 86.5 09/12/2022 09:35 AM    MCH 28.6 09/12/2022 09:35 AM    MCHC 33.0 (L) 09/12/2022 09:35 AM    RDW 44.2 09/12/2022 09:35 AM    MPV 9.9 09/12/2022 09:35 AM       Prior echo/stress reviewed: Normal EF    EKG interpreted by me: NSR with PAC    Impression/Plan:  1. Paroxysmal atrial fibrillation (HCC)  EKG        AF s/p ablation  CVA, hemorrhagic   DANNI closure  HTN    - Doing well, off OAC, maintaining NSR since ablation  - Discussed various CVA sequelae, advised PCP followup    Annual FV    A total of 33 minutes of time was spent on day of encounter reviewing medical record, performing history and examination, counseling, ordering medication/test/consults, collaborating with referring service, and documentation.      Prince Brown MD  Cardiac Electrophysiology

## 2022-12-29 ENCOUNTER — OFFICE VISIT (OUTPATIENT)
Dept: MEDICAL GROUP | Facility: PHYSICIAN GROUP | Age: 72
End: 2022-12-29
Payer: MEDICARE

## 2022-12-29 VITALS
HEIGHT: 67 IN | TEMPERATURE: 98.3 F | HEART RATE: 69 BPM | DIASTOLIC BLOOD PRESSURE: 62 MMHG | WEIGHT: 211.2 LBS | OXYGEN SATURATION: 97 % | SYSTOLIC BLOOD PRESSURE: 108 MMHG | BODY MASS INDEX: 33.15 KG/M2 | RESPIRATION RATE: 16 BRPM

## 2022-12-29 DIAGNOSIS — G62.9 NEUROPATHY: ICD-10-CM

## 2022-12-29 DIAGNOSIS — Z23 NEED FOR VACCINATION: ICD-10-CM

## 2022-12-29 DIAGNOSIS — I61.9 HEMORRHAGIC STROKE (HCC): ICD-10-CM

## 2022-12-29 DIAGNOSIS — R53.81 PHYSICAL DECONDITIONING: ICD-10-CM

## 2022-12-29 PROCEDURE — G0008 ADMIN INFLUENZA VIRUS VAC: HCPCS | Performed by: NURSE PRACTITIONER

## 2022-12-29 PROCEDURE — 90662 IIV NO PRSV INCREASED AG IM: CPT | Performed by: NURSE PRACTITIONER

## 2022-12-29 PROCEDURE — 99214 OFFICE O/P EST MOD 30 MIN: CPT | Mod: 25 | Performed by: NURSE PRACTITIONER

## 2022-12-29 ASSESSMENT — FIBROSIS 4 INDEX: FIB4 SCORE: 1.51688812977321948

## 2022-12-29 NOTE — PROGRESS NOTES
"Chief Complaint   Patient presents with    Follow-Up     Would like to talk about neuropathy L leg and get referral for Leg        Subjective:     HPI:   Gokul Baca is a 72 y.o. male here to discuss the evaluation and management of:      Neuropathy  Chronic condition with progression.  Patient reports that since his stroke December 2021 he has had increasing numbness, tingling, and burning sensation in his left lower extremity.  He reports that it feels heavy in nature and is now having to ambulate with a walker and seated wheelchair as he gets fatigued easily.  Currently has not tried any prescription medications to help with the neuropathic pain.  Patient is followed closely with  doctor and homeopathic doctors I would like to try herbal/vitamin supplements    Hemorrhagic stroke (HCC)  Chronic and stable condition.  Blood pressure well controlled in clinic at 108/62.  Patient does continue on losartan 50 mg tablet daily and 81 mg aspirin daily.  He denies any acute symptoms of stroke.  He does have chronic weakness in left side with progressing neuropathy in left lower extremity.    Physical deconditioning  New condition.  Patient does report that he is starting to feel weaker post hemorrhagic stroke 1 year ago.  He indicates he tries to do exercises at home however does think it would be beneficial if he start back in physical therapy.  He does have progression with neuropathic pain to left lower extremity.  Patient does not have ambulate with walker        ROS:  Gen: no fevers/chills, no changes in weight  Pulm: no sob, no cough  CV: no chest pain, no palpitations  MSk: Positive per HPI  Neuro: Positive per HPI      Allergies   Allergen Reactions    Codeine Unspecified     \"Become out of it\"    Ibuprofen Hives    Lactose Unspecified     Stomach upset        Current medicines (including changes today)  Current Outpatient Medications   Medication Sig Dispense Refill    aspirin EC (ECOTRIN) 81 MG " "Tablet Delayed Response Take 81 mg by mouth every day.      losartan (COZAAR) 50 MG Tab Take 1 Tablet by mouth every day. 90 Tablet 3    omeprazole (PRILOSEC) 20 MG delayed-release capsule Take 20 mg by mouth 2 times a day.      hydrALAZINE (APRESOLINE) 10 MG Tab Take 1 Tablet by mouth 3 times a day. 270 Tablet 3    Ascorbic Acid (VITAMIN C PO) Take 1 Tablet by mouth every day.       No current facility-administered medications for this visit.          Objective:       /62   Pulse 69   Temp 36.8 °C (98.3 °F) (Temporal)   Resp 16   Ht 1.689 m (5' 6.5\")   Wt 95.8 kg (211 lb 3.2 oz)   SpO2 97%  Body mass index is 33.58 kg/m².    Physical Exam:  Constitutional: Well-developed and well-nourished male in NAD. Not diaphoretic. No distress.   Skin: warm, dry, intact  Cardiovascular: Regular rate and rhythm without murmur. Radial pulses are intact and equal bilaterally.  Pulmonary: Clear to ausculation. Normal effort. No rales, ronchi, or wheezing.  Extremities: No cyanosis, clubbing, erythema, nor edema.  Upper extremity: Sensation intact bilaterally 5 out of 5 strength.  Left lower extremity decreased sensation, strength 4 out of 5.  Right lower extremity sensation intact, 5 out of 5 strength.  Neurological: Alert and oriented x 3.   Psychiatric:  Behavior, mood, and affect are appropriate.      Assessment and Plan:     The following treatment plan was discussed:    1. Neuropathy  Chronic condition with progression.  Discussed with patient multiple treatments including trial of gabapentin however patient declined at this time would like to follow-up with his  doctor and his homeopathic doctor.  Encourage patient to continue ambulating with walker as needed to prevent falls.  Patient did request new referral to neurology for further evaluation.  I do feel that is appropriate referrals placed.  - Referral to Neurology    2. Hemorrhagic stroke (HCC)  Chronic condition with mild progression with " neuropathy, no other deficits noted during physical exam.  Referral was placed to neurology for further evaluation and management post hemorrhagic stroke.  Patient is doing well we will continue on 81 mg aspirin daily and losartan 50 mg tablet daily for blood pressure management.  - Referral to Neurology      3. Physical deconditioning  New condition.  Patient does show signs of physical deconditioning post hemorrhagic stroke 1 year ago with progression of neuropathic pain.  Recommended patient continue to use walker as needed to help with stability both at the house and fall running errands in town.  Referral placed today for physical therapy.  - Referral to Physical Therapy    4. Need for vaccination  - INFLUENZA VACCINE, HIGH DOSE (65+ ONLY)        Any change or worsening of signs or symptoms, patient encouraged to follow-up or report to urgent care or emergency room for further evaluation. Patient verbalizes understanding and agrees.    Follow-Up: Return in about 5 weeks (around 2/3/2023) for Follow-up with Dr. Waldron.      PLEASE NOTE: This dictation was created using voice recognition software. I have made every reasonable attempt to correct obvious errors, but I expect that there are errors of grammar and possibly content that I did not discover before finalizing the note.

## 2022-12-30 LAB — EKG IMPRESSION: NORMAL

## 2022-12-30 NOTE — ASSESSMENT & PLAN NOTE
New condition.  Patient does report that he is starting to feel weaker post hemorrhagic stroke 1 year ago.  He indicates he tries to do exercises at home however does think it would be beneficial if he start back in physical therapy.  He does have progression with neuropathic pain to left lower extremity.  Patient does not have ambulate with walker

## 2022-12-30 NOTE — ASSESSMENT & PLAN NOTE
Chronic and stable condition.  Blood pressure well controlled in clinic at 108/62.  Patient does continue on losartan 50 mg tablet daily and 81 mg aspirin daily.  He denies any acute symptoms of stroke.  He does have chronic weakness in left side with progressing neuropathy in left lower extremity.

## 2022-12-30 NOTE — ASSESSMENT & PLAN NOTE
Chronic condition with progression.  Patient reports that since his stroke December 2021 he has had increasing numbness, tingling, and burning sensation in his left lower extremity.  He reports that it feels heavy in nature and is now having to ambulate with a walker and seated wheelchair as he gets fatigued easily.  Currently has not tried any prescription medications to help with the neuropathic pain.  Patient is followed closely with  doctor and homeopathic doctors I would like to try herbal/vitamin supplements

## 2023-01-09 ENCOUNTER — APPOINTMENT (OUTPATIENT)
Dept: OPHTHALMOLOGY | Facility: MEDICAL CENTER | Age: 73
End: 2023-01-09
Payer: MEDICARE

## 2023-01-24 ENCOUNTER — OFFICE VISIT (OUTPATIENT)
Dept: OPHTHALMOLOGY | Facility: MEDICAL CENTER | Age: 73
End: 2023-01-24
Payer: MEDICARE

## 2023-01-24 DIAGNOSIS — H25.13 AGE-RELATED NUCLEAR CATARACT OF BOTH EYES: ICD-10-CM

## 2023-01-24 DIAGNOSIS — I61.9 HEMORRHAGIC STROKE (HCC): ICD-10-CM

## 2023-01-24 DIAGNOSIS — H52.213 IRREGULAR ASTIGMATISM OF BOTH EYES: ICD-10-CM

## 2023-01-24 PROCEDURE — 92083 EXTENDED VISUAL FIELD XM: CPT | Performed by: OPHTHALMOLOGY

## 2023-01-24 PROCEDURE — 99214 OFFICE O/P EST MOD 30 MIN: CPT | Mod: 25 | Performed by: OPHTHALMOLOGY

## 2023-01-24 PROCEDURE — 92250 FUNDUS PHOTOGRAPHY W/I&R: CPT | Performed by: OPHTHALMOLOGY

## 2023-01-24 ASSESSMENT — CONF VISUAL FIELD
OS_INFERIOR_TEMPORAL_RESTRICTION: 1
OS_SUPERIOR_TEMPORAL_RESTRICTION: 1
OD_INFERIOR_NASAL_RESTRICTION: 1
OD_SUPERIOR_NASAL_RESTRICTION: 1

## 2023-01-24 ASSESSMENT — EXTERNAL EXAM - LEFT EYE: OS_EXAM: NORMAL

## 2023-01-24 ASSESSMENT — REFRACTION_WEARINGRX
OD_AXIS: 180
OS_SPHERE: -3.00
OD_SPHERE: -1.75
OS_AXIS: 107
OD_CYLINDER: +2.00
OD_ADD: +2.75
OS_ADD: +2.75
OS_CYLINDER: +2.75

## 2023-01-24 ASSESSMENT — TONOMETRY
OD_IOP_MMHG: 14
OS_IOP_MMHG: 13

## 2023-01-24 ASSESSMENT — EXTERNAL EXAM - RIGHT EYE: OD_EXAM: NORMAL

## 2023-01-24 ASSESSMENT — REFRACTION_MANIFEST
OD_AXIS: 008
OS_CYLINDER: +2.75
OS_SPHERE: -3.50
METHOD_AUTOREFRACTION: 1
OD_CYLINDER: +2.25
OS_AXIS: 172
OD_SPHERE: -2.00

## 2023-01-24 ASSESSMENT — SLIT LAMP EXAM - LIDS
COMMENTS: NORMAL
COMMENTS: NORMAL

## 2023-01-24 ASSESSMENT — VISUAL ACUITY
OD_SC: 20/30
METHOD: SNELLEN - LINEAR
OS_SC: 20/60+1

## 2023-01-24 ASSESSMENT — CUP TO DISC RATIO
OS_RATIO: 0.1
OD_RATIO: 0.1

## 2023-01-24 NOTE — ASSESSMENT & PLAN NOTE
7/19/2022 - Can improve central acuity with an adjustment in glasses rx. Might need full frame computer glasses  1/24/2023-we gave Rx.  May need separate distance and computer if could not tolerate the progressive

## 2023-01-24 NOTE — ASSESSMENT & PLAN NOTE
7/19/2022 - Bilateral NS cataracts.   1/24/2023-stable bilateral nuclear sclerotic cataracts.  Last visit adjusted Rx OS but states that could not tolerate.  Possibly progressive was too high but did not bring glasses to check

## 2023-01-24 NOTE — PROGRESS NOTES
Peds/Neuro Ophthalmology:   Vic Flores M.D.    Date & Time note created:    1/24/2023   10:16 AM     Referring MD / APRN:  Jennifer Waldron M.D., No att. providers found    Patient ID:  Name:             Gokul Baca   YOB: 1950  Age:                 72 y.o.  male   MRN:               [id number]    Chief Complaint/Reason for Visit:     Other (Intercranial hemorraghic stroke/)      History of Present Illness:    Gokul Baca is a 72 y.o. male   Follow up intracranial hemorraghic stroke with slightly improved vision.Patient passed occupational therapy and simulated driving test.      Review of Systems:  Review of Systems   Eyes:         Poor peripheral vision.   All other systems reviewed and are negative.    Past Medical History:   Past Medical History:   Diagnosis Date    Anemia     Atrial fibrillation (HCC)     GERD (gastroesophageal reflux disease)     Heart burn     High cholesterol     Hyperlipidemia     Hypertension     Iron deficiency anemia     Pneumonia     as a young adult     Sleep apnea     cpap     Snoring     Stroke (HCC) 12/19/2021    left sided weakness     UTI (urinary tract infection) 03/2022       Past Surgical History:  Past Surgical History:   Procedure Laterality Date    KNEE ARTHROPLASTY TOTAL Left     OTHER      sinus sx        Current Outpatient Medications:  Current Outpatient Medications   Medication Sig Dispense Refill    aspirin EC (ECOTRIN) 81 MG Tablet Delayed Response Take 81 mg by mouth every day.      losartan (COZAAR) 50 MG Tab Take 1 Tablet by mouth every day. 90 Tablet 3    omeprazole (PRILOSEC) 20 MG delayed-release capsule Take 20 mg by mouth 2 times a day.      hydrALAZINE (APRESOLINE) 10 MG Tab Take 1 Tablet by mouth 3 times a day. 270 Tablet 3    Ascorbic Acid (VITAMIN C PO) Take 1 Tablet by mouth every day.       No current facility-administered medications for this visit.       Allergies:  Allergies   Allergen Reactions    Codeine  "Unspecified     \"Become out of it\"    Ibuprofen Hives    Lactose Unspecified     Stomach upset        Family History:  Family History   Problem Relation Age of Onset    Heart Disease Mother     Heart Disease Brother     Alcohol abuse Son        Social History:  Social History     Socioeconomic History    Marital status:      Spouse name: Not on file    Number of children: Not on file    Years of education: Not on file    Highest education level: Master's degree (e.g., MA, MS, Leslie, MEd, MSW, AMY)   Occupational History    Not on file   Tobacco Use    Smoking status: Never    Smokeless tobacco: Never   Vaping Use    Vaping Use: Never used   Substance and Sexual Activity    Alcohol use: Never    Drug use: Never    Sexual activity: Not on file   Other Topics Concern    Not on file   Social History Narrative    Retired     Social Determinants of Health     Financial Resource Strain: Low Risk     Difficulty of Paying Living Expenses: Not hard at all   Food Insecurity: Unknown    Worried About Running Out of Food in the Last Year: Never true    Ran Out of Food in the Last Year: Patient refused   Transportation Needs: No Transportation Needs    Lack of Transportation (Medical): No    Lack of Transportation (Non-Medical): No   Physical Activity: Inactive    Days of Exercise per Week: 0 days    Minutes of Exercise per Session: 0 min   Stress: No Stress Concern Present    Feeling of Stress : Only a little   Social Connections: Unknown    Frequency of Communication with Friends and Family: More than three times a week    Frequency of Social Gatherings with Friends and Family: Patient refused    Attends Latter day Services: Patient refused    Active Member of Clubs or Organizations: Yes    Attends Club or Organization Meetings: Patient refused    Marital Status:    Intimate Partner Violence: Not on file   Housing Stability: Unknown    Unable to Pay for Housing in the Last Year: No    Number of Places Lived in the " Last Year: 1    Unstable Housing in the Last Year: Patient refused          Physical Exam:  Physical Exam    Oriented x 3  Weight/BMI: There is no height or weight on file to calculate BMI.  There were no vitals taken for this visit.    Base Eye Exam       Visual Acuity (Snellen - Linear)         Right Left    Dist sc 20/30 20/60+1              Tonometry ( care, 9:02 AM)         Right Left    Pressure 14 13              Pupils         Pupils    Right PERRL    Left PERRL              Visual Fields         Right Left                                Extraocular Movement         Right Left     Full, Ortho Full, Ortho              Neuro/Psych       Oriented x3: Yes    Mood/Affect: Normal              Dilation       Both eyes: able to view without dilation                   Slit Lamp and Fundus Exam       External Exam         Right Left    External Normal Normal              Slit Lamp Exam         Right Left    Lids/Lashes Normal Normal    Conjunctiva/Sclera White and quiet White and quiet    Cornea Clear Clear    Anterior Chamber Deep and quiet Deep and quiet    Iris Round and reactive Round and reactive    Lens Clear Clear    Vitreous Posterior vitreous detachment Posterior vitreous detachment              Fundus Exam         Right Left    Disc Normal Normal    C/D Ratio 0.1 0.1    Macula Normal Normal    Vessels Normal Normal    Periphery Normal Normal                  Refraction       Wearing Rx         Sphere Cylinder Axis Add    Right -1.75 +2.00 180 +2.75    Left -3.00 +2.75 107 +2.75              Manifest Refraction (Auto)         Sphere Cylinder Axis    Right -2.00 +2.25 008    Left -3.50 +2.75 172              Final Rx         Sphere Cylinder Axis Add    Right -1.75 +2.00 180 +2.75    Left -3.00 +2.75 107 +2.75                    Pertinent Lab/Test/Imaging Review:      Assessment and Plan:     Age-related nuclear cataract of both eyes  7/19/2022 - Bilateral NS cataracts.   1/24/2023-stable bilateral nuclear  sclerotic cataracts.  Last visit adjusted Rx OS but states that could not tolerate.  Possibly progressive was too high but did not bring glasses to check    Irregular astigmatism of both eyes  7/19/2022 - Can improve central acuity with an adjustment in glasses rx. Might need full frame computer glasses  1/24/2023-we gave Rx.  May need separate distance and computer if could not tolerate the progressive    Stroke (HCC)  7/189/2022- No evidence of an ischemic optic neuropathy. His optic nerves are small and tilted, but without pallor. OCT NFL thickness 75 OD and 64 OS  1/24/2023-OCT nerve fiber layer thickness 78 OD 86 OS    Hemorrhagic stroke (HCC)  7/19/2022 - Right temporal hemorrhagic stroke leading to the left inferior quadrant defect. Demonstrated the defect on HVF and discussed implications.     1/24/2023-less dense left inferior quadrantic defect on [facility] visual field testing    30 minutes total, time spent today reviewing problem list including images from stroke, full neuro ophthalmic evaluation, reviewing results visual fields OCT and photos with patient, discussing reasons why her glasses Rx might not be seeing well including foot and location of progressive, re giving Rx, discussing progressive cataracts, discussing follow-up, and generating note in epic    Vic Flores M.D.

## 2023-01-24 NOTE — ASSESSMENT & PLAN NOTE
7/19/2022 - Right temporal hemorrhagic stroke leading to the left inferior quadrant defect. Demonstrated the defect on HVF and discussed implications.     1/24/2023-less dense left inferior quadrantic defect on [facility] visual field testing

## 2023-01-24 NOTE — ASSESSMENT & PLAN NOTE
7/189/2022- No evidence of an ischemic optic neuropathy. His optic nerves are small and tilted, but without pallor. OCT NFL thickness 75 OD and 64 OS  1/24/2023-OCT nerve fiber layer thickness 78 OD 86 OS

## 2023-02-03 ENCOUNTER — OFFICE VISIT (OUTPATIENT)
Dept: MEDICAL GROUP | Facility: PHYSICIAN GROUP | Age: 73
End: 2023-02-03
Payer: MEDICARE

## 2023-02-03 VITALS
BODY MASS INDEX: 33.88 KG/M2 | OXYGEN SATURATION: 98 % | HEIGHT: 66 IN | WEIGHT: 210.8 LBS | SYSTOLIC BLOOD PRESSURE: 128 MMHG | HEART RATE: 73 BPM | RESPIRATION RATE: 18 BRPM | DIASTOLIC BLOOD PRESSURE: 80 MMHG | TEMPERATURE: 97.3 F

## 2023-02-03 DIAGNOSIS — R79.89 ABNORMAL TSH: ICD-10-CM

## 2023-02-03 DIAGNOSIS — R79.89 ABNORMAL CBC: ICD-10-CM

## 2023-02-03 DIAGNOSIS — Z12.12 SCREENING FOR COLORECTAL CANCER: ICD-10-CM

## 2023-02-03 DIAGNOSIS — Z12.11 SCREENING FOR COLORECTAL CANCER: ICD-10-CM

## 2023-02-03 DIAGNOSIS — D50.8 OTHER IRON DEFICIENCY ANEMIA: ICD-10-CM

## 2023-02-03 DIAGNOSIS — E78.5 HYPERLIPIDEMIA, UNSPECIFIED HYPERLIPIDEMIA TYPE: ICD-10-CM

## 2023-02-03 DIAGNOSIS — I10 PRIMARY HYPERTENSION: ICD-10-CM

## 2023-02-03 PROCEDURE — 99214 OFFICE O/P EST MOD 30 MIN: CPT | Performed by: FAMILY MEDICINE

## 2023-02-03 ASSESSMENT — PATIENT HEALTH QUESTIONNAIRE - PHQ9: CLINICAL INTERPRETATION OF PHQ2 SCORE: 0

## 2023-02-03 ASSESSMENT — FIBROSIS 4 INDEX: FIB4 SCORE: 1.51688812977321948

## 2023-02-08 NOTE — PROGRESS NOTES
"Subjective:   Gokul Baca is a 72 y.o. male here today for evaluation and management of:     Iron deficiency anemia  Patient has been feeling stronger  Repeat cbc ordered to evaluate for anemia  Due for crc screening, FIT ordered    Primary hypertension  Controlled on losartan 50 mg daily  bp today 128/80 HR 73  He feels well with no chest pain, headache, syncope or LE edema               Current medicines (including changes today)  Current Outpatient Medications   Medication Sig Dispense Refill    aspirin EC (ECOTRIN) 81 MG Tablet Delayed Response Take 81 mg by mouth every day.      losartan (COZAAR) 50 MG Tab Take 1 Tablet by mouth every day. 90 Tablet 3    omeprazole (PRILOSEC) 20 MG delayed-release capsule Take 20 mg by mouth 2 times a day.      hydrALAZINE (APRESOLINE) 10 MG Tab Take 1 Tablet by mouth 3 times a day. 270 Tablet 3    Ascorbic Acid (VITAMIN C PO) Take 1 Tablet by mouth every day.       No current facility-administered medications for this visit.     He  has a past medical history of Anemia, Atrial fibrillation (HCC), GERD (gastroesophageal reflux disease), Heart burn, High cholesterol, Hyperlipidemia, Hypertension, Iron deficiency anemia, Pneumonia, Sleep apnea, Snoring, Stroke (HCC) (12/19/2021), and UTI (urinary tract infection) (03/2022).    ROS  No chest pain, no shortness of breath, no abdominal pain       Objective:     /80   Pulse 73   Temp 36.3 °C (97.3 °F) (Temporal)   Resp 18   Ht 1.676 m (5' 6\")   Wt 95.6 kg (210 lb 12.8 oz)   SpO2 98%  Body mass index is 34.02 kg/m².   Physical Exam:  Constitutional: Alert, no distress.  Skin: Warm, dry, good turgor, no rashes in visible areas.  Eye: Equal, round and reactive, conjunctiva clear, lids normal.  ENMT: Lips without lesions, good dentition, oropharynx clear.  Neck: Trachea midline, no masses, no thyromegaly. No cervical or supraclavicular lymphadenopathy  Respiratory: Unlabored respiratory effort, lungs clear to " auscultation, no wheezes, no ronchi.  Cardiovascular: Normal S1, S2, no murmur, no edema.  Abdomen: Soft, non-tender, no masses, no hepatosplenomegaly.  Psych: Alert and oriented x3, normal affect and mood.        Assessment and Plan:   The following treatment plan was discussed    1. Screening for colorectal cancer  - OCCULT BLOOD FECES IMMUNOASSAY (FIT); Future    2. Primary hypertension  - Comp Metabolic Panel; Future    3. Hyperlipidemia, unspecified hyperlipidemia type  - Lipid Profile; Future    4. Abnormal TSH  - TSH WITH REFLEX TO FT4; Future    5. Abnormal CBC  - CBC WITH DIFFERENTIAL; Future    6. Other iron deficiency anemia      Followup: No follow-ups on file.

## 2023-03-15 ENCOUNTER — APPOINTMENT (OUTPATIENT)
Dept: NEUROLOGY | Facility: MEDICAL CENTER | Age: 73
End: 2023-03-15
Attending: STUDENT IN AN ORGANIZED HEALTH CARE EDUCATION/TRAINING PROGRAM
Payer: MEDICARE

## 2023-03-15 ENCOUNTER — APPOINTMENT (OUTPATIENT)
Dept: NEUROLOGY | Facility: MEDICAL CENTER | Age: 73
End: 2023-03-15
Attending: PSYCHIATRY & NEUROLOGY
Payer: MEDICARE

## 2023-03-22 ENCOUNTER — OFFICE VISIT (OUTPATIENT)
Dept: NEUROLOGY | Facility: MEDICAL CENTER | Age: 73
End: 2023-03-22
Attending: PSYCHIATRY & NEUROLOGY
Payer: MEDICARE

## 2023-03-22 VITALS
HEIGHT: 66 IN | SYSTOLIC BLOOD PRESSURE: 108 MMHG | HEART RATE: 88 BPM | BODY MASS INDEX: 34.12 KG/M2 | RESPIRATION RATE: 16 BRPM | WEIGHT: 212.3 LBS | OXYGEN SATURATION: 98 % | DIASTOLIC BLOOD PRESSURE: 64 MMHG

## 2023-03-22 DIAGNOSIS — G62.9 NEUROPATHY: ICD-10-CM

## 2023-03-22 PROCEDURE — 99215 OFFICE O/P EST HI 40 MIN: CPT | Performed by: PSYCHIATRY & NEUROLOGY

## 2023-03-22 PROCEDURE — 99212 OFFICE O/P EST SF 10 MIN: CPT | Performed by: PSYCHIATRY & NEUROLOGY

## 2023-03-22 RX ORDER — PREGABALIN 50 MG/1
50 CAPSULE ORAL NIGHTLY
Qty: 90 CAPSULE | Refills: 1 | Status: SHIPPED | OUTPATIENT
Start: 2023-03-22 | End: 2023-03-24 | Stop reason: SDUPTHER

## 2023-03-22 ASSESSMENT — FIBROSIS 4 INDEX: FIB4 SCORE: 1.51688812977321948

## 2023-03-22 NOTE — PROGRESS NOTES
"[facility]  Neurology Clinics  New Patient Note/Follow Up Visit      Patient's Name: Gokul Baca  YOB: 1950  MRN: [id number]  Date of Service: 03/22/2023    Referring Provider: No referring provider defined for this encounter.    Chief Concern: Post-intracerebral hemorrhage neuropathic pain    HPI: The patient is a 72 y.o., ambidextrous male, who initially presented to my clinic for evaluation of post-stroke neuropathic pain on 03/22/2023.    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 He was eventually cleared to be on anticoagulation by neurology in March 2022, but underwent ablation procedure for the treatment of his Afib. He remains on aspirin since then.     He never had seizures.    Since his hemorrhagic stroke in December 2021, he recovered reasonably well, is very active, currently works for insurance company, and is able to perform his activities of daily living.  He is very interested in continuing his rehabilitation, and actually the main reason for his visit today is to explore further options for his poststroke neuropathic pain in left arm and leg, as well as intermittent right sided scalp pain.  In addition, there is some numbness in his left foot and left hand.  He tried Cymbalta in the past, but could not tolerate it.    He has physical therapy scheduled.  He reports that his memory is stable at this time.    Pertinent Ancillary Test Results:    MRI brain studies:   - MRI brain - none available for my review.    CT head studies:   - CT head wo contrast (12/19/2021 at [facility]): \"IMPRESSION:  1. A 3.4 x 3.6 cm parenchymal hemorrhage in the right basal ganglia. Mild mass effect in the right lateral ventricle. No significant midline shift.\"     - CT head wo contrast " "(03/21/2022 at [facility]): \"IMPRESSION:   1.  Interval resolution of intracranial hemorrhage.  2.  Encephalomalacia present in the RIGHT posterior basal ganglia and frontoparietal white matter consistent with chronic infarct.  3.  No acute intracranial hemorrhage or territorial infarct.  4.  Moderate diffuse atrophy.\"    EEG studies:  - none available for my review.     Review of Systems: As above.    Past Medical History:  Past Medical History:   Diagnosis Date    Anemia     Atrial fibrillation (HCC)     GERD (gastroesophageal reflux disease)     Heart burn     High cholesterol     Hyperlipidemia     Hypertension     Iron deficiency anemia     Pneumonia     as a young adult     Sleep apnea     cpap     Snoring     Stroke (HCC) 12/19/2021    left sided weakness     UTI (urinary tract infection) 03/2022     Past Surgical History:  Past Surgical History:   Procedure Laterality Date    KNEE ARTHROPLASTY TOTAL Left     OTHER      sinus sx       Social History:  Social History     Tobacco Use    Smoking status: Never    Smokeless tobacco: Never   Vaping Use    Vaping Use: Never used   Substance Use Topics    Alcohol use: Never    Drug use: Never     Family History:  Family History   Problem Relation Age of Onset    Heart Disease Mother     Heart Disease Brother     Alcohol abuse Son      Allergies:  Allergies   Allergen Reactions    Codeine Unspecified     \"Become out of it\"    Ibuprofen Hives    Lactose Unspecified     Stomach upset      Current Medications:    Current Outpatient Medications:     aspirin EC, 81 mg, Oral, DAILY, Taking    losartan, 50 mg, Oral, DAILY, Taking    omeprazole, 20 mg, Oral, BID, Taking    hydrALAZINE, 10 mg, Oral, TID (Patient taking differently: 10 mg, Oral, 2 Times Daily (BID)), Taking    Ascorbic Acid (VITAMIN C PO), 1 Tablet, Oral, DAILY (Patient not taking: Reported on 3/22/2023), Not Taking    Physical Examination:    Ambulatory Vitals  Encounter Vitals  Blood Pressure : 108/64  Pulse: " "88  Respiration: 16  Pulse Oximetry: 98 %  Weight: 96.3 kg (212 lb 4.9 oz)  Height: 167.6 cm (5' 6\")  BMI (Calculated): 34.27    Neurological Examination:  Mental Status: The patient is alert and oriented to person, place, time, and situation. Speech is fluent, with no aphasia nor dysarthria noted. Affect is normal.    Cranial Nerve Examination:  CN I: Olfaction examination is deferred.  CN II: Visual fields examination is notable for the left lower quadrantanopsia.   CN III, IV, VI: Eye movements are normal in all directions. Pupils are reactive to direct and consensual light. There is no relative afferent pupillary defect. There is no nystagmus.  CN V: Facial sensation to light touch is intact throughout.   CN VII: Left lower facial weakness.  CN VIII: Decreased hearing to finger-rubbing on the left.  CN IX, X: Soft palate elevates symmetrically.  CN XI: Symmetrical shoulder shrug exam.  CN XII: Midline tongue protrusion and moves symmetrically to each side.     Motor Examination:  Notable for mild left arm and leg weakness, with some increased tone on the left, as well as left pronator drift.     Muscle Stretch Reflexes Examination:  Brisk MSR, L>R/.    Sensory Examination:  Notable for decreased sensation and sensitivity to touch on the left.     Coordination:  Overall unremarkable finger-nose-finger testing.     Stance/gait:  Cautious, hemiparetic gait.     ASSESSMENT AND PLAN:  1. Post-stroke neuropathic pain.  Clinical presentation is consistent with post-stroke neuropathic pain, especially in context of sensitivity to touch.  On the other hand, underlying neuropathy cannot be fully ruled out.  We will proceed with work-up and treatment:  - obtain EMG/nerve conduction study testing to rule out underlying neuropathy as a possible contributor to his symptoms:  - Referral to Neurodiagnostics (EEG,EP,EMG/NCS/DBS)  -His primary care physician has ordered routine labs, including TSH; the patient will ask if " hemoglobin A1c may be ordered as well.  There are previous levels of vitamin B12 noted.  If work-up remains unremarkable, we would consider further laboratory testing, such as SPEP as well as testing for less frequent causes of neuropathy, especially if EMG/NCS comes back positive.  - pregabalin (LYRICA) 50 MG capsule; Take 1 Capsule by mouth every evening for 180 days.  Dispense: 90 Capsule; Refill: 1; discussed Lyrica adverse effects, specifically related to angioedema, especially in context of him taking losartan, as well as somnolence, and other adverse effects.  He is aware of of the need to seek immediate medical attention if he develops any swelling and/or difficulties breathing.  We decided to proceed with a very low-dose of Lyrica, since he had previous adverse effects to Cymbalta.  - Referral to Occupational Therapy  - The patient was encouraged to follow-up with physical therapy as scheduled.    Follow-up for his other medical problems with his primary care physician.    Follow up in 3 months.     My total time spent caring for the patient on the day of the encounter was 41 minutes.   This does not include time spent on separately billable procedures/tests.    Maurizio Gonzalez MD  Neurology Attending, Epilepsy Program  [facility]

## 2023-03-24 ENCOUNTER — TELEPHONE (OUTPATIENT)
Dept: NEUROLOGY | Facility: MEDICAL CENTER | Age: 73
End: 2023-03-24
Payer: MEDICARE

## 2023-03-24 DIAGNOSIS — G62.9 NEUROPATHY: ICD-10-CM

## 2023-03-24 RX ORDER — PREGABALIN 50 MG/1
50 CAPSULE ORAL NIGHTLY
Qty: 90 CAPSULE | Refills: 1 | OUTPATIENT
Start: 2023-03-24 | End: 2023-09-20

## 2023-03-24 RX ORDER — PREGABALIN 50 MG/1
50 CAPSULE ORAL NIGHTLY
Qty: 90 CAPSULE | Refills: 1 | Status: SHIPPED | OUTPATIENT
Start: 2023-03-24 | End: 2023-05-31

## 2023-03-24 NOTE — TELEPHONE ENCOUNTER
Received request via: Pharmacy    Was the patient seen in the last year in this department? Yes    Does the patient have an active prescription (recently filled or refills available) for medication(s) requested? Yes.   Does the patient have care home Plus and need 100 day supply (blood pressure, diabetes and cholesterol meds only)? Patient does not have SCP    This is a dr Kerr pt. Issues with MAGUI. Thank you

## 2023-03-28 ENCOUNTER — OCCUPATIONAL THERAPY (OUTPATIENT)
Dept: OCCUPATIONAL THERAPY | Facility: REHABILITATION | Age: 73
End: 2023-03-28
Attending: PSYCHIATRY & NEUROLOGY
Payer: MEDICARE

## 2023-03-28 DIAGNOSIS — G62.9 NEUROPATHY: ICD-10-CM

## 2023-03-28 PROCEDURE — 97110 THERAPEUTIC EXERCISES: CPT

## 2023-03-28 PROCEDURE — 97165 OT EVAL LOW COMPLEX 30 MIN: CPT

## 2023-03-28 SDOH — ECONOMIC STABILITY: GENERAL: QUALITY OF LIFE: GOOD

## 2023-03-28 ASSESSMENT — ENCOUNTER SYMPTOMS
QUALITY: PRESSURE
ALLEVIATING FACTORS: REST
QUALITY: NUMBNESS
PAIN SCALE AT HIGHEST: 8
EXACERBATED BY: SLEEPING
PAIN SCALE: 5
QUALITY: ACHING
PAIN TIMING: ALL DAY
PAIN SCALE AT LOWEST: 2
EXACERBATED BY: REPOSITIONING

## 2023-03-28 NOTE — OP THERAPY EVALUATION
Outpatient Occupational Therapy  INITIAL NEUROLOGICAL EVALUATION    [facility] Occupational Therapy [address]  [address]  [place] [address]  Phone:  [phone]  Fax:  [phone]    Date of Evaluation: 03/28/2023    Patient: Gokul Baca  YOB: 1950  MRN: [id number]     Referring Provider: Maurizio Gonzalez M.D.  [address] [place], [place] [address] [place] [address]  [place] [address]   Referring Diagnosis Neuropathy [G62.9]     Time Calculation    Start time: 0145  Stop time: 0230 Time Calculation (min): 45 minutes             Chief Complaint: Hand Problem and Hand Weakness    Visit Diagnoses     ICD-10-CM   1. Neuropathy  G62.9       Subjective:   History of Present Illness:     Date of onset:  11/28/2021    Mechanism of injury:  1. Post-stroke neuropathic pain.  Clinical presentation is consistent with post-stroke neuropathic pain, especially in context of sensitivity to touch.  On the other hand, underlying neuropathy cannot be fully ruled out.  We will proceed with work-up and treatment:  - obtain EMG/nerve conduction study testing to rule out underlying neuropathy as a possible contributor to his symptoms:  - Referral to Neurodiagnostics (EEG,EP,EMG/NCS/DBS)  -His primary care physician has ordered routine labs, including TSH; the patient will ask if hemoglobin A1c may be ordered as well.  There are previous levels of vitamin B12 noted.  If work-up remains unremarkable, we would consider further laboratory testing, such as SPEP as well as testing for less frequent causes of neuropathy, especially if EMG/NCS comes back positive.  - pregabalin (LYRICA) 50 MG capsule; Take 1 Capsule by mouth every evening for 180 days.  Dispense: 90 Capsule; Refill: 1; discussed Lyrica adverse effects, specifically related to angioedema, especially in context of him taking losartan, as well as somnolence, and other adverse effects.  He is aware of of the need to seek immediate medical attention if he develops any  swelling and/or difficulties breathing.  We decided to proceed with a very low-dose of Lyrica, since he had previous adverse effects to Cymbalta.  - Referral to Occupational Therapy  - The patient was encouraged to follow-up with physical therapy as scheduled.  Quality of life:  Good  Prior level of function:  Independent after stroke and mobilizes with 4WW  Headaches:  no headaches  Ear problems: none  Sleep disturbance:  Not disrupted  Pain:     Current pain ratin    At best pain ratin    At worst pain ratin    Location:  Dorsum of left wrist and hand and then from PIPs downwards.    Quality:  Aching, pressure and numbness    Pain timing:  All day    Relieving factors:  Rest (working with neurologist regarding medication managment.)    Aggravating factors:  Repositioning and sleeping    Progression:  Worsening  Social Support:     Lives in:  One-story house    Lives with:  Spouse  Hand dominance:  Right  Diagnostic Tests:     None      Diagnostic Tests Comments:  Not for the hands.    Treatments:     Previous treatment:  Physical therapy, occupational therapy and speech therapy    Current treatment:  Occupational therapy and physical therapy  Patient Goals:     Patient goals for therapy:  Decreased pain, increased strength, independence with ADLs/IADLs and return to sport/leisure activities    Past Medical History:   Diagnosis Date    Anemia     Atrial fibrillation (HCC)     GERD (gastroesophageal reflux disease)     Heart burn     High cholesterol     Hyperlipidemia     Hypertension     Iron deficiency anemia     Pneumonia     as a young adult     Sleep apnea     cpap     Snoring     Stroke (HCC) 2021    left sided weakness     UTI (urinary tract infection) 2022     Past Surgical History:   Procedure Laterality Date    KNEE ARTHROPLASTY TOTAL Left     OTHER      sinus sx      Social History     Tobacco Use    Smoking status: Never    Smokeless tobacco: Never   Substance Use Topics     Alcohol use: Never     Family and Occupational History     Socioeconomic History    Marital status:      Spouse name: Not on file    Number of children: Not on file    Years of education: Not on file    Highest education level: Master's degree (e.g., MA, MS, Leslie, MEd, MSW, AMY)   Occupational History    Not on file       Objective:   Active Range of Motion:   Upper extremity (left):     All left upper extremity active range of motion: All within functional limits      Strength:     Left upper extremity strength within functional limits.      , Prehension, Pinch:  /Prehension (left):      strength: Impaired    Opposition: Impaired  Pinch (left):     Pinch (tip to tip): Impaired    Pinch (3 point): Impaired    Pinch (lateral): Impaired    Strength Comments:   strength:  R=44 pounds  L= 38 pounds    Tone, Sensation and Coordination:   Tone:     Left upper extremity muscle tone: Normal    Sensation   Upper extremity (left):     Light touch: Intact    Sharp/dull: Intact     Stereognosis: Intact     Proprioception: Intact     Sensation comments:   Patient has neuropathic pain at neck, left shoulder, and at dorsum of hand and thumb and from PIPs down to tips of fingers.      Coordination   Upper extremity (left):     Fine motor: Impaired    Gross motor: Impaired    Slow alternating movements: Impaired    Rapid alternating movements: Impaired    Finger to finger: Impaired    Finger touch to nose: Impaired      Coordination comments:   9 hole peg test:  R=33 seconds  L=47 seconds    Cognition:     Orientation: normal to time, normal to place, normal to person and normal to situation    Direction following: three step    Short term memory: intact    Long term memory: intact    Attention span: intact    Sequencing: intact    Organization: intact    Problem solving: intact    Judgement and safety awareness: intact    Hearing: intact    Vision/Perception:     Visual tracking: intact    Convergence:  intact    Visual attentions: intact    Visual acuity: intact    Visual scanning: intact    R/L discrimination: intact    R/L hemianopsia: not present    R/L neglect: not present    Vision assistive device(s): none      Therapeutic Exercises (CPT 74048):     1. medium resistance theraband    2. medium resistance theraputty    3. fine motor manipulation/dexterity activities/exercises for home    Therapeutic Exercise Summary:  Initiated HEP with written, verbal and visual instruction.  To complete 3 x a day    Time-based treatments/modalities:    Occupational Therapy Timed Treatment Charges  Therapeutic exercise minutes (CPT 20868): 15 minutes      Assessment, Response and Plan:   Impairments: abnormal coordination, fine motor function, impaired physical strength, lacks appropriate home exercise program, limited ADL's and pain with function    Assessment details:  Patient is a 71 y/o male with hx of CVA-let hemiparesis with good functional return and referred to OT with neuropathic pain and weakness of left non-dominant hand.  Patient presenting with intact sensation, decreased strength, impaired dexterity/manipulation, impaired coordination and impaired endurance affecting level of function.  Patient would benefit from OT intervention to enhance functional use of left affected upper limb.    Barriers to therapy:  Time constraints and transportation  Prognosis: good    Goals:   Short Term Goals:   Patient will be independent with HEP  Patient will improve right  strength to at least 40 pounds to enhance domestic/leisure tasks  Patient will be mod I with grooming hair  Patient will be mod I with buttoning and fine motor ADLs.   Short term goal timespan:  2-4 weeks    Long Term Goals:   Patient will improve bilateral  strength to at least 45 pounds to enhance domestic/leisure tasks  Patient will score 25 seconds or less on the 9 hole peg test due to improved fine motor manipulation/dexterity  Patient will score  at least 60/80 on the UEFI.    Long term goal timespan:  4-6 weeks    Plan:   Therapy options:  Occupational therapy treatment to continue  Planned therapy interventions:  Therapeutic Activities (CPT 16605) and Therapeutic Exercise (CPT 22794)  Frequency:  2x month  Duration in weeks:  6  Duration in visits:  3  Discussed with:  Patient    Functional Assessment Used:  UEFI = 44/80        Referring provider co-signature:  I have reviewed this plan of care and my co-signature certifies the need for services.    Certification Period: 03/28/2023 to  05/10/23    Physician Signature: ________________________________ Date: ______________

## 2023-04-03 ENCOUNTER — TELEPHONE (OUTPATIENT)
Dept: URGENT CARE | Facility: PHYSICIAN GROUP | Age: 73
End: 2023-04-03
Payer: MEDICARE

## 2023-04-05 ENCOUNTER — APPOINTMENT (OUTPATIENT)
Dept: OCCUPATIONAL THERAPY | Facility: REHABILITATION | Age: 73
End: 2023-04-05
Attending: PSYCHIATRY & NEUROLOGY
Payer: MEDICARE

## 2023-04-19 ENCOUNTER — OCCUPATIONAL THERAPY (OUTPATIENT)
Dept: OCCUPATIONAL THERAPY | Facility: REHABILITATION | Age: 73
End: 2023-04-19
Attending: PSYCHIATRY & NEUROLOGY
Payer: MEDICARE

## 2023-04-19 DIAGNOSIS — I69.30 LATE EFFECTS OF CEREBRAL ISCHEMIC STROKE: ICD-10-CM

## 2023-04-19 DIAGNOSIS — G62.9 NEUROPATHY: ICD-10-CM

## 2023-04-19 DIAGNOSIS — R29.898 WEAKNESS OF BOTH HANDS: ICD-10-CM

## 2023-04-19 PROCEDURE — 97110 THERAPEUTIC EXERCISES: CPT

## 2023-04-19 NOTE — OP THERAPY DAILY TREATMENT
Outpatient Occupational Therapy  DAILY TREATMENT     [facility] Occupational [address]  [address]  [place] [address]  Phone:  [phone]  Fax:  [phone]    Date: 04/19/2023    Patient: Gokul Baca  YOB: 1950  MRN: [id number]     Time Calculation  Start time: 0145  Stop time: 0230 Time Calculation (min): 45 minutes         Chief Complaint: Extremity Weakness and Self Care Duties    Visit #: 2    SUBJECTIVE:  I have doing the putty once a day, but I forgot the theraband so I need to do that too.      OBJECTIVE:  Current objective measures:   Active Range of Motion:   Upper extremity (left):     All left upper extremity active range of motion: All within functional limits        Strength:     Left upper extremity strength within functional limits.       , Prehension, Pinch:  /Prehension (left):      strength: Impaired    Opposition: Impaired  Pinch (left):     Pinch (tip to tip): Impaired    Pinch (3 point): Impaired    Pinch (lateral): Impaired     Strength Comments:   strength:  R=55 pounds  L= 38 pounds     Tone, Sensation and Coordination:   Tone:     Left upper extremity muscle tone: Normal     Sensation   Upper extremity (left):     Light touch: Intact    Sharp/dull: Intact     Stereognosis: Intact     Proprioception: Intact      Sensation comments:   Patient has neuropathic pain at neck, left shoulder, and at dorsum of hand and thumb and from PIPs down to tips of fingers.       Coordination   Upper extremity (left):     Fine motor: Impaired    Gross motor: Impaired    Slow alternating movements: Impaired    Rapid alternating movements: Impaired    Finger to finger: Impaired    Finger touch to nose: Impaired      Coordination comments:   9 hole peg test:  R=26 seconds  L=39 seconds     Cognition:     Orientation: normal to time, normal to place, normal to person and normal to situation    Direction following: three step    Short term memory:  intact    Long term memory: intact    Attention span: intact    Sequencing: intact    Organization: intact    Problem solving: intact    Judgement and safety awareness: intact    Hearing: intact     Vision/Perception:     Visual tracking: intact    Convergence: intact    Visual attentions: intact    Visual acuity: intact    Visual scanning: intact    R/L discrimination: intact    R/L hemianopsia: not present    R/L neglect: not present    Vision assistive device(s): none        Therapeutic Exercises (CPT 24175):     1. isolated pronation/supination, 10 x    2. medium resistance theraputty    3. fine motor manipulation/dexterity activities/exercises for home    4. flexion/extension exercise stick in horizontal and vertical positions, 10 x each position.    Time-based treatments/modalities:  Therapeutic exercise minutes (CPT 52447): 45 minutes        Pain rating before treatment: 4  Pain rating after treatment: 6  Left shoulder and hand  ASSESSMENT:   Response to treatment: Patient progressing with improved right  strength and fine motor dexterity in both hands.  Patient stated he can complete grooming of hair independently now.  Encouraged to complete HEP 3 x a day    PLAN/RECOMMENDATIONS:   Plan for treatment: therapy treatment to continue next visit.  Planned interventions for next visit: continue with current treatment, therapeutic activities (CPT 49594), and therapeutic exercise (CPT 34171)

## 2023-05-03 ENCOUNTER — APPOINTMENT (OUTPATIENT)
Dept: OCCUPATIONAL THERAPY | Facility: REHABILITATION | Age: 73
End: 2023-05-03
Attending: PSYCHIATRY & NEUROLOGY
Payer: MEDICARE

## 2023-05-04 ENCOUNTER — OFFICE VISIT (OUTPATIENT)
Dept: MEDICAL GROUP | Facility: PHYSICIAN GROUP | Age: 73
End: 2023-05-04
Payer: MEDICARE

## 2023-05-04 VITALS
SYSTOLIC BLOOD PRESSURE: 138 MMHG | DIASTOLIC BLOOD PRESSURE: 80 MMHG | HEART RATE: 66 BPM | OXYGEN SATURATION: 98 % | BODY MASS INDEX: 33.91 KG/M2 | RESPIRATION RATE: 14 BRPM | TEMPERATURE: 97.8 F | WEIGHT: 211 LBS | HEIGHT: 66 IN

## 2023-05-04 DIAGNOSIS — K52.9 GASTROENTERITIS: ICD-10-CM

## 2023-05-04 DIAGNOSIS — S06.0X0A CONCUSSION WITHOUT LOSS OF CONSCIOUSNESS, INITIAL ENCOUNTER: ICD-10-CM

## 2023-05-04 PROCEDURE — 99213 OFFICE O/P EST LOW 20 MIN: CPT | Performed by: STUDENT IN AN ORGANIZED HEALTH CARE EDUCATION/TRAINING PROGRAM

## 2023-05-04 ASSESSMENT — FIBROSIS 4 INDEX: FIB4 SCORE: 1.51688812977321948

## 2023-05-04 NOTE — LETTER
May 4, 2023    To Whom It May Concern:         This is confirmation that Gokul Baca attended his scheduled appointment with Joleen Espinoza D.O. on 5/04/23.    I recommend Gokul be excused from work for 1 week for a recent injury.          If you have any questions please do not hesitate to call me at the phone number listed below.    Sincerely,      Joleen Espinoza D.O.  [phone]
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
## 2023-05-04 NOTE — ASSESSMENT & PLAN NOTE
Neurologically intact.  He may have suffered a mild concussion.  He is having some trouble concentrating at work and brain fog.  I advised in these scenarios we would like to refrain from work until his symptoms resolve or improve.  We will give him a letter to have 1 week off work, return to care 2 weeks for reevaluation.  If needs more time off work to please reach out to us.

## 2023-05-04 NOTE — ASSESSMENT & PLAN NOTE
Likely gastroenteritis from recent restaurant food.  No concerning symptoms of nausea, vomiting, diarrhea this time.  Advised a FODMAP diet, stay well-hydrated. If worsening return to care, antibiotics not indicated at this time

## 2023-05-17 ENCOUNTER — OCCUPATIONAL THERAPY (OUTPATIENT)
Dept: OCCUPATIONAL THERAPY | Facility: REHABILITATION | Age: 73
End: 2023-05-17
Attending: PSYCHIATRY & NEUROLOGY
Payer: MEDICARE

## 2023-05-17 DIAGNOSIS — R29.898 WEAKNESS OF BOTH HANDS: ICD-10-CM

## 2023-05-17 DIAGNOSIS — G62.9 NEUROPATHY: ICD-10-CM

## 2023-05-17 DIAGNOSIS — I69.30 LATE EFFECTS OF CEREBRAL ISCHEMIC STROKE: ICD-10-CM

## 2023-05-17 PROCEDURE — 97110 THERAPEUTIC EXERCISES: CPT

## 2023-05-17 NOTE — OP THERAPY PROGRESS SUMMARY
Outpatient Occupational Therapy  PROGRESS SUMMARY NOTE    [facility] Occupational Therapy [address].  [address]  [place] [address]  Phone:  [phone]  Fax:  [phone]    Date of Visit: 05/17/2023    Patient: Gokul Baca  YOB: 1950  MRN: [id number]     Referring Provider: Maurizio Gonzalez M.D.  [address] [place] [id number]   Referring Diagnosis Polyneuropathy, unspecified [G62.9]     Visit Diagnoses     ICD-10-CM   1. Neuropathy  G62.9   2. Weakness of both hands  R29.898   3. 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  Patient progressing well and several short term goals met and upgraded.  Now able to wash hair and button small buttons independently.  Patient would benefit with continued OT intervention to further enhance level of function and quality of life.      Objective findings and assessment details: Current objective measures:   Active Range of Motion:   Upper extremity (left):     All left upper extremity active range of motion: All within functional limits        Strength:     Left upper extremity strength within functional limits.       , Prehension, Pinch:  /Prehension (left):      strength: Impaired    Opposition: Impaired  Pinch (left):     Pinch (tip to tip): Impaired    Pinch (3 point): Impaired    Pinch (lateral): Impaired     Strength Comments:   strength:  R=60 pounds  L= 41 pounds     Tone, Sensation and Coordination:   Tone:     Left upper extremity muscle tone: Normal     Sensation   Upper extremity (left):     Light touch: Intact    Sharp/dull: Intact     Stereognosis: Intact      Proprioception: Intact      Sensation comments:   Patient has neuropathic pain at neck, left shoulder, and at dorsum of hand and thumb and from PIPs down to tips of fingers.       Coordination   Upper extremity (left):     Fine motor: Impaired    Gross motor: Impaired    Slow alternating movements: Impaired    Rapid alternating movements: Impaired    Finger to finger: Impaired    Finger touch to nose: Impaired      Coordination comments:   9 hole peg test:  R=24 seconds  L=31 seconds    Goals:   Short Term Goals:   Patient will improve left  strength to at least 50 pounds to enhance domestic/leisure tasks  Patient will be mod I holding a fork in left hand and cutting food with right hand.    Patient will utilize left hand as a functional assist for at least 75% of the time to enhance bimanual integration.   Short term goal timespan:  2-4 weeks    Long Term Goals:   Patient will improve bilateral  strength to at least 60 pounds to enhance domestic/leisure tasks  Patient will score 25 seconds or less on the 9 hole peg test with left hand due to improved fine motor manipulation/dexterity  Patient will score at least 60/80 on the UEFI  Long term goal timespan:  4-6 weeks    Plan:   Planned therapy interventions:  Therapeutic Activities (CPT 51632) and Therapeutic Exercise (CPT 46738)  Frequency:  1x week  Duration in weeks:  6  Duration in visits:  6      Referring provider co-signature:  I have reviewed this plan of care and my co-signature certifies the need for services.    Certification Period: 05/17/2023 to 06/28/23    Physician Signature: ________________________________ Date: ______________

## 2023-05-17 NOTE — OP THERAPY DAILY TREATMENT
Outpatient Occupational Therapy  DAILY TREATMENT     [facility] Occupational [address].  [address]  [place] [address]  Phone:  [phone]  Fax:  [phone]    Date: 05/17/2023    Patient: Gokul Baca  YOB: 1950  MRN: [id number]     Time Calculation  Start time: 0145  Stop time: 0230 Time Calculation (min): 45 minutes         Chief Complaint: Hand Weakness and Hand Problem    Visit #: 3    SUBJECTIVE:  My wife and I have been really working on my exercises and I have started typing again.    OBJECTIVE:  Current objective measures:   Active Range of Motion:   Upper extremity (left):     All left upper extremity active range of motion: All within functional limits        Strength:     Left upper extremity strength within functional limits.       , Prehension, Pinch:  /Prehension (left):      strength: Impaired    Opposition: Impaired  Pinch (left):     Pinch (tip to tip): Impaired    Pinch (3 point): Impaired    Pinch (lateral): Impaired     Strength Comments:   strength:  R=60 pounds  L= 41 pounds     Tone, Sensation and Coordination:   Tone:     Left upper extremity muscle tone: Normal     Sensation   Upper extremity (left):     Light touch: Intact    Sharp/dull: Intact     Stereognosis: Intact     Proprioception: Intact      Sensation comments:   Patient has neuropathic pain at neck, left shoulder, and at dorsum of hand and thumb and from PIPs down to tips of fingers.       Coordination   Upper extremity (left):     Fine motor: Impaired    Gross motor: Impaired    Slow alternating movements: Impaired    Rapid alternating movements: Impaired    Finger to finger: Impaired    Finger touch to nose: Impaired      Coordination comments:   9 hole peg test:  R=24 seconds  L=31 seconds     Cognition:     Orientation: normal to time, normal to place, normal to person and normal to situation    Direction following: three step    Short term memory: intact     Long term memory: intact    Attention span: intact    Sequencing: intact    Organization: intact    Problem solving: intact    Judgement and safety awareness: intact    Hearing: intact     Vision/Perception:     Visual tracking: intact    Convergence: intact    Visual attentions: intact    Visual acuity: intact    Visual scanning: intact    R/L discrimination: intact    R/L hemianopsia: not present    R/L neglect: not present    Vision assistive device(s): none        Therapeutic Exercises (CPT 22109):     1. isolated pronation/supination, 10 x    2. medium resistance theraputty    3. fine motor manipulation/dexterity activities/exercises, grooved pegboard, 9 hole peg test and manipulation of coins.    4. flexion/extension exercise stick in horizontal and vertical positions, 10 x each position.    5. 9# digi-flex, 10 x    6. 11# graded clip with 3 point prehension, 10 x      Time-based treatments/modalities:  Therapeutic exercise minutes (CPT 22716): 45 minutes        Pain rating before treatment: 2  Pain rating after treatment: 2  Left hand  ASSESSMENT:   Response to treatment: Patient progressing well with therapy and HEP.  Increased  strength and dexterity along with lower pain levels.  PN completed.      PLAN/RECOMMENDATIONS:   Plan for treatment: therapy treatment to continue next visit.  Planned interventions for next visit: continue with current treatment, therapeutic activities (CPT 24593), and therapeutic exercise (CPT 96967)

## 2023-05-23 ENCOUNTER — OFFICE VISIT (OUTPATIENT)
Dept: NEUROLOGY | Facility: MEDICAL CENTER | Age: 73
End: 2023-05-23
Attending: PSYCHIATRY & NEUROLOGY
Payer: MEDICARE

## 2023-05-23 VITALS
OXYGEN SATURATION: 96 % | WEIGHT: 212.74 LBS | DIASTOLIC BLOOD PRESSURE: 56 MMHG | HEART RATE: 88 BPM | RESPIRATION RATE: 14 BRPM | HEIGHT: 66 IN | BODY MASS INDEX: 34.19 KG/M2 | SYSTOLIC BLOOD PRESSURE: 108 MMHG

## 2023-05-23 DIAGNOSIS — M79.2 NEUROPATHIC PAIN: ICD-10-CM

## 2023-05-23 PROCEDURE — 3074F SYST BP LT 130 MM HG: CPT | Performed by: PSYCHIATRY & NEUROLOGY

## 2023-05-23 PROCEDURE — 99211 OFF/OP EST MAY X REQ PHY/QHP: CPT | Performed by: PSYCHIATRY & NEUROLOGY

## 2023-05-23 PROCEDURE — 3078F DIAST BP <80 MM HG: CPT | Performed by: PSYCHIATRY & NEUROLOGY

## 2023-05-23 PROCEDURE — 99213 OFFICE O/P EST LOW 20 MIN: CPT | Performed by: PSYCHIATRY & NEUROLOGY

## 2023-05-23 ASSESSMENT — FIBROSIS 4 INDEX: FIB4 SCORE: 1.51688812977321948

## 2023-05-23 NOTE — PROGRESS NOTES
[facility]  Neurology Clinics  Follow Up Visit      Patient's Name: Gokul Baca  YOB: 1950  MRN: [id number]  Date of Service: 05/23/2023    Referring Provider: No referring provider defined for this encounter.    Chief Concern: Post-intracerebral hemorrhage neuropathic pain    The patient presents for a follow up with his wife and provides verbal consent for her to be present and provide additional history as necessary.     HPI (as obtained at the time initial visit and updated as necessary): The patient is a 72 y.o., ambidextrous male, who initially presented to my clinic for evaluation of post-stroke neuropathic pain on 03/22/2023.    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 He was eventually cleared to be on anticoagulation by neurology in March 2022, but underwent ablation procedure for the treatment of his Afib. He remains on aspirin since then.     He never had seizures.    Since his hemorrhagic stroke in December 2021, he recovered reasonably well, is very active, currently works for insurance company, and is able to perform his activities of daily living.  He is very interested in continuing his rehabilitation, and actually the main reason for his visit today is to explore further options for his poststroke neuropathic pain in left arm and leg, as well as intermittent right sided scalp pain.  In addition, there is some numbness in his left foot and left hand.  He tried Cymbalta in the past, but could not tolerate it.    He has physical therapy scheduled.  He reports that his memory is stable at this time.    Pertinent Ancillary Test Results:    MRI brain studies:   - MRI brain - none available for my review.    CT head studies:   - CT head wo contrast (12/19/2021 at [facility]):  "\"IMPRESSION:  1. A 3.4 x 3.6 cm parenchymal hemorrhage in the right basal ganglia. Mild mass effect in the right lateral ventricle. No significant midline shift.\"     - CT head wo contrast (03/21/2022 at [facility]): \"IMPRESSION:   1.  Interval resolution of intracranial hemorrhage.  2.  Encephalomalacia present in the RIGHT posterior basal ganglia and frontoparietal white matter consistent with chronic infarct.  3.  No acute intracranial hemorrhage or territorial infarct.  4.  Moderate diffuse atrophy.\"    EEG studies:  - none available for my review.     INTERIM HISTORY (05/23/2023): The patient reports feeling overall better from neurological perspective. He is attending occupational therapy and reports feeling better with it. He took some Lyrica, but it seems it made him drowsy. His gait remains stable. No other concerns at this time.    Review of Systems: As above.     Past Medical History:  Past Medical History:   Diagnosis Date    Anemia     Atrial fibrillation (HCC)     GERD (gastroesophageal reflux disease)     Heart burn     High cholesterol     Hyperlipidemia     Hypertension     Iron deficiency anemia     Pneumonia     as a young adult     Sleep apnea     cpap     Snoring     Stroke ([facility]) 12/19/2021    left sided weakness     UTI (urinary tract infection) 03/2022     Past Surgical History:  Past Surgical History:   Procedure Laterality Date    KNEE ARTHROPLASTY TOTAL Left     OTHER      sinus sx       Social History:  Social History     Tobacco Use    Smoking status: Never    Smokeless tobacco: Never   Vaping Use    Vaping Use: Never used   Substance Use Topics    Alcohol use: Never    Drug use: Never     Family History:  Family History   Problem Relation Age of Onset    Heart Disease Mother     Heart Disease Brother     Alcohol abuse Son      Allergies:  Allergies   Allergen Reactions    Codeine Unspecified     \"Become out of it\"    Ibuprofen Hives    Lactose Unspecified     Stomach upset      Current " "Medications:    Current Outpatient Medications:     aspirin EC, 81 mg, Oral, DAILY, PRN    losartan, 50 mg, Oral, DAILY, Taking    omeprazole, 20 mg, Oral, BID, Taking    hydrALAZINE, 10 mg, Oral, TID (Patient taking differently: 10 mg, Oral, 2 Times Daily (BID)), Taking    pregabalin, 50 mg, Oral, Nightly (Patient not taking: Reported on 5/23/2023), Not Taking    Physical Examination:    Ambulatory Vitals  Encounter Vitals  Encounter Vitals  Blood Pressure : 108/56  Pulse: 88  Respiration: 14  Pulse Oximetry: 96 %  Weight: 96.5 kg (212 lb 11.9 oz)  Height: 167.6 cm (5' 6\")  BMI (Calculated): 34.34    Neurological Examination:  Mental Status: The patient is alert and oriented to person, place, time, and situation. Speech is fluent, with no aphasia nor dysarthria noted. Affect is normal.    Cranial Nerve Examination:  CN I: Olfaction examination is deferred.  CN II: Visual fields examination is notable for the left lower quadrantanopsia.   CN III, IV, VI: Eye movements are normal in all directions. Pupils are reactive to direct and consensual light. There is no relative afferent pupillary defect. There is no nystagmus.  CN V: Facial sensation to light touch is intact throughout.   CN VII: Left lower facial weakness.  CN VIII: Decreased hearing to finger-rubbing on the left.  CN IX, X: Soft palate elevates symmetrically.  CN XI: Symmetrical shoulder shrug exam.  CN XII: Midline tongue protrusion and moves symmetrically to each side.     Motor Examination:  Notable for mild left arm and leg weakness, with some increased tone on the left, as well as left pronator drift.     Muscle Stretch Reflexes Examination:  Brisk MSR, L>R.    Sensory Examination:  Notable for decreased sensation and sensitivity to touch on the left.     Coordination:  Overall unremarkable finger-nose-finger testing.     Stance/gait:  Cautious, hemiparetic gait.     ASSESSMENT AND PLAN:  1. Post-stroke neuropathic pain.  Clinical presentation is " consistent with post-stroke neuropathic pain, especially in context of sensitivity to touch.  On the other hand, underlying neuropathy cannot be fully ruled out.  He is doing better with OT. We will proceed with work-up and treatment:  - EMG/NCS is still needed; the patient will work on scheduling it.  - blood work ordered by his PCP is still pending. HbA1c is still needed. There are previous levels of vitamin B12 noted.  If work-up remains unremarkable, we would consider further laboratory testing, such as SPEP as well as testing for less frequent causes of neuropathy, especially if EMG/NCS comes back positive.  - continue OT  - continue PT  - I advised the patient to try Lyrica 50 mg at bedtime as tolerated for his neuropathic pain. Discussed briefly adverse effects. In the past, provided more detailed adverse effects profile of Lyrica: specifically related to angioedema, especially in context of him taking losartan, as well as somnolence, and other adverse effects.  He is aware of of the need to seek immediate medical attention if he develops any swelling and/or difficulties breathing.    Follow-up for his other medical problems with his primary care physician.    Follow up in 3 months.     My total time spent caring for the patient on the day of the encounter was 25 minutes.   This does not include time spent on separately billable procedures/tests.    Maurizio Gonzalez MD  Neurology Attending, Epilepsy Program  [facility]

## 2023-05-24 ENCOUNTER — APPOINTMENT (OUTPATIENT)
Dept: OCCUPATIONAL THERAPY | Facility: REHABILITATION | Age: 73
End: 2023-05-24
Attending: PSYCHIATRY & NEUROLOGY
Payer: MEDICARE

## 2023-05-24 NOTE — OP THERAPY DAILY TREATMENT
Outpatient Occupational Therapy  DAILY TREATMENT     [facility] Occupational [address].  [address]  [place] [address]  Phone:  [phone]  Fax:  [phone]    Date: 05/24/2023    Patient: Gokul Baca  YOB: 1950  MRN: [id number]     Time Calculation                 Chief Complaint: No chief complaint on file.    Visit #: 4    SUBJECTIVE:  ***    OBJECTIVE:  Current objective measures:   Active Range of Motion:   Upper extremity (left):     All left upper extremity active range of motion: All within functional limits        Strength:     Left upper extremity strength within functional limits.       , Prehension, Pinch:  /Prehension (left):      strength: Impaired    Opposition: Impaired  Pinch (left):     Pinch (tip to tip): Impaired    Pinch (3 point): Impaired    Pinch (lateral): Impaired     Strength Comments:   strength:  R=60 pounds  L= 41 pounds     Tone, Sensation and Coordination:   Tone:     Left upper extremity muscle tone: Normal     Sensation   Upper extremity (left):     Light touch: Intact    Sharp/dull: Intact     Stereognosis: Intact     Proprioception: Intact      Sensation comments:   Patient has neuropathic pain at neck, left shoulder, and at dorsum of hand and thumb and from PIPs down to tips of fingers.       Coordination   Upper extremity (left):     Fine motor: Impaired    Gross motor: Impaired    Slow alternating movements: Impaired    Rapid alternating movements: Impaired    Finger to finger: Impaired    Finger touch to nose: Impaired      Coordination comments:   9 hole peg test:  R=24 seconds  L=31 seconds        Therapeutic Exercises (CPT 75397):     1. isolated pronation/supination, 10 x    2. medium resistance theraputty    3. fine motor manipulation/dexterity activities/exercises, grooved pegboard, 9 hole peg test and manipulation of coins.    4. flexion/extension exercise stick in horizontal and vertical positions, 10 x  "each position.    5. 9# digi-flex, 10 x    6. 11# graded clip with 3 point prehension, 10 x      Time-based treatments/modalities:           Pain rating before treatment: {PAIN NUMBERS_1-10:05418}  Pain rating after treatment: {PAIN NUMBERS_1-10:13262}    ASSESSMENT:   Response to treatment: ***    PLAN/RECOMMENDATIONS:   Plan for treatment: {Therapy Plan:[id number]::\"therapy treatment to continue next visit\"}.  Planned interventions for next visit: {planned OT Interventions:[id number]}         "

## 2023-05-26 ENCOUNTER — OFFICE VISIT (OUTPATIENT)
Dept: URGENT CARE | Facility: PHYSICIAN GROUP | Age: 73
End: 2023-05-26
Payer: MEDICARE

## 2023-05-26 VITALS
DIASTOLIC BLOOD PRESSURE: 64 MMHG | WEIGHT: 212 LBS | HEIGHT: 66 IN | SYSTOLIC BLOOD PRESSURE: 114 MMHG | OXYGEN SATURATION: 96 % | BODY MASS INDEX: 34.07 KG/M2 | TEMPERATURE: 97.8 F | RESPIRATION RATE: 16 BRPM | HEART RATE: 86 BPM

## 2023-05-26 DIAGNOSIS — R05.9 COUGH, UNSPECIFIED TYPE: ICD-10-CM

## 2023-05-26 DIAGNOSIS — M79.605 PAIN OF LEFT LOWER EXTREMITY: ICD-10-CM

## 2023-05-26 LAB
FLUAV RNA SPEC QL NAA+PROBE: NEGATIVE
FLUBV RNA SPEC QL NAA+PROBE: NEGATIVE
RSV RNA SPEC QL NAA+PROBE: NEGATIVE
SARS-COV-2 RNA RESP QL NAA+PROBE: NEGATIVE

## 2023-05-26 PROCEDURE — 0241U POCT CEPHEID COV-2, FLU A/B, RSV - PCR: CPT | Performed by: FAMILY MEDICINE

## 2023-05-26 PROCEDURE — 3074F SYST BP LT 130 MM HG: CPT | Performed by: FAMILY MEDICINE

## 2023-05-26 PROCEDURE — 3078F DIAST BP <80 MM HG: CPT | Performed by: FAMILY MEDICINE

## 2023-05-26 PROCEDURE — 99214 OFFICE O/P EST MOD 30 MIN: CPT | Performed by: FAMILY MEDICINE

## 2023-05-26 ASSESSMENT — FIBROSIS 4 INDEX: FIB4 SCORE: 1.51688812977321948

## 2023-05-26 NOTE — PROGRESS NOTES
"Subjective:         Chief Complaint   Patient presents with    Leg Swelling     BLOOD CLOT IN LEFT LEG. NEED U/S. X 3 WK       Otalgia     BOTH     Pharyngitis     X 1 DAY    Congestion     CHEST CONGESTION X 1 DAY              HPI    #1 leg pain  C/o dull achy left leg pain and swelling x 2 days.  Pain is worse with palpation.   Nothing seems to make it better.   The patient denies any chest pain or dyspnea.   No fevers, chills, nausea or vomiting.            #2. Pharyngitis   This is a new problem. The current episode started in the past 2  days. The problem has been unchanged. There has been no fever. The pain is MILD. Associated symptoms include a dry cough. Pertinent negatives include no abdominal pain,  diarrhea, headaches, shortness of breath or vomiting. no exposure to strep or mono.   has tried acetaminophen for the symptoms. The treatment provided mild relief.           Social History     Tobacco Use    Smoking status: Never    Smokeless tobacco: Never   Vaping Use    Vaping Use: Never used   Substance Use Topics    Alcohol use: Never    Drug use: Never         Past Medical History:   Diagnosis Date    UTI (urinary tract infection) 03/2022    Stroke (HCC) 12/19/2021    left sided weakness     Anemia     Atrial fibrillation (HCC)     GERD (gastroesophageal reflux disease)     Heart burn     High cholesterol     Hyperlipidemia     Hypertension     Iron deficiency anemia     Pneumonia     as a young adult     Sleep apnea     cpap     Snoring          Allergies   Allergen Reactions    Codeine Unspecified     \"Become out of it\"    Ibuprofen Hives    Lactose Unspecified     Stomach upset          Family History   Problem Relation Age of Onset    Heart Disease Mother     Heart Disease Brother     Alcohol abuse Son          Review of Systems   Constitutional: Negative for fever, chills and malaise/fatigue.   Eyes: Negative.    Respiratory: Negative for     sputum production.    Cardiovascular: Negative for chest " "pain and palpitations.   Gastrointestinal: Negative for nausea, vomiting, abdominal pain, diarrhea and constipation.   Genitourinary: Negative for dysuria, urgency and frequency.   Skin: Negative for itching.   Neurological: Negative for dizziness and tingling.   Psychiatric/Behavioral: Negative for depression.   All other systems reviewed and are negative.         Objective:     /64   Pulse 86   Temp 36.6 °C (97.8 °F) (Temporal)   Resp 16   Ht 1.676 m (5' 6\")   Wt 96.2 kg (212 lb)   SpO2 96%     Physical Exam   Constitutional: patient is oriented to person, place, and time. Patient appears well-developed and well-nourished. No distress.   HENT: Mouth/Throat: no posterior oropharyngeal exudate.   There is posterior oropharyngeal erythema present. No posterior oropharyngeal edema.   Tonsils absent  Head: Normocephalic and atraumatic.   Eyes: Conjunctivae are normal. Right eye exhibits no discharge. Left eye exhibits no discharge. No scleral icterus.   Neck: Neck supple. No JVD present.   Cardiovascular: Normal rate, regular rhythm and normal heart sounds.  Exam reveals no friction rub.    No murmur heard.  Pulmonary/Chest: Effort normal and breath sounds normal. No respiratory distress. Patient has no wheezes or rales.   Abdominal: Soft. Patient exhibits no distension and no mass. There is no tenderness.   Neurological: patient is alert and oriented to person, place, and time. No cranial nerve deficit.   Skin: Skin is warm. Patient is not diaphoretic.    Extremities - trace pedal edema on left leg.   bette's sign negative  .   Psychiatric: the patient's behavior is normal.   Nursing note and vitals reviewed.              Assessment/Plan:        1. Cough, unspecified type  Likely allergy related  Rx flonase bid  Follow up in one week if no improvement, sooner if symptoms worsen.     - POCT CEPHEID COV-2, FLU A/B, RSV - PCR    2. Pain of left lower extremity   U/s scheduled for 1630 tomorrow to r/o " DVT      - US-EXTREMITY VENOUS LOWER UNILAT LEFT; Future       My total time spent caring for the patient on the day of the encounter was 30 minutes.   This does not include time spent on separately billable procedures/tests.

## 2023-05-27 ENCOUNTER — HOSPITAL ENCOUNTER (OUTPATIENT)
Dept: RADIOLOGY | Facility: MEDICAL CENTER | Age: 73
End: 2023-05-27
Attending: FAMILY MEDICINE
Payer: MEDICARE

## 2023-05-27 DIAGNOSIS — M79.605 PAIN OF LEFT LOWER EXTREMITY: ICD-10-CM

## 2023-05-27 PROCEDURE — 93971 EXTREMITY STUDY: CPT | Mod: LT

## 2023-05-31 ENCOUNTER — OCCUPATIONAL THERAPY (OUTPATIENT)
Dept: OCCUPATIONAL THERAPY | Facility: REHABILITATION | Age: 73
End: 2023-05-31
Attending: PSYCHIATRY & NEUROLOGY
Payer: MEDICARE

## 2023-05-31 ENCOUNTER — OFFICE VISIT (OUTPATIENT)
Dept: MEDICAL GROUP | Facility: PHYSICIAN GROUP | Age: 73
End: 2023-05-31
Payer: MEDICARE

## 2023-05-31 ENCOUNTER — E-CONSULT (OUTPATIENT)
Dept: HEMATOLOGY ONCOLOGY | Facility: MEDICAL CENTER | Age: 73
End: 2023-05-31

## 2023-05-31 VITALS
RESPIRATION RATE: 16 BRPM | HEIGHT: 66 IN | HEART RATE: 84 BPM | BODY MASS INDEX: 34.07 KG/M2 | OXYGEN SATURATION: 97 % | DIASTOLIC BLOOD PRESSURE: 80 MMHG | SYSTOLIC BLOOD PRESSURE: 120 MMHG | TEMPERATURE: 98 F | WEIGHT: 212 LBS

## 2023-05-31 DIAGNOSIS — G62.9 NEUROPATHY: ICD-10-CM

## 2023-05-31 DIAGNOSIS — Z86.79 HISTORY OF ATRIAL FIBRILLATION: ICD-10-CM

## 2023-05-31 DIAGNOSIS — I69.30 LATE EFFECTS OF CEREBRAL ISCHEMIC STROKE: ICD-10-CM

## 2023-05-31 DIAGNOSIS — Z71.9 ENCOUNTER FOR CONSULTATION: ICD-10-CM

## 2023-05-31 DIAGNOSIS — R29.898 WEAKNESS OF BOTH HANDS: ICD-10-CM

## 2023-05-31 DIAGNOSIS — E78.5 HYPERLIPIDEMIA, UNSPECIFIED HYPERLIPIDEMIA TYPE: ICD-10-CM

## 2023-05-31 DIAGNOSIS — R74.8 ELEVATED ALKALINE PHOSPHATASE LEVEL: ICD-10-CM

## 2023-05-31 DIAGNOSIS — Z12.11 SCREENING FOR COLON CANCER: ICD-10-CM

## 2023-05-31 DIAGNOSIS — R79.89 ABNORMAL CBC: ICD-10-CM

## 2023-05-31 PROBLEM — I48.91 AF (ATRIAL FIBRILLATION) (HCC): Status: RESOLVED | Noted: 2021-12-19 | Resolved: 2023-05-31

## 2023-05-31 PROBLEM — I48.0 PAF (PAROXYSMAL ATRIAL FIBRILLATION) (HCC): Status: RESOLVED | Noted: 2022-05-17 | Resolved: 2023-05-31

## 2023-05-31 PROCEDURE — 3079F DIAST BP 80-89 MM HG: CPT | Performed by: FAMILY MEDICINE

## 2023-05-31 PROCEDURE — 99214 OFFICE O/P EST MOD 30 MIN: CPT | Performed by: FAMILY MEDICINE

## 2023-05-31 PROCEDURE — 99446 NTRPROF PH1/NTRNET/EHR 5-10: CPT | Performed by: STUDENT IN AN ORGANIZED HEALTH CARE EDUCATION/TRAINING PROGRAM

## 2023-05-31 PROCEDURE — 97110 THERAPEUTIC EXERCISES: CPT

## 2023-05-31 PROCEDURE — 3074F SYST BP LT 130 MM HG: CPT | Performed by: FAMILY MEDICINE

## 2023-05-31 ASSESSMENT — FIBROSIS 4 INDEX: FIB4 SCORE: 1.51688812977321948

## 2023-05-31 NOTE — ASSESSMENT & PLAN NOTE
Elevated alk phos and elevated ggt.   Has normal liver US 2021  He had knee surgery 2020  I'll request hematology/oncology input regarding work up for any underlying malignancy.

## 2023-05-31 NOTE — OP THERAPY DAILY TREATMENT
Outpatient Occupational Therapy  DAILY TREATMENT     [facility] Occupational [address].  [address]  [place] [address]  Phone:  [phone]  Fax:  [phone]    Date: 05/31/2023    Patient: Gokul Baca  YOB: 1950  MRN: [id number]     Time Calculation  Start time: 0145  Stop time: 0230 Time Calculation (min): 45 minutes         Chief Complaint: Hand Weakness, Hand Problem, and Self Care Duties    Visit #: 4    SUBJECTIVE:  I have been doing a lot of activities with both my hands to get to use the left one more.      OBJECTIVE:  Current objective measures:   Active Range of Motion:   Upper extremity (left):     All left upper extremity active range of motion: All within functional limits        Strength:     Left upper extremity strength within functional limits.       Strength Comments:   strength:  R=65 pounds  L= 41 pounds     Tone, Sensation and Coordination:   Tone:     Left upper extremity muscle tone: Normal     Sensation   Upper extremity (left):     Light touch: Intact    Sharp/dull: Intact     Stereognosis: Intact     Proprioception: Intact      Sensation comments:   Patient has neuropathic pain at neck, left shoulder, and at dorsum of hand and thumb and from PIPs down to tips of fingers.       Coordination   Upper extremity (left):     Fine motor: Impaired    Gross motor: Impaired    Slow alternating movements: Impaired    Rapid alternating movements: Impaired    Finger to finger: Impaired    Finger touch to nose: Impaired      Coordination comments:   9 hole peg test:  R=24 seconds  L=31 seconds        Therapeutic Exercises (CPT 57039):     1. isolated pronation/supination, 10 x    2. medium resistance theraputty    3. fine motor manipulation/dexterity activities/exercises, grooved pegboard, 9 hole peg test and manipulation of coins.    4. flexion/extension exercise stick in horizontal and vertical positions, 10 x each position.    5. 9# digi-flex, 10  x    6. 11# graded clip with 3 point prehension, 10 x      Time-based treatments/modalities:  Therapeutic exercise minutes (CPT 64887): 45 minutes        Pain rating before treatment: 1  Pain rating after treatment: 2    ASSESSMENT:   Response to treatment: improved right  strength.  Recommended typing.[employer] for typing test exercises at home.      PLAN/RECOMMENDATIONS:   Plan for treatment: therapy treatment to continue next visit.  Planned interventions for next visit: continue with current treatment, therapeutic activities (CPT 53775), and therapeutic exercise (CPT 58995)

## 2023-05-31 NOTE — PATIENT INSTRUCTIONS
Please get fasting labs, fasting for 8-10 hours before next visit. You can make an appointment for the lab or walk in.   Lab hours [facility] location: Monday to Friday 6 am - 4 pm, Saturday 7 am -noon  Even if you lose your lab paperwork, you can still come in to get your lab done.

## 2023-05-31 NOTE — ASSESSMENT & PLAN NOTE
No afibb after ablation.   Continues on asa 81 mg    Had leg swelling, no DVT seen on recent US on 5/27/23

## 2023-05-31 NOTE — PROGRESS NOTES
"Subjective:   Gokul Baca is a 72 y.o. male here today for evaluation and management of:     History of atrial fibrillation  No afibb after ablation.   Continues on asa 81 mg    Had leg swelling, no DVT seen on recent US on 5/27/23    Elevated alkaline phosphatase level  Elevated alk phos and elevated ggt.   Has normal liver US 2021  He had knee surgery 2020  I'll request hematology/oncology input regarding work up for any underlying malignancy.          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     Current medicines (including changes today)  Current Outpatient Medications   Medication Sig Dispense Refill    aspirin EC (ECOTRIN) 81 MG Tablet Delayed Response Take 81 mg by mouth every day.      losartan (COZAAR) 50 MG Tab Take 1 Tablet by mouth every day. 90 Tablet 3    omeprazole (PRILOSEC) 20 MG delayed-release capsule Take 20 mg by mouth 2 times a day.      hydrALAZINE (APRESOLINE) 10 MG Tab Take 1 Tablet by mouth 3 times a day. (Patient taking differently: Take 10 mg by mouth 2 times a day.) 270 Tablet 3     No current facility-administered medications for this visit.     He  has a past medical history of Anemia, Atrial fibrillation (HCC), GERD (gastroesophageal reflux disease), Heart burn, High cholesterol, Hyperlipidemia, Hypertension, Iron deficiency anemia, Pneumonia, Sleep apnea, Snoring, Stroke (HCC) (12/19/2021), and UTI (urinary tract infection) (03/2022).    ROS  No chest pain, no shortness of breath, no abdominal pain       Objective:     /80   Pulse 84   Temp 36.7 °C (98 °F) (Temporal)   Resp 16   Ht 1.664 m (5' 5.5\")   Wt 96.2 kg (212 lb)   SpO2 97%  Body mass index is 34.74 kg/m².   Physical Exam:  Constitutional: Alert, no distress.uses walker  Skin: Warm, dry, good turgor, no rashes in visible areas.  Eye: Equal, round and reactive, conjunctiva clear, lids normal.  ENMT: Lips without lesions, good " dentition, oropharynx clear.  Neck: Trachea midline, no masses, no thyromegaly. No cervical or supraclavicular lymphadenopathy  Respiratory: Unlabored respiratory effort, lungs clear to auscultation, no wheezes, no ronchi.  Cardiovascular: Normal S1, S2, no murmur, no edema.  Abdomen: Soft, non-tender, no masses, no hepatosplenomegaly.  Psych: Alert and oriented x3, normal affect and mood.        Assessment and Plan:   The following treatment plan was discussed    1. History of atrial fibrillation    2. Elevated alkaline phosphatase level  - Comp Metabolic Panel; Future  - E-consult to Hematology/Oncology    3. Abnormal CBC  - CBC WITH DIFFERENTIAL; Future    4. Hyperlipidemia, unspecified hyperlipidemia type  - Lipid Profile; Future    5. Screening for colon cancer  - Referral to GI for Colonoscopy      Followup: Return in about 4 months (around 9/30/2023) for Lab Review.

## 2023-06-01 NOTE — PROGRESS NOTES
E-Consult Response     After careful review of the patient's information available in the medical record, the following are my findings and recommendations:    Reason for consult:  Elevated alkaline phosphatase    Summary of data reviewed: Alk phos mildly elevated at 132. No signs of liver disease on RUQ US from 07/2022.     Recommendations: Would continue with recommended cancer screenings at this time. No evidence of any liver pathology based on our reports. Trend alk phos for monitoring, and if it gets significantly elevated without any other identified causes, then would send to GI for evaluation.     E-Consult Time: 9 minutes were spent with >50% of the total time spent reviewing items outlined in the summary of data reviewed (Use code [id number])    Oc Landeros D.O.

## 2023-06-07 ENCOUNTER — OCCUPATIONAL THERAPY (OUTPATIENT)
Dept: OCCUPATIONAL THERAPY | Facility: REHABILITATION | Age: 73
End: 2023-06-07
Attending: PSYCHIATRY & NEUROLOGY
Payer: MEDICARE

## 2023-06-07 DIAGNOSIS — R29.898 WEAKNESS OF BOTH HANDS: ICD-10-CM

## 2023-06-07 PROCEDURE — 97110 THERAPEUTIC EXERCISES: CPT

## 2023-06-07 NOTE — OP THERAPY DAILY TREATMENT
Outpatient Occupational Therapy  DAILY TREATMENT     [facility] [address].  [address]  [place] [address]  Phone:  [phone]  Fax:  [phone]    Date: 06/07/2023    Patient: Gokul Baca  YOB: 1950  MRN: [id number]     Time Calculation  Start time: 0230  Stop time: 0315 Time Calculation (min): 45 minutes         Chief Complaint: Hand Weakness and Self Care Duties    Visit #: 5    SUBJECTIVE:  I am doing a lot of two handed activities at home.    OBJECTIVE:  Current objective measures:   Active Range of Motion:   Upper extremity (left):     All left upper extremity active range of motion: All within functional limits        Strength:     Left upper extremity strength within functional limits.       Strength Comments:   strength:  R=65 pounds  L= 41 pounds     Tone, Sensation and Coordination:   Tone:     Left upper extremity muscle tone: Normal     Sensation   Upper extremity (left):     Light touch: Intact    Sharp/dull: Intact     Stereognosis: Intact     Proprioception: Intact      Sensation comments:   Patient has neuropathic pain at neck, left shoulder, and at dorsum of hand and thumb and from PIPs down to tips of fingers.       Coordination   Upper extremity (left):     Fine motor: Impaired    Gross motor: Impaired    Slow alternating movements: Impaired    Rapid alternating movements: Impaired    Finger to finger: Impaired    Finger touch to nose: Impaired      Coordination comments:   9 hole peg test:  R=24 seconds  L=31 seconds        Therapeutic Exercises (CPT 93269):     1. isolated pronation/supination, 10 x    2. medium resistance theraputty    3. fine motor manipulation/dexterity activities/exercises, grooved pegboard, 9 hole peg test and manipulation of coins.    4. flexion/extension exercise stick in horizontal and vertical positions, 10 x each position.    5. 9# digi-flex, 10 x    6. 11# graded clip with 3 point prehension, 10  x      Time-based treatments/modalities:  Therapeutic exercise minutes (CPT 69015): 45 minutes        Pain rating before treatment: 3  Pain rating after treatment: 3  Left shoulder  ASSESSMENT:   Response to treatment: continues to be highly motivated in therapy and with HEP.  Now engaging both hands at least 50% of the time.      PLAN/RECOMMENDATIONS:   Plan for treatment: therapy treatment to continue next visit.  Planned interventions for next visit: continue with current treatment, therapeutic activities (CPT 89056), and therapeutic exercise (CPT 49008)

## 2023-06-14 ENCOUNTER — OCCUPATIONAL THERAPY (OUTPATIENT)
Dept: OCCUPATIONAL THERAPY | Facility: REHABILITATION | Age: 73
End: 2023-06-14
Attending: PSYCHIATRY & NEUROLOGY
Payer: MEDICARE

## 2023-06-14 DIAGNOSIS — I69.30 LATE EFFECTS OF CEREBRAL ISCHEMIC STROKE: ICD-10-CM

## 2023-06-14 DIAGNOSIS — R29.898 WEAKNESS OF BOTH HANDS: ICD-10-CM

## 2023-06-14 DIAGNOSIS — G62.9 NEUROPATHY: ICD-10-CM

## 2023-06-14 PROCEDURE — 97110 THERAPEUTIC EXERCISES: CPT

## 2023-06-14 NOTE — OP THERAPY DAILY TREATMENT
Outpatient Occupational Therapy  DAILY TREATMENT     [facility] Occupational [address].  [address]  [place] [address]  Phone:  [phone]  Fax:  [phone]    Date: 06/14/2023    Patient: Gokul Baca  YOB: 1950  MRN: [id number]     Time Calculation  Start time: 0145  Stop time: 0230 Time Calculation (min): 45 minutes         Chief Complaint: Hand Weakness and Self Care Duties    Visit #: 6    SUBJECTIVE:  I didn't get a good sleep last night, so I am pretty tired    OBJECTIVE:  Current objective measures:   Active Range of Motion:   Upper extremity (left):     All left upper extremity active range of motion: All within functional limits        Strength:     Left upper extremity strength within functional limits.       Strength Comments:   strength:  R=65 pounds  L= 41 pounds     Tone, Sensation and Coordination:   Tone:     Left upper extremity muscle tone: Normal     Sensation   Upper extremity (left):     Light touch: Intact    Sharp/dull: Intact     Stereognosis: Intact     Proprioception: Intact      Sensation comments:   Patient has neuropathic pain at neck, left shoulder, and at dorsum of hand and thumb and from PIPs down to tips of fingers.       Coordination   Upper extremity (left):     Fine motor: Impaired    Gross motor: Impaired    Slow alternating movements: Impaired    Rapid alternating movements: Impaired    Finger to finger: Impaired    Finger touch to nose: Impaired      Coordination comments:   9 hole peg test:  R=24 seconds  L=31 seconds        Therapeutic Exercises (CPT 57283):     1. isolated pronation/supination, 10 x    2. medium resistance theraputty    3. fine motor manipulation/dexterity activities/exercises, grooved pegboard, 9 hole peg test and manipulation of coins.Use of screwdriver.    4. flexion/extension exercise stick in horizontal and vertical positions, 10 x each position.    5. 9# digi-flex, 10 x    6. 11# graded clip with 3  point prehension, 10 x      Time-based treatments/modalities:  Therapeutic exercise minutes (CPT 73921): 45 minutes        Pain rating before treatment: 6  Pain rating after treatment: 7  Left neck/shoulder area  ASSESSMENT:   Response to treatment: Strength and dexterity remains status quo along with increased shoulder pain due to tiredness.      PLAN/RECOMMENDATIONS:   Plan for treatment: therapy treatment to continue next visit.  Planned interventions for next visit: continue with current treatment, therapeutic activities (CPT 69263), and therapeutic exercise (CPT 28219)

## 2023-06-21 ENCOUNTER — APPOINTMENT (OUTPATIENT)
Dept: OCCUPATIONAL THERAPY | Facility: REHABILITATION | Age: 73
End: 2023-06-21
Attending: PSYCHIATRY & NEUROLOGY
Payer: MEDICARE

## 2023-06-21 NOTE — OP THERAPY DAILY TREATMENT
Outpatient Occupational Therapy  DAILY TREATMENT     [facility] Occupational [address].  [address]  [place] [address]  Phone:  [phone]  Fax:  [phone]    Date: 06/21/2023    Patient: Gokul Baca  YOB: 1950  MRN: [id number]     Time Calculation                 Chief Complaint: No chief complaint on file.    Visit #: 7    SUBJECTIVE:  ***    OBJECTIVE:  Current objective measures:   Active Range of Motion:   Upper extremity (left):     All left upper extremity active range of motion: All within functional limits        Strength:     Left upper extremity strength within functional limits.       Strength Comments:   strength:  R=65 pounds  L= 41 pounds     Tone, Sensation and Coordination:   Tone:     Left upper extremity muscle tone: Normal     Sensation   Upper extremity (left):     Light touch: Intact    Sharp/dull: Intact     Stereognosis: Intact     Proprioception: Intact      Sensation comments:   Patient has neuropathic pain at neck, left shoulder, and at dorsum of hand and thumb and from PIPs down to tips of fingers.       Coordination   Upper extremity (left):     Fine motor: Impaired    Gross motor: Impaired    Slow alternating movements: Impaired    Rapid alternating movements: Impaired    Finger to finger: Impaired    Finger touch to nose: Impaired      Coordination comments:   9 hole peg test:  R=24 seconds  L=31 seconds        Therapeutic Exercises (CPT 19115):     1. isolated pronation/supination, 10 x    2. medium resistance theraputty    3. fine motor manipulation/dexterity activities/exercises, grooved pegboard, 9 hole peg test and manipulation of coins.Use of screwdriver.    4. flexion/extension exercise stick in horizontal and vertical positions, 10 x each position.    5. 9# digi-flex, 10 x    6. 11# graded clip with 3 point prehension, 10 x      Time-based treatments/modalities:           Pain rating before treatment: {PAIN  "NUMBERS_1-10:16050}  Pain rating after treatment: {PAIN NUMBERS_1-10:58323}    ASSESSMENT:   Response to treatment: ***    PLAN/RECOMMENDATIONS:   Plan for treatment: {Therapy Plan:[id number]::\"therapy treatment to continue next visit\"}.  Planned interventions for next visit: {planned OT Interventions:[id number]}         "

## 2023-06-23 ENCOUNTER — OFFICE VISIT (OUTPATIENT)
Dept: MEDICAL GROUP | Facility: PHYSICIAN GROUP | Age: 73
End: 2023-06-23
Payer: MEDICARE

## 2023-06-23 VITALS
HEIGHT: 66 IN | TEMPERATURE: 98.1 F | SYSTOLIC BLOOD PRESSURE: 132 MMHG | HEART RATE: 92 BPM | BODY MASS INDEX: 33.91 KG/M2 | RESPIRATION RATE: 16 BRPM | WEIGHT: 211 LBS | OXYGEN SATURATION: 97 % | DIASTOLIC BLOOD PRESSURE: 84 MMHG

## 2023-06-23 DIAGNOSIS — M25.512 ACUTE PAIN OF LEFT SHOULDER: ICD-10-CM

## 2023-06-23 DIAGNOSIS — G62.9 NEUROPATHY: ICD-10-CM

## 2023-06-23 PROCEDURE — 99214 OFFICE O/P EST MOD 30 MIN: CPT | Performed by: FAMILY MEDICINE

## 2023-06-23 PROCEDURE — 3075F SYST BP GE 130 - 139MM HG: CPT | Performed by: FAMILY MEDICINE

## 2023-06-23 PROCEDURE — 3079F DIAST BP 80-89 MM HG: CPT | Performed by: FAMILY MEDICINE

## 2023-06-23 ASSESSMENT — FIBROSIS 4 INDEX: FIB4 SCORE: 1.51688812977321948

## 2023-06-23 NOTE — ASSESSMENT & PLAN NOTE
Gabapentin made him feel dizzy and he could not think well on it.   He will try an OTC supplement  Has appointment with neurology for follow up.

## 2023-06-23 NOTE — PROGRESS NOTES
"Subjective:   Gokul Baca is a 72 y.o. male here today for evaluation and management of:     Acute pain of left shoulder  Pulling up on bed guardrail, he has left shoulder pain radiating down his left hand and fingers and has decreased strength of left hand.   Hx of prior rotator cuff injury of left shoulder and he feels he may have torn or pulled the rotator cuff.   Ref to PT provided.   advsied to use tylenol, ice, heat.     Neuropathy  Gabapentin made him feel dizzy and he could not think well on it.   He will try an OTC supplement  Has appointment with neurology for follow up.             Current medicines (including changes today)  Current Outpatient Medications   Medication Sig Dispense Refill    aspirin EC (ECOTRIN) 81 MG Tablet Delayed Response Take 81 mg by mouth every day.      losartan (COZAAR) 50 MG Tab Take 1 Tablet by mouth every day. 90 Tablet 3    omeprazole (PRILOSEC) 20 MG delayed-release capsule Take 20 mg by mouth 2 times a day.      hydrALAZINE (APRESOLINE) 10 MG Tab Take 1 Tablet by mouth 3 times a day. (Patient taking differently: Take 10 mg by mouth 2 times a day.) 270 Tablet 3     No current facility-administered medications for this visit.     He  has a past medical history of Anemia, Atrial fibrillation (HCC), GERD (gastroesophageal reflux disease), Heart burn, High cholesterol, Hyperlipidemia, Hypertension, Iron deficiency anemia, Pneumonia, Sleep apnea, Snoring, Stroke (HCC) (12/19/2021), and UTI (urinary tract infection) (03/2022).    ROS  No chest pain, no shortness of breath, no abdominal pain       Objective:     /84   Pulse 92   Temp 36.7 °C (98.1 °F) (Temporal)   Resp 16   Ht 1.664 m (5' 5.5\")   Wt 95.7 kg (211 lb)   SpO2 97%  Body mass index is 34.58 kg/m².   Physical Exam:  Constitutional: Alert, no distress, well-groomed.  Skin: No rashes in visible areas.  Eye: Round. Conjunctiva clear, lids normal. No icterus.   ENMT: Lips pink without lesions, good " dentition, moist mucous membranes. Phonation normal.  Neck: No masses, no thyromegaly. Moves freely without pain.  Respiratory: Unlabored respiratory effort, no cough or audible wheeze  Psych: Alert and oriented x3, normal affect and mood.          Assessment and Plan:   The following treatment plan was discussed    1. Acute pain of left shoulder  - Referral to Physical Therapy    2. Neuropathy      Followup: Return for As Scheduled, Lab Review.

## 2023-06-23 NOTE — ASSESSMENT & PLAN NOTE
Pulling up on bed guardrail, he has left shoulder pain radiating down his left hand and fingers and has decreased strength of left hand.   Hx of prior rotator cuff injury of left shoulder and he feels he may have torn or pulled the rotator cuff.   Ref to PT provided.   advsied to use tylenol, ice, heat.

## 2023-06-27 ENCOUNTER — OFFICE VISIT (OUTPATIENT)
Dept: NEUROLOGY | Facility: MEDICAL CENTER | Age: 73
End: 2023-06-27
Attending: PSYCHIATRY & NEUROLOGY
Payer: MEDICARE

## 2023-06-27 VITALS
OXYGEN SATURATION: 96 % | HEART RATE: 75 BPM | BODY MASS INDEX: 34.12 KG/M2 | RESPIRATION RATE: 14 BRPM | DIASTOLIC BLOOD PRESSURE: 74 MMHG | WEIGHT: 212.3 LBS | SYSTOLIC BLOOD PRESSURE: 118 MMHG | HEIGHT: 66 IN

## 2023-06-27 DIAGNOSIS — M79.2 NEUROPATHIC PAIN: ICD-10-CM

## 2023-06-27 PROCEDURE — 99211 OFF/OP EST MAY X REQ PHY/QHP: CPT | Performed by: PSYCHIATRY & NEUROLOGY

## 2023-06-27 PROCEDURE — 99214 OFFICE O/P EST MOD 30 MIN: CPT | Performed by: PSYCHIATRY & NEUROLOGY

## 2023-06-27 PROCEDURE — 3078F DIAST BP <80 MM HG: CPT | Performed by: PSYCHIATRY & NEUROLOGY

## 2023-06-27 PROCEDURE — 3074F SYST BP LT 130 MM HG: CPT | Performed by: PSYCHIATRY & NEUROLOGY

## 2023-06-27 ASSESSMENT — FIBROSIS 4 INDEX: FIB4 SCORE: 1.51688812977321948

## 2023-06-27 NOTE — PROGRESS NOTES
[facility]  Neurology Clinics  Follow Up Visit      Patient's Name: Gokul Baca  YOB: 1950  MRN: [id number]  Date of Service: 06/27/2023    Referring Provider: No referring provider defined for this encounter.    Chief Concern: Post-intracerebral hemorrhage neuropathic pain.    The patient presents for a follow up with his wife and provides verbal consent for her to be present and provide additional history as necessary.     HPI (as obtained at the time initial visit and updated as necessary): The patient is a 72 y.o., ambidextrous male, who initially presented to my clinic for evaluation of post-stroke neuropathic pain on 03/22/2023.    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 He was eventually cleared to be on anticoagulation by neurology in March 2022, but underwent ablation procedure for the treatment of his Afib. He remains on aspirin since then.     He never had seizures.    Since his hemorrhagic stroke in December 2021, he recovered reasonably well, is very active, currently works for [employer], and is able to perform his activities of daily living.  He is very interested in continuing his rehabilitation, and actually the main reason for his visit today is to explore further options for his poststroke neuropathic pain in left arm and leg, as well as intermittent right sided scalp pain.  In addition, there is some numbness in his left foot and left hand.  He tried Cymbalta in the past, but could not tolerate it.    He has physical therapy scheduled.  He reports that his memory is stable at this time.    Pertinent Ancillary Test Results:    MRI brain studies:   - MRI brain - none available for my review.    CT head studies:   - CT head wo contrast (12/19/2021 at [facility]):  "\"IMPRESSION:  1. A 3.4 x 3.6 cm parenchymal hemorrhage in the right basal ganglia. Mild mass effect in the right lateral ventricle. No significant midline shift.\"     - CT head wo contrast (03/21/2022 at [facility]): \"IMPRESSION:   1.  Interval resolution of intracranial hemorrhage.  2.  Encephalomalacia present in the RIGHT posterior basal ganglia and frontoparietal white matter consistent with chronic infarct.  3.  No acute intracranial hemorrhage or territorial infarct.  4.  Moderate diffuse atrophy.\"    EEG studies:  - none available for my review.     INTERIM HISTORY (06/27/2023): The patient reports that he had an injury to his left shoulder and pain in his let arm is somewhat worse since then. He also continues to have neuropathic pain, similar as before. He is attending occupational therapy and finds it very helpful. He is not able to tolerate Lyrica.    Review of Systems: No recent fevers nor significant weight changes. His mood is stable and no SI/HI.     Past Medical History:  Past Medical History:   Diagnosis Date    Anemia     Atrial fibrillation (HCC)     GERD (gastroesophageal reflux disease)     Heart burn     High cholesterol     Hyperlipidemia     Hypertension     Iron deficiency anemia     Pneumonia     as a young adult     Sleep apnea     cpap     Snoring     Stroke (HCC) 12/19/2021    left sided weakness     UTI (urinary tract infection) 03/2022     Past Surgical History:  Past Surgical History:   Procedure Laterality Date    KNEE ARTHROPLASTY TOTAL Left     OTHER      sinus sx       Social History:  Social History     Tobacco Use    Smoking status: Never    Smokeless tobacco: Never   Vaping Use    Vaping Use: Never used   Substance Use Topics    Alcohol use: Never    Drug use: Never     Family History:  Family History   Problem Relation Age of Onset    Heart Disease Mother     Heart Disease Brother     Alcohol abuse Son      [place] Suicide Severity Rating Scale   [place] Suicide Reassessment     " "New or continued thoughts about killing self?: No  Preparing to end life?: No      Allergies:  Allergies   Allergen Reactions    Codeine Unspecified     \"Become out of it\"    Ibuprofen Hives    Lactose Unspecified     Stomach upset      Current Medications:    Current Outpatient Medications:     Acetaminophen (TYLENOL EXTRA STRENGTH PO), Take  by mouth., PRN    aspirin EC, 81 mg, Oral, DAILY, Taking    losartan, 50 mg, Oral, DAILY, Taking    omeprazole, 20 mg, Oral, BID, Taking    hydrALAZINE, 10 mg, Oral, TID (Patient taking differently: 10 mg, Oral, 2 Times Daily (BID)), Taking    Physical Examination:    Ambulatory Vitals  Encounter Vitals  Blood Pressure : 118/74  Pulse: 75  Respiration: 14  Pulse Oximetry: 96 %  Weight: 96.3 kg (212 lb 4.9 oz)  Height: 166.4 cm (5' 5.5\")  BMI (Calculated): 34.79    Neurological Examination:  Mental Status: The patient is alert and oriented to person, place, time, and situation. Speech is fluent, with no aphasia nor dysarthria noted. Affect is normal.    Cranial Nerve Examination:  CN I: Olfaction examination is deferred.  CN II: Visual fields examination is notable for the left lower quadrantanopsia.   CN III, IV, VI: Eye movements are normal in all directions. Pupils are reactive to direct and consensual light. There is no relative afferent pupillary defect. There is no nystagmus.  CN V: Facial sensation to light touch is intact throughout.   CN VII: Left lower facial weakness.  CN IX, X: Soft palate elevates symmetrically.  CN XI: Symmetrical shoulder shrug exam.  CN XII: Midline tongue protrusion and moves symmetrically to each side.     Motor Examination:  Notable for mild left arm and leg weakness, with some increased tone on the left, as well as left pronator drift.     Muscle Stretch Reflexes Examination:  Brisk MSR, L>R.    Sensory Examination:  Notable for decreased sensation and sensitivity to touch on the left.     Coordination:  Overall unremarkable " finger-nose-finger testing.     Stance/gait:  Deferred.    ASSESSMENT AND PLAN:  1. Post-stroke neuropathic pain.  Clinical presentation is consistent with post-stroke neuropathic pain, especially in context of sensitivity to touch.  On the other hand, underlying neuropathy cannot be fully ruled out.  He is doing better with OT but had a recent left shoulder injury. Previously ordered work up is still needed.  - Referral to Neurodiagnostics (EEG,EP,EMG/NCS/DBS)  - SERUM PROTEIN ELECTROPHORESIS; Future  - VITAMIN B12; Future  - METHYLMALONIC ACID, SERUM; Future  - FOLATE; Future  - blood work otherwise per PCP.     - continue OT  - continue PT  - the patient will follow up with ortho for his right shoulder injury.  - we will consider further steps once the above work up is back.    Follow-up for his other medical problems with his primary care physician.    Follow up in 2 months.     Maurizio Gonzalez MD  Neurology Attending, Epilepsy Program  [facility]

## 2023-06-28 ENCOUNTER — OCCUPATIONAL THERAPY (OUTPATIENT)
Dept: OCCUPATIONAL THERAPY | Facility: REHABILITATION | Age: 73
End: 2023-06-28
Attending: PSYCHIATRY & NEUROLOGY
Payer: MEDICARE

## 2023-06-28 DIAGNOSIS — I69.30 LATE EFFECTS OF CEREBRAL ISCHEMIC STROKE: ICD-10-CM

## 2023-06-28 DIAGNOSIS — G62.9 NEUROPATHY: ICD-10-CM

## 2023-06-28 DIAGNOSIS — R29.898 WEAKNESS OF BOTH HANDS: ICD-10-CM

## 2023-06-28 PROCEDURE — 97110 THERAPEUTIC EXERCISES: CPT

## 2023-06-28 NOTE — OP THERAPY PROGRESS SUMMARY
Outpatient Occupational Therapy  PROGRESS SUMMARY NOTE    [facility] Occupational Therapy [address]  [address]  [place] [address]  Phone:  [phone]  Fax:  [phone]    Date of Visit: 06/28/2023    Patient: Gokul aBca  YOB: 1950  MRN: [id number]     Referring Provider: Maurizio Gonzalez M.D.  [address] [place] [id number]   Referring Diagnosis Polyneuropathy, unspecified [G62.9]     Visit Diagnoses     ICD-10-CM   1. Weakness of both hands  R29.898   2. Neuropathy  G62.9   3. Late effects of cerebral ischemic stroke  I69.30       Rehab Potential: excellent    Progress Report Period: 5/17/23--6/28/23    Functional Assessment Used:  UEFI = 50/80          Objective Findings and Assessment:   Patient progression towards goals: Patient seen for 4 visits since last PN and continues to be highly motivated in therapy.  Patient now using left affected hand for at least 50% of the time during self care tasks.  Patient would benefit with continued OT intervention to further enhance functional use of left dominant hand.      Objective findings and assessment details: Active Range of Motion:   Upper extremity (left):     All left upper extremity active range of motion: All within functional limits        Strength:     Left upper extremity strength within functional limits.       Strength Comments:   strength:  R=65 pounds  L= 41 pounds     Tone, Sensation and Coordination:   Tone:     Left upper extremity muscle tone: Normal     Sensation   Upper extremity (left):     Light touch: Intact    Sharp/dull: Intact     Stereognosis: Intact     Proprioception: Intact      Sensation comments:   Patient has neuropathic pain at neck, left shoulder, and at dorsum of hand and thumb and from PIPs down to tips of fingers.       Coordination   Upper extremity (left):     Fine motor: Impaired    Gross motor: Impaired    Slow alternating movements: Impaired    Rapid alternating  movements: Impaired    Finger to finger: Impaired    Finger touch to nose: Impaired      Coordination comments:   9 hole peg test:  R=23 seconds  L=31 seconds    Goals:   Short Term Goals:   Patient will improve left  strength to at least 50 pounds to enhance domestic/leisure tasks  Patient will be mod I holding a fork in left hand and cutting food with right hand.    Patient will utilize left hand as a functional assist for at least 75% of the time to enhance bimanual integration.   Short term goal timespan:  2-4 weeks    Long Term Goals:   Patient will improve bilateral  strength to at least 60 pounds to enhance domestic/leisure tasks  Patient will score 25 seconds or less on the 9 hole peg test with left hand due to improved fine motor manipulation/dexterity  Patient will score at least 60/80 on the UEFI  Long term goal timespan:  4-6 weeks    Plan:   Planned therapy interventions:  Therapeutic Activities (CPT 13557) and Therapeutic Exercise (CPT 14211)  Frequency:  1x week  Duration in weeks:  6  Duration in visits:  6      Referring provider co-signature:  I have reviewed this plan of care and my co-signature certifies the need for services.    Certification Period: 06/28/2023 to 08/09/23    Physician Signature: ________________________________ Date: ______________

## 2023-06-28 NOTE — OP THERAPY DAILY TREATMENT
Outpatient Occupational Therapy  DAILY TREATMENT     [facility] Occupational Therapy [address]  [address]  [place] [address]  Phone:  [phone]  Fax:  [phone]    Date: 06/28/2023    Patient: Gokul Baca  YOB: 1950  MRN: [id number]     Time Calculation  Start time: 0145  Stop time: 0230 Time Calculation (min): 45 minutes         Chief Complaint: Extremity Weakness and Hand Weakness    Visit #: 7    SUBJECTIVE:  I saw my neurologist about my shoulder pain and he is sending me to an Orthopedic doctor to see if it is my tendons instead of nerves.      OBJECTIVE:  Current objective measures:   Active Range of Motion:   Upper extremity (left):     All left upper extremity active range of motion: All within functional limits        Strength:     Left upper extremity strength within functional limits.       Strength Comments:   strength:  R=65 pounds  L= 41 pounds     Tone, Sensation and Coordination:   Tone:     Left upper extremity muscle tone: Normal     Sensation   Upper extremity (left):     Light touch: Intact    Sharp/dull: Intact     Stereognosis: Intact     Proprioception: Intact      Sensation comments:   Patient has neuropathic pain at neck, left shoulder, and at dorsum of hand and thumb and from PIPs down to tips of fingers.       Coordination   Upper extremity (left):     Fine motor: Impaired    Gross motor: Impaired    Slow alternating movements: Impaired    Rapid alternating movements: Impaired    Finger to finger: Impaired    Finger touch to nose: Impaired      Coordination comments:   9 hole peg test:  R=23 seconds  L=31 seconds        Therapeutic Exercises (CPT 28533):     1. isolated pronation/supination, 10 x    2. medium resistance theraputty    3. fine motor manipulation/dexterity activities/exercises, grooved pegboard, 9 hole peg test and manipulation of coins.Use of screwdriver.    4. flexion/extension exercise stick in horizontal and vertical  positions, 10 x each position.    5. 9# digi-flex, 10 x    6. 11# graded clip with 3 point prehension, 10 x      Time-based treatments/modalities:  Therapeutic exercise minutes (CPT 31036): 45 minutes        Pain rating before treatment: 3  Pain rating after treatment: 4    ASSESSMENT:   Response to treatment: Patient did well in therapy today considering the pain he was been having in left affected arm.  Progress note completed.      PLAN/RECOMMENDATIONS:   Plan for treatment: therapy treatment to continue next visit.  Planned interventions for next visit: continue with current treatment, therapeutic activities (CPT 46212), and therapeutic exercise (CPT 28350)

## 2023-07-01 ENCOUNTER — HOSPITAL ENCOUNTER (OUTPATIENT)
Dept: RADIOLOGY | Facility: MEDICAL CENTER | Age: 73
End: 2023-07-01
Attending: NURSE PRACTITIONER
Payer: MEDICARE

## 2023-07-01 ENCOUNTER — OFFICE VISIT (OUTPATIENT)
Dept: URGENT CARE | Facility: PHYSICIAN GROUP | Age: 73
End: 2023-07-01
Payer: MEDICARE

## 2023-07-01 VITALS
RESPIRATION RATE: 20 BRPM | DIASTOLIC BLOOD PRESSURE: 82 MMHG | BODY MASS INDEX: 34.66 KG/M2 | OXYGEN SATURATION: 98 % | WEIGHT: 208 LBS | TEMPERATURE: 98.8 F | HEIGHT: 65 IN | SYSTOLIC BLOOD PRESSURE: 124 MMHG | HEART RATE: 68 BPM

## 2023-07-01 DIAGNOSIS — S49.92XA INJURY OF LEFT SHOULDER, INITIAL ENCOUNTER: ICD-10-CM

## 2023-07-01 PROCEDURE — 2023F DILAT RTA XM W/O RTNOPTHY: CPT | Performed by: NURSE PRACTITIONER

## 2023-07-01 PROCEDURE — 73030 X-RAY EXAM OF SHOULDER: CPT | Mod: LT

## 2023-07-01 PROCEDURE — 99213 OFFICE O/P EST LOW 20 MIN: CPT | Performed by: NURSE PRACTITIONER

## 2023-07-01 PROCEDURE — 3074F SYST BP LT 130 MM HG: CPT | Performed by: NURSE PRACTITIONER

## 2023-07-01 PROCEDURE — 3079F DIAST BP 80-89 MM HG: CPT | Performed by: NURSE PRACTITIONER

## 2023-07-01 ASSESSMENT — FIBROSIS 4 INDEX: FIB4 SCORE: 1.51688812977321948

## 2023-07-01 ASSESSMENT — ENCOUNTER SYMPTOMS
EYES NEGATIVE: 1
RESPIRATORY NEGATIVE: 1
GASTROINTESTINAL NEGATIVE: 1
CARDIOVASCULAR NEGATIVE: 1
CONSTITUTIONAL NEGATIVE: 1

## 2023-07-02 NOTE — PROGRESS NOTES
"Subjective:   Gokul Baca is a 72 y.o. male who presents for Shoulder Injury (left)      Patient presents for evaluation of injury to his left shoulder which occurred within the past day or two.  Patient states he has had a prior injury to this left shoulder in the past, and with this reinjury he would like to get into see Dr. Sawyer.  He states that he was pulling himself up with a pull bar and feels that he had an injury to tendon or ligament. He would like an MRI today.  Patient reports pain and weakness and decreased ROM since the injury.  He denies numbness or tingling. He denies radiation of pain anywhere else.     Shoulder Injury   The incident occurred at home. The left shoulder is affected. Injury mechanism: A pulling injury. The quality of the pain is described as aching and burning. The pain does not radiate. The pain is at a severity of 6/10. The pain is moderate. Pertinent negatives include no chest pain. The symptoms are aggravated by movement and overhead lifting. He has tried ice, elevation, acetaminophen and immobilization for the symptoms. The treatment provided mild relief.       Review of Systems   Constitutional: Negative.    HENT: Negative.     Eyes: Negative.    Respiratory: Negative.     Cardiovascular: Negative.  Negative for chest pain.   Gastrointestinal: Negative.    Musculoskeletal:         Left shoulder injury   Skin: Negative.        Medications, Allergies, and current problem list reviewed today in Epic.     Objective:     /82 (BP Location: Right arm, Patient Position: Sitting, BP Cuff Size: Adult)   Pulse 68   Temp 37.1 °C (98.8 °F) (Temporal)   Resp 20   Ht 1.651 m (5' 5\")   Wt 94.3 kg (208 lb)   SpO2 98%     Physical Exam  Vitals reviewed.   Constitutional:       Appearance: Normal appearance.   HENT:      Head: Normocephalic and atraumatic.      Nose: Nose normal.      Mouth/Throat:      Mouth: Mucous membranes are moist.      Pharynx: Oropharynx is clear. "   Eyes:      Extraocular Movements: Extraocular movements intact.      Conjunctiva/sclera: Conjunctivae normal.      Pupils: Pupils are equal, round, and reactive to light.   Cardiovascular:      Rate and Rhythm: Normal rate and regular rhythm.      Pulses: Normal pulses.      Heart sounds: Normal heart sounds.   Pulmonary:      Effort: Pulmonary effort is normal.      Breath sounds: Normal breath sounds.   Abdominal:      General: Abdomen is flat. Bowel sounds are normal.      Palpations: Abdomen is soft.   Musculoskeletal:      Left shoulder: Tenderness present. No swelling, deformity, effusion or laceration. Decreased range of motion.      Cervical back: Normal range of motion and neck supple.   Skin:     General: Skin is warm and dry.      Capillary Refill: Capillary refill takes less than 2 seconds.   Neurological:      General: No focal deficit present.      Mental Status: He is alert and oriented to person, place, and time.   Psychiatric:         Mood and Affect: Mood normal.         Behavior: Behavior normal.         RADIOLOGY RESULTS   DX-SHOULDER 2+ LEFT    Result Date: 7/1/2023 7/1/2023 4:21 PM HISTORY/REASON FOR EXAM:  Left shoulder pain and limited range of motion after injury TECHNIQUE/EXAM DESCRIPTION AND NUMBER OF VIEWS:  3 views of the LEFT shoulder. COMPARISON: None FINDINGS: Bone mineralization is normal. There is no evidence of acute fracture. There is no evidence of dislocation. There is mild to moderate joint space narrowing and periarticular sclerosis. No abnormalities are identified in the soft tissues.     There is no evidence of acute fracture. Osteoarthritis appears to be present at the glenohumeral joint.             Assessment/Plan:     Diagnosis and associated orders:     1. Injury of left shoulder, initial encounter  DX-SHOULDER 2+ LEFT    Referral to Orthopedics         Comments/MDM:     Patient was reassured that he does not have a fracture.  Patient will use ice, heat and  antiinflammatories for pain and discomfort. He will do gentle ROM exercises at home.   Referral to Dr. Sawyer was placed for patient today.  Patient will follow up with Dr. Sawyer for further management of his shoulder pain.          Differential diagnosis, natural history, supportive care, and indications for immediate follow-up discussed.    Advised the patient to follow-up with the primary care physician for recheck, reevaluation, and consideration of further management.    Please note that this dictation was created using voice recognition software. I have made a reasonable attempt to correct obvious errors, but I expect that there are errors of grammar and possibly content that I did not discover before finalizing the note.    This note was electronically signed by CRYSTAL Hill

## 2023-07-05 ENCOUNTER — APPOINTMENT (OUTPATIENT)
Dept: OCCUPATIONAL THERAPY | Facility: REHABILITATION | Age: 73
End: 2023-07-05
Attending: PSYCHIATRY & NEUROLOGY
Payer: MEDICARE

## 2023-07-05 NOTE — OP THERAPY DAILY TREATMENT
Outpatient Occupational Therapy  DAILY TREATMENT     [facility] Occupational [address].  [address]  [place] [address]  Phone:  [phone]  Fax:  [phone]    Date: 07/05/2023    Patient: Gokul Baca  YOB: 1950  MRN: [id number]     Time Calculation                 Chief Complaint: No chief complaint on file.    Visit #: 8    SUBJECTIVE:  ***    OBJECTIVE:  Current objective measures:   Active Range of Motion:   Upper extremity (left):     All left upper extremity active range of motion: All within functional limits        Strength:     Left upper extremity strength within functional limits.       Strength Comments:   strength:  R=65 pounds  L= 41 pounds     Tone, Sensation and Coordination:   Tone:     Left upper extremity muscle tone: Normal     Sensation   Upper extremity (left):     Light touch: Intact    Sharp/dull: Intact     Stereognosis: Intact     Proprioception: Intact      Sensation comments:   Patient has neuropathic pain at neck, left shoulder, and at dorsum of hand and thumb and from PIPs down to tips of fingers.       Coordination   Upper extremity (left):     Fine motor: Impaired    Gross motor: Impaired    Slow alternating movements: Impaired    Rapid alternating movements: Impaired    Finger to finger: Impaired    Finger touch to nose: Impaired      Coordination comments:   9 hole peg test:  R=23 seconds  L=31 seconds        Therapeutic Exercises (CPT 87616):     1. isolated pronation/supination, 10 x    2. medium resistance theraputty    3. fine motor manipulation/dexterity activities/exercises, grooved pegboard, 9 hole peg test and manipulation of coins.Use of screwdriver.    4. flexion/extension exercise stick in horizontal and vertical positions, 10 x each position.    5. 9# digi-flex, 10 x    6. 11# graded clip with 3 point prehension, 10 x      Time-based treatments/modalities:           Pain rating before treatment: {PAIN  "NUMBERS_1-10:47451}  Pain rating after treatment: {PAIN NUMBERS_1-10:40177}    ASSESSMENT:   Response to treatment: ***    PLAN/RECOMMENDATIONS:   Plan for treatment: {Therapy Plan:[id number]::\"therapy treatment to continue next visit\"}.  Planned interventions for next visit: {planned OT Interventions:[id number]}         "

## 2023-07-11 DIAGNOSIS — I10 PRIMARY HYPERTENSION: ICD-10-CM

## 2023-07-11 RX ORDER — HYDRALAZINE HYDROCHLORIDE 10 MG/1
10 TABLET, FILM COATED ORAL
Qty: 180 TABLET | Refills: 3 | Status: SHIPPED | OUTPATIENT
Start: 2023-07-11 | End: 2023-11-13

## 2023-07-12 ENCOUNTER — APPOINTMENT (OUTPATIENT)
Dept: OCCUPATIONAL THERAPY | Facility: REHABILITATION | Age: 73
End: 2023-07-12
Attending: PSYCHIATRY & NEUROLOGY
Payer: MEDICARE

## 2023-07-19 ENCOUNTER — APPOINTMENT (OUTPATIENT)
Dept: OCCUPATIONAL THERAPY | Facility: REHABILITATION | Age: 73
End: 2023-07-19
Attending: PSYCHIATRY & NEUROLOGY
Payer: MEDICARE

## 2023-07-25 ENCOUNTER — APPOINTMENT (OUTPATIENT)
Dept: RADIOLOGY | Facility: MEDICAL CENTER | Age: 73
End: 2023-07-25
Attending: EMERGENCY MEDICINE
Payer: MEDICARE

## 2023-07-25 ENCOUNTER — HOSPITAL ENCOUNTER (EMERGENCY)
Facility: MEDICAL CENTER | Age: 73
End: 2023-07-26
Attending: EMERGENCY MEDICINE
Payer: MEDICARE

## 2023-07-25 DIAGNOSIS — R42 DIZZINESS: ICD-10-CM

## 2023-07-25 DIAGNOSIS — J06.9 VIRAL URI: ICD-10-CM

## 2023-07-25 DIAGNOSIS — E86.0 DEHYDRATION: ICD-10-CM

## 2023-07-25 LAB
ALBUMIN SERPL BCP-MCNC: 4.5 G/DL (ref 3.2–4.9)
ALBUMIN/GLOB SERPL: 1.3 G/DL
ALP SERPL-CCNC: 231 U/L (ref 30–99)
ALT SERPL-CCNC: 36 U/L (ref 2–50)
ANION GAP SERPL CALC-SCNC: 15 MMOL/L (ref 7–16)
AST SERPL-CCNC: 37 U/L (ref 12–45)
BASOPHILS # BLD AUTO: 0.5 % (ref 0–1.8)
BASOPHILS # BLD: 0.03 K/UL (ref 0–0.12)
BILIRUB SERPL-MCNC: 0.5 MG/DL (ref 0.1–1.5)
BUN SERPL-MCNC: 15 MG/DL (ref 8–22)
CALCIUM ALBUM COR SERPL-MCNC: 9.3 MG/DL (ref 8.5–10.5)
CALCIUM SERPL-MCNC: 9.7 MG/DL (ref 8.5–10.5)
CHLORIDE SERPL-SCNC: 99 MMOL/L (ref 96–112)
CO2 SERPL-SCNC: 21 MMOL/L (ref 20–33)
CREAT SERPL-MCNC: 0.71 MG/DL (ref 0.5–1.4)
EKG IMPRESSION: NORMAL
EOSINOPHIL # BLD AUTO: 0 K/UL (ref 0–0.51)
EOSINOPHIL NFR BLD: 0 % (ref 0–6.9)
ERYTHROCYTE [DISTWIDTH] IN BLOOD BY AUTOMATED COUNT: 42.5 FL (ref 35.9–50)
GFR SERPLBLD CREATININE-BSD FMLA CKD-EPI: 98 ML/MIN/1.73 M 2
GLOBULIN SER CALC-MCNC: 3.5 G/DL (ref 1.9–3.5)
GLUCOSE SERPL-MCNC: 164 MG/DL (ref 65–99)
HCT VFR BLD AUTO: 45.5 % (ref 42–52)
HGB BLD-MCNC: 15.4 G/DL (ref 14–18)
IMM GRANULOCYTES # BLD AUTO: 0.04 K/UL (ref 0–0.11)
IMM GRANULOCYTES NFR BLD AUTO: 0.6 % (ref 0–0.9)
LACTATE SERPL-SCNC: 1.4 MMOL/L (ref 0.5–2)
LACTATE SERPL-SCNC: 2.1 MMOL/L (ref 0.5–2)
LYMPHOCYTES # BLD AUTO: 0.81 K/UL (ref 1–4.8)
LYMPHOCYTES NFR BLD: 12.6 % (ref 22–41)
MCH RBC QN AUTO: 30 PG (ref 27–33)
MCHC RBC AUTO-ENTMCNC: 33.8 G/DL (ref 32.3–36.5)
MCV RBC AUTO: 88.7 FL (ref 81.4–97.8)
MONOCYTES # BLD AUTO: 0.13 K/UL (ref 0–0.85)
MONOCYTES NFR BLD AUTO: 2 % (ref 0–13.4)
NEUTROPHILS # BLD AUTO: 5.43 K/UL (ref 1.82–7.42)
NEUTROPHILS NFR BLD: 84.3 % (ref 44–72)
NRBC # BLD AUTO: 0 K/UL
NRBC BLD-RTO: 0 /100 WBC (ref 0–0.2)
PLATELET # BLD AUTO: 261 K/UL (ref 164–446)
PMV BLD AUTO: 9.6 FL (ref 9–12.9)
POTASSIUM SERPL-SCNC: 4.7 MMOL/L (ref 3.6–5.5)
PROT SERPL-MCNC: 8 G/DL (ref 6–8.2)
RBC # BLD AUTO: 5.13 M/UL (ref 4.7–6.1)
SODIUM SERPL-SCNC: 135 MMOL/L (ref 135–145)
TROPONIN T SERPL-MCNC: 7 NG/L (ref 6–19)
WBC # BLD AUTO: 6.4 K/UL (ref 4.8–10.8)

## 2023-07-25 PROCEDURE — 93005 ELECTROCARDIOGRAM TRACING: CPT

## 2023-07-25 PROCEDURE — 71045 X-RAY EXAM CHEST 1 VIEW: CPT

## 2023-07-25 PROCEDURE — 700111 HCHG RX REV CODE 636 W/ 250 OVERRIDE (IP): Mod: JZ | Performed by: EMERGENCY MEDICINE

## 2023-07-25 PROCEDURE — 84484 ASSAY OF TROPONIN QUANT: CPT

## 2023-07-25 PROCEDURE — 36415 COLL VENOUS BLD VENIPUNCTURE: CPT

## 2023-07-25 PROCEDURE — 700105 HCHG RX REV CODE 258: Mod: JZ | Performed by: EMERGENCY MEDICINE

## 2023-07-25 PROCEDURE — 83605 ASSAY OF LACTIC ACID: CPT

## 2023-07-25 PROCEDURE — 85025 COMPLETE CBC W/AUTO DIFF WBC: CPT

## 2023-07-25 PROCEDURE — 99285 EMERGENCY DEPT VISIT HI MDM: CPT

## 2023-07-25 PROCEDURE — 96374 THER/PROPH/DIAG INJ IV PUSH: CPT

## 2023-07-25 PROCEDURE — 87040 BLOOD CULTURE FOR BACTERIA: CPT | Mod: 91

## 2023-07-25 PROCEDURE — 93005 ELECTROCARDIOGRAM TRACING: CPT | Performed by: EMERGENCY MEDICINE

## 2023-07-25 PROCEDURE — 80053 COMPREHEN METABOLIC PANEL: CPT

## 2023-07-25 RX ORDER — SODIUM CHLORIDE, SODIUM LACTATE, POTASSIUM CHLORIDE, CALCIUM CHLORIDE 600; 310; 30; 20 MG/100ML; MG/100ML; MG/100ML; MG/100ML
1000 INJECTION, SOLUTION INTRAVENOUS ONCE
Status: COMPLETED | OUTPATIENT
Start: 2023-07-25 | End: 2023-07-25

## 2023-07-25 RX ORDER — SODIUM CHLORIDE, SODIUM LACTATE, POTASSIUM CHLORIDE, CALCIUM CHLORIDE 600; 310; 30; 20 MG/100ML; MG/100ML; MG/100ML; MG/100ML
1000 INJECTION, SOLUTION INTRAVENOUS ONCE
Status: COMPLETED | OUTPATIENT
Start: 2023-07-25 | End: 2023-07-26

## 2023-07-25 RX ORDER — ONDANSETRON 2 MG/ML
4 INJECTION INTRAMUSCULAR; INTRAVENOUS ONCE
Status: COMPLETED | OUTPATIENT
Start: 2023-07-25 | End: 2023-07-25

## 2023-07-25 RX ADMIN — SODIUM CHLORIDE, POTASSIUM CHLORIDE, SODIUM LACTATE AND CALCIUM CHLORIDE 1000 ML: 600; 310; 30; 20 INJECTION, SOLUTION INTRAVENOUS at 20:45

## 2023-07-25 RX ADMIN — ONDANSETRON 4 MG: 2 INJECTION INTRAMUSCULAR; INTRAVENOUS at 20:45

## 2023-07-25 RX ADMIN — SODIUM CHLORIDE, POTASSIUM CHLORIDE, SODIUM LACTATE AND CALCIUM CHLORIDE 1000 ML: 600; 310; 30; 20 INJECTION, SOLUTION INTRAVENOUS at 23:22

## 2023-07-25 ASSESSMENT — FIBROSIS 4 INDEX: FIB4 SCORE: 1.51688812977321948

## 2023-07-26 ENCOUNTER — APPOINTMENT (OUTPATIENT)
Dept: RADIOLOGY | Facility: MEDICAL CENTER | Age: 73
End: 2023-07-26
Attending: EMERGENCY MEDICINE
Payer: MEDICARE

## 2023-07-26 VITALS
BODY MASS INDEX: 34.99 KG/M2 | SYSTOLIC BLOOD PRESSURE: 181 MMHG | TEMPERATURE: 97.5 F | HEART RATE: 86 BPM | HEIGHT: 65 IN | RESPIRATION RATE: 16 BRPM | DIASTOLIC BLOOD PRESSURE: 94 MMHG | OXYGEN SATURATION: 95 % | WEIGHT: 210 LBS

## 2023-07-26 LAB
APPEARANCE UR: CLEAR
BACTERIA #/AREA URNS HPF: NEGATIVE /HPF
BILIRUB UR QL STRIP.AUTO: NEGATIVE
COLOR UR: YELLOW
EPI CELLS #/AREA URNS HPF: NEGATIVE /HPF
GLUCOSE UR STRIP.AUTO-MCNC: NEGATIVE MG/DL
HYALINE CASTS #/AREA URNS LPF: ABNORMAL /LPF
KETONES UR STRIP.AUTO-MCNC: 15 MG/DL
LEUKOCYTE ESTERASE UR QL STRIP.AUTO: NEGATIVE
MICRO URNS: ABNORMAL
NITRITE UR QL STRIP.AUTO: NEGATIVE
PH UR STRIP.AUTO: 6 [PH] (ref 5–8)
PROT UR QL STRIP: 100 MG/DL
RBC # URNS HPF: ABNORMAL /HPF
RBC UR QL AUTO: NEGATIVE
SP GR UR STRIP.AUTO: 1.01
UROBILINOGEN UR STRIP.AUTO-MCNC: 0.2 MG/DL
WBC #/AREA URNS HPF: ABNORMAL /HPF

## 2023-07-26 PROCEDURE — 96376 TX/PRO/DX INJ SAME DRUG ADON: CPT

## 2023-07-26 PROCEDURE — 70450 CT HEAD/BRAIN W/O DYE: CPT

## 2023-07-26 PROCEDURE — 87086 URINE CULTURE/COLONY COUNT: CPT

## 2023-07-26 PROCEDURE — 700111 HCHG RX REV CODE 636 W/ 250 OVERRIDE (IP): Performed by: EMERGENCY MEDICINE

## 2023-07-26 PROCEDURE — 81001 URINALYSIS AUTO W/SCOPE: CPT

## 2023-07-26 PROCEDURE — 96375 TX/PRO/DX INJ NEW DRUG ADDON: CPT

## 2023-07-26 RX ORDER — HYDRALAZINE HYDROCHLORIDE 20 MG/ML
10 INJECTION INTRAMUSCULAR; INTRAVENOUS ONCE
Status: COMPLETED | OUTPATIENT
Start: 2023-07-26 | End: 2023-07-26

## 2023-07-26 RX ORDER — ONDANSETRON 2 MG/ML
4 INJECTION INTRAMUSCULAR; INTRAVENOUS ONCE
Status: COMPLETED | OUTPATIENT
Start: 2023-07-26 | End: 2023-07-26

## 2023-07-26 RX ADMIN — ONDANSETRON 4 MG: 2 INJECTION INTRAMUSCULAR; INTRAVENOUS at 02:20

## 2023-07-26 RX ADMIN — HYDRALAZINE HYDROCHLORIDE 10 MG: 20 INJECTION, SOLUTION INTRAMUSCULAR; INTRAVENOUS at 00:15

## 2023-07-26 NOTE — ED TRIAGE NOTES
Chief Complaint   Patient presents with    N/V    Dizziness     Pt BIB EMS. Per report pt became nauseous after eating a late breakfast. Since he reports to have vomited about 5 times with associated fatigue/dizziness.  PTA, pt given 4 mg Zofran. Pt states hx of CVA in 21 with left sided deficits.

## 2023-07-26 NOTE — ED NOTES
Rounded on Pt.    Pt provided urine sample.    Updated Pt on plan of care. Pt verbalized understanding.  No acute distress at this time.  Will continue to monitor.

## 2023-07-26 NOTE — ED PROVIDER NOTES
ED Provider Note    CHIEF COMPLAINT  Chief Complaint   Patient presents with    N/V    Dizziness     Pt BIB EMS. Per report pt became nauseous after eating a late breakfast. Since he reports to have vomited about 5 times with associated fatigue/dizziness.  PTA, pt given 4 mg Zofran. Pt states hx of CVA in 21 with left sided deficits.        EXTERNAL RECORDS REVIEWED  Outpatient Notes      HPI/ROS  LIMITATION TO HISTORY   Select: : None  OUTSIDE HISTORIAN(S):  Family wife at bedside provides additional history to the details of the development of his illness    Gokul Baca is a 72 y.o. male who presents to the emergency department multiple complaints.  Patient primarily reports here for nausea vomiting dizziness.  Patient states that over the last few days he has been suffering from an upper respiratory infection.  States that he has had some congestion and some slight shortness of breath and slight pressure in his chest.  He reports that he reported the symptoms to his  physician who placed him on steroids and a Z-Gene.  He reports that he thinks he had a fever today he has had no measured fevers he has had no altered mental status no abdominal pain no pain swelling redness or other changes in his lower extremities no blood in his vomit he has had no changes in his bowel movements or urination    PAST MEDICAL HISTORY   has a past medical history of Anemia, Atrial fibrillation (HCC), GERD (gastroesophageal reflux disease), Heart burn, High cholesterol, Hyperlipidemia, Hypertension, Iron deficiency anemia, Pneumonia, Sleep apnea, Snoring, Stroke (HCC) (12/19/2021), and UTI (urinary tract infection) (03/2022).    SURGICAL HISTORY   has a past surgical history that includes knee arthroplasty total (Left) and other.    FAMILY HISTORY  Family History   Problem Relation Age of Onset    Heart Disease Mother     Heart Disease Brother     Alcohol abuse Son        SOCIAL HISTORY  Social History     Tobacco  "Use    Smoking status: Never    Smokeless tobacco: Never   Vaping Use    Vaping Use: Never used   Substance and Sexual Activity    Alcohol use: Never    Drug use: Never    Sexual activity: Not on file       CURRENT MEDICATIONS  Home Medications    **Home medications have not yet been reviewed for this encounter**         ALLERGIES  Allergies   Allergen Reactions    Codeine Unspecified     \"Become out of it\"    Ibuprofen Hives    Lactose Unspecified     Stomach upset        PHYSICAL EXAM  VITAL SIGNS: BP (!) 189/112   Pulse 80   Temp 36.1 °C (97 °F) (Oral)   Resp 16   Ht 1.651 m (5' 5\")   Wt 95.3 kg (210 lb)   SpO2 96%   BMI 34.95 kg/m²    Pulse ox interpretation: I interpret this pulse ox as normal.  Constitutional: Alert and oriented x 3, no acute Distress  HEENT: Atraumatic normocephalic, pupils are equal round reactive to light extraocular movements are intact. The nares is clear, external ears are normal, mouth shows moist mucous membranes normal dentition for age  Neck: Supple, no JVD no tracheal deviation  Cardiovascular: Regular rate and rhythm no murmur rub or gallop 2+ pulses peripherally x4  Thorax & Lungs: No respiratory distress, no wheezes rales or rhonchi, No chest tenderness.   GI: Soft nontender nondistended positive bowel sounds, no peritoneal signs  Skin: Warm dry no acute rash or lesion  Musculoskeletal: Chronic left-sided weakness in upper lower extremity no acute deformity  Neurologic: Cranial nerves III through XII are grossly intact no sensory deficit no cerebellar dysfunction   Psychiatric: Appropriate affect for situation at this time            DIAGNOSTIC STUDIES / PROCEDURES  Results for orders placed or performed during the hospital encounter of 07/25/23   Lactic acid (lactate)   Result Value Ref Range    Lactic Acid 2.1 (H) 0.5 - 2.0 mmol/L   Lactic acid (lactate): Repeat if initial lactic acid result is greater than 2   Result Value Ref Range    Lactic Acid 1.4 0.5 - 2.0 mmol/L "   CBC With Differential   Result Value Ref Range    WBC 6.4 4.8 - 10.8 K/uL    RBC 5.13 4.70 - 6.10 M/uL    Hemoglobin 15.4 14.0 - 18.0 g/dL    Hematocrit 45.5 42.0 - 52.0 %    MCV 88.7 81.4 - 97.8 fL    MCH 30.0 27.0 - 33.0 pg    MCHC 33.8 32.3 - 36.5 g/dL    RDW 42.5 35.9 - 50.0 fL    Platelet Count 261 164 - 446 K/uL    MPV 9.6 9.0 - 12.9 fL    Neutrophils-Polys 84.30 (H) 44.00 - 72.00 %    Lymphocytes 12.60 (L) 22.00 - 41.00 %    Monocytes 2.00 0.00 - 13.40 %    Eosinophils 0.00 0.00 - 6.90 %    Basophils 0.50 0.00 - 1.80 %    Immature Granulocytes 0.60 0.00 - 0.90 %    Nucleated RBC 0.00 0.00 - 0.20 /100 WBC    Neutrophils (Absolute) 5.43 1.82 - 7.42 K/uL    Lymphs (Absolute) 0.81 (L) 1.00 - 4.80 K/uL    Monos (Absolute) 0.13 0.00 - 0.85 K/uL    Eos (Absolute) 0.00 0.00 - 0.51 K/uL    Baso (Absolute) 0.03 0.00 - 0.12 K/uL    Immature Granulocytes (abs) 0.04 0.00 - 0.11 K/uL    NRBC (Absolute) 0.00 K/uL   Comp Metabolic Panel   Result Value Ref Range    Sodium 135 135 - 145 mmol/L    Potassium 4.7 3.6 - 5.5 mmol/L    Chloride 99 96 - 112 mmol/L    Co2 21 20 - 33 mmol/L    Anion Gap 15.0 7.0 - 16.0    Glucose 164 (H) 65 - 99 mg/dL    Bun 15 8 - 22 mg/dL    Creatinine 0.71 0.50 - 1.40 mg/dL    Calcium 9.7 8.5 - 10.5 mg/dL    Correct Calcium 9.3 8.5 - 10.5 mg/dL    AST(SGOT) 37 12 - 45 U/L    ALT(SGPT) 36 2 - 50 U/L    Alkaline Phosphatase 231 (H) 30 - 99 U/L    Total Bilirubin 0.5 0.1 - 1.5 mg/dL    Albumin 4.5 3.2 - 4.9 g/dL    Total Protein 8.0 6.0 - 8.2 g/dL    Globulin 3.5 1.9 - 3.5 g/dL    A-G Ratio 1.3 g/dL   Urinalysis    Specimen: Urine   Result Value Ref Range    Color Yellow     Character Clear     Specific Gravity 1.015 <1.035    Ph 6.0 5.0 - 8.0    Glucose Negative Negative mg/dL    Ketones 15 (A) Negative mg/dL    Protein 100 (A) Negative mg/dL    Bilirubin Negative Negative    Urobilinogen, Urine 0.2 Negative    Nitrite Negative Negative    Leukocyte Esterase Negative Negative    Occult Blood  Negative Negative    Micro Urine Req Microscopic    ESTIMATED GFR   Result Value Ref Range    GFR (CKD-EPI) 98 >60 mL/min/1.73 m 2   TROPONIN   Result Value Ref Range    Troponin T 7 6 - 19 ng/L   URINE MICROSCOPIC (W/UA)   Result Value Ref Range    WBC 0-2 (A) /hpf    RBC 0-2 (A) /hpf    Bacteria Negative None /hpf    Epithelial Cells Negative /hpf    Hyaline Cast 0-2 /lpf   EKG   Result Value Ref Range    Report       [facility] Emergency Dept.    Test Date:  2023  Pt Name:    NASIM TELLEZ                Department: ER  MRN:        [id number]                      Room:       RD 05 H  Gender:     Male                         Technician:  :        1950                   Requested By:ER TRIAGE PROTOCOL  Order #:    [id number]                    Reading MD: REJI DELGADO MD    Measurements  Intervals                                Axis  Rate:       67                           P:          44  ME:         169                          QRS:        0  QRSD:       86                           T:          9  QT:         423  QTc:        447    Interpretive Statements  Inferior Q waves on this EKG are present on multiple previous EKGs from  2022 however not present on his last EKG otherwise no acute ST  elevation or depression no other ischemic or arrhythmic features  Electronically Signed On 2023 20:19:23 PDT by REJI DELGADO MD           RADIOLOGY  CT-HEAD W/O   Final Result         1.  No acute intracranial abnormality is identified, there are nonspecific white matter changes, commonly associated with small vessel ischemic disease.  Associated mild cerebral atrophy is noted.   2.  Atherosclerosis.         DX-CHEST-PORTABLE (1 VIEW)   Final Result         1. No acute cardiopulmonary abnormalities are identified.            COURSE & MEDICAL DECISION MAKING    ED Observation Status? Yes; I am placing the patient in to an observation status due to a diagnostic  uncertainty as well as therapeutic intensity. Patient placed in observation status at 7:59 PM, 7/25/2023.     Observation plan is as follows: IV hydration labs and CT head    Upon Reevaluation, the patient's condition has: Improved; and will be discharged.    Patient discharged from ED Observation status at 02:10 (Time) 07/26/23   (Date).     ASSESSMENT, COURSE AND PLAN  Care Narrative: 72-year-old male recent upper respiratory infection reports with nausea vomiting and dizziness.  Patient found to be somewhat dehydrated when he is given IV hydration and Zofran feels much better however having some residual dizziness CT head was completed and unremarkable.  After second liter of fluid patient is feeling much better and has complete resolution of dizziness.  Patient given instructions return for worsening dizziness headache altered mental status any further intractable nausea vomiting any other acute symptom changes or concerns.  I did discuss with patient that I felt as though the medications that he jael on may be contributing to this nausea vomiting and as such contributing to dehydration.  Patient has no white count no fever no focal abnormality on chest x-ray I do not see any indication for antibiotics at this time.      HTN/IDDM FOLLOW UP:  The patient has known hypertension and is being followed by their primary care doctor      ADDITIONAL PROBLEM LIST    DISPOSITION AND DISCUSSIONS  I have discussed management of the patient with the following physicians and HAMZAH's:      Discussion of management with other QHP or appropriate source(s): None     Escalation of care considered, and ultimately not performed:acute inpatient care management, however at this time, the patient is most appropriate for outpatient management    Barriers to care at this time, including but not limited to: .     Decision tools and prescription drugs considered including, but not limited to:  Given prescription for antiemetics and  instructions on oral hydration .    FINAL DIAGNOSIS  1. Dehydration Inactive   2. Dizziness Inactive   3. Viral URI Active          Electronically signed by: Jamil Cooley M.D., 7/25/2023

## 2023-07-26 NOTE — ED NOTES
Rounded on Pt.    Pt unable to urinate.    Performed bladder scan; 139 mL max found with multiple scans.    Updated Pt on plan of care. Pt verbalized understanding.  No acute distress at this time.  Will continue to monitor.

## 2023-07-26 NOTE — ED NOTES
Bedside report received from SLICK Hall. Pt resting comfortably in bed with steady and unlabored breathing.

## 2023-07-26 NOTE — ED NOTES
Received bedside report; assumed care of Pt.    No oxygen.  No SI precautions.  High fall risk precautions.    Introduced self to Pt; informed him that I am part of his care team.  Rounded on Pt. Updated Pt on plan of care. Pt verbalized understanding.  No acute distress at this time.  Will continue to monitor.

## 2023-07-26 NOTE — ED NOTES
Patient educated on discharge instructions, follow up appointments, prescriptions, and home care. Patient verbalized understanding. Patient wheeled to ER lobby. Patient placed in vehicle without incident.

## 2023-07-27 NOTE — DISCHARGE PLANNING
ER SW placed call to Pt's home number and left message requesting a call back as Pt's POLST form appears to have been left in the ER after visit yesterday.     Spouse called back and requested that the POLST be mailed to home address.  SW provided the POLST and information to CM  to mail to Pt.

## 2023-07-28 LAB
BACTERIA UR CULT: NORMAL
SIGNIFICANT IND 70042: NORMAL
SITE SITE: NORMAL
SOURCE SOURCE: NORMAL

## 2023-07-30 LAB
BACTERIA BLD CULT: NORMAL
BACTERIA BLD CULT: NORMAL
SIGNIFICANT IND 70042: NORMAL
SIGNIFICANT IND 70042: NORMAL
SITE SITE: NORMAL
SITE SITE: NORMAL
SOURCE SOURCE: NORMAL
SOURCE SOURCE: NORMAL

## 2023-08-08 ENCOUNTER — OFFICE VISIT (OUTPATIENT)
Dept: MEDICAL GROUP | Facility: PHYSICIAN GROUP | Age: 73
End: 2023-08-08
Payer: MEDICARE

## 2023-08-08 VITALS
DIASTOLIC BLOOD PRESSURE: 60 MMHG | HEART RATE: 67 BPM | TEMPERATURE: 97.8 F | HEIGHT: 65 IN | WEIGHT: 210 LBS | BODY MASS INDEX: 34.99 KG/M2 | RESPIRATION RATE: 16 BRPM | SYSTOLIC BLOOD PRESSURE: 102 MMHG | OXYGEN SATURATION: 95 %

## 2023-08-08 DIAGNOSIS — R53.1 GENERALIZED WEAKNESS: ICD-10-CM

## 2023-08-08 DIAGNOSIS — R82.998 DARK URINE: ICD-10-CM

## 2023-08-08 DIAGNOSIS — I95.9 HYPOTENSION, UNSPECIFIED HYPOTENSION TYPE: ICD-10-CM

## 2023-08-08 DIAGNOSIS — I63.9 CEREBROVASCULAR ACCIDENT (CVA), UNSPECIFIED MECHANISM (HCC): ICD-10-CM

## 2023-08-08 DIAGNOSIS — R53.1 WEAKNESS: ICD-10-CM

## 2023-08-08 PROCEDURE — 3074F SYST BP LT 130 MM HG: CPT | Performed by: FAMILY MEDICINE

## 2023-08-08 PROCEDURE — 99214 OFFICE O/P EST MOD 30 MIN: CPT | Performed by: FAMILY MEDICINE

## 2023-08-08 PROCEDURE — 3078F DIAST BP <80 MM HG: CPT | Performed by: FAMILY MEDICINE

## 2023-08-08 RX ORDER — LOSARTAN POTASSIUM 50 MG/1
1 TABLET ORAL DAILY
COMMUNITY
End: 2023-08-08

## 2023-08-08 RX ORDER — PREDNISONE 20 MG/1
TABLET ORAL
COMMUNITY
Start: 2023-07-25 | End: 2023-09-06

## 2023-08-08 RX ORDER — HYDRALAZINE HYDROCHLORIDE 10 MG/1
1 TABLET, FILM COATED ORAL 3 TIMES DAILY
COMMUNITY
End: 2023-08-08

## 2023-08-08 RX ORDER — AZITHROMYCIN 250 MG/1
TABLET, FILM COATED ORAL
COMMUNITY
End: 2023-09-06

## 2023-08-08 RX ORDER — PREDNISONE 20 MG/1
TABLET ORAL
COMMUNITY
End: 2023-09-06

## 2023-08-08 RX ORDER — MELOXICAM 15 MG/1
TABLET ORAL
COMMUNITY
Start: 2023-07-11 | End: 2023-09-06

## 2023-08-08 RX ORDER — LOSARTAN POTASSIUM 25 MG/1
25 TABLET ORAL DAILY
Qty: 90 TABLET | Refills: 3 | Status: SHIPPED | OUTPATIENT
Start: 2023-08-08 | End: 2023-11-13

## 2023-08-08 RX ORDER — ONDANSETRON 4 MG/1
4 TABLET, FILM COATED ORAL EVERY 4 HOURS PRN
Qty: 20 TABLET | Refills: 0 | Status: SHIPPED | OUTPATIENT
Start: 2023-08-08 | End: 2023-09-12

## 2023-08-08 RX ORDER — MELOXICAM 15 MG/1
TABLET ORAL
COMMUNITY
End: 2023-09-06

## 2023-08-08 RX ORDER — AZITHROMYCIN 250 MG/1
TABLET, FILM COATED ORAL
COMMUNITY
Start: 2023-07-25 | End: 2023-09-06

## 2023-08-08 SDOH — ECONOMIC STABILITY: TRANSPORTATION INSECURITY
IN THE PAST 12 MONTHS, HAS LACK OF RELIABLE TRANSPORTATION KEPT YOU FROM MEDICAL APPOINTMENTS, MEETINGS, WORK OR FROM GETTING THINGS NEEDED FOR DAILY LIVING?: PATIENT DECLINED

## 2023-08-08 SDOH — HEALTH STABILITY: PHYSICAL HEALTH

## 2023-08-08 SDOH — ECONOMIC STABILITY: TRANSPORTATION INSECURITY
IN THE PAST 12 MONTHS, HAS THE LACK OF TRANSPORTATION KEPT YOU FROM MEDICAL APPOINTMENTS OR FROM GETTING MEDICATIONS?: PATIENT DECLINED

## 2023-08-08 SDOH — ECONOMIC STABILITY: HOUSING INSECURITY

## 2023-08-08 SDOH — ECONOMIC STABILITY: FOOD INSECURITY: WITHIN THE PAST 12 MONTHS, THE FOOD YOU BOUGHT JUST DIDN'T LAST AND YOU DIDN'T HAVE MONEY TO GET MORE.: PATIENT DECLINED

## 2023-08-08 SDOH — ECONOMIC STABILITY: FOOD INSECURITY: WITHIN THE PAST 12 MONTHS, YOU WORRIED THAT YOUR FOOD WOULD RUN OUT BEFORE YOU GOT MONEY TO BUY MORE.: PATIENT DECLINED

## 2023-08-08 SDOH — ECONOMIC STABILITY: INCOME INSECURITY: IN THE LAST 12 MONTHS, WAS THERE A TIME WHEN YOU WERE NOT ABLE TO PAY THE MORTGAGE OR RENT ON TIME?: PATIENT REFUSED

## 2023-08-08 SDOH — ECONOMIC STABILITY: TRANSPORTATION INSECURITY
IN THE PAST 12 MONTHS, HAS LACK OF TRANSPORTATION KEPT YOU FROM MEETINGS, WORK, OR FROM GETTING THINGS NEEDED FOR DAILY LIVING?: PATIENT DECLINED

## 2023-08-08 SDOH — ECONOMIC STABILITY: INCOME INSECURITY: HOW HARD IS IT FOR YOU TO PAY FOR THE VERY BASICS LIKE FOOD, HOUSING, MEDICAL CARE, AND HEATING?: PATIENT DECLINED

## 2023-08-08 SDOH — HEALTH STABILITY: MENTAL HEALTH

## 2023-08-08 ASSESSMENT — SOCIAL DETERMINANTS OF HEALTH (SDOH)
HOW OFTEN DO YOU ATTENT MEETINGS OF THE CLUB OR ORGANIZATION YOU BELONG TO?: PATIENT DECLINED
HOW MANY DRINKS CONTAINING ALCOHOL DO YOU HAVE ON A TYPICAL DAY WHEN YOU ARE DRINKING: PATIENT DECLINED
HOW HARD IS IT FOR YOU TO PAY FOR THE VERY BASICS LIKE FOOD, HOUSING, MEDICAL CARE, AND HEATING?: PATIENT DECLINED
HOW OFTEN DO YOU ATTEND CHURCH OR RELIGIOUS SERVICES?: PATIENT DECLINED
HOW OFTEN DO YOU ATTENT MEETINGS OF THE CLUB OR ORGANIZATION YOU BELONG TO?: PATIENT DECLINED
DO YOU BELONG TO ANY CLUBS OR ORGANIZATIONS SUCH AS CHURCH GROUPS UNIONS, FRATERNAL OR ATHLETIC GROUPS, OR SCHOOL GROUPS?: PATIENT DECLINED
WITHIN THE PAST 12 MONTHS, YOU WORRIED THAT YOUR FOOD WOULD RUN OUT BEFORE YOU GOT THE MONEY TO BUY MORE: PATIENT DECLINED
IN A TYPICAL WEEK, HOW MANY TIMES DO YOU TALK ON THE PHONE WITH FAMILY, FRIENDS, OR NEIGHBORS?: PATIENT DECLINED
HOW OFTEN DO YOU ATTEND CHURCH OR RELIGIOUS SERVICES?: PATIENT DECLINED
HOW OFTEN DO YOU HAVE A DRINK CONTAINING ALCOHOL: NEVER
IN A TYPICAL WEEK, HOW MANY TIMES DO YOU TALK ON THE PHONE WITH FAMILY, FRIENDS, OR NEIGHBORS?: PATIENT DECLINED
HOW OFTEN DO YOU GET TOGETHER WITH FRIENDS OR RELATIVES?: PATIENT DECLINED
HOW OFTEN DO YOU GET TOGETHER WITH FRIENDS OR RELATIVES?: PATIENT DECLINED
HOW OFTEN DO YOU HAVE SIX OR MORE DRINKS ON ONE OCCASION: PATIENT DECLINED
DO YOU BELONG TO ANY CLUBS OR ORGANIZATIONS SUCH AS CHURCH GROUPS UNIONS, FRATERNAL OR ATHLETIC GROUPS, OR SCHOOL GROUPS?: PATIENT DECLINED

## 2023-08-08 ASSESSMENT — LIFESTYLE VARIABLES
AUDIT-C TOTAL SCORE: -1
HOW OFTEN DO YOU HAVE A DRINK CONTAINING ALCOHOL: NEVER
HOW OFTEN DO YOU HAVE SIX OR MORE DRINKS ON ONE OCCASION: PATIENT DECLINED
SKIP TO QUESTIONS 9-10: 0
HOW MANY STANDARD DRINKS CONTAINING ALCOHOL DO YOU HAVE ON A TYPICAL DAY: PATIENT DECLINED

## 2023-08-08 ASSESSMENT — FIBROSIS 4 INDEX: FIB4 SCORE: 1.65

## 2023-08-08 NOTE — PROGRESS NOTES
Subjective:   Gokul Baca is a 72 y.o. male here today for evaluation and management of:     Weakness  Since seen in ER on July 25th for dehydration vertigo, had taken an oral steroid for wheezing with an URI prior to the ER visit, since discharge from ER, he has worsening weakness.   He has no fever, cough, abdominal pain, vomiting or diarrhea.   Has nausea. He has been eating well, has not been drinking enough water, rx for zofran provided so he can stay well hydrated.   Urine has been darker, labs and normal urine culture reviewed.   He is on a zpack  bp is low today 102/60, he is on hydralazine 10 BID, losartan 50 daily, decrease to 25 mg daily. Had normal echo 2022  He has not been able to get out of bed except 1 day out of 4, he has not been sleeping or eating on a regular schedule due to feeling so tired.   Ref to Home health provided  He had a stroke 2021, he cannot walk independently, even with a walker very limited ambulation, needs a light weight WC  He is at high risk for falls.       Stroke (HCC)  Dec 2021  He had a stroke and has left sided residual weakness and in a WC             Current medicines (including changes today)  Current Outpatient Medications   Medication Sig Dispense Refill    ondansetron (ZOFRAN) 4 MG Tab tablet Take 1 Tablet by mouth every four hours as needed for Nausea/Vomiting. 20 Tablet 0    losartan (COZAAR) 25 MG Tab Take 1 Tablet by mouth every day. Hold if BP is <110/70 90 Tablet 3    Misc. Devices Misc Light weight wheel chair electric WC, collapsible 1 Each 0    hydrALAZINE (APRESOLINE) 10 MG Tab Take 1 Tablet by mouth 2 times a day. 180 Tablet 3    aspirin EC (ECOTRIN) 81 MG Tablet Delayed Response Take 81 mg by mouth every day.      omeprazole (PRILOSEC) 20 MG delayed-release capsule Take 20 mg by mouth 2 times a day.      azithromycin (ZITHROMAX) 250 MG Tab TAKE 2 TABLETS BY MOUTH ON DAY 1, AND THEN TAKE 1 TABLET BY MOUTH ONCE A DAY ON DAY 2 THROUGH DAY 5       "azithromycin (ZITHROMAX) 250 MG Tab TAKE 2 TABLETS BY MOUTH ON DAY 1, AND THEN TAKE 1 TABLET BY MOUTH ONCE A DAY ON DAY 2 THROUGH DAY 5      meloxicam (MOBIC) 15 MG tablet TAKE 1 TABLET BY MOUTH ONCE DAILY AS DIRECTED FOR 30 DAYS      meloxicam (MOBIC) 15 MG tablet TAKE 1 TABLET BY MOUTH ONCE DAILY AS DIRECTED FOR 30 DAYS      predniSONE (DELTASONE) 20 MG Tab TAKE 1 TABLET BY MOUTH TWICE DAILY FOR 5 DAYS      predniSONE (DELTASONE) 20 MG Tab TAKE 1 TABLET BY MOUTH TWICE DAILY FOR 5 DAYS      Acetaminophen (TYLENOL EXTRA STRENGTH PO) Take  by mouth.       No current facility-administered medications for this visit.     He  has a past medical history of Anemia, Atrial fibrillation (HCC), GERD (gastroesophageal reflux disease), Heart burn, High cholesterol, Hyperlipidemia, Hypertension, Iron deficiency anemia, Pneumonia, Sleep apnea, Snoring, Stroke (HCC) (12/19/2021), and UTI (urinary tract infection) (03/2022).    ROS  No chest pain, no shortness of breath, no abdominal pain       Objective:     /60   Pulse 67   Temp 36.6 °C (97.8 °F) (Temporal)   Resp 16   Ht 1.651 m (5' 5\")   Wt 95.3 kg (210 lb)   SpO2 95%  Body mass index is 34.95 kg/m².   Physical Exam:  Constitutional: Alert, sitting in WC and leaning head on exam table.   Skin: Warm, dry, good turgor, no rashes in visible areas.  Eye: Equal, round and reactive, conjunctiva clear, lids normal.  ENMT: Lips without lesions, good dentition, oropharynx clear.  Neck: Trachea midline, no masses, no thyromegaly. No cervical or supraclavicular lymphadenopathy  Respiratory: Unlabored respiratory effort, lungs clear to auscultation, no wheezes, no ronchi.  Cardiovascular: Normal S1, S2, no murmur, no edema.  Abdomen: Soft, non-tender, no masses, no hepatosplenomegaly.  Psych: Alert and oriented x3, normal affect and mood.        Assessment and Plan:   The following treatment plan was discussed    1. Dark urine  - URINE CULTURE(NEW); Future    2. Generalized " weakness  - Referral to Home Health  - Misc. Devices Misc; Light weight wheel chair electric WC, collapsible  Dispense: 1 Each; Refill: 0    3. Hypotension, unspecified hypotension type  - Referral to Home Health  - CBC WITH DIFFERENTIAL; Future  - LACTIC ACID; Future  - proBrain Natriuretic Peptide, NT; Future  - Misc. Devices Misc; Light weight wheel chair electric WC, collapsible  Dispense: 1 Each; Refill: 0  - Comp Metabolic Panel; Future    4. Weakness  - Misc. Devices Misc; Light weight wheel chair electric WC, collapsible  Dispense: 1 Each; Refill: 0    5. Cerebrovascular accident (CVA), unspecified mechanism (HCC)  - Misc. Devices Misc; Light weight wheel chair electric WC, collapsible  Dispense: 1 Each; Refill: 0    Other orders  - azithromycin (ZITHROMAX) 250 MG Tab; TAKE 2 TABLETS BY MOUTH ON DAY 1, AND THEN TAKE 1 TABLET BY MOUTH ONCE A DAY ON DAY 2 THROUGH DAY 5  - azithromycin (ZITHROMAX) 250 MG Tab; TAKE 2 TABLETS BY MOUTH ON DAY 1, AND THEN TAKE 1 TABLET BY MOUTH ONCE A DAY ON DAY 2 THROUGH DAY 5  - meloxicam (MOBIC) 15 MG tablet; TAKE 1 TABLET BY MOUTH ONCE DAILY AS DIRECTED FOR 30 DAYS  - meloxicam (MOBIC) 15 MG tablet; TAKE 1 TABLET BY MOUTH ONCE DAILY AS DIRECTED FOR 30 DAYS  - predniSONE (DELTASONE) 20 MG Tab; TAKE 1 TABLET BY MOUTH TWICE DAILY FOR 5 DAYS  - predniSONE (DELTASONE) 20 MG Tab; TAKE 1 TABLET BY MOUTH TWICE DAILY FOR 5 DAYS  - ondansetron (ZOFRAN) 4 MG Tab tablet; Take 1 Tablet by mouth every four hours as needed for Nausea/Vomiting.  Dispense: 20 Tablet; Refill: 0  - losartan (COZAAR) 25 MG Tab; Take 1 Tablet by mouth every day. Hold if BP is <110/70  Dispense: 90 Tablet; Refill: 3    ER precautions reviewed  Followup: Return for please fax WC order to DME .

## 2023-08-08 NOTE — ASSESSMENT & PLAN NOTE
Since seen in ER on July 25th for dehydration vertigo, had taken an oral steroid for wheezing with an URI prior to the ER visit, since discharge from ER, he has worsening weakness.   He has no fever, cough, abdominal pain, vomiting or diarrhea.   Has nausea. He has been eating well, has not been drinking enough water, rx for zofran provided so he can stay well hydrated.   Urine has been darker, labs and normal urine culture reviewed.   He is on a zpack  bp is low today 102/60, he is on hydralazine 10 BID, losartan 50 daily, decrease to 25 mg daily. Had normal echo 2022  He has not been able to get out of bed except 1 day out of 4, he has not been sleeping or eating on a regular schedule due to feeling so tired.   Ref to Home health provided  He had a stroke 2021, he cannot walk independently, even with a walker very limited ambulation, needs a light weight WC  He is at high risk for falls.

## 2023-08-09 ENCOUNTER — HOSPITAL ENCOUNTER (OUTPATIENT)
Facility: MEDICAL CENTER | Age: 73
End: 2023-08-09
Attending: FAMILY MEDICINE
Payer: MEDICARE

## 2023-08-09 ENCOUNTER — TELEPHONE (OUTPATIENT)
Dept: OCCUPATIONAL THERAPY | Facility: REHABILITATION | Age: 73
End: 2023-08-09
Payer: MEDICARE

## 2023-08-09 DIAGNOSIS — R82.998 DARK URINE: ICD-10-CM

## 2023-08-09 PROCEDURE — 87086 URINE CULTURE/COLONY COUNT: CPT

## 2023-08-09 NOTE — OP THERAPY DISCHARGE SUMMARY
Outpatient Occupational Therapy  DISCHARGE SUMMARY NOTE    [facility] [address]  [address]  [place] [address]  Phone:  [phone]  Fax:  [phone]    Date of Visit: 08/09/2023    Patient: Gokul Baca  YOB: 1950  MRN: [id number]     Referring Provider: Maurizio Gonzalez M.D.  [address] [place] [id number]   Referring Diagnosis: Polyneuropathy, unspecified [G62.9]           Your patient is being discharged from Occupational Therapy with the following comments:   Due to other medical issues, patient has had to cancel several visits and then via telephone today, patient stated he was still unwell and awaiting surgery.  He requested that we stop therapy for now and he will request a new referral when he feels able to return      Recommendations:  Continue with HEP for left upper extremity strength and fine motor manipulation/dexterity    LILIANA King/L    Date: 8/9/2023

## 2023-08-11 LAB
BACTERIA UR CULT: NORMAL
SIGNIFICANT IND 70042: NORMAL
SITE SITE: NORMAL
SOURCE SOURCE: NORMAL

## 2023-08-29 ENCOUNTER — TELEPHONE (OUTPATIENT)
Dept: CARDIOLOGY | Facility: MEDICAL CENTER | Age: 73
End: 2023-08-29

## 2023-08-29 NOTE — TELEPHONE ENCOUNTER
S/w patient in regards to sleep regimen. Discussed with patient he will need to reach out to PCP in regards to getting a sleep aid or some kind of sleep medicine.   Patient verbalized understanding.

## 2023-08-29 NOTE — TELEPHONE ENCOUNTER
Caller: Gokul Baca    Topic/issue: Patient just wanted to check in, he been on a really good eating program, has lost 10lbs in the last 10 days, O2 level is 91, BP is at 120/71, & the only thing he is worried out is sleep issues. He is not sleeping at all (going on 1 week) and when he goes to lay down he really cannot get into deep or REM sleep. When he does get up he does feel exhausted. He is taking some sleep medicine to see if it helps but so far no luck.    Callback Number: [phone]    Thank you,  -Georgia LEWIS

## 2023-09-01 ENCOUNTER — HOSPITAL ENCOUNTER (OUTPATIENT)
Dept: LAB | Facility: MEDICAL CENTER | Age: 73
End: 2023-09-01
Attending: FAMILY MEDICINE
Payer: MEDICARE

## 2023-09-01 DIAGNOSIS — R79.89 ABNORMAL TSH: ICD-10-CM

## 2023-09-01 DIAGNOSIS — I95.9 HYPOTENSION, UNSPECIFIED HYPOTENSION TYPE: ICD-10-CM

## 2023-09-01 LAB
BASOPHILS # BLD AUTO: 1 % (ref 0–1.8)
BASOPHILS # BLD: 0.09 K/UL (ref 0–0.12)
EOSINOPHIL # BLD AUTO: 0.22 K/UL (ref 0–0.51)
EOSINOPHIL NFR BLD: 2.4 % (ref 0–6.9)
ERYTHROCYTE [DISTWIDTH] IN BLOOD BY AUTOMATED COUNT: 44.5 FL (ref 35.9–50)
HCT VFR BLD AUTO: 49.7 % (ref 42–52)
HGB BLD-MCNC: 16.3 G/DL (ref 14–18)
IMM GRANULOCYTES # BLD AUTO: 0.02 K/UL (ref 0–0.11)
IMM GRANULOCYTES NFR BLD AUTO: 0.2 % (ref 0–0.9)
LACTATE SERPL-SCNC: 1.4 MMOL/L (ref 0.5–2)
LYMPHOCYTES # BLD AUTO: 2.48 K/UL (ref 1–4.8)
LYMPHOCYTES NFR BLD: 27 % (ref 22–41)
MCH RBC QN AUTO: 29.9 PG (ref 27–33)
MCHC RBC AUTO-ENTMCNC: 32.8 G/DL (ref 32.3–36.5)
MCV RBC AUTO: 91.2 FL (ref 81.4–97.8)
MONOCYTES # BLD AUTO: 0.76 K/UL (ref 0–0.85)
MONOCYTES NFR BLD AUTO: 8.3 % (ref 0–13.4)
NEUTROPHILS # BLD AUTO: 5.63 K/UL (ref 1.82–7.42)
NEUTROPHILS NFR BLD: 61.1 % (ref 44–72)
NRBC # BLD AUTO: 0 K/UL
NRBC BLD-RTO: 0 /100 WBC (ref 0–0.2)
PLATELET # BLD AUTO: 247 K/UL (ref 164–446)
PMV BLD AUTO: 11.7 FL (ref 9–12.9)
RBC # BLD AUTO: 5.45 M/UL (ref 4.7–6.1)
WBC # BLD AUTO: 9.2 K/UL (ref 4.8–10.8)

## 2023-09-01 PROCEDURE — 36415 COLL VENOUS BLD VENIPUNCTURE: CPT

## 2023-09-01 PROCEDURE — 80053 COMPREHEN METABOLIC PANEL: CPT

## 2023-09-01 PROCEDURE — 83605 ASSAY OF LACTIC ACID: CPT

## 2023-09-01 PROCEDURE — 85025 COMPLETE CBC W/AUTO DIFF WBC: CPT

## 2023-09-02 LAB
ALBUMIN SERPL BCP-MCNC: 4.4 G/DL (ref 3.2–4.9)
ALBUMIN/GLOB SERPL: 1.3 G/DL
ALP SERPL-CCNC: 233 U/L (ref 30–99)
ALT SERPL-CCNC: 59 U/L (ref 2–50)
ANION GAP SERPL CALC-SCNC: 16 MMOL/L (ref 7–16)
AST SERPL-CCNC: 51 U/L (ref 12–45)
BILIRUB SERPL-MCNC: 0.4 MG/DL (ref 0.1–1.5)
BUN SERPL-MCNC: 29 MG/DL (ref 8–22)
CALCIUM ALBUM COR SERPL-MCNC: 9.8 MG/DL (ref 8.5–10.5)
CALCIUM SERPL-MCNC: 10.1 MG/DL (ref 8.5–10.5)
CHLORIDE SERPL-SCNC: 99 MMOL/L (ref 96–112)
CO2 SERPL-SCNC: 19 MMOL/L (ref 20–33)
CREAT SERPL-MCNC: 0.92 MG/DL (ref 0.5–1.4)
GFR SERPLBLD CREATININE-BSD FMLA CKD-EPI: 89 ML/MIN/1.73 M 2
GLOBULIN SER CALC-MCNC: 3.4 G/DL (ref 1.9–3.5)
GLUCOSE SERPL-MCNC: 75 MG/DL (ref 65–99)
POTASSIUM SERPL-SCNC: 4.5 MMOL/L (ref 3.6–5.5)
PROT SERPL-MCNC: 7.8 G/DL (ref 6–8.2)
SODIUM SERPL-SCNC: 134 MMOL/L (ref 135–145)

## 2023-09-06 ENCOUNTER — OFFICE VISIT (OUTPATIENT)
Dept: NEUROLOGY | Facility: MEDICAL CENTER | Age: 73
End: 2023-09-06
Attending: PSYCHIATRY & NEUROLOGY
Payer: MEDICARE

## 2023-09-06 VITALS
HEIGHT: 65 IN | HEART RATE: 79 BPM | TEMPERATURE: 97.5 F | SYSTOLIC BLOOD PRESSURE: 122 MMHG | OXYGEN SATURATION: 98 % | DIASTOLIC BLOOD PRESSURE: 78 MMHG | BODY MASS INDEX: 34.95 KG/M2

## 2023-09-06 DIAGNOSIS — G62.9 NEUROPATHY: ICD-10-CM

## 2023-09-06 PROCEDURE — 3078F DIAST BP <80 MM HG: CPT | Performed by: PSYCHIATRY & NEUROLOGY

## 2023-09-06 PROCEDURE — 99214 OFFICE O/P EST MOD 30 MIN: CPT | Performed by: PSYCHIATRY & NEUROLOGY

## 2023-09-06 PROCEDURE — 3074F SYST BP LT 130 MM HG: CPT | Performed by: PSYCHIATRY & NEUROLOGY

## 2023-09-06 PROCEDURE — 99212 OFFICE O/P EST SF 10 MIN: CPT | Performed by: PSYCHIATRY & NEUROLOGY

## 2023-09-06 NOTE — PROGRESS NOTES
[facility]  Neurology Clinics  Follow Up Visit      Patient's Name: Gokul Baca  YOB: 1950  MRN: [id number]  Date of Service: 09/06/2023    Referring Provider: No referring provider defined for this encounter.    Chief Concern: Post-intracerebral hemorrhage neuropathic pain.    The patient presents for a follow up with his wife and provides verbal consent for her to be present and provide additional history as necessary.     HPI (as obtained at the time initial visit and updated as necessary): The patient is a 72 y.o., ambidextrous male, who initially presented to my clinic for evaluation of post-stroke neuropathic pain on 03/22/2023.    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 He was eventually cleared to be on anticoagulation by neurology in March 2022, but underwent ablation procedure for the treatment of his Afib. He remains on aspirin since then.     He never had seizures.    Since his hemorrhagic stroke in December 2021, he recovered reasonably well, is very active, currently works for [employer] company, and is able to perform his activities of daily living.  He is very interested in continuing his rehabilitation, and actually the main reason for his visit today is to explore further options for his poststroke neuropathic pain in left arm and leg, as well as intermittent right sided scalp pain.  In addition, there is some numbness in his left foot and left hand.  He tried Cymbalta in the past, but could not tolerate it.    He has physical therapy scheduled.  He reports that his memory is stable at this time.    Pertinent Ancillary Test Results:    MRI brain studies:   - MRI brain - none available for my review.    CT head studies:   - CT head wo contrast (12/19/2021 at [facility]):  "\"IMPRESSION: 1. A 3.4 x 3.6 cm parenchymal hemorrhage in the right basal ganglia. Mild mass effect in the right lateral ventricle. No significant midline shift.\"     - CT head wo contrast (03/21/2022 at [facility]): \"IMPRESSION: 1.  Interval resolution of intracranial hemorrhage. 2.  Encephalomalacia present in the RIGHT posterior basal ganglia and frontoparietal white matter consistent with chronic infarct. 3.  No acute intracranial hemorrhage or territorial infarct. 4.  Moderate diffuse atrophy.\"    EEG studies:  - none available for my review.     INTERIM HISTORY (09/06/2023): The patient reports doing better at this time overall.  He reports that he changed his diet significantly, more toward vegan/vegetarian diet, and is practicing intermittent fasting; he reports that this really helped his health condition overall.    He continues to have left shoulder pain, as well as neuropathic pain in the left arm, but it seems with the dietary changes these symptoms improved.  He is not on any medications at this time for this problem.  He is also following with other services for this problem, and is being evaluated for radiculopathy per the patient.    His other medical problems are followed with appropriate medical services.    Review of Systems: No recent fevers.  He lost weight in context of his dietary changes. His mood is stable and no SI/HI.     Past Medical History:  Past Medical History:   Diagnosis Date    Anemia     Atrial fibrillation (HCC)     GERD (gastroesophageal reflux disease)     Heart burn     High cholesterol     Hyperlipidemia     Hypertension     Iron deficiency anemia     Pneumonia     as a young adult     Sleep apnea     cpap     Snoring     Stroke (HCC) 12/19/2021    left sided weakness     UTI (urinary tract infection) 03/2022     Past Surgical History:  Past Surgical History:   Procedure Laterality Date    KNEE ARTHROPLASTY TOTAL Left     OTHER      sinus sx       Social History:  Social History " "    Tobacco Use    Smoking status: Never    Smokeless tobacco: Never   Vaping Use    Vaping Use: Never used   Substance Use Topics    Alcohol use: Never    Drug use: Never     Family History:  Family History   Problem Relation Age of Onset    Heart Disease Mother     Heart Disease Brother     Alcohol abuse Son      Hopkins Suicide Reassessment  New or continued thoughts about killing self?: No  Preparing to end life?: No    Allergies:  Allergies   Allergen Reactions    Codeine Unspecified     \"Become out of it\"    Ibuprofen Hives    Lactose Unspecified     Stomach upset      Current Medications:    Current Outpatient Medications:     losartan, 25 mg, Oral, DAILY, Taking    hydrALAZINE, 10 mg, Oral, BID, Taking    aspirin EC, 81 mg, Oral, DAILY, Taking    omeprazole, 20 mg, Oral, BID, Taking    azithromycin, TAKE 2 TABLETS BY MOUTH ON DAY 1, AND THEN TAKE 1 TABLET BY MOUTH ONCE A DAY ON DAY 2 THROUGH DAY 5    azithromycin, TAKE 2 TABLETS BY MOUTH ON DAY 1, AND THEN TAKE 1 TABLET BY MOUTH ONCE A DAY ON DAY 2 THROUGH DAY 5    meloxicam, TAKE 1 TABLET BY MOUTH ONCE DAILY AS DIRECTED FOR 30 DAYS    meloxicam, TAKE 1 TABLET BY MOUTH ONCE DAILY AS DIRECTED FOR 30 DAYS    predniSONE, TAKE 1 TABLET BY MOUTH TWICE DAILY FOR 5 DAYS    predniSONE, TAKE 1 TABLET BY MOUTH TWICE DAILY FOR 5 DAYS    ondansetron, 4 mg, Oral, Q4HRS PRN    Misc. Devices, Light weight wheel chair electric WC, collapsible    Physical Examination:    Ambulatory Vitals  Encounter Vitals  Temperature: 36.4 °C (97.5 °F)  Temp src: Temporal  Blood Pressure : 122/78  Pulse: 79  Pulse Oximetry: 98 %  Height: 165.1 cm (5' 5\")      Neurological Examination:  Mental Status: The patient is alert and oriented to person, place, time, and situation. Speech is fluent, with no aphasia nor dysarthria noted. Affect is normal.    Cranial Nerve Examination:  CN I: Olfaction examination is deferred.  CN II: Visual fields examination is notable for the left lower " quadrantanopsia.   CN III, IV, VI: Eye movements are normal in all directions. Pupils are reactive to direct and consensual light. There is no relative afferent pupillary defect. There is no nystagmus.  CN V: Facial sensation to light touch is intact throughout.   CN VII: Discrete left lower facial weakness.  CN IX, X: Soft palate elevates symmetrically.  CN XI: Symmetrical shoulder shrug exam.  CN XII: Midline tongue protrusion and moves symmetrically to each side.     Motor Examination:  Notable for mild left arm and leg weakness, with some increased tone on the left, as well as left pronator drift.     Muscle Stretch Reflexes Examination:  Brisk MSR, L>R.    Sensory Examination:  Notable for decreased sensation and sensitivity to touch on the left.     Coordination:  Overall unremarkable finger-nose-finger testing.     Stance/gait:  Deferred (in wheelchair).    ASSESSMENT AND PLAN:  1. Post-stroke neuropathic pain.  Clinical presentation is consistent with post-stroke neuropathic pain, especially in context of sensitivity to touch.  On the other hand, underlying neuropathy cannot be fully ruled out.  He is doing better with OT and recent dietary changes. Previously ordered work up is still needed.  - METHYLMALONIC ACID, SERUM; Future  - VITAMIN B12; Future  - VITAMIN D,25 HYDROXY (DEFICIENCY); Future  - FOLATE; Future  - SERUM PROTEIN ELECTROPHORESIS; Future  - EMG/NCS: Referral to Neurodiagnostics (EEG,EP,EMG/NCS/DBS)    - continue PT/OT as available.   - the patient will follow up with ortho for his right shoulder injury.  - we will consider further steps once the above work up is back.    Follow-up for his other medical problems with his primary care physician, specifically in regards to abnormalities noted on CMP pertaining to liver function tests/BUN.    Follow up in 3 months.     Maurizio Gonzalez MD  Neurology Attending, Epilepsy Program  [facility]

## 2023-09-08 ENCOUNTER — HOSPITAL ENCOUNTER (OUTPATIENT)
Dept: LAB | Facility: MEDICAL CENTER | Age: 73
End: 2023-09-08
Attending: PSYCHIATRY & NEUROLOGY
Payer: MEDICARE

## 2023-09-08 DIAGNOSIS — G62.9 NEUROPATHY: ICD-10-CM

## 2023-09-08 DIAGNOSIS — R79.89 ABNORMAL CBC: ICD-10-CM

## 2023-09-08 DIAGNOSIS — R74.8 ELEVATED ALKALINE PHOSPHATASE LEVEL: ICD-10-CM

## 2023-09-08 DIAGNOSIS — E78.5 HYPERLIPIDEMIA, UNSPECIFIED HYPERLIPIDEMIA TYPE: ICD-10-CM

## 2023-09-08 LAB
ALBUMIN SERPL BCP-MCNC: 4.4 G/DL (ref 3.2–4.9)
ALBUMIN/GLOB SERPL: 1.3 G/DL
ALP SERPL-CCNC: 230 U/L (ref 30–99)
ALT SERPL-CCNC: 63 U/L (ref 2–50)
ANION GAP SERPL CALC-SCNC: 15 MMOL/L (ref 7–16)
AST SERPL-CCNC: 49 U/L (ref 12–45)
BASOPHILS # BLD AUTO: 0 % (ref 0–1.8)
BASOPHILS # BLD: 0 K/UL (ref 0–0.12)
BILIRUB SERPL-MCNC: 0.4 MG/DL (ref 0.1–1.5)
BUN SERPL-MCNC: 24 MG/DL (ref 8–22)
BURR CELLS BLD QL SMEAR: NORMAL
CALCIUM ALBUM COR SERPL-MCNC: 9.2 MG/DL (ref 8.5–10.5)
CALCIUM SERPL-MCNC: 9.5 MG/DL (ref 8.5–10.5)
CHLORIDE SERPL-SCNC: 103 MMOL/L (ref 96–112)
CHOLEST SERPL-MCNC: 237 MG/DL (ref 100–199)
CO2 SERPL-SCNC: 21 MMOL/L (ref 20–33)
CREAT SERPL-MCNC: 0.94 MG/DL (ref 0.5–1.4)
EOSINOPHIL # BLD AUTO: 0.7 K/UL (ref 0–0.51)
EOSINOPHIL NFR BLD: 13.8 % (ref 0–6.9)
ERYTHROCYTE [DISTWIDTH] IN BLOOD BY AUTOMATED COUNT: 45.4 FL (ref 35.9–50)
FASTING STATUS PATIENT QL REPORTED: NORMAL
GFR SERPLBLD CREATININE-BSD FMLA CKD-EPI: 87 ML/MIN/1.73 M 2
GLOBULIN SER CALC-MCNC: 3.4 G/DL (ref 1.9–3.5)
GLUCOSE SERPL-MCNC: 83 MG/DL (ref 65–99)
HCT VFR BLD AUTO: 48.5 % (ref 42–52)
HDLC SERPL-MCNC: 53 MG/DL
HGB BLD-MCNC: 15.8 G/DL (ref 14–18)
LDLC SERPL CALC-MCNC: 163 MG/DL
LYMPHOCYTES # BLD AUTO: 1.63 K/UL (ref 1–4.8)
LYMPHOCYTES NFR BLD: 31.9 % (ref 22–41)
MANUAL DIFF BLD: NORMAL
MCH RBC QN AUTO: 29.4 PG (ref 27–33)
MCHC RBC AUTO-ENTMCNC: 32.6 G/DL (ref 32.3–36.5)
MCV RBC AUTO: 90.3 FL (ref 81.4–97.8)
MONOCYTES # BLD AUTO: 0.57 K/UL (ref 0–0.85)
MONOCYTES NFR BLD AUTO: 11.2 % (ref 0–13.4)
MORPHOLOGY BLD-IMP: NORMAL
NEUTROPHILS # BLD AUTO: 2.2 K/UL (ref 1.82–7.42)
NEUTROPHILS NFR BLD: 43.1 % (ref 44–72)
NRBC # BLD AUTO: 0 K/UL
NRBC BLD-RTO: 0 /100 WBC (ref 0–0.2)
PLATELET # BLD AUTO: 180 K/UL (ref 164–446)
PLATELET BLD QL SMEAR: NORMAL
PMV BLD AUTO: 12 FL (ref 9–12.9)
POIKILOCYTOSIS BLD QL SMEAR: NORMAL
POTASSIUM SERPL-SCNC: 4.4 MMOL/L (ref 3.6–5.5)
PROT SERPL-MCNC: 7.8 G/DL (ref 6–8.2)
RBC # BLD AUTO: 5.37 M/UL (ref 4.7–6.1)
RBC BLD AUTO: PRESENT
SODIUM SERPL-SCNC: 139 MMOL/L (ref 135–145)
TRIGL SERPL-MCNC: 103 MG/DL (ref 0–149)
WBC # BLD AUTO: 5.1 K/UL (ref 4.8–10.8)

## 2023-09-08 PROCEDURE — 36415 COLL VENOUS BLD VENIPUNCTURE: CPT

## 2023-09-08 PROCEDURE — 82306 VITAMIN D 25 HYDROXY: CPT | Mod: GA

## 2023-09-08 PROCEDURE — 82746 ASSAY OF FOLIC ACID SERUM: CPT

## 2023-09-08 PROCEDURE — 80061 LIPID PANEL: CPT

## 2023-09-08 PROCEDURE — 83921 ORGANIC ACID SINGLE QUANT: CPT

## 2023-09-08 PROCEDURE — 82607 VITAMIN B-12: CPT

## 2023-09-08 PROCEDURE — 80053 COMPREHEN METABOLIC PANEL: CPT

## 2023-09-08 PROCEDURE — 85025 COMPLETE CBC W/AUTO DIFF WBC: CPT

## 2023-09-08 PROCEDURE — 84165 PROTEIN E-PHORESIS SERUM: CPT

## 2023-09-08 PROCEDURE — 85007 BL SMEAR W/DIFF WBC COUNT: CPT

## 2023-09-08 PROCEDURE — 84155 ASSAY OF PROTEIN SERUM: CPT

## 2023-09-09 LAB
25(OH)D3 SERPL-MCNC: 40 NG/ML (ref 30–100)
FOLATE SERPL-MCNC: 6.1 NG/ML
VIT B12 SERPL-MCNC: 1255 PG/ML (ref 211–911)

## 2023-09-11 LAB
ALBUMIN SERPL ELPH-MCNC: 4.15 G/DL (ref 3.75–5.01)
ALPHA1 GLOB SERPL ELPH-MCNC: 0.3 G/DL (ref 0.19–0.46)
ALPHA2 GLOB SERPL ELPH-MCNC: 0.98 G/DL (ref 0.48–1.05)
B-GLOBULIN SERPL ELPH-MCNC: 0.83 G/DL (ref 0.48–1.1)
EER PROT ELECT SER Q1092: NORMAL
GAMMA GLOB SERPL ELPH-MCNC: 1.04 G/DL (ref 0.62–1.51)
INTERPRETATION SERPL IFE-IMP: NORMAL
METHYLMALONATE SERPL-SCNC: <0.1 UMOL/L (ref 0–0.4)
MONOCLONAL PROTEIN NL11656: NORMAL G/DL
PROT SERPL-MCNC: 7.3 G/DL (ref 6.3–8.2)

## 2023-09-12 ENCOUNTER — OFFICE VISIT (OUTPATIENT)
Dept: MEDICAL GROUP | Facility: PHYSICIAN GROUP | Age: 73
End: 2023-09-12
Payer: MEDICARE

## 2023-09-12 VITALS
OXYGEN SATURATION: 97 % | BODY MASS INDEX: 31.99 KG/M2 | HEART RATE: 70 BPM | TEMPERATURE: 97.3 F | RESPIRATION RATE: 18 BRPM | WEIGHT: 192 LBS | DIASTOLIC BLOOD PRESSURE: 70 MMHG | SYSTOLIC BLOOD PRESSURE: 118 MMHG | HEIGHT: 65 IN

## 2023-09-12 DIAGNOSIS — Z66 DNR (DO NOT RESUSCITATE): ICD-10-CM

## 2023-09-12 DIAGNOSIS — Z78.9 DNI (DO NOT INTUBATE): ICD-10-CM

## 2023-09-12 DIAGNOSIS — I61.9 HEMORRHAGIC STROKE (HCC): ICD-10-CM

## 2023-09-12 PROCEDURE — 3078F DIAST BP <80 MM HG: CPT | Performed by: FAMILY MEDICINE

## 2023-09-12 PROCEDURE — 99214 OFFICE O/P EST MOD 30 MIN: CPT | Performed by: FAMILY MEDICINE

## 2023-09-12 PROCEDURE — 3074F SYST BP LT 130 MM HG: CPT | Performed by: FAMILY MEDICINE

## 2023-09-12 ASSESSMENT — FIBROSIS 4 INDEX: FIB4 SCORE: 2.4

## 2023-09-12 NOTE — PROGRESS NOTES
"Subjective:   Gokul Baca is a 72 y.o. male here today for evaluation and management of:     DNR (do not resuscitate)  polst form filled  DNR/DNI  Limited hospital care, prefers to avoid ICU, limited trial IV fluids  No artificial feeding.     DNI (do not intubate)  polst form filled  DNR/DNI  Limited hospital care, prefers to avoid ICU, limited trial IV fluids  No artificial feeding.     Hemorrhagic stroke (HCC)  Weakness with walking, uses a walker  Left arm weakness, b/l leg weakness  Working with PT  DMV forms filled for dmv placard x 2 cars.              Current medicines (including changes today)  Current Outpatient Medications   Medication Sig Dispense Refill    losartan (COZAAR) 25 MG Tab Take 1 Tablet by mouth every day. Hold if BP is <110/70 90 Tablet 3    hydrALAZINE (APRESOLINE) 10 MG Tab Take 1 Tablet by mouth 2 times a day. 180 Tablet 3    aspirin EC (ECOTRIN) 81 MG Tablet Delayed Response Take 81 mg by mouth every day.      omeprazole (PRILOSEC) 20 MG delayed-release capsule Take 20 mg by mouth 2 times a day.      ondansetron (ZOFRAN) 4 MG Tab tablet Take 1 Tablet by mouth every four hours as needed for Nausea/Vomiting. 20 Tablet 0    Misc. Devices Misc Light weight wheel chair electric WC, collapsible 1 Each 0     No current facility-administered medications for this visit.     He  has a past medical history of Anemia, Atrial fibrillation (HCC), GERD (gastroesophageal reflux disease), Heart burn, High cholesterol, Hyperlipidemia, Hypertension, Iron deficiency anemia, Pneumonia, Sleep apnea, Snoring, Stroke (HCC) (12/19/2021), and UTI (urinary tract infection) (03/2022).    ROS  No chest pain, no shortness of breath, no abdominal pain       Objective:     /70   Pulse 70   Temp 36.3 °C (97.3 °F) (Temporal)   Resp 18   Ht 1.651 m (5' 5\")   Wt 87.1 kg (192 lb)   SpO2 97%  Body mass index is 31.95 kg/m².   Physical Exam:  Constitutional: Alert, no distress, well-groomed.  Skin: No rashes " in visible areas.  Eye: Round. Conjunctiva clear, lids normal. No icterus.   ENMT: Lips pink without lesions, good dentition, moist mucous membranes. Phonation normal.  Neck: No masses, no thyromegaly. Moves freely without pain.  Respiratory: Unlabored respiratory effort, no cough or audible wheeze  Psych: Alert and oriented x3, normal affect and mood.          Assessment and Plan:   The following treatment plan was discussed    1. DNR (do not resuscitate)    2. DNI (do not intubate)    3. Hemorrhagic stroke (HCC)      Followup: No follow-ups on file.

## 2023-09-12 NOTE — ASSESSMENT & PLAN NOTE
polst form filled  DNR/DNI  Limited hospital care, prefers to avoid ICU, limited trial IV fluids  No artificial feeding.

## 2023-09-12 NOTE — ASSESSMENT & PLAN NOTE
Weakness with walking, uses a walker  Left arm weakness, b/l leg weakness  Working with PT  DMV forms filled for dmv placard x 2 cars.

## 2023-09-20 NOTE — PROGRESS NOTES
"Rehab Progress Note     Date of Service: 12/28/2021  Chief Complaint: Follow-up stroke    Interval Events (Subjective)  Yesterday patient was sent to the ED for concern of altered mental status, decresaed arousal, and then a controlled fall to the ground without hitting is head. Patient was evaluated in the ED, in the ED patient had resolution of his symptoms. No imaging completed in the ED. Patient mostly concerned about the poor temperature control at rehab, while in ED patient was requesting to go home. ED staff supported return to rehab in order to make gains to DC home safetly after rehab.   Today patient reports he \"feels like a new man.\" States that he feels much better than yesterday. Reports he slept much better, the temperature was better controlled in his room, and appreciated that he had more blankets. Reports he is looking forward to more therapy today.     ROS: No changes to bowel, bladder, pain, mood, or sleep.       Objective:  VITAL SIGNS: /70   Pulse 61   Temp 36.5 °C (97.7 °F) (Temporal)   Resp 18   Ht 1.664 m (5' 5.51\")   Wt 116 kg (255 lb 11.7 oz)   SpO2 99%   BMI 41.89 kg/m²   Gen: awake and alert with SLP in T dine   Neuro: notable for left neglect, left hemiparesis, left visual field cut   Pulm: no witnessed coughing during meal       Recent Results (from the past 72 hour(s))   CBC WITHOUT DIFFERENTIAL    Collection Time: 12/26/21  4:50 AM   Result Value Ref Range    WBC 13.5 (H) 4.8 - 10.8 K/uL    RBC 3.99 (L) 4.70 - 6.10 M/uL    Hemoglobin 11.8 (L) 14.0 - 18.0 g/dL    Hematocrit 35.5 (L) 42.0 - 52.0 %    MCV 89.0 81.4 - 97.8 fL    MCH 29.6 27.0 - 33.0 pg    MCHC 33.2 (L) 33.7 - 35.3 g/dL    RDW 45.0 35.9 - 50.0 fL    Platelet Count 113 (L) 164 - 446 K/uL    MPV 10.6 9.0 - 12.9 fL   Basic Metabolic Panel    Collection Time: 12/26/21  4:50 AM   Result Value Ref Range    Sodium 138 135 - 145 mmol/L    Potassium 3.3 (L) 3.6 - 5.5 mmol/L    Chloride 102 96 - 112 mmol/L    Co2 23 20 " How Severe Are Your Spot(S)?: mild - 33 mmol/L    Glucose 80 65 - 99 mg/dL    Bun 13 8 - 22 mg/dL    Creatinine 0.71 0.50 - 1.40 mg/dL    Calcium 8.2 (L) 8.5 - 10.5 mg/dL    Anion Gap 13.0 7.0 - 16.0   PHOSPHORUS    Collection Time: 12/26/21  4:50 AM   Result Value Ref Range    Phosphorus 2.9 2.5 - 4.5 mg/dL   proBrain Natriuretic Peptide, NT    Collection Time: 12/26/21  4:50 AM   Result Value Ref Range    NT-proBNP 208 (H) 0 - 125 pg/mL   ESTIMATED GFR    Collection Time: 12/26/21  4:50 AM   Result Value Ref Range    GFR If African American >60 >60 mL/min/1.73 m 2    GFR If Non African American >60 >60 mL/min/1.73 m 2   CBC WITHOUT DIFFERENTIAL    Collection Time: 12/27/21  6:02 AM   Result Value Ref Range    WBC 11.3 (H) 4.8 - 10.8 K/uL    RBC 3.02 (L) 4.70 - 6.10 M/uL    Hemoglobin 9.2 (L) 14.0 - 18.0 g/dL    Hematocrit 27.0 (L) 42.0 - 52.0 %    MCV 89.4 81.4 - 97.8 fL    MCH 30.5 27.0 - 33.0 pg    MCHC 34.1 33.7 - 35.3 g/dL    RDW 45.7 35.9 - 50.0 fL    Platelet Count 237 164 - 446 K/uL    MPV 9.9 9.0 - 12.9 fL   Basic Metabolic Panel    Collection Time: 12/27/21  6:02 AM   Result Value Ref Range    Sodium 139 135 - 145 mmol/L    Potassium 3.8 3.6 - 5.5 mmol/L    Chloride 105 96 - 112 mmol/L    Co2 23 20 - 33 mmol/L    Glucose 80 65 - 99 mg/dL    Bun 13 8 - 22 mg/dL    Creatinine 0.75 0.50 - 1.40 mg/dL    Calcium 8.1 (L) 8.5 - 10.5 mg/dL    Anion Gap 11.0 7.0 - 16.0   MAGNESIUM    Collection Time: 12/27/21  6:02 AM   Result Value Ref Range    Magnesium 1.8 1.5 - 2.5 mg/dL   PHOSPHORUS    Collection Time: 12/27/21  6:02 AM   Result Value Ref Range    Phosphorus 3.0 2.5 - 4.5 mg/dL   ESTIMATED GFR    Collection Time: 12/27/21  6:02 AM   Result Value Ref Range    GFR If African American >60 >60 mL/min/1.73 m 2    GFR If Non African American >60 >60 mL/min/1.73 m 2       Current Facility-Administered Medications   Medication Frequency   • modafinil (PROVIGIL) tablet 100 mg QAM   • hydrALAZINE (APRESOLINE) tablet 50 mg Q8HRS   • omeprazole (PRILOSEC)  What Type Of Note Output Would You Prefer (Optional)?: Bullet Format What Is The Reason For Today's Visit?: Full Body Skin Examination capsule 20 mg BID   • [MAR Hold - Suspended Admission] sodium chloride (SALT) tablet 3 g TID WITH MEALS   • melatonin tablet 3 mg QHS PRN   • normal saline (PF) 20 mL Q12HRS   • piperacillin-tazobactam (ZOSYN) 3.375 g in  mL IVPB Q6HRS   • Respiratory Therapy Consult Continuous RT   • Pharmacy Consult Request ...Pain Management Review 1 Each PHARMACY TO DOSE   • acetaminophen (Tylenol) tablet 650 mg Q4HRS PRN   • docusate sodium (ENEMEEZ) enema 283 mg QDAY PRN   • sodium phosphate (Fleet) enema 133 mL QDAY PRN   • artificial tears ophthalmic solution 1 Drop PRN   • benzocaine-menthol (CEPACOL) lozenge 1 Lozenge Q2HRS PRN   • mag hydrox-al hydrox-simeth (MAALOX PLUS ES or MYLANTA DS) suspension 20 mL Q2HRS PRN   • ondansetron (ZOFRAN ODT) dispertab 4 mg 4X/DAY PRN    Or   • ondansetron (ZOFRAN) syringe/vial injection 4 mg 4X/DAY PRN   • traZODone (DESYREL) tablet 50 mg QHS PRN   • sodium chloride (OCEAN) 0.65 % nasal spray 2 Spray PRN   • midazolam (VERSED) 5 mg/mL (1 mL vial) PRN   • senna-docusate (PERICOLACE or SENOKOT S) 8.6-50 MG per tablet 2 Tablet BID    And   • polyethylene glycol/lytes (MIRALAX) PACKET 1 Packet QDAY PRN    And   • magnesium hydroxide (MILK OF MAGNESIA) suspension 30 mL QDAY PRN    And   • bisacodyl (DULCOLAX) suppository 10 mg QDAY PRN   • amLODIPine (NORVASC) tablet 10 mg Q DAY   • losartan (COZAAR) tablet 100 mg DAILY   • hydrALAZINE (APRESOLINE) tablet 10 mg TID PRN   • cloNIDine (CATAPRES) tablet 0.1 mg TID PRN   • acetaminophen (TYLENOL) tablet 1,000 mg TID   • butalbital/apap/caffeine -40 mg (Fioricet) per tablet 1 Tablet Q6HRS PRN   • atorvastatin (LIPITOR) tablet 40 mg Q EVENING       Orders Placed This Encounter   Procedures   • Diet Order Diet: Level 6 - Soft and Bite Sized (No Straw; t-dine); Liquid level: Level 0 - Thin; Tray Modifications (optional): No Straws     Standing Status:   Standing     Number of Occurrences:   1     Order Specific Question:   Diet:      Answer:   Level 6 - Soft and Bite Sized [23]     Comments:   No Straw; t-dine     Order Specific Question:   Liquid level     Answer:   Level 0 - Thin     Order Specific Question:   Tray Modifications (optional)     Answer:   No Straws       Assessment:  Active Hospital Problems    Diagnosis    • *Hemorrhagic stroke (HCC)    • Fever    • Left-sided neglect    • Impaired mobility and ADLs    • Right ear pain    • Morbid obesity with BMI of 40.0-44.9, adult (HCC)    • GERD (gastroesophageal reflux disease)    • AF (atrial fibrillation) (HCC)    • Hyperlipemia    • Hypertension      This patient is a 71 y.o. male admitted for acute inpatient rehabilitation with Hemorrhagic stroke (HCC).    Therapy notes reviewed. Patient is a max to total assist currently.     We will have his first weekly conference on Weds to pick a discharge date.    Patient had assisted fall today with PTA.    Medical Decision Making and Plan:    Large right basal ganglia hemorrhagic stroke  Left hemiparesis  Left visual field cut  Left neglect  Cognitive impairment  Continue full rehab program  PT/OT/SLP, 1 hr each discipline, 5 days per week    Statin    Per neurology okay to restart Xarelto in 2 weeks, so we will check a CT scan first, patient and wife not interested in restarting    Outpatient follow-up with stroke bridge, Dr. Figueroa, referrals made    Sedation  Discontinued melatonin  Discontinued gabapentin  Discontinued PRN hydralazine  Start Provigil  12/27: Sent to ED to check Stat CT, resolution of AMS in ED, CT head not completed     Headache, resolved   Likely from brain swelling  Continue scheduled Tylenol, dose increased  As needed Fioricet, last use never  Increase salt tabs to 3 mg TID to keep sodium above 140     Hypertension  Amlodipine 10 mg  Losartan 100 mg  Increased dose of hydralazine 25 mg every 8 to 50 mg every 8, increased again to 75 mg every 8 hours  As needed clonidine for systolic blood pressures over 160  Appreciate  What Is The Reason For Today's Visit? (Being Monitored For X): concerning skin lesions on an annual basis hospitalist assistance     Paroxysmal atrial fibrillation  Xarelto currently on hold for 2 weeks per neurology  May benefit from beta-blocker, per review of outpatient notes has been on metoprolol and amiodarone in the past  Appreciate hospitalist assistance     Edema  Previously on IV Lasix  Checked Doppler ultrasound of the left upper extremity and left lower extremity, lower extremities negative for DVT    Left arm thrombophlebitis  Right arm superficial thrombus  Initially started on Keflex, now switched to IV antibiotics, stop date is 1/1   No need for full anti-coagulation, which also would be contra-indicated    Fever, resolved  Checked UA - positiive and chest x-ray - megative  Urine culture pending  Checked blood cultures - NTG  Was started on Keflex for thrombophlebitis, now switched to IV antibiotics  Appreciate hospitalist assistance     Anemia  Mild  Monitor with weekly labs     GERD  Omeprazole     Insomnia, continues  Melatonin and hydralazine discontinued due to sedation     Morbid obesity  Due to excess calories  BMI 41.89  Outpatient nutritional counseling     Right ear pain  Otitis externa  Evaluate with otoscope  On IV antibiotics, continues to need IV access   Appreciate hospitalist assistance    Bowel program  Continue bowel medications  Last BM 12/26    Bladder program  Check PVRs - 33  Bladder scan for no voids  ICP for over 400 cc  Scheduled toileting    DVT prophylaxis  Contraindicated given hemorrhage.   Compression stockings on in am, off in pm.  Increase mobility. Monitor for swelling.    Total time:  27 minutes.  I spent greater than 50% of the time for patient care, counseling, and coordination on this date, including patient face-to face time, unit/floor time with review of records/pertinent lab data and studies, as well as discussing diagnostic evaluation/work up, planned therapeutic interventions, and future disposition of care, as per the interval events/subjective and the  assessment and plan as noted above.    I have performed a physical exam, reviewed and updated ROS, as well as the assessment and plan today 12/28/2021. In review of note from 12/27/2021 there are no new changes except as documented above.      Marii Cummings D.O.  Physical Medicine and Rehabilitation

## 2023-09-29 ENCOUNTER — NON-PROVIDER VISIT (OUTPATIENT)
Dept: NEUROLOGY | Facility: MEDICAL CENTER | Age: 73
End: 2023-09-29
Attending: SPECIALIST
Payer: MEDICARE

## 2023-09-29 DIAGNOSIS — R20.0 NUMBNESS: ICD-10-CM

## 2023-09-29 DIAGNOSIS — G62.9 NEUROPATHY: ICD-10-CM

## 2023-09-29 PROCEDURE — 95909 NRV CNDJ TST 5-6 STUDIES: CPT | Performed by: PSYCHIATRY & NEUROLOGY

## 2023-09-29 PROCEDURE — 95885 MUSC TST DONE W/NERV TST LIM: CPT | Performed by: PSYCHIATRY & NEUROLOGY

## 2023-09-29 PROCEDURE — 95886 MUSC TEST DONE W/N TEST COMP: CPT | Performed by: PSYCHIATRY & NEUROLOGY

## 2023-09-29 PROCEDURE — 95885 MUSC TST DONE W/NERV TST LIM: CPT | Mod: 26 | Performed by: PSYCHIATRY & NEUROLOGY

## 2023-09-29 PROCEDURE — 95909 NRV CNDJ TST 5-6 STUDIES: CPT | Mod: 26 | Performed by: PSYCHIATRY & NEUROLOGY

## 2023-09-29 NOTE — PROCEDURES
"NERVE CONDUCTION STUDIES AND ELECTROMYOGRAPHY REPORT  [facility]  09/29/23          IMPRESSION:  This is a minimally abnormal and technically limited electrodiagnostic study.      There is evidence consistent with a very mild left median neuropathy at the wrist (carpal tunnel syndrome).  Otherwise, no evidence of a large fiber peripheral neuropathy in the left lower extremity.  Needle exam of the left tibialis anterior without evidence of myopathy or denervation.  Patient cannot tolerate further testing.      Enid De La Rosa MD  Neurology - Neurophysiology              REASON FOR REFERRAL:  Mr. Gokul Beltran Baca 72 y.o. referred by Dr. Maurizio Gonzalez for evaluation of sensory disturbance.  Patient has had neuropathic pain in the left upper and lower extremity with numbness in the left foot and hand.    Height: 5'5\"  Weight: 180 lbs        ELECTRODIAGNOSTIC EXAMINATION:  Nerve conduction studies (NCS) and electromyography (EMG) are utilized to evaluate direct or indirect damage to the peripheral nervous system. NCS are performed to measure the nerve(s) response(s) to electrostimulation across a given nerve segment. EMG evaluates the passive and active electrical activity of the muscle(s) in question.  Muscles are innervated by specific peripheral nerves and roots. Often times, several nerves the muscle to be examined in order to determine the presence or absence of the disease process. Furthermore, nerves and muscles may need to be tested in a [id number] comparison, as well as in additional extremities, as this may be crucial in characterizing the extent of the disease process, which may be diffuse or isolated and of varying degree of severity. The extent of the neurodiagnostic exam is justified as it may help arrive to a proper diagnosis, which ultimately may contribute to better management of the patient. Therefore, the nerves to muscles examined during the study were medically " necessary.      NERVE CONDUCTION STUDY SUMMARY:  Selected nerves of the left upper and lower extremity are studied.    Abnormal left median/ulnar palmar comparison due to relatively prolonged median latency.  Normal left median motor response -patient could not tolerate proximal stimulation.  Normal left sural sensory response.  Normal left common peroneal motor response - slowing likely secondary to temperature.  Normal left tibial motor response.      NEEDLE EMG SUMMARY:  Concentric needle study of selected left lower extremity muscles is performed.     Insertion activity is normal in all muscles sampled.   Patient could not tolerate needle study of the left gastrocnemius.  There are normal morphology (amplitude/duration) motor unit action potentials firing with normal recruitment in the left tibialis anterior.      PATIENT DATA TABLES    Nerve Conduction Studies     Stim Site NR Onset (ms) Norm Onset (ms) O-P Amp (µV) Norm O-P Amp Site1 Site2 Delta-P (ms) Dist (cm) Alex (m/s) Norm Alex (m/s)   Left Sural Anti Sensory (Lat Mall)   Calf    3.1 <4.6 14.1 >3 Calf Lat Mall 3.9 14.0 *36 >40        Stim Site NR Onset (ms) Norm Onset (ms) O-P Amp (mV) Norm O-P Amp Site1 Site2 Delta-0 (ms) Dist (cm) Alex (m/s) Norm Alex (m/s)   Left Median Motor (Abd Poll Brev)   Wrist    3.8 <4 6.6 >5 Elbow Wrist 10.0 0.0  >50   Left Peroneal EDB Motor (Ext Dig Brev)   Ankle    4.8 <6 3.3 >2.5 B Fib Ankle 8.7 30.0 *34 >40   B Fib    13.5  2.9  Poplt B Fib 1.3 10.0 77    Poplt    14.8  2.8          Left Tibial Motor (Abd Villeda Brev)   Ankle    5.6 <6 5.0 >4 Knee Ankle 6.2 37.0 60 >40   Knee    11.8  5.0               Stim Site NR Peak (ms) Norm Peak (ms) P-T Amp (µV) Site1 Site2 Delta-P (ms) Norm Delta (ms)   Left Median/Ulnar Palm Comparison (Wrist - 8cm)   Median Palm    2.3 <2.3 29.5 Median Palm Ulnar Palm *0.4 <0.3   Ulnar Palm    1.9 <2.3 11.2                      Electromyography     Side Muscle Nerve Root Ins Act Fibs Psw Amp Dur Poly  Recrt Int Pat Comment   Left AntTibialis Dp Br Fibular L4-5 Nml Nml Nml Nml Nml 0 Nml Nml    Left Gastroc Tibial S1-2 Nml Nml Nml

## 2023-10-16 ENCOUNTER — APPOINTMENT (OUTPATIENT)
Dept: RADIOLOGY | Facility: MEDICAL CENTER | Age: 73
End: 2023-10-16
Attending: EMERGENCY MEDICINE
Payer: MEDICARE

## 2023-10-16 ENCOUNTER — APPOINTMENT (OUTPATIENT)
Dept: RADIOLOGY | Facility: MEDICAL CENTER | Age: 73
End: 2023-10-16
Payer: MEDICARE

## 2023-10-16 ENCOUNTER — HOSPITAL ENCOUNTER (OUTPATIENT)
Facility: MEDICAL CENTER | Age: 73
End: 2023-10-18
Attending: EMERGENCY MEDICINE | Admitting: INTERNAL MEDICINE
Payer: MEDICARE

## 2023-10-16 DIAGNOSIS — Z95.818 PRESENCE OF WATCHMAN LEFT ATRIAL APPENDAGE CLOSURE DEVICE: ICD-10-CM

## 2023-10-16 DIAGNOSIS — R25.2 SPASTICITY: ICD-10-CM

## 2023-10-16 DIAGNOSIS — E66.9 OBESITY (BMI 30-39.9): ICD-10-CM

## 2023-10-16 DIAGNOSIS — M25.512 ACUTE PAIN OF LEFT SHOULDER: ICD-10-CM

## 2023-10-16 DIAGNOSIS — J06.9 UPPER RESPIRATORY TRACT INFECTION, UNSPECIFIED TYPE: ICD-10-CM

## 2023-10-16 DIAGNOSIS — D69.6 THROMBOCYTOPENIA (HCC): ICD-10-CM

## 2023-10-16 DIAGNOSIS — R53.1 WEAKNESS: Chronic | ICD-10-CM

## 2023-10-16 DIAGNOSIS — E78.5 HYPERLIPIDEMIA, UNSPECIFIED HYPERLIPIDEMIA TYPE: ICD-10-CM

## 2023-10-16 DIAGNOSIS — I63.9 CEREBROVASCULAR ACCIDENT (CVA), UNSPECIFIED MECHANISM (HCC): ICD-10-CM

## 2023-10-16 DIAGNOSIS — D50.8 OTHER IRON DEFICIENCY ANEMIA: ICD-10-CM

## 2023-10-16 DIAGNOSIS — J34.89 NASAL DRYNESS: ICD-10-CM

## 2023-10-16 DIAGNOSIS — Z78.9 IMPAIRED MOBILITY AND ADLS: ICD-10-CM

## 2023-10-16 DIAGNOSIS — R39.11 URINARY HESITANCY: ICD-10-CM

## 2023-10-16 DIAGNOSIS — I10 PRIMARY HYPERTENSION: ICD-10-CM

## 2023-10-16 DIAGNOSIS — D64.9 ANEMIA, UNSPECIFIED TYPE: ICD-10-CM

## 2023-10-16 DIAGNOSIS — G47.33 OSA (OBSTRUCTIVE SLEEP APNEA): ICD-10-CM

## 2023-10-16 DIAGNOSIS — R74.8 ELEVATED ALKALINE PHOSPHATASE LEVEL: ICD-10-CM

## 2023-10-16 DIAGNOSIS — H25.13 AGE-RELATED NUCLEAR CATARACT OF BOTH EYES: ICD-10-CM

## 2023-10-16 DIAGNOSIS — H60.501 ACUTE OTITIS EXTERNA OF RIGHT EAR, UNSPECIFIED TYPE: ICD-10-CM

## 2023-10-16 DIAGNOSIS — S06.0X0A CONCUSSION WITHOUT LOSS OF CONSCIOUSNESS, INITIAL ENCOUNTER: ICD-10-CM

## 2023-10-16 DIAGNOSIS — M25.552 HIP PAIN, LEFT: ICD-10-CM

## 2023-10-16 DIAGNOSIS — I69.30 HISTORY OF HEMORRHAGIC CEREBROVASCULAR ACCIDENT (CVA) WITH RESIDUAL DEFICIT: Chronic | ICD-10-CM

## 2023-10-16 DIAGNOSIS — N39.0 URINARY TRACT INFECTION WITHOUT HEMATURIA, SITE UNSPECIFIED: Chronic | ICD-10-CM

## 2023-10-16 DIAGNOSIS — H52.213 IRREGULAR ASTIGMATISM OF BOTH EYES: ICD-10-CM

## 2023-10-16 DIAGNOSIS — Z98.890 S/P ABLATION OF ATRIAL FIBRILLATION: ICD-10-CM

## 2023-10-16 DIAGNOSIS — K21.9 GASTROESOPHAGEAL REFLUX DISEASE WITHOUT ESOPHAGITIS: ICD-10-CM

## 2023-10-16 DIAGNOSIS — E66.01 MORBID OBESITY WITH BMI OF 40.0-44.9, ADULT (HCC): ICD-10-CM

## 2023-10-16 DIAGNOSIS — R53.81 PHYSICAL DECONDITIONING: ICD-10-CM

## 2023-10-16 DIAGNOSIS — Z86.79 S/P ABLATION OF ATRIAL FIBRILLATION: ICD-10-CM

## 2023-10-16 DIAGNOSIS — Z90.3 H/O GASTRIC SLEEVE: ICD-10-CM

## 2023-10-16 DIAGNOSIS — Z66 DNR (DO NOT RESUSCITATE): ICD-10-CM

## 2023-10-16 DIAGNOSIS — I62.9 INTRACRANIAL HEMORRHAGE (HCC): ICD-10-CM

## 2023-10-16 DIAGNOSIS — M79.2 NEUROPATHIC PAIN: ICD-10-CM

## 2023-10-16 DIAGNOSIS — Z74.09 IMPAIRED MOBILITY AND ADLS: ICD-10-CM

## 2023-10-16 DIAGNOSIS — Z86.79 HISTORY OF ATRIAL FIBRILLATION: ICD-10-CM

## 2023-10-16 DIAGNOSIS — E87.6 HYPOKALEMIA: ICD-10-CM

## 2023-10-16 DIAGNOSIS — R60.9 EDEMA, UNSPECIFIED TYPE: ICD-10-CM

## 2023-10-16 DIAGNOSIS — K64.9 HEMORRHOIDS, UNSPECIFIED HEMORRHOID TYPE: ICD-10-CM

## 2023-10-16 DIAGNOSIS — K52.9 GASTROENTERITIS: ICD-10-CM

## 2023-10-16 DIAGNOSIS — R41.4 LEFT-SIDED NEGLECT: ICD-10-CM

## 2023-10-16 DIAGNOSIS — Z78.9 DNI (DO NOT INTUBATE): ICD-10-CM

## 2023-10-16 DIAGNOSIS — G62.9 NEUROPATHY: ICD-10-CM

## 2023-10-16 LAB
ALBUMIN SERPL BCP-MCNC: 3 G/DL (ref 3.2–4.9)
ALBUMIN/GLOB SERPL: 0.8 G/DL
ALP SERPL-CCNC: 210 U/L (ref 30–99)
ALT SERPL-CCNC: 46 U/L (ref 2–50)
ANION GAP SERPL CALC-SCNC: 13 MMOL/L (ref 7–16)
APPEARANCE UR: CLEAR
AST SERPL-CCNC: 39 U/L (ref 12–45)
BACTERIA #/AREA URNS HPF: ABNORMAL /HPF
BASOPHILS # BLD AUTO: 0.4 % (ref 0–1.8)
BASOPHILS # BLD: 0.04 K/UL (ref 0–0.12)
BILIRUB SERPL-MCNC: 0.8 MG/DL (ref 0.1–1.5)
BILIRUB UR QL STRIP.AUTO: NEGATIVE
BUN SERPL-MCNC: 16 MG/DL (ref 8–22)
CALCIUM ALBUM COR SERPL-MCNC: 10 MG/DL (ref 8.5–10.5)
CALCIUM SERPL-MCNC: 9.2 MG/DL (ref 8.5–10.5)
CHLORIDE SERPL-SCNC: 99 MMOL/L (ref 96–112)
CK SERPL-CCNC: 74 U/L (ref 0–154)
CO2 SERPL-SCNC: 23 MMOL/L (ref 20–33)
COLOR UR: ABNORMAL
CREAT SERPL-MCNC: 0.99 MG/DL (ref 0.5–1.4)
CRP SERPL HS-MCNC: 27.62 MG/DL (ref 0–0.75)
EOSINOPHIL # BLD AUTO: 0.03 K/UL (ref 0–0.51)
EOSINOPHIL NFR BLD: 0.3 % (ref 0–6.9)
EPI CELLS #/AREA URNS HPF: NEGATIVE /HPF
ERYTHROCYTE [DISTWIDTH] IN BLOOD BY AUTOMATED COUNT: 51 FL (ref 35.9–50)
ERYTHROCYTE [SEDIMENTATION RATE] IN BLOOD BY WESTERGREN METHOD: 14 MM/HOUR (ref 0–20)
FLUAV RNA SPEC QL NAA+PROBE: NEGATIVE
FLUBV RNA SPEC QL NAA+PROBE: NEGATIVE
GFR SERPLBLD CREATININE-BSD FMLA CKD-EPI: 81 ML/MIN/1.73 M 2
GLOBULIN SER CALC-MCNC: 3.9 G/DL (ref 1.9–3.5)
GLUCOSE SERPL-MCNC: 101 MG/DL (ref 65–99)
GLUCOSE UR STRIP.AUTO-MCNC: NEGATIVE MG/DL
HCT VFR BLD AUTO: 42.6 % (ref 42–52)
HGB BLD-MCNC: 14.3 G/DL (ref 14–18)
HYALINE CASTS #/AREA URNS LPF: ABNORMAL /LPF
IMM GRANULOCYTES # BLD AUTO: 0.06 K/UL (ref 0–0.11)
IMM GRANULOCYTES NFR BLD AUTO: 0.6 % (ref 0–0.9)
IRON SATN MFR SERPL: 12 % (ref 15–55)
IRON SERPL-MCNC: 24 UG/DL (ref 50–180)
KETONES UR STRIP.AUTO-MCNC: 40 MG/DL
LACTATE SERPL-SCNC: 1.5 MMOL/L (ref 0.5–2)
LEUKOCYTE ESTERASE UR QL STRIP.AUTO: ABNORMAL
LYMPHOCYTES # BLD AUTO: 1.01 K/UL (ref 1–4.8)
LYMPHOCYTES NFR BLD: 10.3 % (ref 22–41)
MAGNESIUM SERPL-MCNC: 1.8 MG/DL (ref 1.5–2.5)
MCH RBC QN AUTO: 30.4 PG (ref 27–33)
MCHC RBC AUTO-ENTMCNC: 33.6 G/DL (ref 32.3–36.5)
MCV RBC AUTO: 90.6 FL (ref 81.4–97.8)
MICRO URNS: ABNORMAL
MONOCYTES # BLD AUTO: 1.92 K/UL (ref 0–0.85)
MONOCYTES NFR BLD AUTO: 19.7 % (ref 0–13.4)
NEUTROPHILS # BLD AUTO: 6.7 K/UL (ref 1.82–7.42)
NEUTROPHILS NFR BLD: 68.7 % (ref 44–72)
NITRITE UR QL STRIP.AUTO: NEGATIVE
NRBC # BLD AUTO: 0 K/UL
NRBC BLD-RTO: 0 /100 WBC (ref 0–0.2)
PH UR STRIP.AUTO: 5.5 [PH] (ref 5–8)
PHOSPHATE SERPL-MCNC: 3.5 MG/DL (ref 2.5–4.5)
PLATELET # BLD AUTO: 306 K/UL (ref 164–446)
PMV BLD AUTO: 10.4 FL (ref 9–12.9)
POTASSIUM SERPL-SCNC: 4.3 MMOL/L (ref 3.6–5.5)
PROT SERPL-MCNC: 6.9 G/DL (ref 6–8.2)
PROT UR QL STRIP: 30 MG/DL
RBC # BLD AUTO: 4.7 M/UL (ref 4.7–6.1)
RBC # URNS HPF: ABNORMAL /HPF
RBC UR QL AUTO: ABNORMAL
RSV RNA SPEC QL NAA+PROBE: NEGATIVE
SARS-COV-2 RNA RESP QL NAA+PROBE: NOTDETECTED
SODIUM SERPL-SCNC: 135 MMOL/L (ref 135–145)
SP GR UR STRIP.AUTO: 1.02
SPECIMEN SOURCE: NORMAL
TIBC SERPL-MCNC: 196 UG/DL (ref 250–450)
TROPONIN T SERPL-MCNC: 11 NG/L (ref 6–19)
TSH SERPL DL<=0.005 MIU/L-ACNC: 0.41 UIU/ML (ref 0.38–5.33)
UIBC SERPL-MCNC: 172 UG/DL (ref 110–370)
UROBILINOGEN UR STRIP.AUTO-MCNC: 1 MG/DL
VIT B12 SERPL-MCNC: 655 PG/ML (ref 211–911)
WBC # BLD AUTO: 9.8 K/UL (ref 4.8–10.8)
WBC #/AREA URNS HPF: ABNORMAL /HPF

## 2023-10-16 PROCEDURE — 87077 CULTURE AEROBIC IDENTIFY: CPT

## 2023-10-16 PROCEDURE — 700111 HCHG RX REV CODE 636 W/ 250 OVERRIDE (IP): Mod: JZ

## 2023-10-16 PROCEDURE — 83550 IRON BINDING TEST: CPT

## 2023-10-16 PROCEDURE — 81001 URINALYSIS AUTO W/SCOPE: CPT

## 2023-10-16 PROCEDURE — 99223 1ST HOSP IP/OBS HIGH 75: CPT | Mod: GC | Performed by: INTERNAL MEDICINE

## 2023-10-16 PROCEDURE — 87486 CHLMYD PNEUM DNA AMP PROBE: CPT

## 2023-10-16 PROCEDURE — 82652 VIT D 1 25-DIHYDROXY: CPT

## 2023-10-16 PROCEDURE — G0378 HOSPITAL OBSERVATION PER HR: HCPCS

## 2023-10-16 PROCEDURE — 87086 URINE CULTURE/COLONY COUNT: CPT

## 2023-10-16 PROCEDURE — 84443 ASSAY THYROID STIM HORMONE: CPT

## 2023-10-16 PROCEDURE — 83605 ASSAY OF LACTIC ACID: CPT

## 2023-10-16 PROCEDURE — 87798 DETECT AGENT NOS DNA AMP: CPT

## 2023-10-16 PROCEDURE — 87581 M.PNEUMON DNA AMP PROBE: CPT

## 2023-10-16 PROCEDURE — 80053 COMPREHEN METABOLIC PANEL: CPT

## 2023-10-16 PROCEDURE — 0241U HCHG SARS-COV-2 COVID-19 NFCT DS RESP RNA 4 TRGT MIC: CPT

## 2023-10-16 PROCEDURE — 36415 COLL VENOUS BLD VENIPUNCTURE: CPT

## 2023-10-16 PROCEDURE — 74018 RADEX ABDOMEN 1 VIEW: CPT

## 2023-10-16 PROCEDURE — 70450 CT HEAD/BRAIN W/O DYE: CPT

## 2023-10-16 PROCEDURE — 83735 ASSAY OF MAGNESIUM: CPT

## 2023-10-16 PROCEDURE — 87633 RESP VIRUS 12-25 TARGETS: CPT

## 2023-10-16 PROCEDURE — 700105 HCHG RX REV CODE 258

## 2023-10-16 PROCEDURE — 84484 ASSAY OF TROPONIN QUANT: CPT

## 2023-10-16 PROCEDURE — 87186 SC STD MICRODIL/AGAR DIL: CPT

## 2023-10-16 PROCEDURE — 82550 ASSAY OF CK (CPK): CPT

## 2023-10-16 PROCEDURE — 72170 X-RAY EXAM OF PELVIS: CPT

## 2023-10-16 PROCEDURE — 700102 HCHG RX REV CODE 250 W/ 637 OVERRIDE(OP)

## 2023-10-16 PROCEDURE — A9270 NON-COVERED ITEM OR SERVICE: HCPCS

## 2023-10-16 PROCEDURE — 85652 RBC SED RATE AUTOMATED: CPT

## 2023-10-16 PROCEDURE — 86140 C-REACTIVE PROTEIN: CPT

## 2023-10-16 PROCEDURE — 73552 X-RAY EXAM OF FEMUR 2/>: CPT | Mod: LT

## 2023-10-16 PROCEDURE — 96372 THER/PROPH/DIAG INJ SC/IM: CPT | Mod: XU

## 2023-10-16 PROCEDURE — 87040 BLOOD CULTURE FOR BACTERIA: CPT

## 2023-10-16 PROCEDURE — 85025 COMPLETE CBC W/AUTO DIFF WBC: CPT

## 2023-10-16 PROCEDURE — 99285 EMERGENCY DEPT VISIT HI MDM: CPT

## 2023-10-16 PROCEDURE — 71045 X-RAY EXAM CHEST 1 VIEW: CPT

## 2023-10-16 PROCEDURE — 82607 VITAMIN B-12: CPT

## 2023-10-16 PROCEDURE — 83540 ASSAY OF IRON: CPT

## 2023-10-16 PROCEDURE — 84100 ASSAY OF PHOSPHORUS: CPT

## 2023-10-16 PROCEDURE — C9803 HOPD COVID-19 SPEC COLLECT: HCPCS | Performed by: EMERGENCY MEDICINE

## 2023-10-16 RX ORDER — SODIUM CHLORIDE 9 MG/ML
1000 INJECTION, SOLUTION INTRAVENOUS ONCE
Status: COMPLETED | OUTPATIENT
Start: 2023-10-16 | End: 2023-10-16

## 2023-10-16 RX ORDER — OMEPRAZOLE 20 MG/1
20 CAPSULE, DELAYED RELEASE ORAL 2 TIMES DAILY
Status: DISCONTINUED | OUTPATIENT
Start: 2023-10-16 | End: 2023-10-18 | Stop reason: HOSPADM

## 2023-10-16 RX ORDER — ACETAMINOPHEN 325 MG/1
650 TABLET ORAL EVERY 6 HOURS PRN
Status: DISCONTINUED | OUTPATIENT
Start: 2023-10-16 | End: 2023-10-18 | Stop reason: HOSPADM

## 2023-10-16 RX ORDER — LOSARTAN POTASSIUM 50 MG/1
25 TABLET ORAL DAILY
Status: DISCONTINUED | OUTPATIENT
Start: 2023-10-16 | End: 2023-10-18 | Stop reason: HOSPADM

## 2023-10-16 RX ORDER — ASPIRIN 81 MG/1
81 TABLET ORAL DAILY
Status: DISCONTINUED | OUTPATIENT
Start: 2023-10-16 | End: 2023-10-16

## 2023-10-16 RX ORDER — ENOXAPARIN SODIUM 100 MG/ML
40 INJECTION SUBCUTANEOUS DAILY
Status: DISCONTINUED | OUTPATIENT
Start: 2023-10-16 | End: 2023-10-18 | Stop reason: HOSPADM

## 2023-10-16 RX ADMIN — ENOXAPARIN SODIUM 40 MG: 100 INJECTION SUBCUTANEOUS at 18:19

## 2023-10-16 RX ADMIN — LOSARTAN POTASSIUM 25 MG: 50 TABLET, FILM COATED ORAL at 18:20

## 2023-10-16 RX ADMIN — OMEPRAZOLE 20 MG: 20 CAPSULE, DELAYED RELEASE ORAL at 18:20

## 2023-10-16 RX ADMIN — SODIUM CHLORIDE 1000 ML: 9 INJECTION, SOLUTION INTRAVENOUS at 18:17

## 2023-10-16 ASSESSMENT — ENCOUNTER SYMPTOMS
SEIZURES: 0
EYE REDNESS: 0
FEVER: 1
EYE DISCHARGE: 0
NERVOUS/ANXIOUS: 0
SHORTNESS OF BREATH: 0
COUGH: 1
NAUSEA: 0
HEMOPTYSIS: 0
SINUS PAIN: 1
SORE THROAT: 0
HEARTBURN: 0
HEADACHES: 0
LOSS OF CONSCIOUSNESS: 0
DIARRHEA: 0
TINGLING: 0
FOCAL WEAKNESS: 1
TREMORS: 0
DIZZINESS: 0
SPUTUM PRODUCTION: 1
WHEEZING: 0
SPEECH CHANGE: 0
ORTHOPNEA: 0
WEIGHT LOSS: 1
FALLS: 1
ABDOMINAL PAIN: 0
VOMITING: 0
PALPITATIONS: 0
CHILLS: 1
BLURRED VISION: 0
WEAKNESS: 1
CONSTIPATION: 0

## 2023-10-16 ASSESSMENT — FIBROSIS 4 INDEX
FIB4 SCORE: 1.32
FIB4 SCORE: 2.4

## 2023-10-16 ASSESSMENT — LIFESTYLE VARIABLES
ON A TYPICAL DAY WHEN YOU DRINK ALCOHOL HOW MANY DRINKS DO YOU HAVE: 0
ALCOHOL_USE: NO
TOTAL SCORE: 0
EVER FELT BAD OR GUILTY ABOUT YOUR DRINKING: NO
CONSUMPTION TOTAL: NEGATIVE
TOTAL SCORE: 0
DOES PATIENT WANT TO STOP DRINKING: NO
HAVE PEOPLE ANNOYED YOU BY CRITICIZING YOUR DRINKING: NO
TOTAL SCORE: 0
AVERAGE NUMBER OF DAYS PER WEEK YOU HAVE A DRINK CONTAINING ALCOHOL: 0
HOW MANY TIMES IN THE PAST YEAR HAVE YOU HAD 5 OR MORE DRINKS IN A DAY: 0
HAVE YOU EVER FELT YOU SHOULD CUT DOWN ON YOUR DRINKING: NO
EVER HAD A DRINK FIRST THING IN THE MORNING TO STEADY YOUR NERVES TO GET RID OF A HANGOVER: NO

## 2023-10-16 ASSESSMENT — COGNITIVE AND FUNCTIONAL STATUS - GENERAL
SUGGESTED CMS G CODE MODIFIER MOBILITY: CM
PERSONAL GROOMING: TOTAL
CLIMB 3 TO 5 STEPS WITH RAILING: TOTAL
SUGGESTED CMS G CODE MODIFIER DAILY ACTIVITY: CM
DRESSING REGULAR LOWER BODY CLOTHING: TOTAL
EATING MEALS: A LITTLE
DRESSING REGULAR UPPER BODY CLOTHING: TOTAL
TURNING FROM BACK TO SIDE WHILE IN FLAT BAD: A LOT
TOILETING: TOTAL
MOVING TO AND FROM BED TO CHAIR: UNABLE
STANDING UP FROM CHAIR USING ARMS: TOTAL
WALKING IN HOSPITAL ROOM: TOTAL
DAILY ACTIVITIY SCORE: 8
MOVING FROM LYING ON BACK TO SITTING ON SIDE OF FLAT BED: UNABLE
MOBILITY SCORE: 7
HELP NEEDED FOR BATHING: TOTAL

## 2023-10-16 ASSESSMENT — PAIN DESCRIPTION - PAIN TYPE
TYPE: ACUTE PAIN
TYPE: ACUTE PAIN

## 2023-10-16 NOTE — SENIOR ADMIT NOTE
"[facility] INTERNAL MEDICINE SENIOR ADMIT NOTE:                                                           Chief Complaint: Worsening weakness and flu-like symptoms    History of Present Illness:    Gokul Baca is a 72 y.o. male with Hx of hemorrhagic stroke with residual left sided weakness,  who presents with complaints of worsening weakness and upper respiratory symptoms for the past few days.      Physical Exam:   Vitals:    10/16/23 1300 10/16/23 1400 10/16/23 1500 10/16/23 1600   BP: 116/68 121/69 110/62 109/65   Pulse: 74 72 69 71   Resp: 19 17 19 18   Temp:       TempSrc:       SpO2: 92% 94% 95% 94%   Weight:       Height:         Body mass index is 32.45 kg/m².  /65   Pulse 71   Temp 36.3 °C (97.3 °F) (Temporal)   Resp 18   Ht 1.651 m (5' 5\")   Wt 88.5 kg (195 lb)   SpO2 94%   BMI 32.45 kg/m²   O2 therapy: Pulse Oximetry: 94 %, O2 (LPM): 0, O2 Delivery Device: None - Room Air    General: Weak and cachetic appearing, in NAD  HEENT: normocephalic, atraumatic, no scleral icterus   Cardiovascular: RRR without m/r/g, no lower extremity edema   Lungs: Clear to auscultation bilaterally, no crackles or wheezes  Abdomen: Soft, nontender to palpation, no guarding   MINGO: declined   Skin: No rashes or erythema appreciated  Neurologic: AAO x4, CN 2-12 intact, 2/5 LLE, gross sensation intact, finger-nose wnl, heel-shin wnl    Pertinent Labs:   Lab Results   Component Value Date/Time    SODIUM 135 10/16/2023 11:39 AM    POTASSIUM 4.3 10/16/2023 11:39 AM    CHLORIDE 99 10/16/2023 11:39 AM    CO2 23 10/16/2023 11:39 AM    GLUCOSE 101 (H) 10/16/2023 11:39 AM    BUN 16 10/16/2023 11:39 AM    CREATININE 0.99 10/16/2023 11:39 AM         Recent Labs     10/16/23  1139   WBC 9.8   RBC 4.70   HEMOGLOBIN 14.3   HEMATOCRIT 42.6   MCV 90.6   MCH 30.4   RDW 51.0*   PLATELETCT 306   MPV 10.4   NEUTSPOLYS 68.70   LYMPHOCYTES 10.30*   MONOCYTES 19.70*   EOSINOPHILS 0.30   BASOPHILS 0.40         Pertinent Imaging: "   [id number]-[id number]-8 VIEW   Final Result      Nonobstructive bowel gas pattern. No significantly increased stool burden.      DX-PELVIS-1 OR 2 VIEWS   Final Result      1.  No evidence of displaced pelvic fracture   2.  Osteoarthritis      DX-FEMUR-2+ LEFT   Final Result      No radiographic evidence of acute traumatic injury.      DX-CHEST-PORTABLE (1 VIEW)   Final Result         1. No acute cardiopulmonary abnormalities are identified.      CT-HEAD W/O   Final Result         1. No acute intracranial abnormality. No evidence of acute intracranial hemorrhage or mass lesion.                            Assessment and Plan:    #Worsening lower extremity weakness  #History of hemorrhagic stroke  -admit to observation  -Telemetry due to history of atrial fibrillation s/p ablation  -MRI of the brain  -Check CPK, vitamin D, TSH, lipid panel and vitamin B12  -Crestor  -PT OT    #Upper respiratory symptoms  -Viral panel  -Blood cultures pending    #Hypertension  Home losartan 25 mg    For complete details, see Dr. Lugo's H&P.    Sunitha Agrawal M.D.

## 2023-10-16 NOTE — ED TRIAGE NOTES
"Chief Complaint   Patient presents with    Flu Like Symptoms     X2 days    Weakness     Pt BIB EMS for above complaints. In triage pt is unable to sit up in WC, slumped over the side. Pt report flu like symptoms and weakness for 2 days. Hx of stoke with left sided weakness.     Pt to blue 22.       /63   Pulse 100   Temp 36.3 °C (97.3 °F) (Temporal)   Resp 20   Ht 1.651 m (5' 5\")   Wt 88.5 kg (195 lb)   SpO2 94%       "

## 2023-10-16 NOTE — ED NOTES
Assumed care of patient, patient bedside report received from RN Laura . Pt AAO X 4 , respirations even and unlabored, on room air . Introduced self as pt RN, POC discussed, call light in reach, Fall risk interventions in place.

## 2023-10-16 NOTE — ED PROVIDER NOTES
ED Provider Note    CHIEF COMPLAINT  Chief Complaint   Patient presents with    Flu Like Symptoms     X2 days    Weakness           HPI/ROS    OUTSIDE HISTORIAN(S):  Significant other wife is at bedside and history review of systems today and the patient's had increasing symptoms of weakness and is asking for MRIs of the lumbar spine as well as the head and evaluation she states that she cannot take care of him.    Gokul Baca is a 72 y.o. male who presents with wife with complaint of fall.  The patient does have history of stroke left-sided weakness has had increasing weakness throughout the last several days and feels like he might infection.  He did slide down off the bed and landed on his bottom a few days ago and felt back up.  He states that he has had pain to the left hip and they think he has bursitis.  His primary care physician was asking for an MRI of the brain for possible multiple strokelike symptoms in the past and increasing in frequency as well as the MRI of the lumbar spine secondary to his chronic pain.  Patient denies any urinary or bowel incontinence or retention, fever, shakes, chills, sweats, nausea, vomiting    PAST MEDICAL HISTORY   has a past medical history of Anemia, Atrial fibrillation (HCC), GERD (gastroesophageal reflux disease), Heart burn, High cholesterol, Hyperlipidemia, Hypertension, Iron deficiency anemia, Pneumonia, Sleep apnea, Snoring, Stroke (HCC) (12/19/2021), and UTI (urinary tract infection) (03/2022).    SURGICAL HISTORY   has a past surgical history that includes knee arthroplasty total (Left) and other.    FAMILY HISTORY  Family History   Problem Relation Age of Onset    Heart Disease Mother     Heart Disease Brother     Alcohol abuse Son        SOCIAL HISTORY  Social History     Tobacco Use    Smoking status: Never    Smokeless tobacco: Never   Vaping Use    Vaping Use: Never used   Substance and Sexual Activity    Alcohol use: Never    Drug use: Never    Sexual  "activity: Not on file       CURRENT MEDICATIONS  Home Medications       Reviewed by Melanie Sunshine R.N. (Registered Nurse) on 10/16/23 at 1053  Med List Status: Not Addressed     Medication Last Dose Status   aspirin EC (ECOTRIN) 81 MG Tablet Delayed Response  Active   hydrALAZINE (APRESOLINE) 10 MG Tab  Active   losartan (COZAAR) 25 MG Tab  Active   omeprazole (PRILOSEC) 20 MG delayed-release capsule  Active                    ALLERGIES  Allergies   Allergen Reactions    Codeine Unspecified     \"Become out of it\"    Ibuprofen Hives    Lactose Unspecified     Stomach upset        PHYSICAL EXAM  VITAL SIGNS: /63   Pulse 100   Temp 36.3 °C (97.3 °F) (Temporal)   Resp 20   Ht 1.651 m (5' 5\")   Wt 88.5 kg (195 lb)   SpO2 94%   BMI 32.45 kg/m²      Nursing notes and vitals reviewed.  Constitutional: Well developed, Well nourished, No acute distress, Non-toxic appearance.   Eyes: PERRLA, EOMI, Conjunctiva normal, No discharge.   HENT: Normocephalic, Atraumatic, moist mucous membranes, no facial edema   Cardiovascular: Normal heart rate, Normal rhythm, No murmurs, No rubs, No gallops.   Thorax & Lungs: No respiratory distress, No rales, No rhonchi, No wheezing, No chest tenderness.   Abdomen: Bowel sounds normal, Soft, No tenderness, No guarding, No rebound, No masses, No pulsatile masses.   Skin: Warm, Dry, ecchymosis left gluteus region, No erythema, No rash.   Extremities: No deformity, no edema, good range of motion range of motion upper lower extremes bilaterally  Neurologic: Alert & oriented x 3, strength is 5/5 right lower extremity, 4/5 left extremity, extreme no pain or drift, negative finger-nose, sensation intact throughout, slight tenderness to the left hip   psychiatric: Affect normal for clinical presentation.    DIAGNOSTIC STUDIES / PROCEDURES  EKG  I have independently interpreted this EKG  Normal sinus rhythm on monitor    LABS  Results for orders placed or performed during the hospital " encounter of 10/16/23   CBC With Differential   Result Value Ref Range    WBC 9.8 4.8 - 10.8 K/uL    RBC 4.70 4.70 - 6.10 M/uL    Hemoglobin 14.3 14.0 - 18.0 g/dL    Hematocrit 42.6 42.0 - 52.0 %    MCV 90.6 81.4 - 97.8 fL    MCH 30.4 27.0 - 33.0 pg    MCHC 33.6 32.3 - 36.5 g/dL    RDW 51.0 (H) 35.9 - 50.0 fL    Platelet Count 306 164 - 446 K/uL    MPV 10.4 9.0 - 12.9 fL    Neutrophils-Polys 68.70 44.00 - 72.00 %    Lymphocytes 10.30 (L) 22.00 - 41.00 %    Monocytes 19.70 (H) 0.00 - 13.40 %    Eosinophils 0.30 0.00 - 6.90 %    Basophils 0.40 0.00 - 1.80 %    Immature Granulocytes 0.60 0.00 - 0.90 %    Nucleated RBC 0.00 0.00 - 0.20 /100 WBC    Neutrophils (Absolute) 6.70 1.82 - 7.42 K/uL    Lymphs (Absolute) 1.01 1.00 - 4.80 K/uL    Monos (Absolute) 1.92 (H) 0.00 - 0.85 K/uL    Eos (Absolute) 0.03 0.00 - 0.51 K/uL    Baso (Absolute) 0.04 0.00 - 0.12 K/uL    Immature Granulocytes (abs) 0.06 0.00 - 0.11 K/uL    NRBC (Absolute) 0.00 K/uL   Comp Metabolic Panel   Result Value Ref Range    Sodium 135 135 - 145 mmol/L    Potassium 4.3 3.6 - 5.5 mmol/L    Chloride 99 96 - 112 mmol/L    Co2 23 20 - 33 mmol/L    Anion Gap 13.0 7.0 - 16.0    Glucose 101 (H) 65 - 99 mg/dL    Bun 16 8 - 22 mg/dL    Creatinine 0.99 0.50 - 1.40 mg/dL    Calcium 9.2 8.5 - 10.5 mg/dL    Correct Calcium 10.0 8.5 - 10.5 mg/dL    AST(SGOT) 39 12 - 45 U/L    ALT(SGPT) 46 2 - 50 U/L    Alkaline Phosphatase 210 (H) 30 - 99 U/L    Total Bilirubin 0.8 0.1 - 1.5 mg/dL    Albumin 3.0 (L) 3.2 - 4.9 g/dL    Total Protein 6.9 6.0 - 8.2 g/dL    Globulin 3.9 (H) 1.9 - 3.5 g/dL    A-G Ratio 0.8 g/dL   Lactic Acid   Result Value Ref Range    Lactic Acid 1.5 0.5 - 2.0 mmol/L   Magnesium   Result Value Ref Range    Magnesium 1.8 1.5 - 2.5 mg/dL   Phosphorus   Result Value Ref Range    Phosphorus 3.5 2.5 - 4.5 mg/dL   Sed Rate   Result Value Ref Range    Sed Rate Westergren 14 0 - 20 mm/hour   C Reactive Protein Quantitative (Non-Cardiac)   Result Value Ref Range     Stat C-Reactive Protein 27.62 (H) 0.00 - 0.75 mg/dL   Troponin   Result Value Ref Range    Troponin T 11 6 - 19 ng/L   CoV-2, FLU A/B, and RSV by PCR (2-4 Hours CEPHEID) : Collect NP swab in VTM    Specimen: Respirate   Result Value Ref Range    Influenza virus A RNA Negative Negative    Influenza virus B, PCR Negative Negative    RSV, PCR Negative Negative    SARS-CoV-2 by PCR NotDetected     SARS-CoV-2 Source NP Swab    ESTIMATED GFR   Result Value Ref Range    GFR (CKD-EPI) 81 >60 mL/min/1.73 m 2         RADIOLOGY  I have independently interpreted the diagnostic imaging associated with this visit and am waiting the final reading from the radiologist.   My preliminary interpretation is as follows: Chest x-ray is negative for infiltrate  Radiologist interpretation:   DX-PELVIS-1 OR 2 VIEWS   Final Result      1.  No evidence of displaced pelvic fracture   2.  Osteoarthritis      DX-FEMUR-2+ LEFT   Final Result      No radiographic evidence of acute traumatic injury.      DX-CHEST-PORTABLE (1 VIEW)   Final Result         1. No acute cardiopulmonary abnormalities are identified.      CT-HEAD W/O   Final Result         1. No acute intracranial abnormality. No evidence of acute intracranial hemorrhage or mass lesion.                           COURSE & MEDICAL DECISION MAKING    ED Observation Status? No; Patient does not meet criteria for ED Observation.     INITIAL ASSESSMENT, COURSE AND PLAN  Care Narrative: This is a pleasant 32-year-old gentleman presents with weakness.  Here in the emergency room, the patient have no evidence of strokelike symptoms and for slight weakness left lower extremity has been chronic for the last 3 days.  CT scan of the brain was completed is negative for acute requiring hemorrhage and abnormality.  Patient does not have leukocytosis, is afebrile do not believe as meningitis encephalitis.  X-ray of the pelvis and femur completed secondary to pain after trauma and the patient has no other  fracture.  I am concerned he might have an occult small fracture though need further evaluation and probable MRI.  In addition, the patient's wife is asking for MRI of the brain and lumbar spine per the recommendation of the primary care physician.  I did inform her that this would not happen in the emergency department maybe in the hospital for further evaluation for strokelike symptoms.  I discussed the patient with [facility] internal medicine resident for hospitalization at 1520.      DISPOSITION AND DISCUSSIONS  I have discussed management of the patient with the following physicians and HAMZAH's: Discussed the patient with [facility] internal medicine for hospitalization.        FINAL DIAGNOSIS  Weakness  Ground-level fall  Inability to walk       Electronically signed by: Miguel Ramos D.O., 10/16/2023 10:59 AM

## 2023-10-16 NOTE — ED NOTES
Unable to complete med rec at this time.   Permission given by pt to contact wifeCarly VALENTINE to wife Adriana [phone]

## 2023-10-17 ENCOUNTER — APPOINTMENT (OUTPATIENT)
Dept: RADIOLOGY | Facility: MEDICAL CENTER | Age: 73
End: 2023-10-17
Payer: MEDICARE

## 2023-10-17 PROBLEM — N39.0 URINARY TRACT INFECTION: Chronic | Status: ACTIVE | Noted: 2021-12-25

## 2023-10-17 PROBLEM — R39.11 URINARY HESITANCY: Status: RESOLVED | Noted: 2023-10-16 | Resolved: 2023-10-17

## 2023-10-17 LAB
ALBUMIN SERPL BCP-MCNC: 3.1 G/DL (ref 3.2–4.9)
ALBUMIN/GLOB SERPL: 0.8 G/DL
ALP SERPL-CCNC: 195 U/L (ref 30–99)
ALT SERPL-CCNC: 41 U/L (ref 2–50)
ANION GAP SERPL CALC-SCNC: 14 MMOL/L (ref 7–16)
AST SERPL-CCNC: 30 U/L (ref 12–45)
BASOPHILS # BLD AUTO: 0.2 % (ref 0–1.8)
BASOPHILS # BLD: 0.02 K/UL (ref 0–0.12)
BILIRUB SERPL-MCNC: 0.6 MG/DL (ref 0.1–1.5)
BUN SERPL-MCNC: 20 MG/DL (ref 8–22)
CALCIUM ALBUM COR SERPL-MCNC: 10 MG/DL (ref 8.5–10.5)
CALCIUM SERPL-MCNC: 9.3 MG/DL (ref 8.5–10.5)
CHLORIDE SERPL-SCNC: 99 MMOL/L (ref 96–112)
CHOLEST SERPL-MCNC: 178 MG/DL (ref 100–199)
CO2 SERPL-SCNC: 23 MMOL/L (ref 20–33)
CREAT SERPL-MCNC: 0.83 MG/DL (ref 0.5–1.4)
EOSINOPHIL # BLD AUTO: 0.1 K/UL (ref 0–0.51)
EOSINOPHIL NFR BLD: 1.1 % (ref 0–6.9)
ERYTHROCYTE [DISTWIDTH] IN BLOOD BY AUTOMATED COUNT: 50.7 FL (ref 35.9–50)
FERRITIN SERPL-MCNC: 640 NG/ML (ref 22–322)
GFR SERPLBLD CREATININE-BSD FMLA CKD-EPI: 94 ML/MIN/1.73 M 2
GLOBULIN SER CALC-MCNC: 3.8 G/DL (ref 1.9–3.5)
GLUCOSE SERPL-MCNC: 74 MG/DL (ref 65–99)
HCT VFR BLD AUTO: 43.6 % (ref 42–52)
HDLC SERPL-MCNC: 38 MG/DL
HGB BLD-MCNC: 14.5 G/DL (ref 14–18)
IMM GRANULOCYTES # BLD AUTO: 0.05 K/UL (ref 0–0.11)
IMM GRANULOCYTES NFR BLD AUTO: 0.6 % (ref 0–0.9)
LDLC SERPL CALC-MCNC: 121 MG/DL
LYMPHOCYTES # BLD AUTO: 1.68 K/UL (ref 1–4.8)
LYMPHOCYTES NFR BLD: 18.7 % (ref 22–41)
MAGNESIUM SERPL-MCNC: 2 MG/DL (ref 1.5–2.5)
MCH RBC QN AUTO: 30 PG (ref 27–33)
MCHC RBC AUTO-ENTMCNC: 33.3 G/DL (ref 32.3–36.5)
MCV RBC AUTO: 90.3 FL (ref 81.4–97.8)
MONOCYTES # BLD AUTO: 1.74 K/UL (ref 0–0.85)
MONOCYTES NFR BLD AUTO: 19.4 % (ref 0–13.4)
NEUTROPHILS # BLD AUTO: 5.4 K/UL (ref 1.82–7.42)
NEUTROPHILS NFR BLD: 60 % (ref 44–72)
NRBC # BLD AUTO: 0 K/UL
NRBC BLD-RTO: 0 /100 WBC (ref 0–0.2)
PLATELET # BLD AUTO: 278 K/UL (ref 164–446)
PMV BLD AUTO: 9.6 FL (ref 9–12.9)
POTASSIUM SERPL-SCNC: 4.3 MMOL/L (ref 3.6–5.5)
PROT SERPL-MCNC: 6.9 G/DL (ref 6–8.2)
RBC # BLD AUTO: 4.83 M/UL (ref 4.7–6.1)
SODIUM SERPL-SCNC: 136 MMOL/L (ref 135–145)
TRIGL SERPL-MCNC: 93 MG/DL (ref 0–149)
WBC # BLD AUTO: 9 K/UL (ref 4.8–10.8)

## 2023-10-17 PROCEDURE — 96374 THER/PROPH/DIAG INJ IV PUSH: CPT | Mod: XU

## 2023-10-17 PROCEDURE — 700117 HCHG RX CONTRAST REV CODE 255

## 2023-10-17 PROCEDURE — 97162 PT EVAL MOD COMPLEX 30 MIN: CPT

## 2023-10-17 PROCEDURE — G0378 HOSPITAL OBSERVATION PER HR: HCPCS

## 2023-10-17 PROCEDURE — 80061 LIPID PANEL: CPT

## 2023-10-17 PROCEDURE — A9270 NON-COVERED ITEM OR SERVICE: HCPCS

## 2023-10-17 PROCEDURE — 85025 COMPLETE CBC W/AUTO DIFF WBC: CPT

## 2023-10-17 PROCEDURE — 96372 THER/PROPH/DIAG INJ SC/IM: CPT | Mod: XU

## 2023-10-17 PROCEDURE — A9579 GAD-BASE MR CONTRAST NOS,1ML: HCPCS

## 2023-10-17 PROCEDURE — 83735 ASSAY OF MAGNESIUM: CPT

## 2023-10-17 PROCEDURE — 36415 COLL VENOUS BLD VENIPUNCTURE: CPT

## 2023-10-17 PROCEDURE — 82728 ASSAY OF FERRITIN: CPT

## 2023-10-17 PROCEDURE — 99232 SBSQ HOSP IP/OBS MODERATE 35: CPT | Mod: GC | Performed by: INTERNAL MEDICINE

## 2023-10-17 PROCEDURE — 97166 OT EVAL MOD COMPLEX 45 MIN: CPT

## 2023-10-17 PROCEDURE — 80053 COMPREHEN METABOLIC PANEL: CPT

## 2023-10-17 PROCEDURE — 70552 MRI BRAIN STEM W/DYE: CPT

## 2023-10-17 PROCEDURE — 96376 TX/PRO/DX INJ SAME DRUG ADON: CPT | Mod: XU

## 2023-10-17 PROCEDURE — 700101 HCHG RX REV CODE 250

## 2023-10-17 PROCEDURE — 700111 HCHG RX REV CODE 636 W/ 250 OVERRIDE (IP): Mod: JZ

## 2023-10-17 PROCEDURE — 700111 HCHG RX REV CODE 636 W/ 250 OVERRIDE (IP)

## 2023-10-17 PROCEDURE — 700102 HCHG RX REV CODE 250 W/ 637 OVERRIDE(OP)

## 2023-10-17 PROCEDURE — 70551 MRI BRAIN STEM W/O DYE: CPT

## 2023-10-17 RX ORDER — ASPIRIN 81 MG/1
81 TABLET ORAL DAILY
Status: DISCONTINUED | OUTPATIENT
Start: 2023-10-18 | End: 2023-10-18 | Stop reason: HOSPADM

## 2023-10-17 RX ORDER — LIDOCAINE 50 MG/G
2 PATCH TOPICAL EVERY 24 HOURS
Status: DISCONTINUED | OUTPATIENT
Start: 2023-10-17 | End: 2023-10-18 | Stop reason: HOSPADM

## 2023-10-17 RX ADMIN — OMEPRAZOLE 20 MG: 20 CAPSULE, DELAYED RELEASE ORAL at 04:40

## 2023-10-17 RX ADMIN — GADOTERIDOL 20 ML: 279.3 INJECTION, SOLUTION INTRAVENOUS at 11:30

## 2023-10-17 RX ADMIN — CEFTRIAXONE SODIUM 1000 MG: 10 INJECTION, POWDER, FOR SOLUTION INTRAVENOUS at 07:58

## 2023-10-17 RX ADMIN — LIDOCAINE 2 PATCH: 50 PATCH TOPICAL at 21:23

## 2023-10-17 RX ADMIN — LOSARTAN POTASSIUM 25 MG: 50 TABLET, FILM COATED ORAL at 04:40

## 2023-10-17 RX ADMIN — ENOXAPARIN SODIUM 40 MG: 100 INJECTION SUBCUTANEOUS at 16:46

## 2023-10-17 RX ADMIN — DICLOFENAC SODIUM 2 G: 10 GEL TOPICAL at 04:41

## 2023-10-17 ASSESSMENT — ENCOUNTER SYMPTOMS
NERVOUS/ANXIOUS: 0
SPEECH CHANGE: 0
EYE DISCHARGE: 0
WHEEZING: 0
HEMOPTYSIS: 0
DIARRHEA: 0
CONSTIPATION: 0
ABDOMINAL PAIN: 0
WEAKNESS: 1
TREMORS: 0
DIZZINESS: 0
TINGLING: 0
SHORTNESS OF BREATH: 0
BLURRED VISION: 0
NAUSEA: 0
SINUS PAIN: 1
COUGH: 1
FOCAL WEAKNESS: 1
FALLS: 1
EYE REDNESS: 0
BACK PAIN: 1
FEVER: 0
ORTHOPNEA: 0
SORE THROAT: 0
PALPITATIONS: 0
LOSS OF CONSCIOUSNESS: 0
SEIZURES: 0
VOMITING: 0
HEARTBURN: 0
MYALGIAS: 1
SPUTUM PRODUCTION: 1
HEADACHES: 0
CHILLS: 0

## 2023-10-17 ASSESSMENT — COGNITIVE AND FUNCTIONAL STATUS - GENERAL
HELP NEEDED FOR BATHING: A LOT
SUGGESTED CMS G CODE MODIFIER MOBILITY: CL
DRESSING REGULAR UPPER BODY CLOTHING: A LITTLE
WALKING IN HOSPITAL ROOM: A LOT
MOVING TO AND FROM BED TO CHAIR: UNABLE
CLIMB 3 TO 5 STEPS WITH RAILING: TOTAL
EATING MEALS: A LITTLE
DRESSING REGULAR LOWER BODY CLOTHING: A LOT
MOVING FROM LYING ON BACK TO SITTING ON SIDE OF FLAT BED: A LOT
TURNING FROM BACK TO SIDE WHILE IN FLAT BAD: A LOT
TOILETING: A LOT
MOBILITY SCORE: 11
PERSONAL GROOMING: A LITTLE
STANDING UP FROM CHAIR USING ARMS: A LITTLE
DAILY ACTIVITIY SCORE: 15
SUGGESTED CMS G CODE MODIFIER DAILY ACTIVITY: CK

## 2023-10-17 ASSESSMENT — GAIT ASSESSMENTS
DEVIATION: OTHER (COMMENT)
ASSISTIVE DEVICE: FRONT WHEEL WALKER
DISTANCE (FEET): 10
GAIT LEVEL OF ASSIST: MINIMAL ASSIST

## 2023-10-17 ASSESSMENT — PAIN DESCRIPTION - PAIN TYPE
TYPE: ACUTE PAIN
TYPE: ACUTE PAIN

## 2023-10-17 ASSESSMENT — ACTIVITIES OF DAILY LIVING (ADL): TOILETING: INDEPENDENT

## 2023-10-17 ASSESSMENT — FIBROSIS 4 INDEX: FIB4 SCORE: 1.18

## 2023-10-17 NOTE — ED NOTES
Patient report to SLICK Rose. Pt AAO X 4 , respirations even and unlabored, on room air . Fall risk interventions in place.

## 2023-10-17 NOTE — ASSESSMENT & PLAN NOTE
Patient has arthritis and gets steroid injections    -Diclofenac topical 4 times daily  -Tylenol as needed for pain

## 2023-10-17 NOTE — CARE PLAN
The patient is Stable - Low risk of patient condition declining or worsening    Shift Goals  Clinical Goals: Promote ADLs, rest  Patient Goals: Rest  Family Goals: KAELA    Progress made toward(s) clinical / shift goals:      Problem: Skin Integrity  Goal: Skin integrity is maintained or improved  Outcome: Progressing  Note:   . Assess and monitor skin integrity, appearance and/or temperature 2. Assess risk factors for impaired skin integrity and/or pressures ulcers 3. Implement precautions to protect skin integrity in collaboration with interdisciplinary team 4. Implement pressure ulcer prevention protocol if at risk for skin breakdown 5. Confirm wound care consult if at risk for skin breakdown      Problem: Pain - Standard  Goal: Alleviation of pain or a reduction in pain to the patient’s comfort goal  Outcome: Progressing  Flowsheets (Taken 10/17/2023 7602)  Non Verbal Scale: Calm  OB Pain Intervention:   Repositioned   Rest       Patient is not progressing towards the following goals:

## 2023-10-17 NOTE — H&P
"[facility] Internal Medicine History & Physical Note    Date of Service  10/16/2023    R Team: R IM Green Team   Attending: Sal Odonnell M.d.  Senior Resident: Dr. Sunitha Agrawal  Intern:  Dr. Ingrid Lugo  Contact Number: [phone]    Primary Care Physician  Jennifer Waldron M.D.    Consultants  None      Code Status  DNAR/DNI    Chief Complaint  Chief Complaint   Patient presents with    Flu Like Symptoms     X2 days    Weakness       History of Presenting Illness (HPI):   Gokul Baca is a 72 y.o. 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  At baseline patient uses a cane to ambulate, has been using wheelchair and mostly bedbound prior to admission.  Last week wife was out of town, he had  friends checking in on him but does endorse 3 ground-level mechanical falls in the last 10 days when attempting to sit in a chair.  Denies LOC or head injury with falls.  Pertinent positive ROS: Sick contact (daughter's friend with URI) low-grade subjective fevers, reduced appetite, fatigue, subjective 35 pound weight loss since August, external ear pain, postnasal drip, productive cough, hesitancy, frequency. Pertinent negative ROS: Dizziness, headaches, vision changes, lightheadedness, orthopnea, shortness of breath, nausea vomiting.    In the ED, /63   Pulse 100   Temp 36.3 °C (97.3 °F) (Temporal)   Resp 20   Ht 1.651 m (5' 5\")   Wt 88.5 kg (195 lb)   SpO2 94%   BMI 32.45 kg/m².  COVID, flu, RSV negative. CBC within normal limits, BMP unremarkable alk phos 210 and GFR 81, LA 1.5, troponin T 11, ESR 14, CRP 27.62 chest x-ray without acute cardiopulmonary abnormalities, head CT no acute concerns for bleeds.    Patient admitted to " telemetry floor for further work-up of worsening left upper and lower extremity weakness.    I discussed the plan of care with patient and family.    Review of Systems  Review of Systems   Constitutional:  Positive for chills, fever (subjective low grade), malaise/fatigue and weight loss.   HENT:  Positive for congestion, ear pain, hearing loss and sinus pain (frontal sinuses). Negative for sore throat and tinnitus.    Eyes:  Negative for blurred vision, discharge and redness.   Respiratory:  Positive for cough and sputum production. Negative for hemoptysis, shortness of breath and wheezing.    Cardiovascular:  Negative for chest pain, palpitations, orthopnea and leg swelling.   Gastrointestinal:  Negative for abdominal pain, constipation, diarrhea, heartburn, nausea and vomiting.   Genitourinary:  Positive for frequency and urgency. Negative for dysuria and hematuria.   Musculoskeletal:  Positive for falls and joint pain.   Neurological:  Positive for focal weakness (left sided weakness at baseline, worsened) and weakness. Negative for dizziness, tingling, tremors, speech change, seizures, loss of consciousness and headaches.   Psychiatric/Behavioral:  The patient is not nervous/anxious.        Past Medical History   has a past medical history of Anemia, Atrial fibrillation (HCC), GERD (gastroesophageal reflux disease), Heart burn, High cholesterol, Hyperlipidemia, Hypertension, Iron deficiency anemia, Pneumonia, Sleep apnea, Snoring, Stroke (HCC) (12/19/2021), and UTI (urinary tract infection) (03/2022).    Surgical History   has a past surgical history that includes knee arthroplasty total (Left) and other.     Family History  family history includes Alcohol abuse in his son; Heart Disease in his brother and mother.   Family history reviewed with patient.       Social History  Tobacco: Never smoker  Alcohol: Does not drink alcohol  Recreational drugs (illegal or prescription): Denies recreational drugs, takes  "prescriptions as prescribed  Employment:   Living Situation: Lives in [place], [place] with his wife  Recent Travel: No recent travel  Primary Care Provider:  Jennifer Waldron M.D.    Allergies  Allergies   Allergen Reactions    Codeine Unspecified     \"Become out of it\"    Ibuprofen Hives    Lactose Unspecified     Stomach upset        Medications  Prior to Admission Medications   Prescriptions Last Dose Informant Patient Reported? Taking?   aspirin EC (ECOTRIN) 81 MG Tablet Delayed Response 10/15/2023 at [facility]  Yes No   Sig: Take 81 mg by mouth every day.   hydrALAZINE (APRESOLINE) 10 MG Tab 10/15/2023 at [facility]  No No   Sig: Take 1 Tablet by mouth 2 times a day.   losartan (COZAAR) 25 MG Tab 10/15/2023 at [facility]  No No   Sig: Take 1 Tablet by mouth every day. Hold if BP is <110/70   omeprazole (PRILOSEC) 20 MG delayed-release capsule 10/15/2023 at [facility] Patient Yes No   Sig: Take 20 mg by mouth 2 times a day.      Facility-Administered Medications: None       Physical Exam  Temp:  [36.3 °C (97.3 °F)] 36.3 °C (97.3 °F)  Pulse:  [] 63  Resp:  [14-25] 18  BP: (100-121)/(62-70) 117/70  SpO2:  [92 %-96 %] 95 %  Blood Pressure : 117/66   Temperature: 36.3 °C (97.3 °F)   Pulse: 66   Respiration: 18   Pulse Oximetry: 96 %       Physical Exam  Vitals reviewed.   Constitutional:       Appearance: He is obese.   HENT:      Head: Normocephalic and atraumatic.      Right Ear: Decreased hearing noted. A middle ear effusion is present. There is impacted cerumen.      Left Ear: Decreased hearing noted. A middle ear effusion is present. There is impacted cerumen.      Nose: Congestion and rhinorrhea present.      Mouth/Throat:      Mouth: Mucous membranes are dry.      Comments: Post nasal drip, cobblestoning  Eyes:      General: No scleral icterus.     Conjunctiva/sclera: Conjunctivae normal.   Cardiovascular:      Rate and Rhythm: Normal rate and regular rhythm.      Pulses: Normal pulses.      Heart sounds: " "Normal heart sounds. No murmur heard.  Pulmonary:      Effort: Pulmonary effort is normal.      Breath sounds: Normal breath sounds.   Abdominal:      General: Abdomen is flat.      Palpations: Abdomen is soft.   Genitourinary:     Comments: Declined, states has rectal tone  Musculoskeletal:      Cervical back: Neck supple.      Right lower leg: No edema.   Lymphadenopathy:      Cervical: No cervical adenopathy.   Neurological:      General: No focal deficit present.      Mental Status: He is alert and oriented to person, place, and time. Mental status is at baseline.      Cranial Nerves: No cranial nerve deficit.      Sensory: Sensation is intact. No sensory deficit.      Motor: Weakness present.      Coordination: Finger-Nose-Finger Test normal.      Deep Tendon Reflexes: Babinski sign absent on the right side. Babinski sign absent on the left side.      Reflex Scores:       Patellar reflexes are 2+ on the right side and 1+ on the left side.     Comments: Left lower extremity strength 2/5  Right lower extremity strength 5/5  Left interosseous strength 4/5   Right interosseous strength 5/5     Psychiatric:         Mood and Affect: Mood normal.         Judgment: Judgment normal.         Laboratory:  Recent Labs     10/16/23  1139   WBC 9.8   RBC 4.70   HEMOGLOBIN 14.3   HEMATOCRIT 42.6   MCV 90.6   MCH 30.4   MCHC 33.6   RDW 51.0*   PLATELETCT 306   MPV 10.4     Recent Labs     10/16/23  1139   SODIUM 135   POTASSIUM 4.3   CHLORIDE 99   CO2 23   GLUCOSE 101*   BUN 16   CREATININE 0.99   CALCIUM 9.2     Recent Labs     10/16/23  1139   ALTSGPT 46   ASTSGOT 39   ALKPHOSPHAT 210*   TBILIRUBIN 0.8   GLUCOSE 101*         No results for input(s): \"NTPROBNP\" in the last 72 hours.      Recent Labs     10/16/23  1139   TROPONINT 11       Imaging:  [id number] [id number]   Final Result      Nonobstructive bowel gas pattern. No significantly increased stool burden.      DX-PELVIS-1 OR 2 VIEWS   Final Result      1.  No evidence of " displaced pelvic fracture   2.  Osteoarthritis      DX-FEMUR-2+ LEFT   Final Result      No radiographic evidence of acute traumatic injury.      DX-CHEST-PORTABLE (1 VIEW)   Final Result         1. No acute cardiopulmonary abnormalities are identified.      CT-HEAD W/O   Final Result         1. No acute intracranial abnormality. No evidence of acute intracranial hemorrhage or mass lesion.                             Assessment/Plan:  Problem Representation:   Mr. Baca is a 72-year-old male with pertinent past medical history of hemorrhagic stroke of right basal ganglia intraparenchymal hemorrhage with residual left-sided weakness presenting with worsening left-sided weakness in upper and lower extremities and URI symptoms as well as urinary symptoms. Patient admitted to telemetry floor for further work-up of worsening left upper and lower extremity weakness.  I anticipate this patient is appropriate for observation status at this time     * Weakness- (present on admission)  Assessment & Plan  hemorrhagic stroke (right basal ganglia intraparenchymal hemorrhage) with residual left-sided weakness in 2021.   Now presenting with weakness that is worse compared to baseline x10 days in left interosseous muscles and left lower extremity    -MRI Brain  -PT/OT  -TSH, Vit D, B12  -On physical exam dehydrated, NS 1 L bolus    Urinary hesitancy  Assessment & Plan  3-4 day history of hesitancy and frequency    -UA  -Bladder scan      URI (upper respiratory infection)  Assessment & Plan  external ear pain, postnasal drip, productive cough  COVID, flu, RSV negative    -Respiratory viral panel  -Sputum culture with Gram stain  -see a/p otitis externa for further details    H/O gastric sleeve  Assessment & Plan  In 2013  Receives B12 shots, not taking specific bariatric vitamins  Risk of vitamin and mineral deficiencies, particularly fat-soluble vitamins trace and trace minerals    -will consider ordering labs to assess for  "vitamin and mineral deficiencies    Acute pain of left shoulder- (present on admission)  Assessment & Plan  Patient has arthritis and gets steroid injections    -Diclofenac topical 4 times daily  -Tylenol as needed for pain    Hip pain, left- (present on admission)  Assessment & Plan  History of bursitis, receives steroid injections as needed    -Diclofenac  -Tylenol    Otitis externa- (present on admission)  Assessment & Plan  Discomfort with manipulation of pinna, unable to completely visualize TM due to earwax    -Will reevaluate following cerumen removal  -Nursing order placed for bilateral cerumen removal    History of hemorrhagic cerebrovascular accident (CVA) with residual deficit- (present on admission)  Assessment & Plan  hemorrhagic stroke (right basal ganglia intraparenchymal hemorrhage) with residual left-sided weakness in 2021.     Hyperlipemia- (present on admission)  Assessment & Plan  Repeat lipid panel  -history of \"allergic reaction\" to atorvostatin  -patient declined addition of any statin therapy stating \"the statin studies done in [place] do not have good enough evidence\"           VTE prophylaxis: enoxaparin ppx    "

## 2023-10-17 NOTE — CARE PLAN
The patient is Stable - Low risk of patient condition declining or worsening    Shift Goals  Clinical Goals: telemetry monitoring, safety, rest, comfort  Patient Goals: rest, comfort  Family Goals: KAELA    Progress made toward(s) clinical / shift goals:    Problem: Pain - Standard  Goal: Alleviation of pain or a reduction in pain to the patient’s comfort goal  Outcome: Progressing  Note: Patient having no complaints of pain. Only soreness on left side and right buttock from recent fall. Waffle mattress in place to promote comfort, as well as TAPS system, and pillow support.      Problem: Knowledge Deficit - Standard  Goal: Patient and family/care givers will demonstrate understanding of plan of care, disease process/condition, diagnostic tests and medications  Outcome: Progressing  Note: Patient updated on plan of care, including telemetry monitoring, medications orders, and fall prevention.      Problem: Fall Risk  Goal: Patient will remain free from falls  Outcome: Progressing  Note: Bed alarm on, bed in lowest position, call bell within reach, and fall precautions discussed with patient. Frequent rounding utilized to promote patient safety as well.      Problem: Skin Integrity  Goal: Skin integrity is maintained or improved  Outcome: Progressing  Note: Waffle overlay on bed. TAPS system ordered and in place upon arrival. Patient using pillow support and urinal bedside.        Patient is not progressing towards the following goals:

## 2023-10-17 NOTE — HOSPITAL COURSE
"Gokul Baca is a 72 y.o. 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  Patient was treated with IV ceftriaxone for possible UTI.  MRI of the brain showed no signs of hemorrhage or ischemic stroke.  Patient was back to baseline at discharge.  Home health for PT and OT ordered.  "

## 2023-10-17 NOTE — ASSESSMENT & PLAN NOTE
hemorrhagic stroke (right basal ganglia intraparenchymal hemorrhage) with residual left-sided weakness in 2021.   Now presenting with weakness that is worse compared to baseline x10 days in left interosseous muscles and left lower extremity    -MRI no signs or symptoms of stroke  -PT/OT recommend acute rehab.  PM and R consult placed.  -TSH vitamin B12 within normal limits

## 2023-10-17 NOTE — ASSESSMENT & PLAN NOTE
3-4 day history of hesitancy and frequency  -Bladder scan    Symptomatic bacteruria-   UA( bacteria, 10-20 WBC , ketones , trace blood, +leukocyte esterase)    -Stopped IV Rocephin to transition to p.o. Bactrim

## 2023-10-17 NOTE — PROGRESS NOTES
"[facility] Internal Medicine Daily Progress Note    Date of Service  10/17/2023    [facility] Team: R IM Green Team   Attending: Sal Odonnell M.d.  Senior Resident: Dr. Sunitha Agrawal   Intern:  Dr. Ingrid Lugo  Contact Number: [phone]    Chief Complaint  Gokul Baca is a 72 y.o. 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    Hospital Course  Gokul Baca is a 72 y.o. 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      Interval Problem Update  -At bedside, patient appears relieved to have confirmation of UTI.  Patient states he would like to be informed of medical decisions, particularly when involving addition of medications.  -Symptomatic bacteruria- UA( bacteria, 10-20 WBC , ketones , trace blood, +leukocyte esterase) started on empiric therapy with Rocephin, will de-escalate antibiotics pending culture and sensitivities  -Pending read of MRI brain with and without    I have discussed this patient's plan of care and discharge plan at IDT rounds today with Case Management, Nursing, Nursing leadership, and other members of the IDT team.    Consultants/Specialty  None    Code Status  DNAR/DNI    Disposition  The patient is not medically cleared for discharge to home or a post-acute facility.      I have placed the appropriate orders for post-discharge needs.    Review of Systems  Review of Systems   Constitutional:  Positive for malaise/fatigue. Negative for chills " and fever (subjective low grade).   HENT:  Positive for congestion, ear pain, hearing loss and sinus pain (frontal sinuses). Negative for sore throat and tinnitus.    Eyes:  Negative for blurred vision, discharge and redness.   Respiratory:  Positive for cough and sputum production. Negative for hemoptysis, shortness of breath and wheezing.    Cardiovascular:  Negative for chest pain, palpitations, orthopnea and leg swelling.   Gastrointestinal:  Negative for abdominal pain, constipation, diarrhea, heartburn, nausea and vomiting.   Genitourinary:  Positive for frequency and urgency. Negative for dysuria and hematuria.   Musculoskeletal:  Positive for back pain, falls, joint pain and myalgias.   Neurological:  Positive for focal weakness (left sided weakness at baseline, worsened) and weakness. Negative for dizziness, tingling, tremors, speech change, seizures, loss of consciousness and headaches.   Psychiatric/Behavioral:  The patient is not nervous/anxious.         Physical Exam  Temp:  [36.7 °C (98.1 °F)-36.9 °C (98.5 °F)] 36.8 °C (98.2 °F)  Pulse:  [63-73] 73  Resp:  [17-19] 18  BP: (109-146)/(39-86) 129/77  SpO2:  [94 %-97 %] 94 %    Physical Exam  Vitals reviewed.   Constitutional:       Appearance: He is obese.   HENT:      Head: Normocephalic and atraumatic.      Right Ear: Decreased hearing noted. No middle ear effusion.      Left Ear: Decreased hearing noted.  No middle ear effusion.      Nose: Congestion and rhinorrhea present.      Mouth/Throat:      Mouth: Mucous membranes are dry.      Comments: Post nasal drip, cobblestoning  Eyes:      General: No scleral icterus.     Conjunctiva/sclera: Conjunctivae normal.   Cardiovascular:      Rate and Rhythm: Normal rate and regular rhythm.      Pulses: Normal pulses.      Heart sounds: Normal heart sounds. No murmur heard.  Pulmonary:      Effort: Pulmonary effort is normal.      Breath sounds: Normal breath sounds.   Abdominal:      General: Abdomen is flat.  Bowel sounds are normal.      Palpations: Abdomen is soft.   Musculoskeletal:      Right lower leg: No edema.      Left lower leg: No edema.   Skin:     General: Skin is warm and dry.      Comments: Increased sensitivity to touch in bilateral UE (L>R), possible allodynia    Neurological:      General: No focal deficit present.      Mental Status: He is alert and oriented to person, place, and time. Mental status is at baseline.      Cranial Nerves: No cranial nerve deficit.      Sensory: Sensation is intact. No sensory deficit.      Motor: Weakness present.      Coordination: Finger-Nose-Finger Test normal.      Deep Tendon Reflexes: Babinski sign absent on the right side. Babinski sign absent on the left side.      Reflex Scores:       Patellar reflexes are 2+ on the right side and 1+ on the left side.     Comments: Left lower extremity strength 2/5  Right lower extremity strength 5/5  Left interosseous strength 4/5   Right interosseous strength 5/5     Psychiatric:         Behavior: Behavior normal.         Judgment: Judgment normal.         Fluids    Intake/Output Summary (Last 24 hours) at 10/17/2023 1347  Last data filed at 10/17/2023 1000  Gross per 24 hour   Intake 740 ml   Output 700 ml   Net 40 ml       Laboratory  Recent Labs     10/16/23  1139 10/17/23  0123   WBC 9.8 9.0   RBC 4.70 4.83   HEMOGLOBIN 14.3 14.5   HEMATOCRIT 42.6 43.6   MCV 90.6 90.3   MCH 30.4 30.0   MCHC 33.6 33.3   RDW 51.0* 50.7*   PLATELETCT 306 278   MPV 10.4 9.6     Recent Labs     10/16/23  1139 10/17/23  0123   SODIUM 135 136   POTASSIUM 4.3 4.3   CHLORIDE 99 99   CO2 23 23   GLUCOSE 101* 74   BUN 16 20   CREATININE 0.99 0.83   CALCIUM 9.2 9.3             Recent Labs     10/17/23  0123   TRIGLYCERIDE 93   HDL 38*   *       Assessment/Plan  Problem Representation:    * Weakness- (present on admission)  Assessment & Plan  hemorrhagic stroke (right basal ganglia intraparenchymal hemorrhage) with residual left-sided weakness in  2021.   Now presenting with weakness that is worse compared to baseline x10 days in left interosseous muscles and left lower extremity    -MRI Brain  -PT/OT  -TSH, Vit D, B12  -On physical exam dehydrated, NS 1 L bolus    Urinary tract infection  Assessment & Plan  3-4 day history of hesitancy and frequency  -Bladder scan    Symptomatic bacteruria-   UA( bacteria, 10-20 WBC , ketones , trace blood, +leukocyte esterase)    empiric therapy with Rocephin, will de-escalate antibiotics pending culture and sensitivities    History of hemorrhagic cerebrovascular accident (CVA) with residual deficit- (present on admission)  Assessment & Plan  hemorrhagic stroke (right basal ganglia intraparenchymal hemorrhage) with residual left-sided weakness in 2021.     URI (upper respiratory infection)  Assessment & Plan  external ear pain, postnasal drip, productive cough  COVID, flu, RSV negative    -Respiratory viral panel  -Sputum culture with Gram stain  -see a/p otitis externa for further details    H/O gastric sleeve  Assessment & Plan  In 2013  Receives B12 shots, not taking specific bariatric vitamins  Risk of vitamin and mineral deficiencies, particularly fat-soluble vitamins trace and trace minerals    -will consider ordering labs to assess for vitamin and mineral deficiencies    Acute pain of left shoulder- (present on admission)  Assessment & Plan  Patient has arthritis and gets steroid injections    -Diclofenac topical 4 times daily  -Tylenol as needed for pain    Hip pain, left- (present on admission)  Assessment & Plan  History of bursitis, receives steroid injections as needed    -Diclofenac  -Tylenol    Otitis externa- (present on admission)  Assessment & Plan  Discomfort with manipulation of pinna, unable to completely visualize TM due to earwax    -Will reevaluate following cerumen removal  -Nursing order placed for bilateral cerumen removal    Hyperlipemia- (present on admission)  Assessment & Plan  History of  "\"allergic reaction\" to atorvostatin  Patient declined addition of any statin therapy stating \"the statin studies done in [place] do not have good enough evidence\"   Repeat ,  improved compared to prior (patient reports having made an effort to manage cholesterol through diet)            VTE prophylaxis: enoxaparin ppx    I have performed a physical exam and reviewed and updated ROS and Plan today (10/17/2023). In review of yesterday's note (10/16/2023), there are no changes except as documented above.          "

## 2023-10-17 NOTE — ASSESSMENT & PLAN NOTE
In 2013  Receives B12 shots, not taking specific bariatric vitamins  Risk of vitamin and mineral deficiencies, particularly fat-soluble vitamins trace and trace minerals    -will consider ordering labs to assess for vitamin and mineral deficiencies

## 2023-10-17 NOTE — ASSESSMENT & PLAN NOTE
Discomfort with manipulation of pinna, unable to completely visualize TM due to earwax  Cerumen removal completed

## 2023-10-17 NOTE — THERAPY
Occupational Therapy   Initial Evaluation     Patient Name: Gokul Baca  Age:  72 y.o., Sex:  male  Medical Record #: [id number]  Today's Date: 10/17/2023     Precautions  Precautions: Fall Risk    Assessment    Patient is 72 y.o. male admitted for worsening left sided weakness, UTI symptoms; PMH of HTN, afib s/p ablation, HDL, left hip pain. Pt normally independent with ADLs and functional mobility (limited mobility per spouse). Pt required Tee for bed mobility and transfers as well a seated grooming, maxA for lower body ADLs. Pt with a noteable left leg drag during ambulation. Will continue to see for skilled therapy while admitted as well as recommend post-acute placement.     Plan    Occupational Therapy Initial Treatment Plan   Treatment Interventions: (P) Self Care / Activities of Daily Living, Adaptive Equipment, Manual Therapy Techniques, Neuro Re-Education / Balance, Therapeutic Exercises, Therapeutic Activity  Treatment Frequency: (P) 3 Times per Week  Duration: (P) Until Therapy Goals Met    DC Equipment Recommendations: (P) Unable to determine at this time  Discharge Recommendations: (P) Recommend post-acute placement for additional occupational therapy services prior to discharge home     Objective       10/17/23 1540   Prior Living Situation   Prior Services Intermittent Physical Support for ADL Per Family   Housing / Facility 1 Story House   Bathroom Set up Walk In Shower;Shower Chair;Grab Bars   Equipment Owned 4-Wheel Walker   Lives with - Patient's Self Care Capacity Spouse   Prior Level of ADL Function   Self Feeding Independent   Grooming / Hygiene Independent   Bathing Independent   Dressing Independent   Toileting Independent   Prior Level of IADL Function   Prior Level Of Mobility Independent With Device in Home   Driving / Transportation Relatives / Others Provide Transportation   Occupation (Pre-Hospital Vocational) Retired Due To Age   History of Falls   History of Falls Yes    Precautions   Precautions Fall Risk   Pain 0 - 10 Group   Therapist Pain Assessment Post Activity Pain Same as Prior to Activity;Nurse Notified   Cognition    Cognition / Consciousness X   Level of Consciousness Alert   Attention Impaired   Initiation Impaired   Comments limited insight into deficits, easily distracted   Active ROM Upper Body   Active ROM Upper Body  WDL   Strength Upper Body   Upper Body Strength  X   Comments RUE WFL, LUE 3/5   Sensation Upper Body   Upper Extremity Sensation  WDL   Upper Body Muscle Tone   Upper Body Muscle Tone  X   Lt Upper Extremity Muscle Tone Hypotonic   Neurological Concerns   Neurological Concerns Yes   Lt Upper Extremity Fine Motor Control Impaired   Lt Upper Extremity Functional Use Impaired   Coordination Upper Body   Coordination X   Fine Motor Coordination LUE impaired   Balance Assessment   Sitting Balance (Static) Fair   Sitting Balance (Dynamic) Fair   Standing Balance (Static) Poor +   Standing Balance (Dynamic) Poor -   Weight Shift Sitting Fair   Weight Shift Standing Fair   Comments w/ FWW   Bed Mobility    Supine to Sit Minimal Assist   Sit to Supine Minimal Assist   Scooting Minimal Assist   ADL Assessment   Grooming Minimal Assist;Seated   Upper Body Dressing Minimal Assist   Lower Body Dressing Maximal Assist   How much help from another person does the patient currently need...   Putting on and taking off regular lower body clothing? 2   Bathing (including washing, rinsing, and drying)? 2   Toileting, which includes using a toilet, bedpan, or urinal? 2   Putting on and taking off regular upper body clothing? 3   Taking care of personal grooming such as brushing teeth? 3   Eating meals? 3   6 Clicks Daily Activity Score 15   Functional Mobility   Sit to Stand Minimal Assist   Bed, Chair, Wheelchair Transfer Minimal Assist   Toilet Transfers Maximal Assist   Mobility bed mobility, bathroom mobility, back to bed   Comments w/ FWW   Activity Tolerance    Sitting Edge of Bed 8 min   Standing 5 min   Short Term Goals   Short Term Goal # 1 Pt will complete ADL transfers with supervision   Short Term Goal # 2 Pt will complete LB dressing with supervision   Short Term Goal # 3 Pt will complete toileting with supervision   Education Group   Education Provided Role of Occupational Therapist   Role of Occupational Therapist Patient Response Patient;Family;Acceptance;Explanation   Occupational Therapy Initial Treatment Plan    Treatment Interventions Self Care / Activities of Daily Living;Adaptive Equipment;Manual Therapy Techniques;Neuro Re-Education / Balance;Therapeutic Exercises;Therapeutic Activity   Treatment Frequency 3 Times per Week   Duration Until Therapy Goals Met   Problem List   Problem List Decreased Homemaking Skills;Decreased Active Daily Living Skills;Decreased Upper Extremity Strength Left;Decreased Upper Extremity AROM Left;Decreased Functional Mobility;Decreased Activity Tolerance;Impaired Upper Extremity Tone Left;Impaired Postural Control / Balance;Safety Awareness Deficits / Cognition   Anticipated Discharge Equipment and Recommendations   DC Equipment Recommendations Unable to determine at this time   Discharge Recommendations Recommend post-acute placement for additional occupational therapy services prior to discharge home   Interdisciplinary Plan of Care Collaboration   IDT Collaboration with  Nursing;Physical Therapist;Family / Caregiver   Patient Position at End of Therapy In Bed;Bed Alarm On;Call Light within Reach;Tray Table within Reach;Phone within Reach;Family / Friend in Room   Collaboration Comments RN updated

## 2023-10-17 NOTE — ED NOTES
Patient transferred to [facility] accompanied by transport. Patient AAO X 4, respirations even and unlabored on room air. Patient in possessions of personal belongings.

## 2023-10-17 NOTE — THERAPY
"Physical Therapy   Initial Evaluation     Patient Name: Gokul Baca  Age:  72 y.o., Sex:  male  Medical Record #: [id number]  Today's Date: 10/17/2023     Precautions  Precautions: Fall Risk    Assessment  Patient is 72 y.o. male admitted with worsening L sided weakness. PMH includes HTN, afib s/p ablation, hemorrhagic stroke with residual L sided deficits. Pt found to have a URI and UTI. Family at bedside throughout session. R LE grossly 4/5 and L LE grossly 3/5. Min assist required for bed mob, transfers,and 10ft of gat 2x with FWW. As pt fatigued, physical assist required to progress L LE through swing phase. PT will cont while pt is in acute care setting to address deficits.     Plan    Physical Therapy Initial Treatment Plan   Treatment Plan : Bed Mobility, Gait Training, Neuro Re-Education / Balance, Self Care / Home Evaluation, Stair Training, Therapeutic Activities, Therapeutic Exercise  Treatment Frequency: 3 Times per Week  Duration: Until Therapy Goals Met    DC Equipment Recommendations: Unable to determine at this time  Discharge Recommendations: Recommend post-acute placement for additional physical therapy services prior to discharge home          10/17/23 3806   Prior Living Situation   Prior Services Intermittent Physical Support for ADL Per Family   Housing / Facility 1 Story House   Steps Into Home 0   Steps In Home 0   Equipment Owned 4-Wheel Walker   Lives with - Patient's Self Care Capacity Spouse   Comments pt lives with his wife. per wife, pt has been struggling with mobility   Prior Level of Functional Mobility   Bed Mobility Independent   Transfer Status Independent   Ambulation Independent   Ambulation Distance household   Assistive Devices Used 4-Wheel Walker   History of Falls   History of Falls Yes   Date of Last Fall   (per wife, pt is a \"professional quinten\")   Cognition    Cognition / Consciousness X   Level of Consciousness Alert   Comments appears to have some memory deficits "   Active ROM Lower Body    Active ROM Lower Body  X   Comments L LE appears to have some tone, reported pain with assessment   Strength Lower Body   Lower Body Strength  X   Comments R LE grossly 4/5, LLE grossly 3/5   Other Treatments   Other Treatments Provided family and friends at bedside throughout   Balance Assessment   Sitting Balance (Static) Fair   Sitting Balance (Dynamic) Fair   Standing Balance (Static) Poor +   Standing Balance (Dynamic) Poor -   Weight Shift Sitting Fair   Weight Shift Standing Fair   Comments FWW   Bed Mobility    Supine to Sit Minimal Assist   Sit to Supine Minimal Assist   Scooting Minimal Assist   Gait Analysis   Gait Level Of Assist Minimal Assist   Assistive Device Front Wheel Walker   Distance (Feet) 10   # of Times Distance was Traveled 2   Deviation Other (Comment)  (L foot drag)   Weight Bearing Status no restrictions   Comments physical assist to progress L LE   Functional Mobility   Sit to Stand Minimal Assist   Bed, Chair, Wheelchair Transfer Minimal Assist   Toilet Transfers Maximal Assist   Transfer Method Stand Step   Mobility in room with FWW   Patient / Family Goals    Patient / Family Goal #1 none stated   Short Term Goals    Short Term Goal # 1 pt will be able to complete supine<>sitting with bed controls and SPV in 6tx   Short Term Goal # 2 pt will be able to complete functional transfers with FWW and SPV in 6tx   Short Term Goal # 3 pt will be able to ambulate 25ft with FWW and SPV in 6tx in order to dc home   Education Group   Education Provided Role of Physical Therapist   Role of Physical Therapist Patient Response Patient;Family;Significant Other;Acceptance;Demonstration;Explanation;Verbal Demonstration;Action Demonstration   Anticipated Discharge Equipment and Recommendations   DC Equipment Recommendations Unable to determine at this time   Discharge Recommendations Recommend post-acute placement for additional physical therapy services prior to discharge  home

## 2023-10-17 NOTE — HOSPITAL COURSE
Mr. 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

## 2023-10-17 NOTE — PROGRESS NOTES
Durable Power of  paperwork with patient upon transfer to unit. Copy given to charge RN. To be scanned into patient chart.

## 2023-10-17 NOTE — ASSESSMENT & PLAN NOTE
hemorrhagic stroke (right basal ganglia intraparenchymal hemorrhage) with residual left-sided weakness in 2021.

## 2023-10-17 NOTE — ASSESSMENT & PLAN NOTE
external ear pain, postnasal drip, productive cough  COVID, flu, RSV negative    -Respiratory viral panel  -Sputum culture with Gram stain  -see a/p otitis externa for further details

## 2023-10-17 NOTE — DISCHARGE PLANNING
Case Management Discharge Planning    Admission Date: 10/16/2023  GMLOS:    ALOS: 0    6-Clicks ADL Score: 8  6-Clicks Mobility Score: 7  PT and/or OT Eval ordered: Yes  Post-acute Referrals Ordered: Yes  Post-acute Choice Obtained: No  Has referral(s) been sent to post-acute provider:  No      Anticipated Discharge Dispo: Discharge Disposition: D/T to home under HHA care in anticipation of covered skilled care (06)    DME Needed: No    Action(s) Taken: Updated Provider/Nurse on Discharge Plan and DC Assessment Complete (See below)    Escalations Completed: None    Medically Clear: No    Barriers to Discharge: Medical clearance and Pending PT Evaluation    **0805  LSW spoke to patient at bedside and confirmed facesheet information. Patient lives in [place] with spouse, Adriana. Patient reports he uses a wheelchair and walker at baseline. Patient expressed concerns about his transferring skills declining. Patient would be open to some sort of therapy. He is currently under observation status. Pending PT/OT recommendations.            Care Transition Team Assessment    Information Source  Orientation Level: Oriented X4  Information Given By: Patient  Who is responsible for making decisions for patient? : Patient    Readmission Evaluation  Is this a readmission?: No    Elopement Risk  Legal Hold: No  Ambulatory or Self Mobile in Wheelchair: No-Not an Elopement Risk  Elopement Risk: Not at Risk for Elopement    Interdisciplinary Discharge Planning  Lives with - Patient's Self Care Capacity: Alone and Unable to Care For Self, Spouse  Patient or legal guardian wants to designate a caregiver: Yes  Caregiver name: Adriana Baca  Caregiver contact info: [phone]  Support Systems: Spouse / Significant Other  Housing / Facility: 1 Story House (Admited for placement)  Durable Medical Equipment: Not Applicable    Discharge Preparedness  What is your plan after discharge?: Uncertain - pending medical team collaboration  What  are your discharge supports?: Spouse  Prior Functional Level: Uses Wheelchair, Uses Walker, Independent with Medication Management, Independent with Activities of Daily Living    Functional Assesment  Prior Functional Level: Uses Wheelchair, Uses Walker, Independent with Medication Management, Independent with Activities of Daily Living    Finances  Financial Barriers to Discharge: No  Prescription Coverage: Yes    Vision / Hearing Impairment  Vision Impairment : Yes  Right Eye Vision: Impaired, Wears Glasses  Left Eye Vision: Impaired, Wears Glasses (peripheral blindness)  Hearing Impairment : No    Advance Directive  Advance Directive?: POLST    Domestic Abuse  Have you ever been the victim of abuse or violence?: No  Physical Abuse or Sexual Abuse: No  Verbal Abuse or Emotional Abuse: No  Possible Abuse/Neglect Reported to:: Not Applicable    Discharge Risks or Barriers  Discharge risks or barriers?: No    Anticipated Discharge Information  Discharge Disposition: D/T to home under A care in anticipation of covered skilled care (06)

## 2023-10-17 NOTE — PROGRESS NOTES
..4 Eyes Skin Assessment Completed by Tomasa RN and SLICK Montemayor.    Head WDL  Ears WDL  Nose WDL  Mouth WDL  Neck WDL  Breast/Chest WDL  Shoulder Blades WDL  Spine WDL  (R) Arm/Elbow/Hand Redness and Blanching elbows  (L) Arm/Elbow/Hand Redness and Blanching elbows  Abdomen WDL  Groin WDL  Scrotum/Coccyx/Buttocks Redness, Blanching, and Discoloration BRUISING ON BUTTOCKS FROM RECENT FALL  (R) Leg WDL  (L) Leg WDL  (R) Heel/Foot/Toe Redness and Blanching heels  (L) Heel/Foot/Toe Redness and Blanching heels          Devices In Places Tele Box      Interventions In Place Waffle Overlay, TAP System, and Pillows    Possible Skin Injury No    Pictures Uploaded Into Epic N/A  Wound Consult Placed N/A  RN Wound Prevention Protocol Ordered Yes

## 2023-10-18 ENCOUNTER — HOSPITAL ENCOUNTER (INPATIENT)
Facility: REHABILITATION | Age: 73
End: 2023-10-18
Attending: PHYSICAL MEDICINE & REHABILITATION | Admitting: PHYSICAL MEDICINE & REHABILITATION
Payer: MEDICARE

## 2023-10-18 ENCOUNTER — PHARMACY VISIT (OUTPATIENT)
Dept: PHARMACY | Facility: MEDICAL CENTER | Age: 73
End: 2023-10-18
Payer: COMMERCIAL

## 2023-10-18 VITALS
SYSTOLIC BLOOD PRESSURE: 145 MMHG | DIASTOLIC BLOOD PRESSURE: 73 MMHG | HEIGHT: 65 IN | WEIGHT: 192.9 LBS | RESPIRATION RATE: 16 BRPM | BODY MASS INDEX: 32.14 KG/M2 | OXYGEN SATURATION: 91 % | HEART RATE: 54 BPM | TEMPERATURE: 96.9 F

## 2023-10-18 PROCEDURE — 700102 HCHG RX REV CODE 250 W/ 637 OVERRIDE(OP)

## 2023-10-18 PROCEDURE — 99215 OFFICE O/P EST HI 40 MIN: CPT | Mod: 25 | Performed by: PHYSICAL MEDICINE & REHABILITATION

## 2023-10-18 PROCEDURE — A9270 NON-COVERED ITEM OR SERVICE: HCPCS

## 2023-10-18 PROCEDURE — G2212 PROLONG OUTPT/OFFICE VIS: HCPCS | Performed by: PHYSICAL MEDICINE & REHABILITATION

## 2023-10-18 PROCEDURE — 700111 HCHG RX REV CODE 636 W/ 250 OVERRIDE (IP)

## 2023-10-18 PROCEDURE — 99239 HOSP IP/OBS DSCHRG MGMT >30: CPT | Mod: GC | Performed by: INTERNAL MEDICINE

## 2023-10-18 PROCEDURE — G0378 HOSPITAL OBSERVATION PER HR: HCPCS

## 2023-10-18 PROCEDURE — 700101 HCHG RX REV CODE 250

## 2023-10-18 PROCEDURE — RXMED WILLOW AMBULATORY MEDICATION CHARGE

## 2023-10-18 RX ORDER — TAMSULOSIN HYDROCHLORIDE 0.4 MG/1
0.4 CAPSULE ORAL
Status: DISCONTINUED | OUTPATIENT
Start: 2023-10-19 | End: 2023-10-18 | Stop reason: HOSPADM

## 2023-10-18 RX ORDER — POLYETHYLENE GLYCOL 3350 17 G/17G
1 POWDER, FOR SOLUTION ORAL 2 TIMES DAILY
Status: DISCONTINUED | OUTPATIENT
Start: 2023-10-18 | End: 2023-10-18 | Stop reason: HOSPADM

## 2023-10-18 RX ORDER — SULFAMETHOXAZOLE AND TRIMETHOPRIM 800; 160 MG/1; MG/1
1 TABLET ORAL EVERY 12 HOURS
Qty: 2 TABLET | Refills: 0 | Status: ACTIVE | OUTPATIENT
Start: 2023-10-19 | End: 2023-11-13

## 2023-10-18 RX ORDER — SULFAMETHOXAZOLE AND TRIMETHOPRIM 800; 160 MG/1; MG/1
1 TABLET ORAL EVERY 12 HOURS
Status: DISCONTINUED | OUTPATIENT
Start: 2023-10-19 | End: 2023-10-18 | Stop reason: HOSPADM

## 2023-10-18 RX ORDER — TAMSULOSIN HYDROCHLORIDE 0.4 MG/1
0.4 CAPSULE ORAL
Qty: 30 CAPSULE | Refills: 0 | Status: SHIPPED | OUTPATIENT
Start: 2023-10-19 | End: 2023-11-13

## 2023-10-18 RX ORDER — SULFAMETHOXAZOLE AND TRIMETHOPRIM 800; 160 MG/1; MG/1
1 TABLET ORAL EVERY 12 HOURS
Status: DISCONTINUED | OUTPATIENT
Start: 2023-10-19 | End: 2023-10-18

## 2023-10-18 RX ADMIN — CEFTRIAXONE SODIUM 1000 MG: 10 INJECTION, POWDER, FOR SOLUTION INTRAVENOUS at 06:14

## 2023-10-18 RX ADMIN — LIDOCAINE 2 PATCH: 50 PATCH TOPICAL at 13:53

## 2023-10-18 RX ADMIN — LOSARTAN POTASSIUM 25 MG: 50 TABLET, FILM COATED ORAL at 06:14

## 2023-10-18 RX ADMIN — ASPIRIN 81 MG: 81 TABLET, COATED ORAL at 06:14

## 2023-10-18 RX ADMIN — POLYETHYLENE GLYCOL 3350 1 PACKET: 17 POWDER, FOR SOLUTION ORAL at 10:15

## 2023-10-18 RX ADMIN — OMEPRAZOLE 20 MG: 20 CAPSULE, DELAYED RELEASE ORAL at 06:14

## 2023-10-18 ASSESSMENT — ENCOUNTER SYMPTOMS
BACK PAIN: 1
SPUTUM PRODUCTION: 1
EYE REDNESS: 0
CONSTIPATION: 0
FALLS: 1
HEMOPTYSIS: 0
ABDOMINAL PAIN: 0
DIARRHEA: 0
BLURRED VISION: 0
FEVER: 0
ORTHOPNEA: 0
MYALGIAS: 1
NERVOUS/ANXIOUS: 0
VOMITING: 0
HEARTBURN: 0
CHILLS: 0
SINUS PAIN: 1
SORE THROAT: 0
SPEECH CHANGE: 0
TREMORS: 0
LOSS OF CONSCIOUSNESS: 0
PALPITATIONS: 0
NAUSEA: 0
COUGH: 1
EYE DISCHARGE: 0
FOCAL WEAKNESS: 1
HEADACHES: 0
SHORTNESS OF BREATH: 0
TINGLING: 0
WEAKNESS: 1
SEIZURES: 0
DIZZINESS: 0
WHEEZING: 0

## 2023-10-18 ASSESSMENT — FIBROSIS 4 INDEX: FIB4 SCORE: 1.18

## 2023-10-18 ASSESSMENT — PAIN DESCRIPTION - PAIN TYPE: TYPE: ACUTE PAIN

## 2023-10-18 NOTE — FACE TO FACE
Face to Face Supporting Documentation - Home Health    The encounter with this patient was in whole or in part the primary reason for home health admission.    Date of encounter:   Patient:                    MRN:                       YOB: 2021  Gokul Baca  [id number]  1950     Home health to see patient for:  Physical Therapy evaluation and treatment and Occupational therapy evaluation and treatment    Skilled need for:  Exacerbation of Chronic Disease State Worsening left sided weakness    Skilled nursing interventions to include:  Comment: PT and OT    Homebound status evidenced by:  Need the aid of supportive devices such as crutches, canes, wheelchairs or walkers, Needs the assistance of another person in order to leave the home, or Have a condition such that leaving his or her home is medically contraindicated. Leaving home requires a considerable and taxing effort. There is a normal inability to leave the home.    Community Physician to provide follow up care: Jennifer Waldron M.D.     Optional Interventions? No      I certify the face to face encounter for this home health care referral meets the CMS requirements and the encounter/clinical assessment with the patient was, in whole, or in part, for the medical condition(s) listed above, which is the primary reason for home health care. Based on my clinical findings: the service(s) are medically necessary, support the need for home health care, and the homebound criteria are met.  I certify that this patient has had a face to face encounter by myself.  Sunitha Agrawal M.D. - NPI: [id number]

## 2023-10-18 NOTE — CONSULTS
Physical Medicine and Rehabilitation Consultation              Date of initial consultation: 10/18/2023  Consulting provider: Sunitha Agrawal M.D.  Reason for consultation: assess for acute inpatient rehab appropriateness  LOS: 0 Day(s)    Chief complaint: weakness    HPI: The patient is a 72 y.o. right hand dominant male with a past medical history of hypertension, A-fib status post ablation in 2022, hemorrhagic stroke with residual left-sided weakness;  who presented on 10/16/2023 10:47 AM with worsening left-sided weakness.  MR brain negative for acute intracranial abnormality, UA positive for UTI.  Patient is now on ceftriaxone 1000 mg IV daily.    The patient currently reports that he is back to baseline. His wife at bedside confirms this. Patient was walking about 20' at home with FWW, and now is walking 10' x2. He is requiring max assist with ADLs which wife feels she can provide. Both patient and spouse feel comfortable returning home at this level.     ROS  Pertinent positives are mentioned in the HPI, all others reviewed and are negative.    Social Hx:  1 SH  0 ERICA  With: Spouse    THERAPY:  Restrictions: Fall risk  PT: Functional mobility   10/17: Walking 10 feet x 2 with front wheel walker at min assist    OT: ADLs  10/17: Max assist lower body dressing    SLP:   None    IMAGING:  MRI brain with and without contrast 10/17/2023  No abnormal intracranial enhancement is identified.    PROCEDURES:  None    PMH:  Past Medical History:   Diagnosis Date    Anemia     Atrial fibrillation (HCC)     GERD (gastroesophageal reflux disease)     Heart burn     High cholesterol     Hyperlipidemia     Hypertension     Iron deficiency anemia     Pneumonia     as a young adult     Sleep apnea     cpap     Snoring     Stroke (HCC) 12/19/2021    left sided weakness     UTI (urinary tract infection) 03/2022       PSH:  Past Surgical History:   Procedure Laterality Date    KNEE ARTHROPLASTY TOTAL Left     OTHER      sinus  "sx        FHX:  Family History   Problem Relation Age of Onset    Heart Disease Mother     Heart Disease Brother     Alcohol abuse Son        Medications:  Current Facility-Administered Medications   Medication Dose    polyethylene glycol/lytes (Miralax) PACKET 1 Packet  1 Packet    cefTRIAXone (Rocephin) syringe 1,000 mg  1,000 mg    lidocaine (Lidoderm) 5 % 2 Patch  2 Patch    aspirin EC tablet 81 mg  81 mg    acetaminophen (Tylenol) tablet 650 mg  650 mg    losartan (Cozaar) tablet 25 mg  25 mg    omeprazole (PriLOSEC) capsule 20 mg  20 mg    enoxaparin (Lovenox) inj 40 mg  40 mg    diclofenac sodium (Voltaren) 1 % gel 2 g  2 g       Allergies:  Allergies   Allergen Reactions    Codeine Unspecified     \"Become out of it\"    Ibuprofen Hives    Lactose Unspecified     Stomach upset          Physical Exam:  Vitals: BP (!) 145/73   Pulse (!) 54   Temp 36.1 °C (96.9 °F) (Temporal)   Resp 16   Ht 1.651 m (5' 5\")   Wt 87.5 kg (192 lb 14.4 oz)   SpO2 91%   Gen: NAD  Head: NC/AT  Eyes/ Nose/ Mouth: PERRLA, moist mucous membranes  Cardio: RRR, good distal perfusion, warm extremities  Pulm: normal respiratory effort, no cyanosis   Abd: Soft NTND, negative borborygmi   Ext: No peripheral edema. No calf tenderness. No clubbing.    Mental status: answers questions appropriately follows commands  Speech: fluent, no aphasia or dysarthria    Motor:      Upper Extremity  Myotome R L   Shoulder flexion C5 5 4/5   Elbow flexion C5 5 4/5   Wrist extension C6 5 4/5   Elbow extension C7 5 4/5   Finger flexion C8 5 4/5   Finger abduction T1 5 4/5     Lower Extremity Myotome R L   Hip flexion L2 4/5 4/5   Knee extension L3 5 4/5   Ankle dorsiflexion L4 5 4/5   Toe extension L5 5 4/5   Ankle plantarflexion S1 5 4/5     Sensory:   intact to light touch through out    Labs: Reviewed and significant for   Recent Labs     10/16/23  1138 10/16/23  1139 10/17/23  0123   RBC  --  4.70 4.83   HEMOGLOBIN  --  14.3 14.5   HEMATOCRIT  --  42.6 " 43.6   PLATELETCT  --  306 278   IRON 24*  --   --    FERRITIN  --   --  640.0*   TOTIRONBC 196*  --   --      Recent Labs     10/16/23  1139 10/17/23  0123   SODIUM 135 136   POTASSIUM 4.3 4.3   CHLORIDE 99 99   CO2 23 23   GLUCOSE 101* 74   BUN 16 20   CREATININE 0.99 0.83   CALCIUM 9.2 9.3     No results found for this or any previous visit (from the past 24 hour(s)).      ASSESSMENT:  Patient is a 72 y.o. male admitted with UTI and stroke recrudescence now s/p abx therapy     Rehabilitation: Impaired ADLs and mobility  Patient is not a candidate for IPR    All cases are subject to administrative review and recommendations may change    Disposition recommendations:  - OK to DC home with spousal support   - Not a candidate for IPR, patient is back to baseline, no new acute neurological process seen on MR Brain, and patient lacks medical necessity to remain hospitalized   -PMR will sign off, please reconsult or reach out via Voalte if further evaluation or medical management is requested    Medical Complexity:    Left sided weakness   - Secondary to stroke recrudescence in the setting of UTI   - MR brain negative for acute process   - Continue PT/OT while in house   - OK to DC home and continue with outpatient therapies. Home health is an option but he is able to travel to outpatient therapy which is preferred.   - Secondary stroke prevention with ASA 81mg     UTI  - Treated with ceftriaxone   - etiology is likely from urinary retention     Urinary retention   - starting flomax 0.4mg QAM     Hypertension   - losartan 25mg daily       DVT PPX: Lovenox       Thank you for allowing us to participate in the care of this patient.     Patient was seen for >80 minutes on unit/floor of which > 50% of time was spent on counseling and coordination of care regarding the above, including prognosis, risk reduction, benefits of treatment, and options for next stage of care.    Basim Andersen, DO   Physical Medicine and  Rehabilitation     Please note that this dictation was created using voice recognition software. I have made every reasonable attempt to correct obvious errors, but there may be errors of grammar and possibly content that I did not discover before finalizing the note.

## 2023-10-18 NOTE — PROGRESS NOTES
"[facility] Internal Medicine Daily Progress Note    Date of Service  10/18/2023    R Team: R IM Green Team   Attending: Sal Odonnell M.d.  Senior Resident: Dr. Sunitha Agrawal   Intern:  Dr. Ingrid Lugo  Contact Number: [phone]    Chief Complaint  Gokul Baca is a 72 y.o. 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    Hospital Course  Gokul Baca is a 72 y.o. 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      Interval Problem Update  -No acute events overnight.  Patient feels his condition is improving and his strength on the left side is back to baseline..  Does complain about nursing staff not addressing his issues quickly.  -MRI with no signs of stroke  -Will transition patient from IV ceftriaxone to Bactrim  -PM and R consult for acute rehab    I have discussed this patient's plan of care and discharge plan at IDT rounds today with Case Management, Nursing, Nursing leadership, and other members of the IDT team.    Consultants/Specialty  None    Code Status  DNAR/DNI    Disposition  The patient is medically cleared for discharge to home or a post-acute facility.  Anticipate discharge to: skilled nursing facility    I have placed the appropriate orders for post-discharge needs.    Review of Systems  Review of Systems   Constitutional:  Positive for malaise/fatigue. Negative for chills and fever (subjective low grade).   HENT:  Positive for congestion, ear " pain, hearing loss and sinus pain (frontal sinuses). Negative for sore throat and tinnitus.    Eyes:  Negative for blurred vision, discharge and redness.   Respiratory:  Positive for cough and sputum production. Negative for hemoptysis, shortness of breath and wheezing.    Cardiovascular:  Negative for chest pain, palpitations, orthopnea and leg swelling.   Gastrointestinal:  Negative for abdominal pain, constipation, diarrhea, heartburn, nausea and vomiting.   Genitourinary:  Positive for frequency and urgency. Negative for dysuria and hematuria.   Musculoskeletal:  Positive for back pain, falls, joint pain and myalgias.   Neurological:  Positive for focal weakness (left sided weakness at baseline, worsened) and weakness. Negative for dizziness, tingling, tremors, speech change, seizures, loss of consciousness and headaches.   Psychiatric/Behavioral:  The patient is not nervous/anxious.         Physical Exam  Temp:  [36.1 °C (96.9 °F)-36.9 °C (98.5 °F)] 36.1 °C (96.9 °F)  Pulse:  [54-81] 54  Resp:  [16-18] 16  BP: (128-160)/(71-86) 145/73  SpO2:  [91 %-96 %] 91 %    Physical Exam  Vitals reviewed.   Constitutional:       Appearance: He is obese.   HENT:      Head: Normocephalic and atraumatic.      Right Ear: Decreased hearing noted. No middle ear effusion.      Left Ear: Decreased hearing noted.  No middle ear effusion.      Nose: No congestion or rhinorrhea.      Mouth/Throat:      Mouth: Mucous membranes are dry.      Comments: Post nasal drip, cobblestoning  Eyes:      General: No scleral icterus.     Conjunctiva/sclera: Conjunctivae normal.   Cardiovascular:      Rate and Rhythm: Normal rate and regular rhythm.      Pulses: Normal pulses.      Heart sounds: Normal heart sounds. No murmur heard.  Pulmonary:      Effort: Pulmonary effort is normal.      Breath sounds: Normal breath sounds.   Abdominal:      General: Abdomen is flat. Bowel sounds are normal.      Palpations: Abdomen is soft.   Musculoskeletal:       Right lower leg: No edema.      Left lower leg: No edema.   Skin:     General: Skin is warm and dry.      Comments: Increased sensitivity to touch in bilateral UE (L>R), possible allodynia    Neurological:      General: No focal deficit present.      Mental Status: He is alert and oriented to person, place, and time. Mental status is at baseline.      Cranial Nerves: No cranial nerve deficit.      Sensory: Sensation is intact. No sensory deficit.      Motor: Weakness present.      Coordination: Finger-Nose-Finger Test normal.      Deep Tendon Reflexes: Babinski sign absent on the right side. Babinski sign absent on the left side.      Reflex Scores:       Patellar reflexes are 2+ on the right side and 1+ on the left side.     Comments: Left lower extremity strength 2/5  Right lower extremity strength 5/5  Left interosseous strength 4/5   Right interosseous strength 5/5     Psychiatric:         Behavior: Behavior normal.         Judgment: Judgment normal.         Fluids    Intake/Output Summary (Last 24 hours) at 10/18/2023 1438  Last data filed at 10/18/2023 0810  Gross per 24 hour   Intake 980 ml   Output 700 ml   Net 280 ml       Laboratory  Recent Labs     10/16/23  1139 10/17/23  0123   WBC 9.8 9.0   RBC 4.70 4.83   HEMOGLOBIN 14.3 14.5   HEMATOCRIT 42.6 43.6   MCV 90.6 90.3   MCH 30.4 30.0   MCHC 33.6 33.3   RDW 51.0* 50.7*   PLATELETCT 306 278   MPV 10.4 9.6     Recent Labs     10/16/23  1139 10/17/23  0123   SODIUM 135 136   POTASSIUM 4.3 4.3   CHLORIDE 99 99   CO2 23 23   GLUCOSE 101* 74   BUN 16 20   CREATININE 0.99 0.83   CALCIUM 9.2 9.3             Recent Labs     10/17/23  0123   TRIGLYCERIDE 93   HDL 38*   *       Assessment/Plan  Problem Representation:    * Weakness- (present on admission)  Assessment & Plan  hemorrhagic stroke (right basal ganglia intraparenchymal hemorrhage) with residual left-sided weakness in 2021.   Now presenting with weakness that is worse compared to baseline x10 days  "in left interosseous muscles and left lower extremity    -MRI no signs or symptoms of stroke  -PT/OT recommend acute rehab.  PM and R consult placed.  -TSH vitamin B12 within normal limits    URI (upper respiratory infection)  Assessment & Plan  external ear pain, postnasal drip, productive cough  COVID, flu, RSV negative    -Respiratory viral panel  -Sputum culture with Gram stain  -see a/p otitis externa for further details    H/O gastric sleeve  Assessment & Plan  In 2013  Receives B12 shots, not taking specific bariatric vitamins  Risk of vitamin and mineral deficiencies, particularly fat-soluble vitamins trace and trace minerals    -will consider ordering labs to assess for vitamin and mineral deficiencies    Acute pain of left shoulder- (present on admission)  Assessment & Plan  Patient has arthritis and gets steroid injections    -Diclofenac topical 4 times daily  -Tylenol as needed for pain    Hip pain, left- (present on admission)  Assessment & Plan  History of bursitis, receives steroid injections as needed    -Diclofenac  -Tylenol    Urinary tract infection  Assessment & Plan  3-4 day history of hesitancy and frequency  -Bladder scan    Symptomatic bacteruria-   UA( bacteria, 10-20 WBC , ketones , trace blood, +leukocyte esterase)    -Stopped IV Rocephin to transition to p.o. Bactrim    Otitis externa- (present on admission)  Assessment & Plan  Discomfort with manipulation of pinna, unable to completely visualize TM due to earwax  Cerumen removal completed    History of hemorrhagic cerebrovascular accident (CVA) with residual deficit- (present on admission)  Assessment & Plan  hemorrhagic stroke (right basal ganglia intraparenchymal hemorrhage) with residual left-sided weakness in 2021.     Hyperlipemia- (present on admission)  Assessment & Plan  History of \"allergic reaction\" to atorvostatin  Patient declined addition of any statin therapy stating \"the statin studies done in [place] do not have good " "enough evidence\"   Repeat ,  improved compared to prior (patient reports having made an effort to manage cholesterol through diet)            VTE prophylaxis: enoxaparin ppx    I have performed a physical exam and reviewed and updated ROS and Plan today (10/18/2023). In review of yesterday's note (10/17/2023), there are no changes except as documented above.          "

## 2023-10-18 NOTE — CARE PLAN
The patient is Stable - Low risk of patient condition declining or worsening    Shift Goals  Clinical Goals: Rest, ADLs  Patient Goals: Rest  Family Goals: KAELA    Progress made toward(s) clinical / shift goals:    Problem: Pain - Standard  Goal: Alleviation of pain or a reduction in pain to the patient’s comfort goal  Outcome: Progressing  Note: Used comfort measures and lidocaine patch to help patient with pain. Patient was able to sleep comfortably most of the night.      Problem: Skin Integrity  Goal: Skin integrity is maintained or improved  Outcome: Progressing  Note: Patient has waffle overlay. Has urinal at bedside and educated on its use.

## 2023-10-18 NOTE — DISCHARGE PLANNING
Received Choice form @: [id number]  Agency/Facility Name: [facility]  Referral sent per Choice form @: Not sent, need order    @[phone]  DPA sent Home Health referral to [facility].

## 2023-10-18 NOTE — DISCHARGE PLANNING
Case Management Discharge Planning    Admission Date: 10/16/2023  GMLOS:    ALOS: 0    6-Clicks ADL Score: 15  6-Clicks Mobility Score: 11  PT and/or OT Eval ordered: Yes  Post-acute Referrals Ordered: NA  Post-acute Choice Obtained: Yes  Has referral(s) been sent to post-acute provider:  [facility]OU[facility]      Anticipated Discharge Dispo: Discharge Disposition: D/T to home under HHA care in anticipation of covered skilled care (06)    DME Needed: No    Action(s) Taken: Updated Provider/Nurse on Discharge Plan  SW spoke to patient at bedside. Patient stated he wanted to go home with home health. Patient had [facility] in the past and would like to have them come back. Choice form signed and sent to [facility]. SW requested for HH orders. PMR evaluated and declined, recommending home. No other needs reported at this time    Escalations Completed: None    Medically Clear: No    Next Steps: HH order pending     Barriers to Discharge: Medical clearance and Outpatient referrals pending

## 2023-10-18 NOTE — DISCHARGE PLANNING
[facility] Transitional Care Coordination    Referral from:  Dr. Sunitha Agrawal  Insurance Provider on Facesheet:  Medicare/[employer]  Potential Rehab diagnosis: Debility    Chart review indicates patient has ongoing medical management and therapy needs to possibly meet inpatient rehab facility criteria with the goal of returning to community.      D/C Support: Spouse    Physiatry to consult per protocol.      Last Covid test:    Thank you for the referral.

## 2023-10-18 NOTE — DISCHARGE INSTRUCTIONS
-Finish course of antibiotics, next dose is tomorrow in the morning and in the afternoon.  -Follow-up with your primary care provider for hospitalization follow-up.  -Continue all other medications as directed.

## 2023-10-18 NOTE — CARE PLAN
The patient is Stable - Low risk of patient condition declining or worsening    Shift Goals  Clinical Goals: rest, ambulation , skin integrity  Patient Goals: Rest  Family Goals: KAELA    Progress made toward(s) clinical / shift goals:  Patient able to tolerate turning to sides for skin integrity.     Patient is not progressing towards the following goals:     Problem: Fall Risk  Goal: Patient will remain free from falls  Education provided on use of call lights for assistance.         Problem: Skin Integrity  Goal: Skin integrity is maintained or improved  Outcome: Progressing

## 2023-10-18 NOTE — PROGRESS NOTES
Patient  transferring to discharge lounge at this time. Discharge RN to provide discharge education, follow-up information and remove PIV. Patient transporting via his own wheelchair. Belongings with patient. Meds to beds to be given at discharge lounge. Discharge RN updated. Patient accompanied by wife.

## 2023-10-19 LAB
1,25(OH)2D3 SERPL-MCNC: 64.4 PG/ML (ref 19.9–79.3)
B PARAP IS1001 DNA NPH QL NAA+NON-PROBE: NOT DETECTED
B PERT.PT PRMT NPH QL NAA+NON-PROBE: NOT DETECTED
C PNEUM DNA NPH QL NAA+NON-PROBE: NOT DETECTED
FLUAV RNA NPH QL NAA+NON-PROBE: NOT DETECTED
FLUBV RNA NPH QL NAA+NON-PROBE: NOT DETECTED
HADV DNA NPH QL NAA+NON-PROBE: NOT DETECTED
HCOV 229E RNA NPH QL NAA+NON-PROBE: NOT DETECTED
HCOV HKU1 RNA NPH QL NAA+NON-PROBE: NOT DETECTED
HCOV NL63 RNA NPH QL NAA+NON-PROBE: NOT DETECTED
HCOV OC43 RNA NPH QL NAA+NON-PROBE: NOT DETECTED
HMPV RNA NPH QL NAA+NON-PROBE: NOT DETECTED
HPIV1 RNA NPH QL NAA+NON-PROBE: NOT DETECTED
HPIV2 RNA NPH QL NAA+NON-PROBE: NOT DETECTED
HPIV3 RNA NPH QL NAA+NON-PROBE: NOT DETECTED
HPIV4 RNA NPH QL NAA+NON-PROBE: NOT DETECTED
M PNEUMO DNA NPH QL NAA+NON-PROBE: NOT DETECTED
RSV RNA NPH QL NAA+NON-PROBE: NOT DETECTED
RV+EV RNA NPH QL NAA+NON-PROBE: NOT DETECTED
SARS-COV-2 RNA NPH QL NAA+NON-PROBE: NOTDETECTED

## 2023-10-19 NOTE — DISCHARGE SUMMARY
"[facility] Internal Medicine Discharge Summary    Attending: Dr. Sal Odonnell MD   Senior Resident: Dr. Sunitha Agrawal MD  Intern:  Dr. Ingrid Lugo MD  Contact Number: [phone]    CHIEF COMPLAINT ON ADMISSION  Chief Complaint   Patient presents with    Flu Like Symptoms     X2 days    Weakness       Reason for Admission  Flu like symptoms      Admission Date  10/16/2023    CODE STATUS  DNAR/DNI    HPI & HOSPITAL COURSE  This is a 72 y.o. male here with worsening left-sided weakness.   Gokul Baca is a 72 y.o. 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  Patient was treated with IV ceftriaxone for possible UTI.  MRI of the brain showed no signs of hemorrhage or ischemic stroke.  Patient was back to baseline at discharge.  Home health for PT and OT ordered.    Therefore, he is discharged in good and stable condition to home with organized home healthcare and close outpatient follow-up.    The patient met 2-midnight criteria for an inpatient stay at the time of discharge.    Discharge Date  10/18/2023    Physical Exam on Day of Discharge  Constitutional:       Appearance: He is obese.   HENT:      Head: Normocephalic and atraumatic.      Right Ear: Decreased hearing noted. No middle ear effusion.      Left Ear: Decreased hearing noted.  No middle ear effusion.      Nose: No congestion or rhinorrhea.      Mouth/Throat:      Mouth: Mucous membranes are moist.  Eyes:      General: No scleral icterus.     Conjunctiva/sclera: Conjunctivae normal.   Cardiovascular:      Rate and Rhythm: Normal rate and regular rhythm.      Pulses: Normal pulses.      Heart sounds: Normal heart sounds. No murmur heard.  Pulmonary:      Effort: Pulmonary effort is normal.      Breath " sounds: Normal breath sounds.   Abdominal:      General: Abdomen is flat. Bowel sounds are normal.      Palpations: Abdomen is soft.   Musculoskeletal:      Right lower leg: No edema.      Left lower leg: No edema.   Skin:     General: Skin is warm and dry.      Comments: Increased sensitivity to touch in bilateral UE (L>R), possible allodynia    Neurological:      General: No focal deficit present.      Mental Status: He is alert and oriented to person, place, and time. Mental status is at baseline.      Cranial Nerves: No cranial nerve deficit.      Sensory: Sensation is intact. No sensory deficit.      Motor: Weakness present.      Coordination: Finger-Nose-Finger Test normal.      Deep Tendon Reflexes: Babinski sign absent on the right side. Babinski sign absent on the left side.      Reflex Scores:       Patellar reflexes are 2+ on the right side and 1+ on the left side.     Comments: Left lower extremity strength 2/5  Right lower extremity strength 5/5  Left interosseous strength 4/5   Right interosseous strength 5/5     Psychiatric:         Behavior: Behavior normal.         Judgment: Judgment normal.        FOLLOW UP ITEMS POST DISCHARGE  -Finish course of antibiotics, next dose is tomorrow in the morning and in the afternoon.  -Follow-up with your primary care provider for hospitalization follow-up.  -Continue all other medications as directed.    DISCHARGE DIAGNOSES  Principal Problem:    Weakness (Chronic) (POA: Yes)  Active Problems:    Hyperlipemia (POA: Yes)    History of hemorrhagic cerebrovascular accident (CVA) with residual deficit (Chronic) (POA: Yes)    Impaired mobility and ADLs (POA: Yes)    Otitis externa (POA: Yes)    Urinary tract infection (Chronic) (POA: Unknown)    Hip pain, left (POA: Yes)    Presence of Watchman left atrial appendage closure device (POA: Yes)    Acute pain of left shoulder (POA: Yes)    H/O gastric sleeve (POA: Unknown)    URI (upper respiratory infection) (POA:  "Unknown)  Resolved Problems:    Urinary hesitancy (POA: Unknown)      FOLLOW UP  Future Appointments   Date Time Provider Department Center   11/14/2023 10:20 AM Jennifer Waldron M.D. [facility]   12/11/2023 11:00 AM Maurizio Gonzalez M.D. [facility] None   12/12/2023 10:20 AM Jennifer Waldron M.D. [facility]   12/15/2023  1:20 PM [facility] None   1/23/2024  1:30 PM Vic Flores M.D. [facility] None     No follow-up provider specified.    MEDICATIONS ON DISCHARGE     Medication List        START taking these medications        Instructions   sulfamethoxazole-trimethoprim 800-160 MG tablet  Start taking on: October 19, 2023  Commonly known as: Bactrim DS   Take 1 Tablet by mouth every 12 hours.  Dose: 1 Tablet     tamsulosin 0.4 MG capsule  Start taking on: October 19, 2023  Commonly known as: Flomax   Take 1 Capsule by mouth 1/2 hour after breakfast.  Dose: 0.4 mg            CONTINUE taking these medications        Instructions   aspirin EC 81 MG Tbec  Commonly known as: Ecotrin   Take 81 mg by mouth every day.  Dose: 81 mg     hydrALAZINE 10 MG Tabs  Commonly known as: Apresoline   Take 1 Tablet by mouth 2 times a day.  Dose: 10 mg     losartan 25 MG Tabs  Commonly known as: Cozaar   Take 1 Tablet by mouth every day. Hold if BP is <110/70  Dose: 25 mg     omeprazole 20 MG delayed-release capsule  Commonly known as: PriLOSEC   Take 20 mg by mouth 2 times a day.  Dose: 20 mg              Allergies  Allergies   Allergen Reactions    Codeine Unspecified     \"Become out of it\"    Ibuprofen Hives    Lactose Unspecified     Stomach upset        DIET  Orders Placed This Encounter   Procedures    Diet Order Diet: Cardiac     Standing Status:   Standing     Number of Occurrences:   1     Order Specific Question:   Diet:     Answer:   Cardiac [6]       ACTIVITY  As tolerated.  Weight bearing as tolerated    CONSULTATIONS  Physical medicine and rehabilitation    PROCEDURES  None    LABORATORY  Lab Results "   Component Value Date    SODIUM 136 10/17/2023    POTASSIUM 4.3 10/17/2023    CHLORIDE 99 10/17/2023    CO2 23 10/17/2023    GLUCOSE 74 10/17/2023    BUN 20 10/17/2023    CREATININE 0.83 10/17/2023        Lab Results   Component Value Date    WBC 9.0 10/17/2023    HEMOGLOBIN 14.5 10/17/2023    HEMATOCRIT 43.6 10/17/2023    PLATELETCT 278 10/17/2023        Total time of the discharge process exceeds 40 minutes.

## 2023-10-23 ENCOUNTER — TELEPHONE (OUTPATIENT)
Dept: URGENT CARE | Facility: PHYSICIAN GROUP | Age: 73
End: 2023-10-23
Payer: MEDICARE

## 2023-10-24 ENCOUNTER — TELEPHONE (OUTPATIENT)
Dept: MEDICAL GROUP | Facility: PHYSICIAN GROUP | Age: 73
End: 2023-10-24
Payer: MEDICARE

## 2023-10-24 DIAGNOSIS — R53.1 WEAKNESS: ICD-10-CM

## 2023-10-24 DIAGNOSIS — R53.1 GENERALIZED WEAKNESS: ICD-10-CM

## 2023-10-24 DIAGNOSIS — I63.9 CEREBROVASCULAR ACCIDENT (CVA), UNSPECIFIED MECHANISM (HCC): ICD-10-CM

## 2023-10-24 DIAGNOSIS — N39.0 URINARY TRACT INFECTION WITHOUT HEMATURIA, SITE UNSPECIFIED: ICD-10-CM

## 2023-10-24 NOTE — TELEPHONE ENCOUNTER
Pt is requesting a referral to [facility], pt was just seen in Hospital and would like advice on how to prevent UTI's.

## 2023-10-24 NOTE — TELEPHONE ENCOUNTER
Spoke with Jodi ADAMS from [facility]. He states he is there right now doing an Eval and he is noticing some popping in his hips, he is wondering if you can place a Home Xray order for L hip.

## 2023-10-25 DIAGNOSIS — R29.4 CLICKING OF LEFT HIP: ICD-10-CM

## 2023-10-26 ENCOUNTER — HOSPITAL ENCOUNTER (OUTPATIENT)
Dept: RADIOLOGY | Facility: MEDICAL CENTER | Age: 73
End: 2023-10-26
Attending: FAMILY MEDICINE
Payer: MEDICARE

## 2023-10-26 DIAGNOSIS — R29.4 CLICKING OF LEFT HIP: ICD-10-CM

## 2023-10-26 PROCEDURE — 73502 X-RAY EXAM HIP UNI 2-3 VIEWS: CPT | Mod: LT

## 2023-11-07 ENCOUNTER — TELEPHONE (OUTPATIENT)
Dept: CARDIOLOGY | Facility: MEDICAL CENTER | Age: 73
End: 2023-11-07
Payer: MEDICARE

## 2023-11-07 NOTE — TELEPHONE ENCOUNTER
S/w patients wife regarding [facility] recommendations below, has a f/u appt with PCP next week. Patients wife verbalized understanding.

## 2023-11-07 NOTE — TELEPHONE ENCOUNTER
Prince Brown M.D.  You 13 minutes ago (3:33 PM)       Please let pt know that EP clinic does not generally manage blood pressure. Please ensure they have PCP appointment for BP evaluation and if not please provide them a referral to vascular medicine for BP management. I can still see them in clinic but please give them this information.

## 2023-11-07 NOTE — TELEPHONE ENCOUNTER
Caller: Adriana Baca - spouse    Topic/issue: Patient wife called and said the patients BP has dropping in the last hour, wife thinks maybe his medication needs to be raised and is asking for a callback. I did advise to go to the ER if BP continues dropping.     BP Readings:    92/60 first   80/50 second  90/58 third     Please advise.    Callback Number: [phone] (home) [phone] (work)      Thank you,    Chel GONSALES

## 2023-11-07 NOTE — TELEPHONE ENCOUNTER
S/w patients wife regarding low BP levels. Wife stated this morning while transitioning out of bed and into chair he started feeling ill.   Check his BP, it was 90/58  No HR to report, was not taken.   Patient called to schedule a f/u with [facility], scheduled tomorrow, and patients wife is worried medications need to be adjusted.   ER precautions discussed with patient.

## 2023-11-08 ENCOUNTER — OFFICE VISIT (OUTPATIENT)
Dept: CARDIOLOGY | Facility: MEDICAL CENTER | Age: 73
End: 2023-11-08
Attending: INTERNAL MEDICINE
Payer: MEDICARE

## 2023-11-08 VITALS
DIASTOLIC BLOOD PRESSURE: 72 MMHG | OXYGEN SATURATION: 94 % | BODY MASS INDEX: 29.16 KG/M2 | HEART RATE: 99 BPM | HEIGHT: 65 IN | SYSTOLIC BLOOD PRESSURE: 110 MMHG | RESPIRATION RATE: 14 BRPM | WEIGHT: 175 LBS

## 2023-11-08 DIAGNOSIS — I48.0 PAROXYSMAL ATRIAL FIBRILLATION (HCC): ICD-10-CM

## 2023-11-08 PROCEDURE — 99212 OFFICE O/P EST SF 10 MIN: CPT | Performed by: INTERNAL MEDICINE

## 2023-11-08 PROCEDURE — 3078F DIAST BP <80 MM HG: CPT | Performed by: INTERNAL MEDICINE

## 2023-11-08 PROCEDURE — 99214 OFFICE O/P EST MOD 30 MIN: CPT | Performed by: INTERNAL MEDICINE

## 2023-11-08 PROCEDURE — 3074F SYST BP LT 130 MM HG: CPT | Performed by: INTERNAL MEDICINE

## 2023-11-08 PROCEDURE — 93005 ELECTROCARDIOGRAM TRACING: CPT | Performed by: INTERNAL MEDICINE

## 2023-11-08 ASSESSMENT — FIBROSIS 4 INDEX: FIB4 SCORE: 1.18

## 2023-11-08 NOTE — PROGRESS NOTES
Arrhythmia Clinic Note (Established patient)    DOS: 11/8/2023    Chief complaint/Reason for consult: AFib    Interval History: 71 y/o M with pAF/AFL s/p ablation and DANNI-C. He has been feeling unwell recently with weakness and lower blood pressure.    ROS (+ highlighted in bold):  Constitutional: Fevers/chills/fatigue/weightloss  HEENT: Blurry vision/eye pain/sore throat/hearing loss  Respiratory: Shortness of breath/cough  Cardiovascular: Chest pain/palpitations/edema/orthopnea/syncope  GI: Nausea/vomitting/diarrhea  MSK: Arthralgias/myagias/muscle weakness  Skin: Rash/sores  Neurological: Numbness/tremors/vertigo  Endocrine: Excessive thirst/polyuria/cold intolerance/heat intolerance  Psych: Depression/anxiety    Past Medical History:   Diagnosis Date    Anemia     Atrial fibrillation (HCC)     GERD (gastroesophageal reflux disease)     Heart burn     High cholesterol     Hyperlipidemia     Hypertension     Iron deficiency anemia     Pneumonia     as a young adult     Sleep apnea     cpap     Snoring     Stroke (HCC) 12/19/2021    left sided weakness     UTI (urinary tract infection) 03/2022       Past Surgical History:   Procedure Laterality Date    KNEE ARTHROPLASTY TOTAL Left     OTHER      sinus sx        Social History     Socioeconomic History    Marital status:      Spouse name: Not on file    Number of children: Not on file    Years of education: Not on file    Highest education level: Master's degree (e.g., MA, MS, Leslie, MEd, MSW, AMY)   Occupational History    Not on file   Tobacco Use    Smoking status: Never    Smokeless tobacco: Never   Vaping Use    Vaping Use: Never used   Substance and Sexual Activity    Alcohol use: Never    Drug use: Never    Sexual activity: Not on file   Other Topics Concern    Not on file   Social History Narrative    Retired     Social Determinants of Health     Financial Resource Strain: Unknown (8/8/2023)    Overall Financial Resource Strain (CARDIA)     Difficulty  "of Paying Living Expenses: Patient refused   Food Insecurity: Unknown (8/8/2023)    Hunger Vital Sign     Worried About Running Out of Food in the Last Year: Patient refused     Ran Out of Food in the Last Year: Patient refused   Transportation Needs: Unknown (8/8/2023)    PRAPARE - Transportation     Lack of Transportation (Medical): Patient refused     Lack of Transportation (Non-Medical): Patient refused   Physical Activity: Inactive (3/13/2022)    Exercise Vital Sign     Days of Exercise per Week: 0 days     Minutes of Exercise per Session: 0 min   Stress: No Stress Concern Present (3/13/2022)    [employer] - Occupational Stress Questionnaire     Feeling of Stress : Only a little   Social Connections: Unknown (8/8/2023)    Social Connection and Isolation Panel [NHANES]     Frequency of Communication with Friends and Family: Patient refused     Frequency of Social Gatherings with Friends and Family: Patient refused     Attends [employer] Services: Patient refused     Active Member of Clubs or Organizations: Patient refused     Attends Club or Organization Meetings: Patient refused     Marital Status:    Intimate Partner Violence: Not on file   Housing Stability: Unknown (8/8/2023)    Housing Stability Vital Sign     Unable to Pay for Housing in the Last Year: Patient refused     Number of Places Lived in the Last Year: Not on file     Unstable Housing in the Last Year: Patient refused       Family History   Problem Relation Age of Onset    Heart Disease Mother     Heart Disease Brother     Alcohol abuse Son        Allergies   Allergen Reactions    Codeine Unspecified     \"Become out of it\"    Ibuprofen Hives    Lactose Unspecified     Stomach upset        Current Outpatient Medications   Medication Sig Dispense Refill    losartan (COZAAR) 25 MG Tab Take 1 Tablet by mouth every day. Hold if BP is <110/70 90 Tablet 3    hydrALAZINE (APRESOLINE) 10 MG Tab Take 1 Tablet by mouth 2 " "times a day. 180 Tablet 3    aspirin EC (ECOTRIN) 81 MG Tablet Delayed Response Take 81 mg by mouth every day.      omeprazole (PRILOSEC) 20 MG delayed-release capsule Take 20 mg by mouth 2 times a day.      sulfamethoxazole-trimethoprim (BACTRIM DS) 800-160 MG tablet Take 1 Tablet by mouth every 12 hours. (Patient not taking: Reported on 11/8/2023) 2 Tablet 0    tamsulosin (FLOMAX) 0.4 MG capsule Take 1 Capsule by mouth 1/2 hour after breakfast. (Patient not taking: Reported on 11/8/2023) 30 Capsule 0     No current facility-administered medications for this visit.       Physical Exam:  Vitals:    11/08/23 1334   BP: 110/72   BP Location: Right arm   Patient Position: Sitting   BP Cuff Size: Adult   Pulse: 99   Resp: 14   SpO2: 94%   Weight: 79.4 kg (175 lb)   Height: 1.651 m (5' 5\")     General appearance: NAD, conversant   Eyes: anicteric sclerae, moist conjunctivae; no lid-lag; PERRLA  HENT: Atraumatic; oropharynx clear with moist mucous membranes and no mucosal ulcerations; normal hard and soft palate  Neck: Trachea midline; FROM, supple, no thyromegaly or lymphadenopathy  Lungs: CTA, with normal respiratory effort and no intercostal retractions  CV: RRR, no MRGs, no JVD  Abdomen: Soft, non-tender; no masses or HSM  Extremities: No peripheral edema or extremity lymphadenopathy  Skin: Normal temperature, turgor and texture; no rash, ulcers or subcutaneous nodules  Psych: Appropriate affect, alert and oriented to person, place and time    Data:  Lipids:   Lab Results   Component Value Date/Time    CHOLSTRLTOT 178 10/17/2023 01:23 AM    TRIGLYCERIDE 93 10/17/2023 01:23 AM    HDL 38 (A) 10/17/2023 01:23 AM     (H) 10/17/2023 01:23 AM        BMP:  Lab Results   Component Value Date/Time    SODIUM 136 10/17/2023 0123    POTASSIUM 4.3 10/17/2023 0123    CHLORIDE 99 10/17/2023 0123    CO2 23 10/17/2023 0123    GLUCOSE 74 10/17/2023 0123    BUN 20 10/17/2023 0123    CREATININE 0.83 10/17/2023 0123    CALCIUM 9.3 " "10/17/2023 0123    ANION 14.0 10/17/2023 0123        TSH:   Lab Results   Component Value Date/Time    TSHULTRASEN 0.410 10/16/2023 1138        THYROXINE (T4):   No results found for: \"FREEDIR\"     CBC:   Lab Results   Component Value Date/Time    WBC 9.0 10/17/2023 01:23 AM    RBC 4.83 10/17/2023 01:23 AM    HEMOGLOBIN 14.5 10/17/2023 01:23 AM    HEMATOCRIT 43.6 10/17/2023 01:23 AM    MCV 90.3 10/17/2023 01:23 AM    MCH 30.0 10/17/2023 01:23 AM    MCHC 33.3 10/17/2023 01:23 AM    RDW 50.7 (H) 10/17/2023 01:23 AM    PLATELETCT 278 10/17/2023 01:23 AM    MPV 9.6 10/17/2023 01:23 AM    NEUTSPOLYS 60.00 10/17/2023 01:23 AM    LYMPHOCYTES 18.70 (L) 10/17/2023 01:23 AM    MONOCYTES 19.40 (H) 10/17/2023 01:23 AM    EOSINOPHILS 1.10 10/17/2023 01:23 AM    BASOPHILS 0.20 10/17/2023 01:23 AM    IMMGRAN 0.60 10/17/2023 01:23 AM    NRBC 0.00 10/17/2023 01:23 AM    NEUTS 5.40 10/17/2023 01:23 AM    LYMPHS 1.68 10/17/2023 01:23 AM    MONOS 1.74 (H) 10/17/2023 01:23 AM    EOS 0.10 10/17/2023 01:23 AM    BASO 0.02 10/17/2023 01:23 AM    IMMGRANAB 0.05 10/17/2023 01:23 AM    NRBCAB 0.00 10/17/2023 01:23 AM        CBC w/o DIFF  Lab Results   Component Value Date/Time    WBC 9.0 10/17/2023 01:23 AM    RBC 4.83 10/17/2023 01:23 AM    HEMOGLOBIN 14.5 10/17/2023 01:23 AM    MCV 90.3 10/17/2023 01:23 AM    MCH 30.0 10/17/2023 01:23 AM    MCHC 33.3 10/17/2023 01:23 AM    RDW 50.7 (H) 10/17/2023 01:23 AM    MPV 9.6 10/17/2023 01:23 AM       Prior echo/stress reviewed: DANNI-C without flow      EKG interpreted by me: Sinus tach    Impression/Plan:  1. Paroxysmal atrial fibrillation (HCC)  EKG        pAF  DANNI-C in situ  Hypotension    - Pt refuses ED visit  - Hold losartan and hydralazine  - Reinstitute losartan if SBP >130  - Advise PCP [facility] for BP control  - If feels worse would go to ED, possible infectious etiology    Annual [facility]    Prince Brown MD  Cardiac Electrophysiology    "

## 2023-11-13 ENCOUNTER — OFFICE VISIT (OUTPATIENT)
Dept: URGENT CARE | Facility: PHYSICIAN GROUP | Age: 73
End: 2023-11-13
Payer: MEDICARE

## 2023-11-13 VITALS
WEIGHT: 180 LBS | DIASTOLIC BLOOD PRESSURE: 66 MMHG | BODY MASS INDEX: 29.99 KG/M2 | TEMPERATURE: 97.5 F | HEIGHT: 65 IN | HEART RATE: 75 BPM | SYSTOLIC BLOOD PRESSURE: 98 MMHG | OXYGEN SATURATION: 99 % | RESPIRATION RATE: 16 BRPM

## 2023-11-13 DIAGNOSIS — H93.8X3 SENSATION OF FULLNESS IN BOTH EARS: ICD-10-CM

## 2023-11-13 PROCEDURE — 2023F DILAT RTA XM W/O RTNOPTHY: CPT | Performed by: PHYSICIAN ASSISTANT

## 2023-11-13 PROCEDURE — 3078F DIAST BP <80 MM HG: CPT | Performed by: PHYSICIAN ASSISTANT

## 2023-11-13 PROCEDURE — 3074F SYST BP LT 130 MM HG: CPT | Performed by: PHYSICIAN ASSISTANT

## 2023-11-13 PROCEDURE — 99213 OFFICE O/P EST LOW 20 MIN: CPT | Performed by: PHYSICIAN ASSISTANT

## 2023-11-13 ASSESSMENT — ENCOUNTER SYMPTOMS
GASTROINTESTINAL NEGATIVE: 1
ABDOMINAL PAIN: 0
RESPIRATORY NEGATIVE: 1
SORE THROAT: 0
DIZZINESS: 1
WEAKNESS: 1
HEADACHES: 1
FEVER: 0
DIARRHEA: 0
VOMITING: 0
RHINORRHEA: 1
COUGH: 0
CARDIOVASCULAR NEGATIVE: 1
SINUS PAIN: 0
CHILLS: 0

## 2023-11-13 ASSESSMENT — FIBROSIS 4 INDEX: FIB4 SCORE: 1.2

## 2023-11-14 ENCOUNTER — OFFICE VISIT (OUTPATIENT)
Dept: MEDICAL GROUP | Facility: PHYSICIAN GROUP | Age: 73
End: 2023-11-14
Payer: MEDICARE

## 2023-11-14 VITALS
RESPIRATION RATE: 16 BRPM | OXYGEN SATURATION: 97 % | TEMPERATURE: 97.7 F | HEIGHT: 66 IN | DIASTOLIC BLOOD PRESSURE: 76 MMHG | BODY MASS INDEX: 28.12 KG/M2 | WEIGHT: 175 LBS | HEART RATE: 93 BPM | SYSTOLIC BLOOD PRESSURE: 110 MMHG

## 2023-11-14 DIAGNOSIS — Z12.11 SCREENING FOR COLORECTAL CANCER: ICD-10-CM

## 2023-11-14 DIAGNOSIS — Z23 NEED FOR VACCINATION: ICD-10-CM

## 2023-11-14 DIAGNOSIS — I95.0 IDIOPATHIC HYPOTENSION: ICD-10-CM

## 2023-11-14 DIAGNOSIS — Z12.12 SCREENING FOR COLORECTAL CANCER: ICD-10-CM

## 2023-11-14 PROBLEM — I10 PRIMARY HYPERTENSION: Status: RESOLVED | Noted: 2021-12-19 | Resolved: 2023-11-14

## 2023-11-14 PROCEDURE — 3074F SYST BP LT 130 MM HG: CPT | Performed by: FAMILY MEDICINE

## 2023-11-14 PROCEDURE — 3078F DIAST BP <80 MM HG: CPT | Performed by: FAMILY MEDICINE

## 2023-11-14 PROCEDURE — 99214 OFFICE O/P EST MOD 30 MIN: CPT | Mod: 25 | Performed by: FAMILY MEDICINE

## 2023-11-14 PROCEDURE — G0008 ADMIN INFLUENZA VIRUS VAC: HCPCS | Performed by: FAMILY MEDICINE

## 2023-11-14 PROCEDURE — 90662 IIV NO PRSV INCREASED AG IM: CPT | Performed by: FAMILY MEDICINE

## 2023-11-14 RX ORDER — DIAZEPAM 5 MG/1
TABLET ORAL
COMMUNITY
End: 2023-11-14

## 2023-11-14 RX ORDER — MIDODRINE HYDROCHLORIDE 2.5 MG/1
2.5 TABLET ORAL 2 TIMES DAILY
Qty: 60 TABLET | Refills: 2 | Status: SHIPPED | OUTPATIENT
Start: 2023-11-14 | End: 2023-12-12

## 2023-11-14 RX ORDER — AZITHROMYCIN 250 MG/1
TABLET, FILM COATED ORAL
Qty: 6 TABLET | Refills: 3 | Status: SHIPPED | OUTPATIENT
Start: 2023-11-14 | End: 2023-12-12

## 2023-11-14 ASSESSMENT — FIBROSIS 4 INDEX: FIB4 SCORE: 1.2

## 2023-11-14 NOTE — PROGRESS NOTES
"Subjective:   Gokul Baca is a 73 y.o. adult here today for evaluation and management of:     Idiopathic hypotension  Pt has always had HTN, in the last month he has symptomatic hypotension bp dropping to 80/60 and he in clinic is in a WC, head on a pillow, he feels weak.   He has stopped bp meds  I encouraged him first to stay well hydrated 64 oz daily, increase sodium intake with table salt in diet.   If these measures don't help, start midodrine 2.5 bid, stop if bp >130/80.   Restart losartan per cardiology instructions if BP >135/85  Patient has been having deteriorating health and strength since hemorrhagic stroke in 2021.              Current medicines (including changes today)  Current Outpatient Medications   Medication Sig Dispense Refill    azithromycin (ZITHROMAX) 250 MG Tab Take two tabs by mouth day one and one tab by mouth day 2 to 5 at first sign of infection 6 Tablet 3    midodrine (PROAMATINE) 2.5 MG Tab Take 1 Tablet by mouth 2 times a day. Stop if BP >130/80 60 Tablet 2    aspirin EC (ECOTRIN) 81 MG Tablet Delayed Response Take 81 mg by mouth every day.      omeprazole (PRILOSEC) 20 MG delayed-release capsule Take 20 mg by mouth 2 times a day.       No current facility-administered medications for this visit.     He  has a past medical history of Anemia, Atrial fibrillation (HCC), GERD (gastroesophageal reflux disease), Heart burn, High cholesterol, Hyperlipidemia, Hypertension, Iron deficiency anemia, Pneumonia, Sleep apnea, Snoring, Stroke (HCC) (12/19/2021), and UTI (urinary tract infection) (03/2022).    ROS  No chest pain, no shortness of breath, no abdominal pain       Objective:     /76   Pulse 93   Temp 36.5 °C (97.7 °F) (Temporal)   Resp 16   Ht 1.664 m (5' 5.5\")   Wt 79.4 kg (175 lb)   SpO2 97%  Body mass index is 28.68 kg/m².   Physical Exam:  Constitutional: Alert, no distress. In WC,   Skin: Warm, dry, good turgor, no rashes in visible areas.  Eye: Equal, round and " reactive, conjunctiva clear, lids normal.  ENMT: Lips without lesions, good dentition, oropharynx clear.  Neck: Trachea midline, no masses, no thyromegaly. No cervical or supraclavicular lymphadenopathy  Respiratory: Unlabored respiratory effort, lungs clear to auscultation, no wheezes, no ronchi.  Cardiovascular: Normal S1, S2, no murmur, no edema.  Abdomen: Soft, non-tender, no masses, no hepatosplenomegaly.  Psych: Alert and oriented x3, normal affect and mood.        Assessment and Plan:   The following treatment plan was discussed    1. Screening for colorectal cancer    2. Idiopathic hypotension    Other orders  - azithromycin (ZITHROMAX) 250 MG Tab; Take two tabs by mouth day one and one tab by mouth day 2 to 5 at first sign of infection  Dispense: 6 Tablet; Refill: 3  - midodrine (PROAMATINE) 2.5 MG Tab; Take 1 Tablet by mouth 2 times a day. Stop if BP >130/80  Dispense: 60 Tablet; Refill: 2      Followup: No follow-ups on file.

## 2023-11-14 NOTE — PROGRESS NOTES
"Subjective     Juan Antonio Baca is a very pleasant 73 y.o. adult who presents with Otalgia (X 2 days on bilateral ears, congestion.  )            Otalgia   There is pain in both ears. This is a new problem. The current episode started in the past 7 days. The problem occurs constantly. The problem has been unchanged. There has been no fever. The fever has been present for Less than 1 day. The pain is at a severity of 1/10. The pain is mild. Associated symptoms include headaches, hearing loss and rhinorrhea. Pertinent negatives include no abdominal pain, coughing, diarrhea, sore throat or vomiting. He has tried nothing for the symptoms. The treatment provided no relief. There is no history of a chronic ear infection or a tympanostomy tube.         PMH:  has a past medical history of Anemia, Atrial fibrillation (HCC), GERD (gastroesophageal reflux disease), Heart burn, High cholesterol, Hyperlipidemia, Hypertension, Iron deficiency anemia, Pneumonia, Sleep apnea, Snoring, Stroke (HCC) (12/19/2021), and UTI (urinary tract infection) (03/2022).  MEDS:   Current Outpatient Medications:     aspirin EC (ECOTRIN) 81 MG Tablet Delayed Response, Take 81 mg by mouth every day., Disp: , Rfl:     omeprazole (PRILOSEC) 20 MG delayed-release capsule, Take 20 mg by mouth 2 times a day., Disp: , Rfl:   ALLERGIES:   Allergies   Allergen Reactions    Codeine Unspecified     \"Become out of it\"    Ibuprofen Hives    Lactose Unspecified     Stomach upset      SURGHX:   Past Surgical History:   Procedure Laterality Date    KNEE ARTHROPLASTY TOTAL Left     OTHER      sinus sx      SOCHX:  reports that he has never smoked. He has never used smokeless tobacco. He reports that he does not drink alcohol and does not use drugs.  FH: family history includes Alcohol abuse in his son; Heart Disease in his brother and mother.    Review of Systems   Constitutional:  Positive for malaise/fatigue. Negative for chills and fever.   HENT:  Positive for " "congestion, ear pain, hearing loss and rhinorrhea. Negative for sinus pain and sore throat.    Respiratory: Negative.  Negative for cough.    Cardiovascular: Negative.    Gastrointestinal: Negative.  Negative for abdominal pain, diarrhea and vomiting.   Neurological:  Positive for dizziness, weakness and headaches.       Medications, Allergies, and current problem list reviewed today in Epic           Objective     BP 98/66   Pulse 75   Temp 36.4 °C (97.5 °F) (Temporal)   Resp 16   Ht 1.651 m (5' 5\")   Wt 81.6 kg (180 lb)   SpO2 99%   BMI 29.95 kg/m²      Physical Exam  Vitals and nursing note reviewed.   Constitutional:       General: He is not in acute distress.     Appearance: Normal appearance. He is well-developed. He is not ill-appearing, toxic-appearing or diaphoretic.   HENT:      Head: Normocephalic and atraumatic.      Right Ear: Ear canal and external ear normal. A middle ear effusion is present.      Left Ear: Ear canal and external ear normal. A middle ear effusion is present.      Nose: Congestion and rhinorrhea present.      Mouth/Throat:      Mouth: Mucous membranes are moist.      Pharynx: No oropharyngeal exudate or posterior oropharyngeal erythema.      Comments: PND noted  Eyes:      General:         Right eye: No discharge.         Left eye: No discharge.      Conjunctiva/sclera: Conjunctivae normal.   Cardiovascular:      Rate and Rhythm: Normal rate and regular rhythm.      Pulses: Normal pulses.      Heart sounds: Normal heart sounds.   Pulmonary:      Effort: Pulmonary effort is normal. No respiratory distress.      Breath sounds: Normal breath sounds. No wheezing, rhonchi or rales.   Musculoskeletal:      Cervical back: Normal range of motion and neck supple.      Right lower leg: No edema.      Left lower leg: No edema.   Lymphadenopathy:      Cervical: No cervical adenopathy.   Skin:     General: Skin is warm and dry.   Neurological:      General: No focal deficit present.      " Mental Status: He is alert and oriented to person, place, and time. Mental status is at baseline.   Psychiatric:         Mood and Affect: Mood normal.         Behavior: Behavior normal.         Thought Content: Thought content normal.         Judgment: Judgment normal.                         Assessment & Plan     This is a very pleasant 73-year-old male presenting for evaluation of his ears.  He has bilateral aural fullness and pressure.  No sharp pain, discharge, bleeding or fever.  Patient has a significant pertinent past contributory medical history and has follow-up with PCP tomorrow for more thorough investigation.  They are here strictly for a AOM rule out.  There is no shortness of breath, chest pain, urinary symptoms.  Vitals normal.  Exam shows no signs of infection or obstruction.  Patient will trial OTC meds for ETD.  ER and red flag symptoms discussed.      1. Sensation of fullness in both ears          Trial nasal spray and antihistamine.    I personally reviewed prior external notes and test results pertinent to today's visit. Return to clinic or go to ED if symptoms worsen or persist. Red flag symptoms and indications for ED discussed at length. Patient/Parent/Guardian voices understanding.  AVS with post-visit instructions provided or given verbally.  Follow-up with your primary care provider in 3-5 days. All side effects and potential interactions of prescribed medication discussed including allergic response, GI upset, tendon injury, rash, sedation, OCP effectiveness, etc.    Please note that this dictation was created using voice recognition software. I have made every reasonable attempt to correct obvious errors, but I expect that there are errors of grammar and possibly content that I did not discover before finalizing the note.

## 2023-11-14 NOTE — ASSESSMENT & PLAN NOTE
Pt has always had HTN, in the last month he has symptomatic hypotension bp dropping to 80/60 and he in clinic is in a WC, head on a pillow, he feels weak.   He has stopped bp meds  I encouraged him first to stay well hydrated 64 oz daily, increase sodium intake with table salt in diet.   If these measures don't help, start midodrine 2.5 bid, stop if bp >130/80.   Restart losartan per cardiology instructions if BP >135/85  Patient has been having deteriorating health and strength since hemorrhagic stroke in 2021.

## 2023-11-20 LAB — EKG IMPRESSION: NORMAL

## 2023-11-20 PROCEDURE — 93010 ELECTROCARDIOGRAM REPORT: CPT | Performed by: INTERNAL MEDICINE

## 2023-12-11 ENCOUNTER — APPOINTMENT (OUTPATIENT)
Dept: NEUROLOGY | Facility: MEDICAL CENTER | Age: 73
End: 2023-12-11
Attending: PSYCHIATRY & NEUROLOGY
Payer: MEDICARE

## 2023-12-12 ENCOUNTER — TELEPHONE (OUTPATIENT)
Dept: MEDICAL GROUP | Facility: PHYSICIAN GROUP | Age: 73
End: 2023-12-12

## 2023-12-12 ENCOUNTER — APPOINTMENT (OUTPATIENT)
Dept: RADIOLOGY | Facility: IMAGING CENTER | Age: 73
End: 2023-12-12
Attending: FAMILY MEDICINE
Payer: MEDICARE

## 2023-12-12 ENCOUNTER — OFFICE VISIT (OUTPATIENT)
Dept: MEDICAL GROUP | Facility: PHYSICIAN GROUP | Age: 73
End: 2023-12-12
Payer: MEDICARE

## 2023-12-12 VITALS
BODY MASS INDEX: 29.16 KG/M2 | WEIGHT: 175 LBS | DIASTOLIC BLOOD PRESSURE: 70 MMHG | HEART RATE: 79 BPM | SYSTOLIC BLOOD PRESSURE: 130 MMHG | TEMPERATURE: 97.7 F | HEIGHT: 65 IN | RESPIRATION RATE: 18 BRPM | OXYGEN SATURATION: 98 %

## 2023-12-12 DIAGNOSIS — M79.672 LEFT FOOT PAIN: ICD-10-CM

## 2023-12-12 PROCEDURE — 73620 X-RAY EXAM OF FOOT: CPT | Mod: TC,FY,LT | Performed by: FAMILY MEDICINE

## 2023-12-12 PROCEDURE — 73600 X-RAY EXAM OF ANKLE: CPT | Mod: TC,FY,LT | Performed by: FAMILY MEDICINE

## 2023-12-12 PROCEDURE — 3078F DIAST BP <80 MM HG: CPT | Performed by: FAMILY MEDICINE

## 2023-12-12 PROCEDURE — 99214 OFFICE O/P EST MOD 30 MIN: CPT | Performed by: FAMILY MEDICINE

## 2023-12-12 PROCEDURE — 3075F SYST BP GE 130 - 139MM HG: CPT | Performed by: FAMILY MEDICINE

## 2023-12-12 RX ORDER — GABAPENTIN 100 MG/1
CAPSULE ORAL
COMMUNITY
Start: 2023-11-28 | End: 2023-12-22 | Stop reason: SDUPTHER

## 2023-12-12 RX ORDER — HYDROXYZINE PAMOATE 25 MG/1
CAPSULE ORAL
COMMUNITY
Start: 2023-12-08 | End: 2023-12-12

## 2023-12-12 ASSESSMENT — FIBROSIS 4 INDEX: FIB4 SCORE: 1.2

## 2023-12-12 NOTE — PROGRESS NOTES
"Subjective:   Gokul Baca is a 73 y.o. adult here today for evaluation and management of:     Left foot pain  For two months he has left foot and ankle pain  With redness and ref bumps that have improved.   Itching and pain of foot and ankle.   Dx ankle and dx foot xrays ordered             Current medicines (including changes today)  Current Outpatient Medications   Medication Sig Dispense Refill    gabapentin (NEURONTIN) 100 MG Cap TAKE 1 CAPSULE BY MOUTH NIGHTLY FOR NEUROPATHY      aspirin EC (ECOTRIN) 81 MG Tablet Delayed Response Take 81 mg by mouth every day.      omeprazole (PRILOSEC) 20 MG delayed-release capsule Take 20 mg by mouth 2 times a day.       No current facility-administered medications for this visit.     He  has a past medical history of Anemia, Atrial fibrillation (HCC), GERD (gastroesophageal reflux disease), Heart burn, High cholesterol, Hyperlipidemia, Hypertension, Iron deficiency anemia, Pneumonia, Sleep apnea, Snoring, Stroke (HCC) (12/19/2021), and UTI (urinary tract infection) (03/2022).    ROS  No chest pain, no shortness of breath, no abdominal pain       Objective:     /70   Pulse 79   Temp 36.5 °C (97.7 °F) (Temporal)   Resp 18   Ht 1.651 m (5' 5\")   Wt 79.4 kg (175 lb)   SpO2 98%  Body mass index is 29.12 kg/m².   Physical Exam:  Constitutional: Alert, no distress.  Skin: Warm, dry, good turgor, no rashes in visible areas.  Eye: Equal, round and reactive, conjunctiva clear, lids normal.  ENMT: Lips without lesions, good dentition, oropharynx clear.  Neck: Trachea midline, no masses, no thyromegaly. No cervical or supraclavicular lymphadenopathy  Respiratory: Unlabored respiratory effort, lungs clear to auscultation, no wheezes, no ronchi.  Cardiovascular: Normal S1, S2, no murmur, no edema.  Abdomen: Soft, non-tender, no masses, no hepatosplenomegaly.  Psych: Alert and oriented x3, normal affect and mood.        Assessment and Plan:   The following treatment plan " was discussed    1. Left foot pain  - DX-FOOT-2- LEFT; Future  - DX-ANKLE 2- VIEWS LEFT; Future    Other orders  - gabapentin (NEURONTIN) 100 MG Cap; TAKE 1 CAPSULE BY MOUTH NIGHTLY FOR NEUROPATHY      Followup: No follow-ups on file.

## 2023-12-12 NOTE — ASSESSMENT & PLAN NOTE
For two months he has left foot and ankle pain  With redness and ref bumps that have improved.   Itching and pain of foot and ankle.   Dx ankle and dx foot xrays ordered

## 2023-12-13 NOTE — TELEPHONE ENCOUNTER
Patient has requested a medication for his foot and has rash and  itchy     Pharmacy is [facility]      Thank you .

## 2023-12-19 ENCOUNTER — APPOINTMENT (OUTPATIENT)
Dept: RADIOLOGY | Facility: IMAGING CENTER | Age: 73
End: 2023-12-19
Attending: FAMILY MEDICINE
Payer: MEDICARE

## 2023-12-19 ENCOUNTER — NON-PROVIDER VISIT (OUTPATIENT)
Dept: URGENT CARE | Facility: PHYSICIAN GROUP | Age: 73
End: 2023-12-19
Payer: MEDICARE

## 2023-12-19 DIAGNOSIS — R06.02 SHORTNESS OF BREATH: ICD-10-CM

## 2023-12-19 PROCEDURE — 71046 X-RAY EXAM CHEST 2 VIEWS: CPT | Mod: TC,FY | Performed by: FAMILY MEDICINE

## 2023-12-22 ENCOUNTER — OFFICE VISIT (OUTPATIENT)
Dept: MEDICAL GROUP | Facility: PHYSICIAN GROUP | Age: 73
End: 2023-12-22
Payer: MEDICARE

## 2023-12-22 VITALS
TEMPERATURE: 97.6 F | HEIGHT: 66 IN | RESPIRATION RATE: 16 BRPM | BODY MASS INDEX: 28.12 KG/M2 | HEART RATE: 75 BPM | WEIGHT: 175 LBS | DIASTOLIC BLOOD PRESSURE: 80 MMHG | OXYGEN SATURATION: 100 % | SYSTOLIC BLOOD PRESSURE: 120 MMHG

## 2023-12-22 DIAGNOSIS — G62.9 NEUROPATHY: ICD-10-CM

## 2023-12-22 PROBLEM — N39.0 URINARY TRACT INFECTION: Chronic | Status: RESOLVED | Noted: 2021-12-25 | Resolved: 2023-12-22

## 2023-12-22 PROBLEM — J06.9 URI (UPPER RESPIRATORY INFECTION): Status: RESOLVED | Noted: 2023-10-16 | Resolved: 2023-12-22

## 2023-12-22 PROCEDURE — 3079F DIAST BP 80-89 MM HG: CPT | Performed by: FAMILY MEDICINE

## 2023-12-22 PROCEDURE — 3074F SYST BP LT 130 MM HG: CPT | Performed by: FAMILY MEDICINE

## 2023-12-22 PROCEDURE — 99214 OFFICE O/P EST MOD 30 MIN: CPT | Performed by: FAMILY MEDICINE

## 2023-12-22 RX ORDER — GABAPENTIN 100 MG/1
CAPSULE ORAL
Qty: 90 CAPSULE | Refills: 3 | Status: SHIPPED | OUTPATIENT
Start: 2023-12-22 | End: 2024-02-09

## 2023-12-22 ASSESSMENT — FIBROSIS 4 INDEX: FIB4 SCORE: 1.2

## 2023-12-28 NOTE — PROGRESS NOTES
"Subjective:   Gokul Baca is a 73 y.o. adult here today for evaluation and management of:     Neuropathy  Pain of feet keeping him from sleeping  Rx for gabapentin provided. Continues to need WC still with significant deconditioning and weakness after a stroke.   Has ongoing PT with home health.                Current medicines (including changes today)  Current Outpatient Medications   Medication Sig Dispense Refill    gabapentin (NEURONTIN) 100 MG Cap TAKE 1 CAPSULE BY MOUTH NIGHTLY FOR NEUROPATHY 90 Capsule 3    aspirin EC (ECOTRIN) 81 MG Tablet Delayed Response Take 81 mg by mouth every day.      omeprazole (PRILOSEC) 20 MG delayed-release capsule Take 20 mg by mouth 2 times a day.       No current facility-administered medications for this visit.     He  has a past medical history of Anemia, Atrial fibrillation (HCC), GERD (gastroesophageal reflux disease), Heart burn, High cholesterol, Hyperlipidemia, Hypertension, Iron deficiency anemia, Pneumonia, Sleep apnea, Snoring, Stroke (HCC) (12/19/2021), and UTI (urinary tract infection) (03/2022).    ROS  No chest pain, no shortness of breath, no abdominal pain       Objective:     /80 (BP Location: Left arm, Patient Position: Sitting, BP Cuff Size: Adult long)   Pulse 75   Temp 36.4 °C (97.6 °F) (Temporal)   Resp 16   Ht 1.664 m (5' 5.5\")   Wt 79.4 kg (175 lb)   SpO2 100%  Body mass index is 28.68 kg/m².   Physical Exam:  Constitutional: Alert, no distress.  Skin: Warm, dry, good turgor, no rashes in visible areas.  Eye: Equal, round and reactive, conjunctiva clear, lids normal.  ENMT: Lips without lesions, good dentition, oropharynx clear.  Neck: Trachea midline, no masses, no thyromegaly. No cervical or supraclavicular lymphadenopathy  Respiratory: Unlabored respiratory effort, lungs clear to auscultation, no wheezes, no ronchi.  Cardiovascular: Normal S1, S2, no murmur, no edema.  Abdomen: Soft, non-tender, no masses, no " hepatosplenomegaly.  Psych: Alert and oriented x3, normal affect and mood.        Assessment and Plan:   The following treatment plan was discussed    1. Neuropathy    Other orders  - gabapentin (NEURONTIN) 100 MG Cap; TAKE 1 CAPSULE BY MOUTH NIGHTLY FOR NEUROPATHY  Dispense: 90 Capsule; Refill: 3      Followup: No follow-ups on file.

## 2023-12-28 NOTE — ASSESSMENT & PLAN NOTE
Pain of feet keeping him from sleeping  Rx for gabapentin provided. Continues to need WC still with significant deconditioning and weakness after a stroke.   Has ongoing PT with home health.

## 2024-01-12 ENCOUNTER — APPOINTMENT (OUTPATIENT)
Dept: MEDICAL GROUP | Facility: PHYSICIAN GROUP | Age: 74
End: 2024-01-12
Payer: MEDICARE

## 2024-01-17 ENCOUNTER — APPOINTMENT (OUTPATIENT)
Dept: CARDIOLOGY | Facility: MEDICAL CENTER | Age: 74
End: 2024-01-17
Attending: INTERNAL MEDICINE
Payer: MEDICARE

## 2024-02-05 ENCOUNTER — OFFICE VISIT (OUTPATIENT)
Dept: URGENT CARE | Facility: PHYSICIAN GROUP | Age: 74
End: 2024-02-05
Payer: MEDICARE

## 2024-02-05 ENCOUNTER — APPOINTMENT (OUTPATIENT)
Dept: RADIOLOGY | Facility: IMAGING CENTER | Age: 74
End: 2024-02-05
Attending: FAMILY MEDICINE
Payer: MEDICARE

## 2024-02-05 VITALS
BODY MASS INDEX: 29.16 KG/M2 | DIASTOLIC BLOOD PRESSURE: 68 MMHG | WEIGHT: 175 LBS | SYSTOLIC BLOOD PRESSURE: 100 MMHG | OXYGEN SATURATION: 97 % | HEIGHT: 65 IN | RESPIRATION RATE: 18 BRPM | HEART RATE: 93 BPM | TEMPERATURE: 97 F

## 2024-02-05 DIAGNOSIS — M79.652 PAIN OF LEFT THIGH: ICD-10-CM

## 2024-02-05 DIAGNOSIS — H10.9 CONJUNCTIVITIS OF BOTH EYES, UNSPECIFIED CONJUNCTIVITIS TYPE: ICD-10-CM

## 2024-02-05 PROCEDURE — 3078F DIAST BP <80 MM HG: CPT | Performed by: FAMILY MEDICINE

## 2024-02-05 PROCEDURE — 99213 OFFICE O/P EST LOW 20 MIN: CPT | Performed by: FAMILY MEDICINE

## 2024-02-05 PROCEDURE — 73552 X-RAY EXAM OF FEMUR 2/>: CPT | Mod: TC,FY,LT | Performed by: FAMILY MEDICINE

## 2024-02-05 PROCEDURE — 3074F SYST BP LT 130 MM HG: CPT | Performed by: FAMILY MEDICINE

## 2024-02-05 RX ORDER — POLYMYXIN B SULFATE AND TRIMETHOPRIM 1; 10000 MG/ML; [USP'U]/ML
1 SOLUTION OPHTHALMIC 4 TIMES DAILY
Qty: 10 ML | Refills: 0 | Status: SHIPPED | OUTPATIENT
Start: 2024-02-05 | End: 2024-02-12

## 2024-02-05 ASSESSMENT — ENCOUNTER SYMPTOMS
VOMITING: 0
NAUSEA: 0
WEIGHT LOSS: 0
MYALGIAS: 0
EYE REDNESS: 0
EYE DISCHARGE: 0

## 2024-02-05 ASSESSMENT — FIBROSIS 4 INDEX: FIB4 SCORE: 1.2

## 2024-02-05 NOTE — PROGRESS NOTES
"Subjective     Juan Antonio Baca is a 73 y.o. adult who presents with Conjunctivitis (Bilateral eye x 2 days. Itching and red )            2 days bilateral eye redness, drainage, mattering. Possible mild viral prodrome. No allergic prodrome. No FB/trauma. No contact lens use. No other aggravating or alleviating factors.    Chronic left lateral thigh pain.  He has had hip x-rays but they are concerned that the pain is slightly lower.  No radicular pain.  He does have left-sided neurologic deficits due to stroke.  No rash.         Review of Systems   Constitutional:  Negative for malaise/fatigue and weight loss.   Eyes:  Negative for discharge and redness.   Gastrointestinal:  Negative for nausea and vomiting.   Musculoskeletal:  Negative for joint pain and myalgias.   Skin:  Negative for itching and rash.              Objective     /68   Pulse 93   Temp 36.1 °C (97 °F) (Temporal)   Resp 18   Ht 1.651 m (5' 5\")   Wt 79.4 kg (175 lb)   SpO2 97%   BMI 29.12 kg/m²      Physical Exam  Constitutional:       General: He is not in acute distress.     Appearance: He is well-developed.   HENT:      Head: Normocephalic and atraumatic.   Eyes:      Extraocular Movements: Extraocular movements intact.      Pupils: Pupils are equal, round, and reactive to light.      Comments: B conjunctiva injected with moderate exudate. No foreign body. No gross corneal lesion.    No fluorescein uptake   Cardiovascular:      Rate and Rhythm: Normal rate and regular rhythm.      Heart sounds: Normal heart sounds. No murmur heard.  Pulmonary:      Effort: Pulmonary effort is normal.      Breath sounds: Normal breath sounds. No wheezing.   Musculoskeletal:        Legs:    Skin:     General: Skin is warm and dry.      Findings: No rash.   Neurological:      Mental Status: He is alert.                             Assessment & Plan       Xray: no fracture or dislocation per radiology      1. Conjunctivitis of both eyes, unspecified " conjunctivitis type  polymixin-trimethoprim (POLYTRIM) 30451-8.1 UNIT/ML-% Solution      2. Pain of left thigh  DX-FEMUR-2+ LEFT        Differential diagnosis, natural history, supportive care, and indications for immediate follow-up were discussed.     F/u primary care

## 2024-02-09 ENCOUNTER — OFFICE VISIT (OUTPATIENT)
Dept: MEDICAL GROUP | Facility: PHYSICIAN GROUP | Age: 74
End: 2024-02-09
Payer: MEDICARE

## 2024-02-09 VITALS
TEMPERATURE: 98.2 F | OXYGEN SATURATION: 96 % | BODY MASS INDEX: 28.12 KG/M2 | HEART RATE: 103 BPM | HEIGHT: 66 IN | WEIGHT: 175 LBS | RESPIRATION RATE: 16 BRPM | SYSTOLIC BLOOD PRESSURE: 102 MMHG | DIASTOLIC BLOOD PRESSURE: 74 MMHG

## 2024-02-09 DIAGNOSIS — I48.3 TYPICAL ATRIAL FLUTTER (HCC): ICD-10-CM

## 2024-02-09 DIAGNOSIS — E61.1 IRON DEFICIENCY: ICD-10-CM

## 2024-02-09 DIAGNOSIS — M79.605 PAIN OF LEFT LOWER EXTREMITY: ICD-10-CM

## 2024-02-09 DIAGNOSIS — D50.8 OTHER IRON DEFICIENCY ANEMIA: ICD-10-CM

## 2024-02-09 DIAGNOSIS — H10.33 ACUTE CONJUNCTIVITIS OF BOTH EYES, UNSPECIFIED ACUTE CONJUNCTIVITIS TYPE: ICD-10-CM

## 2024-02-09 DIAGNOSIS — E78.5 HYPERLIPIDEMIA, UNSPECIFIED HYPERLIPIDEMIA TYPE: ICD-10-CM

## 2024-02-09 DIAGNOSIS — M79.652 LEFT THIGH PAIN: ICD-10-CM

## 2024-02-09 DIAGNOSIS — R29.898 LEFT LEG WEAKNESS: ICD-10-CM

## 2024-02-09 DIAGNOSIS — I69.354 HEMIPLEGIA AND HEMIPARESIS FOLLOWING CEREBRAL INFARCTION AFFECTING LEFT NON-DOMINANT SIDE (HCC): ICD-10-CM

## 2024-02-09 DIAGNOSIS — I48.0 PAROXYSMAL ATRIAL FIBRILLATION (HCC): ICD-10-CM

## 2024-02-09 PROCEDURE — 3078F DIAST BP <80 MM HG: CPT | Performed by: FAMILY MEDICINE

## 2024-02-09 PROCEDURE — 3074F SYST BP LT 130 MM HG: CPT | Performed by: FAMILY MEDICINE

## 2024-02-09 PROCEDURE — 99214 OFFICE O/P EST MOD 30 MIN: CPT | Performed by: FAMILY MEDICINE

## 2024-02-09 RX ORDER — ERYTHROMYCIN 5 MG/G
1 OINTMENT OPHTHALMIC
Qty: 3.5 G | Refills: 0 | Status: SHIPPED | OUTPATIENT
Start: 2024-02-09

## 2024-02-09 ASSESSMENT — PATIENT HEALTH QUESTIONNAIRE - PHQ9: CLINICAL INTERPRETATION OF PHQ2 SCORE: 0

## 2024-02-09 ASSESSMENT — FIBROSIS 4 INDEX: FIB4 SCORE: 1.2

## 2024-02-09 NOTE — ASSESSMENT & PLAN NOTE
Chronic left thigh pain  Patient had an xray done which was normal  He had US to check for dvts last year which was normal.   Pt has weakness and pain of left thigh.   He is working with PT  Will check dimer just to make sure no dvt as he is quite sedentary in bed.

## 2024-02-09 NOTE — PROGRESS NOTES
Subjective:   Gokul Baca is a 73 y.o. adult here today for evaluation and management of:     Acute conjunctivitis of both eyes  Patient has eyes with redness and pain b/l  No vision loss  Rx for polytrim has been provided by  physician when pt was seen 4 days ago.     Left thigh pain  Chronic left thigh pain  Patient had an xray done which was normal  He had US to check for dvts last year which was normal.   Pt has weakness and pain of left thigh.   He is working with PT  Will check dimer just to make sure no dvt as he is quite sedentary in bed.       Iron deficiency anemia  Patient has been feeling stronger  Repeat cbc, iron ordered to evaluate for anemia  Due for crc screening, FIT ordered  Pt had ref to GI but was not able to schedule.   Last FIT 2021 negative.     Left leg weakness  Patient had a stroke and continues to have leg weakness, using a WC  Also has left thigh and foot pain.   Working with PT through home health.   Will check lumbar MRI     HCC Gap Form    Diagnosis: I69.354 - Hemiplegia and hemiparesis following cerebral infarction affecting left non-dominant side (HCC)  Assessment and plan: Chronic, stable. Continue with current defined treatment plan: continues to have weakness LE and uses a WC working with PT. Follow-up at least annually.  Last edited 02/13/24 09:50 PST by Jennifer Waldron M.D.             Current medicines (including changes today)  Current Outpatient Medications   Medication Sig Dispense Refill    erythromycin 5 MG/GM Ointment Apply 1 Application to both eyes at bedtime. 3.5 g 0    aspirin EC (ECOTRIN) 81 MG Tablet Delayed Response Take 81 mg by mouth every day.      omeprazole (PRILOSEC) 20 MG delayed-release capsule Take 20 mg by mouth 2 times a day.       No current facility-administered medications for this visit.     He  has a past medical history of Anemia, Atrial fibrillation (HCC), GERD (gastroesophageal reflux disease), Heart burn, High cholesterol,  "Hyperlipidemia, Hypertension, Iron deficiency anemia, Pneumonia, Sleep apnea, Snoring, Stroke (HCC) (12/19/2021), and UTI (urinary tract infection) (03/2022).    ROS  No chest pain, no shortness of breath, no abdominal pain       Objective:     /74 (BP Location: Left arm, Patient Position: Sitting, BP Cuff Size: Adult)   Pulse (!) 103   Temp 36.8 °C (98.2 °F) (Temporal)   Resp 16   Ht 1.664 m (5' 5.5\")   Wt 79.4 kg (175 lb)   SpO2 96%  Body mass index is 28.68 kg/m².   Physical Exam:  Constitutional: Alert, no distress.  Skin: Warm, dry, good turgor, no rashes in visible areas.  Eye: Equal, round and reactive, conjunctiva clear, lids normal.  ENMT: Lips without lesions, good dentition, oropharynx clear.  Neck: Trachea midline, no masses, no thyromegaly. No cervical or supraclavicular lymphadenopathy  Respiratory: Unlabored respiratory effort, lungs clear to auscultation, no wheezes, no ronchi.  Cardiovascular: Normal S1, S2, no murmur, no edema.  Abdomen: Soft, non-tender, no masses, no hepatosplenomegaly.  Psych: Alert and oriented x3, normal affect and mood.        Assessment and Plan:   The following treatment plan was discussed    1. Acute conjunctivitis of both eyes, unspecified acute conjunctivitis type    2. Hyperlipidemia, unspecified hyperlipidemia type  - Comp Metabolic Panel; Future  - Lipid Profile; Future    3. Iron deficiency  - IRON/TOTAL IRON BIND; Future  - FERRITIN; Future    4. Pain of left lower extremity  - D-DIMER; Future  - DX-LUMBAR SPINE-2 OR 3 VIEWS; Future  - MR-LUMBAR SPINE-W/O; Future    5. Left leg weakness  - DX-LUMBAR SPINE-2 OR 3 VIEWS; Future  - MR-LUMBAR SPINE-W/O; Future    6. Left thigh pain    7. Hemiplegia and hemiparesis following cerebral infarction affecting left non-dominant side (HCC)    8. Typical atrial flutter (HCC)    9. Paroxysmal atrial fibrillation (HCC)    10. Other iron deficiency anemia  - OCCULT BLOOD FECES IMMUNOASSAY; Future    Other orders  - " erythromycin 5 MG/GM Ointment; Apply 1 Application to both eyes at bedtime.  Dispense: 3.5 g; Refill: 0      Followup: No follow-ups on file.

## 2024-02-09 NOTE — ASSESSMENT & PLAN NOTE
Patient has eyes with redness and pain b/l  No vision loss  Rx for polytrim has been provided by  physician when pt was seen 4 days ago.

## 2024-02-13 ENCOUNTER — OFFICE VISIT (OUTPATIENT)
Dept: CARDIOLOGY | Facility: MEDICAL CENTER | Age: 74
End: 2024-02-13
Attending: NURSE PRACTITIONER
Payer: MEDICARE

## 2024-02-13 VITALS
HEART RATE: 103 BPM | BODY MASS INDEX: 28.12 KG/M2 | WEIGHT: 175 LBS | HEIGHT: 66 IN | DIASTOLIC BLOOD PRESSURE: 76 MMHG | RESPIRATION RATE: 12 BRPM | SYSTOLIC BLOOD PRESSURE: 122 MMHG | OXYGEN SATURATION: 98 %

## 2024-02-13 DIAGNOSIS — Z74.09 IMPAIRED MOBILITY AND ADLS: ICD-10-CM

## 2024-02-13 DIAGNOSIS — Z66 DNR (DO NOT RESUSCITATE): ICD-10-CM

## 2024-02-13 DIAGNOSIS — Z86.79 S/P ABLATION OF ATRIAL FIBRILLATION: ICD-10-CM

## 2024-02-13 DIAGNOSIS — Z78.9 IMPAIRED MOBILITY AND ADLS: ICD-10-CM

## 2024-02-13 DIAGNOSIS — Z98.890 S/P ABLATION OF ATRIAL FIBRILLATION: ICD-10-CM

## 2024-02-13 DIAGNOSIS — R53.81 MALAISE: ICD-10-CM

## 2024-02-13 DIAGNOSIS — R53.81 PHYSICAL DECONDITIONING: ICD-10-CM

## 2024-02-13 DIAGNOSIS — Z95.818 PRESENCE OF WATCHMAN LEFT ATRIAL APPENDAGE CLOSURE DEVICE: ICD-10-CM

## 2024-02-13 DIAGNOSIS — R53.83 OTHER FATIGUE: ICD-10-CM

## 2024-02-13 DIAGNOSIS — I48.0 PAROXYSMAL ATRIAL FIBRILLATION (HCC): ICD-10-CM

## 2024-02-13 PROBLEM — I69.354 HEMIPLEGIA AND HEMIPARESIS FOLLOWING CEREBRAL INFARCTION AFFECTING LEFT NON-DOMINANT SIDE (HCC): Status: ACTIVE | Noted: 2024-02-13

## 2024-02-13 PROBLEM — I48.3 TYPICAL ATRIAL FLUTTER (HCC): Status: ACTIVE | Noted: 2024-02-13

## 2024-02-13 PROBLEM — R29.898 LEFT LEG WEAKNESS: Status: ACTIVE | Noted: 2024-02-13

## 2024-02-13 PROCEDURE — 3074F SYST BP LT 130 MM HG: CPT | Performed by: NURSE PRACTITIONER

## 2024-02-13 PROCEDURE — 99211 OFF/OP EST MAY X REQ PHY/QHP: CPT | Performed by: NURSE PRACTITIONER

## 2024-02-13 PROCEDURE — 93005 ELECTROCARDIOGRAM TRACING: CPT | Performed by: NURSE PRACTITIONER

## 2024-02-13 PROCEDURE — 2023F DILAT RTA XM W/O RTNOPTHY: CPT | Performed by: NURSE PRACTITIONER

## 2024-02-13 PROCEDURE — 99214 OFFICE O/P EST MOD 30 MIN: CPT | Performed by: NURSE PRACTITIONER

## 2024-02-13 PROCEDURE — 99212 OFFICE O/P EST SF 10 MIN: CPT | Performed by: NURSE PRACTITIONER

## 2024-02-13 PROCEDURE — 3078F DIAST BP <80 MM HG: CPT | Performed by: NURSE PRACTITIONER

## 2024-02-13 ASSESSMENT — FIBROSIS 4 INDEX: FIB4 SCORE: 1.2

## 2024-02-13 ASSESSMENT — ENCOUNTER SYMPTOMS
WEAKNESS: 1
WEIGHT LOSS: 0
ORTHOPNEA: 0
CLAUDICATION: 0
PALPITATIONS: 0
PND: 0
SHORTNESS OF BREATH: 0
DIZZINESS: 0

## 2024-02-13 NOTE — ASSESSMENT & PLAN NOTE
Patient has been feeling stronger  Repeat cbc, iron ordered to evaluate for anemia  Due for crc screening, FIT ordered  Pt had ref to GI but was not able to schedule.   Last FIT 2021 negative.

## 2024-02-13 NOTE — PROGRESS NOTES
Chief Complaint   Patient presents with    Atrial Fibrillation     F/V Dx: Paroxysmal atrial fibrillation (HCC)       Subjective     Juan Antonio Baca is a 73 y.o. adult patient of Dr. Brown who presents today with c/o    Patient was last seen by Dr. Borwn on 11/8/2023. Patient was not feeling well with c/o weakness and hypotension. Dr. Brown recommended ER but patient declined. Patient was advised to hold losartan and his hydralazine.     PMH pertinent for pAF/AFL s/p ablation and DANNI-C on ASA.    Today patient states that he is overall not feeling well. Denies having any specific cardiac complaints. Very fatigued with no energy.     Past Medical History:   Diagnosis Date    Anemia     Atrial fibrillation (HCC)     GERD (gastroesophageal reflux disease)     Heart burn     High cholesterol     Hyperlipidemia     Hypertension     Iron deficiency anemia     Pneumonia     as a young adult     Sleep apnea     cpap     Snoring     Stroke (HCC) 12/19/2021    left sided weakness     UTI (urinary tract infection) 03/2022     Past Surgical History:   Procedure Laterality Date    KNEE ARTHROPLASTY TOTAL Left     OTHER      sinus sx      Family History   Problem Relation Age of Onset    Heart Disease Mother     Heart Disease Brother     Alcohol abuse Son      Social History     Socioeconomic History    Marital status:      Spouse name: Not on file    Number of children: Not on file    Years of education: Not on file    Highest education level: Master's degree (e.g., MA, MS, Leslie, MEd, MSW, AMY)   Occupational History    Not on file   Tobacco Use    Smoking status: Never    Smokeless tobacco: Never   Vaping Use    Vaping Use: Never used   Substance and Sexual Activity    Alcohol use: Never    Drug use: Never    Sexual activity: Not on file   Other Topics Concern    Not on file   Social History Narrative    Retired     Social Determinants of Health     Financial Resource Strain: Unknown (8/8/2023)    Overall Financial Resource  "Strain (CARDIA)     Difficulty of Paying Living Expenses: Patient refused   Food Insecurity: Unknown (8/8/2023)    Hunger Vital Sign     Worried About Running Out of Food in the Last Year: Patient refused     Ran Out of Food in the Last Year: Patient refused   Transportation Needs: Unknown (8/8/2023)    PRAPARE - Transportation     Lack of Transportation (Medical): Patient refused     Lack of Transportation (Non-Medical): Patient refused   Physical Activity: Inactive (3/13/2022)    Exercise Vital Sign     Days of Exercise per Week: 0 days     Minutes of Exercise per Session: 0 min   Stress: No Stress Concern Present (3/13/2022)    [employer] - Occupational Stress Questionnaire     Feeling of Stress : Only a little   Social Connections: Unknown (8/8/2023)    Social Connection and Isolation Panel [NHANES]     Frequency of Communication with Friends and Family: Patient refused     Frequency of Social Gatherings with Friends and Family: Patient refused     Attends Voodoo Services: Patient refused     Active Member of Clubs or Organizations: Patient refused     Attends Club or Organization Meetings: Patient refused     Marital Status:    Intimate Partner Violence: Not on file   Housing Stability: Unknown (8/8/2023)    Housing Stability Vital Sign     Unable to Pay for Housing in the Last Year: Patient refused     Number of Places Lived in the Last Year: Not on file     Unstable Housing in the Last Year: Patient refused     Allergies   Allergen Reactions    Codeine Unspecified     \"Become out of it\"    Ibuprofen Hives    Lactose Unspecified     Stomach upset      Outpatient Encounter Medications as of 2/13/2024   Medication Sig Dispense Refill    erythromycin 5 MG/GM Ointment Apply 1 Application to both eyes at bedtime. 3.5 g 0    aspirin EC (ECOTRIN) 81 MG Tablet Delayed Response Take 81 mg by mouth every day.      omeprazole (PRILOSEC) 20 MG delayed-release capsule Take 20 mg by " "mouth 2 times a day.       No facility-administered encounter medications on file as of 2/13/2024.     Review of Systems   Constitutional:  Positive for malaise/fatigue. Negative for weight loss.   Respiratory:  Negative for shortness of breath.    Cardiovascular:  Negative for chest pain, palpitations, orthopnea, claudication, leg swelling and PND.   Neurological:  Positive for weakness. Negative for dizziness.   All other systems reviewed and are negative.             Objective     /76 (BP Location: Left arm, Patient Position: Sitting, BP Cuff Size: Adult)   Pulse (!) 103   Resp 12   Ht 1.664 m (5' 5.5\")   Wt 79.4 kg (175 lb)   SpO2 98%   BMI 28.68 kg/m²     Physical Exam  Constitutional:       General: He is not in acute distress.     Appearance: He is well-developed. He is not ill-appearing.   HENT:      Head: Normocephalic.   Neck:      Vascular: No JVD.   Cardiovascular:      Rate and Rhythm: Normal rate and regular rhythm.      Heart sounds: Normal heart sounds.   Pulmonary:      Effort: No respiratory distress.      Breath sounds: Normal breath sounds.   Abdominal:      Palpations: Abdomen is soft.      Tenderness: There is no abdominal tenderness.   Skin:     General: Skin is warm and dry.   Neurological:      Mental Status: He is alert and oriented to person, place, and time.      Motor: Weakness (GENERAL) present.   Psychiatric:         Mood and Affect: Mood is depressed. Affect is flat.         Behavior: Behavior is withdrawn.                Assessment & Plan     1. Paroxysmal atrial fibrillation (HCC)  EKG    EC-ECHOCARDIOGRAM COMPLETE W/ CONT      2. Other fatigue  EC-ECHOCARDIOGRAM COMPLETE W/ CONT    REFERRAL TO [facility]      3. S/P ablation of atrial fibrillation        4. Presence of Watchman left atrial appendage closure device        5. Impaired mobility and ADLs        6. DNR (do not resuscitate)        7. Physical deconditioning        8. Malaise            Medical Decision " Making: Today's Assessment/Status/Plan:          pAF/AFL:  - H/O PVI and RA CTI RFA by Dr. Brown on 9/12/2022.   - H/O DANNI-C with WATCHMEN, on ASA 81 mg daily.   - Maintaining NSR.     Malaise/Deconditioning:  - Anemia lab work ordered by PCP.  - TTE ordered.  - Very difficult for patient to get out of the house. Referral placed to geriatric care to see if a care provider can see patient in his home in addition to regular FU with his PCP.       Future Appointments   Date Time Provider Department Center   3/13/2024 10:00 AM Elinor Tong M.D. [facility]   3/13/2024  2:00 PM [facility]   3/22/2024  3:00 PM [facility] De Oliveira   4/12/2024  3:00 PM [facility] De Oliveira   5/21/2024  1:15 PM KETTY Aaron None   11/18/2024  4:00 PM [facility] Jain None

## 2024-02-13 NOTE — ASSESSMENT & PLAN NOTE
Patient had a stroke and continues to have leg weakness, using a WC  Also has left thigh and foot pain.   Working with PT through home health.   Will check lumbar MRI

## 2024-02-16 ENCOUNTER — HOSPITAL ENCOUNTER (OUTPATIENT)
Dept: CARDIOLOGY | Facility: MEDICAL CENTER | Age: 74
End: 2024-02-16
Attending: NURSE PRACTITIONER
Payer: MEDICARE

## 2024-02-16 DIAGNOSIS — R53.83 OTHER FATIGUE: ICD-10-CM

## 2024-02-16 DIAGNOSIS — R53.81 PHYSICAL DECONDITIONING: ICD-10-CM

## 2024-02-16 LAB
LV EJECT FRACT  99904: 55
LV EJECT FRACT MOD 2C 99903: 52.06
LV EJECT FRACT MOD 4C 99902: 38.58
LV EJECT FRACT MOD BP 99901: 45.72

## 2024-02-16 PROCEDURE — 93306 TTE W/DOPPLER COMPLETE: CPT

## 2024-02-16 PROCEDURE — 93306 TTE W/DOPPLER COMPLETE: CPT | Mod: 26 | Performed by: INTERNAL MEDICINE

## 2024-02-17 ENCOUNTER — HOSPITAL ENCOUNTER (OUTPATIENT)
Dept: RADIOLOGY | Facility: MEDICAL CENTER | Age: 74
End: 2024-02-17
Attending: FAMILY MEDICINE
Payer: MEDICARE

## 2024-02-17 DIAGNOSIS — M79.605 PAIN OF LEFT LOWER EXTREMITY: ICD-10-CM

## 2024-02-17 DIAGNOSIS — R29.898 LEFT LEG WEAKNESS: ICD-10-CM

## 2024-02-17 PROCEDURE — 72100 X-RAY EXAM L-S SPINE 2/3 VWS: CPT

## 2024-02-28 ENCOUNTER — TELEPHONE (OUTPATIENT)
Dept: URGENT CARE | Facility: PHYSICIAN GROUP | Age: 74
End: 2024-02-28
Payer: MEDICARE

## 2024-02-28 NOTE — TELEPHONE ENCOUNTER
Verbal auth given for u/a orders        Gokul Baca (1950) has s/s is experiencing difficulty voiding, and increased urgency. Would Dr Waldron consider a u/a, please?     thanks,     Mary Hurd <[email or web address]>  '

## 2024-02-29 LAB — EKG IMPRESSION: NORMAL

## 2024-03-20 NOTE — Clinical Note
Andrew Goyal,  Could you provide recommendations regarding the watchman procedure or other afibb treatment? He is doing well and recovering from the hemorrhagic stroke. Thank you, Jennifer
knee pain/injury

## 2024-03-26 RX ORDER — GABAPENTIN 100 MG/1
CAPSULE ORAL
Qty: 90 CAPSULE | Refills: 3 | Status: SHIPPED | OUTPATIENT
Start: 2024-03-26 | End: 2024-03-26 | Stop reason: SDUPTHER

## 2024-03-26 RX ORDER — GABAPENTIN 100 MG/1
CAPSULE ORAL
Qty: 180 CAPSULE | Refills: 3 | Status: SHIPPED | OUTPATIENT
Start: 2024-03-26

## 2024-03-29 ENCOUNTER — OFFICE VISIT (OUTPATIENT)
Dept: MEDICAL GROUP | Facility: PHYSICIAN GROUP | Age: 74
End: 2024-03-29
Payer: MEDICARE

## 2024-03-29 ENCOUNTER — HOSPITAL ENCOUNTER (OUTPATIENT)
Facility: MEDICAL CENTER | Age: 74
End: 2024-03-29
Attending: FAMILY MEDICINE
Payer: MEDICARE

## 2024-03-29 VITALS
HEART RATE: 94 BPM | DIASTOLIC BLOOD PRESSURE: 68 MMHG | SYSTOLIC BLOOD PRESSURE: 110 MMHG | RESPIRATION RATE: 16 BRPM | TEMPERATURE: 98.3 F | OXYGEN SATURATION: 98 % | BODY MASS INDEX: 28.93 KG/M2 | WEIGHT: 180 LBS | HEIGHT: 66 IN

## 2024-03-29 DIAGNOSIS — R30.0 DYSURIA: ICD-10-CM

## 2024-03-29 DIAGNOSIS — R74.8 ELEVATED ALKALINE PHOSPHATASE LEVEL: ICD-10-CM

## 2024-03-29 DIAGNOSIS — R77.9 ELEVATED BLOOD PROTEIN: ICD-10-CM

## 2024-03-29 DIAGNOSIS — Z12.11 COLON CANCER SCREENING: ICD-10-CM

## 2024-03-29 DIAGNOSIS — R53.83 FATIGUE, UNSPECIFIED TYPE: ICD-10-CM

## 2024-03-29 LAB
APPEARANCE UR: ABNORMAL
BACTERIA #/AREA URNS HPF: ABNORMAL /HPF
BILIRUB UR QL STRIP.AUTO: NEGATIVE
CAOX CRY #/AREA URNS HPF: ABNORMAL /HPF
COLOR UR: YELLOW
EPI CELLS #/AREA URNS HPF: NEGATIVE /HPF
GLUCOSE UR STRIP.AUTO-MCNC: NEGATIVE MG/DL
HYALINE CASTS #/AREA URNS LPF: ABNORMAL /LPF
KETONES UR STRIP.AUTO-MCNC: 15 MG/DL
LEUKOCYTE ESTERASE UR QL STRIP.AUTO: ABNORMAL
MICRO URNS: ABNORMAL
NITRITE UR QL STRIP.AUTO: NEGATIVE
PH UR STRIP.AUTO: 5.5 [PH] (ref 5–8)
PROT UR QL STRIP: NEGATIVE MG/DL
RBC # URNS HPF: ABNORMAL /HPF
RBC UR QL AUTO: NEGATIVE
SP GR UR STRIP.AUTO: 1.02
UROBILINOGEN UR STRIP.AUTO-MCNC: 0.2 MG/DL
WBC #/AREA URNS HPF: ABNORMAL /HPF

## 2024-03-29 PROCEDURE — 81001 URINALYSIS AUTO W/SCOPE: CPT

## 2024-03-29 PROCEDURE — 87086 URINE CULTURE/COLONY COUNT: CPT

## 2024-03-29 PROCEDURE — 3078F DIAST BP <80 MM HG: CPT | Performed by: FAMILY MEDICINE

## 2024-03-29 PROCEDURE — 99214 OFFICE O/P EST MOD 30 MIN: CPT | Performed by: FAMILY MEDICINE

## 2024-03-29 PROCEDURE — 87077 CULTURE AEROBIC IDENTIFY: CPT

## 2024-03-29 PROCEDURE — 3074F SYST BP LT 130 MM HG: CPT | Performed by: FAMILY MEDICINE

## 2024-03-29 ASSESSMENT — FIBROSIS 4 INDEX: FIB4 SCORE: 1.2

## 2024-03-29 NOTE — ASSESSMENT & PLAN NOTE
Persistent elevated alk phos, patient has chronic fatigue  Has normal h/h, has elevated protein and alk phos.   Had normal electrophoresis.

## 2024-03-30 LAB
BACTERIA UR CULT: NORMAL
SIGNIFICANT IND 70042: NORMAL
SITE SITE: NORMAL
SOURCE SOURCE: NORMAL

## 2024-04-09 ENCOUNTER — TELEMEDICINE (OUTPATIENT)
Dept: MEDICAL GROUP | Facility: PHYSICIAN GROUP | Age: 74
End: 2024-04-09
Payer: MEDICARE

## 2024-04-09 VITALS — HEIGHT: 66 IN | BODY MASS INDEX: 28.12 KG/M2 | WEIGHT: 175 LBS

## 2024-04-09 DIAGNOSIS — R30.0 DYSURIA: ICD-10-CM

## 2024-04-09 DIAGNOSIS — G62.9 NEUROPATHY: ICD-10-CM

## 2024-04-09 DIAGNOSIS — I63.9 CEREBROVASCULAR ACCIDENT (CVA), UNSPECIFIED MECHANISM (HCC): ICD-10-CM

## 2024-04-09 DIAGNOSIS — Z11.1 SCREENING-PULMONARY TB: ICD-10-CM

## 2024-04-09 PROCEDURE — 99214 OFFICE O/P EST MOD 30 MIN: CPT | Mod: 95 | Performed by: FAMILY MEDICINE

## 2024-04-09 ASSESSMENT — FIBROSIS 4 INDEX: FIB4 SCORE: 1.2

## 2024-04-09 NOTE — ASSESSMENT & PLAN NOTE
Improved pain of feet with gabapentin and foot massages from his wife.   He has been feeling better as getting a good nights sleep.

## 2024-04-09 NOTE — PROGRESS NOTES
Virtual Visit: Established Patient   This visit was conducted via Zoom using secure and encrypted videoconferencing technology.   The patient was in their home in the [facility].    The patient's identity was confirmed and verbal consent was obtained for this virtual visit.     Subjective:   CC:   Chief Complaint   Patient presents with    Paperwork       Gokul Baca is a 73 y.o. adult presenting for evaluation and management of:    Neuropathy  Improved pain of feet with gabapentin and foot massages from his wife.   He has been feeling better as getting a good nights sleep.     Stroke (HCC)    He had a stroke Dec 2021 and has left sided residual weakness and in a WC.   Looking into temporary assisted living placement.   TB blood test ordered for this.   Patient declines statin, is on asa 81 mg       Current medicines (including changes today)  Current Outpatient Medications   Medication Sig Dispense Refill    gabapentin (NEURONTIN) 100 MG Cap TAKE 2 CAPSULE BY MOUTH NIGHTLY FOR NEUROPATHY 180 Capsule 3    erythromycin 5 MG/GM Ointment Apply 1 Application to both eyes at bedtime. 3.5 g 0    aspirin EC (ECOTRIN) 81 MG Tablet Delayed Response Take 81 mg by mouth every day.      omeprazole (PRILOSEC) 20 MG delayed-release capsule Take 20 mg by mouth 2 times a day.       No current facility-administered medications for this visit.       Patient Active Problem List    Diagnosis Date Noted    Hemiplegia and hemiparesis following cerebral infarction affecting left non-dominant side ([facility]) 02/13/2024    Typical atrial flutter ([facility]) 02/13/2024    Left leg weakness 02/13/2024    Acute conjunctivitis of both eyes 02/09/2024    Left thigh pain 02/09/2024    Left foot pain 12/12/2023    Idiopathic hypotension 11/14/2023    H/O gastric sleeve 10/16/2023    Weakness 08/08/2023    Acute pain of left shoulder 06/23/2023    History of atrial fibrillation 05/31/2023    Elevated alkaline phosphatase level 05/31/2023     "Neuropathic pain 05/23/2023    Gastroenteritis 05/04/2023    Concussion without loss of consciousness 05/04/2023    Neuropathy 12/29/2022    Physical deconditioning 12/29/2022    Presence of Watchman left atrial appendage closure device 10/13/2022    S/P ablation of atrial fibrillation 09/14/2022    DNR (do not resuscitate) 03/30/2022    DNI (do not intubate) 03/30/2022    ROB (obstructive sleep apnea) 03/30/2022    Obesity (BMI 30-39.9) 02/16/2022    Nasal dryness 02/15/2022    Hemorrhoids 01/19/2022    Hip pain, left 01/06/2022    Spasticity 01/03/2022    Iron deficiency anemia 01/03/2022    Anemia 12/27/2021    Hypokalemia 12/26/2021    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    Edema 12/25/2021    History of hemorrhagic cerebrovascular accident (CVA) with residual deficit 12/24/2021    Impaired mobility and ADLs 12/24/2021    Otitis externa 12/24/2021    Morbid obesity with BMI of 40.0-44.9, adult (HCC) 12/24/2021    GERD (gastroesophageal reflux disease) 12/23/2021    Intracranial hemorrhage (HCC) 12/19/2021    Hyperlipemia 12/19/2021    Stroke (HCC) 12/19/2021    Age-related nuclear cataract of both eyes 12/19/2021    Irregular astigmatism of both eyes 12/19/2021        Objective:   Ht 1.664 m (5' 5.5\") Comment: pr virtual  Wt 79.4 kg (175 lb) Comment: pr virtual  BMI 28.68 kg/m²     Physical Exam:  Constitutional: Alert, no distress, well-groomed.  Skin: No rashes in visible areas.  Eye: Round. Conjunctiva clear, lids normal. No icterus.   ENMT: Lips pink without lesions, good dentition, moist mucous membranes. Phonation normal.  Neck: No masses, no thyromegaly. Moves freely without pain.  Respiratory: Unlabored respiratory effort, no cough or audible wheeze  Psych: Alert and oriented x3, normal affect and mood.     Assessment and Plan:   The following treatment plan was discussed:     1. Screening-pulmonary TB  - Quantiferon Gold TB (PPD); Future    2. Neuropathy    3. Cerebrovascular accident (CVA), unspecified " mechanism (HCC)      Follow-up: Return for As Scheduled.

## 2024-04-09 NOTE — ASSESSMENT & PLAN NOTE
He had a stroke Dec 2021 and has left sided residual weakness and in a WC.   Looking into temporary assisted living placement.   TB blood test ordered for this.   Patient declines statin, is on asa 81 mg

## 2024-04-10 NOTE — PROGRESS NOTES
"Subjective:   Gokul Baca is a 73 y.o. adult here today for evaluation and management of:     Elevated alkaline phosphatase level  Persistent elevated alk phos, patient has chronic fatigue  Has normal h/h, has elevated protein and alk phos.   Had normal electrophoresis.      HCC Gap Form    Last edited 03/29/24 11:33 PDT by Jennifer Waldron M.D.         Current medicines (including changes today)  Current Outpatient Medications   Medication Sig Dispense Refill    gabapentin (NEURONTIN) 100 MG Cap TAKE 2 CAPSULE BY MOUTH NIGHTLY FOR NEUROPATHY 180 Capsule 3    erythromycin 5 MG/GM Ointment Apply 1 Application to both eyes at bedtime. 3.5 g 0    aspirin EC (ECOTRIN) 81 MG Tablet Delayed Response Take 81 mg by mouth every day.      omeprazole (PRILOSEC) 20 MG delayed-release capsule Take 20 mg by mouth 2 times a day.       No current facility-administered medications for this visit.     He  has a past medical history of Anemia, Atrial fibrillation (formerly [facility]), GERD (gastroesophageal reflux disease), Heart burn, High cholesterol, Hyperlipidemia, Hypertension, Iron deficiency anemia, Pneumonia, Sleep apnea, Snoring, Stroke (formerly [facility]) (12/19/2021), and UTI (urinary tract infection) (03/2022).    ROS  No chest pain, no shortness of breath, no abdominal pain       Objective:     /68 (BP Location: Left arm, Patient Position: Sitting, BP Cuff Size: Adult)   Pulse 94   Temp 36.8 °C (98.3 °F) (Temporal)   Resp 16   Ht 1.664 m (5' 5.5\")   Wt 81.6 kg (180 lb)   SpO2 98%  Body mass index is 29.5 kg/m².   Physical Exam:  Constitutional: Alert, no distress.  Skin: Warm, dry, good turgor, no rashes in visible areas.  Eye: Equal, round and reactive, conjunctiva clear, lids normal.  ENMT: Lips without lesions, good dentition, oropharynx clear.  Neck: Trachea midline, no masses, no thyromegaly. No cervical or supraclavicular lymphadenopathy  Respiratory: Unlabored respiratory effort, lungs clear to auscultation, no wheezes, no " aldair.  Cardiovascular: Normal S1, S2, no murmur, no edema.  Abdomen: Soft, non-tender, no masses, no hepatosplenomegaly.  Psych: Alert and oriented x3, normal affect and mood.    Assessment and Plan:   The following treatment plan was discussed    1. Dysuria  - URINALYSIS; Future  - URINE CULTURE(NEW); Future    2. Colon cancer screening  - COLOGUARD (FIT DNA)    3. Elevated alkaline phosphatase level  - NM-BONE/JOINT SCAN WHOLE BODY; Future    4. Elevated blood protein  - NM-BONE/JOINT SCAN WHOLE BODY; Future    5. Fatigue, unspecified type  - CBC WITH DIFFERENTIAL; Future      Followup: No follow-ups on file.

## 2024-05-09 DIAGNOSIS — E61.1 IRON DEFICIENCY: ICD-10-CM

## 2024-05-09 DIAGNOSIS — R74.8 ELEVATED ALKALINE PHOSPHATASE LEVEL: ICD-10-CM

## 2024-05-13 ENCOUNTER — HOSPITAL ENCOUNTER (OUTPATIENT)
Dept: LAB | Facility: MEDICAL CENTER | Age: 74
End: 2024-05-13
Attending: FAMILY MEDICINE
Payer: MEDICARE

## 2024-05-13 DIAGNOSIS — M79.605 PAIN OF LEFT LOWER EXTREMITY: ICD-10-CM

## 2024-05-13 DIAGNOSIS — E78.5 HYPERLIPIDEMIA, UNSPECIFIED HYPERLIPIDEMIA TYPE: ICD-10-CM

## 2024-05-13 DIAGNOSIS — Z11.1 SCREENING-PULMONARY TB: ICD-10-CM

## 2024-05-13 DIAGNOSIS — R53.83 FATIGUE, UNSPECIFIED TYPE: ICD-10-CM

## 2024-05-13 DIAGNOSIS — E61.1 IRON DEFICIENCY: ICD-10-CM

## 2024-05-13 DIAGNOSIS — R30.0 DYSURIA: ICD-10-CM

## 2024-05-13 LAB
ALBUMIN SERPL BCP-MCNC: 4 G/DL (ref 3.2–4.9)
ALBUMIN/GLOB SERPL: 1.1 G/DL
ALP SERPL-CCNC: 296 U/L (ref 30–99)
ALT SERPL-CCNC: 39 U/L (ref 2–50)
ANION GAP SERPL CALC-SCNC: 13 MMOL/L (ref 7–16)
AST SERPL-CCNC: 47 U/L (ref 12–45)
BASOPHILS # BLD AUTO: 1.4 % (ref 0–1.8)
BASOPHILS # BLD: 0.08 K/UL (ref 0–0.12)
BILIRUB SERPL-MCNC: 0.6 MG/DL (ref 0.1–1.5)
BUN SERPL-MCNC: 15 MG/DL (ref 8–22)
CALCIUM ALBUM COR SERPL-MCNC: 9.6 MG/DL (ref 8.5–10.5)
CALCIUM SERPL-MCNC: 9.6 MG/DL (ref 8.5–10.5)
CHLORIDE SERPL-SCNC: 103 MMOL/L (ref 96–112)
CHOLEST SERPL-MCNC: 322 MG/DL (ref 100–199)
CO2 SERPL-SCNC: 22 MMOL/L (ref 20–33)
CREAT SERPL-MCNC: 0.72 MG/DL (ref 0.5–1.4)
D DIMER PPP IA.FEU-MCNC: 1.04 UG/ML (FEU) (ref 0–0.5)
EOSINOPHIL # BLD AUTO: 0.34 K/UL (ref 0–0.51)
EOSINOPHIL NFR BLD: 5.9 % (ref 0–6.9)
ERYTHROCYTE [DISTWIDTH] IN BLOOD BY AUTOMATED COUNT: 52.8 FL (ref 35.9–50)
FERRITIN SERPL-MCNC: 133 NG/ML (ref 22–322)
GFR SERPLBLD CREATININE-BSD FMLA CKD-EPI: 97 ML/MIN/1.73 M 2
GLOBULIN SER CALC-MCNC: 3.5 G/DL (ref 1.9–3.5)
GLUCOSE SERPL-MCNC: 83 MG/DL (ref 65–99)
HCT VFR BLD AUTO: 48.7 % (ref 42–52)
HDLC SERPL-MCNC: 50 MG/DL
HGB BLD-MCNC: 15.6 G/DL (ref 14–18)
IMM GRANULOCYTES # BLD AUTO: 0.02 K/UL (ref 0–0.11)
IMM GRANULOCYTES NFR BLD AUTO: 0.3 % (ref 0–0.9)
IRON SATN MFR SERPL: 34 % (ref 15–55)
IRON SERPL-MCNC: 94 UG/DL (ref 50–180)
LDLC SERPL CALC-MCNC: 248 MG/DL
LYMPHOCYTES # BLD AUTO: 1.98 K/UL (ref 1–4.8)
LYMPHOCYTES NFR BLD: 34.6 % (ref 22–41)
MCH RBC QN AUTO: 29.9 PG (ref 27–33)
MCHC RBC AUTO-ENTMCNC: 32 G/DL (ref 32.3–36.5)
MCV RBC AUTO: 93.5 FL (ref 81.4–97.8)
MONOCYTES # BLD AUTO: 0.56 K/UL (ref 0–0.85)
MONOCYTES NFR BLD AUTO: 9.8 % (ref 0–13.4)
NEUTROPHILS # BLD AUTO: 2.74 K/UL (ref 1.82–7.42)
NEUTROPHILS NFR BLD: 48 % (ref 44–72)
NRBC # BLD AUTO: 0 K/UL
NRBC BLD-RTO: 0 /100 WBC (ref 0–0.2)
PLATELET # BLD AUTO: 210 K/UL (ref 164–446)
PMV BLD AUTO: 11.1 FL (ref 9–12.9)
POTASSIUM SERPL-SCNC: 4 MMOL/L (ref 3.6–5.5)
PROT SERPL-MCNC: 7.5 G/DL (ref 6–8.2)
RBC # BLD AUTO: 5.21 M/UL (ref 4.7–6.1)
SODIUM SERPL-SCNC: 138 MMOL/L (ref 135–145)
TIBC SERPL-MCNC: 273 UG/DL (ref 250–450)
TRIGL SERPL-MCNC: 119 MG/DL (ref 0–149)
UIBC SERPL-MCNC: 179 UG/DL (ref 110–370)
WBC # BLD AUTO: 5.7 K/UL (ref 4.8–10.8)

## 2024-05-14 ENCOUNTER — HOSPITAL ENCOUNTER (OUTPATIENT)
Facility: MEDICAL CENTER | Age: 74
End: 2024-05-14
Attending: FAMILY MEDICINE
Payer: MEDICARE

## 2024-05-14 LAB
GAMMA INTERFERON BACKGROUND BLD IA-ACNC: 0.04 IU/ML
M TB IFN-G BLD-IMP: NEGATIVE
M TB IFN-G CD4+ BCKGRND COR BLD-ACNC: 0.01 IU/ML
MITOGEN IGNF BCKGRD COR BLD-ACNC: 2.58 IU/ML
QFT TB2 - NIL TBQ2: 0 IU/ML

## 2024-05-14 NOTE — PROGRESS NOTES
Chief Complaint   Patient presents with    Atrial Fibrillation     F/v Dx: Paroxysmal atrial fibrillation (HCC)       Subjective     Juan Antonio Baca is a 73 y.o. adult patient of Dr. Brown who presents today with c/o    Patient was last seen by Dr. Brown on 11/8/2023. Patient was not feeling well with c/o weakness and hypotension. Dr. Brown recommended ER but patient declined. Patient was advised to hold losartan and his hydralazine.     Patient was last seen by myself on 2/13/2024. He did not have any specific cardiac complaints, felt generally unwell. TTE was ordered.     PMH pertinent for pAF/AFL s/p ablation and DANNI-C on ASA.    Today patient states he continues to struggle with severe fatigue. Wife reports that patient is in bed about 90% of the time. They are thinking about moving to [place] into a senior community, wife would like to keep patient our of assisted living or skilled nursing as long as she can. Patient states that he has good days, today he is feeling particularly weak. Has his head on a pillow in the exam room. BP low on recheck by myself (76/54). Declines ill symptom, no fever or chills.     Past Medical History:   Diagnosis Date    Anemia     Atrial fibrillation (HCC)     GERD (gastroesophageal reflux disease)     Heart burn     High cholesterol     Hyperlipidemia     Hypertension     Iron deficiency anemia     Pneumonia     as a young adult     Sleep apnea     cpap     Snoring     Stroke (HCC) 12/19/2021    left sided weakness     UTI (urinary tract infection) 03/2022     Past Surgical History:   Procedure Laterality Date    KNEE ARTHROPLASTY TOTAL Left     OTHER      sinus sx      Family History   Problem Relation Age of Onset    Heart Disease Mother     Heart Disease Brother     Alcohol abuse Son      Social History     Socioeconomic History    Marital status:      Spouse name: Not on file    Number of children: Not on file    Years of education: Not on file    Highest education level:  Master's degree (e.g., MA, MS, Leslie, MEd, MSW, AMY)   Occupational History    Not on file   Tobacco Use    Smoking status: Never    Smokeless tobacco: Never   Vaping Use    Vaping status: Never Used   Substance and Sexual Activity    Alcohol use: Never    Drug use: Never    Sexual activity: Not on file   Other Topics Concern    Not on file   Social History Narrative    Retired     Social Determinants of Health     Financial Resource Strain: Patient Declined (8/8/2023)    Overall Financial Resource Strain (CARDIA)     Difficulty of Paying Living Expenses: Patient declined   Food Insecurity: Patient Declined (8/8/2023)    Hunger Vital Sign     Worried About Running Out of Food in the Last Year: Patient declined     Ran Out of Food in the Last Year: Patient declined   Transportation Needs: Patient Declined (8/8/2023)    PRAPARE - Transportation     Lack of Transportation (Medical): Patient declined     Lack of Transportation (Non-Medical): Patient declined   Physical Activity: Inactive (3/13/2022)    Exercise Vital Sign     Days of Exercise per Week: 0 days     Minutes of Exercise per Session: 0 min   Stress: No Stress Concern Present (3/13/2022)    Citizen of [place] of Occupational Health - Occupational Stress Questionnaire     Feeling of Stress : Only a little   Social Connections: Unknown (8/8/2023)    Social Connection and Isolation Panel [NHANES]     Frequency of Communication with Friends and Family: Patient declined     Frequency of Social Gatherings with Friends and Family: Patient declined     Attends Confucianist Services: Patient declined     Active Member of Clubs or Organizations: Patient declined     Attends Club or Organization Meetings: Patient declined     Marital Status:    Intimate Partner Violence: Not on file   Housing Stability: Unknown (8/8/2023)    Housing Stability Vital Sign     Unable to Pay for Housing in the Last Year: Patient refused     Number of Places Lived in the Last Year: Not on  "file     Unstable Housing in the Last Year: Patient refused     Allergies   Allergen Reactions    Codeine Unspecified     \"Become out of it\"    Ibuprofen Hives    Lactose Unspecified     Stomach upset      Outpatient Encounter Medications as of 5/21/2024   Medication Sig Dispense Refill    erythromycin 5 MG/GM Ointment Apply 1 Application to both eyes at bedtime. 3.5 g 0    aspirin EC (ECOTRIN) 81 MG Tablet Delayed Response Take 81 mg by mouth every day.      omeprazole (PRILOSEC) 20 MG delayed-release capsule Take 20 mg by mouth 2 times a day.      [DISCONTINUED] gabapentin (NEURONTIN) 100 MG Cap TAKE 2 CAPSULE BY MOUTH NIGHTLY FOR NEUROPATHY (Patient not taking: Reported on 5/15/2024) 180 Capsule 3     No facility-administered encounter medications on file as of 5/21/2024.     Review of Systems   Constitutional:  Negative for malaise/fatigue and weight loss.   Respiratory:  Negative for shortness of breath.    Cardiovascular:  Negative for chest pain, palpitations, orthopnea, claudication, leg swelling and PND.   Neurological:  Negative for dizziness and weakness.   All other systems reviewed and are negative.             Objective     BP 92/62 (BP Location: Left arm, Patient Position: Sitting, BP Cuff Size: Adult)   Pulse 88   Resp 16   Ht 1.676 m (5' 6\")   Wt 79.4 kg (175 lb)   SpO2 97%   BMI 28.25 kg/m²     Physical Exam  Constitutional:       Appearance: He is well-developed.      Comments: In wheelchair   HENT:      Head: Normocephalic.   Eyes:      Extraocular Movements: Extraocular movements intact.   Neck:      Vascular: No JVD.   Cardiovascular:      Rate and Rhythm: Normal rate and regular rhythm.      Heart sounds: Normal heart sounds.   Pulmonary:      Effort: No respiratory distress.      Breath sounds: Normal breath sounds.   Musculoskeletal:      Right lower leg: No edema.      Left lower leg: No edema.   Skin:     General: Skin is warm and dry.   Neurological:      Mental Status: He is alert " and oriented to person, place, and time.      Motor: Weakness present.   Psychiatric:      Comments: Lethargic            TTE 2/16/2024:  CONCLUSIONS  Left ventricular systolic function is normal.  The left ventricular ejection fraction is visually estimated to be 55%.  Grade I diastolic dysfunction.  Mildly dilated right ventricle.  Normal right ventricular systolic function.  No significant valvular disease.   Unable to estimate right atrial pressure.   Estimated right ventricular systolic pressure is 20 mmHg + right atrial pressure, likely normal.      Compared to the prior study on 5/18/2022: no significant change.     Assessment & Plan     1. Paroxysmal atrial fibrillation (HCC)  EKG      2. Other fatigue  RI ZIO PATCH MONITOR    CANCELED: [id number] PET W/FLOW RESERVE      3. Diastolic dysfunction  RIH ZIO PATCH MONITOR    CANCELED: [id number] PET W/FLOW RESERVE      4. Physical deconditioning        5. Chronic anticoagulation        6. Presence of Watchman left atrial appendage closure device        7. S/P ablation of atrial fibrillation        8. Impaired mobility and ADLs              Medical Decision Making: Today's Assessment/Status/Plan:   pAF/AFL:  - H/O PVI and RA CTI RFA by Dr. Brown on 9/12/2022.   - H/O DANNI-C with WATCHMEN, on ASA 81 mg daily.   - Maintaining NSR.     Malaise/Physical Deconditioning:  - No anemia on recent CBC.   - TTE recently completed showed no significant structural abnormalities.   - Cardiac monitor placed on patient to evaluate for possible bradyarrhythmia.   - BP very low in office, 76/54 on re-check. Recommended ER as patient is symptomatic. Patient declined going to the ER, states that he has a low BP medication at home he can take.   - Encouraged patient discuss progressive physical strength with his neurologist, ?neuro-degenerative condition.     Follow up to be determined after Zio cardiac results are received.       Future Appointments   Date Time Provider  [facility]   6/20/2024  1:40 PM Jennifer Waldron M.D. [facility]   7/25/2024  1:40 PM Jennifer Waldron M.D. [facility]   8/22/2024  1:20 PM Jennifer Waldron M.D. [facility]   9/19/2024  1:20 PM Jennifer Waldron M.D. [facility]   10/24/2024  1:20 PM Jennifer Waldron M.D. [facility]   11/21/2024  1:20 PM Jennifer Waldron M.D. [facility]   12/19/2024  1:20 PM Jennifer Waldron M.D. [facility]

## 2024-05-15 ENCOUNTER — APPOINTMENT (OUTPATIENT)
Dept: MEDICAL GROUP | Facility: PHYSICIAN GROUP | Age: 74
End: 2024-05-15
Payer: MEDICARE

## 2024-05-15 VITALS
HEIGHT: 66 IN | WEIGHT: 175 LBS | RESPIRATION RATE: 16 BRPM | SYSTOLIC BLOOD PRESSURE: 114 MMHG | HEART RATE: 76 BPM | DIASTOLIC BLOOD PRESSURE: 72 MMHG | TEMPERATURE: 98.4 F | OXYGEN SATURATION: 97 % | BODY MASS INDEX: 28.12 KG/M2

## 2024-05-15 DIAGNOSIS — R79.89 POSITIVE D DIMER: ICD-10-CM

## 2024-05-15 DIAGNOSIS — R74.8 ELEVATED ALKALINE PHOSPHATASE LEVEL: ICD-10-CM

## 2024-05-15 PROBLEM — S06.0X0A CONCUSSION WITHOUT LOSS OF CONSCIOUSNESS: Status: RESOLVED | Noted: 2023-05-04 | Resolved: 2024-05-15

## 2024-05-15 LAB
BACTERIA UR CULT: NORMAL
SIGNIFICANT IND 70042: NORMAL
SITE SITE: NORMAL
SOURCE SOURCE: NORMAL

## 2024-05-15 PROCEDURE — 3074F SYST BP LT 130 MM HG: CPT | Performed by: FAMILY MEDICINE

## 2024-05-15 PROCEDURE — 99214 OFFICE O/P EST MOD 30 MIN: CPT | Performed by: FAMILY MEDICINE

## 2024-05-15 PROCEDURE — 3078F DIAST BP <80 MM HG: CPT | Performed by: FAMILY MEDICINE

## 2024-05-15 ASSESSMENT — FIBROSIS 4 INDEX: FIB4 SCORE: 2.54

## 2024-05-15 NOTE — ASSESSMENT & PLAN NOTE
Likely due to old hemorrhagic stroke a few years ago.   He has neuropathy and arthritis of left foot, left knee left hip  He is on asa 81 mg  Will check LE doppler.

## 2024-05-15 NOTE — PROGRESS NOTES
"Subjective:   Gokul Baca is a 73 y.o. adult here today for evaluation and management of:     Positive D dimer  Likely due to old hemorrhagic stroke a few years ago.   He has neuropathy and arthritis of left foot, left knee left hip  He is on asa 81 mg  Will check LE doppler.     Elevated alkaline phosphatase level  Persistent elevated alk phos, patient has chronic fatigue  Has normal h/h, has elevated protein and alk phos.   Had normal electrophoresis.   He will make an appt with GI  He had a normal liver US 2022         Current medicines (including changes today)  Current Outpatient Medications   Medication Sig Dispense Refill    erythromycin 5 MG/GM Ointment Apply 1 Application to both eyes at bedtime. 3.5 g 0    aspirin EC (ECOTRIN) 81 MG Tablet Delayed Response Take 81 mg by mouth every day.      omeprazole (PRILOSEC) 20 MG delayed-release capsule Take 20 mg by mouth 2 times a day.       No current facility-administered medications for this visit.     He  has a past medical history of Anemia, Atrial fibrillation (HCC), GERD (gastroesophageal reflux disease), Heart burn, High cholesterol, Hyperlipidemia, Hypertension, Iron deficiency anemia, Pneumonia, Sleep apnea, Snoring, Stroke (HCC) (12/19/2021), and UTI (urinary tract infection) (03/2022).    ROS  No chest pain, no shortness of breath, no abdominal pain       Objective:     /72 (BP Location: Right arm, Patient Position: Sitting, BP Cuff Size: Adult long)   Pulse 76   Temp 36.9 °C (98.4 °F) (Temporal)   Resp 16   Ht 1.676 m (5' 6\")   Wt 79.4 kg (175 lb)   SpO2 97%  Body mass index is 28.25 kg/m².   Physical Exam:  Constitutional: Alert, no distress. In WC  Skin: Warm, dry, good turgor, no rashes in visible areas.  Eye: Equal, round and reactive, conjunctiva clear, lids normal.  ENMT: Lips without lesions, good dentition, oropharynx clear.  Neck: Trachea midline, no masses, no thyromegaly. No cervical or supraclavicular " lymphadenopathy  Respiratory: Unlabored respiratory effort, lungs clear to auscultation, no wheezes, no ronchi.  Cardiovascular: Normal S1, S2, no murmur, no edema.  Abdomen: Soft, non-tender, no masses, no hepatosplenomegaly.  Psych: Alert and oriented x3, normal affect and mood.    Assessment and Plan:   The following treatment plan was discussed    1. Positive D dimer    2. Elevated alkaline phosphatase level      Followup: Return for As Scheduled.

## 2024-05-15 NOTE — ASSESSMENT & PLAN NOTE
Persistent elevated alk phos, patient has chronic fatigue  Has normal h/h, has elevated protein and alk phos.   Had normal electrophoresis.   He will make an appt with GI  He had a normal liver US 2022

## 2024-05-17 ENCOUNTER — HOSPITAL ENCOUNTER (OUTPATIENT)
Facility: MEDICAL CENTER | Age: 74
End: 2024-05-17
Attending: FAMILY MEDICINE
Payer: MEDICARE

## 2024-05-17 ENCOUNTER — TELEPHONE (OUTPATIENT)
Dept: MEDICAL GROUP | Facility: PHYSICIAN GROUP | Age: 74
End: 2024-05-17
Payer: MEDICARE

## 2024-05-17 DIAGNOSIS — R30.0 DYSURIA: ICD-10-CM

## 2024-05-21 ENCOUNTER — NON-PROVIDER VISIT (OUTPATIENT)
Dept: CARDIOLOGY | Facility: MEDICAL CENTER | Age: 74
End: 2024-05-21
Attending: NURSE PRACTITIONER
Payer: MEDICARE

## 2024-05-21 VITALS
WEIGHT: 175 LBS | SYSTOLIC BLOOD PRESSURE: 92 MMHG | HEIGHT: 66 IN | RESPIRATION RATE: 16 BRPM | BODY MASS INDEX: 28.12 KG/M2 | OXYGEN SATURATION: 97 % | HEART RATE: 88 BPM | DIASTOLIC BLOOD PRESSURE: 62 MMHG

## 2024-05-21 DIAGNOSIS — I48.0 PAROXYSMAL ATRIAL FIBRILLATION (HCC): ICD-10-CM

## 2024-05-21 DIAGNOSIS — R53.83 OTHER FATIGUE: ICD-10-CM

## 2024-05-21 DIAGNOSIS — Z79.01 CHRONIC ANTICOAGULATION: ICD-10-CM

## 2024-05-21 DIAGNOSIS — Z95.818 PRESENCE OF WATCHMAN LEFT ATRIAL APPENDAGE CLOSURE DEVICE: ICD-10-CM

## 2024-05-21 DIAGNOSIS — Z98.890 S/P ABLATION OF ATRIAL FIBRILLATION: ICD-10-CM

## 2024-05-21 DIAGNOSIS — Z86.79 S/P ABLATION OF ATRIAL FIBRILLATION: ICD-10-CM

## 2024-05-21 DIAGNOSIS — I51.89 DIASTOLIC DYSFUNCTION: ICD-10-CM

## 2024-05-21 DIAGNOSIS — R53.81 PHYSICAL DECONDITIONING: ICD-10-CM

## 2024-05-21 DIAGNOSIS — Z74.09 IMPAIRED MOBILITY AND ADLS: ICD-10-CM

## 2024-05-21 DIAGNOSIS — Z78.9 IMPAIRED MOBILITY AND ADLS: ICD-10-CM

## 2024-05-21 LAB — EKG IMPRESSION: NORMAL

## 2024-05-21 PROCEDURE — 99214 OFFICE O/P EST MOD 30 MIN: CPT | Performed by: NURSE PRACTITIONER

## 2024-05-21 PROCEDURE — 3078F DIAST BP <80 MM HG: CPT | Performed by: NURSE PRACTITIONER

## 2024-05-21 PROCEDURE — 93010 ELECTROCARDIOGRAM REPORT: CPT | Performed by: INTERNAL MEDICINE

## 2024-05-21 PROCEDURE — 3074F SYST BP LT 130 MM HG: CPT | Performed by: NURSE PRACTITIONER

## 2024-05-21 PROCEDURE — 2023F DILAT RTA XM W/O RTNOPTHY: CPT | Performed by: NURSE PRACTITIONER

## 2024-05-21 ASSESSMENT — FIBROSIS 4 INDEX: FIB4 SCORE: 2.54

## 2024-05-21 ASSESSMENT — ENCOUNTER SYMPTOMS
WEIGHT LOSS: 0
ORTHOPNEA: 0
PND: 0
CLAUDICATION: 0
DIZZINESS: 0
PALPITATIONS: 0
SHORTNESS OF BREATH: 0
WEAKNESS: 0

## 2024-05-22 NOTE — PROGRESS NOTES
Patient enrolled in the 14 day o Holter monitoring program per CRYSTAL Fuller.  >Office hook-up by Emily, serial # [id number].  >Currently pending EOS.

## 2024-05-23 RX ORDER — AMOXICILLIN 500 MG/1
500 CAPSULE ORAL 3 TIMES DAILY
Qty: 15 CAPSULE | Refills: 0 | Status: SHIPPED | OUTPATIENT
Start: 2024-05-23 | End: 2024-05-28

## 2024-06-20 ENCOUNTER — E-CONSULT (OUTPATIENT)
Dept: HEMATOLOGY ONCOLOGY | Facility: MEDICAL CENTER | Age: 74
End: 2024-06-20

## 2024-06-20 ENCOUNTER — OFFICE VISIT (OUTPATIENT)
Dept: MEDICAL GROUP | Facility: PHYSICIAN GROUP | Age: 74
End: 2024-06-20
Payer: MEDICARE

## 2024-06-20 VITALS
HEART RATE: 80 BPM | RESPIRATION RATE: 16 BRPM | BODY MASS INDEX: 28.25 KG/M2 | DIASTOLIC BLOOD PRESSURE: 80 MMHG | SYSTOLIC BLOOD PRESSURE: 120 MMHG | OXYGEN SATURATION: 100 % | TEMPERATURE: 97.2 F | HEIGHT: 66 IN

## 2024-06-20 DIAGNOSIS — Z12.5 SCREENING FOR MALIGNANT NEOPLASM OF PROSTATE: ICD-10-CM

## 2024-06-20 DIAGNOSIS — Z71.9 ENCOUNTER FOR CONSULTATION: ICD-10-CM

## 2024-06-20 DIAGNOSIS — R53.83 FATIGUE, UNSPECIFIED TYPE: ICD-10-CM

## 2024-06-20 DIAGNOSIS — R74.8 ELEVATED ALKALINE PHOSPHATASE LEVEL: ICD-10-CM

## 2024-06-20 PROCEDURE — 3079F DIAST BP 80-89 MM HG: CPT | Performed by: FAMILY MEDICINE

## 2024-06-20 PROCEDURE — 3074F SYST BP LT 130 MM HG: CPT | Performed by: FAMILY MEDICINE

## 2024-06-20 PROCEDURE — 99214 OFFICE O/P EST MOD 30 MIN: CPT | Performed by: FAMILY MEDICINE

## 2024-06-20 NOTE — PROGRESS NOTES
"Subjective:   Gokul Baca is a 73 y.o. adult here today for evaluation and management of:     Elevated alkaline phosphatase level  Persistent elevated alk phos, patient has chronic fatigue, now in a WC, this is also since his stroke in 2021.   Has normal h/h, has elevated protein and alk phos.   Had normal electrophoresis.   New referral to GI provided, he was unable to see then a few years ago.   He had a normal liver US 2022   Latest Reference Range & Units 10/16/23 11:39 10/17/23 01:23 05/13/24 09:05   Alkaline Phosphatase 30 - 99 U/L 210 (H) 195 (H) 296 (H)   (H): Data is abnormally high    Elevated alk phos 381 in 2022, repeat ordered.     Normal cologuard 2024           Current medicines (including changes today)  Current Outpatient Medications   Medication Sig Dispense Refill    aspirin EC (ECOTRIN) 81 MG Tablet Delayed Response Take 81 mg by mouth every day.      omeprazole (PRILOSEC) 20 MG delayed-release capsule Take 20 mg by mouth 2 times a day.       No current facility-administered medications for this visit.     He  has a past medical history of Anemia, Atrial fibrillation (HCC), GERD (gastroesophageal reflux disease), Heart burn, High cholesterol, Hyperlipidemia, Hypertension, Iron deficiency anemia, Pneumonia, Sleep apnea, Snoring, Stroke (HCC) (12/19/2021), and UTI (urinary tract infection) (03/2022).    ROS  No chest pain, no shortness of breath, no abdominal pain       Objective:     /80   Pulse 80   Temp 36.2 °C (97.2 °F) (Temporal)   Resp 16   Ht 1.676 m (5' 6\")   SpO2 100%  Body mass index is 28.25 kg/m².   Physical Exam: in WC  Constitutional: Alert, no distress.  Skin: Warm, dry, good turgor, no rashes in visible areas.  Eye: Equal, round and reactive, conjunctiva clear, lids normal.  ENMT: Lips without lesions, good dentition, oropharynx clear.  Neck: Trachea midline, no masses, no thyromegaly. No cervical or supraclavicular lymphadenopathy  Respiratory: Unlabored " respiratory effort, lungs clear to auscultation, no wheezes, no ronchi.  Cardiovascular: Normal S1, S2, no murmur, no edema.  Abdomen: Soft, non-tender, no masses, no hepatosplenomegaly.  Psych: Alert and oriented x3, normal affect and mood.    Assessment and Plan:   The following treatment plan was discussed    1. Elevated alkaline phosphatase level  - Referral to Gastroenterology  - GAMMA GT (GGT); Future  - E-consult to Hematology/Oncology  - US-RUQ; Future    2. Screening for malignant neoplasm of prostate  - PROSTATE SPECIFIC AG SCREENING; Future    3. Fatigue, unspecified type  - LIPASE; Future      Followup: No follow-ups on file.

## 2024-06-20 NOTE — ASSESSMENT & PLAN NOTE
Persistent elevated alk phos, patient has chronic fatigue, now in a WC, this is also since his stroke in 2021.   Has normal h/h, has elevated protein and alk phos.   Had normal electrophoresis.   New referral to GI provided, he was unable to see then a few years ago.   He had a normal liver US 2022   Latest Reference Range & Units 10/16/23 11:39 10/17/23 01:23 05/13/24 09:05   Alkaline Phosphatase 30 - 99 U/L 210 (H) 195 (H) 296 (H)   (H): Data is abnormally high    Elevated alk phos 381 in 2022, repeat ordered.     Normal cologuard 2024

## 2024-06-21 NOTE — PROGRESS NOTES
E-Consult Response     After careful review of the patient's information available in the medical record, the following are my findings and recommendations:    Reason for consult:  Abnormal labs, elevated alk phos and GGT    Summary of data reviewed: Alk phos 296, GGT elevated. Liver imaging and referral to GI placed.     Recommendations: No other recommendations at this time. Patient will need imaging and GI evaluation, follow up results.     E-Consult Time: 6 minutes were spent with >50% of the total time spent reviewing items outlined in the summary of data reviewed (Use code [id number])    Oc Landeros D.O.

## 2024-06-27 ENCOUNTER — OFFICE VISIT (OUTPATIENT)
Dept: MEDICAL GROUP | Facility: PHYSICIAN GROUP | Age: 74
End: 2024-06-27
Payer: MEDICARE

## 2024-06-27 VITALS
HEIGHT: 66 IN | DIASTOLIC BLOOD PRESSURE: 66 MMHG | RESPIRATION RATE: 12 BRPM | TEMPERATURE: 97.3 F | BODY MASS INDEX: 26.22 KG/M2 | OXYGEN SATURATION: 96 % | SYSTOLIC BLOOD PRESSURE: 96 MMHG | WEIGHT: 163.14 LBS | HEART RATE: 70 BPM

## 2024-06-27 DIAGNOSIS — Z02.9 ADMINISTRATIVE ENCOUNTER: ICD-10-CM

## 2024-06-27 DIAGNOSIS — I69.30 HISTORY OF HEMORRHAGIC CEREBROVASCULAR ACCIDENT (CVA) WITH RESIDUAL DEFICIT: Chronic | ICD-10-CM

## 2024-06-27 DIAGNOSIS — Z02.2 ENCOUNTER FOR EXAMINATION FOR ADMISSION TO ASSISTED LIVING FACILITY: ICD-10-CM

## 2024-06-27 ASSESSMENT — FIBROSIS 4 INDEX: FIB4 SCORE: 2.54

## 2024-06-28 ENCOUNTER — HOSPITAL ENCOUNTER (OUTPATIENT)
Dept: LAB | Facility: MEDICAL CENTER | Age: 74
End: 2024-06-28
Attending: STUDENT IN AN ORGANIZED HEALTH CARE EDUCATION/TRAINING PROGRAM
Payer: MEDICARE

## 2024-06-28 DIAGNOSIS — Z02.2 ENCOUNTER FOR EXAMINATION FOR ADMISSION TO ASSISTED LIVING FACILITY: ICD-10-CM

## 2024-06-28 PROBLEM — E66.01 MORBID OBESITY WITH BMI OF 40.0-44.9, ADULT (HCC): Status: RESOLVED | Noted: 2021-12-24 | Resolved: 2024-06-28

## 2024-06-28 NOTE — PROGRESS NOTES
HISTORY OF PRESENT ILLNESS: Gokul is a pleasant 73 y.o. adult, established patient who presents today to discuss medical problems as listed below:    Problem   Administrative Encounter    Patient has history of CVA with left-sided deficit back 2021.  He reports that he will be going to an assisted living facility in [place] and would like some different testing done as well as paperwork filled for this.  He is currently in a powered wheelchair, transfers with assistance using a cane.  Reports will need physical therapy while at that facility.  He also reports that he is DNR and has a POLST form filled.     History of Hemorrhagic Cerebrovascular Accident (Cva) With Residual Deficit   Morbid Obesity With Bmi of 40.0-44.9, Adult (Hcc) (Resolved)        Current Outpatient Medications Ordered in Epic   Medication Sig Dispense Refill    aspirin EC (ECOTRIN) 81 MG Tablet Delayed Response Take 81 mg by mouth every day.      omeprazole (PRILOSEC) 20 MG delayed-release capsule Take 20 mg by mouth 2 times a day.       No current [facility]-ordered facility-administered medications on file.       Review of systems:  Per HPI    Patient Active Problem List    Diagnosis Date Noted    Administrative encounter 06/28/2024    Positive D dimer 05/15/2024    Hemiplegia and hemiparesis following cerebral infarction affecting left non-dominant side (HCC) 02/13/2024    Typical atrial flutter (HCC) 02/13/2024    Left leg weakness 02/13/2024    Acute conjunctivitis of both eyes 02/09/2024    Left thigh pain 02/09/2024    Left foot pain 12/12/2023    Idiopathic hypotension 11/14/2023    H/O gastric sleeve 10/16/2023    Weakness 08/08/2023    Acute pain of left shoulder 06/23/2023    History of atrial fibrillation 05/31/2023    Elevated alkaline phosphatase level 05/31/2023    Neuropathic pain 05/23/2023    Gastroenteritis 05/04/2023    Neuropathy 12/29/2022    Physical deconditioning 12/29/2022    Presence of Watchman left atrial appendage  "closure device 10/13/2022    S/P ablation of atrial fibrillation 09/14/2022    DNR (do not resuscitate) 03/30/2022    DNI (do not intubate) 03/30/2022    ROB (obstructive sleep apnea) 03/30/2022    Obesity (BMI 30-39.9) 02/16/2022    Nasal dryness 02/15/2022    Hemorrhoids 01/19/2022    Hip pain, left 01/06/2022    Spasticity 01/03/2022    Iron deficiency anemia 01/03/2022    Anemia 12/27/2021    Hypokalemia 12/26/2021    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    Edema 12/25/2021    History of hemorrhagic cerebrovascular accident (CVA) with residual deficit 12/24/2021    Impaired mobility and ADLs 12/24/2021    Otitis externa 12/24/2021    GERD (gastroesophageal reflux disease) 12/23/2021    Intracranial hemorrhage (HCC) 12/19/2021    Hyperlipemia 12/19/2021    Stroke (HCC) 12/19/2021    Age-related nuclear cataract of both eyes 12/19/2021    Irregular astigmatism of both eyes 12/19/2021     Past Surgical History:   Procedure Laterality Date    KNEE ARTHROPLASTY TOTAL Left     OTHER      sinus sx      Social History     Tobacco Use    Smoking status: Never    Smokeless tobacco: Never   Vaping Use    Vaping status: Never Used   Substance Use Topics    Alcohol use: Never    Drug use: Never      Family History   Problem Relation Age of Onset    Heart Disease Mother     Heart Disease Brother     Alcohol abuse Son      Current Outpatient Medications   Medication Sig Dispense Refill    aspirin EC (ECOTRIN) 81 MG Tablet Delayed Response Take 81 mg by mouth every day.      omeprazole (PRILOSEC) 20 MG delayed-release capsule Take 20 mg by mouth 2 times a day.       No current facility-administered medications for this visit.       Allergies:  Allergies   Allergen Reactions    Codeine Unspecified     \"Become out of it\"    Ibuprofen Hives    Lactose Unspecified     Stomach upset        Allergies, past medical history, past surgical history, family history, social history reviewed and updated.    Objective:    BP 96/66   Pulse 70   " "Temp 36.3 °C (97.3 °F) (Temporal)   Resp 12   Ht 1.676 m (5' 6\")   Wt 74 kg (163 lb 2.3 oz)   SpO2 96%   BMI 26.33 kg/m²    Body mass index is 26.33 kg/m².    Physical exam:  General: Normal appearance, no acute distress, not ill-appearing  Msk: Sitting in wheelchair, left-sided weakness in upper and lower extremities.    Assessment/Plan:    Problem List Items Addressed This Visit       History of hemorrhagic cerebrovascular accident (CVA) with residual deficit (Chronic)    Administrative encounter     Forms filled for assisted living  Still need to sign that he has not had a negative tuberculosis test, different ordered today.          Other Visit Diagnoses       Encounter for examination for admission to assisted living facility        Relevant Orders    Quantiferon Gold TB (PPD)          20 min spent discussing history of presenting illness as well as feeling paperwork    Return if symptoms worsen or fail to improve.   "

## 2024-06-28 NOTE — ASSESSMENT & PLAN NOTE
Forms filled for assisted living  Still need to sign that he has not had a negative tuberculosis test, different ordered today.